# Patient Record
Sex: FEMALE | Race: WHITE | HISPANIC OR LATINO | Employment: UNEMPLOYED | URBAN - METROPOLITAN AREA
[De-identification: names, ages, dates, MRNs, and addresses within clinical notes are randomized per-mention and may not be internally consistent; named-entity substitution may affect disease eponyms.]

---

## 2017-01-06 ENCOUNTER — GENERIC CONVERSION - ENCOUNTER (OUTPATIENT)
Dept: OTHER | Facility: OTHER | Age: 52
End: 2017-01-06

## 2017-01-31 ENCOUNTER — ALLSCRIPTS OFFICE VISIT (OUTPATIENT)
Dept: OTHER | Facility: OTHER | Age: 52
End: 2017-01-31

## 2017-01-31 DIAGNOSIS — M54.50 LOW BACK PAIN: ICD-10-CM

## 2017-01-31 DIAGNOSIS — R20.2 PARESTHESIA OF SKIN: ICD-10-CM

## 2017-02-07 ENCOUNTER — ALLSCRIPTS OFFICE VISIT (OUTPATIENT)
Dept: FAMILY MEDICINE CLINIC | Facility: CLINIC | Age: 52
End: 2017-02-07
Payer: COMMERCIAL

## 2017-02-07 PROCEDURE — T1015 CLINIC SERVICE: HCPCS | Performed by: PHYSICIAN ASSISTANT

## 2017-02-20 ENCOUNTER — HOSPITAL ENCOUNTER (OUTPATIENT)
Dept: RADIOLOGY | Facility: HOSPITAL | Age: 52
Discharge: HOME | End: 2017-02-20
Attending: ANESTHESIOLOGY
Payer: COMMERCIAL

## 2017-02-20 ENCOUNTER — TRANSCRIBE ORDERS (OUTPATIENT)
Dept: ADMINISTRATIVE | Facility: HOSPITAL | Age: 52
End: 2017-02-20

## 2017-02-20 DIAGNOSIS — M54.50 LOW BACK PAIN: ICD-10-CM

## 2017-02-20 DIAGNOSIS — R20.2 PARESTHESIA OF SKIN: ICD-10-CM

## 2017-02-20 PROCEDURE — 72110 X-RAY EXAM L-2 SPINE 4/>VWS: CPT

## 2017-02-21 ENCOUNTER — GENERIC CONVERSION - ENCOUNTER (OUTPATIENT)
Dept: OTHER | Facility: OTHER | Age: 52
End: 2017-02-21

## 2017-02-22 ENCOUNTER — HOSPITAL ENCOUNTER (EMERGENCY)
Facility: HOSPITAL | Age: 52
Discharge: HOME | End: 2017-02-22
Attending: EMERGENCY MEDICINE | Admitting: EMERGENCY MEDICINE
Payer: COMMERCIAL

## 2017-02-22 ENCOUNTER — APPOINTMENT (EMERGENCY)
Dept: RADIOLOGY | Facility: HOSPITAL | Age: 52
End: 2017-02-22
Payer: COMMERCIAL

## 2017-02-22 VITALS
RESPIRATION RATE: 18 BRPM | TEMPERATURE: 98.2 F | DIASTOLIC BLOOD PRESSURE: 65 MMHG | WEIGHT: 130 LBS | HEIGHT: 64 IN | OXYGEN SATURATION: 98 % | SYSTOLIC BLOOD PRESSURE: 122 MMHG | HEART RATE: 60 BPM | BODY MASS INDEX: 22.2 KG/M2

## 2017-02-22 DIAGNOSIS — K59.09 OTHER CONSTIPATION: Primary | ICD-10-CM

## 2017-02-22 DIAGNOSIS — G89.29 CHRONIC RIGHT-SIDED LOW BACK PAIN WITH RIGHT-SIDED SCIATICA: ICD-10-CM

## 2017-02-22 DIAGNOSIS — M54.41 CHRONIC RIGHT-SIDED LOW BACK PAIN WITH RIGHT-SIDED SCIATICA: ICD-10-CM

## 2017-02-22 LAB
BILIRUB UR QL STRIP: NEGATIVE
CLARITY UR: NORMAL
COLOR UR: YELLOW
GLUCOSE UR STRIP-MCNC: NEGATIVE MG/DL
HGB UR QL STRIP.AUTO: NEGATIVE
KETONES UR STRIP-MCNC: NEGATIVE MG/DL
LEUKOCYTE ESTERASE UR QL STRIP: NEGATIVE
NITRITE UR QL STRIP: NEGATIVE
PH UR STRIP.AUTO: 6 [PH] (ref 4.5–8)
PROT UR STRIP-MCNC: NEGATIVE MG/DL
SP GR UR STRIP.AUTO: >=1.03 (ref 1–1.03)
UROBILINOGEN UR QL STRIP.AUTO: 0.2 E.U./DL

## 2017-02-22 PROCEDURE — 74000 HB X-RAY EXAM OF ABDOMEN (SINGLE ANTEROPOSTERIOR VIEW): CPT

## 2017-02-22 PROCEDURE — 99283 EMERGENCY DEPT VISIT LOW MDM: CPT

## 2017-02-22 PROCEDURE — 81003 URINALYSIS AUTO W/O SCOPE: CPT | Performed by: EMERGENCY MEDICINE

## 2017-02-22 RX ORDER — PREDNISONE 20 MG/1
60 TABLET ORAL DAILY
Qty: 15 TABLET | Refills: 0 | Status: SHIPPED | OUTPATIENT
Start: 2017-02-22 | End: 2017-02-27

## 2017-02-22 RX ORDER — POLYETHYLENE GLYCOL 3350 17 G/17G
17 POWDER, FOR SOLUTION ORAL DAILY
Qty: 238 G | Refills: 0 | Status: SHIPPED | OUTPATIENT
Start: 2017-02-22 | End: 2018-02-11

## 2017-02-22 RX ORDER — PREDNISONE 20 MG/1
60 TABLET ORAL ONCE
Status: COMPLETED | OUTPATIENT
Start: 2017-02-22 | End: 2017-02-22

## 2017-02-22 RX ADMIN — PREDNISONE 60 MG: 20 TABLET ORAL at 16:00

## 2017-02-28 ENCOUNTER — GENERIC CONVERSION - ENCOUNTER (OUTPATIENT)
Dept: OTHER | Facility: OTHER | Age: 52
End: 2017-02-28

## 2017-03-10 ENCOUNTER — ALLSCRIPTS OFFICE VISIT (OUTPATIENT)
Dept: FAMILY MEDICINE CLINIC | Facility: CLINIC | Age: 52
End: 2017-03-10
Payer: COMMERCIAL

## 2017-03-10 PROCEDURE — T1015 CLINIC SERVICE: HCPCS | Performed by: PHYSICIAN ASSISTANT

## 2017-03-22 DIAGNOSIS — Z12.31 ENCOUNTER FOR SCREENING MAMMOGRAM FOR MALIGNANT NEOPLASM OF BREAST: ICD-10-CM

## 2017-03-30 ENCOUNTER — HOSPITAL ENCOUNTER (OUTPATIENT)
Dept: MAMMOGRAPHY | Facility: HOSPITAL | Age: 52
Discharge: HOME | End: 2017-03-30
Payer: COMMERCIAL

## 2017-03-30 ENCOUNTER — TRANSCRIBE ORDERS (OUTPATIENT)
Dept: ADMINISTRATIVE | Facility: HOSPITAL | Age: 52
End: 2017-03-30

## 2017-03-30 DIAGNOSIS — Z12.31 SCREENING MAMMOGRAM FOR HIGH-RISK PATIENT: Primary | ICD-10-CM

## 2017-03-30 DIAGNOSIS — Z12.31 ENCOUNTER FOR SCREENING MAMMOGRAM FOR MALIGNANT NEOPLASM OF BREAST: ICD-10-CM

## 2017-03-30 DIAGNOSIS — Z12.31 SCREENING MAMMOGRAM FOR HIGH-RISK PATIENT: ICD-10-CM

## 2017-03-30 PROCEDURE — G0202 SCR MAMMO BI INCL CAD: HCPCS

## 2017-06-13 ENCOUNTER — ALLSCRIPTS OFFICE VISIT (OUTPATIENT)
Dept: OTHER | Facility: OTHER | Age: 52
End: 2017-06-13

## 2017-06-14 ENCOUNTER — ALLSCRIPTS OFFICE VISIT (OUTPATIENT)
Dept: FAMILY MEDICINE CLINIC | Facility: CLINIC | Age: 52
End: 2017-06-14
Payer: COMMERCIAL

## 2017-06-14 DIAGNOSIS — Z01.419 ENCOUNTER FOR GYNECOLOGICAL EXAMINATION WITHOUT ABNORMAL FINDING: ICD-10-CM

## 2017-06-14 DIAGNOSIS — R23.8 OTHER SKIN CHANGES: ICD-10-CM

## 2017-06-14 DIAGNOSIS — R53.83 OTHER FATIGUE: ICD-10-CM

## 2017-06-14 DIAGNOSIS — M79.604 PAIN OF RIGHT LEG: ICD-10-CM

## 2017-06-14 DIAGNOSIS — E78.5 HYPERLIPIDEMIA: ICD-10-CM

## 2017-06-14 PROCEDURE — T1015 CLINIC SERVICE: HCPCS | Performed by: FAMILY MEDICINE

## 2017-06-29 ENCOUNTER — GENERIC CONVERSION - ENCOUNTER (OUTPATIENT)
Dept: OTHER | Facility: OTHER | Age: 52
End: 2017-06-29

## 2017-07-06 ENCOUNTER — APPOINTMENT (OUTPATIENT)
Dept: LAB | Facility: HOSPITAL | Age: 52
End: 2017-07-06
Payer: COMMERCIAL

## 2017-07-06 ENCOUNTER — ALLSCRIPTS OFFICE VISIT (OUTPATIENT)
Dept: FAMILY MEDICINE CLINIC | Facility: CLINIC | Age: 52
End: 2017-07-06
Payer: COMMERCIAL

## 2017-07-06 LAB
BILIRUB UR QL STRIP: NORMAL
CLARITY UR: NORMAL
COLOR UR: YELLOW
GLUCOSE (HISTORICAL): NORMAL
HGB UR QL STRIP.AUTO: NORMAL
KETONES UR STRIP-MCNC: NORMAL MG/DL
LEUKOCYTE ESTERASE UR QL STRIP: NORMAL
NITRITE UR QL STRIP: NORMAL
PH UR STRIP.AUTO: 6 [PH]
PROT UR STRIP-MCNC: NORMAL MG/DL
SP GR UR STRIP.AUTO: 1010
UROBILINOGEN UR QL STRIP.AUTO: 0.2

## 2017-07-06 PROCEDURE — 36415 COLL VENOUS BLD VENIPUNCTURE: CPT

## 2017-07-06 PROCEDURE — 80053 COMPREHEN METABOLIC PANEL: CPT

## 2017-07-06 PROCEDURE — 81241 F5 GENE: CPT

## 2017-07-06 PROCEDURE — 84443 ASSAY THYROID STIM HORMONE: CPT

## 2017-07-06 PROCEDURE — T1015 CLINIC SERVICE: HCPCS | Performed by: FAMILY MEDICINE

## 2017-07-06 PROCEDURE — 85610 PROTHROMBIN TIME: CPT

## 2017-07-06 PROCEDURE — 85025 COMPLETE CBC W/AUTO DIFF WBC: CPT

## 2017-07-06 PROCEDURE — 80061 LIPID PANEL: CPT

## 2017-07-06 PROCEDURE — 81002 URINALYSIS NONAUTO W/O SCOPE: CPT | Performed by: FAMILY MEDICINE

## 2017-07-10 ENCOUNTER — ALLSCRIPTS OFFICE VISIT (OUTPATIENT)
Dept: FAMILY MEDICINE CLINIC | Facility: CLINIC | Age: 52
End: 2017-07-10
Payer: COMMERCIAL

## 2017-07-10 PROCEDURE — T1015 CLINIC SERVICE: HCPCS | Performed by: FAMILY MEDICINE

## 2017-07-10 PROCEDURE — G0145 SCR C/V CYTO,THINLAYER,RESCR: HCPCS | Performed by: PHYSICIAN ASSISTANT

## 2017-07-11 ENCOUNTER — LAB REQUISITION (OUTPATIENT)
Dept: LAB | Facility: HOSPITAL | Age: 52
End: 2017-07-11
Payer: COMMERCIAL

## 2017-07-11 DIAGNOSIS — Z01.419 ENCOUNTER FOR GYNECOLOGICAL EXAMINATION WITHOUT ABNORMAL FINDING: ICD-10-CM

## 2017-07-18 LAB
LAB AP GYN PRIMARY INTERPRETATION: NORMAL
Lab: NORMAL

## 2017-08-08 ENCOUNTER — CLINICAL SUPPORT (OUTPATIENT)
Dept: OTHER | Facility: OTHER | Age: 52
End: 2017-08-08

## 2017-08-29 ENCOUNTER — ALLSCRIPTS OFFICE VISIT (OUTPATIENT)
Dept: FAMILY MEDICINE CLINIC | Facility: CLINIC | Age: 52
End: 2017-08-29
Payer: COMMERCIAL

## 2017-08-29 DIAGNOSIS — M25.552 PAIN IN LEFT HIP: ICD-10-CM

## 2017-08-29 PROCEDURE — T1015 CLINIC SERVICE: HCPCS | Performed by: FAMILY MEDICINE

## 2017-08-30 ENCOUNTER — TRANSCRIBE ORDERS (OUTPATIENT)
Dept: ADMINISTRATIVE | Facility: HOSPITAL | Age: 52
End: 2017-08-30

## 2017-08-30 ENCOUNTER — HOSPITAL ENCOUNTER (OUTPATIENT)
Dept: RADIOLOGY | Facility: HOSPITAL | Age: 52
Discharge: HOME | End: 2017-08-30
Payer: COMMERCIAL

## 2017-08-30 DIAGNOSIS — M25.552 PAIN IN LEFT HIP: ICD-10-CM

## 2017-08-30 PROCEDURE — 73502 X-RAY EXAM HIP UNI 2-3 VIEWS: CPT

## 2017-09-05 ENCOUNTER — APPOINTMENT (OUTPATIENT)
Dept: PHYSICAL THERAPY | Facility: CLINIC | Age: 52
End: 2017-09-05
Payer: COMMERCIAL

## 2017-09-05 ENCOUNTER — GENERIC CONVERSION - ENCOUNTER (OUTPATIENT)
Dept: OTHER | Facility: OTHER | Age: 52
End: 2017-09-05

## 2017-09-05 PROCEDURE — 97140 MANUAL THERAPY 1/> REGIONS: CPT

## 2017-09-05 PROCEDURE — 97110 THERAPEUTIC EXERCISES: CPT

## 2017-09-05 PROCEDURE — 97014 ELECTRIC STIMULATION THERAPY: CPT

## 2017-09-05 PROCEDURE — 97162 PT EVAL MOD COMPLEX 30 MIN: CPT

## 2017-09-06 ENCOUNTER — APPOINTMENT (OUTPATIENT)
Dept: PHYSICAL THERAPY | Facility: CLINIC | Age: 52
End: 2017-09-06
Payer: COMMERCIAL

## 2017-09-06 PROCEDURE — 97014 ELECTRIC STIMULATION THERAPY: CPT

## 2017-09-06 PROCEDURE — 97140 MANUAL THERAPY 1/> REGIONS: CPT

## 2017-09-06 PROCEDURE — 97110 THERAPEUTIC EXERCISES: CPT

## 2017-09-07 ENCOUNTER — APPOINTMENT (OUTPATIENT)
Dept: PHYSICAL THERAPY | Facility: CLINIC | Age: 52
End: 2017-09-07
Payer: COMMERCIAL

## 2017-09-07 PROCEDURE — 97014 ELECTRIC STIMULATION THERAPY: CPT

## 2017-09-07 PROCEDURE — 97140 MANUAL THERAPY 1/> REGIONS: CPT

## 2017-09-07 PROCEDURE — 97110 THERAPEUTIC EXERCISES: CPT

## 2017-09-12 ENCOUNTER — APPOINTMENT (OUTPATIENT)
Dept: PHYSICAL THERAPY | Facility: CLINIC | Age: 52
End: 2017-09-12
Payer: COMMERCIAL

## 2017-09-13 ENCOUNTER — APPOINTMENT (OUTPATIENT)
Dept: PHYSICAL THERAPY | Facility: CLINIC | Age: 52
End: 2017-09-13
Payer: COMMERCIAL

## 2017-09-13 PROCEDURE — 97110 THERAPEUTIC EXERCISES: CPT

## 2017-09-13 PROCEDURE — 97140 MANUAL THERAPY 1/> REGIONS: CPT

## 2017-09-14 ENCOUNTER — APPOINTMENT (OUTPATIENT)
Dept: PHYSICAL THERAPY | Facility: CLINIC | Age: 52
End: 2017-09-14
Payer: COMMERCIAL

## 2017-09-14 PROCEDURE — 97012 MECHANICAL TRACTION THERAPY: CPT

## 2017-09-14 PROCEDURE — 97110 THERAPEUTIC EXERCISES: CPT

## 2017-09-18 ENCOUNTER — ALLSCRIPTS OFFICE VISIT (OUTPATIENT)
Dept: OTHER | Facility: OTHER | Age: 52
End: 2017-09-18

## 2017-09-20 ENCOUNTER — APPOINTMENT (OUTPATIENT)
Dept: PHYSICAL THERAPY | Facility: CLINIC | Age: 52
End: 2017-09-20
Payer: COMMERCIAL

## 2017-09-20 PROCEDURE — 97140 MANUAL THERAPY 1/> REGIONS: CPT

## 2017-09-20 PROCEDURE — 97110 THERAPEUTIC EXERCISES: CPT

## 2017-09-21 ENCOUNTER — GENERIC CONVERSION - ENCOUNTER (OUTPATIENT)
Dept: OTHER | Facility: OTHER | Age: 52
End: 2017-09-21

## 2017-09-21 ENCOUNTER — APPOINTMENT (OUTPATIENT)
Dept: PHYSICAL THERAPY | Facility: CLINIC | Age: 52
End: 2017-09-21
Payer: COMMERCIAL

## 2017-09-21 PROCEDURE — 97110 THERAPEUTIC EXERCISES: CPT

## 2017-09-21 PROCEDURE — 97140 MANUAL THERAPY 1/> REGIONS: CPT

## 2017-09-27 ENCOUNTER — GENERIC CONVERSION - ENCOUNTER (OUTPATIENT)
Dept: OTHER | Facility: OTHER | Age: 52
End: 2017-09-27

## 2017-09-27 ENCOUNTER — APPOINTMENT (OUTPATIENT)
Dept: PHYSICAL THERAPY | Facility: CLINIC | Age: 52
End: 2017-09-27
Payer: COMMERCIAL

## 2017-09-27 PROCEDURE — 97110 THERAPEUTIC EXERCISES: CPT

## 2017-09-27 PROCEDURE — 97012 MECHANICAL TRACTION THERAPY: CPT

## 2017-09-27 PROCEDURE — 97140 MANUAL THERAPY 1/> REGIONS: CPT

## 2017-09-28 ENCOUNTER — APPOINTMENT (OUTPATIENT)
Dept: PHYSICAL THERAPY | Facility: CLINIC | Age: 52
End: 2017-09-28
Payer: COMMERCIAL

## 2017-09-28 PROCEDURE — 97110 THERAPEUTIC EXERCISES: CPT

## 2017-09-28 PROCEDURE — 97140 MANUAL THERAPY 1/> REGIONS: CPT

## 2017-09-28 PROCEDURE — 97012 MECHANICAL TRACTION THERAPY: CPT

## 2017-10-04 ENCOUNTER — APPOINTMENT (OUTPATIENT)
Dept: PHYSICAL THERAPY | Facility: CLINIC | Age: 52
End: 2017-10-04
Payer: COMMERCIAL

## 2017-10-04 PROCEDURE — 97140 MANUAL THERAPY 1/> REGIONS: CPT

## 2017-10-04 PROCEDURE — 97110 THERAPEUTIC EXERCISES: CPT

## 2017-10-04 PROCEDURE — 97012 MECHANICAL TRACTION THERAPY: CPT

## 2017-10-05 ENCOUNTER — APPOINTMENT (OUTPATIENT)
Dept: PHYSICAL THERAPY | Facility: CLINIC | Age: 52
End: 2017-10-05
Payer: COMMERCIAL

## 2017-10-05 PROCEDURE — 97110 THERAPEUTIC EXERCISES: CPT

## 2017-10-05 PROCEDURE — G8979 MOBILITY GOAL STATUS: HCPCS

## 2017-10-05 PROCEDURE — 97012 MECHANICAL TRACTION THERAPY: CPT

## 2017-10-05 PROCEDURE — 97140 MANUAL THERAPY 1/> REGIONS: CPT

## 2017-10-05 PROCEDURE — 97164 PT RE-EVAL EST PLAN CARE: CPT

## 2017-10-05 PROCEDURE — G8978 MOBILITY CURRENT STATUS: HCPCS

## 2017-10-09 ENCOUNTER — GENERIC CONVERSION - ENCOUNTER (OUTPATIENT)
Dept: OTHER | Facility: OTHER | Age: 52
End: 2017-10-09

## 2017-10-11 ENCOUNTER — APPOINTMENT (OUTPATIENT)
Dept: PHYSICAL THERAPY | Facility: CLINIC | Age: 52
End: 2017-10-11
Payer: COMMERCIAL

## 2017-10-11 PROCEDURE — 97012 MECHANICAL TRACTION THERAPY: CPT

## 2017-10-11 PROCEDURE — 97140 MANUAL THERAPY 1/> REGIONS: CPT

## 2017-10-11 PROCEDURE — 97110 THERAPEUTIC EXERCISES: CPT

## 2017-10-12 ENCOUNTER — APPOINTMENT (OUTPATIENT)
Dept: PHYSICAL THERAPY | Facility: CLINIC | Age: 52
End: 2017-10-12
Payer: COMMERCIAL

## 2017-10-12 PROCEDURE — 97110 THERAPEUTIC EXERCISES: CPT

## 2017-10-16 ENCOUNTER — ALLSCRIPTS OFFICE VISIT (OUTPATIENT)
Dept: FAMILY MEDICINE CLINIC | Facility: CLINIC | Age: 52
End: 2017-10-16
Payer: COMMERCIAL

## 2017-10-16 PROCEDURE — T1015 CLINIC SERVICE: HCPCS | Performed by: FAMILY MEDICINE

## 2017-10-18 ENCOUNTER — APPOINTMENT (OUTPATIENT)
Dept: PHYSICAL THERAPY | Facility: CLINIC | Age: 52
End: 2017-10-18
Payer: COMMERCIAL

## 2017-10-18 PROCEDURE — 97110 THERAPEUTIC EXERCISES: CPT

## 2017-10-19 ENCOUNTER — APPOINTMENT (OUTPATIENT)
Dept: PHYSICAL THERAPY | Facility: CLINIC | Age: 52
End: 2017-10-19
Payer: COMMERCIAL

## 2017-10-19 PROCEDURE — 97110 THERAPEUTIC EXERCISES: CPT

## 2017-10-25 ENCOUNTER — APPOINTMENT (OUTPATIENT)
Dept: PHYSICAL THERAPY | Facility: CLINIC | Age: 52
End: 2017-10-25
Payer: COMMERCIAL

## 2017-10-25 PROCEDURE — 97110 THERAPEUTIC EXERCISES: CPT

## 2017-10-26 ENCOUNTER — APPOINTMENT (OUTPATIENT)
Dept: PHYSICAL THERAPY | Facility: CLINIC | Age: 52
End: 2017-10-26
Payer: COMMERCIAL

## 2017-10-26 PROCEDURE — 97110 THERAPEUTIC EXERCISES: CPT

## 2017-10-26 NOTE — PROGRESS NOTES
Assessment  1  Myofascial pain (729 1) (M79 1)   2  Lumbar radiculopathy (724 4) (M54 16)   3  Primary osteoarthritis of both knees (715 16) (M17 0)   4  Backache with radiation (724 5) (M54 9)    Plan  Abdominal pain, PMH: Abdominal pain, LLQ (left lower quadrant), PMH: Acute medial  meniscus tear of right knee, subsequent encounter, PMH: History of epigastric pain    · Methocarbamol 500 MG Oral Tablet; take 1 tablet tid prn   Rx By: Eileen Cherry; Dispense: 30 Days ; #:90 Tablet; Refill: 2;For: Abdominal pain, PMH: Abdominal pain, LLQ (left lower quadrant), PMH: Acute medial meniscus tear of right knee, subsequent encounter, PMH: History of epigastric pain; RONNA = N; Verified Transmission to [facility]; Last Updated By: System, SureScripts; 9/18/2017 9:13:51 AM  Backache with radiation    · Follow-up visit in 2 months Evaluation and Treatment  Follow-up  Status: Hold For -  Scheduling  Requested for: 42Gge7372   Ordered; For: Backache with radiation; Ordered By: Eileen Cherry Performed:  Due: 84FTP6320  Backache with radiation, Lumbar radiculopathy    · * MRI LUMBAR SPINE WO CONTRAST; Status:Need Information - Financial  Authorization; Requested for:[id number];    Perform:[facility]; Due:98Lhm7594; Ordered; For:Backache with radiation, Lumbar radiculopathy; Ordered By:Ebony Orlando;  Lumbar radiculopathy, Paresthesia of bilateral legs    · Gabapentin 400 MG Oral Capsule; TAKE 1 CAPSULE 3 TIMES DAILY   Rx By: Eileen Cherry; Dispense: 30 Days ; #:90 Capsule; Refill: 1;For: Lumbar radiculopathy, Paresthesia of bilateral legs; RONNA = N; Verified Transmission to [facility]; Last Updated By: System, SureScripts; 9/18/2017 8:59:43 AM    Discussion/Summary    This is a 26-year-old Turkish-speaking female patient presents the office for follow-up visit today  The patient has brought her son to interpret for her today   The patient is currently being treated for myofascial pain, lumbar radiculopathy, lumbar back pain, paresthesias of bilateral legs, and patellofemoral arthralgia of the right knee  The patient reports that she has been having low back pain with left lower extremity radiculopathy in the L4 and L5 dermatomal distribution  The patient reports that she has been attending physical therapy 2 times a week for the last 2 weeks and reports that she does not see a significant decrease in her pain symptoms at this time  The patient reports that she is currently taking gabapentin 300 mg 3 times a day for her neuropathic and radicular pain complaints  The patient denies any adverse side effects from this medication at this time and reports that she feels that this medication is effective at decreasing her pain  The patient reports that she has been taking methocarbamol 500 mg 3 times a day for her myofascial pain and muscle spasm and denies any adverse side effects from this medication at this time such as drowsiness or excessive fatigue  The patient reports that she did not get the MRI of her lumbar spine done that was ordered at her last visit because she was unaware that it was ordered  The patient is also complaining of bilateral knee pain and reports that she does follow up with Dr Benjie Slaughter regarding her knee pain  plan for this patient is as follows:I will increase this patient's gabapentin to 400 mg 3 times a day for her neuropathic and radicular pain complaints  The patient denies any adverse side effects from this medication at this time and states that she feels that this medication is effective  I will renew this patient's methocarbamol 500 mg 3 times a day when necessary for her myofascial pain and muscle spasm  The patient denies any adverse side effects from this medication at this time  I encouraged this patient to continue physical therapy 2 times a week to help reduce her low back pain with left lower extremity radiculopathy    I will provide this patient with another prescription for an MRI of her lumbar spine  I did let this patient know that if there are any procedural interventions that she may benefit from that our office will contact her and have this scheduled  We will avoid NSAIDs in this patient due to peptic ulcer disease  The patient should continue with her home exercise program   I will follow-up with this patient in 2 months  The patient has the current Goals: To provide this patient had good pain relief to improve quality of life level functioning  The patent has the current Barriers: Language barrier  Patient is able to Self-Care  The treatment plan was reviewed with the patient/guardian  The patient/guardian understands and agrees with the treatment plan   The patient and Son was counseled regarding instructions for management,-- risk factor reductions,-- prognosis,-- patient and family education,-- impressions,-- risks and benefits of treatment options-- and-- importance of compliance with treatment  total time of encounter was 25 minutes  Chief Complaint  1  Leg Pain  Back and left leg pain  History of Present Illness  This is a 60-year-old Greek-speaking female patient presents the office for follow-up visit today  The patient has brought her son to interpret for her today  The patient is currently being treated for myofascial pain, lumbar radiculopathy, lumbar back pain, paresthesias of bilateral legs, and patellofemoral arthralgia of the right knee  The patient reports that she has been having low back pain with left lower extremity radiculopathy in the L4 and L5 dermatomal distribution  The patient reports that she has been attending physical therapy 2 times a week for the last 2 weeks and reports that she does not see a significant decrease in her pain symptoms at this time  The patient reports that she is currently taking gabapentin 300 mg 3 times a day for her neuropathic and radicular pain complaints   The patient denies any adverse side effects from this medication at this time and reports that she feels that this medication is effective at decreasing her pain  The patient reports that she has been taking methocarbamol 500 mg 3 times a day for her myofascial pain and muscle spasm and denies any adverse side effects from this medication at this time such as drowsiness or excessive fatigue  The patient reports that she did not get the MRI of her lumbar spine done that was ordered at her last visit because she was unaware that it was ordered  The patient is also complaining of bilateral knee pain and reports that she does follow up with Dr Dorinda Benoit regarding her knee pain  patient reports that her pain is the same as her previous visit rates her pain a 6/10 on the numerical pain rating scale  The patient reports that her pain is worse in the morning, evening, and night and that her pain is constant and describes the pain as a burning and pins and needles-like in nature  The patient reports that the amount of pain relief that she is obtaining now with her current medication regimen treatment plan is enough to make a real difference in her life  The patient reports that 3% of her pain is being relieved with her current medication regimen treatment plan   have personally reviewed and/or updated the patient's past medical history, past surgical history, family history, social history, allergies, and vital signs today  Other than as stated above, the patient denies any interval changes in medications, medical condition, mental condition, symptoms, or allergies since last office visit  Sam Sanchez presents with complaints of constant episodes of right posterior upper leg pain, radiating to the right hip, right thigh, bilateral knee and right lower leg  On a scale of 1 to 10, the patient rates the pain as 6  Symptoms are unchanged  Review of Systems    Constitutional: no fever,-- no recent weight gain-- and-- no recent weight loss     Eyes: no double vision-- and-- no blurry vision  Cardiovascular: no chest pain,-- no palpitations-- and-- no lower extremity edema  Respiratory: no complaints of shortness of breath-- and-- no wheezing  Musculoskeletal: difficulty walking, but-- no muscle weakness,-- no joint stiffness,-- no joint swelling,-- no limb swelling,-- no pain in extremity-- and-- no decreased range of motion  Neurological: no dizziness,-- no difficulty swallowing,-- no memory loss,-- no loss of consciousness-- and-- no seizures  Gastrointestinal: no nausea,-- no vomiting,-- no constipation-- and-- no diarrhea  Genitourinary: no difficulty initiating urine stream,-- no genital pain-- and-- no frequent urination  Integumentary: no complaints of skin rash  Psychiatric: no depression  Endocrine: no excessive thirst,-- no adrenal disease,-- no hypothyroidism-- and-- no hyperthyroidism  Hematologic/Lymphatic: no tendency for easy bruising-- and-- no tendency for easy bleeding  ROS reviewed  Active Problems  1  Abdominal pain (789 00) (R10 9)   2  Anxiety (300 00) (F41 9)   3  Arthralgia of knee (719 46) (M25 569)   4  Backache with radiation (724 5) (M54 9)   5  Depression screen (V79 0) (Z13 89)   6  Dyslipidemia (272 4) (E78 5)   7  Easy bruising (782 9) (R23 8)   8  Fatigue (780 79) (R53 83)   9  Lumbar radiculopathy (724 4) (M54 16)   10  Myofascial pain (729 1) (M79 1)   11  Neck swelling (784 2) (R22 1)   12  Need for immunization against influenza (V04 81) (Z23)   13  Need for Tdap vaccination (V06 1) (Z23)   14  Pain of left hip joint (719 45) (M25 552)   15  Paresthesia of bilateral legs (782 0) (R20 2)   16  Patellofemoral arthralgia of right knee (719 46) (M25 561)   17  Post-menopausal (V49 81) (Z78 0)   18  Postmenopausal atrophic vaginitis (627 3) (N95 2)   19  Primary osteoarthritis of both knees (715 16) (M17 0)   20  Right knee pain (719 46) (M25 561)   21  Right leg pain (729 5) (M79 604)   22   Screening for colon cancer (V76 51) (Z12 11)   23  Screening for lipid disorders (V77 91) (Z13 220)   24  Screening for osteoporosis (V82 81) (Z13 820)   25  Transient insomnia (307 41) (F51 02)   26  Visit for routine gyn exam (V72 31) (Z01 419)   27  Visit for screening mammogram (V76 12) (Z12 31)    Past Medical History  1  History of Abdominal pain, LLQ (left lower quadrant) (789 04) (R10 32)   2  History of Acute medial meniscus tear of right knee, subsequent encounter   (V58 89,836 0) (S83 241D)   3  History of Denial (799 29) (R45 89)   4  History of athlete's foot (V12 09) (Z86 19)   5  History of dizziness (V13 89) (Z87 898)   6  History of epigastric pain (V12 79) (Z87 19)   7  History of Helicobacter pylori infection (V12 09) (Z86 19)   8  History of low back pain (V13 59) (Z87 39)   9  History of urinary frequency (V13 09) (Z87 898)   10  History of Other acute sinusitis (461 8) (J01 80)   11  History of Vaginal burning (625 8) (N94 9)    The active problems and past medical history were reviewed and updated today  Surgical History  1  History of Appendectomy   2  History of Complete Colonoscopy    The surgical history was reviewed and updated today  Family History  Mother    1  Family history of Uterine cancer (80) (C55)    The family history was reviewed and updated today  Social History   · Never a smoker  The social history was reviewed and updated today  The social history was reviewed and is unchanged  Current Meds   1  Gabapentin 300 MG Oral Capsule; TAKE 1 CAPSULE 3 TIMES DAILY; Therapy: 50XIC1223 to (Evaluate:34Cdj4960)  Requested for: 54APW5840; Last   Rx:13Jun2017 Ordered   2  Methocarbamol 500 MG Oral Tablet; take 1 tablet tid prn; Therapy: 75GOU6632 to (Evaluate:10Khr6664)  Requested for: 74MHO9874; Last   Rx:13Jun2017 Ordered    The medication list was reviewed and updated today  Allergies  1   No Known Drug Allergies    Vitals  Vital Signs    Recorded: 21NXM4198 08: 37WB   Systolic 496   Diastolic 80   Weight 721 lb 4 oz   BMI Calculated 23 56   BSA Calculated 1 67   Pain Scale 6     Physical Exam    Constitutional   General appearance: Well developed, well nourished, alert, in no distress, non-toxic and no overt pain behavior  Eyes   Sclera: anicteric   HEENT   Hearing grossly intact  Neck   Neck: Supple, symmetric, trachea midline, no masses  Pulmonary   Respiratory effort: Even and unlabored  Cardiovascular   Examination of extremities: No edema or pitting edema present  Abdomen   Abdomen: Soft, non-tender, non-distended  Skin   Skin and subcutaneous tissue: Normal without rashes or lesions, well hydrated  Psychiatric   Mood and affect: Mood and affect appropriate  Neurologic   Cranial nerves: Cranial nerves II-XII grossly intact  the muscle tone was normal   Musculoskeletal   Gait and station: Abnormal  -- Antalgic  Joint Exam: -- Bilateral knees tender to palpation  Lumbar/Sacral Spine examination demonstrates  5/5 lower extremity strength in all muscle groups bilaterally  Positive seated straight leg raise on the left  Lumbar paraspinals and quadratus lumborum muscles tender to palpation bilaterally with significant muscle spasms noted  Results/Data  * XR HIP/PELV 2-3 VWS LEFT W PELVIS IF PERFORMED 98Kme1861 03:26PM Michelle Carey Order Number: [id number]     Test Name Result Flag Reference   * XR HIP/PELV 2-3 VWS LEFT (Report)     LEFT HIP     INDICATION: Chronic left hip pain     COMPARISON: None     VIEWS: AP pelvis and 2 coned down views     IMAGES: 3     FINDINGS:     There is no fracture or dislocation  Degenerative changes present in the inferior aspects of both sacroiliac joints  No lytic or blastic lesions are seen  Surgical clips overlie the right lower abdomen and left side of the true pelvis  IMPRESSION:     No acute osseous abnormality, left hip       Degenerative changes, sacroiliac joints  Workstation performed: [id number]     Signed by:   Marla Casillas MD   8/30/17     * XR SPINE LUMBAR MINIMUM 4 VIEWS NON INJURY 69Biu3112 01:52PM Vidhi Coulter Order Number: [id number]     Test Name Result Flag Reference   XR SPINE LUMBAR MINIMUM 4 VIEWS (Report)     LUMBAR SPINE     INDICATION: Chronic low back pain     COMPARISON: None     VIEWS: AP, lateral and bilateral oblique projections; 4 images     FINDINGS: Minimal thoracolumbar dextroscoliosis  Alignment is unremarkable  There is no radiographic evidence of acute fracture or destructive osseous lesion  No significant lumbar degenerative change noted  Narrowing and adjacent sclerosis is seen in the inferior aspect of the sacroiliac joints, right greater the left  Surgical clips overlie the right iliac crest and left inferior sacroiliac joints  IMPRESSION:     No acute bony abnormality, lumbar spine  Degenerative changes, sacroiliac joints  Workstation performed: [id number]     Signed by:   Marla Casillas MD   2/20/17     Future Appointments    Date/Time Provider Specialty Site   11/13/2017 09:00 AM RE[facility] SPINE   10/16/2017 03:45 PM David Vega, [address]     Signatures   Electronically signed by : Luz Marina Rodney; Sep 18 2017  9:12AM EST                       (Author)    Electronically signed by : Elliott Bazzi DO; Sep 20 2017  4:44PM EST

## 2017-11-01 ENCOUNTER — APPOINTMENT (OUTPATIENT)
Dept: PHYSICAL THERAPY | Facility: CLINIC | Age: 52
End: 2017-11-01
Payer: COMMERCIAL

## 2017-11-01 PROCEDURE — 97110 THERAPEUTIC EXERCISES: CPT

## 2017-11-02 ENCOUNTER — APPOINTMENT (OUTPATIENT)
Dept: PHYSICAL THERAPY | Facility: CLINIC | Age: 52
End: 2017-11-02
Payer: COMMERCIAL

## 2017-11-02 PROCEDURE — 97110 THERAPEUTIC EXERCISES: CPT

## 2017-11-08 ENCOUNTER — APPOINTMENT (OUTPATIENT)
Dept: PHYSICAL THERAPY | Facility: CLINIC | Age: 52
End: 2017-11-08
Payer: COMMERCIAL

## 2017-11-09 ENCOUNTER — APPOINTMENT (OUTPATIENT)
Dept: PHYSICAL THERAPY | Facility: CLINIC | Age: 52
End: 2017-11-09
Payer: COMMERCIAL

## 2017-11-09 ENCOUNTER — GENERIC CONVERSION - ENCOUNTER (OUTPATIENT)
Dept: OTHER | Facility: OTHER | Age: 52
End: 2017-11-09

## 2017-11-09 ENCOUNTER — APPOINTMENT (OUTPATIENT)
Dept: FAMILY MEDICINE CLINIC | Facility: CLINIC | Age: 52
End: 2017-11-09
Payer: COMMERCIAL

## 2017-11-09 PROCEDURE — 97110 THERAPEUTIC EXERCISES: CPT

## 2017-11-09 PROCEDURE — T1015 CLINIC SERVICE: HCPCS | Performed by: FAMILY MEDICINE

## 2017-11-12 NOTE — PROGRESS NOTES
Assessment    1  Pain of left hip joint (474 26) (M25 902)    Plan   Pain of left hip joint    · *1 - [facility]  *  Status: Active  Requested for: 79Pbu3604  Care Summary provided  : Yes   · Follow-up visit in 2 months Evaluation and Treatment  Follow-up  Status: Hold For -Scheduling  Requested for: 27Atj8786  * XR HIP/PELV 2-3 VWS LEFT W PELVIS IF PERFORMED; Status:Resulted - Requires Verification;   Done: 96GOP3815 12:00AM Due:28Jtk6462; Ordered; For:Pain of left hip joint; Ordered By:WILLAM Rodriguez [facility]; Pain of left hip joint (309 41) (K69 750)       Discussion/Summary    She missed appt with DR Emerald Chaparro as she was in [place]  She will reschedule  Check x ray L hip and pelvis  She will also reschedule MRI  6 weeks  The treatment plan was reviewed with the patient/guardian  The patient/guardian understands and agrees with the treatment plan      Chief Complaint  Patietn for a month has been having left hip pain  She says she is having a lot problems walking  History of Present Illness  47 yo female presents for above  she continues to have back pain and now with L hip pain  She did not have MRI done yet as she was on vacation  Leg feels weak but did not fall  described as burning and radiates into the L anterior thigh  Pain is constant but waxes and wanes  Review of Systems   Constitutional: No fever, no chills, feels well, no tiredness, no recent weight gain or weight loss  Eyes: No complaints of eye pain, no red eyes, no eyesight problems, no discharge, no dry eyes, no itching of eyes  ENT: no complaints of earache, no loss of hearing, no nose bleeds, no nasal discharge, no sore throat, no hoarseness  Cardiovascular: No complaints of slow heart rate, no fast heart rate, no chest pain, no palpitations, no leg claudication, no lower extremity edema  Respiratory: No complaints of shortness of breath, no wheezing, no cough, no SOB on exertion, no orthopnea, no PND    Musculoskeletal: as noted in HPI  Active Problems     1  Abdominal pain (789 00) (R10 9)   2  Arthralgia of knee (719 46) (M25 569)   3  Depression screen (V79 0) (Z13 89)   4  Dyslipidemia (272 4) (E78 5)   5  Easy bruising (782 9) (R23 8)   6  Fatigue (780 79) (R53 83)   7  Neck swelling (784 2) (R22 1)   8  Need for Tdap vaccination (V06 1) (Z23)   9  Paresthesia of bilateral legs (782 0) (R20 2)   10  Patellofemoral arthralgia of right knee (719 46) (M25 561)   11  Post-menopausal (V49 81) (Z78 0)   12  Postmenopausal atrophic vaginitis (627 3) (N95 2)   13  Right knee pain (719 46) (M25 561)   14  Right leg pain (729 5) (M79 604)   15  Screening for colon cancer (V76 51) (Z12 11)   16  Screening for lipid disorders (V77 91) (Z13 220)   17  Screening for osteoporosis (V82 81) (Z13 820)   18  Transient insomnia (307 41) (F51 02)   19  Visit for routine gyn exam (V72 31) (Z01 419)   20  Visit for screening mammogram (V76 12) (Z12 31)  Primary osteoarthritis of both knees (715 16) (M17 0)     Backache with radiation (724 5) (M54 9)     Anxiety (300 00) (F41 9)     Need for immunization against influenza (V04 81) (Z23)     Myofascial pain (729 1) (M79 1)     Lumbar radiculopathy (724 4) (M54 16)       Past Medical History    1  History of Abdominal pain, LLQ (left lower quadrant) (789 04) (R10 32)   2  History of Acute medial meniscus tear of right knee, subsequent encounter (V58 89,836 0) (S83 241D)   3  History of Denial (799 29) (R45 89)   4  History of athlete's foot (V12 09) (Z86 19)   5  History of dizziness (V13 89) (Z87 898)   6  History of epigastric pain (V12 79) (Z87 19)   7  History of Helicobacter pylori infection (V12 09) (Z86 19)   8  History of low back pain (V13 59) (Z87 39)   9  History of urinary frequency (V13 09) (Z87 898)   10  History of Other acute sinusitis (461 8) (J01 80)   11  History of Vaginal burning (625 8) (N94 9)    The active problems and past medical history were reviewed and updated today  Surgical History  1  History of Appendectomy   2  History of Complete Colonoscopy    The surgical history was reviewed and updated today  Family History  Mother    1  Family history of Uterine cancer (80) (C55)    The family history was reviewed and updated today  Social History     · Never a smoker  The social history was reviewed and updated today  The social history was reviewed and is unchanged  Current Meds   1  Famotidine 40 MG Oral Tablet; take 1 tablet daily at bedtime; Therapy: 44Uwv7566 to (Last Rx:00Pzw5717)  Requested for: 64EUB0112 Ordered   2  Gabapentin 300 MG Oral Capsule; TAKE 1 CAPSULE 3 TIMES DAILY; Therapy: 63PNX9185 to (Evaluate:54Abg9713)  Requested for: 38BLZ4160; Last Rx:61Xds9054 Ordered   3  HydrOXYzine HCl - 25 MG Oral Tablet; take 1 tab QHS as needed for sleep; Therapy: 75XQF5104 to (Cloteal Raw)  Requested for: 48ESK3884; Last Rx:39Ido8866 Ordered   4  Methocarbamol 500 MG Oral Tablet; take 1 tablet tid prn; Therapy: 81PDW0231 to (Evaluate:11Sep2017)  Requested for: 19BQS8233; Last Rx:37Wdu9161 Ordered    The medication list was reviewed and updated today  Allergies  1  No Known Drug Allergies    Vitals  Vital Signs    Recorded: 35Een5117 03:49PM   Temperature 98 6 F   Heart Rate 90   Systolic 696   Diastolic 76   Height 5 ft 4 in   Weight 137 lb    BMI Calculated 23 52   BSA Calculated 1 67   O2 Saturation 97       Physical Exam   Constitutional  General appearance: No acute distress, well appearing and well nourished  Eyes  Conjunctiva and lids: No swelling, erythema or discharge  Pupils and irises: Equal, round and reactive to light  Ears, Nose, Mouth, and Throat  External inspection of ears and nose: Normal    Pulmonary  Respiratory effort: No increased work of breathing or signs of respiratory distress  Auscultation of lungs: Clear to auscultation  Cardiovascular  Palpation of heart: Normal PMI, no thrills     Auscultation of heart: Normal rate and rhythm, normal S1 and S2, without murmurs  Examination of extremities for edema and/or varicosities: Normal    Lymphatic  Palpation of lymph nodes in neck: No lymphadenopathy  Musculoskeletal  Inspection/palpation of joints, bones, and muscles: Abnormal  -- tenderness L LS paraspinal area  Decreased ROM with hop flexion  Skin  Skin and subcutaneous tissue: Normal without rashes or lesions  Neurologic  Cranial nerves: Cranial nerves 2-12 intact  Psychiatric  Orientation to person, place, and time: Normal    Mood and affect: Normal          Future Appointments    Date/Time Provider Specialty Site   11/13/2017 09:00 AM REJI Truong [facility] SPINE   11/17/2017 11:00 AM HAM Carballo  Orthopedic Surgery [address]        Signatures   Electronically signed by : SHELLY Smith;  Aug 29 2017  4:07PM EST                       (Author)    Electronically signed by : Jovita Castillo MD; Nov 11 2017  6:00PM EST                       (Author)

## 2017-11-13 NOTE — PROGRESS NOTES
Assessment    1  Need for immunization against influenza (V04 81) (Z23)   2  Pain of left hip joint (719 45) (M25 552)   3  Anxiety (300 00) (F41 9)    Plan   Anxiety    · Follow-up PRN Evaluation and Treatment  Follow-up  Status: Complete  Done:16Oct2017  Need for immunization against influenza    · 1 Karla Dupont MD, Kali Childs (Orthopedic Surgery) Co-Management  *  Status: Active Requested for: [id number]  Care Summary provided  : Yes   · Fluzone Quadrivalent Intramuscular Suspension; inject 0 5 mg today; To BeDone: [id number]  Pain of left hip joint (719 45) (M25 552)       Discussion/Summary    Flu vaccine today  Ortho referral  Discussed because of peptic disease no NSAID's  She may use Tylenol 1-2 daily as needed  The treatment plan was reviewed with the patient/guardian  The patient/guardian understands and agrees with the treatment plan      Chief Complaint  Pt is here today for a follow up  Pt is with daughter who speaks [place] and [address]  Daughter states that pt has been having bilateral leg pain and knee pain  Pt is sore more when pt gets up from a sitting position  History of Present Illness  47 yo female presents for above  She recently saw pain management who increased the Gabapentin to 400 mg TID per note  daughter who translates states that she is having hip pain bilat worse on the left that radiates down into the knee after sitting for a few minutes  Leg feels unsteady for a few minutes  Review of Systems   Constitutional: No fever, no chills, feels well, no tiredness, no recent weight gain or weight loss  Eyes: No complaints of eye pain, no red eyes, no eyesight problems, no discharge, no dry eyes, no itching of eyes  ENT: no complaints of earache, no loss of hearing, no nose bleeds, no nasal discharge, no sore throat, no hoarseness  Cardiovascular: No complaints of slow heart rate, no fast heart rate, no chest pain, no palpitations, no leg claudication, no lower extremity edema  Respiratory: No complaints of shortness of breath, no wheezing, no cough, no SOB on exertion, no orthopnea, no PND  Gastrointestinal: No complaints of abdominal pain, no constipation, no nausea or vomiting, no diarrhea, no bloody stools  Genitourinary: No complaints of dysuria, no incontinence, no pelvic pain, no dysmenorrhea, no vaginal discharge or bleeding  Musculoskeletal: as noted in HPI  Integumentary: No complaints of skin rash or lesions, no itching, no skin wounds, no breast pain or lump  Neurological: No complaints of headache, no confusion, no convulsions, no numbness, no dizziness or fainting, no tingling, no limb weakness, no difficulty walking  Active Problems     1  Abdominal pain (789 00) (R10 9)   2  Anxiety (300 00) (F41 9)   3  Arthralgia of knee (719 46) (M25 569)   4  Backache with radiation (724 5) (M54 9)   5  Depression screen (V79 0) (Z13 89)   6  Dyslipidemia (272 4) (E78 5)   7  Easy bruising (782 9) (R23 8)   8  Fatigue (780 79) (R53 83)   9  Lumbar radiculopathy (724 4) (M54 16)   10  Myofascial pain (729 1) (M79 1)   11  Neck swelling (784 2) (R22 1)   12  Need for immunization against influenza (V04 81) (Z23)   13  Need for Tdap vaccination (V06 1) (Z23)   14  Paresthesia of bilateral legs (782 0) (R20 2)   15  Patellofemoral arthralgia of right knee (719 46) (M25 561)   16  Post-menopausal (V49 81) (Z78 0)   17  Postmenopausal atrophic vaginitis (627 3) (N95 2)   18  Primary osteoarthritis of both knees (715 16) (M17 0)   19  Right knee pain (719 46) (M25 561)   20  Right leg pain (729 5) (M79 604)   21  Screening for colon cancer (V76 51) (Z12 11)   22  Screening for lipid disorders (V77 91) (Z13 220)   23  Screening for osteoporosis (V82 81) (Z13 820)   24  Transient insomnia (307 41) (F51 02)   25  Visit for routine gyn exam (V72 31) (Z01 419)   26  Visit for screening mammogram (V76 12) (Z12 31)  Pain of left hip joint (619 45) (M25 102)       Past Medical History    1  History of Abdominal pain, LLQ (left lower quadrant) (789 04) (R10 32)   2  History of Acute medial meniscus tear of right knee, subsequent encounter (V58 89,836 0) (S83 241D)   3  History of Denial (799 29) (R45 89)   4  History of athlete's foot (V12 09) (Z86 19)   5  History of dizziness (V13 89) (Z87 898)   6  History of epigastric pain (V12 79) (Z87 19)   7  History of Helicobacter pylori infection (V12 09) (Z86 19)   8  History of low back pain (V13 59) (Z87 39)   9  History of urinary frequency (V13 09) (Z87 898)   10  History of Other acute sinusitis (461 8) (J01 80)   11  History of Vaginal burning (625 8) (N94 9)    Surgical History  1  History of Appendectomy   2  History of Complete Colonoscopy    Family History  Mother    1  Family history of Uterine cancer (80) (C55)    Social History     · Never a smoker    Current Meds   1  Gabapentin 400 MG Oral Capsule; TAKE 1 CAPSULE 3 TIMES DAILY; Therapy: 81JVU5976 to (Evaluate:20Gxl1743)  Requested for: 65Set4121; Last Rx:01Zfk6330 Ordered   2  Methocarbamol 500 MG Oral Tablet; take 1 tablet tid prn; Therapy: 44DQH0089 to (Evaluate:12Epq4955)  Requested for: 89Mdf4952; Last Rx:50Ucp5842 Ordered    Allergies  1  No Known Drug Allergies    Vitals  Vital Signs    Recorded: 54QHJ0954 04:09PM   Temperature 97 5 F   Heart Rate 52   Respiration 14   Systolic 607   Diastolic 78   Height 5 ft 4 in   Weight 136 lb    BMI Calculated 23 34   BSA Calculated 1 66   O2 Saturation 98       Physical Exam   Constitutional  General appearance: No acute distress, well appearing and well nourished  Eyes  Conjunctiva and lids: No swelling, erythema or discharge  Pupils and irises: Equal, round and reactive to light  Ears, Nose, Mouth, and Throat  External inspection of ears and nose: Normal    Pulmonary  Respiratory effort: No increased work of breathing or signs of respiratory distress  Auscultation of lungs: Clear to auscultation     Cardiovascular  Palpation of heart: Normal PMI, no thrills  Auscultation of heart: Normal rate and rhythm, normal S1 and S2, without murmurs  Examination of extremities for edema and/or varicosities: Normal    Lymphatic  Palpation of lymph nodes in neck: No lymphadenopathy  Musculoskeletal  Inspection/palpation of joints, bones, and muscles: Abnormal  -- L hip pain with palaption  Decreased ROM  Skin  Skin and subcutaneous tissue: Normal without rashes or lesions  Neurologic  Cranial nerves: Cranial nerves 2-12 intact  Psychiatric  Orientation to person, place, and time: Normal    Mood and affect: Normal          Future Appointments    Date/Time Provider Specialty Site   11/13/2017 09:00 AM REJI [facility] SPINE   11/17/2017 11:00 AM HAM Mace  Orthopedic Surgery [address]        Signatures   Electronically signed by :  Wanda Mckeon [facility]; Oct 16 2017  4:31PM EST                       (Author)    Electronically signed by : Azael Borja MD; Nov 12 2017  8:50PM EST                       (Author)

## 2017-11-15 ENCOUNTER — APPOINTMENT (OUTPATIENT)
Dept: PHYSICAL THERAPY | Facility: CLINIC | Age: 52
End: 2017-11-15
Payer: COMMERCIAL

## 2017-11-15 PROCEDURE — G8979 MOBILITY GOAL STATUS: HCPCS

## 2017-11-15 PROCEDURE — G8980 MOBILITY D/C STATUS: HCPCS

## 2017-11-15 PROCEDURE — 97164 PT RE-EVAL EST PLAN CARE: CPT

## 2017-11-15 PROCEDURE — 97110 THERAPEUTIC EXERCISES: CPT

## 2017-11-20 ENCOUNTER — ALLSCRIPTS OFFICE VISIT (OUTPATIENT)
Dept: OTHER | Facility: OTHER | Age: 52
End: 2017-11-20

## 2017-11-20 DIAGNOSIS — M54.42 LOW BACK PAIN WITH LEFT-SIDED SCIATICA: ICD-10-CM

## 2017-11-20 DIAGNOSIS — M54.41 LOW BACK PAIN WITH RIGHT-SIDED SCIATICA: ICD-10-CM

## 2017-11-20 DIAGNOSIS — M25.551 PAIN IN RIGHT HIP: ICD-10-CM

## 2017-11-20 DIAGNOSIS — M54.16 RADICULOPATHY OF LUMBAR REGION: ICD-10-CM

## 2017-11-20 DIAGNOSIS — M25.552 PAIN IN LEFT HIP: ICD-10-CM

## 2017-11-21 ENCOUNTER — GENERIC CONVERSION - ENCOUNTER (OUTPATIENT)
Dept: OTHER | Facility: OTHER | Age: 52
End: 2017-11-21

## 2017-12-11 ENCOUNTER — HOSPITAL ENCOUNTER (OUTPATIENT)
Dept: MRI IMAGING | Facility: HOSPITAL | Age: 52
Discharge: HOME | End: 2017-12-11
Payer: COMMERCIAL

## 2017-12-11 DIAGNOSIS — M54.41 LOW BACK PAIN WITH RIGHT-SIDED SCIATICA: ICD-10-CM

## 2017-12-11 DIAGNOSIS — M54.42 LOW BACK PAIN WITH LEFT-SIDED SCIATICA: ICD-10-CM

## 2017-12-11 DIAGNOSIS — M54.16 RADICULOPATHY OF LUMBAR REGION: ICD-10-CM

## 2017-12-11 PROCEDURE — 72148 MRI LUMBAR SPINE W/O DYE: CPT

## 2017-12-12 ENCOUNTER — GENERIC CONVERSION - ENCOUNTER (OUTPATIENT)
Dept: OTHER | Facility: OTHER | Age: 52
End: 2017-12-12

## 2017-12-12 ENCOUNTER — TRANSCRIBE ORDERS (OUTPATIENT)
Dept: ADMINISTRATIVE | Facility: HOSPITAL | Age: 52
End: 2017-12-12

## 2017-12-12 ENCOUNTER — HOSPITAL ENCOUNTER (OUTPATIENT)
Dept: RADIOLOGY | Facility: HOSPITAL | Age: 52
Discharge: HOME | End: 2017-12-12
Payer: COMMERCIAL

## 2017-12-12 ENCOUNTER — HOSPITAL ENCOUNTER (EMERGENCY)
Facility: HOSPITAL | Age: 52
Discharge: HOME | End: 2017-12-13
Attending: EMERGENCY MEDICINE | Admitting: EMERGENCY MEDICINE
Payer: COMMERCIAL

## 2017-12-12 VITALS
HEART RATE: 66 BPM | SYSTOLIC BLOOD PRESSURE: 176 MMHG | OXYGEN SATURATION: 96 % | BODY MASS INDEX: 23.05 KG/M2 | HEIGHT: 64 IN | RESPIRATION RATE: 18 BRPM | DIASTOLIC BLOOD PRESSURE: 80 MMHG | WEIGHT: 135 LBS | TEMPERATURE: 97.7 F

## 2017-12-12 DIAGNOSIS — M54.42 LOW BACK PAIN WITH LEFT-SIDED SCIATICA: ICD-10-CM

## 2017-12-12 DIAGNOSIS — M54.16 RADICULOPATHY OF LUMBAR REGION: ICD-10-CM

## 2017-12-12 DIAGNOSIS — J06.9 UPPER RESPIRATORY TRACT INFECTION, UNSPECIFIED TYPE: Primary | ICD-10-CM

## 2017-12-12 DIAGNOSIS — M54.41 LOW BACK PAIN WITH RIGHT-SIDED SCIATICA: ICD-10-CM

## 2017-12-12 DIAGNOSIS — J02.9 PHARYNGITIS, UNSPECIFIED ETIOLOGY: ICD-10-CM

## 2017-12-12 LAB — S PYO AG THROAT QL: NEGATIVE

## 2017-12-12 PROCEDURE — 87430 STREP A AG IA: CPT | Performed by: EMERGENCY MEDICINE

## 2017-12-12 PROCEDURE — 72114 X-RAY EXAM L-S SPINE BENDING: CPT

## 2017-12-12 PROCEDURE — 87070 CULTURE OTHR SPECIMN AEROBIC: CPT | Performed by: EMERGENCY MEDICINE

## 2017-12-13 PROCEDURE — 99283 EMERGENCY DEPT VISIT LOW MDM: CPT

## 2017-12-13 RX ORDER — IBUPROFEN 600 MG/1
600 TABLET ORAL EVERY 6 HOURS PRN
Qty: 30 TABLET | Refills: 0 | Status: SHIPPED | OUTPATIENT
Start: 2017-12-13 | End: 2018-02-11

## 2017-12-13 RX ORDER — BENZONATATE 200 MG/1
200 CAPSULE ORAL EVERY 8 HOURS
Qty: 30 CAPSULE | Refills: 0 | Status: SHIPPED | OUTPATIENT
Start: 2017-12-13 | End: 2018-02-11

## 2017-12-13 RX ORDER — IBUPROFEN 600 MG/1
600 TABLET ORAL ONCE
Status: COMPLETED | OUTPATIENT
Start: 2017-12-13 | End: 2017-12-13

## 2017-12-13 RX ADMIN — IBUPROFEN 600 MG: 600 TABLET, FILM COATED ORAL at 00:11

## 2017-12-13 NOTE — DISCHARGE INSTRUCTIONS
Honey for cough  No strep found today - if the culture comes back positive you will need an antibiotic  We will call you in the next few days if you need an antibiotic  Infección respiratoria superior   LO QUE NECESITA SABER:   Natalie infección respiratoria superior también se conoce yolis el resfriado común  Nemo es natalie infección que puede afectar del valle nariz, garganta, oídos y los senos frontales  Para personas saludables, el resfriado común generalmente no es grave y no requiere tratamiento especial  Los síntomas del resfriado generalmente son [place] graves linda los primeros 3 a 5 días  [address] en 7 a 14 días  Puede que usted siga tosiendo por 2 a 3 semanas  Los resfriados son provocados por virus y no mejoran con antibióticos  INSTRUCCIONES SOBRE EL MAGGIE HOSPITALARIA:   Regrese a la nat de emergencias si:   · Usted tiene dolor torácico y dificultad para respirar  Pregúntele a del valle [facility] vitaminas y minerales son adecuados para usted  · Usted tiene fiebre de más de 102ºF [facility])  · Del Valle garganta irritada empeora o usted ve manchas ayaka o jesse en del valle garganta  · Ludy síntomas empeoran después de 3 a 5 días o del valle resfriado no mejora en 14 días  · Usted tiene sarpullido en alguna parte de del valle piel  · Usted tiene bultos grandes y sensible en del valle shante    · Usted tiene secreción espesa de color kenny o amarillo proveniente de del valle nariz  · Usted expectora mucosidad de color amarillo, kenny o con Daniel  · Usted vomita por más de 24 horas y no puede retener líquidos en el estómago  · Usted tiene un grave dolor de oído  · Usted tiene preguntas o inquietudes acerca de del valle condición o cuidado  Medicamentos:  Es posible que usted necesite alguno de los siguientes:  · Los descongestionantes  ayudan a reducir la congestión nasal y [place] Lemon a respirar más fácilmente  Si lynda pastillas descongestionantes, pueden causarle agitación o problemas para dormir   No use aerosol descongestionante por más de unos cuantos días  · Los jarabes para la tos  ayudan a reducir la tos  Pregúntele a del valle médico cuál tipo de medicamento para la tos es mejor para usted  · AINEs (Analgésicos antiinflamatorios no esteroides) yolis el ibuprofeno, ayudan a disminuir la inflamación, el dolor y la [place]  Los AINEs pueden causar sangrado estomacal o problemas renales en ciertas personas  Si usted lynda un medicamento anticoagulante, siempre pregúntele a del valle médico si los FANTASMA son seguros para usted  Siempre rhea la etiqueta de jennifer medicamento y [place] instrucciones  · Acetaminofeno:  liza el dolor y baja la fiebre  Está disponible sin receta médica  Pregunte la cantidad y la frecuencia con que debe tomarlos  [address]  Rhea las etiquetas de todos los demás medicamentos que esté usando para saber si también contienen acetaminofén, o pregunte a del valle médico o farmacéutico  El acetaminofén puede causar daño en el hígado cuando no se lynda de forma correcta  No use más de 4 gramos (4000 miligramos) en total de acetaminofeno en un día  · [place] catherine medicamentos yolis se le haya indicado  Consulte con del valle médico si usted kayla que del valle medicamento no le está ayudando o si presenta efectos secundarios  Infórmele si es alérgico a cualquier medicamento  Mantenga haydee lista actualizada de los [place], las vitaminas y los productos herbales que lynda  Incluya los siguientes datos de los medicamentos: cantidad, frecuencia y motivo de administración  Traiga con usted la lista o los envases de la píldoras a catherine citas de seguimiento  Lleve la lista de los medicamentos con usted en farhad de haydee emergencia  Acuda a catherine consultas de control con del valle médico según le indicaron  Anote catherine preguntas para que se acuerde de hacerlas linda catherine visitas  Cuidados personales:   · Descanse el mayor tiempo posible  Empiece a hacer un poco más día a día  · [employer] líquidos yolis se le indique    Los [employer] ayudarán a aflojar y Yasir Jorge A mucosidad para que la pueda expectorar  Los líquidos ayudarán a evitar la deshidratación  Los líquidos que ayudan a prevenir la deshidratación pueden ser [place] Ghislaine y caldo  No tome líquidos que contienen cafeína  La cafeína puede aumentar el riesgo de deshidratación  Pregúntele a del valle médico cuál es la cantidad de líquido que necesita ingerir a diario  · Alivie el dolor de garganta  Roberto gárgaras de agua tibia con sal  [place] ayuda a aliviar el dolor de garganta  Prepare agua salina disolviendo ¼ de cucharada de sal a 1 taza de agua tibia  Usted puede también chupar dulces duros o pastillas para la garganta  Usted puede usar aerosol para el dolor de garganta  · Use un humidificador o vaporizador  Use un humidificador de deborah frío o un vaporizador para elevar la humedad en del valle casa  [place] podría ayudarle a respirar más fácilmente y al mismo tiempo disminuir la tos  · Use gotas nasales de solución salina yolis se le haya indicado  Estas ayudan a Arthur Dumont  · Aplique vaselina en la parte externa alrededor de las fosas nasales  Esta puede disminuir la irritación de sonarse la nariz  · No fume  La nicotina y otros químicos en los cigarrillos y cigarros pueden empeorar catherine síntomas  También pueden causar infecciones yolis la bronquitis o la neumonía  Pida información a del valle médico si usted actualmente fuma y necesita ayuda para dejar de fumar  Los cigarrillos electrónicos o tabaco sin humo todavía contienen nicotina  Consulte con del valle médico antes de [employer]  Evite transmitir del valle resfriado a los demás:   · Trate de mantenerse alejado de otras personas linda los primeros 2 a 3 días de del valle resfriado cuando éste es más fácil de transmitir  · No comparta alimentos o bebidas  · No comparta toallas de mano con otros miembros de oscar en el hogar  · [employer] frecuentemente, especialmente después de sonarse la nariz   Mj la espalda a otras personas y cúbrase la boca y la nariz con un pañuelo desechable cuando estornuda o tose  © 2017 [address] Information is for End User's use only and may not be sold, redistributed or otherwise used for commercial purposes  All illustrations and images included in CareNotes® are the copyrighted property of A D A M , Inc  or Robert Thornton  Esta información es sólo para uso en educación  Jane intención no es darle un consejo médico sobre enfermedades o tratamientos  Colsulte con jane Sandee Cabot farmacéutico antes de seguir cualquier régimen médico para saber si es seguro y efectivo para usted

## 2017-12-13 NOTE — ED PROVIDER NOTES
History  Chief Complaint   Patient presents with    Sore Throat     c/o sore throat, intermittent vomiting and nausea;  with congestion and cough for past three days  Denies follow up with PCP  Patient: Ai finley o /female  YOB: 1965  MRN: [id number]  PCP: Dee Buckley PA-C  Date of evaluation: 17    (N SAMMIE Locke may have been used in the preparation of this document )    History provided by:  Patient and relative  Sore Throat   Location:  Generalized  Quality:  Sharp  Severity:  Moderate  Onset quality:  Gradual  Duration:  2 days  Timing:  Constant  Progression:  Worsening  Chronicity:  New  Relieved by:  Nothing  Worsened by:  Drinking and eating  Ineffective treatments:  OTC medications  Associated symptoms: chills and cough    Associated symptoms: no abdominal pain, no chest pain, no neck stiffness, no rash, no shortness of breath, no stridor and no trouble swallowing  Fever: subjective  Voice change: hoarse  Prior to Admission Medications   Prescriptions Last Dose Informant Patient Reported?  Taking?   fluticasone (FLONASE) 50 mcg/act nasal spray   No No   Si spray into each nostril daily for 30 days   gabapentin (NEURONTIN) 100 mg capsule Past Week at Unknown time  Yes Yes   Sig: Take 100 mg by mouth 3 (three) times a day   polyethylene glycol (MIRALAX) 17 g packet   No No   Sig: Take 17 g by mouth daily for 14 days      Facility-Administered Medications: None       Past Medical History:   Diagnosis Date    Fatigue     High cholesterol     Neuropathy     Osteoarthritis     knees       Past Surgical History:   Procedure Laterality Date    APPENDECTOMY      NE COLONOSCOPY FLX DX W/COLLJ SPEC WHEN PFRMD N/A 2016    Procedure: COLONOSCOPY;  Surgeon: Seema Bear MD;  Location: [facility];  Service: Gastroenterology    NE ESOPHAGOGASTRODUODENOSCOPY TRANSORAL DIAGNOSTIC N/A 11/15/2016    Procedure: ESOPHAGOGASTRODUODENOSCOPY (EGD); Surgeon: Veronica Mcleod MD;  Location: [facility];  Service: Gastroenterology       History reviewed  No pertinent family history  I have reviewed and agree with the history as documented  Social History   Substance Use Topics    Smoking status: Never Smoker    Smokeless tobacco: Never Used    Alcohol use No        Review of Systems   Constitutional: Positive for chills  Fever: subjective  HENT: Positive for sore throat  Negative for trouble swallowing  Voice change: hoarse  Respiratory: Positive for cough  Negative for shortness of breath and stridor  Cardiovascular: Negative for chest pain and palpitations  Gastrointestinal: Negative for abdominal pain, diarrhea and vomiting  Genitourinary: Negative for decreased urine volume and difficulty urinating  Musculoskeletal: Negative for neck stiffness  Skin: Negative for color change and rash  Physical Exam  ED Triage Vitals [12/12/17 2026]   Temperature Pulse Respirations Blood Pressure SpO2   97 7 °F (36 5 °C) 66 18 (!) 176/80 96 %      Temp Source Heart Rate Source Patient Position - Orthostatic VS BP Location FiO2 (%)   Temporal Monitor Sitting Right arm --      Pain Score       7           Orthostatic Vital Signs  Vitals:    12/12/17 2026   BP: (!) 176/80   Pulse: 66   Patient Position - Orthostatic VS: Sitting       Physical Exam   Constitutional: She appears well-developed and well-nourished  Non-toxic appearance  She appears ill  HENT:   Mouth/Throat: Uvula is midline and mucous membranes are normal  No oropharyngeal exudate, posterior oropharyngeal edema or tonsillar abscesses  Pharynx injected   Cardiovascular: Normal rate and regular rhythm  Pulmonary/Chest: Effort normal and breath sounds normal    Abdominal: Soft  There is no tenderness  Neurological: She is alert  GCS eye subscore is 4  GCS verbal subscore is 5  GCS motor subscore is 6  Psychiatric: She has a normal mood and affect   Her speech is normal and behavior is normal    Nursing note and vitals reviewed  ED Medications  Medications   ibuprofen (MOTRIN) tablet 600 mg (600 mg Oral Given 12/13/17 0011)       Diagnostic Studies  Results Reviewed     Procedure Component Value Units Date/Time    Rapid Beta strep screen [[id number]]  (Normal) Collected:  12/12/17 2113    Lab Status:  Final result Specimen:  Throat from Throat Updated:  12/12/17 2123     Rapid Strep A Screen Negative    Throat culture [[id number]] Collected:  12/12/17 2113    Lab Status: In process Specimen:  Throat from Throat Updated:  12/12/17 2123                 No orders to display              Procedures  Procedures       Phone Contacts  ED Phone Contact    ED Course  ED Course          Pt had declined an ; the  line was used in the discharge process to make sure all her questions were addressed  MDM  CritCare Time    Disposition  Final diagnoses:   Upper respiratory tract infection, unspecified type   Pharyngitis, unspecified etiology     Time reflects when diagnosis was documented in both MDM as applicable and the Disposition within this note     Time User Action Codes Description Comment    12/13/2017 12:07 AM Truong Mendieta Add [J06 9] Upper respiratory tract infection, unspecified type     12/13/2017 12:08 AM Truong Mendieta Add [J02 9] Pharyngitis, unspecified etiology       ED Disposition     ED Disposition Condition Comment    Discharge  [facility] discharge to home/self care  Condition at discharge: Good        Follow-up Information     Follow up With Specialties Details Why Contact Info    Andie Pagan PA-C Family Medicine Call in 1 day Tell about this ER visit   [address]  [phone]          Discharge Medication List as of 12/13/2017 12:26 AM      START taking these medications    Details   Benzocaine-Menthol 15-2 6 MG LOZG Use according to package directions as needed for throat pain , Normal benzonatate (TESSALON) 200 MG capsule Take 1 capsule by mouth every 8 (eight) hours prn cough, Starting Wed 12/13/2017, Normal      ibuprofen (MOTRIN) 600 mg tablet Take 1 tablet by mouth every 6 (six) hours as needed (pain), Starting Wed 12/13/2017, Normal         CONTINUE these medications which have NOT CHANGED    Details   gabapentin (NEURONTIN) 100 mg capsule Take 100 mg by mouth 3 (three) times a day, Until Discontinued, Historical Med      fluticasone (FLONASE) 50 mcg/act nasal spray 1 spray into each nostril daily for 30 days, Starting 12/6/2016, Until Wed 2/22/17, Normal      polyethylene glycol (MIRALAX) 17 g packet Take 17 g by mouth daily for 14 days, Starting 2/22/2017, Until Wed 3/8/17, Print           No discharge procedures on file      ED Provider  Electronically Signed by           Alise Owusu MD  12/13/17 9018

## 2017-12-15 LAB — BACTERIA THROAT CULT: NORMAL

## 2018-01-09 NOTE — MISCELLANEOUS
Signatures   Electronically signed by : Rojas Johnson DO; Aug  8 2017  6:15PM EST                       (Author)

## 2018-01-10 NOTE — MISCELLANEOUS
Message   Recorded as Task   Date: 09/18/2017 08:58 AM, Created By: System   Task Name: Financial Auth   Assigned To: Renetta Davila   Regarding Patient: Rachelle Walls, Status: In Progress   Comment:    System - 18 Sep 2017 8:58 AM     MRI LUMBAR SPINE WO CONTRAST requires Financial Alex Karina - 18 Sep 2017 12:59 PM     TASK IN PROGRESS   Roberto Mcdonough - 18 Sep 2017 12:59 PM     TASK REASSIGNED: Previously Assigned To [employer] - 27 Sep 2017 1:15 PM     TASK EDITED  pt had another task in progress regarding this issue        Active Problems    1  Abdominal pain (789 00) (R10 9)   2  Anxiety (300 00) (F41 9)   3  Arthralgia of knee (719 46) (M25 569)   4  Backache with radiation (724 5) (M54 9)   5  Depression screen (V79 0) (Z13 89)   6  Dyslipidemia (272 4) (E78 5)   7  Easy bruising (782 9) (R23 8)   8  Fatigue (780 79) (R53 83)   9  Lumbar radiculopathy (724 4) (M54 16)   10  Myofascial pain (729 1) (M79 1)   11  Neck swelling (784 2) (R22 1)   12  Need for immunization against influenza (V04 81) (Z23)   13  Need for Tdap vaccination (V06 1) (Z23)   14  Pain of left hip joint (719 45) (M25 552)   15  Paresthesia of bilateral legs (782 0) (R20 2)   16  Patellofemoral arthralgia of right knee (719 46) (M25 561)   17  Post-menopausal (V49 81) (Z78 0)   18  Postmenopausal atrophic vaginitis (627 3) (N95 2)   19  Primary osteoarthritis of both knees (715 16) (M17 0)   20  Right knee pain (719 46) (M25 561)   21  Right leg pain (729 5) (M79 604)   22  Screening for colon cancer (V76 51) (Z12 11)   23  Screening for lipid disorders (V77 91) (Z13 220)   24  Screening for osteoporosis (V82 81) (Z13 820)   25  Transient insomnia (307 41) (F51 02)   26  Visit for routine gyn exam (V72 31) (Z01 419)   27  Visit for screening mammogram (V76 12) (Z12 31)    Current Meds   1  Gabapentin 400 MG Oral Capsule; TAKE 1 CAPSULE 3 TIMES DAILY;    Therapy: 47BUZ3611 to (Evaluate:[address]7677) Requested for: 99Bcg8120; Last   Rx:41Nwi7568 Ordered   2  Methocarbamol 500 MG Oral Tablet; take 1 tablet tid prn; Therapy: 04WHM0934 to (Evaluate:46Xvo5910)  Requested for: 23Pqd2111; Last   Rx:26Avp2627 Ordered    Allergies    1   No Known Drug Allergies    Signatures   Electronically signed by : Valeria Strickland, ; Sep 27 2017  1:15PM EST                       (Author)

## 2018-01-11 NOTE — PROGRESS NOTES
Assessment    1  Right knee pain (719 46) (M25 561)   2  Patellofemoral arthralgia of right knee (719 46) (M22 2X1)   3  Primary osteoarthritis of both knees (715 16) (M17 0)    Plan  Patellofemoral arthralgia of right knee    · Voltaren 1 % Transdermal Gel; APPLY 2 GRAMS TO AFFECTED AREA 2-4 TIMES  A DAY   · Follow-up visit in 2 months Evaluation and Treatment  Follow-up  Status: Complete   Done: [id number]  Patellofemoral arthralgia of right knee, Primary osteoarthritis of both knees    · *1 - [facility] Physical Therapy  Consult  Status: Active  Requested for:  00VGF7920  Care Summary provided  : Yes    Discussion/Summary    Bilateral knee patellofemoral syndrome with primary osteoarthritis  Patient will start a formal physical therapy program working on both knees  Will continue with her NSAIDs as needed for pain  Voltaren gel will be called into pharmacy  Continue with home exercise program  Follow-up in 2 months for reevaluation of the right and left knee  If right knee is still an issue will discuss possible MRI      The treatment plan was reviewed with the patient/guardian  The patient/guardian understands and agrees with the treatment plan      Chief Complaint    1  Knee Injury  Bilateral knee pain right being worse      History of Present Illness  Knee Problem: The patient is being seen for Bilateral a knee problem , follow-up   She was previously evaluated in this clinic  Previous presentation included knee pain, knee swelling, knee clicking and decreased range of motion  Past evaluation has included knee x-rays and orthopedic evaluation  Past treatment has included application of ice and elevation  Symptoms:  pain, swelling, clicking and decreased range of motion  The patient is currently experiencing symptoms  Knee pain: Symptoms are located in the bilateral knees and Right being worse than left  Exacerbating factors:  bearing weight, walking and direct pressure   Relieving factors: application of ice, pain medication and rest  No associated symptoms are reported  HPI: [place] speaking patient enters today with her son for translation  She has continued bilateral knee pain with the right being much worse  She was in our office back in August 2015 end received cortisone injection  She states it only helped about 10%  Since that visit she started to develop left knee pain as well  She was not able to attend physical therapy yet was doing a home exercise program  Pain is diffusely throughout both knees in the medial lateral and anterior aspect  Patient has pain and difficulty with activities of daily living  Pain with going up and down stairs  Review of Systems    Constitutional: No fever, no chills, feels well, no tiredness, no recent weight gain or loss  Eyes: No complaints of eyesight problems, no red eyes  ENT: no loss of hearing, no nosebleeds, no sore throat  Cardiovascular: No complaints of chest pain, no palpitations, no leg claudication or lower extremity edema  Respiratory: no compliants of shortness of breath, no wheezing, no cough  Gastrointestinal: no complaints of abdominal pain, no constipation, no nausea or diarrhea, no vomiting, no bloody stools  Genitourinary: no complaints of dysuria, no incontinence  Musculoskeletal: as noted in HPI  Integumentary: no complaints of skin rash or lesion, no itching or dry skin, no skin wounds  Neurological: no complaints of headache, no confusion, no numbness or tingling, no dizziness  Endocrine: No complaints of muscle weakness, no feelings of weakness, no frequent urination, no excessive thirst    Psychiatric: No suicidal thoughts, no anxiety, no feelings of depression  ROS reviewed  Active Problems    1  Abdominal pain, LLQ (left lower quadrant) (789 04) (R10 32)   2  Arthralgia of knee (719 46) (M25 569)   3  Depression screen (V79 0) (Z13 89)   4  Fatigue (780 79) (R53 83)   5   Neck swelling (784  2) (R22 1)   6  Need for Tdap vaccination (V06 1) (Z23)   7  Patellofemoral arthralgia of right knee (719 46) (M22 2X1)   8  Post-menopausal (V49 81) (Z78 0)   9  Right knee pain (719 46) (M25 561)   10  Screening for colon cancer (V76 51) (Z12 11)   11  Screening for osteoporosis (V82 81) (Z13 820)   12  Vaginal burning (625 8) (N94 9)   13  Visit for routine gyn exam (V72 31) (Z01 419)   14  Visit for screening mammogram (V76 12) (Z12 31)    Past Medical History    · History of Denial (799 29) (R45 89)    The active problems and past medical history were reviewed and updated today  Surgical History    · History of Appendectomy    The surgical history was reviewed and updated today  Family History    · Family history of Uterine cancer (80) (C55)    The family history was reviewed and updated today  Social History    · Never a smoker  The social history was reviewed and updated today  The social history was reviewed and is unchanged  Current Meds   1  No Reported Medications Recorded    The medication list was reviewed and updated today  Allergies    1  TraMADol HCl TABS    Vitals  Signs [Data Includes: Current Encounter]    Heart Rate: 54  Systolic: 737  Diastolic: 79  Height: 5 ft   Weight: 140 lb   BMI Calculated: 27 34  BSA Calculated: 1 6    Physical Exam    Right Knee: Appearance: no effusion, but no swelling  Tenderness: None except the diffuse anteromedial aspect and undersurface of the patella  Palpatory findings include crepitus  ROM: Full except as noted: Motor: Normal except as noted: Special Test: Negative except positive patellar grind and positive patellofemoral apprehension test, but negative medial Kings test, negative lateral Kings test, no laxity on Valgus Stress and no laxity on Varus Stress  Appearance: swelling  Tenderness: diffuse anteromedial aspect, medial joint line and diffuse lateral knee   ROM: Full except as noted: Flexion: restricted AROM 0-110 degrees  Motor: Normal except as noted: Special Test: Negative except positive medial Kings test, but negative patellar grind, negative patellofemoral apprehension test, negative lateral Kings test, no laxity on Valgus Stress and no laxity on Varus Stress  Left Knee: Appearance: Normal except swelling  Tenderness: None except the lateral joint line, medial joint line and undersurface of the patella  Palpatory findings include crepitus  ROM: Full except as noted: Flexion: restricted AROM 0-120 degrees  Motor: Normal except as noted: Special Test: Negative except negative patellar grind, negative patellofemoral apprehension test, negative medial Kings test, negative lateral Kings test, no laxity on Valgus Stress and no laxity on Varus Stress     Constitutional - General appearance: Normal    Musculoskeletal - Lower extremity compartments: Normal    Cardiovascular - Pulses: Normal    Neurologic - Sensation: Normal    Psychiatric - Orientation to person, place, and time: Normal    Eyes   Pupils and irises: Normal        Future Appointments    Date/Time Provider Specialty Site   03/23/2016 01:00 PM Abelardo Schneider MD Gastroenterology Adult [address]   04/05/2016 09:40 AM HAM Treviño , MD Orthopedic Surgery [address]   Electronically signed by : Lynne Reaves, [facility] Kathryn Bocanegra; Feb 5 2016 10:01AM EST                       (Author)    Electronically signed by : KSENIA Reeves ; Feb 5 2016  3:38PM EST                       (Author)

## 2018-01-11 NOTE — MISCELLANEOUS
Message   Recorded as Task   Date: 02/21/2017 08:20 AM, Created By: Terrance Gomez   Task Name: Call Patient with results   Assigned To: Constance Guzman   Regarding Patient: Andre Chandra, Status: In Progress   Comment:    Terrance Gomez - 21 Feb 2017 8:20 AM     Patient Phone: [phone]      Please notify the patient that the x-ray of her lumbar spine revealed degenerative changes of the sacroiliac joints   Danika Mike - 27 Feb 2017 12:25 PM     TASK REASSIGNED: Previously Assigned To Abigail Cuevas - 27 Feb 2017 1:11 PM     TASK EDITED  Attempted to call patient, no answer, no machine to leave message  AichaNeda - 27 Feb 2017 1:11 PM     TASK IN PROGRESS   Farzana Anderson - 28 Feb 2017 8:29 AM     TASK EDITED  I spoke to son (he is on HIPPA) because Isidoro Coello has difficulty with English and explained that the Lumbar Xray showed degenerative changes of the SI Joints  He verbalizes understanding  Active Problems    1  Abdominal pain (789 00) (R10 9)   2  Abdominal pain, epigastric (789 06) (R10 13)   3  Abdominal pain, LLQ (left lower quadrant) (789 04) (R10 32)   4  Acute medial meniscus tear of right knee, subsequent encounter (V58 89,836 0)   (S83 241D)   5  Anxiety (300 00) (F41 9)   6  Arthralgia of knee (719 46) (M25 569)   7  Athlete's foot (110 4) (B35 3)   8  Backache with radiation (724 5) (M54 9)   9  Depression screen (V79 0) (Z13 89)   10  Dyslipidemia (272 4) (E78 5)   11  Fatigue (780 79) (R53 83)   12  H  pylori infection (041 86) (A04 8)   13  Lumbar back pain (724 2) (M54 5)   14  Myofascial pain (729 1) (M79 1)   15  Neck swelling (784 2) (R22 1)   16  Need for immunization against influenza (V04 81) (Z23)   17  Need for Tdap vaccination (V06 1) (Z23)   18  Other acute sinusitis (461 8) (J01 80)   19  Paresthesia of bilateral legs (782 0) (R20 2)   20  Patellofemoral arthralgia of right knee (719 46) (M25 561)   21   Post-menopausal (V42 81) (Z78 0)   22  Postmenopausal atrophic vaginitis (627 3) (N95 2)   23  Primary osteoarthritis of both knees (715 16) (M17 0)   24  Right knee pain (719 46) (M25 561)   25  Right leg pain (729 5) (M79 604)   26  Screening for colon cancer (V76 51) (Z12 11)   27  Screening for lipid disorders (V77 91) (Z13 220)   28  Screening for osteoporosis (V82 81) (Z13 820)   29  Transient insomnia (307 41) (F51 02)   30  Vaginal burning (625 8) (N94 9)   31  Visit for routine gyn exam (V72 31) (Z01 419)   32  Visit for screening mammogram (V76 12) (Z12 31)    Current Meds   1  DULoxetine HCl - 30 MG Oral Capsule Delayed Release Particles; TAKE ONE CAPSULE   BY MOUTH EVERY DAY; Therapy: 05ZTR0102 to (Last Rx:09Pbv3958)  Requested for: 54RZM9325 Ordered   2  Fluconazole 100 MG Oral Tablet; TAKE 1 TABLET ONCE  MAY REPEAT IN 2 DAYS ;   Therapy: 02OII7035 to (Last Rx:49Uzm4745)  Requested for: 37DZV1663 Ordered   3  Gabapentin 100 MG Oral Capsule; TAKE 1 CAPSULE 3 TIMES DAILY; Therapy: 34XPY9513 to (Farzana Decent)  Requested for: 94GTB4005; Last   Rx:31Wea2030 Ordered   4  Methocarbamol 500 MG Oral Tablet; take 1 tablet tid prn;    Therapy: 48MZF8912 to (Farzana Decent)  Requested for: 40IYB8369; Last   OC:78ZIE0994 Ordered    Signatures   Electronically signed by : Ryan Freitas, ; Feb 28 2017  8:30AM EST                       (Author)

## 2018-01-12 VITALS
DIASTOLIC BLOOD PRESSURE: 62 MMHG | HEIGHT: 64 IN | TEMPERATURE: 97.2 F | HEART RATE: 73 BPM | BODY MASS INDEX: 24.07 KG/M2 | WEIGHT: 141 LBS | RESPIRATION RATE: 16 BRPM | OXYGEN SATURATION: 98 % | SYSTOLIC BLOOD PRESSURE: 118 MMHG

## 2018-01-12 NOTE — CONSULTS
Future Appointments    Signatures   Electronically signed by : Noah Medel [facility]; Feb 5 2016 10:01AM EST                       (Author)    Electronically signed by : KSENIA Kumar ; Feb 5 2016  3:38PM EST                       (Author)

## 2018-01-12 NOTE — RESULT NOTES
Message   Please notify the patient that the x-ray of her lumbar spine revealed degenerative changes of the sacroiliac joints     Verified Results  * XR SPINE LUMBAR MINIMUM 4 VIEWS NON INJURY 72Jqk4280 01:52PM Saqib Reynoso Order Number: [id number]     Test Name Result Flag Reference   XR SPINE LUMBAR MINIMUM 4 VIEWS (Report)     LUMBAR SPINE     INDICATION: Chronic low back pain     COMPARISON: None     VIEWS: AP, lateral and bilateral oblique projections; 4 images     FINDINGS: Minimal thoracolumbar dextroscoliosis  Alignment is unremarkable  There is no radiographic evidence of acute fracture or destructive osseous lesion  No significant lumbar degenerative change noted  Narrowing and adjacent sclerosis is seen in the inferior aspect of the sacroiliac joints, right greater the left  Surgical clips overlie the right iliac crest and left inferior sacroiliac joints  IMPRESSION:     No acute bony abnormality, lumbar spine  Degenerative changes, sacroiliac joints  Workstation performed: [id number]     Signed by:   Stefano Cramer MD   2/20/17

## 2018-01-12 NOTE — MISCELLANEOUS
Signatures   Electronically signed by :  Eliseo Silvestre, ; Feb 28 2017  3:58PM EST                       (Author)

## 2018-01-12 NOTE — MISCELLANEOUS
Message   Recorded as Task   Date: 09/18/2017 10:42 AM, Created By: [facility]   Task Name: Care Coordination   Assigned To: Jojo Menon   Regarding Patient: Veronique Fernandez, Status: In Progress   Comment:    Jyoti Barakat - 18 Sep 2017 10:42 AM     TASK CREATED  [facility] with [facility] for PA for MRI of the lumbar spine, Tracking number is [id number]   Kb Armstrong - 18 Sep 2017 12:54 PM     TASK IN PROGRESS   Jyoti Barakat - 19 Sep 2017 9:28 AM     TASK EDITED  Faxed clinical summary and PT notes to Vinay Garcia - 20 Sep 2017 1:26 PM     TASK REASSIGNED: Previously Assigned To Jojo Menno  MRI denied by ins stating it does not meet medical guidelines  Please advise next step   Jesus Alvarez - 20 Sep 2017 3:02 PM     TASK REPLIED TO: Previously Assigned To Natalie Pearce                      The patient will likely need to complete at least 4-6 weeks of physical therapy before her insurance will approve the MRI   Kb Wilsont - 21 Sep 2017 1:17 PM     TASK EDITED  I called and informed patient that MRI was denied by her insurance, I informed her that her insurance will not pay for the MRI  I informed her that Reagan Go would like her to participate in 4-6 weeks of PT, patient does not want to participate in PT at this time  She was pleasant and appreciative of the call  Active Problems    1  Abdominal pain (789 00) (R10 9)   2  Anxiety (300 00) (F41 9)   3  Arthralgia of knee (719 46) (M25 569)   4  Backache with radiation (724 5) (M54 9)   5  Depression screen (V79 0) (Z13 89)   6  Dyslipidemia (272 4) (E78 5)   7  Easy bruising (782 9) (R23 8)   8  Fatigue (780 79) (R53 83)   9  Lumbar radiculopathy (724 4) (M54 16)   10  Myofascial pain (729 1) (M79 1)   11  Neck swelling (784 2) (R22 1)   12  Need for immunization against influenza (V04 81) (Z23)   13  Need for Tdap vaccination (V06 1) (Z23)   14  Pain of left hip joint (719 45) (M25 552)   15   Paresthesia of bilateral legs (782 0) (R20 2)   16  Patellofemoral arthralgia of right knee (719 46) (M25 561)   17  Post-menopausal (V49 81) (Z78 0)   18  Postmenopausal atrophic vaginitis (627 3) (N95 2)   19  Primary osteoarthritis of both knees (715 16) (M17 0)   20  Right knee pain (719 46) (M25 561)   21  Right leg pain (729 5) (M79 604)   22  Screening for colon cancer (V76 51) (Z12 11)   23  Screening for lipid disorders (V77 91) (Z13 220)   24  Screening for osteoporosis (V82 81) (Z13 820)   25  Transient insomnia (307 41) (F51 02)   26  Visit for routine gyn exam (V72 31) (Z01 419)   27  Visit for screening mammogram (V76 12) (Z12 31)    Current Meds   1  Gabapentin 400 MG Oral Capsule; TAKE 1 CAPSULE 3 TIMES DAILY; Therapy: 75MTF2450 to (Evaluate:43Adi1535)  Requested for: 62Urr9205; Last   Rx:47Xtd5499 Ordered   2  Methocarbamol 500 MG Oral Tablet; take 1 tablet tid prn; Therapy: 99JLV9807 to (Evaluate:67Dij4484)  Requested for: 81Eqh2313; Last   Rx:41Qdj4754 Ordered    Allergies    1   No Known Drug Allergies    Signatures   Electronically signed by : Andrew Roman, ; Sep 21 2017  1:18PM EST                       (Author)

## 2018-01-13 VITALS
WEIGHT: 137 LBS | HEART RATE: 90 BPM | SYSTOLIC BLOOD PRESSURE: 120 MMHG | BODY MASS INDEX: 23.39 KG/M2 | TEMPERATURE: 98.6 F | DIASTOLIC BLOOD PRESSURE: 76 MMHG | OXYGEN SATURATION: 97 % | HEIGHT: 64 IN

## 2018-01-13 VITALS
HEIGHT: 64 IN | WEIGHT: 143 LBS | OXYGEN SATURATION: 96 % | BODY MASS INDEX: 24.41 KG/M2 | SYSTOLIC BLOOD PRESSURE: 114 MMHG | HEART RATE: 68 BPM | TEMPERATURE: 96.8 F | RESPIRATION RATE: 16 BRPM | DIASTOLIC BLOOD PRESSURE: 68 MMHG

## 2018-01-13 VITALS
SYSTOLIC BLOOD PRESSURE: 122 MMHG | TEMPERATURE: 98.2 F | HEIGHT: 64 IN | DIASTOLIC BLOOD PRESSURE: 60 MMHG | WEIGHT: 140.38 LBS | BODY MASS INDEX: 23.97 KG/M2

## 2018-01-13 NOTE — MISCELLANEOUS
Message   Recorded as Task   Date: 06/13/2017 09:00 AM, Created By: System   Task Name: Financial Auth   Assigned To: Alexia Brown Procedure,Team   Regarding Patient: Clyde Pedraza, Status: In Progress   Comment:    System - 13 Jun 2017 9:00 AM     MRI LUMBAR SPINE WO CONTRAST requires Financial Dotty Sherri - 22 Jun 2017 1:45 PM     TASK REASSIGNED: Previously Assigned To Michael Jo - 22 Jun 2017 2:05 PM     TASK IN [address] - 28 Jun 2017 2:59 PM     TASK EDITED  MRI Lynn Gene pending approvalLartayla Yu - 28 Jun 2017 2:59 PM     TASK REASSIGNED: Previously Assigned To Zaina Abernathy - 29 Jun 2017 9:08 AM     TASK REASSIGNED: Previously Assigned To [facility]  MRI denied by insurance, does not meet criteria  Any further recommendations at this time for the patient? Please advise   Pedro Bhatt - 29 Jun 2017 9:27 AM     TASK EDITED  [facility] returned a call  Please call [phone]  Neda Holcomb - 29 Jun 2017 2:32 PM     TASK IN PROGRESS   Sally Hardin - 29 Jun 2017 2:32 PM     TASK EDITED  MRI denied due to not meeting clinical guidelines        Active Problems    1  Abdominal pain (789 00) (R10 9)   2  Abdominal pain, epigastric (789 06) (R10 13)   3  Abdominal pain, LLQ (left lower quadrant) (789 04) (R10 32)   4  Acute medial meniscus tear of right knee, subsequent encounter (V58 89,836 0)   (S83 241D)   5  Anxiety (300 00) (F41 9)   6  Arthralgia of knee (719 46) (M25 569)   7  Athlete's foot (110 4) (B35 3)   8  Backache with radiation (724 5) (M54 9)   9  Depression screen (V79 0) (Z13 89)   10  Dyslipidemia (272 4) (E78 5)   11  Easy bruising (782 9) (R23 8)   12  Fatigue (780 79) (R53 83)   13  H  pylori infection (041 86) (A04 8)   14  Lumbar back pain (724 2) (M54 5)   15  Lumbar radiculopathy (724 4) (M54 16)   16  Myofascial pain (729 1) (M79 1)   17  Neck swelling (784 2) (R22 1)   18  Need for immunization against influenza (V04 81) (Z23)   19  Need for Tdap vaccination (V06 1) (Z23)   20  Other acute sinusitis (461 8) (J01 80)   21  Paresthesia of bilateral legs (782 0) (R20 2)   22  Patellofemoral arthralgia of right knee (719 46) (M25 561)   23  Post-menopausal (V49 81) (Z78 0)   24  Postmenopausal atrophic vaginitis (627 3) (N95 2)   25  Primary osteoarthritis of both knees (715 16) (M17 0)   26  Right knee pain (719 46) (M25 561)   27  Right leg pain (729 5) (M79 604)   28  Screening for colon cancer (V76 51) (Z12 11)   29  Screening for lipid disorders (V77 91) (Z13 220)   30  Screening for osteoporosis (V82 81) (Z13 820)   31  Transient insomnia (307 41) (F51 02)   32  Vaginal burning (625 8) (N94 9)   33  Visit for routine gyn exam (V72 31) (Z01 419)   34  Visit for screening mammogram (V76 12) (Z12 31)    Current Meds   1  DULoxetine HCl - 30 MG Oral Capsule Delayed Release Particles; TAKE ONE CAPSULE   BY MOUTH EVERY DAY; Therapy: 64UJO6752 to (Last Rx:12Pjx5054)  Requested for: 67MSZ4208 Ordered   2  Famotidine 40 MG Oral Tablet; take 1 tablet daily at bedtime; Therapy: 64Mjj2287 to (Last Rx:14Jun2017)  Requested for: 81RMR1927 Ordered   3  Gabapentin 300 MG Oral Capsule; TAKE 1 CAPSULE 3 TIMES DAILY; Therapy: 92ZFU3387 to (Evaluate:53Cvv1718)  Requested for: 55BQK4332; Last   Rx:13Jun2017 Ordered   4  Methocarbamol 500 MG Oral Tablet; take 1 tablet tid prn;    Therapy: 30JRC3795 to (Evaluate:07Cam0301)  Requested for: 13Jun2017; Last   Rx:13Jun2017 Ordered    Signatures   Electronically signed by : Anthony Primitivo, ; Jun 29 2017  2:32PM EST                       (Author)

## 2018-01-14 VITALS
SYSTOLIC BLOOD PRESSURE: 124 MMHG | HEART RATE: 93 BPM | TEMPERATURE: 97.6 F | WEIGHT: 140 LBS | HEIGHT: 64 IN | BODY MASS INDEX: 23.9 KG/M2 | OXYGEN SATURATION: 98 % | DIASTOLIC BLOOD PRESSURE: 76 MMHG

## 2018-01-14 VITALS
HEIGHT: 64 IN | DIASTOLIC BLOOD PRESSURE: 78 MMHG | OXYGEN SATURATION: 98 % | BODY MASS INDEX: 23.22 KG/M2 | HEART RATE: 52 BPM | WEIGHT: 136 LBS | TEMPERATURE: 97.5 F | SYSTOLIC BLOOD PRESSURE: 188 MMHG | RESPIRATION RATE: 14 BRPM

## 2018-01-14 VITALS
WEIGHT: 141.13 LBS | HEIGHT: 64 IN | SYSTOLIC BLOOD PRESSURE: 128 MMHG | BODY MASS INDEX: 24.1 KG/M2 | DIASTOLIC BLOOD PRESSURE: 72 MMHG | TEMPERATURE: 98.5 F

## 2018-01-14 VITALS
BODY MASS INDEX: 23.56 KG/M2 | DIASTOLIC BLOOD PRESSURE: 80 MMHG | SYSTOLIC BLOOD PRESSURE: 118 MMHG | WEIGHT: 137.25 LBS

## 2018-01-15 ENCOUNTER — ALLSCRIPTS OFFICE VISIT (OUTPATIENT)
Dept: OTHER | Facility: OTHER | Age: 53
End: 2018-01-15

## 2018-01-15 ENCOUNTER — APPOINTMENT (OUTPATIENT)
Dept: RADIOLOGY | Facility: MEDICAL CENTER | Age: 53
End: 2018-01-15
Payer: COMMERCIAL

## 2018-01-15 ENCOUNTER — TRANSCRIBE ORDERS (OUTPATIENT)
Dept: RADIOLOGY | Facility: MEDICAL CENTER | Age: 53
End: 2018-01-15

## 2018-01-15 ENCOUNTER — TRANSCRIBE ORDERS (OUTPATIENT)
Dept: LAB | Facility: MEDICAL CENTER | Age: 53
End: 2018-01-15

## 2018-01-15 VITALS
DIASTOLIC BLOOD PRESSURE: 80 MMHG | HEART RATE: 63 BPM | WEIGHT: 142 LBS | HEIGHT: 64 IN | BODY MASS INDEX: 24.24 KG/M2 | SYSTOLIC BLOOD PRESSURE: 120 MMHG | TEMPERATURE: 97.6 F | RESPIRATION RATE: 17 BRPM | OXYGEN SATURATION: 97 %

## 2018-01-15 DIAGNOSIS — M25.561 PAIN IN RIGHT KNEE: ICD-10-CM

## 2018-01-15 DIAGNOSIS — M25.562 PAIN IN LEFT KNEE: ICD-10-CM

## 2018-01-15 PROCEDURE — 73562 X-RAY EXAM OF KNEE 3: CPT

## 2018-01-15 NOTE — PROGRESS NOTES
Assessment    1  Encounter for preventive health examination (V70 0) (Z00 00)   2  Vaginal burning (625 8) (N94 9)   3  Patellofemoral arthralgia of right knee (719 46) (M25 561)   4  Visit for screening mammogram (V76 12) (Z12 31)   5  Screening for colon cancer (V76 51) (Z12 11)   6  Post-menopausal (V49 81) (Z78 0)   7  Need for Tdap vaccination (V06 1) (Z23)    Plan   Health Maintenance    · SCREEN AUDIOGRAM- POC; Status:Active; Requested [id number];    · SNELLEN VISION- POC; Status:Complete;   Done: 96YVY4545  Need for Tdap vaccination    · Tdap (Boostrix)  Patellofemoral arthralgia of right knee    · 1 Milly Encinas MD, Moriah Baca (Orthopedic Surgery) Physician Referral  Consult  Status: Hold  For - Scheduling  Requested for: 18EQQ9685  Care Summary provided  : Yes  Post-menopausal    · DXA BODY COMP ANALYSIS; Status:Hold For - Scheduling; Requested AGJ:[id number];   Screening for colon cancer    · COLONOSCOPY; Status:Active; Requested VKX:[id number];    · Gastroenterology Follow Up Evaluation and Treatment  Follow-up  Status: Hold For -  Scheduling  Requested for: 23GQG1038  Visit for screening mammogram    · * MAMMO SCREENING BILATERAL W CAD; Status:Hold For - Scheduling; Requested  for:2016;     Fluconazole 150 MG Oral Tablet; TAKE 1 TABLET 1 TIME ONLY; Therapy: 12DPZ4356 to )  Requested for: 41FHR0660; Last Rx:2016; Status: ACTIVE Ordered  Rx By: Wesly Bah; Dispense: 1 Days ; #:1 Tablet; Refill: 0;   For: Vaginal burning; RONNA = N; Verified Transmission to [facility] ; Last Updated By: SystemManalto; 2016 9:30:06 AM  Patellofemoral arthralgia of right knee (699 46) (M22 2X1)          Discussion/Summary  health maintenance visit Currently, she eats an adequate diet  cervical cancer screening is not indicated Breast cancer screening: mammogram has been ordered   Colorectal cancer screening: the risks and benefits of colorectal cancer screening were discussed and colonoscopy has been ordered  Osteoporosis screening: the risks and benefits of osteoporosis screening were discussed and bone mineral density testing has been ordered  The immunizations will be given as outlined in the orders  Advice and education were given regarding aerobic exercise, weight bearing exercise, calcium supplements and vitamin D supplements  Tdap today  Will schedule mammogram, DEXA and colonoscopy  RTC 1 year or sooner if needed  Will refer back to Ortho for the continued knee pain  Possible side effects of new medications were reviewed with the patient/guardian today  The patient was counseled regarding risk factor reductions  Chief Complaint  yearly physical      History of Present Illness  HM, Adult Female: The patient is being seen for a health maintenance evaluation  The last health maintenance visit was 1 year(s) ago  Social History: Household members include 1 daughter(s) and 1 son(s)  She is   Work status: not currently employed  The patient has never smoked cigarettes  She reports never drinking alcohol  She has never used illicit drugs  General Health: The patient's health since the last visit is described as good  She has regular dental visits  She denies vision problems  She denies hearing loss  Lifestyle:  She consumes a diverse and healthy diet  She does not have any weight concerns  She exercises regularly  She does not use tobacco  She denies alcohol use  She denies drug use  Reproductive health: the patient is postmenopausal    Screening: Cervical cancer screening includes a pap smear performed 1/47, uncertain timing of her last human papilloma virus screening and no previous colposcopy  Breast cancer screening includes a mammogram performed last year, a clinical breast exam performed last year and monthly breast self-exams performed  She hasn't been previously screened for colorectal cancer     Metabolic screening includes lipid profile performed 7/15, glucose screening performed 7/15, thyroid function test performed 7/15 and no previous DEXA  Cardiovascular risk factors: no hypertension, no diabetes, no obesity, no tobacco use, no illicit drug use and no sedentary lifestyle  General health risks: no abnormal cervical cytology  Safety elements used: seat belt, safe driving habits, smoke detector, carbon monoxide detector and hot water temperature less than 120 degrees F    HPI: 45 yo female also presents for bilat knee pain  Had received R knee injection but then lost her insurance  Also today c/o vaginal itching  Review of Systems    Constitutional: No fever, no chills, feels well, no tiredness, no recent weight gain or weight loss  Eyes: No complaints of eye pain, no red eyes, no eyesight problems, no discharge, no dry eyes, no itching of eyes  ENT: no complaints of earache, no loss of hearing, no nose bleeds, no nasal discharge, no sore throat, no hoarseness  Cardiovascular: No complaints of slow heart rate, no fast heart rate, no chest pain, no palpitations, no leg claudication, no lower extremity edema  Respiratory: No complaints of shortness of breath, no wheezing, no cough, no SOB on exertion, no orthopnea, no PND  Gastrointestinal: No complaints of abdominal pain, no constipation, no nausea or vomiting, no diarrhea, no bloody stools  Genitourinary: No complaints of dysuria, no incontinence, no pelvic pain, no dysmenorrhea, no vaginal discharge or bleeding  Musculoskeletal: as noted in HPI  Integumentary: No complaints of skin rash or lesions, no itching, no skin wounds, no breast pain or lump  Neurological: No complaints of headache, no confusion, no convulsions, no numbness, no dizziness or fainting, no tingling, no limb weakness, no difficulty walking  Psychiatric: Not suicidal, no sleep disturbance, no anxiety or depression, no change in personality, no emotional problems     Endocrine: No complaints of proptosis, no hot flashes, no muscle weakness, no deepening of the voice, no feelings of weakness  Hematologic/Lymphatic: No complaints of swollen glands, no swollen glands in the neck, does not bleed easily, does not bruise easily  Active Problems     1  Abdominal pain, LLQ (left lower quadrant) (789 04) (R10 32)   2  Arthralgia of knee (719 46) (M25 569)   3  Depression screen (V79 0) (Z13 89)   4  Fatigue (780 79) (R53 83)   5  Neck swelling (784 2) (R22 1)   6  Post-menopausal (V49 81) (Z78 0)   7  Screening for colon cancer (V76 51) (Z12 11)   8  Screening for osteoporosis (V82 81) (Z13 820)   9  Visit for routine gyn exam (V72 31) (Z01 419)   10  Visit for screening mammogram (V76 12) (Z12 31)    Right knee pain (719 46) (M25 561)       Patellofemoral arthralgia of right knee (719 46) (M22 2X1)          Past Medical History    · History of Denial (799 29) (R45 89)    Surgical History    · History of Appendectomy    Family History    · Family history of Uterine cancer (179) (C55)    Social History    · Never a smoker    Current Meds   1  Voltaren 1 % Transdermal Gel; APPLY 2 GRAMS TO AFFECTED AREA 2-4 TIMES A   DAY; Therapy: 45AVW2099 to (Last Rx:34Wow8911)  Requested for: 61ZJI3497 Ordered    Allergies    1  TraMADol HCl TABS    Vitals   Recorded: 56ZEQ9857 10:10AM   Temperature 97 8 F   Heart Rate 60   Respiration 14   Systolic 939   Diastolic 70   Height 5 ft    Weight 138 lb    BMI Calculated 26 95   BSA Calculated 1 59   O2 Saturation 98     Physical Exam    Constitutional   General appearance: No acute distress, well appearing and well nourished  Head and Face   Head and face: Normal     Eyes   Conjunctiva and lids: No swelling, erythema or discharge  Pupils and irises: Equal, round, reactive to light  Ears, Nose, Mouth, and Throat   External inspection of ears and nose: Normal     Otoscopic examination: Tympanic membranes translucent with normal light reflex   Canals patent without erythema  Hearing: Normal     Nasal mucosa, septum, and turbinates: Normal without edema or erythema  Lips, teeth, and gums: Normal, good dentition  Oropharynx: Normal with no erythema, edema, exudate or lesions  Neck   Neck: Supple, symmetric, trachea midline, no masses  Thyroid: Normal, no thyromegaly  Pulmonary   Respiratory effort: No increased work of breathing or signs of respiratory distress  Percussion of chest: Normal     Auscultation of lungs: Clear to auscultation  Cardiovascular   Palpation of heart: Normal PMI, no thrills  Auscultation of heart: Normal rate and rhythm, normal S1 and S2, no murmurs  Carotid pulses: 2+ bilaterally  Abdominal aorta: Normal     Femoral pulses: 2+ bilaterally  Pedal pulses: 2+ bilaterally  Peripheral vascular exam: Normal     Examination of extremities for edema and/or varicosities: Normal     Abdomen   Abdomen: Non-tender, no masses  Liver and spleen: No hepatomegaly or splenomegaly  Lymphatic   Palpation of lymph nodes in neck: No lymphadenopathy  Palpation of lymph nodes in axillae: No lymphadenopathy  Palpation of lymph nodes in groin: No lymphadenopathy  Musculoskeletal   Gait and station: Normal     Digits and nails: Normal without clubbing or cyanosis  Joints, bones, and muscles: Normal     Range of motion: Normal     Muscle strength/tone: Normal     Skin   Skin and subcutaneous tissue: Normal without rashes or lesions  Palpation of skin and subcutaneous tissue: Normal turgor  Neurologic   Cranial nerves: Cranial nerves II-XII intact  Cortical function: Normal mental status  Reflexes: 2+ and symmetric  Sensation: No sensory loss  Coordination: Normal finger to nose and heel to shin  Psychiatric   Judgment and insight: Normal     Orientation to person, place, and time: Normal     Mood and affect: Normal        Procedure    Procedure: Visual Acuity Test    Indication: routine screening  Inforrmation supplied by a Snellen chart  Results: 20/25 in both eyes without corrective device   The patient was cooperative  Procedure: Hearing Acuity Test    Indication: Routine screeing  Audiometry: Normal bilaterally  Future Appointments    Date/Time Provider Specialty Site   03/23/2016 01:00 PM Afia Larson MD Gastroenterology Adult [facility] [address] [place] [address]   04/05/2016 09:40 AM HAM Dacosta MD, [facility] Orthopedic Surgery [address]   Electronically signed by : Villa Francis, ; Jan 28 2016  9:50AM EST                       (Author)    Electronically signed by :  Shanika Saravia, [facility]; Jan 28 2016 10:41AM EST                       (Author)    Electronically signed by : Alexey Bryan MD; Mar 15 2016 10:35PM EST                       (Author)

## 2018-01-16 NOTE — PROGRESS NOTES
Assessment   1  Chronic low back pain with bilateral sciatica (724 2,724 3,338 29)     (M54 41,M54 42,G89 29)   2  Chronic right hip pain (719 45,338 29) (M25 551,G89 29)   3  Chronic lumbar radiculopathy (724 4) (M54 16)   4  Pain syndrome, chronic (338 4) (G89 4)   5  Chronic pain of left knee (469 31,561 73) (M25 562,G89 29)   6  Chronic pain of right knee (098 07,681 24) (M25 561,G89 29)    Plan   Chronic lumbar radiculopathy, Paresthesia of bilateral legs    · Gabapentin 400 MG Oral Capsule; Take 1 PO QID   Rx By: Rhett Payan; Dispense: 30 Days ; #:120 Capsule; Refill: 2;For: Chronic lumbar radiculopathy, Paresthesia of bilateral legs; RONNA = N; Verified Transmission to [facility]; Last Updated By: SystemSignNow; 1/15/2018 8:31:01 AM  Chronic pain of left knee    · * XR KNEE 3 VW LEFT NON INJURY; Status:Active; Requested EJN:[id number]; Perform:[facility] Radiology; PIK:[id number];LOQEKZM; For:Chronic pain of left knee; Ordered By:Brannon Smith;  Chronic pain of right knee    · * XR KNEE 3 VW RIGHT NON INJURY; Status:Active; Requested MDV:[id number]; Perform:[facility] Radiology; OMQ:39QFX5557;WYUOYPH; For:Chronic pain of right knee; Ordered By:Brannon Smith;  Pain syndrome, chronic    · Follow-up visit in 3 months Evaluation and Treatment  Follow-up  Status: Hold For -    Scheduling  Requested for: 40QTS7201   Ordered; For: Pain syndrome, chronic; Ordered By: hRett Payan Performed:  Due: 87JYG8512    Discussion/Summary      While the patient and her son were in the office today, I did have a thorough conversation with him regarding her medication regimen and treatment plan  I explained to them that this point with the worsening knee pain, I feel that it would be beneficial to proceed with updated x-rays of the left and right knee to evaluate for any new or worsening underlying etiology   I explained to them that once we receive the results of the x-rays, our office will give them a call to discuss the results in the next step in her treatment plan which may include bilateral knee injections with Dr [facility]  The patient was agreeable and verbalized an understanding  At this point time since the gabapentin is providing at least moderate relief, without side effects, for the next 3 months I would like her to continue the gabapentin as prescribed, however, we could possibly increase the medication in the future if needed  The patient was agreeable and verbalized an understanding  patient is to schedule a follow-up office visit in 3 months and at that point time we will regroup with regards to their medication regimen and treatment plan  The patient was agreeable and verbalized an understanding  The patient has the current Goals: To continue with the current level of pain relief and proceed with further evaluation of her left greater than right knee pain  The patent has the current Barriers: Chronic pain syndrome and language barrier  Patient is able to Self-Care  The treatment plan was reviewed with the patient/guardian  The patient/guardian understands and agrees with the treatment plan    The patient and patient's family was counseled regarding instructions for management,-- prognosis,-- patient and family education,-- impressions,-- risks and benefits of treatment options-- and-- importance of compliance with treatment  total time of encounter was 25 minutes  Chief Complaint   1  Back Pain  Chronic low back and leg pain, currently left greater than right, improved/stable  Worsening left greater than right knee pain  History of Present Illness   The patient presents today for a follow-up office visit  She is currently being treated for her chronic pain symptoms with the worse for pain currently being her left-sided greater than right low back and leg pain as well as her left greater than right knee pain   Since her last office visit between the physical therapy and [facility] the gabapentin as she is now taking 1600 mg a day, she is noting at least 50% improvement in her overall pain symptoms, without any significant side effects or issues  She presents today to discuss her medication regimen treatment plan and to see if there is anything else we can do about her knee pain as she reports that there are times when she is trying to be active reducing and feels like her knee, especially her left 1, gives out or will not hold her  Please note that the patient speaks mostly [place]an and her son interpreted for her the entire time  Byron Bolden presents with complaints of constant episodes of left lower back pain, described as dull, aching and burning, radiating to the left thigh  On a scale of 1 to 10, the patient rates the pain as 5  Symptoms are unchanged  Review of Systems        Constitutional: no fever,-- no recent weight gain-- and-- no recent weight loss  Eyes: no double vision-- and-- no blurry vision  Cardiovascular: no chest pain,-- no palpitations-- and-- no lower extremity edema  Respiratory: no complaints of shortness of breath-- and-- no wheezing  Musculoskeletal: difficulty walking-- and-- decreased range of motion, but-- no muscle weakness,-- no joint stiffness,-- no joint swelling,-- no limb swelling-- and-- no pain in extremity  Neurological: no dizziness,-- no difficulty swallowing,-- no memory loss,-- no loss of consciousness-- and-- no seizures  Gastrointestinal: no nausea,-- no vomiting,-- no constipation-- and-- no diarrhea  Genitourinary: no difficulty initiating urine stream,-- no genital pain-- and-- no frequent urination  Integumentary: no complaints of skin rash  Psychiatric: no depression  Endocrine: no excessive thirst,-- no adrenal disease,-- no hypothyroidism-- and-- no hyperthyroidism  Hematologic/Lymphatic: no tendency for easy bruising-- and-- no tendency for easy bleeding        Active Problems   1  Abdominal pain (789 00) (R10 9)   2  Anxiety (300 00) (F41 9)   3  Arthralgia of knee (719 46) (M25 569)   4  Backache with radiation (724 5) (M54 9)   5  Chronic low back pain with bilateral sciatica (724 2,724 3,338 29)     (M54 41,M54 42,G89 29)   6  Chronic lumbar radiculopathy (724 4) (M54 16)   7  Chronic pain of right knee (506 95,635 39) (M25 561,G89 29)   8  Chronic right hip pain (719 45,338 29) (M25 551,G89 29)   9  Depression screen (V79 0) (Z13 89)   10  Dyslipidemia (272 4) (E78 5)   11  Easy bruising (782 9) (R23 8)   12  Fatigue (780 79) (R53 83)   13  Myofascial pain (729 1) (M79 1)   14  Neck swelling (784 2) (R22 1)   15  Need for immunization against influenza (V04 81) (Z23)   16  Need for Tdap vaccination (V06 1) (Z23)   17  Pain of left hip joint (719 45) (M25 552)   18  Pain syndrome, chronic (338 4) (G89 4)   19  Paresthesia of bilateral legs (782 0) (R20 2)   20  Patellofemoral arthralgia of right knee (719 46) (M25 561)   21  Post-menopausal (V49 81) (Z78 0)   22  Postmenopausal atrophic vaginitis (627 3) (N95 2)   23  Primary osteoarthritis of both knees (715 16) (M17 0)   24  Right leg pain (729 5) (M79 604)   25  Screening for colon cancer (V76 51) (Z12 11)   26  Screening for lipid disorders (V77 91) (Z13 220)   27  Screening for osteoporosis (V82 81) (Z13 820)   28  Transient insomnia (307 41) (F51 02)   29  Visit for routine gyn exam (V72 31) (Z01 419)   30  Visit for screening mammogram (V76 12) (Z12 31)    Past Medical History   1  History of Abdominal pain, LLQ (left lower quadrant) (789 04) (R10 32)   2  History of Acute medial meniscus tear of right knee, subsequent encounter     (V58 89,836 0) (S83 241D)   3  History of Denial (799 29) (R45 89)   4  History of athlete's foot (V12 09) (Z86 19)   5  History of dizziness (V13 89) (Z87 898)   6  History of epigastric pain (V12 79) (Z87 19)   7  History of Helicobacter pylori infection (V12 09) (Z86 19)   8   History of low back pain (V13 59) (Z87 39)   9  History of urinary frequency (V13 09) (Z87 898)   10  History of Other acute sinusitis (461 8) (J01 80)   11  History of Vaginal burning (625 8) (N94 9)     The active problems and past medical history were reviewed and updated today  Surgical History   1  History of Appendectomy   2  History of Complete Colonoscopy     The surgical history was reviewed and updated today  Family History   Mother    1  Family history of Uterine cancer (80) (C55)     The family history was reviewed and updated today  Social History    · Never a smoker  The social history was reviewed and updated today  The social history was reviewed and is unchanged  Current Meds    1  Gabapentin 400 MG Oral Capsule; Take 1 pill at bed x 4 days, then1 pill 2 x daily x 4     days, then 1 pill 3 x daily x 4 days, then 1 pill 4 x daily; Therapy: 63OOT0667 to (Evaluate:19Jan2018)  Requested for: 16HKP4519; Last     Rx:20Nov2017 Ordered     The medication list was reviewed and updated today  Allergies   1  No Known Drug Allergies    Vitals   Vital Signs    Recorded: 59IFN8678 08:18AM   Temperature 62 5 F   Systolic 093   Diastolic 70   Weight 550 lb    BMI Calculated 23 69   BSA Calculated 1 67   Pain Scale 5     Physical Exam        Constitutional      General appearance: Well developed, well nourished, alert, in no distress, non-toxic and no overt pain behavior  Eyes      Sclera: anicteric      HEENT      Hearing grossly intact  Pulmonary      Respiratory effort: Even and unlabored  Cardiovascular      Examination of extremities: No edema or pitting edema present  Skin      Skin and subcutaneous tissue: Normal without rashes or lesions, well hydrated  Psychiatric      Mood and affect: Mood and affect appropriate  Neurologic Motor Tone:       Cranial nerves: Cranial nerves II-XII grossly intact   -- Slightly antalgic, painful, but steady gait without the use of any assistive devices  Musculoskeletal       Results/Data   * MRI LUMBAR SPINE WO CONTRAST 24ZKH2748 09:46AM Almeta Door   TW Order Number: [id number]       - Patient Instructions: To schedule this appointment, please contact Central Scheduling at [phone]  Test Name Result Flag Reference   MRI LUMBAR SPINE WO CONTRAST (Report)     MRI LUMBAR SPINE WITHOUT CONTRAST           INDICATION: M54 16: Radiculopathy, lumbar region      M54 41: Lumbago with sciatica, right side      M54 42: Lumbago with sciatica, left side  History taken directly from the electronic ordering system  low back pain radiating down both legs pt has symptoms for approx  2 years-getting worse           COMPARISON: 2/20/2017 radiograph           TECHNIQUE: Sagittal T1, sagittal T2, sagittal inversion recovery, axial T1 and axial T2, coronal T2             IMAGE QUALITY: Diagnostic           FINDINGS:           ALIGNMENT: Minimal levoscoliosis mid lumbar spine  Normal lordosis  MARROW SIGNAL: Normal marrow signal is identified within the visualized bony structures  No discrete marrow lesion  DISTAL CORD AND CONUS: Normal size and signal within the distal cord and conus  The conus ends at the inferior endplate of L1 level  PARASPINAL SOFT TISSUES: Paraspinal soft tissues are unremarkable  SACRUM: Normal signal within the sacrum  No evidence of insufficiency or stress fracture  LOWER THORACIC DISC SPACES: Normal disc height and signal  No disc herniation, canal stenosis or foraminal narrowing  LUMBAR DISC SPACES:              L1-L2: Normal            L2-L3: Normal            L3-L4: Normal            L4-L5: There is a diffuse disk bulge  No significant central canal or neural foraminal narrowing  L5-S1: There is a diffuse disk bulge  No significant central canal or neural foraminal narrowing                   IMPRESSION:           There is no focal disk herniation, central canal or neural foraminal narrowing  Workstation performed: [id number]           Signed by:       Khadijah Ann MD      12/12/17      Signatures    Electronically signed by : Sylvia Reyes [address] [place] [address]; Twin 15 2018  8:49AM EST                       (Author)     Electronically signed by : Francisca Goldberg, DO; Twin 15 2018 10:18AM EST                       (Author)

## 2018-01-16 NOTE — MISCELLANEOUS
Message   Recorded as Task   Date: 11/21/2017 10:21 AM, Created By: Arnold Márquez   Task Name: Miscellaneous   Assigned To: [facility] Authorization,Team   Regarding Patient: Maribell Huang, Status: In Progress   Comment:    Adelaide Kinney - 21 Nov 2017 10:21 AM     TASK CREATED  Pt's son Dayne Villarreal called stating Brianne said the doctor's office has to call because the medication needs prior authorization (he did not know the name of the medication)  Dayne Villarreal can be reached at [phone]  Jyoti Barakat - 21 Nov 2017 10:51 AM     TASK EDITED   Luis Paul - 21 Nov 2017 11:14 AM     TASK EDITED  spoke with Osiel Graves from [facility] and stated that pt does not need a PA that the pharmacy nees to fill the script for 96 pills for the 1st month due to the titration  The next month the 120 pills then will be filled  called pharmacy on file and spoke with Toyabrianna Yves and made her aware   Called pt's son and made him aware that pt's medication will be ready for him to p/u  Active Problems    1  Abdominal pain (789 00) (R10 9)   2  Anxiety (300 00) (F41 9)   3  Arthralgia of knee (719 46) (M25 569)   4  Backache with radiation (724 5) (M54 9)   5  Chronic low back pain with bilateral sciatica (724 2,724 3,338 29)   (M54 41,M54 42,G89 29)   6  Chronic right hip pain (719 45,338 29) (M25 551,G89 29)   7  Depression screen (V79 0) (Z13 89)   8  Dyslipidemia (272 4) (E78 5)   9  Easy bruising (782 9) (R23 8)   10  Fatigue (780 79) (R53 83)   11  Lumbar radiculopathy (724 4) (M54 16)   12  Myofascial pain (729 1) (M79 1)   13  Neck swelling (784 2) (R22 1)   14  Need for immunization against influenza (V04 81) (Z23)   15  Need for Tdap vaccination (V06 1) (Z23)   16  Pain of left hip joint (719 45) (M25 552)   17  Pain syndrome, chronic (338 4) (G89 4)   18  Paresthesia of bilateral legs (782 0) (R20 2)   19  Patellofemoral arthralgia of right knee (719 46) (M25 561)   20   Post-menopausal (V49 81) (Z78 0)   21  Postmenopausal atrophic vaginitis (627 3) (N95 2)   22  Primary osteoarthritis of both knees (715 16) (M17 0)   23  Right knee pain (719 46) (M25 561)   24  Right leg pain (729 5) (M79 604)   25  Screening for colon cancer (V76 51) (Z12 11)   26  Screening for lipid disorders (V77 91) (Z13 220)   27  Screening for osteoporosis (V82 81) (Z13 820)   28  Transient insomnia (307 41) (F51 02)   29  Visit for routine gyn exam (V72 31) (Z01 419)   30  Visit for screening mammogram (V76 12) (Z12 31)    Current Meds   1  Gabapentin 400 MG Oral Capsule; Take 1 pill at bed x 4 days, then1 pill 2 x daily x 4   days, then 1 pill 3 x daily x 4 days, then 1 pill 4 x daily; Therapy: 75KSB6112 to (Evaluate:19Jan2018)  Requested for: 27KHC6000; Last   Rx:20Nov2017 Ordered    Allergies    1   No Known Drug Allergies    Signatures   Electronically signed by : Daniela García, ; Nov 21 2017 11:14AM EST                       (Author)

## 2018-01-17 NOTE — MISCELLANEOUS
Message  patient was called today to  the H  pylori treatment from pharmacy today  Plan  H  pylori infection    · Amoxicillin 500 MG Oral Capsule; TAKE 2 CAPSULES TWICE DAILY UNTIL GONE   · Clarithromycin 500 MG Oral Tablet; TAKE 1 TABLET EVERY 12 HOURS DAILY   · Omeprazole 20 MG Oral Tablet Delayed Release;  Take 1 tablet twice daily    Signatures   Electronically signed by : Haley Javier MD; Dec 29 2016  5:23PM EST                       (Author)

## 2018-01-17 NOTE — RESULT NOTES
Message   h  pylori stool antigen test negative   small tubular adenoma in the colon biopsy  removed  repeat colonoscopy in 5 years  enter reminder to repeat colonoscopy in 5 years  If patient still has abd pain, can schedule follow up office appointments  thanks  Verified Results  (1) Bay Allred, STOOL [id number] 01:24PM Zaira Villavicencio     Test Name Result Flag Reference   H PYLORI ANTIGEN Negative  Negative   Performed at:  [facility] [address]  [id number]  : Lyubov Carpenter MD, Phone:  [phone]     (1) TISSUE EXAM [id number] 10:45AM Zaira Villavicencio     Test Name Result Flag Reference   LAB AP CASE REPORT (Report)     Surgical Pathology Report             Case: [id number]                   Authorizing Provider: Dionisio Valentine MD       Collected:      05/31/2016 1045        Ordering Location:   [facility] Received:      05/31/2016 1422                    Operating Room                                 Pathologist:      Jovani Yancey MD                                Specimen:  Large Intestine, Right/Ascending Colon, Polypectomy, retrieved with hot snare   LAB AP FINAL DIAGNOSIS      Ascending colon polyp, biopsy of: Tubular adenoma  Interpretation performed at [facility], [address] [place] [address]   LAB AP SURGICAL ADDITIONAL INFORMATION (Report)     These tests were developed and their performance characteristics   determined by [facility] or [facility]  They may not be cleared or approved by the [place]  Food and   Drug Administration  The FDA has determined that such clearance or   approval is not necessary  These tests are used for clinical purposes  They should not be regarded as investigational or for research  This   laboratory has been approved by CLIA 88, designated as a high-complexity   laboratory and is qualified to perform these tests      CPT code: 81626   LAB AP GROSS DESCRIPTION (Report)     A  The specimen is received in formalin, labeled with the patient's name   and hospital number, and is designated ascending colon polyp  The   specimen consists of 2 tan-pink soft tissue fragments measuring 0 3 cm and   0 5 cm in greatest dimension  Entirely submitted  One cassette  Note: The estimated total formalin fixation time based upon information   provided by the submitting clinician and the standard processing schedule   is 27 25 hours    MAS       Plan  Acute medial meniscus tear of right knee, subsequent encounter    · *1 - [facility] Physical Therapy  Consult  Status: Active  Requested for:  [id number]  Care Summary provided  : Yes

## 2018-01-22 ENCOUNTER — GENERIC CONVERSION - ENCOUNTER (OUTPATIENT)
Dept: OTHER | Facility: OTHER | Age: 53
End: 2018-01-22

## 2018-01-22 VITALS
HEIGHT: 64 IN | TEMPERATURE: 98.6 F | BODY MASS INDEX: 23.39 KG/M2 | WEIGHT: 137 LBS | DIASTOLIC BLOOD PRESSURE: 82 MMHG | SYSTOLIC BLOOD PRESSURE: 138 MMHG | HEART RATE: 62 BPM | OXYGEN SATURATION: 97 %

## 2018-01-22 VITALS
DIASTOLIC BLOOD PRESSURE: 60 MMHG | SYSTOLIC BLOOD PRESSURE: 110 MMHG | TEMPERATURE: 98.7 F | BODY MASS INDEX: 23.86 KG/M2 | WEIGHT: 139 LBS

## 2018-01-23 VITALS
WEIGHT: 138 LBS | TEMPERATURE: 98.3 F | DIASTOLIC BLOOD PRESSURE: 70 MMHG | BODY MASS INDEX: 23.69 KG/M2 | SYSTOLIC BLOOD PRESSURE: 118 MMHG

## 2018-01-23 NOTE — MISCELLANEOUS
Message   Recorded as Task   Date: 12/12/2017 01:01 PM, Created By: Tierra Marquez   Task Name: Follow Up   Assigned To: RAMON Espana   Regarding Patient: Anabela Henson, Status: Active   Comment:    Brannon Smith - 12 Dec 2017 1:01 PM     TASK CREATED  Can you please call the patient and advise her that her lumb/sac spine MRI showed mild disc bulging at l4-5 & L5-S1, but no sig herniations, stenosis, or even OA noted  At this point I would recommend proceeding with the gabapentin as discussed and f/u as scheduled in January  Thank you  Glenis Sanchez - 12 Dec 2017 1:19 PM     TASK EDITED  Informed pt of results of MRI and plan  Active Problems    1  Abdominal pain (789 00) (R10 9)   2  Anxiety (300 00) (F41 9)   3  Arthralgia of knee (719 46) (M25 569)   4  Backache with radiation (724 5) (M54 9)   5  Chronic low back pain with bilateral sciatica (724 2,724 3,338 29)   (M54 41,M54 42,G89 29)   6  Chronic right hip pain (719 45,338 29) (M25 551,G89 29)   7  Depression screen (V79 0) (Z13 89)   8  Dyslipidemia (272 4) (E78 5)   9  Easy bruising (782 9) (R23 8)   10  Fatigue (780 79) (R53 83)   11  Lumbar radiculopathy (724 4) (M54 16)   12  Myofascial pain (729 1) (M79 1)   13  Neck swelling (784 2) (R22 1)   14  Need for immunization against influenza (V04 81) (Z23)   15  Need for Tdap vaccination (V06 1) (Z23)   16  Pain of left hip joint (719 45) (M25 552)   17  Pain syndrome, chronic (338 4) (G89 4)   18  Paresthesia of bilateral legs (782 0) (R20 2)   19  Patellofemoral arthralgia of right knee (719 46) (M25 561)   20  Post-menopausal (V49 81) (Z78 0)   21  Postmenopausal atrophic vaginitis (627 3) (N95 2)   22  Primary osteoarthritis of both knees (715 16) (M17 0)   23  Right knee pain (719 46) (M25 561)   24  Right leg pain (729 5) (M79 604)   25  Screening for colon cancer (V76 51) (Z12 11)   26  Screening for lipid disorders (V77 91) (Z13 220)   27   Screening for osteoporosis (V82 81) (Z13 820)   28  Transient insomnia (307 41) (F51 02)   29  Visit for routine gyn exam (V72 31) (Z01 419)   30  Visit for screening mammogram (V76 12) (Z12 31)    Current Meds   1  Gabapentin 400 MG Oral Capsule; Take 1 pill at bed x 4 days, then1 pill 2 x daily x 4   days, then 1 pill 3 x daily x 4 days, then 1 pill 4 x daily; Therapy: 57FKH0434 to (Evaluate:19Jan2018)  Requested for: 85KMY5239; Last   Rx:20Nov2017 Ordered    Allergies    1   No Known Drug Allergies    Signatures   Electronically signed by : Ketan Nicole, ; Dec 12 2017  1:20PM EST                       (Author)

## 2018-01-24 NOTE — MISCELLANEOUS
Message   Recorded as Task   Date: 01/15/2018 12:36 PM, Created By: Margoth Loja   Task Name: Follow Up   Assigned To: RAMON Gomezr Reneeuce   Regarding Patient: Kyle Alcaraz, Status: In Progress   Comment:    Brannon Smith - 15 Twin 2018 12:36 PM     TASK CREATED  Can you please call the patient and advise her that her b/l knee x-rays were normal  At this point we can try a topical NSAID such as Voltaren gel and continue with the gabapentin as prescribed  I escribed the Voltaren gel to her pharmacy  She can put it on her knees up to 4 x daily as needed  Thank you  Carmen Krueger - 15 Twin 2018 1:14 PM     TASK EDITED   Thu Estelle - 15 Twin 2018 1:18 PM     TASK EDITED  Attempted to reach pt, her son answered, pt was not home, he will tell the pt to call [facility] back, [address] phone # provided  Rhea - 22 Jan 2018 11:26 AM     TASK EDITED  S/w pt who gave phone to son due to language barrier  Advised son of same  States he will  voltaren gel  4/16 f/u with RM  Active Problems    1  Abdominal pain (789 00) (R10 9)   2  Anxiety (300 00) (F41 9)   3  Arthralgia of knee (719 46) (M25 569)   4  Backache with radiation (724 5) (M54 9)   5  Chronic low back pain with bilateral sciatica (724 2,724 3,338 29)   (M54 41,M54 42,G89 29)   6  Chronic lumbar radiculopathy (724 4) (M54 16)   7  Chronic pain of left knee (797 44,430 46) (M25 562,G89 29)   8  Chronic pain of right knee (883 53,446 01) (M25 561,G89 29)   9  Chronic right hip pain (719 45,338 29) (M25 551,G89 29)   10  Depression screen (V79 0) (Z13 89)   11  Dyslipidemia (272 4) (E78 5)   12  Easy bruising (782 9) (R23 8)   13  Fatigue (780 79) (R53 83)   14  Myofascial pain (729 1) (M79 1)   15  Neck swelling (784 2) (R22 1)   16  Need for immunization against influenza (V04 81) (Z23)   17  Need for Tdap vaccination (V06 1) (Z23)   18  Pain of left hip joint (719 45) (M25 552)   19  Pain syndrome, chronic (338 4) (G89 4)   20   Paresthesia of bilateral legs (782 0) (R20 2)   21  Patellofemoral arthralgia of right knee (719 46) (M25 561)   22  Post-menopausal (V49 81) (Z78 0)   23  Postmenopausal atrophic vaginitis (627 3) (N95 2)   24  Primary osteoarthritis of both knees (715 16) (M17 0)   25  Right leg pain (729 5) (M79 604)   26  Screening for colon cancer (V76 51) (Z12 11)   27  Screening for lipid disorders (V77 91) (Z13 220)   28  Screening for osteoporosis (V82 81) (Z13 820)   29  Transient insomnia (307 41) (F51 02)   30  Visit for routine gyn exam (V72 31) (Z01 419)   31  Visit for screening mammogram (V76 12) (Z12 31)    Current Meds   1  Diclofenac Sodium 1 % Transdermal Gel; Apply 4 grams to affected area QID PRN; Therapy: 24LPO6688 to [id number])  Requested for: 22GPY4251; Last   Rx:15Jan2018 Ordered   2  Gabapentin 400 MG Oral Capsule; Take 1 PO QID; Therapy: 26YUX5727 to (Evaluate:15Apr2018)  Requested for: 20KBA6747; Last   Rx:15Jan2018 Ordered    Allergies    1   No Known Drug Allergies    Signatures   Electronically signed by : Judit Villeda, ; Jan 22 2018 11:26AM EST                       (Author)

## 2018-02-11 ENCOUNTER — HOSPITAL ENCOUNTER (EMERGENCY)
Facility: HOSPITAL | Age: 53
Discharge: HOME | End: 2018-02-11
Admitting: EMERGENCY MEDICINE
Payer: COMMERCIAL

## 2018-02-11 VITALS
OXYGEN SATURATION: 98 % | DIASTOLIC BLOOD PRESSURE: 66 MMHG | RESPIRATION RATE: 18 BRPM | BODY MASS INDEX: 23.5 KG/M2 | TEMPERATURE: 99.3 F | HEART RATE: 66 BPM | SYSTOLIC BLOOD PRESSURE: 143 MMHG | WEIGHT: 136.91 LBS

## 2018-02-11 DIAGNOSIS — J32.9 SINUSITIS: Primary | ICD-10-CM

## 2018-02-11 PROCEDURE — 99283 EMERGENCY DEPT VISIT LOW MDM: CPT

## 2018-02-11 RX ORDER — AMOXICILLIN AND CLAVULANATE POTASSIUM 875; 125 MG/1; MG/1
1 TABLET, FILM COATED ORAL EVERY 12 HOURS
Qty: 14 TABLET | Refills: 0 | Status: SHIPPED | OUTPATIENT
Start: 2018-02-11 | End: 2018-02-18

## 2018-02-11 RX ORDER — AMOXICILLIN AND CLAVULANATE POTASSIUM 875; 125 MG/1; MG/1
1 TABLET, FILM COATED ORAL ONCE
Status: COMPLETED | OUTPATIENT
Start: 2018-02-11 | End: 2018-02-11

## 2018-02-11 RX ADMIN — AMOXICILLIN AND CLAVULANATE POTASSIUM 1 TABLET: 875; 125 TABLET, FILM COATED ORAL at 19:02

## 2018-02-11 NOTE — ED PROVIDER NOTES
History  Chief Complaint   Patient presents with    Jaw Pain     bilateral ear pain into jaws with headache and chills     Patient presents to the emergency department today with her granddaughter  Patient states over the last 8 days she has had frontal and maxillary sinus pressure as well as sore throat and bilateral ear pain  Occasional cough  She has been experiencing body aches  No history of chest pain shortness of breath or abdominal pain  Fever today  Prior to Admission Medications   Prescriptions Last Dose Informant Patient Reported? Taking?   gabapentin (NEURONTIN) 100 mg capsule   Yes Yes   Sig: Take 100 mg by mouth 3 (three) times a day      Facility-Administered Medications: None       Past Medical History:   Diagnosis Date    Fatigue     High cholesterol     Neuropathy     Osteoarthritis     knees       Past Surgical History:   Procedure Laterality Date    APPENDECTOMY      NH COLONOSCOPY FLX DX W/COLLJ SPEC WHEN PFRMD N/A 5/31/2016    Procedure: COLONOSCOPY;  Surgeon: Dennis Baer MD;  Location: [facility];  Service: Gastroenterology    NH ESOPHAGOGASTRODUODENOSCOPY TRANSORAL DIAGNOSTIC N/A 11/15/2016    Procedure: ESOPHAGOGASTRODUODENOSCOPY (EGD); Surgeon: Dennis Baer MD;  Location: [facility];  Service: Gastroenterology       History reviewed  No pertinent family history  I have reviewed and agree with the history as documented  Social History   Substance Use Topics    Smoking status: Never Smoker    Smokeless tobacco: Never Used    Alcohol use No        Review of Systems   Constitutional: Negative  HENT: Positive for congestion, ear pain, sinus pain, sinus pressure and sore throat  Negative for dental problem, drooling, ear discharge, facial swelling, hearing loss, mouth sores, nosebleeds, postnasal drip, rhinorrhea, sneezing, tinnitus, trouble swallowing and voice change  Eyes: Negative  Respiratory: Negative  Cardiovascular: Negative      Gastrointestinal: Negative  Endocrine: Negative  Genitourinary: Negative  Musculoskeletal: Positive for myalgias  Negative for arthralgias, back pain, gait problem, joint swelling, neck pain and neck stiffness  Skin: Negative  Allergic/Immunologic: Negative  Neurological: Negative  Hematological: Negative  Psychiatric/Behavioral: Negative  All other systems reviewed and are negative  Physical Exam  ED Triage Vitals [02/11/18 1752]   Temperature Pulse Respirations Blood Pressure SpO2   99 3 °F (37 4 °C) 66 18 143/66 98 %      Temp Source Heart Rate Source Patient Position - Orthostatic VS BP Location FiO2 (%)   Temporal Right Sitting Right arm --      Pain Score       7           Orthostatic Vital Signs  Vitals:    02/11/18 1752   BP: 143/66   Pulse: 66   Patient Position - Orthostatic VS: Sitting       Physical Exam   Constitutional: She is oriented to person, place, and time  She appears well-developed and well-nourished  No distress  HENT:   Head: Normocephalic  Right Ear: External ear normal    Left Ear: External ear normal    Mouth/Throat: Oropharynx is clear and moist  No oropharyngeal exudate  Boggy and edematous nasal mucosa with b/l maxillary and frontal sinus tenderness to percussion  Eyes: EOM are normal  Pupils are equal, round, and reactive to light  Right eye exhibits no discharge  Left eye exhibits no discharge  No scleral icterus  Neck: Normal range of motion  No JVD present  No tracheal deviation present  No thyromegaly present  Cardiovascular: Normal rate, regular rhythm, normal heart sounds and intact distal pulses  Exam reveals no gallop and no friction rub  No murmur heard  Pulmonary/Chest: Effort normal and breath sounds normal  No stridor  No respiratory distress  She has no wheezes  She has no rales  She exhibits no tenderness  Abdominal: Soft  Musculoskeletal: Normal range of motion  Lymphadenopathy:     She has no cervical adenopathy     Neurological: She is alert and oriented to person, place, and time  Skin: Skin is warm  Capillary refill takes less than 2 seconds  She is not diaphoretic  Psychiatric: She has a normal mood and affect  ED Medications  Medications   amoxicillin-clavulanate (AUGMENTIN) 875-125 mg per tablet 1 tablet (not administered)       Diagnostic Studies  Results Reviewed     None                 No orders to display              Procedures  Procedures       Phone Contacts  ED Phone Contact    ED Course  ED Course                                [facility]  CritCare Time    Disposition  Final diagnoses:   Sinusitis     Time reflects when diagnosis was documented in both MDM as applicable and the Disposition within this note     Time User Action Codes Description Comment    2/11/2018  6:35 PM Gladys SPRING Add [J32 9] Sinusitis       ED Disposition     ED Disposition Condition Comment    Discharge  [facility] discharge to home/self care  Condition at discharge: Good        Follow-up Information     Follow up With Specialties Details Why Contact Info    Thai Staley PA-C Family Medicine Schedule an appointment as soon as possible for a visit  [place] [id number] [phone]          Patient's Medications   Discharge Prescriptions    AMOXICILLIN-CLAVULANATE (AUGMENTIN) 875-125 MG PER TABLET    Take 1 tablet by mouth every 12 (twelve) hours for 7 days       Start Date: 2/11/2018 End Date: 2/18/2018       Order Dose: 1 tablet       Quantity: 14 tablet    Refills: 0     No discharge procedures on file      ED Provider  Electronically Signed by           Wm Argueta PA-C  02/11/18 8805

## 2018-02-11 NOTE — DISCHARGE INSTRUCTIONS
Rinosinusitis   LO QUE NECESITA SABER:   La rinosinusitis (RS) es la inflamación de jane nariz y senos paranasales  Por lo general, comienza yolis un virus, frecuentemente yolis un resfriado común  Los virus normalmente moser entre 7 y 8 días y no necesitan tratamiento  Cuando el virus no se mejora por sí solo, usted podría tener rinosinusitis bacteriana  [place] significa que ha comenzado a crecer bacteria dentro de ludy senos paranasales  La RS aguda dura menos de 4 semanas  La RS crónica dura 12 semanas o más  La RS recurrente es cuando usted tiene 4 o más episodios de rinosinusitis en 1 año  INSTRUCCIONES SOBRE EL [facility]IE [facility]OSPITALARIA:   Busque atención médica de inmediato si:   · Jane dario y párpado están enrojecidos, inflamados y adoloridos  · Usted no puede abrir jane dario  · Usted tiene visión doble o no ve  · Jane globo ocular sobresale o usted no puede  jane dario  · Usted tiene más sueño de lo normal o nota cambios en jane habilidad de pensar, moverse o hablar  · Usted tiene el shante rígido, fiebre o un herve dolor de trevin  · Usted tiene inflamada la frente o el cuero cabelludo  Pregúntele a jane Rogue Beady vitaminas y minerales son adecuados para usted  · Ludy síntomas empeoran o no mejoran después de 3 a 5 días de [place]  · Usted tiene preguntas o inquietudes acerca de jane condición o cuidado  Medicamentos:  Es posible que usted necesite alguno de los siguientes:  · El acetaminofén  liza el dolor y baja la fiebre  Está disponible sin receta médica  Pregunte la cantidad y la frecuencia con que debe tomarlos  Umair 645  El acetaminofén puede causar daño en el hígado cuando no se lynda de forma correcta  · AINEs (Analgésicos antiinflamatorios no esteroides) yolis el ibuprofeno, ayudan a disminuir la inflamación, el dolor y la [place]  Nemo medicamento esta disponible con o sin haydee receta médica   Los AINEs pueden causar sangrado estomacal o problemas renales en ciertas personas  Si usted lynda un medicamento anticoagulante, siempre pregúntele a del valle médico si los FANTASMA son seguros para usted  Siempre maria ines la etiqueta de jennifer medicamento y [place] instrucciones  · Los aerosoles nasales con esteroides  disminuyen la inflamación de del valle nariz y de los senos paranasales  · Los descongestionantes  reducen la inflamación y despejan la mucosidad de la nariz y los senos paranasales  Los descongestionantes podrían ayudarle a respirar más fácilmente  · Los antihistamínicos  secar la mucosidad en del valle nariz y [employer] estornudos  · Antibióticos  sirven para tratar haydee infección bacteriana y se podrían necesitar si los síntomas no mejoran o se [place]marie Linear  · [place] catherine medicamentos yolis se le haya indicado  Consulte con del valle médico si usted kayla que del valle medicamento no le está ayudando o si presenta efectos secundarios  Infórmele si es alérgico a cualquier medicamento  Mantenga haydee lista actualizada de los [place], las vitaminas y los productos herbales que lynda  Incluya los siguientes datos de los medicamentos: cantidad, frecuencia y motivo de administración  Traiga con usted la lista o los envases de la píldoras a catherine citas de seguimiento  Lleve la lista de los medicamentos con usted en farhad de haydee emergencia  Cuidados personales:   · Enjuague catherine senos paranasales  Use un aparato para enjuagar catherine fosas nasales con haydee solución salina (agua salada)  [place] ayudará a diluir la mucosidad en la nariz eliminando el polen y la suciedad  También ayudará a reducir la inflamación para que usted pueda respirar normalmente  Pregunte a del valle médico con qué frecuencia hacerlo  · Respire vapor  [place] agua en un sartén hasta que salga vapor  Inclínese sobre el cuenco y roberto haydee carpa con haydee toalla bandar  Respire profundamente por aproximadamente 20 minutos  Tenga cuidado de no acercarse demasiado al vapor o de quemarse  Roberto esto 3 veces al día   Usted también puede respirar profundamente mientras lynda haydee ducha caliente  · Duerma con la Page Heller  Coloque haydee almohada adicional debajo de jane trevin antes de dormir para ayudar a drenar catherine senos paranasales  · [address] se le haya indicado  Pregunte a jane médico sobre la cantidad de líquido que necesita dmitri todos los días y cuáles le recomienda  Los líquidos van a diluir la mucosidad en jane Rice Gottron y Waynette Nunez a drenarla  Evite las bebidas que contienen alcohol o cafeína  · No fume y evite el humo de [place]  La nicotina y otros químicos en los cigarrillos y cigarros pueden empeorar catherine síntomas  Pida información a jane médico si usted actualmente fuma y necesita ayuda para dejar de fumar  Los cigarrillos electrónicos o tabaco sin humo todavía contienen nicotina  Consulte con jane médico antes de [employer]  Acuda a catherine consultas de control con jane médico según le indicaron  Acuda a haydee megan de seguimiento si catherine síntomas empeoran o no mejoran después de 3 a 5 días de tratamiento  Anote catherine preguntas para que se acuerde de hacerlas linda catherine visitas  © 2017 [facility] Information is for End User's use only and may not be sold, redistributed or otherwise used for commercial purposes  All illustrations and images included in CareNotes® are the copyrighted property of [employer]  or Robert Thornton  Esta información es sólo para uso en educación  Jane intención no es darle un consejo médico sobre enfermedades o tratamientos  Colsulte con jane [place] farmacéutico antes de seguir cualquier régimen médico para saber si es seguro y efectivo para usted

## 2018-04-18 ENCOUNTER — OFFICE VISIT (OUTPATIENT)
Dept: PAIN MEDICINE | Facility: CLINIC | Age: 53
End: 2018-04-18
Payer: COMMERCIAL

## 2018-04-18 VITALS — WEIGHT: 138 LBS | SYSTOLIC BLOOD PRESSURE: 120 MMHG | BODY MASS INDEX: 23.69 KG/M2 | DIASTOLIC BLOOD PRESSURE: 80 MMHG

## 2018-04-18 DIAGNOSIS — G89.4 CHRONIC PAIN SYNDROME: ICD-10-CM

## 2018-04-18 DIAGNOSIS — M54.16 LUMBAR RADICULOPATHY: Primary | ICD-10-CM

## 2018-04-18 DIAGNOSIS — M79.18 MYOFASCIAL PAIN SYNDROME: ICD-10-CM

## 2018-04-18 PROCEDURE — 99214 OFFICE O/P EST MOD 30 MIN: CPT | Performed by: ANESTHESIOLOGY

## 2018-04-18 RX ORDER — GABAPENTIN 600 MG/1
600 TABLET ORAL 3 TIMES DAILY
Qty: 90 TABLET | Refills: 2 | Status: SHIPPED | OUTPATIENT
Start: 2018-04-18 | End: 2018-05-17

## 2018-04-18 NOTE — PROGRESS NOTES
Pt is c/o lower back pain  Assessment:  1  Lumbar radiculopathy    2  Myofascial pain syndrome    3  Chronic pain syndrome        Plan:  Patient is a 44-year-old female with complaints of low back pain and left leg pain in the L5 and L4 nerve root distribution presents to office for follow-up visit  Patient denies any adverse events since last office visit  MRI lumbar spine was reviewed which shows 2 disc bulges at L4-L5 and L5-S1   1  We will schedule patient for a left L4 and L5 nerve root transforaminal epidural steroid injection  2  We will  Increased patient's gabapentin 600 mg p o  T i d   3   We will also order we will start physical therapy for lumbar corestrengthening Karina based therapy        Complete risks and benefits including bleeding, infection, tissue reaction, nerve injury and allergic reaction were discussed  The approach was demonstrated using models and literature was provided  Verbal and written consent was obtained  [place] Prescription Drug Monitoring Program report was reviewed and was appropriate       History of Present Illness: The patient is a 48 y o  female who presents for a follow up office visit in regards to Back Pain  The patients current symptoms include  6/10 constant pins and needles in the left lower extremity in the L5 and L4 without any particular pain pattern  Current pain medications includes:  Gabapentin 400 mg   The patient reports that this regimen is providing   50% pain relief  The patient is reporting no side effects from this pain medication regimen  I have personally reviewed and/or updated the patient's past medical history, past surgical history, family history, social history, current medications, allergies, and vital signs today  Review of Systems  Review of Systems   Respiratory: Negative for shortness of breath  Cardiovascular: Positive for chest pain     Gastrointestinal: Negative for constipation, diarrhea, nausea and vomiting  Musculoskeletal: Negative for arthralgias, gait problem, joint swelling and myalgias  Skin: Negative for rash  Neurological: Negative for dizziness, seizures and weakness  All other systems reviewed and are negative  Past Medical History:   Diagnosis Date    Fatigue     High cholesterol     Neuropathy     Osteoarthritis     knees       Past Surgical History:   Procedure Laterality Date    APPENDECTOMY      MD COLONOSCOPY FLX DX W/COLLJ SPEC WHEN PFRMD N/A 5/31/2016    Procedure: COLONOSCOPY;  Surgeon: Hezzie Cranker, MD;  Location: [facility];  Service: Gastroenterology    MD ESOPHAGOGASTRODUODENOSCOPY TRANSORAL DIAGNOSTIC N/A 11/15/2016    Procedure: ESOPHAGOGASTRODUODENOSCOPY (EGD); Surgeon: Hezzie Cranker, MD;  Location: [facility];  Service: Gastroenterology       No family history on file  Social History     Occupational History    Not on file  Social History Main Topics    Smoking status: Never Smoker    Smokeless tobacco: Never Used    Alcohol use No    Drug use: No    Sexual activity: Not on file         Current Outpatient Prescriptions:     gabapentin (NEURONTIN) 100 mg capsule, Take 100 mg by mouth 3 (three) times a day, Disp: , Rfl:     No Known Allergies    Physical Exam:    /80   Wt 62 6 kg (138 lb)   BMI 23 69 kg/m²     Constitutional:normal, well developed, well nourished, alert, in no distress and non-toxic and no overt pain behavior  Eyes:anicteric  HEENT:grossly intact  Neck:supple, symmetric, trachea midline and no masses   Pulmonary:even and unlabored  Cardiovascular:No edema or pitting edema present  Skin:Normal without rashes or lesions and well hydrated  Psychiatric:Mood and affect appropriate  Neurologic:Cranial Nerves II-XII grossly intact  Musculoskeletal:normal      Lumbar/Sacral Spine examination demonstrates  Full range of motion lumbar spine with pain upon: flexion, lateral rotation to the left/right, and bending to the left/right  Bilateral lumbar paraspinals tender to palpation  Muscle spasms noted in the lumbar area bilaterally  5/5 lower extremity strength in all muscle groups  Right lower extremity, 3/5 lower extremity strength in left lower muscles  Positive seated straight leg raise for  left lower extremities  Sensitivity to light touch intact bilateral lower extremities  4+ reflexes in the patella and Achilles  No ankle clonus    Imaging  No orders to display     XR spine lumbar complete w bending minimum 6 views   Status: Final result   PACS Images     Show images for XR spine lumbar complete w bending minimum 6 views   Order Report      Order Details   Study Result     LUMBAR SPINE     INDICATION:  M54 16: Radiculopathy, lumbar region  M54 41: Lumbago with sciatica, right side  M54 42: Lumbago with sciatica, left side  History taken directly from the electronic ordering system           COMPARISON: February 20, 2017     VIEWS:  AP, bilateral oblique, coned down and lateral projections in neutral, flexion and extension     IMAGES:  6     FINDINGS:     Alignment is unremarkable      There is no subluxation and alignment is stable in flexion, extension, and neutral positioning      There is no radiographic evidence of acute fracture or destructive osseous lesion      Mild degenerative changes are noted the posterior elements at L4-5 and L5-S1 as well as both sacroiliac joints      Surgical clips overlie the right iliac crest and left iliac bone      IMPRESSION:     Degenerative changes, lower lumbar spine/lumbosacral junction and both sacroiliac joints       No orders of the defined types were placed in this encounter

## 2018-04-20 ENCOUNTER — TRANSCRIBE ORDERS (OUTPATIENT)
Dept: ADMINISTRATIVE | Facility: HOSPITAL | Age: 53
End: 2018-04-20

## 2018-04-24 DIAGNOSIS — Z12.39 SCREENING FOR BREAST CANCER: Primary | ICD-10-CM

## 2018-04-30 ENCOUNTER — EVALUATION (OUTPATIENT)
Dept: PHYSICAL THERAPY | Facility: CLINIC | Age: 53
End: 2018-04-30
Payer: COMMERCIAL

## 2018-04-30 DIAGNOSIS — M54.16 LUMBAR RADICULOPATHY: Primary | ICD-10-CM

## 2018-04-30 PROCEDURE — G8990 OTHER PT/OT CURRENT STATUS: HCPCS | Performed by: PHYSICAL THERAPIST

## 2018-04-30 PROCEDURE — 97140 MANUAL THERAPY 1/> REGIONS: CPT | Performed by: PHYSICAL THERAPIST

## 2018-04-30 PROCEDURE — 97010 HOT OR COLD PACKS THERAPY: CPT | Performed by: PHYSICAL THERAPIST

## 2018-04-30 PROCEDURE — G8991 OTHER PT/OT GOAL STATUS: HCPCS | Performed by: PHYSICAL THERAPIST

## 2018-04-30 PROCEDURE — 97162 PT EVAL MOD COMPLEX 30 MIN: CPT | Performed by: PHYSICAL THERAPIST

## 2018-04-30 NOTE — PROGRESS NOTES
PT Evaluation     Today's date: 2018  Patient name: Brad Allen  : 1965  MRN: [id number]  Referring provider: Norman Bueno MD  Dx:   Encounter Diagnosis     ICD-10-CM    1  Lumbar radiculopathy M54 16 Ambulatory referral to Physical Therapy                  Assessment    Assessment details:   CURRENT FUNCTIONAL STATUS    Sleep tolerance several hours  Standing/ADL tolerance 15-20 minutes  Walking tolerance 20 minutes      Ability to bend forward: Moderate pain arising from     Ability to arise from sitting: Moderate pain arising from  Lifting tolerance 20 lbs  SHORT TERM GOALS (2 WEEKS)    Decrease painduring lumbar spine AROM  Increase lower extremity strength 1/2 grade in all weak areas  Decrease pain to 2-5/10  Sleep tolerance 4-6 hours  Standing/ADL tolerance 25-30 minutes  Walking tolerance 30 minutes      Ability to bend forward: Minimal pain arising from     Ability to arise from sitting: Minimal pain arising from  Lifting tolerance 25 lbs  LONG TERM GOALS (DISCHARGE)    Lumbar Spine AROM: Minimal/no pain in all planes  Lower Extremity Strength: 5/5 t/o  Decrease pain to 0-3/10  Sleep tolerance 6-8 hours  Standing/ADL tolerance 60 minutes  Walking tolerance 60 minutes      Ability to bend forward: Minimal/no pain arising from     Ability to arise from sitting: Minimal/no pain arising from  Lifting tolerance 30 lbs  Prognosis: fair    Goals  See assessment details above  Plan  Planned modality interventions: thermotherapy: hydrocollator packs  Planned therapy interventions: manual therapy, neuromuscular re-education, therapeutic activities and therapeutic exercise  Frequency: 2x week  Duration in weeks: 4  Plan details: Brad Allen is a 48y o  year old female presenting to PT with pain, decreased range of motion, decreased strength, and decreased tolerance to activity   This patient would benefit from skilled PT services to address these issues and to maximize function  A home exercise program was provided and all questions were answered  Thank you for the referral           Subjective Evaluation    History of Present Illness  Mechanism of injury: CC: Left sided low back pain radiating into the left hip and knee, weakness of the left leg, pins and needles over the left knee  HPI: The patient states that her back problem began 3 years ago after sitting in a cold room for 15 hours  Testing revealed some arthritis and diffuse disc bulging in her low back  Prior injections and physical therapy afforded some relief  She will be having another injection next month  Pain  Current pain ratin  At best pain rating: 3  At worst pain ratin    Patient Goals  Patient goals for therapy: decreased pain, increased motion, increased strength and independence with ADLs/IADLs          Objective     Postural Observations    Additional Postural Observation Details  Gait and transfers normal, no lateral shift  General Comments     Lumbar Comments  CURRENT OBJECTIVE MEASUREMENTS    Lumbar Spine AROM: WFL in all planes, with flexion, extension, and R SB being painful  Lower Extremity Strength: Grossly 4/5 t/o with bilateral knee pain reported  Lower Extremity Reflexes: +2 bilaterally             Precautions: None    Daily Treatment Diary     Manual          STM L LF        P/A Mob         Flex/Rot Mob         Hamstring Stretch L LF        Piriformis Stretch L LF                 Exercise Diary          Prone lying         Elbow Props         Press Ups         SBB         SG         LTR         SKTC         DKTC         Flexion Sitting         Flexion Standing         NuStep: S , A         BACK EXT: F  P , C         PYR AB: S , P         Rotary Torso: S  P , C         Hoist Row: S          Lat Pulldown: S         SA Pulldown         Oblique Press         Multi-Hip: F , P         FLEX/EXT         ABD/ADD         Prone Planks         Side Planks Suspension Planks         Body Mechanics         Centralization         Disc mechanics         Lumbar Roll                  Modalities          HP L L-S prone 15'        Traction

## 2018-05-03 ENCOUNTER — OFFICE VISIT (OUTPATIENT)
Dept: PHYSICAL THERAPY | Facility: CLINIC | Age: 53
End: 2018-05-03
Payer: COMMERCIAL

## 2018-05-03 DIAGNOSIS — M54.16 LUMBAR RADICULOPATHY: Primary | ICD-10-CM

## 2018-05-03 PROCEDURE — 97140 MANUAL THERAPY 1/> REGIONS: CPT

## 2018-05-03 PROCEDURE — 97010 HOT OR COLD PACKS THERAPY: CPT

## 2018-05-03 PROCEDURE — 97110 THERAPEUTIC EXERCISES: CPT

## 2018-05-03 NOTE — PROGRESS NOTES
Daily Note     Today's date: 5/3/2018  Patient name: Brad Allen  : 1965  MRN: [id number]  Referring provider: Norman Bueno MD  Dx:   Encounter Diagnosis     ICD-10-CM    1  Lumbar radiculopathy M54 16                   Subjective: Patient expresses that her pain resides in her L side, quad and groin area  Daughter translates as needed  Objective: See treatment diary below  Incorporated TE program today to strengthen core and back musculature  Assessment: Tolerated treatment well  Patient demonstrated fatigue post treatment  Patient will benefit from further services to monitor pain and strengthen core  Progress as able and to tolerance  Plan: Continue per plan of care  Precautions: None     Daily Treatment Diary      Manual    5-3           STM L LF CM           P/A Mob               Flex/Rot Mob               Hamstring Stretch L LF  CM           Piriformis Stretch L LF  CM                           Exercise Diary                Prone lying               Elbow Props               Press Ups               SBB               SG               LTR               SKTC               DKTC               Flexion Sitting               Flexion Standing               Bike    10' L 3           BACK EXT: F 6  P 3 ,    40# 2/10           PYR AB: S 4, P    40# 2/10           Rotary Torso: S 2  P3 , C 7    P 3 2/10           Hoist Row: S     P 2 2x10           Lat Pulldown:  S    P 2 2/10           SA Pulldown    P 4 2/10           Oblique Press    P 2 2/10           Multi-Hip: F , P               FLEX/EXT               ABD/ADD               Prone Planks               Side Planks               Suspension Planks               Body Mechanics               Centralization               Disc mechanics               Lumbar Roll                               Modalities                HP L L-S prone 15'  15'           Traction

## 2018-05-08 ENCOUNTER — OFFICE VISIT (OUTPATIENT)
Dept: PHYSICAL THERAPY | Facility: CLINIC | Age: 53
End: 2018-05-08
Payer: COMMERCIAL

## 2018-05-08 DIAGNOSIS — M54.16 LUMBAR RADICULOPATHY: Primary | ICD-10-CM

## 2018-05-08 PROCEDURE — 97110 THERAPEUTIC EXERCISES: CPT

## 2018-05-08 PROCEDURE — 97140 MANUAL THERAPY 1/> REGIONS: CPT

## 2018-05-08 PROCEDURE — 97010 HOT OR COLD PACKS THERAPY: CPT

## 2018-05-08 NOTE — PROGRESS NOTES
Daily Note     Today's date: 2018  Patient name: Ute Farrell  : 1965  MRN: [id number]  Referring provider: Laura Stokes MD  Dx:   Encounter Diagnosis     ICD-10-CM    1  Lumbar radiculopathy M54 16                   Subjective: Patient reports soreness in the L hip/anterior thigh is increased since performing the exercise program       Objective: See treatment diary below      Assessment: Tolerated treatment well  Patient Patient reports slight discomfort during the exercise program and stretching  Plan: Continue per plan of care  Precautions: None     Daily Treatment Diary      Manual    5-3           STM L LF CM  LF         P/A Mob               Flex/Rot Mob               Hamstring Stretch L LF  CM LF         Piriformis Stretch L LF  CM  LF                         Exercise Diary                Prone lying               Elbow Props               Press Ups               SBB               SG               LTR               SKTC               DKTC               Flexion Sitting               Flexion Standing               Bike    10' L 3  L 3 10'         BACK EXT: F 6  P 3 ,    40# 2/10  P 4 2/10         PYR AB: S 4, P    40# 2/10  P 4 2/10         Rotary Torso: S 2  P3 , C 7    P 3 2/10  P 3 2/10         Hoist Row: S     P 2 2x10  P 2 2/10         Lat Pulldown:  S    P 2 2/10  P 3 2/10         SA Pulldown    P 4 2/10  P 4 2/10         Oblique Press    P 2 2/10  P 2 2/10         Multi-Hip: F , P               FLEX/EXT               ABD/ADD               Prone Planks               Side Planks               Suspension Planks               Body Mechanics               Centralization               Disc mechanics               Lumbar Roll                               Modalities                HP L L-S prone 15'  15'  15'         Traction

## 2018-05-10 ENCOUNTER — OFFICE VISIT (OUTPATIENT)
Dept: PHYSICAL THERAPY | Facility: CLINIC | Age: 53
End: 2018-05-10
Payer: COMMERCIAL

## 2018-05-10 DIAGNOSIS — M54.16 LUMBAR RADICULOPATHY: Primary | ICD-10-CM

## 2018-05-10 PROCEDURE — 97010 HOT OR COLD PACKS THERAPY: CPT

## 2018-05-10 PROCEDURE — 97110 THERAPEUTIC EXERCISES: CPT

## 2018-05-10 PROCEDURE — 97140 MANUAL THERAPY 1/> REGIONS: CPT

## 2018-05-10 NOTE — PROGRESS NOTES
Daily Note     Today's date: 5/10/2018  Patient name: Nic Phillips  : 1965  MRN: [id number]  Referring provider: Babita Raphael MD  Dx:   Encounter Diagnosis     ICD-10-CM    1  Lumbar radiculopathy M54 16                   Subjective: Patient reports her back pain is decreased but anterior/lateral thigh pain continues  (Pegge Clonts by patient's daughter)      Objective: See treatment diary below      Assessment: Tolerated treatment well  Patient exhibited good technique with therapeutic exercises      Plan: Continue per plan of care  Precautions: None     Daily Treatment Diary      Manual    5-3  5/8  5/10       STM L LF CM  LF  LF       P/A Mob               Flex/Rot Mob               Hamstring Stretch L LF  CM LF  LF       Piriformis Stretch L LF  CM  LF  LF                       Exercise Diary                Prone lying               Elbow Props               Press Ups               SBB               SG               LTR               SKTC               DKTC               Flexion Sitting               Flexion Standing               Bike    10' L 3  L 3 10'  L 3 10'       BACK EXT: F 6  P 3 ,    40# 2/10  P 4 2/10  P 4 3/10       PYR AB: S 4, P    40# 2/10  P 4 2/10  P 4 3/10       Rotary Torso: S 2  P3 , C 7    P 3 2/10  P 3 2/10  P 3 3/10       Hoist Row: S     P 2 2x10  P 2 2/10  P 2 3/10       Lat Pulldown:  S    P 2 2/10  P 3 2/10  P 3 3/10       SA Pulldown    P 4 2/10  P 4 2/10  P 4 3/10       Oblique Press    P 2 2/10  P 2 2/10  P 2 3/10       Multi-Hip: F , P               FLEX/EXT               ABD/ADD               Prone Planks               Side Planks               Suspension Planks               Body Mechanics               Centralization               Disc mechanics               Lumbar Roll                               Modalities                HP L L-S prone 15'  15'  15'  15'       Traction

## 2018-05-14 ENCOUNTER — TRANSCRIBE ORDERS (OUTPATIENT)
Dept: ADMINISTRATIVE | Facility: HOSPITAL | Age: 53
End: 2018-05-14

## 2018-05-14 DIAGNOSIS — Z12.31 VISIT FOR SCREENING MAMMOGRAM: Primary | ICD-10-CM

## 2018-05-15 ENCOUNTER — OFFICE VISIT (OUTPATIENT)
Dept: PHYSICAL THERAPY | Facility: CLINIC | Age: 53
End: 2018-05-15
Payer: COMMERCIAL

## 2018-05-15 DIAGNOSIS — M54.16 LUMBAR RADICULOPATHY: Primary | ICD-10-CM

## 2018-05-15 DIAGNOSIS — Z12.39 SCREENING FOR BREAST CANCER: ICD-10-CM

## 2018-05-15 PROCEDURE — 97010 HOT OR COLD PACKS THERAPY: CPT

## 2018-05-15 PROCEDURE — 97110 THERAPEUTIC EXERCISES: CPT

## 2018-05-15 PROCEDURE — 97140 MANUAL THERAPY 1/> REGIONS: CPT

## 2018-05-15 NOTE — PROGRESS NOTES
Daily Note     Today's date: 5/15/2018  Patient name: Feliberto Davies  : 1965  MRN: [id number]  Referring provider: Brett Owens MD  Dx:   Encounter Diagnosis     ICD-10-CM    1  Lumbar radiculopathy M54 16    2  Screening for breast cancer Z12 31 Mammo screening bilateral w cad                  Subjective: Patient reports pain is constant      Objective: See treatment diary below      Assessment: Tolerated treatment well  Patient exhibited good technique with therapeutic exercises      Plan: Continue per plan of care  Precautions: None     Daily Treatment Diary      Manual    5-3  5/8  5/10  5/14     STM L LF CM  LF  LF  LF     P/A Mob               Flex/Rot Mob               Hamstring Stretch L LF  CM LF  LF  self     Piriformis Stretch L LF  CM  LF  LF  self                     Exercise Diary                Prone lying               Elbow Props               Press Ups               SBB               SG               LTR               SKTC               DKTC               Flexion Sitting               Flexion Standing               Bike    10' L 3  L 3 10'  L 3 10'  L 3 10'     BACK EXT: F 6  P 3 ,    40# 2/10  P 4 2/10  P 4 3/10  P 4 3/10     PYR AB: S 4, P    40# 2/10  P 4 2/10  P 4 3/10  P 4 3/10     Rotary Torso: S 2  P3 , C 7    P 3 2/10  P 3 2/10  P 3 3/10  P 3 3/10     Hoist Row: S     P 2 2x10  P 2 2/10  P 2 3/10  P 2 3/10     Lat Pulldown:  S    P 2 2/10  P 3 2/10  P 3 3/10  P 3 3/10     SA Pulldown    P 4 2/10  P 4 2/10  P 4 3/10  P 4 3/10     Oblique Press    P 2 2/10  P 2 2/10  P 2 3/10  P 2 3/10     Multi-Hip: F , P               FLEX/EXT               ABD/ADD               Prone Planks               Side Planks               Suspension Planks               Body Mechanics               Centralization               Disc mechanics               Lumbar Roll                               Modalities                HP L L-S prone 15'  15'  15'  15'  15'     Traction

## 2018-05-17 ENCOUNTER — OFFICE VISIT (OUTPATIENT)
Dept: PHYSICAL THERAPY | Facility: CLINIC | Age: 53
End: 2018-05-17
Payer: COMMERCIAL

## 2018-05-17 ENCOUNTER — HOSPITAL ENCOUNTER (OUTPATIENT)
Facility: HOSPITAL | Age: 53
Setting detail: OUTPATIENT SURGERY
Discharge: HOME | End: 2018-05-17
Attending: ANESTHESIOLOGY | Admitting: ANESTHESIOLOGY
Payer: COMMERCIAL

## 2018-05-17 ENCOUNTER — APPOINTMENT (OUTPATIENT)
Dept: RADIOLOGY | Facility: HOSPITAL | Age: 53
End: 2018-05-17
Payer: COMMERCIAL

## 2018-05-17 VITALS
BODY MASS INDEX: 23.56 KG/M2 | HEART RATE: 61 BPM | HEIGHT: 64 IN | WEIGHT: 138 LBS | OXYGEN SATURATION: 98 % | RESPIRATION RATE: 18 BRPM | SYSTOLIC BLOOD PRESSURE: 130 MMHG | TEMPERATURE: 98 F | DIASTOLIC BLOOD PRESSURE: 72 MMHG

## 2018-05-17 DIAGNOSIS — M54.16 LUMBAR RADICULOPATHY: Primary | ICD-10-CM

## 2018-05-17 PROCEDURE — 64484 NJX AA&/STRD TFRM EPI L/S EA: CPT | Performed by: ANESTHESIOLOGY

## 2018-05-17 PROCEDURE — 97010 HOT OR COLD PACKS THERAPY: CPT

## 2018-05-17 PROCEDURE — 97140 MANUAL THERAPY 1/> REGIONS: CPT

## 2018-05-17 PROCEDURE — 72100 X-RAY EXAM L-S SPINE 2/3 VWS: CPT

## 2018-05-17 PROCEDURE — 64483 NJX AA&/STRD TFRM EPI L/S 1: CPT | Performed by: ANESTHESIOLOGY

## 2018-05-17 PROCEDURE — 97110 THERAPEUTIC EXERCISES: CPT

## 2018-05-17 RX ORDER — DEXAMETHASONE SODIUM PHOSPHATE 10 MG/ML
INJECTION, SOLUTION INTRAMUSCULAR; INTRAVENOUS AS NEEDED
Status: DISCONTINUED | OUTPATIENT
Start: 2018-05-17 | End: 2018-05-17 | Stop reason: HOSPADM

## 2018-05-17 RX ORDER — LIDOCAINE HYDROCHLORIDE 10 MG/ML
INJECTION, SOLUTION EPIDURAL; INFILTRATION; INTRACAUDAL; PERINEURAL AS NEEDED
Status: DISCONTINUED | OUTPATIENT
Start: 2018-05-17 | End: 2018-05-17 | Stop reason: HOSPADM

## 2018-05-17 RX ADMIN — IOHEXOL 48 ML: 300 INJECTION, SOLUTION INTRAVENOUS at 12:58

## 2018-05-17 NOTE — OP NOTE
OPERATIVE REPORT  PATIENT NAME: Benny Lipscomb    :  1965  MRN: [id number]  Pt Location: [facility] ROOM 01    SURGERY DATE: 2018    Surgeon(s) and Role:     * Aicha Flores MD - Primary    Preop Diagnosis:  Lumbar radiculopathy [M54 16]    Post-Op Diagnosis Codes:     * Lumbar radiculopathy [M54 16]    Procedure(s) (LRB):  L4 AND L5 TRANSFORAMINAL EPIDURAL STEROID INJECTION (Left)    Specimen(s):  * No specimens in log *    Estimated Blood Loss:   Minimal    Drains:       Anesthesia Type:   Local    Operative Indications:  Lumbar radiculopathy [M54 16]      Operative Findings:  same    Complications:   None    Procedure and Technique:  Indication:  Low back and leg pain  Preoperative diagnosis:  Lumbar radiculitis  Postoperative diagnosis:  Lumbar radiculitis    Procedure: Fluoroscopically-guided left L4-L5 and L5-S1 transforaminal epidural steroid injection under fluoroscopy      After discussing the risks, benefits, and alternatives to the procedure, the patient expressed understanding and wished to proceed  The patient was brought to the fluoroscopy suite and placed in the prone position  A procedural pause was conducted to verify:  correct patient identity, procedure to be performed and as applicable, correct side and site, correct patient position, and availability of implants, special equipment and special requirements  After identifying the left L4 and L5 pedicles fluoroscopically with an oblique view, the skin was sterilely prepped and draped in the usual fashion using Chloraprep skin prep  The skin and subcutaneous tissue were anesthetized with 0 5% lidocaine  A 3 5 inch 22 gauge spinal needle was then advanced under fluoroscopic guidance to the posterior aspect of the left L4-L5 and L5-S1 neural foramens    Appropriate foraminal depth was determined with a lateral fluoroscopic view, and AP visualization confirmed needle positioning at approximately the 6 oclock position relative to the pedicles  After negative aspiration, 1 mL of Omnipaque 300 contrast was injected using live fluoroscopy/digital subtraction angiography, confirming appropriate transforaminal spread without evidence of intravascular or intrathecal uptake  Next, a local anesthetic test dose consisting of 1 mL of 2% lidocaine was injected through the needle at each level  After an appropriate period of observation, a directed neurological exam was performed which revealed no new neurologic deficits  Next, a 1 5 ml solution consisting of 7 5 mg of dexamethasone in sterile saline was injected slowly and incrementally into the epidural space at each level  Following the injection the needles were withdrawn slightly and flushed with lidocaine as they were fully extracted  The patient tolerated the procedure well and there were no apparent complications  The patient did not develop any new neurologic deficits  After appropriate observation, the patient was dismissed from the clinic in good condition under their own power  COMMENTS   The patient received a total steroid dose of 15 mg of dexamethasone     I was present for the entire procedure    Patient Disposition:  hemodynamically stable    SIGNATURE: Hannah Sanchez MD  DATE: May 17, 2018  TIME: 12:57 PM

## 2018-05-17 NOTE — H&P (VIEW-ONLY)
Pt is c/o lower back pain  Assessment:  1  Lumbar radiculopathy    2  Myofascial pain syndrome    3  Chronic pain syndrome        Plan:  Patient is a 59-year-old female with complaints of low back pain and left leg pain in the L5 and L4 nerve root distribution presents to office for follow-up visit  Patient denies any adverse events since last office visit  MRI lumbar spine was reviewed which shows 2 disc bulges at L4-L5 and L5-S1   1  We will schedule patient for a left L4 and L5 nerve root transforaminal epidural steroid injection  2  We will  Increased patient's gabapentin 600 mg p o  T i d   3   We will also order we will start physical therapy for lumbar corestrengthening Karina based therapy        Complete risks and benefits including bleeding, infection, tissue reaction, nerve injury and allergic reaction were discussed  The approach was demonstrated using models and literature was provided  Verbal and written consent was obtained  [place] Prescription Drug Monitoring Program report was reviewed and was appropriate       History of Present Illness: The patient is a 48 y o  female who presents for a follow up office visit in regards to Back Pain  The patients current symptoms include  6/10 constant pins and needles in the left lower extremity in the L5 and L4 without any particular pain pattern  Current pain medications includes:  Gabapentin 400 mg   The patient reports that this regimen is providing   50% pain relief  The patient is reporting no side effects from this pain medication regimen  I have personally reviewed and/or updated the patient's past medical history, past surgical history, family history, social history, current medications, allergies, and vital signs today  Review of Systems  Review of Systems   Respiratory: Negative for shortness of breath  Cardiovascular: Positive for chest pain     Gastrointestinal: Negative for constipation, diarrhea, nausea and vomiting  Musculoskeletal: Negative for arthralgias, gait problem, joint swelling and myalgias  Skin: Negative for rash  Neurological: Negative for dizziness, seizures and weakness  All other systems reviewed and are negative  Past Medical History:   Diagnosis Date    Fatigue     High cholesterol     Neuropathy     Osteoarthritis     knees       Past Surgical History:   Procedure Laterality Date    APPENDECTOMY      ND COLONOSCOPY FLX DX W/COLLJ SPEC WHEN PFRMD N/A 5/31/2016    Procedure: COLONOSCOPY;  Surgeon: Haley Javier MD;  Location: [facility];  Service: Gastroenterology    ND ESOPHAGOGASTRODUODENOSCOPY TRANSORAL DIAGNOSTIC N/A 11/15/2016    Procedure: ESOPHAGOGASTRODUODENOSCOPY (EGD); Surgeon: Haley Javier MD;  Location: [facility];  Service: Gastroenterology       No family history on file  Social History     Occupational History    Not on file  Social History Main Topics    Smoking status: Never Smoker    Smokeless tobacco: Never Used    Alcohol use No    Drug use: No    Sexual activity: Not on file         Current Outpatient Prescriptions:     gabapentin (NEURONTIN) 100 mg capsule, Take 100 mg by mouth 3 (three) times a day, Disp: , Rfl:     No Known Allergies    Physical Exam:    /80   Wt 62 6 kg (138 lb)   BMI 23 69 kg/m²     Constitutional:normal, well developed, well nourished, alert, in no distress and non-toxic and no overt pain behavior  Eyes:anicteric  HEENT:grossly intact  Neck:supple, symmetric, trachea midline and no masses   Pulmonary:even and unlabored  Cardiovascular:No edema or pitting edema present  Skin:Normal without rashes or lesions and well hydrated  Psychiatric:Mood and affect appropriate  Neurologic:Cranial Nerves II-XII grossly intact  Musculoskeletal:normal      Lumbar/Sacral Spine examination demonstrates  Full range of motion lumbar spine with pain upon: flexion, lateral rotation to the left/right, and bending to the left/right  Bilateral lumbar paraspinals tender to palpation  Muscle spasms noted in the lumbar area bilaterally  5/5 lower extremity strength in all muscle groups  Right lower extremity, 3/5 lower extremity strength in left lower muscles  Positive seated straight leg raise for  left lower extremities  Sensitivity to light touch intact bilateral lower extremities  4+ reflexes in the patella and Achilles  No ankle clonus    Imaging  No orders to display     XR spine lumbar complete w bending minimum 6 views   Status: Final result   PACS Images     Show images for XR spine lumbar complete w bending minimum 6 views   Order Report      Order Details   Study Result     LUMBAR SPINE     INDICATION:  M54 16: Radiculopathy, lumbar region  M54 41: Lumbago with sciatica, right side  M54 42: Lumbago with sciatica, left side  History taken directly from the electronic ordering system           COMPARISON: February 20, 2017     VIEWS:  AP, bilateral oblique, coned down and lateral projections in neutral, flexion and extension     IMAGES:  6     FINDINGS:     Alignment is unremarkable      There is no subluxation and alignment is stable in flexion, extension, and neutral positioning      There is no radiographic evidence of acute fracture or destructive osseous lesion      Mild degenerative changes are noted the posterior elements at L4-5 and L5-S1 as well as both sacroiliac joints      Surgical clips overlie the right iliac crest and left iliac bone      IMPRESSION:     Degenerative changes, lower lumbar spine/lumbosacral junction and both sacroiliac joints       No orders of the defined types were placed in this encounter

## 2018-05-17 NOTE — DISCHARGE INSTRUCTIONS
Epidural Steroid Injection   WHAT YOU NEED TO KNOW:   An epidural steroid injection (LANDRY) is a procedure to inject steroid medicine into the epidural space  The epidural space is between your spinal cord and vertebrae  Steroids reduce inflammation and fluid buildup in your spine that may be causing pain  You may be given pain medicine along with the steroids  ACTIVITY  · Do not drive or operate machinery today  · No strenuous activity today - bending, lifting, etc   · You may resume normal activites starting tomorrow - start slowly and as tolerated  · You may shower today, but no tub baths or hot tubs  · You may have numbness for several hours from the local anesthetic  Please use caution and common sense, especially with weight-bearing activities  CARE OF THE INJECTION SITE  · If you have soreness or pain, apply ice to the area today (20 minutes on/20 minutes off)  · Starting tomorrow, you may use warm, moist heat or ice if needed  · You may have an increase or change in your discomfort for 36-48 hours after your treatment  · Apply ice and continue with any pain medication you have been prescribed  · Notify [facility] if you have any of the following: redness, drainage, swelling, headache, stiff neck or fever above 100°F     SPECIAL INSTRUCTIONS  · Our office will contact you in approximately 7 days for a progress report  MEDICATIONS  · Continue to take all routine medications  · Our office may have instructed you to hold some medications  If you have a problem specifically related to your procedure, please call our office at [phone]  Problems not related to your procedure should be directed to your primary care physician

## 2018-05-17 NOTE — PROGRESS NOTES
Daily Note     Today's date: 2018  Patient name: Vilma Abdi  : 1965  MRN: [id number]  Referring provider: Johnnie Ingram MD  Dx:   Encounter Diagnosis     ICD-10-CM    1  Lumbar radiculopathy M54 16                   Subjective: Patient answers with head nod "yes" to all attempts at obtaining information about back pain  No  available today  Objective: See treatment diary below      Assessment: Tolerated treatment well  Patient Attempted to explain how the patient could perform the hamstring/piriformis stretches at home  Unclear whether patient understood         Plan: Continue per plan of care  Precautions: None     Daily Treatment Diary      Manual    5-3  5/8  5/10  5/14  5/17   STM L LF CM  LF  LF  LF  LF   P/A Mob               Flex/Rot Mob               Hamstring Stretch L LF  CM LF  LF  self  self   Piriformis Stretch L LF  CM  LF  LF  self  self                   Exercise Diary                Prone lying               Elbow Props               Press Ups               SBB               SG               LTR               SKTC               DKTC               Flexion Sitting               Flexion Standing               Bike    10' L 3  L 3 10'  L 3 10'  L 3 10'  L 3 10'   BACK EXT: F 6  P 3 ,    40# 2/10  P 4 2/10  P 4 3/10  P 4 3/10  P 4 3/10   PYR AB: S 4, P    40# 2/10  P 4 2/10  P 4 3/10  P 4 3/10  P 4 3/10   Rotary Torso: S 2  P3 , C 7    P 3 2/10  P 3 2/10  P 3 3/10  P 3 3/10  P 3 3/10   Hoist Row: S     P 2 2x10  P 2 2/10  P 2 3/10  P 2 3/10  P 2 3/10   Lat Pulldown:  S    P 2 2/10  P 3 2/10  P 3 3/10  P 3 3/10  P 3 3/10   SA Pulldown    P 4 2/10  P 4 2/10  P 4 3/10  P 4 3/10  P 4 3/10   Oblique Press    P 2 2/10  P 2 2/10  P 2 3/10  P 2 3/10  P 2 3/10   Multi-Hip: F , P               FLEX/EXT               ABD/ADD               Prone Planks               Side Planks               Suspension Planks               Body Mechanics               Centralization               Disc mechanics               Lumbar Roll                               Modalities                HP L L-S prone 15'  15'  15'  15'  15'  15'   Traction

## 2018-05-22 ENCOUNTER — OFFICE VISIT (OUTPATIENT)
Dept: PHYSICAL THERAPY | Facility: CLINIC | Age: 53
End: 2018-05-22
Payer: COMMERCIAL

## 2018-05-22 DIAGNOSIS — M54.16 LUMBAR RADICULOPATHY: Primary | ICD-10-CM

## 2018-05-22 PROCEDURE — 97010 HOT OR COLD PACKS THERAPY: CPT

## 2018-05-22 PROCEDURE — 97140 MANUAL THERAPY 1/> REGIONS: CPT

## 2018-05-22 PROCEDURE — 97110 THERAPEUTIC EXERCISES: CPT

## 2018-05-22 NOTE — PROGRESS NOTES
Daily Note     Today's date: 2018  Patient name: Valery Armstrong  : 1965  MRN: [id number]  Referring provider: Ryley Smith MD  Dx:   Encounter Diagnosis     ICD-10-CM    1  Lumbar radiculopathy M54 16                   Subjective: Patient reports no change in back pain  Objective: See treatment diary below      Assessment: Tolerated treatment well  Patient exhibited good technique with therapeutic exercises      Plan: Continue per plan of care       Precautions: None     Daily Treatment Diary      Manual    5-3  5/8  5/10  5/14  5/17   STM L LF CM  LF  LF  LF  LF   P/A Mob               Flex/Rot Mob               Hamstring Stretch L self  CM LF  LF  self  self   Piriformis Stretch L self  CM  LF  LF  self  self                   Exercise Diary                Prone lying               Elbow Props               Press Ups               SBB               SG               LTR               SKTC               DKTC               Flexion Sitting               Flexion Standing               Bike  L 3 10'  10' L 3  L 3 10'  L 3 10'  L 3 10'  L 3 10'   BACK EXT: F 6  P 3 ,  P 4 3/10  40# 2/10  P 4 2/10  P 4 3/10  P 4 3/10  P 4 3/10   PYR AB: S 4, P  P 4 3/10  40# 2/10  P 4 2/10  P 4 3/10  P 4 3/10  P 4 3/10   Rotary Torso: S 2  P3 , C 7  P 3 3/10  P 3 2/10  P 3 2/10  P 3 3/10  P 3 3/10  P 3 3/10   Hoist Row: S   P 2 3/10  P 2 2x10  P 2 2/10  P 2 3/10  P 2 3/10  P 2 3/10   Lat Pulldown: S  P 3 3/10  P 2 2/10  P 3 2/10  P 3 3/10  P 3 3/10  P 3 3/10   SA Pulldown  P 4 3/10  P 4 2/10  P 4 2/10  P 4 3/10  P 4 3/10  P 4 3/10   Oblique Press P 2 3/10  P 2 2/10  P 2 2/10  P 2 3/10  P 2 3/10  P 2 3/10   Multi-Hip: F , P               FLEX/EXT               ABD/ADD               Prone Planks               Side Planks               Suspension Planks               Body Mechanics               Centralization               Disc mechanics               Lumbar Roll                               Modalities                HP L L-S prone 15'  15'  15'  15'  15'  15'   Traction

## 2018-05-23 ENCOUNTER — TELEPHONE (OUTPATIENT)
Dept: PAIN MEDICINE | Facility: CLINIC | Age: 53
End: 2018-05-23

## 2018-05-24 ENCOUNTER — OFFICE VISIT (OUTPATIENT)
Dept: PHYSICAL THERAPY | Facility: CLINIC | Age: 53
End: 2018-05-24
Payer: COMMERCIAL

## 2018-05-24 DIAGNOSIS — M54.16 LUMBAR RADICULOPATHY: Primary | ICD-10-CM

## 2018-05-24 PROCEDURE — 97110 THERAPEUTIC EXERCISES: CPT

## 2018-05-24 PROCEDURE — 97140 MANUAL THERAPY 1/> REGIONS: CPT

## 2018-05-24 PROCEDURE — 97010 HOT OR COLD PACKS THERAPY: CPT

## 2018-05-24 NOTE — PROGRESS NOTES
Daily Note     Today's date: 2018  Patient name: Angie Stevenson  : 1965  MRN: [id number]  Referring provider: Bari Guerra MD  Dx:   Encounter Diagnosis     ICD-10-CM    1  Lumbar radiculopathy M54 16                   Subjective: Patient complains of increased pain today, from L LS area into L anterior thigh      Objective: See treatment diary below      Assessment: Tolerated treatment well   Patient exhibited good technique with therapeutic exercises      Plan: Continue per plan of care           Precautions: None     Daily Treatment Diary      Manual  5/22  5/24  5/8  5/10  5/14  5/17   STM L LF LF  LF  LF  LF  LF   P/A Mob               Flex/Rot Mob               Hamstring Stretch L self  self LF  LF  self  self   Piriformis Stretch L self  self  LF  LF  self  self                   Exercise Diary                Prone lying               Elbow Props               Press Ups               SBB               SG               LTR               SKTC               DKTC               Flexion Sitting               Flexion Standing               Bike  L 3 10'  10' L 3  L 3 10'  L 3 10'  L 3 10'  L 3 10'   BACK EXT: F 6  P 3 ,  P 4 3/10  50# 2/10  P 4 2/10  P 4 3/10  P 4 3/10  P 4 3/10   PYR AB: S 4, P  P 4 3/10  40# 2/10  P 4 2/10  P 4 3/10  P 4 3/10  P 4 3/10   Rotary Torso: S 2  P3 , C 7  P 3 3/10  P 3 2/10  P 3 2/10  P 3 3/10  P 3 3/10  P 3 3/10   Hoist Row: S   P 2 3/10  P 2 2x10  P 2 2/10  P 2 3/10  P 2 3/10  P 2 3/10   Lat Pulldown: S  P 3 3/10  P 2 2/10  P 3 2/10  P 3 3/10  P 3 3/10  P 3 3/10   SA Pulldown  P 4 3/10  P 4 2/10  P 4 2/10  P 4 3/10  P 4 3/10  P 4 3/10   Oblique Press P 2 3/10  P 2 2/10  P 2 2/10  P 2 3/10  P 2 3/10  P 2 3/10   Multi-Hip: F , P               FLEX/EXT               ABD/ADD               Prone Planks               Side Planks               Suspension Planks               Body Mechanics               Centralization               Disc mechanics               Lumbar Roll                               Modalities                HP L L-S prone 13'  15'  15'  15'  15'  15'   Traction

## 2018-05-25 NOTE — TELEPHONE ENCOUNTER
Spoke with pt via [employer] # [id number]  Pt could not give % relief  Pain level 6/10  Explained to the pt that it could take up to 2weeks to get the full effect of the esteroid  pt is still c/o of pain going down her legs

## 2018-05-29 ENCOUNTER — OFFICE VISIT (OUTPATIENT)
Dept: PHYSICAL THERAPY | Facility: CLINIC | Age: 53
End: 2018-05-29
Payer: COMMERCIAL

## 2018-05-29 DIAGNOSIS — M54.16 LUMBAR RADICULOPATHY: Primary | ICD-10-CM

## 2018-05-29 PROCEDURE — 97010 HOT OR COLD PACKS THERAPY: CPT

## 2018-05-29 PROCEDURE — 97110 THERAPEUTIC EXERCISES: CPT

## 2018-05-29 PROCEDURE — 97140 MANUAL THERAPY 1/> REGIONS: CPT

## 2018-05-29 NOTE — PROGRESS NOTES
Daily Note     Today's date: 2018  Patient name: Pee Shirley  : 1965  MRN: [id number]  Referring provider: Cande Reno MD  Dx:   Encounter Diagnosis     ICD-10-CM    1  Lumbar radiculopathy M54 16                   Subjective: Patient answers "yes" to questions regarding back pain  Language barrier prevents effective communication  Objective: See treatment diary below      Assessment: Tolerated treatment well   Patient exhibited good technique with therapeutic exercises      Plan: Continue per plan of care         Precautions: None     Daily Treatment Diary      Manual  5/22  5/24  5/29  5/10  5/14  5/17   STM L LF LF  LF  LF  LF  LF   P/A Mob               Flex/Rot Mob               Hamstring Stretch L self  self 10" X3  LF  self  self   Piriformis Stretch L self  self  10" X3  LF  self  self                   Exercise Diary                Prone lying               Elbow Props               Press Ups               SBB               SG               LTR               SKTC               DKTC               Flexion Sitting               Flexion Standing               Bike  L 3 10'  10' L 3  L 3 10'  L 3 10'  L 3 10'  L 3 10'   BACK EXT: F 6  P 3 ,  P 4 3/10  50# 2/10  P 52/10  P 4 3/10  P 4 3/10  P 4 3/10   PYR AB: S 4, P  P 4 3/10  40# 2/10  P 4 2/10  P 4 3/10  P 4 3/10  P 4 3/10   Rotary Torso: S 2  P3 , C 7  P 3 3/10  P 3 2/10  P 3 2/10  P 3 3/10  P 3 3/10  P 3 3/10   Hoist Row: S   P 2 3/10  P 2 2x10  P 2 2/10  P 2 3/10  P 2 3/10  P 2 3/10   Lat Pulldown: S  P 3 3/10  P 2 2/10  P 3 2/10  P 3 3/10  P 3 3/10  P 3 3/10   SA Pulldown  P 4 3/10  P 4 2/10  P 4 2/10  P 4 3/10  P 4 3/10  P 4 3/10   Oblique Press P 2 3/10  P 2 2/10  P 2 2/10  P 2 3/10  P 2 3/10  P 2 3/10   Multi-Hip: F , P               FLEX/EXT               ABD/ADD               Prone Planks               Side Planks               Suspension Planks               Body Mechanics               Centralization               Disc mechanics               Lumbar Roll                               Modalities                HP L L-S prone 15'  15'  15'  15'  15'  15'   Traction

## 2018-05-31 ENCOUNTER — EVALUATION (OUTPATIENT)
Dept: PHYSICAL THERAPY | Facility: CLINIC | Age: 53
End: 2018-05-31
Payer: COMMERCIAL

## 2018-05-31 DIAGNOSIS — Z12.39 SCREENING FOR BREAST CANCER: ICD-10-CM

## 2018-05-31 DIAGNOSIS — M54.16 LUMBAR RADICULOPATHY: Primary | ICD-10-CM

## 2018-05-31 PROCEDURE — 97010 HOT OR COLD PACKS THERAPY: CPT | Performed by: PHYSICAL THERAPIST

## 2018-05-31 PROCEDURE — 97110 THERAPEUTIC EXERCISES: CPT | Performed by: PHYSICAL THERAPIST

## 2018-05-31 PROCEDURE — G8992 OTHER PT/OT  D/C STATUS: HCPCS | Performed by: PHYSICAL THERAPIST

## 2018-05-31 PROCEDURE — G8991 OTHER PT/OT GOAL STATUS: HCPCS | Performed by: PHYSICAL THERAPIST

## 2018-05-31 PROCEDURE — 97140 MANUAL THERAPY 1/> REGIONS: CPT | Performed by: PHYSICAL THERAPIST

## 2018-05-31 NOTE — PROGRESS NOTES
PT Discharge    Today's date: 2018  Patient name: Saint Isaacs  : 1965  MRN: [id number]  Referring provider: Ousmane Sweet MD  Dx:   Encounter Diagnosis     ICD-10-CM    1  Lumbar radiculopathy M54 16    2  Screening for breast cancer [id number]                   Assessment    Assessment details:   CURRENT FUNCTIONAL STATUS    Sleep tolerance several hours  Standing/ADL tolerance 20-30 minutes  Walking tolerance 20 minutes      Ability to bend forward: Moderate pain arising from     Ability to arise from sitting: Moderate pain arising from  Lifting tolerance 20 lbs  LONG TERM GOALS (DISCHARGE)    Lumbar Spine AROM: Minimal/no pain in all planes  -not met  Lower Extremity Strength: 5/5 t/o -not  met  Decrease pain to 0-3/10 -not met  Sleep tolerance 6-8 hours  -not met     Standing/ADL tolerance 60 minutes  -not met   Walking tolerance 60 minutes-not met      Ability to bend forward: Minimal/no pain arising from-not met    Ability to arise from sitting: Minimal/no pain arising from -not met    Lifting tolerance 30 lbs  -not met    Goals  See assessment details above  Plan  Plan details: The patient has not any improvement in her pain levels  , and minimal improvement in her activity tolerance  She has achieved maximum benefit from PT and has been discharged to a [facility]  Subjective Evaluation    History of Present Illness  Mechanism of injury: Subjective: The patient continues to have constant low back pain radiating into the left leg  More recently she has had pain and burning in both feet at night  She notes some temporary relief from treatment, but overall her pain has not improved  Pain  Current pain ratin  At best pain rating: 3  At worst pain ratin          Objective     Postural Observations    Additional Postural Observation Details  Gait and transfers normal, no lateral shift      General Comments     Lumbar Comments  CURRENT OBJECTIVE MEASUREMENTS    Lumbar Spine AROM: WFL in all planes, with flexion, extension, and R SB being painful  Lower Extremity Strength: Grossly 4/5 t/o with bilateral knee pain reported  Lower Extremity Reflexes: +2 bilaterally             Precautions: None     Daily Treatment Diary      Manual  5/22 5/24 5/29 5/31 5/14 5/17   STM L LF LF  LF  LF  LF  LF   P/A Mob               Flex/Rot Mob               Hamstring Stretch L self  self 10" X3  self  self  self   Piriformis Stretch L self  self  10" X3  self  self  self                   Exercise Diary                Prone lying               Elbow Props               Press Ups               SBB               SG               LTR               SKTC               DKTC               Flexion Sitting               Flexion Standing               Bike  L 3 10'  10' L 3  L 3 10'  L 3 10'  L 3 10'  L 3 10'   BACK EXT: F 6  P 3 ,  P 4 3/10  50# 2/10  P 52/10  P 4 3/10  P 4 3/10  P 4 3/10   PYR AB: S 4, P  P 4 3/10  40# 2/10  P 4 2/10  P 4 3/10  P 4 3/10  P 4 3/10   Rotary Torso: S 2  P3 , C 7  P 3 3/10  P 3 2/10  P 3 2/10  P 3 3/10  P 3 3/10  P 3 3/10   Hoist Row: S   P 2 3/10  P 2 2x10  P 2 2/10  P 2 3/10  P 2 3/10  P 2 3/10   Lat Pulldown: S  P 3 3/10  P 2 2/10  P 3 2/10  P 3 3/10  P 3 3/10  P 3 3/10   SA Pulldown  P 4 3/10  P 4 2/10  P 4 2/10  P 4 3/10  P 4 3/10  P 4 3/10   Oblique Press P 2 3/10  P 2 2/10  P 2 2/10  P 2 3/10  P 2 3/10  P 2 3/10   Modalities                HP L L-S prone 15'  15'  15'  15'  15'  15'   Traction

## 2018-06-04 ENCOUNTER — HOSPITAL ENCOUNTER (OUTPATIENT)
Dept: MAMMOGRAPHY | Facility: HOSPITAL | Age: 53
Discharge: HOME | End: 2018-06-04
Payer: COMMERCIAL

## 2018-06-04 DIAGNOSIS — Z12.31 VISIT FOR SCREENING MAMMOGRAM: ICD-10-CM

## 2018-06-04 PROCEDURE — 77067 SCR MAMMO BI INCL CAD: CPT

## 2018-06-04 PROCEDURE — 77063 BREAST TOMOSYNTHESIS BI: CPT

## 2018-06-15 ENCOUNTER — TELEPHONE (OUTPATIENT)
Dept: PAIN MEDICINE | Facility: CLINIC | Age: 53
End: 2018-06-15

## 2018-06-15 NOTE — TELEPHONE ENCOUNTER
Spoke with pt via [address] [facility]  [address] [place] # [id number]  Made pt aware why she was being discharged from the practice  A letter will be drafted and put on your desk for review

## 2018-06-15 NOTE — TELEPHONE ENCOUNTER
Pt came in and cancelled todays appt  Please see appt history  Pt has cancelled x2 and NS x2   Please advise if we can reschedule

## 2018-06-22 ENCOUNTER — OFFICE VISIT (OUTPATIENT)
Dept: FAMILY MEDICINE CLINIC | Facility: CLINIC | Age: 53
End: 2018-06-22
Payer: COMMERCIAL

## 2018-06-22 VITALS
HEIGHT: 64 IN | TEMPERATURE: 96.8 F | RESPIRATION RATE: 18 BRPM | OXYGEN SATURATION: 98 % | BODY MASS INDEX: 24.59 KG/M2 | HEART RATE: 85 BPM | WEIGHT: 144 LBS | DIASTOLIC BLOOD PRESSURE: 76 MMHG | SYSTOLIC BLOOD PRESSURE: 136 MMHG

## 2018-06-22 DIAGNOSIS — N89.8 VAGINAL ITCHING: ICD-10-CM

## 2018-06-22 DIAGNOSIS — E78.5 DYSLIPIDEMIA: Primary | ICD-10-CM

## 2018-06-22 DIAGNOSIS — M54.16 CHRONIC LUMBAR RADICULOPATHY: ICD-10-CM

## 2018-06-22 DIAGNOSIS — Z12.4 PAP SMEAR FOR CERVICAL CANCER SCREENING: ICD-10-CM

## 2018-06-22 DIAGNOSIS — R13.12 OROPHARYNGEAL DYSPHAGIA: ICD-10-CM

## 2018-06-22 PROBLEM — M25.551 CHRONIC RIGHT HIP PAIN: Status: ACTIVE | Noted: 2017-11-20

## 2018-06-22 PROBLEM — G89.29 CHRONIC RIGHT HIP PAIN: Status: ACTIVE | Noted: 2017-11-20

## 2018-06-22 PROCEDURE — T1015 CLINIC SERVICE: HCPCS | Performed by: FAMILY MEDICINE

## 2018-06-22 RX ORDER — FLUCONAZOLE 150 MG/1
150 TABLET ORAL ONCE
Qty: 1 TABLET | Refills: 0 | Status: SHIPPED | OUTPATIENT
Start: 2018-06-22 | End: 2018-06-22

## 2018-06-23 ENCOUNTER — APPOINTMENT (OUTPATIENT)
Dept: LAB | Facility: HOSPITAL | Age: 53
End: 2018-06-23
Payer: COMMERCIAL

## 2018-06-23 DIAGNOSIS — E78.5 DYSLIPIDEMIA: ICD-10-CM

## 2018-06-23 LAB
ALBUMIN SERPL BCP-MCNC: 4.1 G/DL (ref 3.5–5)
ALP SERPL-CCNC: 98 U/L (ref 46–116)
ALT SERPL W P-5'-P-CCNC: 56 U/L (ref 12–78)
ANION GAP SERPL CALCULATED.3IONS-SCNC: 8 MMOL/L (ref 4–13)
AST SERPL W P-5'-P-CCNC: 26 U/L (ref 5–45)
BASOPHILS # BLD AUTO: 0.03 THOUSANDS/ΜL (ref 0–0.1)
BASOPHILS NFR BLD AUTO: 1 % (ref 0–1)
BILIRUB SERPL-MCNC: 0.5 MG/DL (ref 0.2–1)
BUN SERPL-MCNC: 16 MG/DL (ref 5–25)
CALCIUM SERPL-MCNC: 9.1 MG/DL (ref 8.3–10.1)
CHLORIDE SERPL-SCNC: 104 MMOL/L (ref 100–108)
CHOLEST SERPL-MCNC: 165 MG/DL (ref 50–200)
CO2 SERPL-SCNC: 26 MMOL/L (ref 21–32)
CREAT SERPL-MCNC: 0.7 MG/DL (ref 0.6–1.3)
EOSINOPHIL # BLD AUTO: 0.08 THOUSAND/ΜL (ref 0–0.61)
EOSINOPHIL NFR BLD AUTO: 2 % (ref 0–6)
ERYTHROCYTE [DISTWIDTH] IN BLOOD BY AUTOMATED COUNT: 12.4 % (ref 11.6–15.1)
GFR SERPL CREATININE-BSD FRML MDRD: 99 ML/MIN/1.73SQ M
GLUCOSE P FAST SERPL-MCNC: 97 MG/DL (ref 65–99)
HCT VFR BLD AUTO: 38.3 % (ref 34.8–46.1)
HDLC SERPL-MCNC: 44 MG/DL (ref 40–60)
HGB BLD-MCNC: 13.7 G/DL (ref 11.5–15.4)
LDLC SERPL CALC-MCNC: 89 MG/DL (ref 0–100)
LYMPHOCYTES # BLD AUTO: 2.14 THOUSANDS/ΜL (ref 0.6–4.47)
LYMPHOCYTES NFR BLD AUTO: 41 % (ref 14–44)
MCH RBC QN AUTO: 31.2 PG (ref 26.8–34.3)
MCHC RBC AUTO-ENTMCNC: 35.8 G/DL (ref 31.4–37.4)
MCV RBC AUTO: 87 FL (ref 82–98)
MONOCYTES # BLD AUTO: 0.29 THOUSAND/ΜL (ref 0.17–1.22)
MONOCYTES NFR BLD AUTO: 6 % (ref 4–12)
NEUTROPHILS # BLD AUTO: 2.64 THOUSANDS/ΜL (ref 1.85–7.62)
NEUTS SEG NFR BLD AUTO: 51 % (ref 43–75)
NONHDLC SERPL-MCNC: 121 MG/DL
PLATELET # BLD AUTO: 223 THOUSANDS/UL (ref 149–390)
PMV BLD AUTO: 9.8 FL (ref 8.9–12.7)
POTASSIUM SERPL-SCNC: 4.5 MMOL/L (ref 3.5–5.3)
PROT SERPL-MCNC: 7.1 G/DL (ref 6.4–8.2)
RBC # BLD AUTO: 4.39 MILLION/UL (ref 3.81–5.12)
SODIUM SERPL-SCNC: 138 MMOL/L (ref 136–145)
T4 SERPL-MCNC: 11.7 UG/DL (ref 4.7–13.3)
TRIGL SERPL-MCNC: 160 MG/DL
TSH SERPL DL<=0.05 MIU/L-ACNC: 2.91 UIU/ML (ref 0.36–3.74)
WBC # BLD AUTO: 5.18 THOUSAND/UL (ref 4.31–10.16)

## 2018-06-23 PROCEDURE — 80061 LIPID PANEL: CPT

## 2018-06-23 PROCEDURE — 80053 COMPREHEN METABOLIC PANEL: CPT

## 2018-06-23 PROCEDURE — 36415 COLL VENOUS BLD VENIPUNCTURE: CPT

## 2018-06-23 PROCEDURE — 84443 ASSAY THYROID STIM HORMONE: CPT

## 2018-06-23 PROCEDURE — 85025 COMPLETE CBC W/AUTO DIFF WBC: CPT

## 2018-06-23 PROCEDURE — 84436 ASSAY OF TOTAL THYROXINE: CPT

## 2018-07-11 ENCOUNTER — TELEPHONE (OUTPATIENT)
Dept: PAIN MEDICINE | Facility: CLINIC | Age: 53
End: 2018-07-11

## 2018-07-11 NOTE — TELEPHONE ENCOUNTER
Family Practice called on behalf of patient (only speaks Pashto) who is in that office at time of call, she is with her daughter who is 15 y/o  Patient states she came into the office on 6/15/18 top reschedule that appt and was not a no show or simple cancellation  Patient states she had to cancel her  6/15/18 appt, because her son (36 y/o) who translates for her could not come with her to the appointment  She was feeling better seeing Dr Rakel Aceves and hopes he will reconsider her as a patient  Patient and son will come to [address] office to sign MORRIS in order to speak with her son who is an adult  Ignacio Castaneda is available to speak with [facility] on behalf of the patient

## 2018-07-13 ENCOUNTER — TELEPHONE (OUTPATIENT)
Dept: PAIN MEDICINE | Facility: CLINIC | Age: 53
End: 2018-07-13

## 2018-07-13 NOTE — TELEPHONE ENCOUNTER
Pt came into office with son  Thania Haider (son) stated pt does not speak english and did not have anyone to translate for her and that is why she cancelled so many appts  He stated that if appts can be made before 9am for her, he is able to come with her and translate  Please advise if we will continue to see pt under these circumstances

## 2018-08-24 ENCOUNTER — OFFICE VISIT (OUTPATIENT)
Dept: MULTI SPECIALTY CLINIC | Facility: CLINIC | Age: 53
End: 2018-08-24
Payer: COMMERCIAL

## 2018-08-24 VITALS
WEIGHT: 141.09 LBS | DIASTOLIC BLOOD PRESSURE: 80 MMHG | HEIGHT: 59 IN | BODY MASS INDEX: 28.44 KG/M2 | SYSTOLIC BLOOD PRESSURE: 132 MMHG

## 2018-08-24 DIAGNOSIS — H91.91 HEARING LOSS OF RIGHT EAR, UNSPECIFIED HEARING LOSS TYPE: ICD-10-CM

## 2018-08-24 DIAGNOSIS — J04.0 REFLUX LARYNGITIS: ICD-10-CM

## 2018-08-24 DIAGNOSIS — J30.2 SEASONAL ALLERGIC RHINITIS, UNSPECIFIED TRIGGER: ICD-10-CM

## 2018-08-24 DIAGNOSIS — R13.14 PHARYNGOESOPHAGEAL DYSPHAGIA: Primary | ICD-10-CM

## 2018-08-24 DIAGNOSIS — K21.9 REFLUX LARYNGITIS: ICD-10-CM

## 2018-08-24 DIAGNOSIS — J38.01 VOCAL FOLD PARESIS, RIGHT: ICD-10-CM

## 2018-08-24 DIAGNOSIS — R09.89 SENSATION OF FOREIGN BODY IN THROAT: ICD-10-CM

## 2018-08-24 DIAGNOSIS — E07.89 THYROID FULLNESS: ICD-10-CM

## 2018-08-24 DIAGNOSIS — K21.9 GASTROESOPHAGEAL REFLUX DISEASE, ESOPHAGITIS PRESENCE NOT SPECIFIED: ICD-10-CM

## 2018-08-24 DIAGNOSIS — K90.41 GLUTEN INTOLERANCE: ICD-10-CM

## 2018-08-24 PROCEDURE — 99203 OFFICE O/P NEW LOW 30 MIN: CPT | Performed by: OTOLARYNGOLOGY

## 2018-08-24 PROCEDURE — 31575 DIAGNOSTIC LARYNGOSCOPY: CPT | Performed by: OTOLARYNGOLOGY

## 2018-08-24 RX ORDER — OMEPRAZOLE 40 MG/1
40 CAPSULE, DELAYED RELEASE ORAL EVERY MORNING
Qty: 30 CAPSULE | Refills: 6 | Status: SHIPPED | OUTPATIENT
Start: 2018-08-24 | End: 2018-08-28

## 2018-08-24 RX ORDER — RANITIDINE 300 MG/1
300 TABLET ORAL
Qty: 30 TABLET | Refills: 6 | Status: SHIPPED | OUTPATIENT
Start: 2018-08-24 | End: 2018-08-28

## 2018-08-24 NOTE — PROGRESS NOTES
[facility] New Patient Visit    Dayanara Erickson is a 48 y o  who presents with a chief complaint of dysphagia    HPI:  She reported difficulty swallowing for 5-6 mos  No preceding events/infections  No pain with swallow  Stable  She reported sensation of food sticking in the throat  Also described globus sensation  No cough with swallow  No weight loss  No swallow studies  Reflux-  Denied heartburn  Mucous/throat clear  Sometimes has dry, sore throat  Mild cough associated with allergy  Sometimes has hoarse voice  Denied GI evaluation; Records show she had EGD in 2016 Dr Maria Elena Singh  When eating fatty/rich foods, drinking coffee, has some nausea  No lactose intolerance, no perceived gluten intolerance  No reflux meds taken  Allergy- spring allergy symptoms  At times takes medicine (OTC antihistamine), or uses theraflu tea  TFT recently [facility]    EGD 11/15/16 - irreg z line, inflammation/erosion in stomach  Final Diagnosis   A  Stomach, ulcer, biopsy:  -  Few Helicobacter pylori gastritis with associated chronic inflammation   -  Negative for features of ulceration, intestinal metaplasia, dysplasia or carcinoma  -  Immunohistochemical stain for Helicobacter pylori, performed with appropriate control, is positive, supporting the diagnosis  -  Alcian blue/PAS stain is negative for intestinal type mucin      B  Gastroesophageal junction, biopsy:  -  Portion of benign gastric mucosa with chronic inflammation and Helicobacter pylori organisms identified on H&E   -  Negative for intestinal metaplasia, dysplasia or carcinoma  -  Esophageal squamous mucosa is not identified  Results reviewed; images from any scan have been personally reviewed:    Review of systems 10 point review of systems reviewed as documented in the intake form, scanned into the medical record under the media tab      The past medical, surgical, social and family history have been reviewed as documented in today's record  No problem-specific Assessment & Plan notes found for this encounter  Physical exam:     /80 (BP Location: Left arm, Patient Position: Sitting, Cuff Size: Adult)   Ht 4' 11" (1 499 m)   Wt 64 kg (141 lb 1 5 oz)   BMI 28 50 kg/m²     Constitutional:  Well developed, well nourished, in no acute distress  Eyes:  Extra-ocular movements intact, The lids and conjunctivae are normal in appearance  Head: Atraumatic, normocephalic, no visible scalp lesions  Ears:  Auricles normal in appearance bilaterally, mastoid prominence non-tender, external auditory canals clear bilaterally, tympanic membranes intact bilaterally without evidence of middle ear effusion or masses  Nose/Sinuses:  External appearance unremarkable, no maxillary or frontal sinus tenderness to palpation bilaterally  Anterior rhinoscopy revealed patent nasal cavities, mild deviated nasal septum, inferior turbinates with hypertrophy, pale  Oral Cavity:  Moist mucus membranes, gums and dentition unremarkable, no oral mucosal masses or lesions, floor of mouth soft, tongue mobile without masses or lesions  Oropharynx:  Base of tongue soft and without masses, tonsils bilaterally unremarkable, soft palate mucosa unremarkable  Tonsils 1+, crypts  Neck:  No visible or palpable cervical lesions or lymphadenopathy, thyroid gland full and without masses, normal laryngeal elevation with swallowing  Salivary Glands: Parotid and submandibular salivary glands non-tender to palpation and without masses bilaterally  Cardiovascular:  Extremities perfused, pulses palpable  Respiratory:  Normal respiratory effort without evidence of retractions or use of accessory muscles; no stridor  Neurologic:  Alert; Cranial nerves III-VII, IX-XII intact bilaterally  Dec hearing left with finger rub    Psychiatric:  Alert and oriented to time, place and person, normal affect      Procedures  PROCEDURE: Flexible Laryngoscopy  Indication:  Dysphagia, dysphonia  Verbal consent obtained  Surgeon: Jeremy Aguiar MD  Anesthesia: 2% lidocaine, oxymetazoline  Scope passed through nasal cavity  Nasopharynx: unremarkable  Oropharynx: unremarkable  Hypopharynx/Larynx:   Vocal fold mobility = slight asymmetry (mild right paresis)   Laryngeal edema  = mild-mod   Laryngeal erythema = mod arytenoids   Vocal folds = mid 1/3 fibrosis L>R, small ectasia   Other findings = lingual tonsil hypertrophy  Scope was removed  Patient tolerated procedure well without complications        Assessment:   1  Pharyngoesophageal dysphagia  Ambulatory Referral to Otolaryngology   2  Gastroesophageal reflux disease, esophagitis presence not specified  omeprazole (PriLOSEC) 40 MG capsule    ranitidine (ZANTAC) 300 MG tablet   3  Thyroid fullness  US head neck soft tissue   4  Hearing loss of right ear, unspecified hearing loss type  Comprehensive hearing evaluation   5  Sensation of foreign body in throat     6  Reflux laryngitis     7  Vocal fold paresis, right      mild       Orders  Orders Placed This Encounter   Procedures    US head neck soft tissue     Standing Status:   Future     Standing Expiration Date:   8/24/2019     Scheduling Instructions:      No prep required  Please bring your insurance cards, a form of photo ID and a list of your medications with you  Arrive 15 minutes prior to your appointment time in order to register  To schedule this appointment, please contact Central Scheduling at [phone]  Order Specific Question:   Reason for Exam:     Answer:   thyroid fullness     Order Specific Question:   Is the patient pregnant?      Answer:   No    Comprehensive hearing evaluation     Standing Status:   Future     Standing Expiration Date:   8/24/2019         Plan:    -Treat reflux  Omeprazole 40 qam  Ranitidine 300 qhs  Diet/lifestyle modifications handout given    -Need to find out if she was treated for H pylori in 2016  -reflux/neuro labs, gluten  -thyroid u/s  -audiogram  -future considerations (EGD, barium esophagram)  -return in 3 months        Thank you for allowing me to participate in the care of your patient

## 2018-08-28 ENCOUNTER — OFFICE VISIT (OUTPATIENT)
Dept: FAMILY MEDICINE CLINIC | Facility: CLINIC | Age: 53
End: 2018-08-28
Payer: COMMERCIAL

## 2018-08-28 VITALS
HEIGHT: 59 IN | BODY MASS INDEX: 28.83 KG/M2 | HEART RATE: 86 BPM | RESPIRATION RATE: 18 BRPM | DIASTOLIC BLOOD PRESSURE: 80 MMHG | TEMPERATURE: 97.6 F | SYSTOLIC BLOOD PRESSURE: 130 MMHG | WEIGHT: 143 LBS | OXYGEN SATURATION: 97 %

## 2018-08-28 DIAGNOSIS — M25.562 ACUTE PAIN OF BOTH KNEES: Primary | ICD-10-CM

## 2018-08-28 DIAGNOSIS — M25.561 ACUTE PAIN OF BOTH KNEES: Primary | ICD-10-CM

## 2018-08-28 PROCEDURE — T1015 CLINIC SERVICE: HCPCS | Performed by: FAMILY MEDICINE

## 2018-08-28 RX ORDER — NAPROXEN 500 MG/1
500 TABLET ORAL
Qty: 30 TABLET | Refills: 1 | Status: SHIPPED | OUTPATIENT
Start: 2018-08-28 | End: 2018-10-08 | Stop reason: SDUPTHER

## 2018-08-28 NOTE — PROGRESS NOTES
History and Physical  Akhil Johnson 48 y o  female MRN: [id number]      Assessment:   bilateral knee pain    Plan:  PT for knee pain  Start Naproxen BID with food prn  Gabapentin 100 mg TID  May consider MRI of L knee if no improvement  RTC 6 weeks      Chief Complaint   Patient presents with    Leg Pain     pain on both legs radiating to feet she says she wants medication  HPI:  Akhil Johnson is a 48 y o  female who presents with above x months  I reviewed X ray results from 1/18  Pain is worse with weight bearing  Bottoms of the feet hurt with getting up  Pain also travels up the into the L hip  L knee has been giving out and she has fallen  Rare swelling of the knees  Historical Information   Past Medical History:   Diagnosis Date    Fatigue     High cholesterol     Neuropathy     Osteoarthritis     knees     Past Surgical History:   Procedure Laterality Date    APPENDECTOMY      EPIDURAL BLOCK INJECTION Left 5/17/2018    Procedure: L4 AND L5 TRANSFORAMINAL EPIDURAL STEROID INJECTION;  Surgeon: Kyaw Disla MD;  [facility];  Service: Pain Management     IA COLONOSCOPY FLX DX W/COLLJ SPEC WHEN PFRMD N/A 5/31/2016    Procedure: COLONOSCOPY;  Surgeon: Emerson Caballero MD;  [facility];  Service: Gastroenterology    IA ESOPHAGOGASTRODUODENOSCOPY TRANSORAL DIAGNOSTIC N/A 11/15/2016    Procedure: ESOPHAGOGASTRODUODENOSCOPY (EGD); Surgeon: Emerson Caballero MD;  [facility];  Service: Gastroenterology     Social History   History   Alcohol Use No     History   Drug Use No     History   Smoking Status    Never Smoker   Smokeless Tobacco    Never Used     Family History   Problem Relation Age of Onset    Uterine cancer Mother        Meds/Allergies   No Known Allergies    Meds:  No current outpatient prescriptions on file  REVIEW OF SYSTEMS  Review of Systems   Constitutional: Negative  HENT: Negative  Eyes: Negative  Cardiovascular: Negative  Musculoskeletal:        As per HPI   Psychiatric/Behavioral: Negative  Current Vitals:   Blood Pressure: 130/80 (08/28/18 1631)  Pulse: 86 (08/28/18 1631)  Temperature: 97 6 °F (36 4 °C) (08/28/18 1631)  Respirations: 18 (08/28/18 1631)  Height: 4' 11" (149 9 cm) (08/28/18 1631)  Weight - Scale: 64 9 kg (143 lb) (08/28/18 1631)  SpO2: 97 % (08/28/18 1631)  Body mass index is 28 88 kg/m²  PHYSICAL EXAMS:  Physical Exam   Constitutional: She is oriented to person, place, and time  She appears well-developed and well-nourished  HENT:   Head: Normocephalic and atraumatic  Right Ear: External ear normal    Left Ear: External ear normal    Eyes: Pupils are equal, round, and reactive to light  Neck: Normal range of motion  Neck supple  No thyromegaly present  Cardiovascular: Normal rate, regular rhythm, normal heart sounds and intact distal pulses  Pulmonary/Chest: Effort normal and breath sounds normal  She has no wheezes  She has no rales  Musculoskeletal: She exhibits no edema  L knee with diffuse tenderness over medial aspect with palpation  + drawers sign L knee  Neurological: She is alert and oriented to person, place, and time  Skin: Skin is warm and dry  Psychiatric: She has a normal mood and affect  Lab Results:          Pamella García PA-C  8/28/2018, 4:52 PM

## 2018-09-07 ENCOUNTER — OFFICE VISIT (OUTPATIENT)
Dept: AUDIOLOGY | Age: 53
End: 2018-09-07
Payer: COMMERCIAL

## 2018-09-07 DIAGNOSIS — H90.6 MIXED CONDUCTIVE AND SENSORINEURAL HEARING LOSS, BILATERAL: Primary | ICD-10-CM

## 2018-09-07 PROCEDURE — 92567 TYMPANOMETRY: CPT | Performed by: AUDIOLOGIST-HEARING AID FITTER

## 2018-09-07 PROCEDURE — 92557 COMPREHENSIVE HEARING TEST: CPT | Performed by: AUDIOLOGIST-HEARING AID FITTER

## 2018-09-07 NOTE — LETTER
2018     Trina Valdez PA-C  [address] [place] [phone]    Patient: Jerry Nair   YOB: 1965   Date of Visit: 2018       Dear Dr Lizzy Wong: Thank you for referring Jerry Nair to me for evaluation  Below are my notes for this consultation  If you have questions, please do not hesitate to call me  I look forward to following your patient along with you  Sincerely,        Bob Arra        CC: No Recipients  Bob Fraire  2018 12:20 PM  Sign at close encounter  HEARING EVALUATION    Name:  Jerry Nair  :  1965  Age:  48 y o  Date of Evaluation: 18     History: Difficulty Understanding and itchiness in ears  Reason for visit: Jerry Nair is being seen today at the request of Dr Lizzy Wong for an evaluation of hearing  Patient was accompanied by family member who served as   Patient reports itchiness in ears for about one month  She reports some difficulty hearing  Patient denies otalgia, otorrhea, dizziness, fullness, and tinnitus  Patient denies a family history of hearing loss and noise exposure  EVALUATION:    Otoscopic Evaluation:   Right Ear: Clear and healthy ear canal and tympanic membrane   Left Ear: Clear and healthy ear canal and tympanic membrane    Tympanometry:   Right: Type A - normal middle ear pressure and compliance   Left: Type A - normal middle ear pressure and compliance    Audiogram Results:  Pure tone testing revealed a mild to moderate mixed hearing loss, bilaterally  SRT and PTA are in agreement indicating good test reliability  *see attached audiogram      RECOMMENDATIONS:  3 month hearing eval, Consult ENT, Return to [facility]  for F/U, Hearing Aid Evaluation and Copy to Patient/Caregiver    PATIENT EDUCATION:   Discussed results and recommendations with Makayla Ortiz  Questions were addressed and the patient was encouraged to contact our department should concerns arise        Dominga Calero Leela Fraire , CCC-A  Clinical Audiologist

## 2018-09-07 NOTE — PROGRESS NOTES
HEARING EVALUATION    Name:  Angie Stevenson  :  1965  Age:  48 y o  Date of Evaluation: 18     History: Difficulty Understanding and itchiness in ears  Reason for visit: Angie Stevenson is being seen today at the request of Dr Desirae Platt for an evaluation of hearing  Patient was accompanied by family member who served as   Patient reports itchiness in ears for about one month  She reports some difficulty hearing  Patient denies otalgia, otorrhea, dizziness, fullness, and tinnitus  Patient denies a family history of hearing loss and noise exposure  EVALUATION:    Otoscopic Evaluation:   Right Ear: Clear and healthy ear canal and tympanic membrane   Left Ear: Clear and healthy ear canal and tympanic membrane    Tympanometry:   Right: Type A - normal middle ear pressure and compliance   Left: Type A - normal middle ear pressure and compliance    Audiogram Results:  Pure tone testing revealed a mild to moderate mixed hearing loss, bilaterally  SRT and PTA are in agreement indicating good test reliability  *see attached audiogram      RECOMMENDATIONS:  3 month hearing eval, Consult ENT, Return to [facility]  for F/U, Hearing Aid Evaluation and Copy to Patient/Caregiver    PATIENT EDUCATION:   Discussed results and recommendations with Fred Urrutia  Questions were addressed and the patient was encouraged to contact our department should concerns arise        Leela Vera , CCC-A  Clinical Audiologist

## 2018-09-10 ENCOUNTER — EVALUATION (OUTPATIENT)
Dept: PHYSICAL THERAPY | Facility: CLINIC | Age: 53
End: 2018-09-10
Payer: COMMERCIAL

## 2018-09-10 DIAGNOSIS — M25.561 ACUTE PAIN OF BOTH KNEES: Primary | ICD-10-CM

## 2018-09-10 DIAGNOSIS — M25.562 ACUTE PAIN OF BOTH KNEES: Primary | ICD-10-CM

## 2018-09-10 PROCEDURE — 97162 PT EVAL MOD COMPLEX 30 MIN: CPT | Performed by: PHYSICAL THERAPIST

## 2018-09-10 PROCEDURE — 97110 THERAPEUTIC EXERCISES: CPT | Performed by: PHYSICAL THERAPIST

## 2018-09-10 PROCEDURE — G8978 MOBILITY CURRENT STATUS: HCPCS | Performed by: PHYSICAL THERAPIST

## 2018-09-10 PROCEDURE — G8979 MOBILITY GOAL STATUS: HCPCS | Performed by: PHYSICAL THERAPIST

## 2018-09-10 PROCEDURE — 97014 ELECTRIC STIMULATION THERAPY: CPT | Performed by: PHYSICAL THERAPIST

## 2018-09-10 NOTE — PROGRESS NOTES
PT Evaluation     Today's date: 9/10/2018  Patient name: Ranjana Barton  : 1965  MRN: [id number]  Referring provider: Ariel Cordero PA-C  Dx:   Encounter Diagnosis     ICD-10-CM    1  Acute pain of both knees M25 561 Ambulatory referral to Physical Therapy    M25 562                   Assessment    Assessment details:   CURRENT FUNCTIONAL STATUS    Standing/ADL tolerance 20 minutes  Walking tolerance 1/2 mile  Ascends stairs reciprocally with moderate pain/descends stairs step by step  Difficulty arising from sitting: Moderate  Able to squat 30 degrees with moderate pain  SHORT TERM GOALS (2 WEEKS)    Decreased pain with knee AROM  Increase knee strength 3-5 lbs in all weak areas  Girth MP R/L: 40 5/39 5cm  Decrease pain to 1-4/10  Standing/ADL tolerance 30-45 minutes  Walking tolerance 3/4 mile  Ascends/descends stairs reciprocally with minimal pain  Difficulty arising from sitting: Mild  Able to squat 30 degrees with minimal pain  LONG TERM GOALS (DISCHARGE)    B Knee AROM: WNL and painfree  B Knee Strength: E=40 lbs, F=25 lbs  B Girth MP: 39 5 cm  Decrease pain to 0-3/10  Standing/ADL tolerance 60 minutes  Walking tolerance 1 mile  Ascends/descends stairs reciprocally with minimal/no pain  Difficulty arising from sitting: Minimal/none  Able to squat 45 degrees without pain  Understanding of Dx/Px/POC: good   Prognosis: good    Goals  See assessment details above  Plan  Planned modality interventions: unattended electrical stimulation, cryotherapy and thermotherapy: hydrocollator packs  Planned therapy interventions: manual therapy, neuromuscular re-education, therapeutic training and therapeutic exercise  Frequency: 2x week  Duration in weeks: 4  Plan details: Ranjana Barton is a 48 y o  female presenting to PT with pain, decreased range of motion, decreased strength, and decreased tolerance to activity   This patient would benefit from skilled PT services to address these issues and to maximize function  A home exercise program was provided and all questions were answered  Thank you for the referral           Subjective Evaluation    History of Present Illness  Mechanism of injury: CC: Pain, weakness, cracking, and swelling in both knees  HPI: The patient's knee problems began 3 years ago without any history of injury  She had injections 3 months ago, with relief noted for 2 months  She has not had any other treatment or testing done  She is a homemaker  Pain  Current pain ratin  At best pain rating: 3  At worst pain ratin  Location: Left knee 3-6/10, right knee 2-5/10  Patient Goals  Patient goals for therapy: decreased pain, increased strength, independence with ADLs/IADLs, improved balance and decreased edema          Objective     Observations     Additional Observation Details  Gait mildly antalgic on the left leg without an AD  Keturah Francois Moderate right knee swelling  General Comments     Knee Comments  CURRENT OBJECTIVE MEASUREMENTS    B Knee AROM: WNL but moderately painful  Knee Strength R/L: E=28/29 lbs, F=12/18 lbs  Girth MP R/L: 41 5/39 5 cm               Precautions: None    Daily Treatment Diary     Manual  9/10        PROM         Hamstring Stretch         Calf Stretch                  Exercise Diary          QS         SLR         SAQ         Heel slides with Strap         T-Band Press         T-Band Hamstring         T-Band TKE         LAQ 1# 2/10        Mini Squats         Steps Forward         Steps Lateral         PYR HAM: S 6  P 4, C 6 P 3 2/10        PYR QUAD: S   P , C         Leg Press: S         XO TKE         Hack Squat         SLS         NuStep: S , A         Bike: S         Treadmill                  Modalities          CP/Premod B knees 15'

## 2018-09-11 ENCOUNTER — OFFICE VISIT (OUTPATIENT)
Dept: PAIN MEDICINE | Facility: CLINIC | Age: 53
End: 2018-09-11
Payer: COMMERCIAL

## 2018-09-11 VITALS — BODY MASS INDEX: 28.52 KG/M2 | WEIGHT: 141.2 LBS

## 2018-09-11 DIAGNOSIS — M79.18 MYOFASCIAL PAIN: ICD-10-CM

## 2018-09-11 DIAGNOSIS — G89.4 CHRONIC PAIN SYNDROME: ICD-10-CM

## 2018-09-11 DIAGNOSIS — M54.16 CHRONIC LUMBAR RADICULOPATHY: Primary | ICD-10-CM

## 2018-09-11 PROCEDURE — 99214 OFFICE O/P EST MOD 30 MIN: CPT | Performed by: ANESTHESIOLOGY

## 2018-09-11 RX ORDER — GABAPENTIN 600 MG/1
600 TABLET ORAL 3 TIMES DAILY
Qty: 90 TABLET | Refills: 0 | Status: SHIPPED | OUTPATIENT
Start: 2018-09-11 | End: 2018-11-08 | Stop reason: SDUPTHER

## 2018-09-11 NOTE — PROGRESS NOTES
Assessment:  1  Chronic lumbar radiculopathy    2  Myofascial pain    3  Chronic pain syndrome        Plan:  Patient is a 59-year-old female with complaints of low back pain and left leg pain in the L5 and L4 nerve root distribution presents to office for follow-up visit  Patient denies any adverse events since last office visit  MRI lumbar spine was reviewed which shows 2 disc bulges at L4-L5 and L5-S1  Patient is status post left L4-L5 and L5-S1 transforaminal epidural steroid injection performed on 05/17/2018 for which she reported 80% relief lasting greater than 3 months  After discussion we feel that good plan would be to repeat the injection since her pain is root turned in combine that with lumbar core strengthening to ultimately get her in the position that would decrease her requirements for injection and improve her long-term outcome  1  We will schedule patient for a left L4 and L5 nerve root transforaminal epidural steroid injection  2  We will  continue patient's gabapentin 600 mg p o  T i d   3  We will also order we will start physical therapy for lumbar core strengthening Karina based therapy        Complete risks and benefits including bleeding, infection, tissue reaction, nerve injury and allergic reaction were discussed  The approach was demonstrated using models and literature was provided  Verbal and written consent was obtained  [place] Prescription Drug Monitoring Program report was reviewed and was appropriate       History of Present Illness: The patient is a 48 y o  female who presents for a follow up office visit in regards to Back Pain and Leg Pain  The patients current symptoms include 7/10 constant burning, sharp pain in the low back and into left lower extremity which is worse in the evening and at nighttime  Current pain medications includes:   Gabapentin 600 mg   The patient reports that this regimen is providing 45% pain relief    The patient is reporting no side effects from this pain medication regimen  I have personally reviewed and/or updated the patient's past medical history, past surgical history, family history, social history, current medications, allergies, and vital signs today  Review of Systems  Review of Systems   Musculoskeletal: Positive for gait problem  All other systems reviewed and are negative  Past Medical History:   Diagnosis Date    Fatigue     High cholesterol     Neuropathy     Osteoarthritis     knees       Past Surgical History:   Procedure Laterality Date    APPENDECTOMY      EPIDURAL BLOCK INJECTION Left 5/17/2018    Procedure: L4 AND L5 TRANSFORAMINAL EPIDURAL STEROID INJECTION;  Surgeon: Jonathan Chacon MD;  [facility];  Service: Pain Management     SD COLONOSCOPY FLX DX W/COLLJ SPEC WHEN PFRMD N/A 5/31/2016    Procedure: COLONOSCOPY;  Surgeon: Michelle Triplett MD;  [facility];  Service: Gastroenterology    SD ESOPHAGOGASTRODUODENOSCOPY TRANSORAL DIAGNOSTIC N/A 11/15/2016    Procedure: ESOPHAGOGASTRODUODENOSCOPY (EGD); Surgeon: Michelle Triplett MD;  [facility];  Service: Gastroenterology       Family History   Problem Relation Age of Onset    Uterine cancer Mother        Social History     Occupational History    Not on file  Social History Main Topics    Smoking status: Never Smoker    Smokeless tobacco: Never Used    Alcohol use No    Drug use: No    Sexual activity: Not on file         Current Outpatient Prescriptions:     naproxen (NAPROSYN) 500 mg tablet, Take 1 tablet (500 mg total) by mouth 2 (two) times daily after meals, Disp: 30 tablet, Rfl: 1    No Known Allergies    Physical Exam:    Wt 64 kg (141 lb 3 2 oz)   BMI 28 52 kg/m²     Constitutional:normal, well developed, well nourished, alert, in no distress and non-toxic and no overt pain behavior    Eyes:anicteric  HEENT:grossly intact  Neck:supple, symmetric, trachea midline and no masses Pulmonary:even and unlabored  Cardiovascular:No edema or pitting edema present  Skin:Normal without rashes or lesions and well hydrated  Psychiatric:Mood and affect appropriate  Neurologic:Cranial Nerves II-XII grossly intact  Musculoskeletal:antalgic     Lumbar/Sacral Spine examination demonstrates  Full range of motion lumbar spine with pain upon: flexion, lateral rotation to the left/right, and bending to the left/right  Bilateral lumbar paraspinals tender to palpation  Muscle spasms noted in the lumbar area bilaterally  5/5 lower extremity strength in all muscle groups  Right lower extremity, 3/5 lower extremity strength in left lower muscles  Positive seated straight leg raise for  left lower extremities  Sensitivity to light touch intact bilateral lower extremities  4+ reflexes in the patella and Achilles  No ankle clonus    Imaging  No orders to display       No orders of the defined types were placed in this encounter

## 2018-09-13 ENCOUNTER — OFFICE VISIT (OUTPATIENT)
Dept: PHYSICAL THERAPY | Facility: CLINIC | Age: 53
End: 2018-09-13
Payer: COMMERCIAL

## 2018-09-13 DIAGNOSIS — M25.562 ACUTE PAIN OF BOTH KNEES: Primary | ICD-10-CM

## 2018-09-13 DIAGNOSIS — M25.561 ACUTE PAIN OF BOTH KNEES: Primary | ICD-10-CM

## 2018-09-13 PROCEDURE — 97014 ELECTRIC STIMULATION THERAPY: CPT

## 2018-09-13 PROCEDURE — 97110 THERAPEUTIC EXERCISES: CPT

## 2018-09-13 NOTE — PROGRESS NOTES
Daily Note     Today's date: 2018  Patient name: Navid Dubois  : 1965  MRN: [id number]  Referring provider: Lanie Barlow PA-C  Dx:   Encounter Diagnosis     ICD-10-CM    1  Acute pain of both knees M25 561     M25 562                   Subjective: Patient indicates B knee/shin pain  Objective: See treatment diary below      Assessment: Tolerated treatment well  Patient exhibited good technique with therapeutic exercises      Plan: Continue per plan of care       Precautions: None     Daily Treatment Diary      Manual  9/10  9/13           PROM               Hamstring Stretch               Calf Stretch                               Exercise Diary                QS               SLR               SAQ               Heel slides with Strap               T-Band Press               T-Band Hamstring               T-Band TKE               TB Sidestep  L 2 5X       LAQ 1# 2/10  1# 2/10           Mini Squats               Steps Forward               Steps Lateral    4" 2/10           PYR HAM: S 6  P 4, C 6 P 3 2/10  P 3 2/10           PYR QUAD: S   P , C               Leg Press: S7    P 2 2/10           XO TKE    P 2 2/10           Hack Squat               SLS               NuStep: S6 , A    L 2 7'           Bike: S               Treadmill                               Modalities                CP/Premod B knees 15'  15'

## 2018-09-17 ENCOUNTER — OFFICE VISIT (OUTPATIENT)
Dept: PHYSICAL THERAPY | Facility: CLINIC | Age: 53
End: 2018-09-17
Payer: COMMERCIAL

## 2018-09-17 DIAGNOSIS — M25.561 ACUTE PAIN OF BOTH KNEES: Primary | ICD-10-CM

## 2018-09-17 DIAGNOSIS — M25.562 ACUTE PAIN OF BOTH KNEES: Primary | ICD-10-CM

## 2018-09-17 PROCEDURE — 97110 THERAPEUTIC EXERCISES: CPT

## 2018-09-17 PROCEDURE — 97014 ELECTRIC STIMULATION THERAPY: CPT

## 2018-09-17 NOTE — PROGRESS NOTES
Daily Note     Today's date: 2018  Patient name: Wiley Moreland  : 1965  MRN: [id number]  Referring provider: Maira Felix PA-C  Dx:   Encounter Diagnosis     ICD-10-CM    1  Acute pain of both knees M25 561     M25 562               Subjective: Pt reports slight improvement since SOC in B knees  Objective: Precautions: None     Daily Treatment Diary      Manual  9/10  9/13  9/17         PROM               Hamstring Stretch               Calf Stretch                               Exercise Diary                QS               SLR               SAQ               Heel slides with Strap               T-Band Press               T-Band Hamstring               T-Band TKE               TB Sidestep   L 2 5X  L2 5x         LAQ 1# 2/10  1# 2/10  1# 2x10         Mini Squats               Steps Forward               Steps Lateral    4" 2/10  4" 2x10         PYR HAM: S 6  P 4, C 6 P 3 2/10  P 3 2/10  P3 2x10         PYR QUAD: S   P , C               Leg Press: S7    P 2 2/10  P2 2x10         XO TKE    P 2 2/10  P2 2x10         Hack Squat               SLS               NuStep: S6 , A    L 2 7'  L2 7'         Bike: S               Treadmill                               Modalities                CP/Premod B knees 13'  15'  15' HP             Assessment: Tolerated treatment well  Pt with discomfort during session  Improved symptoms  Plan: Progress treatment as tolerated

## 2018-09-18 NOTE — PROGRESS NOTES
PT Evaluation     Today's date: 2018  Patient name: Ute Farrell  : 1965  MRN: [id number]  Referring provider: Laura Stokes MD  Dx:   Encounter Diagnosis     ICD-10-CM    1  Chronic lumbar radiculopathy M54 16 Ambulatory referral to Physical Therapy                  Assessment    Assessment details:   CURRENT FUNCTIONAL STATUS    Ability to bend forward: Moderate pain arising from     Ability to arise from sitting: Moderate pain arising from  Lifting tolerance 10 lbs  Able to do light housework  SHORT TERM GOALS (2 WEEKS)    Increase lumbar spine AROM 10-20% in all planes  Decrease pain to 2-5/10  Ability to bend forward: Decreased pain arising from     Ability to arise from sitting: Decreased pain arising from  Lifting tolerance 15 lbs  Able to do light/moderate housework  LONG TERM GOALS (DISCHARGE)    Lumbar Spine AROM: WFL and less pain in all planes  Decrease pain to 1-4/10/10  Ability to bend forward: Minimal/no pain arising from     Ability to arise from sitting: Minimal/no pain arising from  Lifting tolerance 20 lbs  Able to do moderate housework  Understanding of Dx/Px/POC: fair   Prognosis: fair    Goals  See assessment details above  Plan  Planned modality interventions: thermotherapy: hydrocollator packs  Planned therapy interventions: neuromuscular re-education, therapeutic activities and therapeutic exercise  Frequency: 2x week  Duration in weeks: 4  Plan details: Ute Farrell is a 48y o  year old female presenting to PT with pain, decreased range of motion, and decreased tolerance to activity  This patient would benefit from skilled PT services to address these issues and to maximize function  A home exercise program was provided and all questions were answered   Thank you for the referral           Subjective Evaluation    History of Present Illness  Mechanism of injury: CC: Left sided low back pain radiating into the left hip and knee, weakness of the left leg, pins and needles over the left knee  HPI: The patient states that her back problem began 3-4 years ago after sitting in a cold room for 15 hours  Testing revealed some arthritis and diffuse disc bulging in her low back  Prior injections and several physical therapy admissions over the past year afforded some relief  She is a homemaker  Pain  Current pain ratin  At best pain rating: 3  At worst pain ratin    Patient Goals  Patient goals for therapy: decreased pain, increased motion and increased strength          Objective     Postural Observations    Additional Postural Observation Details  Gait mildly antalgic due to left knee pain  Transfers normal, no lateral shift  General Comments     Lumbar Comments  CURRENT OBJECTIVE MEASUREMENTS    Lumbar Spine AROM: F=90% with pain, E=50% with pain, R LR=536% without pain, L SB=75% with pain             Precautions: None    Daily Treatment Diary     Manual                   Exercise Diary          NuStep: S , A         BACK EXT: F 6  P 3, C 0 P 4 2/10        PYR AB: S 4,   P 1 P 4 2/10        Rotary Torso:   S 2, P 3, C 3 P 3 2/10        Hoist Row: S 5 P 2 2/10        Lat Pulldown: S 5 P 3 2/10        SA Pulldown P 4 2/10        Oblique Press P 2 2/10                 Modalities          HP

## 2018-09-20 ENCOUNTER — OFFICE VISIT (OUTPATIENT)
Dept: PHYSICAL THERAPY | Facility: CLINIC | Age: 53
End: 2018-09-20
Payer: COMMERCIAL

## 2018-09-20 ENCOUNTER — EVALUATION (OUTPATIENT)
Dept: PHYSICAL THERAPY | Facility: CLINIC | Age: 53
End: 2018-09-20
Payer: COMMERCIAL

## 2018-09-20 DIAGNOSIS — M25.561 ACUTE PAIN OF BOTH KNEES: Primary | ICD-10-CM

## 2018-09-20 DIAGNOSIS — M17.0 PRIMARY OSTEOARTHRITIS OF BOTH KNEES: ICD-10-CM

## 2018-09-20 DIAGNOSIS — M25.562 ACUTE PAIN OF BOTH KNEES: Primary | ICD-10-CM

## 2018-09-20 DIAGNOSIS — M54.16 CHRONIC LUMBAR RADICULOPATHY: Primary | ICD-10-CM

## 2018-09-20 PROCEDURE — 97162 PT EVAL MOD COMPLEX 30 MIN: CPT | Performed by: PHYSICAL THERAPIST

## 2018-09-20 PROCEDURE — 97014 ELECTRIC STIMULATION THERAPY: CPT

## 2018-09-20 PROCEDURE — G8990 OTHER PT/OT CURRENT STATUS: HCPCS | Performed by: PHYSICAL THERAPIST

## 2018-09-20 PROCEDURE — 97010 HOT OR COLD PACKS THERAPY: CPT | Performed by: PHYSICAL THERAPIST

## 2018-09-20 PROCEDURE — G8991 OTHER PT/OT GOAL STATUS: HCPCS | Performed by: PHYSICAL THERAPIST

## 2018-09-20 PROCEDURE — 97110 THERAPEUTIC EXERCISES: CPT

## 2018-09-20 PROCEDURE — 97110 THERAPEUTIC EXERCISES: CPT | Performed by: PHYSICAL THERAPIST

## 2018-09-20 NOTE — PROGRESS NOTES
Daily Note     Today's date: 2018  Patient name: Arnol Allan  : 1965  MRN: [id number]  Referring provider: Marco Amaya PA-C  Dx:   Encounter Diagnosis     ICD-10-CM    1  Acute pain of both knees M25 561     M25 562                   Subjective: Patient indicates knee pain with gestures due to language barrier  Objective: See treatment diary below      Assessment: Tolerated treatment well  Patient exhibited good technique with therapeutic exercises      Plan: Continue per plan of care       Objective: Precautions: None     Daily Treatment Diary      Manual  9/10  9/13  9/17  9/20       PROM               Hamstring Stretch               Calf Stretch                               Exercise Diary                QS               SLR               SAQ               Heel slides with Strap               T-Band Press               T-Band Hamstring               T-Band TKE               TB Sidestep   L 2 5X  L2 5x  L 2 5X       LAQ 1# 2/10  1# 2/10  1# 2x10  1# 2/10       Mini Squats               Steps Forward               Steps Lateral    4" 2/10  4" 2x10  4" 2/10       PYR HAM: S 6  P 4, C 6 P 3 2/10  P 3 2/10  P3 2x10  P 3 2/10       PYR QUAD: S   P , C               Leg Press: S7    P 2 2/10  P2 2x10  P 2 2/10       XO TKE    P 2 2/10  P2 2x10  P 2 2/10       Hack Squat               SLS               NuStep: S6 , A    L 2 7'  L2 7'  L 2 7'       Bike: S               Treadmill                               Modalities                CP/Premod B knees 13'  15'  15' HP  15'

## 2018-09-21 NOTE — PROGRESS NOTES
Pt reminded to complete the following active test: US head neck soft tissue which was ordered by Dr Jocelin Monterroso on 08/24/18  Pt provided with central scheduling phone number  Pt stated she will call them and make an appointment to complete US

## 2018-09-25 ENCOUNTER — APPOINTMENT (OUTPATIENT)
Dept: PHYSICAL THERAPY | Facility: CLINIC | Age: 53
End: 2018-09-25
Payer: COMMERCIAL

## 2018-09-27 ENCOUNTER — OFFICE VISIT (OUTPATIENT)
Dept: PHYSICAL THERAPY | Facility: CLINIC | Age: 53
End: 2018-09-27
Payer: COMMERCIAL

## 2018-09-27 DIAGNOSIS — M25.562 ACUTE PAIN OF BOTH KNEES: Primary | ICD-10-CM

## 2018-09-27 DIAGNOSIS — M54.16 CHRONIC LUMBAR RADICULOPATHY: Primary | ICD-10-CM

## 2018-09-27 DIAGNOSIS — M25.561 ACUTE PAIN OF BOTH KNEES: Primary | ICD-10-CM

## 2018-09-27 PROCEDURE — 97014 ELECTRIC STIMULATION THERAPY: CPT

## 2018-09-27 PROCEDURE — 97110 THERAPEUTIC EXERCISES: CPT

## 2018-09-27 NOTE — PROGRESS NOTES
Daily Note     Today's date: 2018  Patient name: Jerry Nair  : 1965  MRN: [id number]  Referring provider: Kirsty Davis MD  Dx:   Encounter Diagnosis     ICD-10-CM    1  Acute pain of both knees M25 561     M25 562                   Subjective: Patient c/o a little pain in the L knee      Objective: See treatment diary below      Assessment: Tolerated treatment well  Patient exhibited good technique with therapeutic exercises      Plan: Continue per plan of care       Daily Treatment Diary      Manual  9/10  9/13  9/17  9/20  9/27     PROM               Hamstring Stretch               Calf Stretch                               Exercise Diary                QS               SLR               SAQ               Heel slides with Strap               T-Band Press               T-Band Hamstring               T-Band TKE               TB Sidestep   L 2 5X  L2 5x  L 2 5X       LAQ 1# 2/10  1# 2/10  1# 2x10  1# 2/10  2# 3/10     Mini Squats               Steps Forward               Steps Lateral    4" 2/10  4" 2x10  4" 2/10  4" 2/10     PYR HAM: S 6  P 4, C 6 P 3 2/10  P 3 2/10  P3 2x10  P 3 2/10  P 3 3/10     PYR QUAD: S   P , C               Leg Press: S7    P 2 2/10  P2 2x10  P 2 2/10  P 2 3/10     XO TKE    P 2 2/10  P2 2x10  P 2 2/10  P 2 3/10     Hack Squat               SLS               NuStep: S6 , A    L 2 7'  L2 7'  L 2 7'  L 2 10'     Bike: S               Treadmill                               Modalities                CP/Premod B knees 13'  15'  15' HP  15'  15'

## 2018-10-01 ENCOUNTER — APPOINTMENT (OUTPATIENT)
Dept: PHYSICAL THERAPY | Facility: CLINIC | Age: 53
End: 2018-10-01
Payer: COMMERCIAL

## 2018-10-02 ENCOUNTER — OFFICE VISIT (OUTPATIENT)
Dept: PHYSICAL THERAPY | Facility: CLINIC | Age: 53
End: 2018-10-02
Payer: COMMERCIAL

## 2018-10-02 ENCOUNTER — APPOINTMENT (OUTPATIENT)
Dept: PHYSICAL THERAPY | Facility: CLINIC | Age: 53
End: 2018-10-02
Payer: COMMERCIAL

## 2018-10-02 ENCOUNTER — HOSPITAL ENCOUNTER (OUTPATIENT)
Dept: ULTRASOUND IMAGING | Facility: HOSPITAL | Age: 53
Discharge: HOME | End: 2018-10-02
Attending: OTOLARYNGOLOGY
Payer: COMMERCIAL

## 2018-10-02 DIAGNOSIS — M25.562 ACUTE PAIN OF BOTH KNEES: Primary | ICD-10-CM

## 2018-10-02 DIAGNOSIS — E07.89 THYROID FULLNESS: ICD-10-CM

## 2018-10-02 DIAGNOSIS — M25.561 ACUTE PAIN OF BOTH KNEES: Primary | ICD-10-CM

## 2018-10-02 DIAGNOSIS — M54.16 CHRONIC LUMBAR RADICULOPATHY: Primary | ICD-10-CM

## 2018-10-02 PROCEDURE — 76536 US EXAM OF HEAD AND NECK: CPT

## 2018-10-02 PROCEDURE — 97014 ELECTRIC STIMULATION THERAPY: CPT

## 2018-10-02 PROCEDURE — 97110 THERAPEUTIC EXERCISES: CPT

## 2018-10-02 NOTE — PROGRESS NOTES
Daily Note     Today's date: 10/2/2018  Patient name: Nathaly Rashid  : 1965  MRN: [id number]  Referring provider: Aby Contreras MD  Dx:   Encounter Diagnosis     ICD-10-CM    1  Chronic lumbar radiculopathy M54 16                   Subjective: Pt reports L/S is improving with exercises she is doing here  Objective:   Assessment: Pt responded well with gym activities as relief is admitted    Plan: Continue per plan of care       Precautions: None     Daily Treatment Diary      Manual  9/20  9/27  10/2                         Exercise Diary                NuStep: S , A               BACK EXT: F 6  P 3, C 0 P 4 2/10  P 4 3/10  P4 3/10         PYR AB: S 4,   P 1 P 4 2/10  P 4 3/10  P4 3/10         Rotary Torso:   S 2, P 3, C 3 P 3 2/10  P 3 3/10  P3 3/10         Hoist Row: S 5 P 2 2/10  P 2 3/10  P2 3/10         Lat Pulldown: S 5 P 3 2/10  P 3 3/10  P3 3/10         SA Pulldown P 4 2/10  P 4 3/10  P4 3/10         Oblique Press P 2 2/10  P 2 3/10  P2 3/10                         Modalities                HP                                            S

## 2018-10-02 NOTE — PROGRESS NOTES
Daily Note     Today's date: 10/2/2018  Patient name: Quin Cloud  : 1965  MRN: [id number]  Referring provider: Jc Kaye PA-C  Dx:   Encounter Diagnosis     ICD-10-CM    1  Acute pain of both knees M25 561     M25 562                   Subjective:Pt complains of both knee pain that is not improving  Objective   Assessment: Tolerated treatment well   Patient exhibited good technique with therapeutic exercises        Plan: Continue per plan of care       Daily Treatment Diary      Manual  9/10  9/13  9/17  9/20  9/27 10/2   PROM               Hamstring Stretch               Calf Stretch                               Exercise Diary                QS               SLR               SAQ               Heel slides with Strap               T-Band Press               T-Band Hamstring               T-Band TKE               TB Sidestep   L 2 5X  L2 5x  L 2 5X    L2 5x   LAQ 1# 2/10  1# 2/10  1# 2x10  1# 2/10  2# 3/10  2# 3/10   Mini Squats               Steps Forward               Steps Lateral    4" 2/10  4" 2x10  4" 2/10  4" 2/10  4# 2/10   PYR HAM: S 6  P 4, C 6 P 3 2/10  P 3 2/10  P3 2x10  P 3 2/10  P 3 3/10  P3 3/10   PYR QUAD: S   P , C               Leg Press: S7    P 2 2/10  P2 2x10  P 2 2/10  P 2 3/10  P2 3/10   XO TKE    P 2 2/10  P2 2x10  P 2 2/10  P 2 3/10  P2 3/10   Hack Squat               SLS               NuStep: S6 , A    L 2 7'  L2 7'  L 2 7'  L 2 10'  L2 10'   Bike: S               Treadmill                               Modalities                CP/Premod B knees 13'  15'  15' HP  15'  15'  15'         A
18-Mar-2017 12:17

## 2018-10-03 NOTE — PROGRESS NOTES
PT Discharge    Today's date: 10/4/2018  Patient name: Joel Murrieta  : 1965  MRN: [id number]  Referring provider: Yoshi Shanks PA-C  Dx:   Encounter Diagnosis     ICD-10-CM    1  Acute pain of both knees M25 561     M25 562    2  Primary osteoarthritis of both knees M17 0                   Assessment    Assessment details:   CURRENT FUNCTIONAL STATUS    Standing/ADL tolerance 20 minutes  Walking tolerance 1/2 mile  Ascends stairs reciprocally with moderate pain/descends stairs step by step  Difficulty arising from sitting: Moderate  Able to squat 30 degrees with moderate pain  LONG TERM GOALS (DISCHARGE)    B Knee AROM: WNL and painfree -not met  B Knee Strength: E=40 lbs, F=25 lbs  -not met  B Girth MP: 39 5 cm -not met  Decrease pain to 0-3/10 -not met  Standing/ADL tolerance 60 minutes  -not met   Walking tolerance 1 mile  -not met  Ascends/descends stairs reciprocally with minimal/no pain  -not met  Difficulty arising from sitting: Minimal/none-not met  Able to squat 45 degrees without pain  -not met  Understanding of Dx/Px/POC: good   Prognosis: poor    Goals  See assessment details above  Plan  Planned modality interventions: unattended electrical stimulation, cryotherapy and thermotherapy: hydrocollator packs  Planned therapy interventions: manual therapy, neuromuscular re-education, therapeutic training and therapeutic exercise  Plan details: The patient's pain levels and activity tolerance have not improved with treatment  Her functional survey score has decreased significantly, from 60 to 39  Her strength measurements have also diminished  Accordingly, the patient has been discharged from our care due to lack of progress  She will see her PCP for follow up on 10/8/2018  Subjective Evaluation    History of Present Illness  Mechanism of injury: Subjective: The patient's pain has not improved in either knee, nor has her tolerance for standing or walking    Pain  Current pain ratin  At best pain rating: 3  At worst pain ratin  Location: Left knee 3-7/10, right knee 3-10  Patient Goals  Patient goals for therapy: decreased pain, increased strength, independence with ADLs/IADLs, improved balance and decreased edema          Objective     Observations     Additional Observation Details  Gait mildly antalgic on the left leg without an AD  Mild right knee swelling  General Comments     Knee Comments  CURRENT OBJECTIVE MEASUREMENTS    B Knee AROM: WNL but moderately painful  Knee Strength R/L: E=29/14 lbs, F=19/16 lbs  Girth MP R/L: 41/39 cm             Daily Treatment Diary      Manual  10/4  9/13  9/17  9/20  9/27 10/2                   Exercise Diary                TB Sidestep  L 2 5x L 2 5X  L2 5x  L 2 5X    L2 5x   LAQ 2# 2/10  1# 2/10  1# 2x10  1# 2/10  2# 3/10  2# 3/10   Mini Squats               Steps Forward               Steps Lateral  4" 2/10  4" 2/10  4" 2x10  4" 2/10  4" 2/10  4# 2/10   PYR HAM: S 6  P 4, C 6 P 3 2/10  P 3 2/10  P3 2x10  P 3 2/10  P 3 3/10  P3 3/10   PYR QUAD: S   P , C               Leg Press: S7  P 2 3/10  P 2 2/10  P2 2x10  P 2 2/10  P 2 3/10  P2 3/10   XO TKE  P 2 3/10  P 2 2/10  P2 2x10  P 2 2/10  P 2 3/10  P2 3/10   Hack Squat               SLS               NuStep: S6 , A  L 2 7'  L 2 7'  L2 7'  L 2 7'  L 2 10'  L2 10'   Bike: S                               Modalities                HP/Premod B knees 13'  15'  15' HP  15'  15'  15'

## 2018-10-04 ENCOUNTER — OFFICE VISIT (OUTPATIENT)
Dept: PHYSICAL THERAPY | Facility: CLINIC | Age: 53
End: 2018-10-04
Payer: COMMERCIAL

## 2018-10-04 ENCOUNTER — APPOINTMENT (OUTPATIENT)
Dept: RADIOLOGY | Facility: HOSPITAL | Age: 53
End: 2018-10-04
Payer: COMMERCIAL

## 2018-10-04 ENCOUNTER — HOSPITAL ENCOUNTER (OUTPATIENT)
Facility: HOSPITAL | Age: 53
Setting detail: OUTPATIENT SURGERY
Discharge: HOME | End: 2018-10-04
Attending: ANESTHESIOLOGY | Admitting: ANESTHESIOLOGY
Payer: COMMERCIAL

## 2018-10-04 ENCOUNTER — EVALUATION (OUTPATIENT)
Dept: PHYSICAL THERAPY | Facility: CLINIC | Age: 53
End: 2018-10-04
Payer: COMMERCIAL

## 2018-10-04 VITALS
RESPIRATION RATE: 18 BRPM | TEMPERATURE: 96.3 F | SYSTOLIC BLOOD PRESSURE: 151 MMHG | OXYGEN SATURATION: 96 % | HEART RATE: 57 BPM | DIASTOLIC BLOOD PRESSURE: 97 MMHG

## 2018-10-04 DIAGNOSIS — M54.16 CHRONIC LUMBAR RADICULOPATHY: Primary | ICD-10-CM

## 2018-10-04 DIAGNOSIS — M17.0 PRIMARY OSTEOARTHRITIS OF BOTH KNEES: ICD-10-CM

## 2018-10-04 DIAGNOSIS — M25.561 ACUTE PAIN OF BOTH KNEES: Primary | ICD-10-CM

## 2018-10-04 DIAGNOSIS — M25.562 ACUTE PAIN OF BOTH KNEES: Primary | ICD-10-CM

## 2018-10-04 PROCEDURE — G8979 MOBILITY GOAL STATUS: HCPCS | Performed by: PHYSICAL THERAPIST

## 2018-10-04 PROCEDURE — 97110 THERAPEUTIC EXERCISES: CPT | Performed by: PHYSICAL THERAPIST

## 2018-10-04 PROCEDURE — 64483 NJX AA&/STRD TFRM EPI L/S 1: CPT | Performed by: ANESTHESIOLOGY

## 2018-10-04 PROCEDURE — 72100 X-RAY EXAM L-S SPINE 2/3 VWS: CPT

## 2018-10-04 PROCEDURE — 64484 NJX AA&/STRD TFRM EPI L/S EA: CPT | Performed by: ANESTHESIOLOGY

## 2018-10-04 PROCEDURE — 97110 THERAPEUTIC EXERCISES: CPT

## 2018-10-04 PROCEDURE — G8980 MOBILITY D/C STATUS: HCPCS | Performed by: PHYSICAL THERAPIST

## 2018-10-04 RX ORDER — OMEPRAZOLE 40 MG/1
40 CAPSULE, DELAYED RELEASE ORAL DAILY
COMMUNITY
End: 2018-10-08 | Stop reason: ALTCHOICE

## 2018-10-04 RX ORDER — LIDOCAINE HYDROCHLORIDE 10 MG/ML
INJECTION, SOLUTION INFILTRATION; PERINEURAL AS NEEDED
Status: DISCONTINUED | OUTPATIENT
Start: 2018-10-04 | End: 2018-10-04 | Stop reason: HOSPADM

## 2018-10-04 RX ORDER — RANITIDINE 300 MG/1
300 TABLET ORAL
COMMUNITY
End: 2018-10-08 | Stop reason: SDUPTHER

## 2018-10-04 RX ORDER — DEXAMETHASONE SODIUM PHOSPHATE 10 MG/ML
INJECTION, SOLUTION INTRAMUSCULAR; INTRAVENOUS AS NEEDED
Status: DISCONTINUED | OUTPATIENT
Start: 2018-10-04 | End: 2018-10-04 | Stop reason: HOSPADM

## 2018-10-04 NOTE — PROGRESS NOTES
Daily Note     Today's date: 10/4/2018  Patient name: Lashanda Lipscomb  : 1965  MRN: [id number]  Referring provider: Emerson Farr PA-C  Dx:   Encounter Diagnosis     ICD-10-CM    1  Chronic lumbar radiculopathy M54 16                   Subjective: Patient indicates L lateral thigh pain by pointing to that area  Language barrier prevents further explanation  Objective: See treatment diary below      Assessment: Tolerated treatment well  Patient exhibited good technique with therapeutic exercises      Plan: Continue per plan of care       Precautions: None     Daily Treatment Diary      Manual  9/20  9/27  10/2  10/4                       Exercise Diary                NuStep: S , A               BACK EXT: F 6  P 3, C 0 P 4 2/10  P 4 3/10  P4 3/10  P 4 3/10       PYR AB: S 4,   P 1 P 4 2/10  P 4 3/10  P4 3/10  P 4 3/10       Rotary Torso:   S 2, P 3, C 3 P 3 2/10  P 3 3/10  P3 3/10  P 3 3/10       Hoist Row: S 5 P 2 2/10  P 2 3/10  P2 3/10  P 2 3/10       Lat Pulldown: S 5 P 3 2/10  P 3 3/10  P3 3/10  P 3 3/10       SA Pulldown P 4 2/10  P 4 3/10  P4 3/10  P 4 3/10       Oblique Press P 2 2/10  P 2 3/10  P2 3/10  P 2 3/10                       Modalities                HP

## 2018-10-04 NOTE — OP NOTE
OPERATIVE REPORT  PATIENT NAME: Dieter Da Silva    :  1965  MRN: [id number]  Pt Location: [facility] ROOM 01    SURGERY DATE: 10/4/2018    Surgeon(s) and Role:     * Catracho Monsivais MD - Primary    Preop Diagnosis:  Lumbar radiculopathy [M54 16]    Post-Op Diagnosis Codes:     * Lumbar radiculopathy [M54 16]    Procedure(s) (LRB):  L4 AND L5 TRANSFORAMINAL EPIDURAL STEROID INJECTION (Left)    Specimen(s):  * No specimens in log *    Estimated Blood Loss:   Minimal    Drains:       Anesthesia Type:   Local    Operative Indications:  Lumbar radiculopathy [M54 16]      Operative Findings:  same    Complications:   None    Procedure and Technique:  Indication:  Low back and leg pain  Preoperative diagnosis:  Lumbar radiculitis  Postoperative diagnosis:  Lumbar radiculitis    Procedure: Fluoroscopically-guided left L4-5 and L5-S1 transforaminal epidural steroid injection under fluoroscopy      After discussing the risks, benefits, and alternatives to the procedure, the patient expressed understanding and wished to proceed  The patient was brought to the fluoroscopy suite and placed in the prone position  A procedural pause was conducted to verify:  correct patient identity, procedure to be performed and as applicable, correct side and site, correct patient position, and availability of implants, special equipment and special requirements  After identifying the left L4 and L5 pedicles fluoroscopically with an oblique view, the skin was sterilely prepped and draped in the usual fashion using Chloraprep skin prep  The skin and subcutaneous tissue were anesthetized with 0 5% lidocaine  A 3 5 inch 22 gauge spinal needle was then advanced under fluoroscopic guidance to the posterior aspect of the left Health the patient a L4-L5-L5-S1 neural foramens    Appropriate foraminal depth was determined with a lateral fluoroscopic view, and AP visualization confirmed needle positioning at approximately the 6 oclock position relative to the pedicles  After negative aspiration, 1 mL of Omnipaque 300 contrast was injected using live fluoroscopy/digital subtraction angiography, confirming appropriate transforaminal spread without evidence of intravascular or intrathecal uptake  Next, a local anesthetic test dose consisting of 1 mL of 2% lidocaine was injected through the needle at each level  After an appropriate period of observation, a directed neurological exam was performed which revealed no new neurologic deficits  Next, a 1 5 ml solution consisting of 7 5 mg of dexamethasone in sterile saline was injected slowly and incrementally into the epidural space at each level  Following the injection the needles were withdrawn slightly and flushed with lidocaine as they were fully extracted  The patient tolerated the procedure well and there were no apparent complications  The patient did not develop any new neurologic deficits  After appropriate observation, the patient was dismissed from the clinic in good condition under their own power  COMMENTS   The patient received a total steroid dose of 15 mg of dexamethasone     I was present for the entire procedure    Patient Disposition:  hemodynamically stable    SIGNATURE: Ludwig Moss MD  DATE: October 4, 2018  TIME: 12:33 PM

## 2018-10-04 NOTE — H&P
Assessment:  1  Chronic lumbar radiculopathy    2  Myofascial pain    3  Chronic pain syndrome          Plan:  Patient is a 55-year-old female with complaints of low back pain and left leg pain in the L5 and L4 nerve root distribution presents to office for follow-up visit  Vijaya Lee denies any adverse events since last office visit   MRI lumbar spine was reviewed which shows 2 disc bulges at L4-L5 and L5-S1  Patient is status post left L4-L5 and L5-S1 transforaminal epidural steroid injection performed on 05/17/2018 for which she reported 80% relief lasting greater than 3 months  After discussion we feel that good plan would be to repeat the injection since her pain is root turned in combine that with lumbar core strengthening to ultimately get her in the position that would decrease her requirements for injection and improve her long-term outcome  1  We will perform a left L4 and L5 nerve root transforaminal epidural steroid injection          Complete risks and benefits including bleeding, infection, tissue reaction, nerve injury and allergic reaction were discussed  The approach was demonstrated using models and literature was provided  Verbal and written consent was obtained                  [place] Prescription Drug Monitoring Program report was reviewed and was appropriate         History of Present Illness: The patient is a 48 y o  female who presents for a follow up office visit in regards to Back Pain and Leg Pain  The patients current symptoms include 7/10 constant burning, sharp pain in the low back and into left lower extremity which is worse in the evening and at nighttime      Current pain medications includes:   Gabapentin 600 mg   The patient reports that this regimen is providing 45% pain relief    The patient is reporting no side effects from this pain medication regimen      I have personally reviewed and/or updated the patient's past medical history, past surgical history, family history, social history, current medications, allergies, and vital signs today             Review of Systems  Review of Systems   Musculoskeletal: Positive for gait problem  All other systems reviewed and are negative            Medical History        Past Medical History:   Diagnosis Date    Fatigue      High cholesterol      Neuropathy      Osteoarthritis       knees            Surgical History         Past Surgical History:   Procedure Laterality Date    APPENDECTOMY        EPIDURAL BLOCK INJECTION Left 5/17/2018     Procedure: L4 AND L5 TRANSFORAMINAL EPIDURAL STEROID INJECTION;  Surgeon: Maya Mukherjee MD;  Location: [facility];  Service: Pain Management     CT COLONOSCOPY FLX DX W/COLLJ SPEC WHEN PFRMD N/A 5/31/2016     Procedure: COLONOSCOPY;  Surgeon: Chelsey Dai MD;  Location: [facility];  Service: Gastroenterology    CT ESOPHAGOGASTRODUODENOSCOPY TRANSORAL DIAGNOSTIC N/A 11/15/2016     Procedure: ESOPHAGOGASTRODUODENOSCOPY (EGD); Surgeon: Chelsey Dai MD;  Location: [facility];  Service: Gastroenterology                  Family History   Problem Relation Age of Onset    Uterine cancer Mother           Social History          Occupational History    Not on file            Social History Main Topics    Smoking status: Never Smoker    Smokeless tobacco: Never Used    Alcohol use No    Drug use: No    Sexual activity: Not on file            Current Outpatient Prescriptions:     naproxen (NAPROSYN) 500 mg tablet, Take 1 tablet (500 mg total) by mouth 2 (two) times daily after meals, Disp: 30 tablet, Rfl: 1     No Known Allergies     Physical Exam:     Wt 64 kg (141 lb 3 2 oz)   BMI 28 52 kg/m²      Constitutional:normal, well developed, well nourished, alert, in no distress and non-toxic and no overt pain behavior    Eyes:anicteric  HEENT:grossly intact  Neck:supple, symmetric, trachea midline and no masses   Pulmonary:even and unlabored  Cardiovascular:No edema or pitting edema present  Skin:Normal without rashes or lesions and well hydrated  Psychiatric:Mood and affect appropriate  Neurologic:Cranial Nerves II-XII grossly intact  Musculoskeletal:antalgic     Lumbar/Sacral Spine examination demonstrates   Full range of motion lumbar spine with pain upon: flexion, lateral rotation to the left/right, and bending to the left/right   Bilateral lumbar paraspinals tender to palpation  Muscle spasms noted in the lumbar area bilaterally  5/5 lower extremity strength in all muscle groups  Right lower extremity, 3/5 lower extremity strength in left lower muscles  Positive seated straight leg raise for  left lower extremities   Sensitivity to light touch intact bilateral lower extremities   4+ reflexes in the patella and Achilles   No ankle clonus

## 2018-10-08 ENCOUNTER — OFFICE VISIT (OUTPATIENT)
Dept: FAMILY MEDICINE CLINIC | Facility: CLINIC | Age: 53
End: 2018-10-08
Payer: COMMERCIAL

## 2018-10-08 VITALS
DIASTOLIC BLOOD PRESSURE: 80 MMHG | HEART RATE: 55 BPM | WEIGHT: 138 LBS | TEMPERATURE: 96.8 F | HEIGHT: 59 IN | RESPIRATION RATE: 16 BRPM | BODY MASS INDEX: 27.82 KG/M2 | SYSTOLIC BLOOD PRESSURE: 110 MMHG

## 2018-10-08 DIAGNOSIS — M25.562 ACUTE PAIN OF BOTH KNEES: ICD-10-CM

## 2018-10-08 DIAGNOSIS — K21.00 REFLUX ESOPHAGITIS: Primary | ICD-10-CM

## 2018-10-08 DIAGNOSIS — M25.561 ACUTE PAIN OF BOTH KNEES: ICD-10-CM

## 2018-10-08 PROCEDURE — T1015 CLINIC SERVICE: HCPCS | Performed by: FAMILY MEDICINE

## 2018-10-08 RX ORDER — NAPROXEN 500 MG/1
500 TABLET ORAL
Qty: 60 TABLET | Refills: 1 | Status: SHIPPED | OUTPATIENT
Start: 2018-10-08 | End: 2019-01-05 | Stop reason: ALTCHOICE

## 2018-10-08 RX ORDER — RANITIDINE 300 MG/1
300 TABLET ORAL
Qty: 30 TABLET | Refills: 1 | Status: SHIPPED | OUTPATIENT
Start: 2018-10-08 | End: 2019-01-05 | Stop reason: ALTCHOICE

## 2018-10-08 NOTE — PROGRESS NOTES
History and Physical  Nae Pa 48 y o  female MRN: [id number]      Assessment:   L knee pain  Reflux    Plan:  Stop Prilosec  Continue other meds  RTC 4 months    Chief Complaint   Patient presents with    Follow-up    Medication Refill        HPI:  Nae Pa is a 48 y o  female who presents for follow up  She has been going to PT for L knee pain and it is improving nicely  Needs med refills today  Historical Information   Past Medical History:   Diagnosis Date    Fatigue     High cholesterol     Neuropathy     Osteoarthritis     knees     Past Surgical History:   Procedure Laterality Date    APPENDECTOMY      EPIDURAL BLOCK INJECTION Left 5/17/2018    Procedure: L4 AND L5 TRANSFORAMINAL EPIDURAL STEROID INJECTION;  Surgeon: Kesha Neri MD;  [facility];  Service: Pain Management     EPIDURAL BLOCK INJECTION Left 10/4/2018    Procedure: L4 AND L5 TRANSFORAMINAL EPIDURAL STEROID INJECTION;  Surgeon: Kesha Neri MD;  [facility];  Service: Pain Management     AR COLONOSCOPY FLX DX W/COLLJ SPEC WHEN PFRMD N/A 5/31/2016    Procedure: COLONOSCOPY;  Surgeon: Ahmed Galeazzi, MD;  [facility];  Service: Gastroenterology    AR ESOPHAGOGASTRODUODENOSCOPY TRANSORAL DIAGNOSTIC N/A 11/15/2016    Procedure: ESOPHAGOGASTRODUODENOSCOPY (EGD);   Surgeon: Ahmed Galeazzi, MD;  [facility];  Service: Gastroenterology     Social History   History   Alcohol Use No     History   Drug Use No     History   Smoking Status    Never Smoker   Smokeless Tobacco    Never Used     Family History   Problem Relation Age of Onset    Uterine cancer Mother        Meds/Allergies   No Known Allergies    Meds:    Current Outpatient Prescriptions:     gabapentin (NEURONTIN) 600 MG tablet, Take 1 tablet (600 mg total) by mouth 3 (three) times a day for 30 days, Disp: 90 tablet, Rfl: 0    naproxen (NAPROSYN) 500 mg tablet, Take 1 tablet (500 mg total) by mouth 2 (two) times daily after meals, Disp: 30 tablet, Rfl: 1    omeprazole (PriLOSEC) 40 MG capsule, Take 40 mg by mouth daily, Disp: , Rfl:     ranitidine (ZANTAC) 300 MG tablet, Take 300 mg by mouth daily at bedtime, Disp: , Rfl:       REVIEW OF SYSTEMS  Review of Systems   Constitutional: Negative  HENT: Negative  Eyes: Negative  Respiratory: Negative  Cardiovascular: Negative  Gastrointestinal: Negative  Endocrine: Negative  Genitourinary: Negative  Musculoskeletal: Positive for arthralgias  Skin: Negative  Neurological: Negative  Hematological: Negative  Psychiatric/Behavioral: Negative  Current Vitals:   Blood Pressure: 110/80 (10/08/18 1605)  Pulse: 55 (10/08/18 1605)  Temperature: (!) 96 8 °F (36 °C) (10/08/18 1605)  Temp Source: Tympanic (10/08/18 1605)  Respirations: 16 (10/08/18 1605)  Height: 4' 11" (149 9 cm) (10/08/18 1605)  Weight - Scale: 62 6 kg (138 lb) (10/08/18 1605)  Body mass index is 27 87 kg/m²  PHYSICAL EXAMS:  Physical Exam   Constitutional: She is oriented to person, place, and time  She appears well-developed and well-nourished  HENT:   Head: Normocephalic and atraumatic  Right Ear: External ear normal    Left Ear: External ear normal    Eyes: Pupils are equal, round, and reactive to light  Neck: Normal range of motion  Neck supple  No thyromegaly present  Cardiovascular: Normal rate, regular rhythm and normal heart sounds  Pulmonary/Chest: Effort normal and breath sounds normal  She has no wheezes  She has no rales  Musculoskeletal: She exhibits no edema  Neurological: She is alert and oriented to person, place, and time  No cranial nerve deficit  Skin: Skin is warm and dry  Psychiatric: She has a normal mood and affect  Lab Results:          Esequiel Hampton PA-C  10/8/2018, 4:17 PM

## 2018-10-09 ENCOUNTER — OFFICE VISIT (OUTPATIENT)
Dept: PHYSICAL THERAPY | Facility: CLINIC | Age: 53
End: 2018-10-09
Payer: COMMERCIAL

## 2018-10-09 DIAGNOSIS — M54.16 LUMBAR RADICULOPATHY: ICD-10-CM

## 2018-10-09 DIAGNOSIS — M54.16 CHRONIC LUMBAR RADICULOPATHY: Primary | ICD-10-CM

## 2018-10-09 PROCEDURE — 97014 ELECTRIC STIMULATION THERAPY: CPT

## 2018-10-09 PROCEDURE — 97110 THERAPEUTIC EXERCISES: CPT

## 2018-10-09 NOTE — PROGRESS NOTES
Daily Note     Today's date: 10/9/2018  Patient name: Lashanda Lipscomb  : 1965  MRN: [id number]  Referring provider: Emerson Farr PA-C  Dx:   Encounter Diagnosis     ICD-10-CM    1  Chronic lumbar radiculopathy M54 16                   Subjective: Patient indicates her pain area when asked by pointing to her L lateral thigh to the knee  Objective: See treatment diary below      Assessment: Tolerated treatment well  Patient exhibited good technique with therapeutic exercises      Plan: Continue per plan of care       Precautions: None     Daily Treatment Diary      Manual  9/20  9/27  10/2  10/4  10/9                     Exercise Diary                NuStep: S , A               BACK EXT: F 6  P 3, C 0 P 4 2/10  P 4 3/10  P4 3/10  P 4 3/10  P 4 3/10     PYR AB: S 4,   P 1 P 4 2/10  P 4 3/10  P4 3/10  P 4 3/10  P 4 3/10     Rotary Torso:   S 2, P 3, C 3 P 3 2/10  P 3 3/10  P3 3/10  P 3 3/10  P 3 3/10     Hoist Row: S 5 P 2 2/10  P 2 3/10  P2 3/10  P 2 3/10  P 2 3/10     Lat Pulldown: S 5 P 3 2/10  P 3 3/10  P3 3/10  P 3 3/10  P 3 3/10     SA Pulldown P 4 2/10  P 4 3/10  P4 3/10  P 4 3/10  P 4 3/10     Oblique Press P 2 2/10  P 2 3/10  P2 3/10  P 2 3/10  P 2 3/10                     Modalities                HP                 CP/IFC          15'

## 2018-10-11 ENCOUNTER — OFFICE VISIT (OUTPATIENT)
Dept: PHYSICAL THERAPY | Facility: CLINIC | Age: 53
End: 2018-10-11
Payer: COMMERCIAL

## 2018-10-11 ENCOUNTER — TELEPHONE (OUTPATIENT)
Dept: PAIN MEDICINE | Facility: CLINIC | Age: 53
End: 2018-10-11

## 2018-10-11 DIAGNOSIS — M54.16 LUMBAR RADICULOPATHY: ICD-10-CM

## 2018-10-11 DIAGNOSIS — M54.16 CHRONIC LUMBAR RADICULOPATHY: Primary | ICD-10-CM

## 2018-10-11 PROCEDURE — 97110 THERAPEUTIC EXERCISES: CPT

## 2018-10-11 PROCEDURE — 97014 ELECTRIC STIMULATION THERAPY: CPT

## 2018-10-11 NOTE — TELEPHONE ENCOUNTER
S/P L4 & L5 TFESI left side, done 10/4 at [facility] by , follow up 11/8      Attempted to call patient, phone rang, no machine to leave message

## 2018-10-11 NOTE — PROGRESS NOTES
Daily Note     Today's date: 10/11/2018  Patient name: Layla Phan  : 1965  MRN: [id number]  Referring provider: Omi Cervantes PA-C  Dx:   Encounter Diagnosis     ICD-10-CM    1  Chronic lumbar radiculopathy M54 16                   Subjective: Patient c/o bilateral knee/ L lateral thigh pain  Objective: See treatment diary below      Assessment: Tolerated treatment well  Patient exhibited good technique with therapeutic exercises      Plan: Continue per plan of care       Precautions: None     Daily Treatment Diary      Manual  9/20  9/27  10/2  10/4  10/9  10/11                   Exercise Diary                NuStep: S6 , A8            L 2 10'   BACK EXT: F 6  P 3, C 0 P 4 2/10  P 4 3/10  P4 3/10  P 4 3/10  P 4 3/10  P 4 3/10   PYR AB: S 4,   P 1 P 4 2/10  P 4 3/10  P4 3/10  P 4 3/10  P 4 3/10  P 4 3/10   Rotary Torso:   S 2, P 3, C 3 P 3 2/10  P 3 3/10  P3 3/10  P 3 3/10  P 3 3/10  P 3 3/10   Hoist Row: S 5 P 2 2/10  P 2 3/10  P2 3/10  P 2 3/10  P 2 3/10  P 2 3/10   Lat Pulldown: S 5 P 3 2/10  P 3 3/10  P3 3/10  P 3 3/10  P 3 3/10  P 3 3/10   SA Pulldown P 4 2/10  P 4 3/10  P4 3/10  P 4 3/10  P 4 3/10  P 4 3/10   Oblique Press P 2 2/10  P 2 3/10  P2 3/10  P 2 3/10  P 2 3/10  P 2 3/10                   Modalities                HP                 CP/IFC          15'  15'

## 2018-10-15 NOTE — TELEPHONE ENCOUNTER
I called and spoke with patient , he states that she still has some of the pain but feels better since having the injections  She is scheduled for follow up 11/8  He will check with her to see if she has any questions and call the office   He was pleasant and appreciative

## 2018-10-16 ENCOUNTER — OFFICE VISIT (OUTPATIENT)
Dept: PHYSICAL THERAPY | Facility: CLINIC | Age: 53
End: 2018-10-16
Payer: COMMERCIAL

## 2018-10-16 DIAGNOSIS — M54.16 CHRONIC LUMBAR RADICULOPATHY: Primary | ICD-10-CM

## 2018-10-16 PROCEDURE — 97110 THERAPEUTIC EXERCISES: CPT

## 2018-10-16 PROCEDURE — 97014 ELECTRIC STIMULATION THERAPY: CPT

## 2018-10-16 NOTE — PROGRESS NOTES
PT Discharge    Today's date: 10/17/2018  Patient name: Prateek Collins  : 1965  MRN: [id number]  Referring provider: Meagan Martinez PA-C  Dx:   No diagnosis found  Assessment    Assessment details:   CURRENT FUNCTIONAL STATUS    Ability to bend forward: Moderate pain arising from     Ability to arise from sitting: Moderate pain arising from  Lifting tolerance 10 lbs  Able to do light housework  LONG TERM GOALS (DISCHARGE)    Lumbar Spine AROM: WFL and less pain in all planes  Decrease pain to 1-4/10/10  Ability to bend forward: Minimal/no pain arising from     Ability to arise from sitting: Minimal/no pain arising from  Lifting tolerance 20 lbs  Able to do moderate housework  Understanding of Dx/Px/POC: fair   Prognosis: fair    Goals  See assessment details above  Plan  Planned modality interventions: thermotherapy: hydrocollator packs  Planned therapy interventions: neuromuscular re-education, therapeutic activities and therapeutic exercise  Plan details: The patient continues to note low back pain radiating into her legs  She has achieved maximum benefit from PT at this time and has been discharged from our care  Subjective Evaluation    History of Present Illness  Mechanism of injury: Subjective: The patient's back pain  Pain  Current pain ratin  At best pain rating: 3  At worst pain ratin    Patient Goals  Patient goals for therapy: decreased pain, increased motion and increased strength          Objective     Postural Observations    Additional Postural Observation Details  Gait mildly antalgic due to left knee pain  Transfers normal, no lateral shift  General Comments     Lumbar Comments  CURRENT OBJECTIVE MEASUREMENTS    Lumbar Spine AROM: F=90% with pain, E=50% with pain, R RZ=774% without pain, L SB=75% with pain

## 2018-10-16 NOTE — PROGRESS NOTES
Daily Note     Today's date: 10/16/2018  Patient name: Lashanda Lipscomb  : 1965  MRN: [id number]  Referring provider: Emerson Farr PA-C  Dx:   Encounter Diagnosis     ICD-10-CM    1  Chronic lumbar radiculopathy M54 16                   Subjective: Pt reports her back is feeling better but my "left knee" is still aching"      Objective:       Precautions: None     Daily Treatment Diary      Manual  9/20  10/16  10/2  10/4  10/9  10/11                   Exercise Diary                NuStep: S6 , A8    L4 2x10        L 2 10'   BACK EXT: F 6  P 3, C 0 P 4 2/10  P 4 3/10  P4 3/10  P 4 3/10  P 4 3/10  P 4 3/10   PYR AB: S 4,   P 1 P 4 2/10  P 4 3/10  P4 3/10  P 4 3/10  P 4 3/10  P 4 3/10   Rotary Torso:   S 2, P 3, C 3 P 3 2/10  P 3 3/10  P3 3/10  P 3 3/10  P 3 3/10  P 3 3/10   Hoist Row: S 5 P 2 2/10  P 2 3/10  P2 3/10  P 2 3/10  P 2 3/10  P 2 3/10   Lat Pulldown: S 5 P 3 2/10  P 3 3/10  P3 3/10  P 3 3/10  P 3 3/10  P 3 3/10   SA Pulldown P 4 2/10  P 4 3/10  P4 3/10  P 4 3/10  P 4 3/10  P 4 3/10   Oblique Press P 2 2/10  P 2 3/10  P2 3/10  P 2 3/10  P 2 3/10  P 2 3/10                   Modalities                HP                 CP/IFC    15      15'  15'             Assessment: Tolerated treatment well with greater relief during exercises        Plan: Continue per plan of care

## 2018-10-17 ENCOUNTER — APPOINTMENT (OUTPATIENT)
Dept: PHYSICAL THERAPY | Facility: CLINIC | Age: 53
End: 2018-10-17
Payer: COMMERCIAL

## 2018-10-17 NOTE — PROGRESS NOTES
PT Discharge    Today's date: 10/22/2018  Patient name: Elias De Oliveira  : 1965  MRN: [id number]  Referring provider: Nando Watkins PA-C  Dx:   Encounter Diagnosis     ICD-10-CM    1  Chronic lumbar radiculopathy M54 16                   Assessment    Assessment details:   CURRENT FUNCTIONAL STATUS    Ability to bend forward: Moderate pain arising from     Ability to arise from sitting: Moderate pain arising from  Lifting tolerance 10 lbs  Able to do light housework  LONG TERM GOALS (DISCHARGE)    Lumbar Spine AROM: WFL and less pain in all planes  -met  Decrease pain to 1-4/10/10  -notmet  Ability to bend forward: Minimal/no pain arising from -not met    Ability to arise from sitting: Minimal/no pain arising from -not met    Lifting tolerance 20 lbs  -not met  Able to do moderate housework -not met  Understanding of Dx/Px/POC: fair   Prognosis: fair    Goals  See assessment details above  Plan  Planned modality interventions: thermotherapy: hydrocollator packs  Planned therapy interventions: neuromuscular re-education, therapeutic activities and therapeutic exercise  Plan details: The patient continues to note low back pain radiating into her legs  She has achieved maximum benefit from PT at this time and has been discharged from our care  Subjective Evaluation    History of Present Illness  Mechanism of injury: Subjective: The patient's back pain is temporarily improved with therapy and her recent injections  Sitting and bending are her most aggravating daily activities  Pain  Current pain ratin  At best pain rating: 3  At worst pain ratin    Patient Goals  Patient goals for therapy: decreased pain, increased motion and increased strength          Objective     Postural Observations    Additional Postural Observation Details  Gait mildly antalgic due to left knee pain  Transfers normal, no lateral shift      General Comments     Lumbar Comments  CURRENT OBJECTIVE MEASUREMENTS    Lumbar Spine AROM: F=100% with central low back pain, E=100% with central low back pain, R IV=855% with left hip pain, L JE=976% with right hip pain             Precautions: None     Daily Treatment Diary      Manual  9/20  10/16  10/22  10/4  10/9  10/11                   Exercise Diary                NuStep: S6 , A8    L4 2x10  L 4 10'      L 2 10'   BACK EXT: F 6  P 3, C 0 P 4 2/10  P 4 3/10  P4 3/10  P 4 3/10  P 4 3/10  P 4 3/10   PYR AB: S 4,   P 1 P 4 2/10  P 4 3/10  P4 3/10  P 4 3/10  P 4 3/10  P 4 3/10   Rotary Torso:   S 2, P 3, C 3 P 3 2/10  P 3 3/10  P3 3/10  P 3 3/10  P 3 3/10  P 3 3/10   Hoist Row: S 5 P 2 2/10  P 2 3/10  P2 3/10  P 2 3/10  P 2 3/10  P 2 3/10   Lat Pulldown: S 5 P 3 2/10  P 3 3/10  P3 3/10  P 3 3/10  P 3 3/10  P 3 3/10   SA Pulldown P 4 2/10  P 4 3/10  P4 3/10  P 4 3/10  P 4 3/10  P 4 3/10   Oblique Press P 2 2/10  P 2 3/10  P2 3/10  P 2 3/10  P 2 3/10  P 2 3/10                   Modalities                HP                 CP/IFC    15  15'    15'  15'

## 2018-10-22 ENCOUNTER — EVALUATION (OUTPATIENT)
Dept: PHYSICAL THERAPY | Facility: CLINIC | Age: 53
End: 2018-10-22
Payer: COMMERCIAL

## 2018-10-22 DIAGNOSIS — M54.16 CHRONIC LUMBAR RADICULOPATHY: Primary | ICD-10-CM

## 2018-10-22 PROCEDURE — G8979 MOBILITY GOAL STATUS: HCPCS | Performed by: PHYSICAL THERAPIST

## 2018-10-22 PROCEDURE — G8980 MOBILITY D/C STATUS: HCPCS | Performed by: PHYSICAL THERAPIST

## 2018-10-22 PROCEDURE — 97014 ELECTRIC STIMULATION THERAPY: CPT | Performed by: PHYSICAL THERAPIST

## 2018-10-22 PROCEDURE — 97110 THERAPEUTIC EXERCISES: CPT | Performed by: PHYSICAL THERAPIST

## 2018-11-08 ENCOUNTER — OFFICE VISIT (OUTPATIENT)
Dept: PAIN MEDICINE | Facility: CLINIC | Age: 53
End: 2018-11-08
Payer: COMMERCIAL

## 2018-11-08 VITALS
HEIGHT: 59 IN | RESPIRATION RATE: 16 BRPM | DIASTOLIC BLOOD PRESSURE: 74 MMHG | BODY MASS INDEX: 28.22 KG/M2 | TEMPERATURE: 98.3 F | SYSTOLIC BLOOD PRESSURE: 128 MMHG | WEIGHT: 140 LBS

## 2018-11-08 DIAGNOSIS — M25.552 LEFT HIP PAIN: ICD-10-CM

## 2018-11-08 DIAGNOSIS — M54.16 LUMBAR RADICULOPATHY: Primary | ICD-10-CM

## 2018-11-08 DIAGNOSIS — M54.16 CHRONIC LUMBAR RADICULOPATHY: ICD-10-CM

## 2018-11-08 DIAGNOSIS — M79.18 MYOFASCIAL PAIN SYNDROME: ICD-10-CM

## 2018-11-08 PROCEDURE — 99214 OFFICE O/P EST MOD 30 MIN: CPT | Performed by: ANESTHESIOLOGY

## 2018-11-08 RX ORDER — TIZANIDINE 2 MG/1
2 TABLET ORAL 3 TIMES DAILY
Qty: 90 TABLET | Refills: 1 | Status: SHIPPED | OUTPATIENT
Start: 2018-11-08 | End: 2019-01-05 | Stop reason: ALTCHOICE

## 2018-11-08 RX ORDER — GABAPENTIN 600 MG/1
600 TABLET ORAL 3 TIMES DAILY
Qty: 90 TABLET | Refills: 1 | Status: SHIPPED | OUTPATIENT
Start: 2018-11-08 | End: 2019-02-04 | Stop reason: SDUPTHER

## 2018-11-08 NOTE — PROGRESS NOTES
Pt c/o back and knee pain    Assessment:  1  Lumbar radiculopathy    2  Chronic lumbar radiculopathy    3  Left hip pain    4  Myofascial pain syndrome        Plan:  Patient is a 27-year-old female with complaints of low back pain and left leg pain in the L5 and L4 nerve root distribution presents to office for follow-up visit  Hosseinjaswant Michael denies any adverse events since last office visit   MRI lumbar spine was reviewed which shows 2 disc bulges at L4-L5 and L5-S1  Patient is status post left L4-L5 and L5-S1 transforaminal epidural steroid injection performed on 10/04/18 for which she reported 30% relief  Pt notes increasing low back pain and radiating pain in the left lower extremity in the L4 NR distribution  Pt also notes pain in the left hip  1  We will order Xr left hip to assess the degenerative changes  2  We will schedule pt for a trigger point injectio to alleviate the muscle spasms in the lumbosacral area  3  We will continue gabapentin 600mg po TID for radiculopathy  4  Pt will continue physical therapy  5  We will schedule pt for a trigger point injection      Complete risks and benefits including bleeding, infection, tissue reaction, nerve injury and allergic reaction were discussed  The approach was demonstrated using models and literature was provided  Verbal and written consent was obtained  My impressions and treatment recommendations were discussed in detail with the patient who verbalized understanding and had no further questions  Discharge instructions were provided  I personally saw and examined the patient and I agree with the above discussed plan of care  No orders of the defined types were placed in this encounter  No orders of the defined types were placed in this encounter  History of Present Illness:  Vernon Barreto is a 48 y o  female who presents for a follow up office visit in regards to Back Pain and Knee Pain     The patients current symptoms include 5/10 constant sharp pain in the low back, and left hip without any particular pain pattern  Current pain medications includes:  gabapentin  The patient reports that this regimen is providing 30% pain relief  The patient is reporting no side effects from this pain medication regimen  I have personally reviewed and/or updated the patient's past medical history, past surgical history, family history, social history, current medications, allergies, and vital signs today  Review of Systems   Respiratory: Negative for shortness of breath  Cardiovascular: Negative for chest pain  Gastrointestinal: Negative for constipation, diarrhea, nausea and vomiting  Musculoskeletal: Positive for gait problem  Negative for arthralgias, joint swelling and myalgias  Skin: Negative for rash  Neurological: Negative for dizziness, seizures and weakness  All other systems reviewed and are negative        Patient Active Problem List   Diagnosis    Abdominal pain, left lower quadrant    Chronic lumbar radiculopathy    Chronic pain syndrome    Dyslipidemia    Chronic pain of right knee    Chronic right hip pain    Myofascial pain    Atrophic vaginitis    Primary osteoarthritis of both knees    Lumbar radiculopathy       Past Medical History:   Diagnosis Date    Fatigue     High cholesterol     Neuropathy     Osteoarthritis     knees       Past Surgical History:   Procedure Laterality Date    APPENDECTOMY      EPIDURAL BLOCK INJECTION Left 5/17/2018    Procedure: L4 AND L5 TRANSFORAMINAL EPIDURAL STEROID INJECTION;  Surgeon: Eufemia Marley MD;  [facility];  Service: Pain Management     EPIDURAL BLOCK INJECTION Left 10/4/2018    Procedure: L4 AND L5 TRANSFORAMINAL EPIDURAL STEROID INJECTION;  Surgeon: Eufemia Marley MD;  [facility];  Service: Pain Management     NC COLONOSCOPY FLX DX W/COLLJ SPEC WHEN PFRMD N/A 5/31/2016    Procedure: COLONOSCOPY;  Surgeon: Abiodun Doe MD;  [facility];  Service: Gastroenterology    SC ESOPHAGOGASTRODUODENOSCOPY TRANSORAL DIAGNOSTIC N/A 11/15/2016    Procedure: ESOPHAGOGASTRODUODENOSCOPY (EGD); Surgeon: Abhijeet Owen MD;  [facility];  Service: Gastroenterology       Family History   Problem Relation Age of Onset    Uterine cancer Mother        Social History     Occupational History    Not on file  Social History Main Topics    Smoking status: Never Smoker    Smokeless tobacco: Never Used    Alcohol use No    Drug use: No    Sexual activity: Not on file       Current Outpatient Prescriptions on File Prior to Visit   Medication Sig    gabapentin (NEURONTIN) 600 MG tablet Take 1 tablet (600 mg total) by mouth 3 (three) times a day for 30 days    naproxen (NAPROSYN) 500 mg tablet Take 1 tablet (500 mg total) by mouth 2 (two) times daily after meals    ranitidine (ZANTAC) 300 MG tablet Take 1 tablet (300 mg total) by mouth daily at bedtime     No current facility-administered medications on file prior to visit  No Known Allergies    Physical Exam:    There were no vitals taken for this visit  Constitutional:normal, well developed, well nourished, alert, in no distress and non-toxic and no overt pain behavior  Eyes:anicteric  HEENT:grossly intact  Neck:supple, symmetric, trachea midline and no masses   Pulmonary:even and unlabored  Cardiovascular:No edema or pitting edema present  Skin:Normal without rashes or lesions and well hydrated  Psychiatric:Mood and affect appropriate  Neurologic:Cranial Nerves II-XII grossly intact  Musculoskeletal:normal , left hip decreased range of motion with internal-external rotation and flexion    Lumbar/Sacral Spine examination demonstrates  Decreased range of motion lumbar spine with pain upon: flexion, lateral rotation to the left/right, and bending to the left/right  Bilateral lumbar paraspinals tender to palpation  Muscle spasms noted in the lumbar area bilaterally   4/5 lower extremity strength in all muscle groups bilaterally  Negative seated straight leg raise for bilateral lower extremities  Sensitivity to light touch intact bilateral lower extremities  2+ reflexes in the patella and Achilles    No ankle clonus     Imaging    1

## 2018-11-12 ENCOUNTER — HOSPITAL ENCOUNTER (OUTPATIENT)
Dept: RADIOLOGY | Facility: HOSPITAL | Age: 53
Discharge: HOME | End: 2018-11-12
Attending: ANESTHESIOLOGY
Payer: COMMERCIAL

## 2018-11-12 DIAGNOSIS — M25.552 LEFT HIP PAIN: ICD-10-CM

## 2018-11-12 PROCEDURE — 73502 X-RAY EXAM HIP UNI 2-3 VIEWS: CPT

## 2018-11-26 ENCOUNTER — TELEPHONE (OUTPATIENT)
Dept: PAIN MEDICINE | Facility: CLINIC | Age: 53
End: 2018-11-26

## 2018-11-26 NOTE — TELEPHONE ENCOUNTER
S/W Mingo-pt's son as per release of health information using  Hadley Kaur # [id number]  Advised him of the same and confirmed the pt's next appt on 12/12  He verbalized understanding and will tell his mother

## 2018-11-26 NOTE — TELEPHONE ENCOUNTER
Natalia Coleman MD  [facility]             Please call the patient regarding her abnormal result  XR Left Hip calcific trochanteric bursitis, right hip

## 2018-12-12 ENCOUNTER — PROCEDURE VISIT (OUTPATIENT)
Dept: PAIN MEDICINE | Facility: CLINIC | Age: 53
End: 2018-12-12
Payer: COMMERCIAL

## 2018-12-12 DIAGNOSIS — M76.31 ILIOTIBIAL BAND SYNDROME OF BOTH SIDES: ICD-10-CM

## 2018-12-12 DIAGNOSIS — M76.32 ILIOTIBIAL BAND SYNDROME OF BOTH SIDES: ICD-10-CM

## 2018-12-12 DIAGNOSIS — M79.18 MYOFASCIAL PAIN SYNDROME: Primary | ICD-10-CM

## 2018-12-12 PROCEDURE — 20552 NJX 1/MLT TRIGGER POINT 1/2: CPT | Performed by: ANESTHESIOLOGY

## 2018-12-12 NOTE — PROGRESS NOTES
Inj Trigger Point Single/Multiple     Date/Time 12/12/2018 8:39 AM     Performed by  Adan Fernando     Authorized by Adan Fernando       Consent: Written consent obtained  Risks and benefits: risks, benefits and alternatives were discussed  Consent given by: patient  Patient understanding: patient states understanding of the procedure being performed  Patient consent: the patient's understanding of the procedure matches consent given  Procedure consent: procedure consent matches procedure scheduled  Relevant documents: relevant documents present and verified  Test results: test results available and properly labeled  Site marked: the operative site was marked  Imaging studies: imaging studies available  Patient identity confirmed: verbally with patient  Time out: Immediately prior to procedure a "time out" was called to verify the correct patient, procedure, equipment, support staff and site/side marked as required  Preparation: Patient was prepped and draped in the usual sterile fashion  Site preparation: Chloraprep    Local anesthesia used: no     Anesthesia   Local anesthesia used: no     Sedation   Patient sedated: no      Specimen: no    Culture: no   Procedure Details   Procedure Notes: Bilateral paracervical and trapezius trigger point injection with steroid       After discussing the risks, benefits, and alternatives to the procedure, the patient expressed understanding and wished to proceed  The patient was brought to the fluoroscopy suite and placed in the prone position  Procedural pause conducted to verify:  correct patient identity, procedure to be performed and as applicable, correct side and site, correct patient position, and availability of implants, special equipment and special requirements  The appropriate hyper irritable musculoskeletal tender points were marked with a sterile pen, prepped with ChloraPrep and sterilely draped   After appropriate reproduction of pain at each of the tender points marked, a total of 10 mL of 40 mg of Depo-Medrol and 0 25% bupivacaine was injected after negative aspiration of air, CSF, heme, or other bodily fluids with a 1 5 inch 25-gauge needle  A total number of 2 muscle groups were injected  Vital signs were monitored before, during, and after the procedure and remained stable at all points  The patient was discharged with no apparent complications on their own power after an appropriate observation period in the recovery area      0 25% Bupivacaine        NDC 3594957869 EXP 11/2019 LOT [id number]  40mg/ml Depomedrol  NDC [id number] EXP 01/2019 LOT [id number]  1% lidocaine                  NDC 6106092609 EXP 02/2020 LOT [id number]      Patient Transportation: confirmed  Patient tolerance: Patient tolerated the procedure well with no immediate complications

## 2018-12-19 ENCOUNTER — EVALUATION (OUTPATIENT)
Dept: PHYSICAL THERAPY | Facility: CLINIC | Age: 53
End: 2018-12-19
Payer: COMMERCIAL

## 2018-12-19 DIAGNOSIS — M76.31 ILIOTIBIAL BAND SYNDROME OF BOTH SIDES: Primary | ICD-10-CM

## 2018-12-19 DIAGNOSIS — M76.32 ILIOTIBIAL BAND SYNDROME OF BOTH SIDES: Primary | ICD-10-CM

## 2018-12-19 PROCEDURE — 97535 SELF CARE MNGMENT TRAINING: CPT | Performed by: PHYSICAL THERAPIST

## 2018-12-19 PROCEDURE — 97010 HOT OR COLD PACKS THERAPY: CPT | Performed by: PHYSICAL THERAPIST

## 2018-12-19 PROCEDURE — G8979 MOBILITY GOAL STATUS: HCPCS | Performed by: PHYSICAL THERAPIST

## 2018-12-19 PROCEDURE — 97140 MANUAL THERAPY 1/> REGIONS: CPT | Performed by: PHYSICAL THERAPIST

## 2018-12-19 PROCEDURE — 97161 PT EVAL LOW COMPLEX 20 MIN: CPT | Performed by: PHYSICAL THERAPIST

## 2018-12-19 PROCEDURE — G8978 MOBILITY CURRENT STATUS: HCPCS | Performed by: PHYSICAL THERAPIST

## 2018-12-19 PROCEDURE — 97110 THERAPEUTIC EXERCISES: CPT | Performed by: PHYSICAL THERAPIST

## 2018-12-19 NOTE — PROGRESS NOTES
PT Evaluation     Today's date: 2018  Patient name: Niurka Chi  : 1965  MRN: [id number]  Referring provider: Miladys Ferraro MD  Dx:   Encounter Diagnosis     ICD-10-CM    1  Iliotibial band syndrome of both sides M76 31 Ambulatory referral to Physical Therapy    M76 32                   Assessment  Understanding of Dx/Px/POC: good   Prognosis: good    Goals  STGs: To be complete within 4 weeks  - Decrease/centralize pain to < 2/10 at worst  - Increase lumbar extension and Hip extension/IR ROM to WNL  - Increase bilateral hip flexor flexibility to WNL  - Increase core stability and posterior lateral LE chain strength to > 4+/5  - Improve postural awareness capacity to > 60min before deficit    LTGs: To be complete within 6 weeks  - Able to sit for any extended amount of time without pain or limitation for increased safety and functional capacity with ADLs and home-related duty  - Able to repetitively bend over at trunk without pain or limitation for increased safety and functional capacity with ADLs and and home-related duty  - Able to complete all lifting/carrying activity without pain or limitation for increased safety and functional capacity with ADLs and whome-related duty  - Able to complete transfers repetitively without pain or limitation for increased safety and functional capacity with ADLs and home-related duty    Plan  Frequency: 2x week  Duration in weeks: 4         Pt is a very pleasant 48 y o  female with chronic LBP and L LE Radicular sx, L Hip ITB syndrome and glut med tendinitis, who presents with functional deficits including decreased capacity with sitting, lifting/carrying, transfers, and bending over at the trunk  Upon completion of today's initial evaluation, Lindsey's sx remain consistent with L side post/lat lumbar derangement and L Hip instability secondary to postural dysfunction and improper functional movement mechanics   Pt will benefit from skilled physical therapy to address current deficits  Subjective Evaluation    Pain  Current pain ratin  At best pain ratin  At worst pain ratin  Location: LBP with L Hip pain           Pt reports chronic LBP with Hip dysfunction/ITB syndrome  Pt reports her sx continue to negatively impact her overall safety and functional capacity with ADLs and home-related duty  Objective Pain level ranges 2-8/10  Palpation:  Mod to severe point tenderness over L Hip flexors, glut med, TFL, ITB  AROM: Lumbar extension 50% decreased, L Hip extension and IR 50% decreased  Strength: Core stability and post/lat LE chain strength 3+/5  Postural Awareness: Poor (Ant pelvic tilt, flexed trunk)  Repeated movement testing: (+) for L side post/lat lumbar derangement  Hip flexor flexibility: Mod shortening (+) Kennedy Test  Unable sit without pain and limitation  Unable to bend over at trunk without pain and limitation  Unable to complete lifting/carrying activity without pain and limitation  Unable to complete transfers without pain and limitation        Precautions: Karina principle for L side post/lat lumbar derangement; 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, Glut Med, TFL, ITB    Daily Treatment Diary     HEP: Sheet provided and discussed    Manual  18            ART x10'            IASTM x5'            JM             Stretch             Triggerpoint                 Exercise Diary  18            Prone on elbows x10'                         Prone Press Ups 3x10            Front Wall Glides             Lateral Wall Glides             LTR             SKTC             DKTC             PB fwd trunk flx             Prone hip ext 3x10            Prone hip IR with TB 3x10 L3            Prone hip ER with Ball 3x10            Prone Hip flexor stretch              No Shoe AE Steamboats             Lateral walking              QKB             Cat Backs             On elbows hip ext                                           Modalities  18 JBP             CP Prone x12'            STIM

## 2018-12-26 ENCOUNTER — OFFICE VISIT (OUTPATIENT)
Dept: PHYSICAL THERAPY | Facility: CLINIC | Age: 53
End: 2018-12-26
Payer: COMMERCIAL

## 2018-12-26 DIAGNOSIS — M76.32 ILIOTIBIAL BAND SYNDROME OF BOTH SIDES: Primary | ICD-10-CM

## 2018-12-26 DIAGNOSIS — M76.31 ILIOTIBIAL BAND SYNDROME OF BOTH SIDES: Primary | ICD-10-CM

## 2018-12-26 PROCEDURE — 97140 MANUAL THERAPY 1/> REGIONS: CPT

## 2018-12-26 PROCEDURE — 97110 THERAPEUTIC EXERCISES: CPT

## 2018-12-26 PROCEDURE — 97010 HOT OR COLD PACKS THERAPY: CPT

## 2018-12-26 NOTE — PROGRESS NOTES
Daily Note     Today's date: 2018  Patient name: Hyacinth Davidson  : 1965  MRN: [id number]  Referring provider: Sherri Haque MD  Dx:   Encounter Diagnosis     ICD-10-CM    1  Iliotibial band syndrome of both sides M76 31     M76 32                   Subjective: Pt reports she has cont sx  Objective: See treatment diary below      Assessment: Tolerated treatment fair  Patient demonstrated fatigue post treatment  Pt with cont sx in L hip flexor/LS  Pt saman fair with some c/o sx  Pt will cont to benefit from tx to address sx and improve function  Plan: Continue per plan of care

## 2018-12-28 ENCOUNTER — OFFICE VISIT (OUTPATIENT)
Dept: PHYSICAL THERAPY | Facility: CLINIC | Age: 53
End: 2018-12-28
Payer: COMMERCIAL

## 2018-12-28 DIAGNOSIS — M76.32 ILIOTIBIAL BAND SYNDROME OF BOTH SIDES: Primary | ICD-10-CM

## 2018-12-28 DIAGNOSIS — M76.31 ILIOTIBIAL BAND SYNDROME OF BOTH SIDES: Primary | ICD-10-CM

## 2018-12-28 PROCEDURE — 97110 THERAPEUTIC EXERCISES: CPT

## 2018-12-28 PROCEDURE — 97140 MANUAL THERAPY 1/> REGIONS: CPT

## 2018-12-28 NOTE — PROGRESS NOTES
Daily Note     Today's date: 2018  Patient name: Kristie Joaquin  : 1965  MRN: [id number]  Referring provider: Newton Olguin MD  Dx:   Encounter Diagnosis     ICD-10-CM    1  Iliotibial band syndrome of both sides M76 31     M76 32                   Subjective: Pt reports her sx and improving and becoming more centralized  Objective: See treatment diary below      Assessment: Tolerated treatment well  Patient demonstrated fatigue post treatment and exhibited good technique with therapeutic exercises  Pt with overall improvement in sx with centralization of sx more proximal into LS  Pt progressing  Plan: Continue per plan of care         Precautions: Karina principle for L side post/lat lumbar derangement; 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, Glut Med, TFL, ITB     Daily Treatment Diary      HEP: Sheet provided and discussed     Manual  18                   ART x10'                     IASTM x5'                     JM                       Stretch    15'                   Triggerpoint                             Exercise Diary  18                   Prone on elbows x10'  10'                                           Prone Press Ups 3x10  3/10                   Front Wall Glides                       Lateral Wall Glides                       LTR                       SKTC                       DKTC                       PB fwd trunk flx                       Prone hip ext 3x10  3/10                   Prone hip IR with TB 3x10 L3  3/10 L3                   Prone hip ER with Ball 3x10  3/10                   Prone Hip flexor stretch                        No Shoe AE Steamboats                       Lateral walking                        QKB                       Cat Backs                       On elbows hip ext                                                                             Modalities  18                   MHP                       CP Prone x12'  10'                   STIM

## 2019-01-02 ENCOUNTER — OFFICE VISIT (OUTPATIENT)
Dept: PHYSICAL THERAPY | Facility: CLINIC | Age: 54
End: 2019-01-02
Payer: COMMERCIAL

## 2019-01-02 DIAGNOSIS — M76.31 ILIOTIBIAL BAND SYNDROME OF BOTH SIDES: Primary | ICD-10-CM

## 2019-01-02 DIAGNOSIS — M76.32 ILIOTIBIAL BAND SYNDROME OF BOTH SIDES: Primary | ICD-10-CM

## 2019-01-02 PROCEDURE — 97110 THERAPEUTIC EXERCISES: CPT

## 2019-01-02 PROCEDURE — 97140 MANUAL THERAPY 1/> REGIONS: CPT

## 2019-01-02 NOTE — PROGRESS NOTES
Daily Note     Today's date: 2019  Patient name: Mary Cox  : 1965  MRN: [id number]  Referring provider: Mary Hernandez MD  Dx:   Encounter Diagnosis     ICD-10-CM    1  Iliotibial band syndrome of both sides M76 31     M76 32                   Subjective: Pt reports she has increased pain today along LS  Objective: See treatment diary below      Assessment: Tolerated treatment fair  Patient demonstrated fatigue post treatment  Pt was limited with exercises today secondary to pain  Pt was positioned in prone ext lying for periods of time to decrease radicular sx  Pt with some relief in prone ext based position         Plan: Continue per plan of care         Precautions: Karina principle for L side post/lat lumbar derangement; 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, Glut Med, TFL, ITB     Daily Treatment Diary      HEP: Sheet provided and discussed     Manual  18                 ART x10'                     IASTM x5'                     JM                       Stretch    15'  25'                 Triggerpoint                             Exercise Diary  18                 Prone on elbows x10'  10'  10'                                         Prone Press Ups 3x10  3/10                   Front Wall Glides                       Lateral Wall Glides                       LTR                       SKTC                       DKTC                       PB fwd trunk flx                       Prone hip ext 3x10  3/10                   Prone hip IR with TB 3x10 L3  3/10 L3                   Prone hip ER with Ball 3x10  3/10                   Prone Hip flexor stretch                        No Shoe AE Steamboats                       Lateral walking                        QKB                       Cat Backs                       On elbows hip ext                                                                             Modalities  18                 MHP                       CP Prone x12'  10'  10'prone                 STIM

## 2019-01-04 ENCOUNTER — OFFICE VISIT (OUTPATIENT)
Dept: PHYSICAL THERAPY | Facility: CLINIC | Age: 54
End: 2019-01-04
Payer: COMMERCIAL

## 2019-01-04 DIAGNOSIS — M76.32 ILIOTIBIAL BAND SYNDROME OF BOTH SIDES: Primary | ICD-10-CM

## 2019-01-04 DIAGNOSIS — M76.31 ILIOTIBIAL BAND SYNDROME OF BOTH SIDES: Primary | ICD-10-CM

## 2019-01-04 PROCEDURE — 97110 THERAPEUTIC EXERCISES: CPT

## 2019-01-04 PROCEDURE — 97140 MANUAL THERAPY 1/> REGIONS: CPT

## 2019-01-04 NOTE — PROGRESS NOTES
Daily Note     Today's date: 2019  Patient name: Oliva Jim  : 1965  MRN: [id number]  Referring provider: Toribio Jamison MD  Dx:   Encounter Diagnosis     ICD-10-CM    1  Iliotibial band syndrome of both sides M76 31     M76 32                   Subjective: Reports some centralization of sx  States more hip and LS pain  Objective: See treatment diary below      Assessment: Tolerated treatment fair  Patient demonstrated fatigue post treatment  Pt demonstrated improved saman to exercise program today with ability to complete full program        Plan: Continue per plan of care       Precautions: Karina principle for L side post/lat lumbar derangement; 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, Glut Med, TFL, ITB     Daily Treatment Diary      HEP: Sheet provided and discussed     Manual  18               ART x10'                     IASTM x5'                     JM                       Stretch    15'  25'  15'               Triggerpoint                             Exercise Diary  18               Prone on elbows x10'  10'  10'  10'                                       Prone Press Ups 3x10  3/10    3/10               Front Wall Glides                       Lateral Wall Glides                       LTR                       SKTC                       DKTC                       PB fwd trunk flx                       Prone hip ext 3x10  3/10    3/10               Prone hip IR with TB 3x10 L3  3/10 L3    3/10 L3               Prone hip ER with Ball 3x10  3/10    3/10               Prone Hip flexor stretch                        No Shoe AE Steamboats                       Lateral walking                        QKB        3/5               Cat Backs                       On elbows hip ext                                                                             Modalities  18               P                     CP Prone x12'  10'  10'prone  10'               STIM

## 2019-01-05 ENCOUNTER — APPOINTMENT (EMERGENCY)
Dept: CT IMAGING | Facility: HOSPITAL | Age: 54
End: 2019-01-05
Payer: COMMERCIAL

## 2019-01-05 ENCOUNTER — HOSPITAL ENCOUNTER (EMERGENCY)
Facility: HOSPITAL | Age: 54
Discharge: HOME | End: 2019-01-05
Attending: EMERGENCY MEDICINE | Admitting: EMERGENCY MEDICINE
Payer: COMMERCIAL

## 2019-01-05 VITALS
RESPIRATION RATE: 18 BRPM | TEMPERATURE: 98.7 F | BODY MASS INDEX: 27.83 KG/M2 | HEART RATE: 55 BPM | DIASTOLIC BLOOD PRESSURE: 76 MMHG | OXYGEN SATURATION: 96 % | SYSTOLIC BLOOD PRESSURE: 165 MMHG | WEIGHT: 137.79 LBS

## 2019-01-05 DIAGNOSIS — R03.0 ELEVATED BLOOD PRESSURE READING: ICD-10-CM

## 2019-01-05 DIAGNOSIS — R10.9 ABDOMINAL PAIN: ICD-10-CM

## 2019-01-05 DIAGNOSIS — R51.9 HEADACHE: Primary | ICD-10-CM

## 2019-01-05 LAB
ALBUMIN SERPL BCP-MCNC: 3.9 G/DL (ref 3.5–5)
ALP SERPL-CCNC: 87 U/L (ref 46–116)
ALT SERPL W P-5'-P-CCNC: 41 U/L (ref 12–78)
ANION GAP SERPL CALCULATED.3IONS-SCNC: 7 MMOL/L (ref 4–13)
AST SERPL W P-5'-P-CCNC: 25 U/L (ref 5–45)
ATRIAL RATE: 54 BPM
BASOPHILS # BLD AUTO: 0.03 THOUSANDS/ΜL (ref 0–0.1)
BASOPHILS NFR BLD AUTO: 0 % (ref 0–1)
BILIRUB SERPL-MCNC: 1 MG/DL (ref 0.2–1)
BUN SERPL-MCNC: 14 MG/DL (ref 5–25)
CALCIUM SERPL-MCNC: 9.1 MG/DL (ref 8.3–10.1)
CHLORIDE SERPL-SCNC: 104 MMOL/L (ref 100–108)
CO2 SERPL-SCNC: 30 MMOL/L (ref 21–32)
CREAT SERPL-MCNC: 0.86 MG/DL (ref 0.6–1.3)
EOSINOPHIL # BLD AUTO: 0.09 THOUSAND/ΜL (ref 0–0.61)
EOSINOPHIL NFR BLD AUTO: 1 % (ref 0–6)
ERYTHROCYTE [DISTWIDTH] IN BLOOD BY AUTOMATED COUNT: 12.3 % (ref 11.6–15.1)
GFR SERPL CREATININE-BSD FRML MDRD: 77 ML/MIN/1.73SQ M
GLUCOSE SERPL-MCNC: 94 MG/DL (ref 65–140)
HCT VFR BLD AUTO: 37.1 % (ref 34.8–46.1)
HGB BLD-MCNC: 12.5 G/DL (ref 11.5–15.4)
IMM GRANULOCYTES # BLD AUTO: 0.02 THOUSAND/UL (ref 0–0.2)
IMM GRANULOCYTES NFR BLD AUTO: 0 % (ref 0–2)
LIPASE SERPL-CCNC: 114 U/L (ref 73–393)
LYMPHOCYTES # BLD AUTO: 1.87 THOUSANDS/ΜL (ref 0.6–4.47)
LYMPHOCYTES NFR BLD AUTO: 27 % (ref 14–44)
MAGNESIUM SERPL-MCNC: 2.1 MG/DL (ref 1.6–2.6)
MCH RBC QN AUTO: 30.6 PG (ref 26.8–34.3)
MCHC RBC AUTO-ENTMCNC: 33.7 G/DL (ref 31.4–37.4)
MCV RBC AUTO: 91 FL (ref 82–98)
MONOCYTES # BLD AUTO: 0.4 THOUSAND/ΜL (ref 0.17–1.22)
MONOCYTES NFR BLD AUTO: 6 % (ref 4–12)
NEUTROPHILS # BLD AUTO: 4.56 THOUSANDS/ΜL (ref 1.85–7.62)
NEUTS SEG NFR BLD AUTO: 66 % (ref 43–75)
NRBC BLD AUTO-RTO: 0 /100 WBCS
P AXIS: -17 DEGREES
PLATELET # BLD AUTO: 210 THOUSANDS/UL (ref 149–390)
PMV BLD AUTO: 9.8 FL (ref 8.9–12.7)
POTASSIUM SERPL-SCNC: 4.3 MMOL/L (ref 3.5–5.3)
PR INTERVAL: 144 MS
PROT SERPL-MCNC: 7.4 G/DL (ref 6.4–8.2)
QRS AXIS: -16 DEGREES
QRSD INTERVAL: 116 MS
QT INTERVAL: 500 MS
QTC INTERVAL: 474 MS
RBC # BLD AUTO: 4.08 MILLION/UL (ref 3.81–5.12)
SODIUM SERPL-SCNC: 141 MMOL/L (ref 136–145)
T WAVE AXIS: 12 DEGREES
TROPONIN I SERPL-MCNC: <0.02 NG/ML
VENTRICULAR RATE: 54 BPM
WBC # BLD AUTO: 6.97 THOUSAND/UL (ref 4.31–10.16)

## 2019-01-05 PROCEDURE — 96375 TX/PRO/DX INJ NEW DRUG ADDON: CPT

## 2019-01-05 PROCEDURE — 84484 ASSAY OF TROPONIN QUANT: CPT | Performed by: EMERGENCY MEDICINE

## 2019-01-05 PROCEDURE — 85025 COMPLETE CBC W/AUTO DIFF WBC: CPT | Performed by: EMERGENCY MEDICINE

## 2019-01-05 PROCEDURE — 93005 ELECTROCARDIOGRAM TRACING: CPT

## 2019-01-05 PROCEDURE — 83690 ASSAY OF LIPASE: CPT | Performed by: EMERGENCY MEDICINE

## 2019-01-05 PROCEDURE — 83735 ASSAY OF MAGNESIUM: CPT | Performed by: EMERGENCY MEDICINE

## 2019-01-05 PROCEDURE — 80053 COMPREHEN METABOLIC PANEL: CPT | Performed by: EMERGENCY MEDICINE

## 2019-01-05 PROCEDURE — 96365 THER/PROPH/DIAG IV INF INIT: CPT

## 2019-01-05 PROCEDURE — 99284 EMERGENCY DEPT VISIT MOD MDM: CPT

## 2019-01-05 PROCEDURE — 70450 CT HEAD/BRAIN W/O DYE: CPT

## 2019-01-05 PROCEDURE — 36415 COLL VENOUS BLD VENIPUNCTURE: CPT | Performed by: EMERGENCY MEDICINE

## 2019-01-05 PROCEDURE — 96366 THER/PROPH/DIAG IV INF ADDON: CPT

## 2019-01-05 PROCEDURE — 93010 ELECTROCARDIOGRAM REPORT: CPT | Performed by: INTERNAL MEDICINE

## 2019-01-05 RX ORDER — METOCLOPRAMIDE HYDROCHLORIDE 5 MG/ML
10 INJECTION INTRAMUSCULAR; INTRAVENOUS ONCE
Status: COMPLETED | OUTPATIENT
Start: 2019-01-05 | End: 2019-01-05

## 2019-01-05 RX ORDER — DIPHENHYDRAMINE HYDROCHLORIDE 50 MG/ML
25 INJECTION INTRAMUSCULAR; INTRAVENOUS ONCE
Status: COMPLETED | OUTPATIENT
Start: 2019-01-05 | End: 2019-01-05

## 2019-01-05 RX ORDER — BUTALBITAL, ACETAMINOPHEN AND CAFFEINE 50; 325; 40 MG/1; MG/1; MG/1
1 TABLET ORAL ONCE
Status: COMPLETED | OUTPATIENT
Start: 2019-01-05 | End: 2019-01-05

## 2019-01-05 RX ORDER — KETOROLAC TROMETHAMINE 30 MG/ML
30 INJECTION, SOLUTION INTRAMUSCULAR; INTRAVENOUS ONCE
Status: COMPLETED | OUTPATIENT
Start: 2019-01-05 | End: 2019-01-05

## 2019-01-05 RX ORDER — ACETAMINOPHEN 325 MG/1
650 TABLET ORAL ONCE
Status: COMPLETED | OUTPATIENT
Start: 2019-01-05 | End: 2019-01-05

## 2019-01-05 RX ORDER — BUTALBITAL, ACETAMINOPHEN AND CAFFEINE 50; 325; 40 MG/1; MG/1; MG/1
1 TABLET ORAL EVERY 6 HOURS PRN
Qty: 10 TABLET | Refills: 0 | Status: SHIPPED | OUTPATIENT
Start: 2019-01-05 | End: 2019-01-07

## 2019-01-05 RX ORDER — MAGNESIUM SULFATE HEPTAHYDRATE 40 MG/ML
2 INJECTION, SOLUTION INTRAVENOUS ONCE
Status: COMPLETED | OUTPATIENT
Start: 2019-01-05 | End: 2019-01-05

## 2019-01-05 RX ADMIN — BUTALBITAL, ACETAMINOPHEN, AND CAFFEINE 1 TABLET: 50; 325; 40 TABLET ORAL at 15:01

## 2019-01-05 RX ADMIN — METOCLOPRAMIDE 10 MG: 5 INJECTION, SOLUTION INTRAMUSCULAR; INTRAVENOUS at 12:44

## 2019-01-05 RX ADMIN — MAGNESIUM SULFATE HEPTAHYDRATE 2 G: 40 INJECTION, SOLUTION INTRAVENOUS at 12:48

## 2019-01-05 RX ADMIN — KETOROLAC TROMETHAMINE 30 MG: 30 INJECTION, SOLUTION INTRAMUSCULAR at 13:57

## 2019-01-05 RX ADMIN — ACETAMINOPHEN 650 MG: 325 TABLET, FILM COATED ORAL at 12:39

## 2019-01-05 RX ADMIN — SODIUM CHLORIDE 1000 ML: 0.9 INJECTION, SOLUTION INTRAVENOUS at 12:39

## 2019-01-05 RX ADMIN — DIPHENHYDRAMINE HYDROCHLORIDE 25 MG: 50 INJECTION, SOLUTION INTRAMUSCULAR; INTRAVENOUS at 12:41

## 2019-01-05 NOTE — ED PROVIDER NOTES
History  Chief Complaint   Patient presents with    Headache     headache for 1 week  with vomiting and photosensitivity   Abdominal Pain     for 1 week with headache  Patient is a 80-year-old Salvadorean-speaking female here with daughter who translates  Patient comes in for headache that has been ongoing for 1 week  Patient describes a constant dull throbbing sensation  She did not take any medications for this  She is photophobic  She has vomited twice that is nonbilious nonbloody in nature  She also started with epigastric discomfort over the past week  States that she is unable to eat due to the headache not due to abdominal pain  She has no fevers, visual changes, tinnitus, difficulty swallowing  She has no diarrhea oral melena or bright red blood per rectum  She had no trauma to the head or abdomen  She has no chest pain or shortness of breath  She has no palpitations or syncope  She has no weakness throughout the bilateral upper and lower extremity  She denies any paresthesias throughout the bilateral upper lower extremities  She denies any neck pain    She has no recent travel, sick contacts or recent antibiotic use        History provided by:  Relative and patient   used: Yes (Salvadorean)    Headache   Pain location:  Generalized  Quality:  Dull (Throbbing)  Radiates to:  Does not radiate  Onset quality:  Gradual  Duration:  1 week  Timing:  Constant  Progression:  Unchanged  Chronicity:  New  Context: not activity, not exposure to bright light, not caffeine, not coughing, not defecating, not eating, not stress, not exposure to cold air, not intercourse, not loud noise and not straining    Relieved by:  None tried  Worsened by:  Nothing  Ineffective treatments:  None tried  Associated symptoms: abdominal pain, nausea, photophobia and vomiting    Associated symptoms: no back pain, no blurred vision, no congestion, no cough, no diarrhea, no dizziness, no drainage, no ear pain, no eye pain, no facial pain, no fatigue, no fever, no focal weakness, no hearing loss, no loss of balance, no myalgias, no near-syncope, no neck pain, no neck stiffness, no numbness, no paresthesias, no seizures, no sinus pressure, no sore throat, no swollen glands, no syncope, no tingling, no URI, no visual change and no weakness    Nausea:     Severity:  Mild    Onset quality:  Gradual    Duration:  1 week    Timing:  Intermittent    Progression:  Waxing and waning  Vomiting:     Quality:  Unable to specify    Severity:  Mild    Timing:  Intermittent    Progression:  Resolved  Risk factors: no anger, no family hx of SAH, does not have insomnia and lifestyle not sedentary    Abdominal Pain   Pain location:  Epigastric  Pain quality: aching    Pain radiates to:  Does not radiate  Pain severity:  Mild  Onset quality:  Gradual  Duration:  1 week  Timing:  Constant  Progression:  Unchanged  Chronicity:  New  Context: not alcohol use, not awakening from sleep, not diet changes, not eating, not laxative use, not medication withdrawal, not previous surgeries, not recent illness, not recent sexual activity, not recent travel, not retching, not sick contacts, not suspicious food intake and not trauma    Relieved by:  None tried  Worsened by:  Nothing  Ineffective treatments:  None tried  Associated symptoms: nausea and vomiting    Associated symptoms: no anorexia, no belching, no chest pain, no chills, no constipation, no cough, no diarrhea, no dysuria, no fatigue, no fever, no flatus, no hematemesis, no hematochezia, no hematuria, no melena, no shortness of breath, no sore throat, no vaginal bleeding and no vaginal discharge    Risk factors: no alcohol abuse, no aspirin use, not elderly, has not had multiple surgeries, no NSAID use, not obese, not pregnant and no recent hospitalization        Prior to Admission Medications   Prescriptions Last Dose Informant Patient Reported?  Taking?   gabapentin (NEURONTIN) 600 MG tablet   No Yes   Sig: Take 1 tablet (600 mg total) by mouth 3 (three) times a day for 30 days      Facility-Administered Medications: None       Past Medical History:   Diagnosis Date    Fatigue     High cholesterol     Neuropathy     Osteoarthritis     knees       Past Surgical History:   Procedure Laterality Date    APPENDECTOMY      EPIDURAL BLOCK INJECTION Left 5/17/2018    Procedure: L4 AND L5 TRANSFORAMINAL EPIDURAL STEROID INJECTION;  Surgeon: Leeann Watkins MD;  Location: [facility];  Service: Pain Management     EPIDURAL BLOCK INJECTION Left 10/4/2018    Procedure: L4 AND L5 TRANSFORAMINAL EPIDURAL STEROID INJECTION;  Surgeon: Leeann Watkins MD;  Location: [facility];  Service: Pain Management     WI COLONOSCOPY FLX DX W/COLLJ SPEC WHEN PFRMD N/A 5/31/2016    Procedure: COLONOSCOPY;  Surgeon: Paulette Toussaint MD;  Location: [facility];  Service: Gastroenterology    WI ESOPHAGOGASTRODUODENOSCOPY TRANSORAL DIAGNOSTIC N/A 11/15/2016    Procedure: ESOPHAGOGASTRODUODENOSCOPY (EGD); Surgeon: Paulette Toussaint MD;  Location: [facility];  Service: Gastroenterology       Family History   Problem Relation Age of Onset    Uterine cancer Mother      I have reviewed and agree with the history as documented  Social History   Substance Use Topics    Smoking status: Never Smoker    Smokeless tobacco: Never Used    Alcohol use No        Review of Systems   Constitutional: Negative for chills, diaphoresis, fatigue and fever  HENT: Negative for congestion, ear pain, hearing loss, postnasal drip, sinus pressure and sore throat  Eyes: Positive for photophobia  Negative for blurred vision, pain and visual disturbance  Respiratory: Negative for cough, chest tightness and shortness of breath  Cardiovascular: Negative for chest pain, palpitations, syncope and near-syncope  Gastrointestinal: Positive for abdominal pain, nausea and vomiting   Negative for anorexia, constipation, diarrhea, flatus, hematemesis, hematochezia and melena  Genitourinary: Negative for difficulty urinating, dysuria, hematuria, vaginal bleeding and vaginal discharge  Musculoskeletal: Negative for back pain, myalgias, neck pain and neck stiffness  Skin: Negative for rash  Neurological: Positive for headaches  Negative for dizziness, focal weakness, seizures, weakness, numbness, paresthesias and loss of balance  Psychiatric/Behavioral: Negative for confusion  All other systems reviewed and are negative  Physical Exam  Physical Exam   Constitutional: She is oriented to person, place, and time  She appears well-developed and well-nourished  No distress  HENT:   Head: Normocephalic and atraumatic  Mouth/Throat: Oropharynx is clear and moist    Eyes: Pupils are equal, round, and reactive to light  Conjunctivae and EOM are normal    Neck: Normal range of motion  Neck supple  Cardiovascular: Normal rate, regular rhythm, normal heart sounds and intact distal pulses  No murmur heard  Pulmonary/Chest: Effort normal and breath sounds normal  No stridor  No respiratory distress  Abdominal: Soft  Bowel sounds are normal  She exhibits no distension  There is no tenderness  Musculoskeletal: Normal range of motion  She exhibits no edema  Neurological: She is alert and oriented to person, place, and time  She has normal strength  She displays no atrophy and normal reflexes  No cranial nerve deficit or sensory deficit  She exhibits normal muscle tone  Coordination and gait normal  GCS eye subscore is 4  GCS verbal subscore is 5  GCS motor subscore is 6  Reflex Scores:       Patellar reflexes are 2+ on the right side and 2+ on the left side  Achilles reflexes are 2+ on the right side and 2+ on the left side  Negative pronator drift  Skin: Skin is warm  Capillary refill takes less than 2 seconds  She is not diaphoretic  Nursing note and vitals reviewed        Vital Signs  ED Triage Vitals [01/05/19 1126] Temperature Pulse Respirations Blood Pressure SpO2   98 7 °F (37 1 °C) 63 12 165/76 95 %      Temp Source Heart Rate Source Patient Position - Orthostatic VS BP Location FiO2 (%)   Temporal Monitor Lying Right arm --      Pain Score       8           Vitals:    01/05/19 1126   BP: 165/76   Pulse: 63   Patient Position - Orthostatic VS: Lying       Visual Acuity      ED Medications  Medications   magnesium sulfate 2 g/50 mL IVPB (premix) 2 g (2 g Intravenous New Bag 1/5/19 1248)   butalbital-acetaminophen-caffeine (FIORICET,ESGIC) -40 mg per tablet 1 tablet (not administered)   sodium chloride 0 9 % bolus 1,000 mL (1,000 mL Intravenous New Bag 1/5/19 1239)   acetaminophen (TYLENOL) tablet 650 mg (650 mg Oral Given 1/5/19 1239)   metoclopramide (REGLAN) injection 10 mg (10 mg Intravenous Given 1/5/19 1244)   diphenhydrAMINE (BENADRYL) injection 25 mg (25 mg Intravenous Given 1/5/19 1241)   ketorolac (TORADOL) injection 30 mg (30 mg Intravenous Given 1/5/19 1357)       Diagnostic Studies  Results Reviewed     Procedure Component Value Units Date/Time    Troponin I [[id number]]  (Normal) Collected:  01/05/19 1213    Lab Status:  Final result Specimen:  Blood from Arm, Right Updated:  01/05/19 1241     Troponin I <0 02 ng/mL     Comprehensive metabolic panel [[id number]] Collected:  01/05/19 1213    Lab Status:  Final result Specimen:  Blood from Arm, Right Updated:  01/05/19 1236     Sodium 141 mmol/L      Potassium 4 3 mmol/L      Chloride 104 mmol/L      CO2 30 mmol/L      ANION GAP 7 mmol/L      BUN 14 mg/dL      Creatinine 0 86 mg/dL      Glucose 94 mg/dL      Calcium 9 1 mg/dL      AST 25 U/L      ALT 41 U/L      Alkaline Phosphatase 87 U/L      Total Protein 7 4 g/dL      Albumin 3 9 g/dL      Total Bilirubin 1 00 mg/dL      eGFR 77 ml/min/1 73sq m     Narrative:         National Kidney Disease Education Program recommendations are as follows:  GFR calculation is accurate only with a steady state creatinine  Chronic Kidney disease less than 60 ml/min/1 73 sq  meters  Kidney failure less than 15 ml/min/1 73 sq  meters  Magnesium [[id number]]  (Normal) Collected:  01/05/19 1213    Lab Status:  Final result Specimen:  Blood from Arm, Right Updated:  01/05/19 1236     Magnesium 2 1 mg/dL     Lipase [[id number]]  (Normal) Collected:  01/05/19 1213    Lab Status:  Final result Specimen:  Blood from Arm, Right Updated:  01/05/19 1236     Lipase 114 u/L     CBC and differential [[id number]] Collected:  01/05/19 1213    Lab Status:  Final result Specimen:  Blood from Arm, Right Updated:  01/05/19 1220     WBC 6 97 Thousand/uL      RBC 4 08 Million/uL      Hemoglobin 12 5 g/dL      Hematocrit 37 1 %      MCV 91 fL      MCH 30 6 pg      MCHC 33 7 g/dL      RDW 12 3 %      MPV 9 8 fL      Platelets 934 Thousands/uL      nRBC 0 /100 WBCs      Neutrophils Relative 66 %      Immat GRANS % 0 %      Lymphocytes Relative 27 %      Monocytes Relative 6 %      Eosinophils Relative 1 %      Basophils Relative 0 %      Neutrophils Absolute 4 56 Thousands/µL      Immature Grans Absolute 0 02 Thousand/uL      Lymphocytes Absolute 1 87 Thousands/µL      Monocytes Absolute 0 40 Thousand/µL      Eosinophils Absolute 0 09 Thousand/µL      Basophils Absolute 0 03 Thousands/µL     UA w Reflex to Microscopic [[id number]]     Lab Status:  No result Specimen:  Urine                  CT head without contrast   Final Result by Jordana Chester MD (01/05 1239)      No acute intracranial abnormality  Diffuse sinus mucosal thickening  Could sinus disease account for the patient's headache?             Workstation performed: [id number]                    Procedures  Procedures       Phone Contacts  ED Phone Contact    ED Course         HEART Risk Score      Most Recent Value   History  0 Filed at: 01/05/2019 1311   ECG  1 Filed at: 01/05/2019 1311   Age  1 Filed at: 01/05/2019 1311   Risk Factors  1 Filed at: 01/05/2019 1311   Troponin  0 Filed at: 01/05/2019 1311   Heart Score Risk Calculator   History  0 Filed at: 01/05/2019 1311   ECG  1 Filed at: 01/05/2019 1311   Age  1 Filed at: 01/05/2019 1311   Risk Factors  1 Filed at: 01/05/2019 1311   Troponin  0 Filed at: 01/05/2019 1311   HEART Score  3 Filed at: 01/05/2019 1311   HEART Score  3 Filed at: 01/05/2019 1311                Stroke Assessment     Row Name 01/05/19 1135             NIH Stroke Scale    Interval Baseline      Level of Consciousness (1a ) 0      LOC Questions (1b ) 0      LOC Commands (1c ) 0      Best Gaze (2 ) 0      Visual (3 ) 0      Facial Palsy (4 ) 0      Motor Arm, Left (5a ) 0      Motor Arm, Right (5b ) 0      Motor Leg, Left (6a ) 0      Motor Leg, Right (6b ) 0      Limb Ataxia (7 ) 0      Sensory (8 ) 0      Best Language (9 ) 0      Dysarthria (10 ) 0      Extinction and Inattention (11 ) (Formerly Neglect) 0      Total 0                First Filed Value   TPA Decision  Patient not a TPA candidate  Patient is not a candidate options  comment [NIH 0 and greater than 6 hours]                        MDM  Number of Diagnoses or Management Options  Abdominal pain:   Elevated blood pressure reading:   Headache:   Diagnosis management comments:   Patient is a 60-year-old female coming in today with headaches and abdominal pain  On exam she is neurologically intact and no peritoneal signs  Will obtain labs, troponin, EKG as well as CT head as patient is mildly elevated blood pressure here in the emergency department  Will also give medications such as Mag, Reglan Benadryl as well as a seated benefit in for pain control until CT head resulted  EKG INTERPRETATION at 1233  RHYTHM:  Bradycardic at 50 beats per minute  AXIS:  Normal axis  INTERVALS:  FL interval measured at 144 milliseconds  QRS COMPLEX:  Right bundle branch block  QRS measured at 116 milliseconds  ST SEGMENT:  Nonspecific ST segment changes  Low-voltage  Diffuse flattening of T-waves    Poor R-wave progression  QT INTERVAL:  QTC measured at 474 millisecond  COMPARED WITH PRIOR no acute change from 2016  Interpretation by Ezekiel Enriquez, DO    Differential diagnosis includes but not limited to:  AAA, mesenteric ischemia, pancreatitis, cholecystitis, choledocholithiasis, hepatitis, bowel obstruction, ileus, gastroenteritis, colitis, malignancy, AAA, perforation, toxicologic poisoning, renal infarct, acute kidney injury, splenic infarct, splenic injury, nephrolithiasis, UTI, muscular strain, intra-abdominal hematoma    1:17 PM  Patient resting in bed  Per daughter patient has marked relief from pain  She has sleep and asking for another blanket  Reviewed CT and labs of patient  No evidence of ACS, end-organ damage, electrolyte dysfunction, or abnormality of CT of the brain  Will give dose of Toradol and reassess    2:28 PM  With use of , patient's pain is down to a 2/10  Discussed with them that given the length of time we cannot completely resolved patient's headache  She remains NIH 0 neurologically intact with no focal deficits  Will give Fioricet and discharged home with Fioricet  Instructed to follow up with PCP  Return to ER instructions given as well  Portions of the record may have been created with voice recognition software  Occasional wrong word or "sound a like" substitutions may have occurred due to the inherent limitations of voice recognition software  Read the chart carefully and recognize, using context, where substitutions have occurred           Amount and/or Complexity of Data Reviewed  Clinical lab tests: ordered and reviewed  Tests in the radiology section of CPT®: ordered and reviewed  Tests in the medicine section of CPT®: ordered and reviewed  Independent visualization of images, tracings, or specimens: yes      CritCare Time    Disposition  Final diagnoses:   Headache   Elevated blood pressure reading   Abdominal pain     Time reflects when diagnosis was documented in both MDM as applicable and the Disposition within this note     Time User Action Codes Description Comment    1/5/2019  2:28 PM Alexandra Parry Add [R51] Headache     1/5/2019  2:29 PM Shanna Viramontes Add [R03 0] Elevated blood pressure reading     1/5/2019  2:29 PM Ana M Oliverio Ezra Add [R10 9] Abdominal pain       ED Disposition     ED Disposition Condition Comment    Discharge  [facility] discharge to home/self care  Condition at discharge: Stable        Follow-up Information     Follow up With Specialties Details Why Contact Info    Sue Holman PA-C Family Medicine, Physician Assistant Schedule an appointment as soon as possible for a visit in 2 days  [address] [place] [id number] [phone]            Patient's Medications   Discharge Prescriptions    BUTALBITAL-ACETAMINOPHEN-CAFFEINE (FIORICET,ESGIC) -40 MG PER TABLET    Take 1 tablet by mouth every 6 (six) hours as needed for headaches for up to 2 days       Start Date: 1/5/2019  End Date: 1/7/2019       Order Dose: 1 tablet       Quantity: 10 tablet    Refills: 0     No discharge procedures on file      ED Provider  Electronically Signed by           Francis William DO  01/05/19 [id number]

## 2019-01-05 NOTE — DISCHARGE INSTRUCTIONS
Dolor abdominal, cuidados ambulatorios   INFORMACIÓN GENERAL:   El dolor abdominal  puede ser sordo, molesto o [place]  Puede sentir dolor en haydee stuart del abdomen o en todo el abdomen  El dolor puede deberse a ciertos estados yolis estreñimiento, sensibilidad o intoxicación alimentaria, infección o haydee obstrucción  Asimismo, el dolor abdominal puede deberse a haydee hernia, apendicitis o úlcera  La causa del dolor abdominal puede ser desconocida  Busque cuidados inmediatos para los siguientes síntomas:   · Bruno dolor de pecho o falta de aliento    · Dolor pulsátil en el abdomen superior o en la parte inferior de la espalda que de repente es angelica    · Dolor que se localiza en el abdomen inferior derecho y empeora con el movimiento    · Fiebre por encima de 100 4°F (38°C) o escalofríos pablito    · Vómito de todo lo que usted come y lynda    · Dolor que no mejora o más oanh empeora linda las siguientes 8 a 12 horas    · Josep en el vómito o heces que se isael negras y alquitranadas    · La piel o el leiva de los ojos se vuelven amarillentos    · Grandes cantidades de sangrado vaginal que no es talbert periodo menstrual  El tratamiento para el dolor abdominal  puede llegar a incluir medicamentos para calmar talbert estómago, prevenir el vómito o disminuir el dolor  Programe haydee megan con talbert proveedor de [employer] se le haya indicado: Anote catherine preguntas para que se acuerde de hacerlas linda catherine visitas  ACUERDOS SOBRE TALBERT CUIDADO:   Usted tiene el derecho de participar en la planificación de talbert cuidado  Aprenda todo lo que pueda sobre talbert condición y yolis darle tratamiento  Discuta con catherine médicos catherine opciones de tratamiento para juntos decidir el cuidado que usted quiere recibir  Usted siempre tiene el derecho a rechazar talbert tratamiento  Esta información es sólo para uso en educación  Talbert intención no es darle un consejo médico sobre enfermedades o tratamientos   Colsulte con talbert Irma Elkin farmacéutico antes de seguir cualquier régimen médico para saber si es seguro y efectivo para usted  © 2014 [address] is for End User's use only and may not be sold, redistributed or otherwise used for commercial purposes  All illustrations and images included in CareNotes® are the copyrighted property of A D A M , Inc  or Robert Thornton  Dolor de trevin randal   LO QUE NECESITA SABER:   El dolor de [place] rnadal es un dolor o molestia que comienza de henna y MELVA rápidamente  Usted puede tener un dolor de trevin randal sólo cuando siente estrés o come ciertos alimentos  Otro tipo dolor de trevin randal puede producirse todos los días y a veces varias veces al día  INSTRUCCIONES SOBRE EL MAGGIE HOSPITALARIA:   Busque atención médica de inmediato si:   · Usted tiene dolor intenso  · Usted tiene entumecimiento en un lado de del valle radha o cuerpo  · Usted tiene un dolor de trevin que ocurre después de un golpe en la trevin, haydee caída u otro trauma  · Tiene dolor de [place], está olvidadizo o confundido o tiene dificultad para hablar  · Tiene dolor de [place], rigidez en el shante y [place]  Pregúntele a del valle Ardie Giovanni vitaminas y minerales son adecuados para usted  · Usted tiene un dolor de trevin angelica y está vomitando  · Usted tiene dolor de trevin todos los días y no se [place] aun después de tratarlo  · Ludy nichole de [facility] u ocurren nuevos síntomas cuando tiene dolor de [place]  · Usted tiene preguntas o inquietudes acerca de del valle condición o cuidado  Medicamentos:  Es posible que usted necesite alguno de los siguientes:  · Un medicamento con receta para el dolor  podrían ser Particia [place]  El medicamento que recomienda del valle médico dependerá del tipo de dolor de trevin que tenga  Usted necesitará dmitri medicamentos para el dolor de trevin según las indicaciones para evitar un problema llamado dolor de trevin de rebote   Estos nichole de [place] ocurren con el uso regular de analgésicos para los trastornos de dolor de [place]  · AINEs (Analgésicos antiinflamatorios no esteroides) yolis el ibuprofeno, ayudan a disminuir la inflamación, el dolor y la [place]  Nemo medicamento esta disponible con o sin haydee receta médica  Los AINEs pueden causar sangrado estomacal o problemas renales en ciertas personas  Si usted lynda un medicamento anticoagulante, siempre pregúntele a del valle médico si los FANTASMA son seguros para usted  Siempre rhea la etiqueta de nemo medicamento y [place] instrucciones  · El acetaminofén  [place] el dolor y baja la fiebre  Está disponible sin receta médica  Pregunte la cantidad y la frecuencia con que debe tomarlos  [address]  Rhea las etiquetas de todos los demás medicamentos que esté usando para saber si también contienen acetaminofén, o pregunte a del valle médico o farmacéutico  El acetaminofén puede causar daño en el hígado cuando no se lynda de forma correcta  No use más de 3 gramos (3,000 miligramos) en total de acetaminofeno en un día  · Antidepresivos  se pueden administrar para algunos tipos de nichole de [place]  · [place] catherine medicamentos yolis se le haya indicado  Consulte con del valle médico si usted kayla que del valle medicamento no le está ayudando o si presenta efectos secundarios  Infórmele si es alérgico a cualquier medicamento  Mantenga haydee lista actualizada de los [employer], las vitaminas y los productos herbales que lynda  Incluya los siguientes datos de los medicamentos: cantidad, frecuencia y motivo de administración  Traiga con usted la lista o los envases de la píldoras a catherine citas de seguimiento  Lleve la lista de los medicamentos con usted en farhad de haydee emergencia  El manejo de del valle síntomas:   · Aplique hielo o calor  en la stuart donde del valle hijo siente el dolor de trevin  Utilice un paquete (compresa) de hielo o calor  Para un paquete de hielo, también puede colocar hielo molido en haydee bolsa plástica   Cubra el paquete de hielo o la bolsa con Corey Alexandre pequeña antes de aplicarla en la piel  Tanto el hielo yolis el calor ayudan a reducir el dolor, y el calor también contribuye a reducir los C [employer]  Aplique calor linda 20 a 30 minutos cada 2 horas  Aplique hielo linda 15 a 20 minutos cada hora  Aplique calor o hielo linda el tiempo y la cantidad de días que se le indique  Usted puede alternar el calor y el hielo  · Relaje catherine músculos  Acuéstese en haydee posición cómoda y cierre catherine ojos  Relaje catherine músculos lentamente  Comience por los dedos de los pies y avance hacia arriba al meño de del valle cuerpo  · Registre en un diario catherine nichole de [place]  Escriba cuándo comienzan y terminan catherine migrañas  Patrisha Powell y qué estaba haciendo cuando comenzó la migraña  Registre lo que comió y lo que tomó las 24 horas previas al comienzo de del valle migraña  Everlean Rinks dolor y dónde le duele: Lleve un registro de lo que hizo para tratar del valle [place] y si obtuvo un resultado satisfactorio  Cómo prevenir un dolor de trevin randal:   · Evite cualquier cosa que provoque un dolor de trevin randal  Los ejemplos incluyen la exposición a sustancias químicas, las grandes altitudes o no dormir lo suficiente  Nia haydee rutina para dormir  Acuéstese y [employer] días a la misma hora  No utilice aparatos electrónicos antes de acostarse  Pueden provocarle un dolor de trevin o impedirle dormir oanh  · No fume  La nicotina y otras sustancias químicas en los cigarrillos y puros pueden desencadenar un dolor de trevin randal o [place]  Pida información a del valle médico si usted actualmente fuma y necesita ayuda para dejar de fumar  Los cigarrillos electrónicos o tabaco sin humo todavía contienen nicotina  Consulte con del valle médico antes de [employer]  · Limite el consumo de alcohol según le indicaron  El alcohol puede provocar un dolor de trevin randal o empeorarlo  Si usted tiene nichole de [place] de racimo, no kenyon alcohol linda un episodio   Para otros tipos de nichole de [place], pregúntele a jane proveedor de atención médica si es seguro para usted beber alcohol  Pregunte cuál es la cantidad ramos que puede beber y con qué frecuencia  · Ejercítese según indicaciones  El ejercicio puede reducir la tensión y [place] a aliviar el dolor de [place]  Propóngase hacer 30 minutos de [place] física kimberly todos los días de la [place]  Jane médico puede ayudarle a crear un plan de ejercicios  · Consuma alimentos saludables y variados  [address] frutas, verduras, productos lácteos bajos en grasa, malathi [place], pescado y frijoles cocidos  Jane médico o dietista puede ayudarle a crear planes de comidas si desea evitar los alimentos que provocan nichole de [place]  Acuda a catherine consultas de control con jane médico según le indicaron  Traiga jane registro de nichole de trevin con usted cuando visite a jane médico  Anote catherine preguntas para que se acuerde de hacerlas linda catherine visitas  © 2017 [address] Information is for End User's use only and may not be sold, redistributed or otherwise used for commercial purposes  All illustrations and images included in CareNotes® are the copyrighted property of A D A M , Inc  or Robert Thornton  Esta información es sólo para uso en educación  Jane intención no es darle un consejo médico sobre enfermedades o tratamientos  Colsulte con jane Charna Heckler farmacéutico antes de seguir cualquier régimen médico para saber si es seguro y efectivo para usted  Dolor de [place] regular   CUIDADO AMBULATORIO:   El dolor de [place]  puede ser leve o intenso  Las causas comunes incluyen el estrés, medicamentos y lesiones en la trevin  Problemas de sueño, alergias y [address] hormonales también pueden causar nichole de [place]  Puede que usted sufra de nichole de [place] frecuentes sin que se conozca jane causa  Es probable que el dolor empiece en otra parte de jane cuerpo y pase a jane trevin   El dolor de trevin también puede moverse hacia otras partes de del valle cuerpo  Un dolor de trevin puede causar otros síntomas, yolis náusea y vómito  Un dolor de trevin herve puede incluso ser haydee señal de derrame cerebral u otro problema jai que necesita de tratamiento inmediato  Llame al 911 en farhad de presentar lo siguiente:   · Usted tiene alguno de los siguientes signos de derrame cerebral:      ¨ Adormecimiento o caída de un lado de del valle radha     ¨ Debilidad en un brazo o ahydee pierna    ¨ Confusión o debilidad para hablar    ¨ Mareos o dolor de trevin intenso, o pérdida de la visión  Busque atención médica de inmediato si:   · Usted tiene un dolor de trevin con rigidez de shante y [place]  · Usted tiene un dolor de trevin angelica y está vomitando  · Usted tiene dolor severo que no se liza después de dmitri ludy medicamentos para el dolor  · Usted tiene dolor de [place] y el dolor empeora cuando usted shamar hacia la hector  · Usted tiene dolor de [place] y cambios en la vista, yolis visión borrosa  · Usted tiene dolor de [place] y está olvidadizo o confundido  Pregúntele a del valle Heddy Emily vitaminas y minerales son adecuados para usted  · Usted tiene dolor de trevin todos los días y no se [place] aun después de tratarlo  · Ludy nichole de [place] u ocurren nuevos síntomas cuando tiene dolor de [place]  · Otras personas con las que usted vive o [place] tienen nichole de [place]  · Usted tiene preguntas o inquietudes acerca de del valle condición o cuidado  El tratamiento  podría incluir cualquiera de los siguientes:  · Migdalia,  Pueden darse medicamentospara prevenir o tratar el dolor de [place]  No espere hasta que el dolor sea severo para dmitri del valle medicamento  Pregunte al médico cómo debe dmitri nemo medicamento de forma ramos  · AINEs (Analgésicos antiinflamatorios no esteroides) yolis el ibuprofeno, ayudan a disminuir la inflamación, el dolor y la [place]   Nemo [employer] disponible con o sin haydee receta médica  Los AINEs pueden causar sangrado estomacal o problemas renales en ciertas personas  Si usted esta tomando un anticoágulante,  siempre  pregunte si los AINEs son seguros para usted  Siempre rhea la etiqueta de jennifer medicamento y [place] instrucciones  No administre jennifer medicamento a niños menores de 6 meses de lisa sin antes obtener la autorización de del valle médico      · Acetaminofeno:  liza el dolor y baja la fiebre  Está disponible sin receta médica  Pregunte la cantidad y la frecuencia con que debe tomarlos  [address]  Rhea las etiquetas de todos los demás medicamentos que esté usando para saber si también contienen acetaminofén, o pregunte a del valle médico o farmacéutico  El acetaminofén puede causar daño en el hígado cuando no se lynda de forma correcta  No use más de 4 gramos (4000 miligramos) en total de acetaminofeno en un día  · Los medicamentos contra las náuseas  pueden darse para calmar del valle estómago y ayudar a prevenir los vómitos  [place] de del valle síntomas:   · Descanse en haydee habitación oscura y [place]  [place] yolis consecuencia podría ayudar a disminuir del valle dolor  · Aplique calor o hielo según lo indicado  El calor o el hielo pueden ayudar a disminuir el dolor o los espasmos musculares  Aplíquese calor o hielo en el área por 20 minutos cada 2 horas por tantos días yolis se lo recomienden  Es probable que del valle médico le recomiende que alterne entre calor y hielo  · Relaje catherine músculos para ayudar a aliviar del valle dolor de trevin  Acuéstese en haydee posición cómoda y cierre catherine ojos  Relaje catherine músculos lentamente  Comience por los dedos de los pies y avance hacia arriba al meño de del valle cuerpo  Un masaje o baño en agua tibia también pueden ayudar a relajar catherine músculos  Mantenga un registro de catherine nichole de trevin:  Escriba en el diario las fechas y las horas en que usted sufre un dolor de [place] y lo que estaba haciendo antes de que [place]   Registre también lo que comió y [employer] 24 horas previas a que comenzara el dolor de [place]  [place] puede ayudar a del valle médico a encontrar la causa de ludy nichole de [place] y así poder crear un plan de tratamiento  Ludy anotaciones también pueden ayudarle a evitar lo que provoca ludy nichole de trevin o manejar ludy síntomas  Duerma suficiente:  Usted debería dormir entre 8 y 10 horas cada noche  Establezca un horario para dormir  Acuéstese y levántese a la misma hora todos los días  Puede resultarle útil hacer algo relajante antes de acostarse  No abdoulaye televisión antes de acostarse  No fume:  La nicotina y otras sustancias químicas en los cigarrillos y cigarros pueden provocar un dolor de trevin tensional o empeorarlo  Pida información a del valle médico si usted actualmente fuma y necesita ayuda para dejar de fumar  Los cigarrillos electrónicos o tabaco sin humo todavía contienen nicotina  Consulte con del valle médico antes de [employer]  [place] líquidos según ludy indicaciones:  Es probable que usted necesite dmitri más líquido para prevenir la deshidratación  La deshidratación puede causar nichole de trevin  Pregunte a del valle médico sobre la cantidad de líquido que necesita dmitri todos los días y cuáles le recomienda  Limite la cafeína y las bebidas alcohólicas según se le haya indicado:  Ludy nichole de [place] pueden ser causados por la cafeína o el alcohol  Es probable que usted también desarrolle un dolor de trevin si lynda cafeína regularmente y aidan de tomarla repentinamente  Consuma alimentos saludables y variados:  No se salte ninguna comida  Annabella demasiado poco puede causar un dolor de [place]  Incluya frutas, vegetales, panes integrales, productos lácteos bajos en grasas, frijoles, malathi magras y pescado  No coma alimentos que provoquen ludy nichole de [place], yolis chocolate y vino tinto   Alimentos que contienen gluten, nitratos, monosodio glutamato (MSG) o azúcares artificiales también pueden llegar a causar un dolor de trevin  Acuda a catherine consultas de control con jane médico según le indicaron  Anote catherine preguntas para que se acuerde de hacerlas linda catherine visitas  © 2017 [address] Information is for End User's use only and may not be sold, redistributed or otherwise used for commercial purposes  All illustrations and images included in CareNotes® are the copyrighted property of A D A M , Inc  or Robert Thornton  Esta información es sólo para uso en educación  Jane intención no es darle un consejo médico sobre enfermedades o tratamientos  Colsulte con jane Aimee Bourdon farmacéutico antes de seguir cualquier régimen médico para saber si es seguro y efectivo para usted  Prehipertensión   CUIDADO AMBULATORIO:   Prehipertensión  es cuando el nivel de la presión arterial está ligeramente más alto de lo normal  La presión arterial es la fuerza que jane lia ejerce contra las kahn de catherine arterias  La presión arterial normal es gustavo de 120/80  La prehipertensión es la presión arterial entre 120/80 y 139/89 en adultos y [facility]  La hipertensión (presión arterial eric) es la presión arterial de 140/90 o superior  La prehipertensión aumenta jane riesgo de hipertensión crónica (a bill plazo)  La prehipertensión y la hipertensión aumentan jane riesgo de haydee enfermedad cardíaca o vascular  Con el tiempo, esto aumenta jane riesgo de un ataque cardíaco, derrame cerebral, insuficiencia cardíaca o enfermedad del riñón que representen haydee amenaza para la lisa  Llame al 911 en farhad de presentar lo siguiente:   · Usted tiene alguno de los siguientes signos de un ataque cardíaco:      ¨ Estornudos, presión, o dolor en jane pecho que dura mas de 5 minutos o regresa      ¨ Malestar o dolor en jane espalda, shante, mandíbula, abdomen, o brazo     ¨ Dificultad para respirar    ¨ Náuseas o vómito    ¨ Siente un desvanecimiento o tiene sudores fríos especialmente en el pecho o dificultad para respirar  · Usted tiene alguno de los siguientes signos de derrame cerebral:      ¨ Adormecimiento o caída de un lado de del valle radha     ¨ Debilidad en un brazo o haydee pierna    ¨ Confusión o debilidad para hablar    ¨ Mareos o dolor de trevin intenso, o pérdida de la visión  Busque atención médica de inmediato si:   · Usted tiene un herve dolor de [place]  · Usted tiene cambios repentinos en del valle vista  Pregúntele a del valle Cherylann Bimler vitaminas y minerales son adecuados para usted  · Usted ha estado tomando medicamento para la presión arterial y [place] todavía está en un nivel más elevado del que del valle médico le indicó que debería estar  · Usted tiene preguntas o inquietudes acerca de del valle condición o cuidado  Controle la hipertensión:   · Salyer menos sodio (sal)  No agregue sodio a catherine alimentos  Limite el consumo de alimentos altos en sodio, yolis por ejemplo comidas enlatadas, dieudonne fritas, y embutidos  Del Valle médico podría sugerirle que siga el plan de alimentación con enfoque dietético para reducir la hipertensión conocido yolis dieta DASH, por catherine siglas en inglés  Nemo plan de comidas es bajo en sodio, grasas malsanas y grasas totales  Es alto en potasio, calcio y [place]  · Ejercítese para mantener o alcanzar un peso saludable  Realice actividad física por lo menos 30 minutos al día, la mayoría de los días de la [place]  [place] ayudará a bajar del valle presión arterial  Pregunte a del valle médico acerca del mejor plan de ejercicio para usted  · [address] [place] [address] estrés  [place] podría ayudarlo a bajar del valle presión arterial  Aprenda sobre formas de relajarse, yolis respiración profunda o escuchar música  · Limite el consumo de alcohol  Pregunte a del valle médico si usted puede dmitri alcohol  Las mujeres deberían limitar el consumo de alcohol a 1 bebida por día  Los hombres deberían limitar el consumo de alcohol a 2 tragos al día  Un trago equivale a 12 onzas de cerveza, 5 onzas de vino o 1 onza y ½ de licor  · No fume    La nicotina y otros químicos en los cigarrillos y cigarros pueden aumentar jane presión arterial y provocar daño al pulmón  Pida información a jane médico si usted actualmente fuma y necesita ayuda para dejar de fumar  Los cigarrillos electrónicos o tabaco sin humo todavía contienen nicotina  Consulte con jane médico antes de [employer]  · Medicamentos para la presión arterial  podría se necesario si usted tiene diabetes, enfermedad cardíaca o enfermedad renal      · Controle catherine otras afecciones de manuel  [place] jane medicamento para la diabetes y colesterol yolis se le indique  Asista a catherine citas de seguimiento con jane médico para controlar estas condiciones  Acuda a catherine consultas de control con jane médico según le indicaron  Anote catherine preguntas para que se acuerde de hacerlas linda catherine visitas  © 2017 [address] [facility] Information is for End User's use only and may not be sold, redistributed or otherwise used for commercial purposes  All illustrations and images included in CareNotes® are the copyrighted property of A D A M , Inc  or Robert Thornton  Esta información es sólo para uso en educación  Jane intención no es darle un consejo médico sobre enfermedades o tratamientos  Colsulte con jane Génesis Poncho farmacéutico antes de seguir cualquier régimen médico para saber si es seguro y efectivo para usted

## 2019-01-09 ENCOUNTER — OFFICE VISIT (OUTPATIENT)
Dept: PHYSICAL THERAPY | Facility: CLINIC | Age: 54
End: 2019-01-09
Payer: COMMERCIAL

## 2019-01-09 DIAGNOSIS — M76.32 ILIOTIBIAL BAND SYNDROME OF BOTH SIDES: Primary | ICD-10-CM

## 2019-01-09 DIAGNOSIS — M76.31 ILIOTIBIAL BAND SYNDROME OF BOTH SIDES: Primary | ICD-10-CM

## 2019-01-09 PROCEDURE — 97110 THERAPEUTIC EXERCISES: CPT

## 2019-01-09 PROCEDURE — 97140 MANUAL THERAPY 1/> REGIONS: CPT

## 2019-01-09 NOTE — PROGRESS NOTES
Daily Note     Today's date: 2019  Patient name: Jordon Mishra  : 1965  MRN: [id number]  Referring provider: Wicho Hunter MD  Dx:   Encounter Diagnosis     ICD-10-CM    1  Iliotibial band syndrome of both sides M76 31     M76 32                   Subjective: Pt states overall she is doing better  Reports improved hip and LS sx  Objective: See treatment diary below      Assessment: Tolerated treatment well  Patient exhibited good technique with therapeutic exercises  Pt saman exercises well without c/o exacerbation of sx  Pt cont to make steady gains with improved sx  Plan: Continue per plan of care         Precautions: Karina principle for L side post/lat lumbar derangement; 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, Glut Med, TFL, ITB     Daily Treatment Diary      HEP: Sheet provided and discussed     Manual  18             ART x10'                     IASTM x5'                     JM                       Stretch    15'  25'  15'  15'             Triggerpoint                             Exercise Diary  18            Prone on elbows x10'  10'  10'  10'  10'                                 Prone Press Ups 3x10  3/10    3/10  3/10           Front Wall Glides                     Lateral Wall Glides                     LTR                     SKTC                     DKTC                     PB fwd trunk flx                     Prone hip ext 3x10  3/10    3/10  3/10           Prone hip IR with TB 3x10 L3  3/10 L3    3/10 L3  3/10 L3           Prone hip ER with Ball 3x10  3/10    3/10  3/10           Prone Hip flexor stretch                      No Shoe AE Steamboats                     Lateral walking                      QKB        3/5  3/5           Cat Backs                     On elbows hip ext                                                                       Modalities  18             MHP                       CP Prone x12'  10'  10'prone  10'  10'             STIM

## 2019-01-11 ENCOUNTER — OFFICE VISIT (OUTPATIENT)
Dept: PHYSICAL THERAPY | Facility: CLINIC | Age: 54
End: 2019-01-11
Payer: COMMERCIAL

## 2019-01-11 DIAGNOSIS — M54.16 CHRONIC LUMBAR RADICULOPATHY: ICD-10-CM

## 2019-01-11 DIAGNOSIS — M76.31 ILIOTIBIAL BAND SYNDROME OF BOTH SIDES: Primary | ICD-10-CM

## 2019-01-11 DIAGNOSIS — M76.32 ILIOTIBIAL BAND SYNDROME OF BOTH SIDES: Primary | ICD-10-CM

## 2019-01-11 PROCEDURE — 97140 MANUAL THERAPY 1/> REGIONS: CPT

## 2019-01-11 PROCEDURE — 97110 THERAPEUTIC EXERCISES: CPT

## 2019-01-11 PROCEDURE — 97010 HOT OR COLD PACKS THERAPY: CPT

## 2019-01-11 NOTE — PROGRESS NOTES
Daily Note     Today's date: 2019  Patient name: Zoe Espino  : 1965  MRN: [id number]  Referring provider: Maikel Gutierrez MD  Dx:   Encounter Diagnosis     ICD-10-CM    1  Iliotibial band syndrome of both sides M76 31     M76 32                   Subjective: Pt reports pain along L ant hip and LS  Objective: See treatment diary below      Assessment: Tolerated treatment fair  Patient demonstrated fatigue post treatment  Pt with good demo of body mechanics with program  Pt with cont point tenderness along L sciatic notch and piriformis  Plan: Continue per plan of care          Precautions: Karina principle for L side post/lat lumbar derangement; 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, Glut Med, TFL, ITB     Daily Treatment Diary      HEP: Sheet provided and discussed     Manual  18           ART x10'                     IASTM x5'                     JM                       Stretch    15'  25'  15'  15'  15'           Triggerpoint                             Exercise Diary  18         Prone on elbows x10'  10'  10'  10'  10'  10'                               Prone Press Ups 3x10  3/10    3/10  3/10  3/10         Front Wall Glides                     Lateral Wall Glides                     LTR                     SKTC                     DKTC                     PB fwd trunk flx                     Prone hip ext 3x10  3/10    3/10  3/10  3/10         Prone hip IR with TB 3x10 L3  3/10 L3    3/10 L3  3/10 L3  3/10 L3         Prone hip ER with Ball 3x10  3/10    3/10  3/10  3/10         Prone Hip flexor stretch                      No Shoe AE Steamboats                     Lateral walking                      QKB        3/5  3/5  3/5         Cat Backs                     On elbows hip ext                                                                       Modalities  18  1/4/19 1/9/19 1/11/19           MHP                       CP Prone x12'  10'  10'prone  10'  10'  10'           STIM

## 2019-01-16 ENCOUNTER — OFFICE VISIT (OUTPATIENT)
Dept: PHYSICAL THERAPY | Facility: CLINIC | Age: 54
End: 2019-01-16
Payer: COMMERCIAL

## 2019-01-16 ENCOUNTER — OFFICE VISIT (OUTPATIENT)
Dept: FAMILY MEDICINE CLINIC | Facility: CLINIC | Age: 54
End: 2019-01-16
Payer: COMMERCIAL

## 2019-01-16 VITALS
OXYGEN SATURATION: 97 % | WEIGHT: 139 LBS | RESPIRATION RATE: 18 BRPM | HEIGHT: 59 IN | HEART RATE: 55 BPM | BODY MASS INDEX: 28.02 KG/M2 | SYSTOLIC BLOOD PRESSURE: 128 MMHG | DIASTOLIC BLOOD PRESSURE: 78 MMHG | TEMPERATURE: 97.2 F

## 2019-01-16 DIAGNOSIS — M54.16 CHRONIC LUMBAR RADICULOPATHY: ICD-10-CM

## 2019-01-16 DIAGNOSIS — M76.31 ILIOTIBIAL BAND SYNDROME OF BOTH SIDES: Primary | ICD-10-CM

## 2019-01-16 DIAGNOSIS — J34.89 SINUS MUCOSAL THICKENING: Primary | ICD-10-CM

## 2019-01-16 DIAGNOSIS — M76.32 ILIOTIBIAL BAND SYNDROME OF BOTH SIDES: Primary | ICD-10-CM

## 2019-01-16 DIAGNOSIS — N76.1 SUBACUTE VAGINITIS: ICD-10-CM

## 2019-01-16 DIAGNOSIS — L29.9 EAR ITCH: ICD-10-CM

## 2019-01-16 DIAGNOSIS — G44.51 HEMICRANIA CONTINUA: ICD-10-CM

## 2019-01-16 PROCEDURE — 97110 THERAPEUTIC EXERCISES: CPT

## 2019-01-16 PROCEDURE — T1015 CLINIC SERVICE: HCPCS | Performed by: FAMILY MEDICINE

## 2019-01-16 PROCEDURE — 97140 MANUAL THERAPY 1/> REGIONS: CPT

## 2019-01-16 RX ORDER — KETOROLAC TROMETHAMINE 30 MG/ML
60 INJECTION, SOLUTION INTRAMUSCULAR; INTRAVENOUS ONCE
Status: COMPLETED | OUTPATIENT
Start: 2019-01-16 | End: 2019-01-16

## 2019-01-16 RX ORDER — KETOROLAC TROMETHAMINE 30 MG/ML
30 INJECTION, SOLUTION INTRAMUSCULAR; INTRAVENOUS ONCE
Status: CANCELLED | OUTPATIENT
Start: 2019-01-16 | End: 2019-01-16

## 2019-01-16 RX ORDER — BUTALBITAL, ACETAMINOPHEN AND CAFFEINE 50; 325; 40 MG/1; MG/1; MG/1
1 TABLET ORAL EVERY 4 HOURS PRN
COMMUNITY
End: 2019-01-18 | Stop reason: SDUPTHER

## 2019-01-16 RX ORDER — AMOXICILLIN AND CLAVULANATE POTASSIUM 875; 125 MG/1; MG/1
1 TABLET, FILM COATED ORAL EVERY 12 HOURS SCHEDULED
Qty: 20 TABLET | Refills: 0 | Status: SHIPPED | OUTPATIENT
Start: 2019-01-16 | End: 2019-01-26

## 2019-01-16 RX ORDER — METRONIDAZOLE 7.5 MG/G
1 GEL VAGINAL 2 TIMES DAILY
Qty: 70 G | Refills: 0 | Status: SHIPPED | OUTPATIENT
Start: 2019-01-16 | End: 2019-01-21

## 2019-01-16 RX ADMIN — KETOROLAC TROMETHAMINE 60 MG: 30 INJECTION, SOLUTION INTRAMUSCULAR; INTRAVENOUS at 15:50

## 2019-01-16 NOTE — PROGRESS NOTES
Daily Note     Today's date: 2019  Patient name: Jordon Mishra  : 1965  MRN: [id number]  Referring provider: Wicho Hunter MD  Dx:   Encounter Diagnosis     ICD-10-CM    1  Iliotibial band syndrome of both sides M76 31     M76 32    2  Chronic lumbar radiculopathy M54 16                   Subjective: Pt reports she is doing well  States having some improvement in L hip pain  Objective: See treatment diary below      Assessment: Tolerated treatment well  Patient exhibited good technique with therapeutic exercises  Pt saman manual tx and TE today with less overall pain  Pt with decreased point tenderness along sciatic notch and piriformis  Plan: Continue per plan of care        Precautions: Karina principle for L side post/lat lumbar derangement; 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, Glut Med, TFL, ITB     Daily Treatment Diary      HEP: Sheet provided and discussed     Manual  18         ART x10'                     IASTM x5'                     JM                       Stretch    15'  25'  15'  15'  15'  15'         Triggerpoint                             Exercise Diary  18       Prone on elbows x10'  10'  10'  10'  10'  10'  10'                             Prone Press Ups 3x10  3/10    3/10  3/10  3/10  3/10       Front Wall Glides                     Lateral Wall Glides                     LTR                     SKTC                     DKTC                     PB fwd trunk flx                     Prone hip ext 3x10  3/10    3/10  3/10  3/10  3/10       Prone hip IR with TB 3x10 L3  3/10 L3    3/10 L3  3/10 L3  3/10 L3 3/10 L3       Prone hip ER with Ball 3x10  3/10    3/10  3/10  3/10  3/10       Prone Hip flexor stretch                      No Shoe AE Steamboats                     Lateral walking                      QKB        3/5  3/5  3/5 3/5       Cat Backs                     On elbows hip ext                      clamshells             L2 3/10                                   Modalities  12/19/18 12/28/18 1/2/19 1/4/19 1/9/19 1/11/19 1/16/19         MHP                       CP Prone x12'  10'  10'prone  10'  10'  10'  10'         STIM

## 2019-01-16 NOTE — PROGRESS NOTES
History and Physical  Zoe Espino 47 y o  female MRN: [id number]      Assessment:   Vaginitis  Headache  Sinus mucosal thickening  Ear itching    Plan:  Toradol 60 mg IM today  Metrogel for vaginitis  Keep appt with GYN as scheduled  Augmentin 875/125 BID until gone for sinus mucosal thickening  Cortisporin otic solution TID x 5 days  RTC if no improvement  Chief Complaint   Patient presents with    Headache     She states she has had a headache for over 15 days   Ear Problem     She states her ears are always itchy but mostly the left  She states its been like that for years   Vaginal Itching        HPI:  Zoe Espino is a 47 y o  female who presents with above  ER record reviewed  Yesterday she had no headache but today it restarted  Describes as a p inching sensation and is located at the top of the head and temples  Today she rates the pain a 3  Today she also c/o vaginal itching which she gets quite frequently  No discharge  Also has bilateral ear itching for many years  Historical Information   Past Medical History:   Diagnosis Date    Fatigue     High cholesterol     Neuropathy     Osteoarthritis     knees     Past Surgical History:   Procedure Laterality Date    APPENDECTOMY      EPIDURAL BLOCK INJECTION Left 5/17/2018    Procedure: L4 AND L5 TRANSFORAMINAL EPIDURAL STEROID INJECTION;  Surgeon: Edmond Yeung MD;  [facility];  Service: Pain Management     EPIDURAL BLOCK INJECTION Left 10/4/2018    Procedure: L4 AND L5 TRANSFORAMINAL EPIDURAL STEROID INJECTION;  Surgeon: Edmond Yeung MD;  [facility];  Service: Pain Management     MT COLONOSCOPY FLX DX W/COLLJ SPEC WHEN PFRMD N/A 5/31/2016    Procedure: COLONOSCOPY;  Surgeon: Jayna Fisher MD;  [facility]: [facility];  Service: Gastroenterology    MT ESOPHAGOGASTRODUODENOSCOPY TRANSORAL DIAGNOSTIC N/A 11/15/2016    Procedure: ESOPHAGOGASTRODUODENOSCOPY (EGD);   Surgeon: Jayna Fsiher MD;  [facility]: [facility];  Service: Gastroenterology     Social History   History   Alcohol Use No     History   Drug Use No     History   Smoking Status    Never Smoker   Smokeless Tobacco    Never Used     Family History   Problem Relation Age of Onset    Uterine cancer Mother        Meds/Allergies   No Known Allergies    Meds:    Current Outpatient Prescriptions:     butalbital-acetaminophen-caffeine (FIORICET,ESGIC) -40 mg per tablet, Take 1 tablet by mouth every 4 (four) hours as needed, Disp: , Rfl:     gabapentin (NEURONTIN) 600 MG tablet, Take 1 tablet (600 mg total) by mouth 3 (three) times a day for 30 days, Disp: 90 tablet, Rfl: 1      REVIEW OF SYSTEMS  Review of Systems   Constitutional: Negative  HENT: Negative  Eyes: Negative  Respiratory: Negative  Cardiovascular: Negative  Gastrointestinal: Negative  Endocrine: Negative  Genitourinary:        Vaginal itching   Neurological: Positive for headaches  Hematological: Negative  Psychiatric/Behavioral: Negative  Current Vitals:   Blood Pressure: 128/78 (01/16/19 1458)  Pulse: 55 (01/16/19 1458)  Temperature: (!) 97 2 °F (36 2 °C) (01/16/19 1458)  Respirations: 18 (01/16/19 1458)  Height: 4' 11" (149 9 cm) (01/16/19 1458)  Weight - Scale: 63 kg (139 lb) (01/16/19 1458)  SpO2: 97 % (01/16/19 1458)  Body mass index is 28 07 kg/m²  PHYSICAL EXAMS:  Physical Exam   Constitutional: She is oriented to person, place, and time  She appears well-developed and well-nourished  HENT:   Head: Normocephalic and atraumatic  Right Ear: External ear normal    Left Ear: External ear normal    Mouth/Throat: Oropharynx is clear and moist    TM's clear and intact  Canals are patent  Eyes: Pupils are equal, round, and reactive to light  EOM are normal    Neck: Normal range of motion  Neck supple  No thyromegaly present  Cardiovascular: Normal rate, regular rhythm and normal heart sounds      Pulmonary/Chest: Effort normal and breath sounds normal  She has no wheezes  She has no rales  Abdominal: There is no tenderness  Musculoskeletal: She exhibits no edema  No tenderness with palpation to the skull  Lymphadenopathy:     She has no cervical adenopathy  Neurological: She is alert and oriented to person, place, and time  No cranial nerve deficit  Skin: Skin is warm and dry  Psychiatric: She has a normal mood and affect  Lab Results:          Thai Staley PA-C  1/16/2019, 3:12 PM

## 2019-01-18 ENCOUNTER — TELEPHONE (OUTPATIENT)
Dept: FAMILY MEDICINE CLINIC | Facility: CLINIC | Age: 54
End: 2019-01-18

## 2019-01-18 ENCOUNTER — OFFICE VISIT (OUTPATIENT)
Dept: PHYSICAL THERAPY | Facility: CLINIC | Age: 54
End: 2019-01-18
Payer: COMMERCIAL

## 2019-01-18 DIAGNOSIS — M76.32 ILIOTIBIAL BAND SYNDROME OF BOTH SIDES: Primary | ICD-10-CM

## 2019-01-18 DIAGNOSIS — M54.16 CHRONIC LUMBAR RADICULOPATHY: ICD-10-CM

## 2019-01-18 DIAGNOSIS — R51.9 FREQUENT HEADACHES: Primary | ICD-10-CM

## 2019-01-18 DIAGNOSIS — M76.31 ILIOTIBIAL BAND SYNDROME OF BOTH SIDES: Primary | ICD-10-CM

## 2019-01-18 PROCEDURE — 97110 THERAPEUTIC EXERCISES: CPT

## 2019-01-18 PROCEDURE — 97140 MANUAL THERAPY 1/> REGIONS: CPT

## 2019-01-18 RX ORDER — BUTALBITAL, ACETAMINOPHEN AND CAFFEINE 50; 325; 40 MG/1; MG/1; MG/1
1 TABLET ORAL EVERY 4 HOURS PRN
Qty: 30 TABLET | Refills: 0 | Status: SHIPPED | OUTPATIENT
Start: 2019-01-18 | End: 2019-04-17

## 2019-01-18 RX ORDER — BUTALBITAL, ACETAMINOPHEN AND CAFFEINE 50; 325; 40 MG/1; MG/1; MG/1
1 TABLET ORAL EVERY 4 HOURS PRN
Qty: 30 TABLET | Refills: 0 | Status: SHIPPED | OUTPATIENT
Start: 2019-01-18 | End: 2019-01-18 | Stop reason: SDUPTHER

## 2019-01-18 NOTE — PROGRESS NOTES
Daily Note     Today's date: 2019  Patient name: Linda Ochoa  : 1965  MRN: [id number]  Referring provider: Rach Dexter MD  Dx:   Encounter Diagnosis     ICD-10-CM    1  Iliotibial band syndrome of both sides M76 31     M76 32    2  Chronic lumbar radiculopathy M54 16                   Subjective: Pt reports she has less hip pain  Objective: See treatment diary below      Assessment: Tolerated treatment well  Patient exhibited good technique with therapeutic exercises  Pt cont to have less point tenderness along IT band and piriformis  Pt saman program well with overall improved sx  Plan: Continue per plan of care         Precautions: Karina principle for L side post/lat lumbar derangement; 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, Glut Med, TFL, ITB     Daily Treatment Diary      HEP: Sheet provided and discussed     Manual  18       ART x10'                     IASTM x5'                     JM                       Stretch    15'  25'  15'  15'  15'  15'  15'       Triggerpoint                             Exercise Diary  18     Prone on elbows x10'  10'  10'  10'  10'  10'  10'  10'                           Prone Press Ups 3x10  3/10    3/10  3/10  3/10  3/10  3/10     Front Wall Glides                     Lateral Wall Glides                     LTR                     SKTC                     DKTC                     PB fwd trunk flx                     Prone hip ext 3x10  3/10    3/10  3/10  3/10  3/10  3/10     Prone hip IR with TB 3x10 L3  3/10 L3    3/10 L3  3/10 L3  3/10 L3 3/10 L3  3/10 L3     Prone hip ER with Ball 3x10  3/10    3/10  3/10  3/10  3/10  3/10     Prone Hip flexor stretch                "      No Shoe AE Steamboats                     Lateral walking                      QKB        3/5  3/5  3/5 3/5  3/10     Cat Backs                     On elbows hip ext                      clamshells             L2 3/10  L2 3/10                                 Modalities  12/19/18 12/28/18 1/2/19 1/4/19 1/9/19 1/11/19 1/16/19 1/18/19       MHP                10'       CP Prone x12'  10'  10'prone  10'  10'  10'  10'  10'       STIM

## 2019-01-18 NOTE — TELEPHONE ENCOUNTER
PATIENT DAUGHTER SANG CALLED TO REPORT THAT EVERY TIME VINNY TAKES HER AUGMENTIN SHE GETS A HEADACHE AND VOMITS  SHE REPORTS THAT WHEN SHE TAKES THE FIORICET HER HEADACHE GOES AWAY AND THAT MEDICINE DOES NOT CAUSE ANY VOMITING  SHE ONLY HAS 2 PILLS LEFT, SHE ASKED IF SHE COULD HAVE ANOTHER PRESCRIPTION FOR THIS SINCE IT HELPS HER HEADACHES    Diego Gutiérrez

## 2019-01-23 ENCOUNTER — OFFICE VISIT (OUTPATIENT)
Dept: PHYSICAL THERAPY | Facility: CLINIC | Age: 54
End: 2019-01-23
Payer: COMMERCIAL

## 2019-01-23 DIAGNOSIS — M76.32 ILIOTIBIAL BAND SYNDROME OF BOTH SIDES: Primary | ICD-10-CM

## 2019-01-23 DIAGNOSIS — M76.31 ILIOTIBIAL BAND SYNDROME OF BOTH SIDES: Primary | ICD-10-CM

## 2019-01-23 DIAGNOSIS — M54.16 CHRONIC LUMBAR RADICULOPATHY: ICD-10-CM

## 2019-01-23 PROCEDURE — 97140 MANUAL THERAPY 1/> REGIONS: CPT

## 2019-01-23 PROCEDURE — 97110 THERAPEUTIC EXERCISES: CPT

## 2019-01-23 NOTE — PROGRESS NOTES
Daily Note     Today's date: 2019  Patient name: Britt Lcokhart  : 1965  MRN: [id number]  Referring provider: Angel Acuña MD  Dx:   Encounter Diagnosis     ICD-10-CM    1  Iliotibial band syndrome of both sides M76 31     M76 32    2  Chronic lumbar radiculopathy M54 16                   Subjective: Pt reports she has improved pain sx in L hip  Objective: See treatment diary below      Assessment: Tolerated treatment well  Patient demonstrated fatigue post treatment and exhibited good technique with therapeutic exercises  Pt with some increased point tenderness along L lateral hip with STM  Pt able to complete added hip ext progression completing with min difficulty  Plan: Continue per plan of care          Precautions: Karina principle for L side post/lat lumbar derangement; 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, Glut Med, TFL, ITB     Daily Treatment Diary      HEP: Sheet provided and discussed     Manual  18     ART x10'                     IASTM x5'                     JM                       Stretch    15'  25'  15'  15'  15'  15'  15'  15'     Triggerpoint                             Exercise Diary  18   Prone on elbows x10'  10'  10'  10'  10'  10'  10'  10' 10'                         Prone Press Ups 3x10  3/10    3/10  3/10  3/10  3/10  3/10  3/10   Front Wall Glides                     Lateral Wall Glides                     LTR                     SKTC                     DKTC                     PB fwd trunk flx                     Prone hip ext 3x10  3/10    3/10  3/10  3/10  3/10  3/10  3/10   Prone hip IR with TB 3x10 L3  3/10 L3    3/10 L3  3/10 L3  3/10 L3 3/10 L3  3/10 L3  3/10 L3   Prone hip ER with Ball 3x10  3/10    3/10  3/10  3/10  3/10  3/10  3/10   Prone Hip flexor stretch                " "    No Shoe AE Steamboats                     Lateral walking                      QKB        3/5  3/5  3/5 3/5  3/10  3/10   Cat Backs                     On elbows hip ext                  3/10    clamshells             L2 3/10  L2 3/10  L2 3/10                               Modalities  12/19/18 12/28/18 1/2/19 1/4/19 1/9/19 1/11/19 1/16/19 1/18/19 1/23/19     MHP                10'  10'     CP Prone x12'  10'  10'prone  10'  10'  10'  10'  10' 10'     STIM

## 2019-01-25 ENCOUNTER — APPOINTMENT (OUTPATIENT)
Dept: PHYSICAL THERAPY | Facility: CLINIC | Age: 54
End: 2019-01-25
Payer: COMMERCIAL

## 2019-01-30 ENCOUNTER — OFFICE VISIT (OUTPATIENT)
Dept: PHYSICAL THERAPY | Facility: CLINIC | Age: 54
End: 2019-01-30
Payer: COMMERCIAL

## 2019-01-30 DIAGNOSIS — M76.32 ILIOTIBIAL BAND SYNDROME OF BOTH SIDES: Primary | ICD-10-CM

## 2019-01-30 DIAGNOSIS — M54.16 CHRONIC LUMBAR RADICULOPATHY: ICD-10-CM

## 2019-01-30 DIAGNOSIS — M76.31 ILIOTIBIAL BAND SYNDROME OF BOTH SIDES: Primary | ICD-10-CM

## 2019-01-30 PROCEDURE — 97140 MANUAL THERAPY 1/> REGIONS: CPT

## 2019-01-30 PROCEDURE — 97110 THERAPEUTIC EXERCISES: CPT

## 2019-01-30 NOTE — PROGRESS NOTES
Daily Note     Today's date: 2019  Patient name: Martha Quinones  : 1965  MRN: [id number]  Referring provider: Jero Lee MD  Dx:   Encounter Diagnosis     ICD-10-CM    1  Iliotibial band syndrome of both sides M76 31     M76 32    2  Chronic lumbar radiculopathy M54 16                   Subjective: Pt reports she has some pain along R SI and lateral hip today  States she has been improving since start of PT  Objective: See treatment diary below      Assessment: Tolerated treatment well  Patient demonstrated fatigue post treatment and exhibited good technique with therapeutic exercises  Pt cont to saman program well with min c/o pain during tx  Pt with less point tenderness over lateral portion of R hip with STM  Plan: Continue per plan of care       Precautions: Karina principle for L side post/lat lumbar derangement; 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, Glut Med, TFL, ITB     Daily Treatment Diary      HEP: Sheet provided and discussed     Manual  18     ART                      IASTM                      JM                       Stretch  15'  15'  25'  15'  15'  15'  15'  15'  15'     Triggerpoint                             Exercise Diary  18   Prone on elbows x10'  10'  10'  10'  10'  10'  10'  10' 10'                         Prone Press Ups 3x10  3/10    3/10  3/10  3/10  3/10  3/10  3/10   Front Wall Glides                     Lateral Wall Glides                     LTR                     SKTC                     DKTC                     PB fwd trunk flx                     Prone hip ext 3x10  3/10    3/10  3/10  3/10  3/10  3/10  3/10   Prone hip IR with TB 3x10 L3  3/10 L3    3/10 L3  3/10 L3  3/10 L3 3/10 L3  3/10 L3  3/10 L3   Prone hip ER with Ball 3x10  3/10    3/10  3/10  3/10  3/10  3/10  3/10   Prone Hip flexor stretch  "              6/20" 6/20"    No Shoe AE Steamboats                     Lateral walking                      QKB  3/5      3/5  3/5  3/5 3/5  3/10  3/10   Cat Backs                     On elbows hip ext  3/10                3/10    clamshells  L3 3/10           L2 3/10  L2 3/10  L2 3/10                               Modalities  1/30/19 12/28/18 1/2/19 1/4/19 1/9/19 1/11/19 1/16/19 1/18/19 1/23/19   MHP                10'  10'   CP Prone x12'  10'  10'prone  10'  10'  10'  10'  10' 10'   STIM

## 2019-02-01 ENCOUNTER — OFFICE VISIT (OUTPATIENT)
Dept: PHYSICAL THERAPY | Facility: CLINIC | Age: 54
End: 2019-02-01
Payer: COMMERCIAL

## 2019-02-01 ENCOUNTER — TELEPHONE (OUTPATIENT)
Dept: PAIN MEDICINE | Facility: CLINIC | Age: 54
End: 2019-02-01

## 2019-02-01 DIAGNOSIS — M76.32 ILIOTIBIAL BAND SYNDROME OF BOTH SIDES: Primary | ICD-10-CM

## 2019-02-01 DIAGNOSIS — M54.16 CHRONIC LUMBAR RADICULOPATHY: ICD-10-CM

## 2019-02-01 DIAGNOSIS — M76.31 ILIOTIBIAL BAND SYNDROME OF BOTH SIDES: Primary | ICD-10-CM

## 2019-02-01 PROCEDURE — 97140 MANUAL THERAPY 1/> REGIONS: CPT

## 2019-02-01 PROCEDURE — 97110 THERAPEUTIC EXERCISES: CPT

## 2019-02-01 NOTE — PROGRESS NOTES
Daily Note     Today's date: 2019  Patient name: Oliva Jim  : 1965  MRN: [id number]  Referring provider: Toribio Jamison MD  Dx:   Encounter Diagnosis     ICD-10-CM    1  Iliotibial band syndrome of both sides M76 31     M76 32    2  Chronic lumbar radiculopathy M54 16                   Subjective: Pt reports cont pain along lateral L hip and central LS  Reports overall improved sx  Objective: See treatment diary below      Assessment: Tolerated treatment well  Patient demonstrated fatigue post treatment and exhibited good technique with therapeutic exercises  Pt cont to saman program well with min increased pain today  Pt cont to have some tenderness over L IT band and lateral hip  Plan: Continue per plan of care       Precautions: Karina principle for L side post/lat lumbar derangement; 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, Glut Med, TFL, ITB     Daily Treatment Diary      HEP: Sheet provided and discussed     Manual  19     ART                       IASTM                       JM                       Stretch  15'  15'  25'  15'  15'  15'  15'  15'  15'     Triggerpoint                             Exercise Diary  19   Prone on elbows x10'  10'  10'  10'  10'  10'  10'  10' 10'                         Prone Press Ups 3x10  3/10    3/10  3/10  3/10  3/10  3/10  3/10   Front Wall Glides                     Lateral Wall Glides                     LTR                     SKTC                     DKTC                     PB fwd trunk flx                     Prone hip ext 3x10  3/10    3/10  3/10  3/10  3/10  3/10  3/10   Prone hip IR with TB 3x10 L3  3/10 L3    3/10 L3  3/10 L3  3/10 L3 3/10 L3  3/10 L3  3/10 L3   Prone hip ER with Ball 3x10  3/10    3/10  3/10  3/10  3/10  3/10  3/10   Prone Hip flexor stretch  "  "            " "  No Shoe AE Steamboats                     Lateral walking                      QKB  3/5  3/5    3/5  3/5  3/5 3/5  3/10  3/10   Cat Backs                     On elbows hip ext  3/10  3/10              3/10    clamshells  L3 3/10  L3 3/10         L2 3/10  L2 3/10  L2 3/10                               Modalities  1/30/19 2/1/19 1/2/19 1/4/19 1/9/19 1/11/19 1/16/19 1/18/19 1/23/19   MHP                10'  10'   CP Prone x12'  10'  10'prone  10'  10'  10'  10'  10' 10'   STIM

## 2019-02-01 NOTE — TELEPHONE ENCOUNTER
Called pt and spoke with son  Verified that her appointment for 2/4 is in [place]  Pt's son verbalized his understanding

## 2019-02-04 ENCOUNTER — OFFICE VISIT (OUTPATIENT)
Dept: PAIN MEDICINE | Facility: CLINIC | Age: 54
End: 2019-02-04
Payer: COMMERCIAL

## 2019-02-04 VITALS — SYSTOLIC BLOOD PRESSURE: 136 MMHG | BODY MASS INDEX: 27.95 KG/M2 | WEIGHT: 138.4 LBS | DIASTOLIC BLOOD PRESSURE: 70 MMHG

## 2019-02-04 DIAGNOSIS — M54.16 CHRONIC LUMBAR RADICULOPATHY: ICD-10-CM

## 2019-02-04 DIAGNOSIS — M79.18 MYOFASCIAL PAIN SYNDROME: Primary | ICD-10-CM

## 2019-02-04 DIAGNOSIS — M54.16 LUMBAR RADICULOPATHY: ICD-10-CM

## 2019-02-04 PROCEDURE — 99213 OFFICE O/P EST LOW 20 MIN: CPT | Performed by: ANESTHESIOLOGY

## 2019-02-04 RX ORDER — GABAPENTIN 600 MG/1
600 TABLET ORAL 3 TIMES DAILY
Qty: 90 TABLET | Refills: 1 | Status: SHIPPED | OUTPATIENT
Start: 2019-02-04 | End: 2019-03-25

## 2019-02-04 NOTE — PROGRESS NOTES
Assessment:  1  Myofascial pain syndrome    2  Chronic lumbar radiculopathy    3  Lumbar radiculopathy        Plan:  Patient is a 70-year-old female with complaints of low back pain and left leg pain in the L5 and L4 nerve root distribution presents to office for follow-up visit  Christoph Granado is status post bilateral paracervical trapezius trigger point injections to she reports 60% percent myofascial in neck shoulders  Patient denies any adverse events since last office visit   Pt notes increasing low back pain and radiating pain in the left lower extremity in the L4 NR distribution  Pt also notes pain in the left hip  X-ray of left hip shows evidence of degeneration of sacroiliac joints and calcification of trochanteric bursa on the right hip  Patient reports good relief from physical therapy for low back pain and leg pain  1  We will continue gabapentin 600mg po TID for radiculopathy  2  Pt will continue physical therapy  3  F/U 2 months        History of Present Illness: The patient is a 47 y o  female who presents for a follow up office visit in regards to Back Pain and Leg Pain  The patients current symptoms include 6/10 constant    Current pain medications includes:    The patient reports that this regimen is providing 60% pain relief  The patient is reporting no side effects from this pain medication regimen  I have personally reviewed and/or updated the patient's past medical history, past surgical history, family history, social history, current medications, allergies, and vital signs today  Review of Systems  Review of Systems   Musculoskeletal: Positive for gait problem  All other systems reviewed and are negative          Past Medical History:   Diagnosis Date    Fatigue     High cholesterol     Neuropathy     Osteoarthritis     knees       Past Surgical History:   Procedure Laterality Date    APPENDECTOMY      EPIDURAL BLOCK INJECTION Left 5/17/2018    Procedure: L4 AND L5 TRANSFORAMINAL EPIDURAL STEROID INJECTION;  Surgeon: Baltazar Matt MD;  [facility];  Service: Pain Management     EPIDURAL BLOCK INJECTION Left 10/4/2018    Procedure: L4 AND L5 TRANSFORAMINAL EPIDURAL STEROID INJECTION;  Surgeon: Baltazar Matt MD;  [facility];  Service: Pain Management     CT COLONOSCOPY FLX DX W/COLLJ SPEC WHEN PFRMD N/A 5/31/2016    Procedure: COLONOSCOPY;  Surgeon: Ariadna Nguyen MD;  [facility];  Service: Gastroenterology    CT ESOPHAGOGASTRODUODENOSCOPY TRANSORAL DIAGNOSTIC N/A 11/15/2016    Procedure: ESOPHAGOGASTRODUODENOSCOPY (EGD); Surgeon: Ariadna Nguyen MD;  [facility];  Service: Gastroenterology       Family History   Problem Relation Age of Onset    Uterine cancer Mother        Social History     Occupational History    Not on file  Social History Main Topics    Smoking status: Never Smoker    Smokeless tobacco: Never Used    Alcohol use No    Drug use: No    Sexual activity: Not on file         Current Outpatient Prescriptions:     butalbital-acetaminophen-caffeine (FIORICET,ESGIC) -40 mg per tablet, Take 1 tablet by mouth every 4 (four) hours as needed for headaches, Disp: 30 tablet, Rfl: 0    gabapentin (NEURONTIN) 600 MG tablet, Take 1 tablet (600 mg total) by mouth 3 (three) times a day for 30 days, Disp: 90 tablet, Rfl: 1    neomycin-polymyxin-hydrocortisone (CORTISPORIN) otic solution, Administer 3 drops into both ears every 8 (eight) hours, Disp: 10 mL, Rfl: 0    No Known Allergies    Physical Exam:    There were no vitals taken for this visit  Constitutional:normal, well developed, well nourished, alert, in no distress and non-toxic and no overt pain behavior    Eyes:anicteric  HEENT:grossly intact  Neck:supple, symmetric, trachea midline and no masses   Pulmonary:even and unlabored  Cardiovascular:No edema or pitting edema present  Skin:Normal without rashes or lesions and well hydrated  Psychiatric:Mood and affect appropriate  Neurologic:Cranial Nerves II-XII grossly intact  Musculoskeletal:normal    Imaging  No orders to display       No orders of the defined types were placed in this encounter

## 2019-02-06 ENCOUNTER — OFFICE VISIT (OUTPATIENT)
Dept: PHYSICAL THERAPY | Facility: CLINIC | Age: 54
End: 2019-02-06
Payer: COMMERCIAL

## 2019-02-06 DIAGNOSIS — M54.16 CHRONIC LUMBAR RADICULOPATHY: ICD-10-CM

## 2019-02-06 DIAGNOSIS — M76.31 ILIOTIBIAL BAND SYNDROME OF BOTH SIDES: Primary | ICD-10-CM

## 2019-02-06 DIAGNOSIS — M76.32 ILIOTIBIAL BAND SYNDROME OF BOTH SIDES: Primary | ICD-10-CM

## 2019-02-06 PROCEDURE — 97110 THERAPEUTIC EXERCISES: CPT

## 2019-02-06 PROCEDURE — 97140 MANUAL THERAPY 1/> REGIONS: CPT

## 2019-02-06 NOTE — PROGRESS NOTES
Daily Note     Today's date: 2019  Patient name: Oliva Jim  : 1965  MRN: [id number]  Referring provider: Toribio Jamison MD  Dx:   Encounter Diagnosis     ICD-10-CM    1  Iliotibial band syndrome of both sides M76 31     M76 32    2  Chronic lumbar radiculopathy M54 16                   Subjective: Pt reports she has less tenderness along lateral L hip  Objective: See treatment diary below      Assessment: Tolerated treatment well  Patient exhibited good technique with therapeutic exercises  Pt cont to saman tx with decreased overall sx  Pt cont to have tenderness with STM along L lateral hip  Pt saman program with overall improved saman  Plan: Continue per plan of care            Precautions: Karina principle for L side post/lat lumbar derangement; 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, Glut Med, TFL, ITB     Daily Treatment Diary      HEP: Sheet provided and discussed     Manual  19     ART                       IASTM                       JM                       Stretch  15'  15'  25'  15'  15'  15'  15'  15'  15'     Triggerpoint                             Exercise Diary  19   Prone on elbows x10'  10'  10'  10'  10'  10'  10'  10' 10'                         Prone Press Ups 3x10  3/10  3/10  3/10  3/10  3/10  3/10  3/10  3/10   Front Wall Glides                     Lateral Wall Glides                     LTR                     SKTC                     DKTC                     PB fwd trunk flx                     Prone hip ext 3x10  3/10    3/10  3/10  3/10  3/10  3/10  3/10   Prone hip IR with TB 3x10 L3  3/10 L3    3/10 L3  3/10 L3  3/10 L3 3/10 L3  3/10 L3  3/10 L3   Prone hip ER with Ball 3x10  3/10    3/10  3/10  3/10  3/10  3/10  3/10   Prone Hip flexor stretch  "  "    "        " "    No Shoe AE Steamboats                     Lateral walking                      QKB  3/5  3/5    3/5  3/5  3/5 3/5  3/10  3/10   Cat Backs                     On elbows hip ext  3/10  3/10    3/10          3/10    clamshells  L3 3/10  L3 3/10    L3 3/10     L2 3/10  L2 3/10  L2 3/10                               Modalities  1/30/19 2/1/19 2/4/19 1/4/19 1/9/19 1/11/19 1/16/19 1/18/19 1/23/19   MHP                10'  10'   CP Prone x12'  10'  10'prone  10'  10'  10'  10'  10' 10'   STIM

## 2019-02-08 ENCOUNTER — OFFICE VISIT (OUTPATIENT)
Dept: PHYSICAL THERAPY | Facility: CLINIC | Age: 54
End: 2019-02-08
Payer: COMMERCIAL

## 2019-02-08 DIAGNOSIS — M76.31 ILIOTIBIAL BAND SYNDROME OF BOTH SIDES: ICD-10-CM

## 2019-02-08 DIAGNOSIS — M76.32 ILIOTIBIAL BAND SYNDROME OF BOTH SIDES: ICD-10-CM

## 2019-02-08 DIAGNOSIS — M54.16 CHRONIC LUMBAR RADICULOPATHY: Primary | ICD-10-CM

## 2019-02-08 PROCEDURE — 97140 MANUAL THERAPY 1/> REGIONS: CPT

## 2019-02-08 PROCEDURE — 97110 THERAPEUTIC EXERCISES: CPT

## 2019-02-08 PROCEDURE — 97010 HOT OR COLD PACKS THERAPY: CPT

## 2019-02-08 NOTE — PROGRESS NOTES
Daily Note     Today's date: 2019  Patient name: Jordon Mishra  : 1965  MRN: [id number]  Referring provider: Wicho Hunter MD  Dx:   Encounter Diagnosis     ICD-10-CM    1  Chronic lumbar radiculopathy M54 16    2  Iliotibial band syndrome of both sides M76 31     M76 32                   Subjective: Pt states she is feeling a little better today  Hip is improving but back is same"      Objective: See treatment diary below      Assessment: Tolerated treatment fair  Pt cued for proper execution of exercise techniques  Pt with minimal complaints during session         Plan: Progress treatment as tolerated                Precautions: Karina principle for L side post/lat lumbar derangement; 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, Glut Med, TFL, ITB     Daily Treatment Diary      HEP: Sheet provided and discussed     Manual  19     ART                       IASTM                       JM                       Stretch  15'  15'  25'  15'  15'  15'  15'  15'  15'  15'   Triggerpoint                             Exercise Diary  19   Prone on elbows x10'  10'  10'  10'  10'  10'  10'  10' 10'                         Prone Press Ups 3x10  3/10  3/10  3/10  3/10  3/10  3/10  3/10  3/10   Front Wall Glides                     Lateral Wall Glides                     LTR                     SKTC                     DKTC                     PB fwd trunk flx                     Prone hip ext 3x10  3/10    3/10  3/10  3/10  3/10  3/10  3/10   Prone hip IR with TB 3x10 L3  3/10 L3    3/10 L3  3/10 L3  3/10 L3 3/10 L3  3/10 L3  3/10 L3   Prone hip ER with Ball 3x10  3/10    3/10  3/10  3/10  3/10  3/10  3/10   Prone Hip flexor stretch  "  "    "        " "    No Shoe AE Steamboats                     Lateral walking                      QKB  3/10  3/5    3/5  3/5  3/5 3/5  3/10  3/10   Cat Backs                     On elbows hip ext  3/10  3/10    3/10          3/10    clamshells  L3 3/10  L3 3/10    L3 3/10     L2 3/10  L2 3/10  L2 3/10                               Modalities  2/7 2/1/19 2/4/19 1/4/19 1/9/19 1/11/19 1/16/19 1/18/19 1/23/19   MHP  P 10'              10'  10'   CP Prone x12'  10'  10'prone  10'  10'  10'  10'  10' 10'   STIM

## 2019-02-13 ENCOUNTER — OFFICE VISIT (OUTPATIENT)
Dept: PHYSICAL THERAPY | Facility: CLINIC | Age: 54
End: 2019-02-13
Payer: COMMERCIAL

## 2019-02-13 DIAGNOSIS — M54.16 CHRONIC LUMBAR RADICULOPATHY: Primary | ICD-10-CM

## 2019-02-13 DIAGNOSIS — M76.32 ILIOTIBIAL BAND SYNDROME OF BOTH SIDES: ICD-10-CM

## 2019-02-13 DIAGNOSIS — M76.31 ILIOTIBIAL BAND SYNDROME OF BOTH SIDES: ICD-10-CM

## 2019-02-13 PROCEDURE — 97140 MANUAL THERAPY 1/> REGIONS: CPT

## 2019-02-13 PROCEDURE — 97110 THERAPEUTIC EXERCISES: CPT

## 2019-02-13 NOTE — PROGRESS NOTES
Daily Note     Today's date: 2019  Patient name: Vani Palacios  : 1965  MRN: [id number]  Referring provider: Srinivas Marquez MD  Dx:   Encounter Diagnosis     ICD-10-CM    1  Chronic lumbar radiculopathy M54 16    2  Iliotibial band syndrome of both sides M76 31     M76 32                   Subjective: Pt cont to c/o L SI and lateral hip discomfort  Objective: See treatment diary below      Assessment: Tolerated treatment fair  Patient demonstrated fatigue post treatment and exhibited good technique with therapeutic exercises  Pt with decreased mm guarding and tenderness with STM along L hip today  Plan: Continue per plan of care        Precautions: Karina principle for L side post/lat lumbar derangement; 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, Glut Med, TFL, ITB     Daily Treatment Diary      HEP: Sheet provided and discussed     Manual  19     ART                       IASTM                       JM                       Stretch  15'  15'  25'  15'  15'  15'  15'  15'  15'  15'   Triggerpoint                             Exercise Diary  19   Prone on elbows x10'  10'  10'  10'  10'  10'  10'  10' 10'                         Prone Press Ups 3x10  3/10  3/10  3/10  3/10  3/10  3/10  3/10  3/10   Front Wall Glides                     Lateral Wall Glides                     LTR                     SKTC                     DKTC                     PB fwd trunk flx                     Prone hip ext 3x10  3/10    3/10  3/10  3/10  3/10  3/10  3/10   Prone hip IR with TB 3x10 L3  3/10 L3    3/10 L3  3/10 L3  3/10 L3 3/10 L3  3/10 L3  3/10 L3   Prone hip ER with Ball 3x10  3/10    3/10  3/10  3/10  3/10  3/10  3/10   Prone Hip flexor stretch  "  "    "  "      " "    No Shoe AE Steamboats                     Lateral walking                      QKB  3/10  3/5    3/5  3/5  3/5 3/5  3/10  3/10   Cat Backs                     On elbows hip ext  3/10  3/10    3/10  3/10        3/10    clamshells  L3 3/10  L3 3/10    L3 3/10  L3 3/10   L2 3/10  L2 3/10  L2 3/10                               Modalities  2/7 2/1/19 2/4/19 2/13/19 1/9/19 1/11/19 1/16/19 1/18/19 1/23/19   MHP  P 10'              10'  10'   CP Prone x12'  10'  10'prone  10'  10'  10'  10'  10' 10'   STIM

## 2019-02-15 ENCOUNTER — OFFICE VISIT (OUTPATIENT)
Dept: PHYSICAL THERAPY | Facility: CLINIC | Age: 54
End: 2019-02-15
Payer: COMMERCIAL

## 2019-02-15 DIAGNOSIS — M76.31 ILIOTIBIAL BAND SYNDROME OF BOTH SIDES: ICD-10-CM

## 2019-02-15 DIAGNOSIS — M54.16 CHRONIC LUMBAR RADICULOPATHY: Primary | ICD-10-CM

## 2019-02-15 DIAGNOSIS — M76.32 ILIOTIBIAL BAND SYNDROME OF BOTH SIDES: ICD-10-CM

## 2019-02-15 PROCEDURE — 97140 MANUAL THERAPY 1/> REGIONS: CPT

## 2019-02-15 PROCEDURE — 97110 THERAPEUTIC EXERCISES: CPT

## 2019-02-15 NOTE — PROGRESS NOTES
Daily Note     Today's date: 2/15/2019  Patient name: Zoe Espino  : 1965  MRN: [id number]  Referring provider: Maikel Gutierrez MD  Dx:   Encounter Diagnosis     ICD-10-CM    1  Chronic lumbar radiculopathy M54 16    2  Iliotibial band syndrome of both sides M76 31     M76 32                   Subjective: Pt reports she is doing well however c/o anterior hip pain today  Objective: See treatment diary below      Assessment: Tolerated treatment fair  Patient demonstrated fatigue post treatment  Pt with notable point tenderness along L anterior hip today and hip flexors with STM  Plan: Continue per plan of care       Precautions: Karina principle for L side post/lat lumbar derangement; 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, Glut Med, TFL, ITB     Daily Treatment Diary      HEP: Sheet provided and discussed     Manual  2/7/19  2/1/19  1/2/19  2/6/19 2/13/19  2/15/19  1/16/19  1/18/19  1/23/19     ART                       IASTM                       JM                       Stretch  15'  15'  25'  15'  15'  15'  15'  15'  15'  15'   Triggerpoint                             Exercise Diary  2/7  2/1/19  2/4/19  2/6/19 2/13/19  2/15/19  1/16/19  1819  1/23/19   Prone on elbows x10'  10'  10'  10'  10'  10'  10'  10' 10'                         Prone Press Ups 3x10  3/10  3/10  3/10  3/10  3/10  3/10  3/10  3/10   Front Wall Glides                     Lateral Wall Glides                     LTR                     SKTC                     DKTC                     PB fwd trunk flx                     Prone hip ext 3x10  3/10    3/10  3/10  3/10  3/10  3/10  3/10   Prone hip IR with TB 3x10 L3  3/10 L3    3/10 L3  3/10 L3  3/10 L3 3/10 L3  3/10 L3  3/10 L3   Prone hip ER with Ball 3x10  3/10    3/10  3/10  3/10  3/10  3/10  3/10   Prone Hip flexor stretch  "  "    "  "  "    " "    No Shoe AE Steamboats                     Lateral walking                      QKB  3/10  3/5    3/5  3/5  3/5 3/5  3/10  3/10   Cat Backs                     On elbows hip ext  3/10  3/10    3/10  3/10  3/10      3/10    clamshells  L3 3/10  L3 3/10    L3 3/10  L3 3/10  L3 3/10 L2 3/10  L2 3/10  L2 3/10                               Modalities  2/7  2/1/19  2/4/19  2/13/19 2/15/19  1/11/19  1/16/19  1/18/19  1/23/19   MHP  P 10'              10'  10'   CP Prone x12'  10'  10'prone  10'  10'  10'  10'  10' 10'   STIM

## 2019-02-20 ENCOUNTER — OFFICE VISIT (OUTPATIENT)
Dept: PHYSICAL THERAPY | Facility: CLINIC | Age: 54
End: 2019-02-20
Payer: COMMERCIAL

## 2019-02-20 DIAGNOSIS — M76.31 ILIOTIBIAL BAND SYNDROME OF BOTH SIDES: ICD-10-CM

## 2019-02-20 DIAGNOSIS — M54.16 CHRONIC LUMBAR RADICULOPATHY: Primary | ICD-10-CM

## 2019-02-20 DIAGNOSIS — M76.32 ILIOTIBIAL BAND SYNDROME OF BOTH SIDES: ICD-10-CM

## 2019-02-20 PROCEDURE — 97110 THERAPEUTIC EXERCISES: CPT | Performed by: PHYSICAL THERAPIST

## 2019-02-20 PROCEDURE — 97112 NEUROMUSCULAR REEDUCATION: CPT | Performed by: PHYSICAL THERAPIST

## 2019-02-20 PROCEDURE — 97140 MANUAL THERAPY 1/> REGIONS: CPT | Performed by: PHYSICAL THERAPIST

## 2019-02-20 NOTE — PROGRESS NOTES
PT Re-Evaluation     Today's date: 2019  Patient name: Nae Pa  : 1965  MRN: [id number]  Referring provider: Jane English MD  Dx:   Encounter Diagnosis     ICD-10-CM    1  Chronic lumbar radiculopathy M54 16    2  Iliotibial band syndrome of both sides M76 31     M76 32                   Assessment  Understanding of Dx/Px/POC: good   Prognosis: good    Goals  STGs: To be complete within 4 weeks  - Decrease/centralize pain to < 2/10 at worst (partially met)  - Increase lumbar extension and Hip extension/IR ROM to WNL (partially met)  - Increase bilateral hip flexor flexibility to WNL (partially met)  - Increase core stability and posterior lateral LE chain strength to > 4+/5 (partially met)  - Improve postural awareness capacity to > 60min before deficit (partially met)    LTGs: To be complete within 6 weeks  - Able to sit for any extended amount of time without pain or limitation for increased safety and functional capacity with ADLs and home-related duty (partially met)  - Able to repetitively bend over at trunk without pain or limitation for increased safety and functional capacity with ADLs and and home-related duty (partially met)  - Able to complete all lifting/carrying activity without pain or limitation for increased safety and functional capacity with ADLs and whome-related duty (partially met)  - Able to complete transfers repetitively without pain or limitation for increased safety and functional capacity with ADLs and home-related duty (partially met)    Plan  Frequency: 2x week  Duration in weeks: 4         Upon completion of today's re-evaluation, as evidenced by present objective ans subjective measures, Lindsey's sx remain consistent with continued recovery from posterior lumbar derangement and bilateral Hip instability secondary to postural dysfunction and improper functional movement mechanics   Pt will benefit from continued skilled physical therapy to address current deficits  Subjective Evaluation    Pain  Current pain ratin  At best pain ratin  At worst pain ratin  Location: LBP with L Hip pain           Pt reports she feels 50-75% improved since starting physical therapy  Pt reports the intensity and frequency of her pain has continued to improve each visit  Pt reports she still feels bilateral Hip and LE weakness, but her strength continues to improve  Continues to see benefit from physical therapy at this time and anxious to continue progressing        Objective Pain level ranges 0-5/10  Palpation: Mild point tenderness over Hip flexors, glut med, TFL, ITB  AROM: WNL t/o lumbar and bilateral hips  Strength: Core stability and post/lat LE chain strength 4+/5  Postural Awareness: Fair and improving (Ant pelvic tilt, flexed trunk)  Repeated movement testing: (+) for L side post/lat lumbar derangement  Hip flexor flexibility: Mild shortening (+) Kennedy Test  Able sit without pain and limitation > 50% of the time  Able to bend over at trunk without pain and limitation > 50% of the time  Able to complete lifting/carrying activity without pain and limitation > 50% of the time  Able to complete transfers without pain and limitation > 50% of the time    Flowsheet Rows      Most Recent Value   PT/OT G-Codes   FOTO information reviewed  -- [PSFS 7/10]          Precautions: Karina principle for L side post/lat lumbar derangement; 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, Glut Med, TFL, ITB     Daily Treatment Diary      HEP: Sheet provided and discussed     Manual  19     ART            x15'           IASTM                       JM                       Stretch  15'  15'  25'  15'  15'   15'  15'  15'  15'   Triggerpoint                             Exercise Diary  19   Prone on elbows x10'  10'  10'  10'  10'  10'  10'  10' 10'                         Prone Press Ups 3x10  3/10  3/10  3/10  3/10  3/10  3/10  3/10  3/10   Front Wall Glides                     Lateral Wall Glides                     LTR                     SKTC                     DKTC                     PB fwd trunk flx                     Prone hip ext 3x10  3/10    3/10  3/10  3/10  3/10  3/10  3/10   Prone hip IR with TB 3x10 L3  3/10 L3    3/10 L3  3/10 L3  3/10 L3 3/10 L3  3/10 L3  3/10 L3   Prone hip ER with Ball 3x10  3/10    3/10  3/10  3/10  3/10  3/10  3/10   Prone Hip flexor stretch  6/20"  6/20"    6/20"  6/20"  6/20"    6/20" 6/20"    No Shoe AE Steamboats                     Lateral walking                      QKB  3/10  3/5    3/5  3/5  3/5 3/5  3/10  3/10   Cat Backs                     On elbows hip ext  3/10  3/10    3/10  3/10  3/10      3/10    clamshells  L3 3/10  L3 3/10    L3 3/10  L3 3/10  L3 3/10 L2 3/10  L2 3/10  L2 3/10                               Modalities  2/7 2/1/19 2/4/19 2/13/19 2/20/19 1/11/19 1/16/19 1/18/19 1/23/19   MHP  P 10'              10'  10'   CP Prone x12'  10'  10'prone  10'  10'  10'  10'  10' 10'   STIM

## 2019-02-22 ENCOUNTER — OFFICE VISIT (OUTPATIENT)
Dept: PHYSICAL THERAPY | Facility: CLINIC | Age: 54
End: 2019-02-22
Payer: COMMERCIAL

## 2019-02-22 DIAGNOSIS — M76.31 ILIOTIBIAL BAND SYNDROME OF BOTH SIDES: ICD-10-CM

## 2019-02-22 DIAGNOSIS — M54.16 CHRONIC LUMBAR RADICULOPATHY: Primary | ICD-10-CM

## 2019-02-22 DIAGNOSIS — M76.32 ILIOTIBIAL BAND SYNDROME OF BOTH SIDES: ICD-10-CM

## 2019-02-22 PROCEDURE — 97110 THERAPEUTIC EXERCISES: CPT

## 2019-02-22 PROCEDURE — 97140 MANUAL THERAPY 1/> REGIONS: CPT

## 2019-02-22 NOTE — PROGRESS NOTES
Daily Note     Today's date: 2019  Patient name: Davidson Ayala  : 1965  MRN: [id number]  Referring provider: Darwin Whitley MD  Dx:   Encounter Diagnosis     ICD-10-CM    1  Chronic lumbar radiculopathy M54 16    2  Iliotibial band syndrome of both sides M76 31     M76 32                   Subjective: Pt reports she has B lateral hip discomfort  States min pain in LS  Objective: See treatment diary below      Assessment: Tolerated treatment well  Patient demonstrated fatigue post treatment and exhibited good technique with therapeutic exercises  Pt cont to saman exercises well  Pt with cont discomfort with STM along lateral hip discomfort  Plan: Continue per plan of care            Precautions: Karina principle for L side post/lat lumbar derangement; 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, Glut Med, TFL, ITB     Daily Treatment Diary      HEP: Sheet provided and discussed     Manual  19     ART            x15'           IASTM                       JM                       Stretch  15'  15'  25'  15'  15'    15'  15'  15'  15'   Triggerpoint                             Exercise Diary  19   Prone on elbows x10'  10'  10'  10'  10'  10'  10'  10' 10'                         Prone Press Ups 3x10  3/10  3/10  3/10  3/10  3/10  3/10  3/10  3/10   Front Wall Glides                     Lateral Wall Glides                     LTR                     SKTC                     DKTC                     PB fwd trunk flx                     Prone hip ext 3x10  3/10    3/10  3/10  3/10  3/10  3/10  3/10   Prone hip IR with TB 3x10 L3  3/10 L3    3/10 L3  3/10 L3  3/10 L3 3/10 L3  3/10 L3  3/10 L3   Prone hip ER with Ball 3x10  3/10    3/10  3/10  3/10  3/10  3/10  3/10   Prone Hip flexor stretch  "  "    "  "  "  "  " "    No Shoe AE Steamboats                     Lateral walking                      QKB  3/10  3/5    3/5  3/5  3/5 3/5  3/10  3/10   Cat Backs                     On elbows hip ext  3/10  3/10    3/10  3/10  3/10  3/10    3/10    clamshells  L3 3/10  L3 3/10    L3 3/10  L3 3/10  L3 3/10 L3 3/10  L2 3/10  L2 3/10                               Modalities  2/7 2/1/19 2/4/19 2/13/19 2/20/19 2/22/19 1/16/19 1/18/19 1/23/19   MHP  P 10'              10'  10'   CP Prone x12'  10'  10'prone  10'  10'  10'  10'  10' 10'   STIM

## 2019-02-26 ENCOUNTER — OFFICE VISIT (OUTPATIENT)
Dept: FAMILY MEDICINE CLINIC | Facility: CLINIC | Age: 54
End: 2019-02-26
Payer: COMMERCIAL

## 2019-02-26 VITALS
HEART RATE: 67 BPM | OXYGEN SATURATION: 97 % | SYSTOLIC BLOOD PRESSURE: 148 MMHG | HEIGHT: 59 IN | DIASTOLIC BLOOD PRESSURE: 94 MMHG | WEIGHT: 134 LBS | RESPIRATION RATE: 18 BRPM | TEMPERATURE: 98.9 F | BODY MASS INDEX: 27.01 KG/M2

## 2019-02-26 DIAGNOSIS — G44.229 CHRONIC TENSION-TYPE HEADACHE, NOT INTRACTABLE: Primary | ICD-10-CM

## 2019-02-26 PROCEDURE — T1015 CLINIC SERVICE: HCPCS | Performed by: FAMILY MEDICINE

## 2019-02-26 PROCEDURE — 99213 OFFICE O/P EST LOW 20 MIN: CPT | Performed by: FAMILY MEDICINE

## 2019-02-26 RX ORDER — TOPIRAMATE 25 MG/1
25 TABLET ORAL EVERY 12 HOURS SCHEDULED
Qty: 60 TABLET | Refills: 1 | Status: SHIPPED | OUTPATIENT
Start: 2019-02-26 | End: 2019-03-25 | Stop reason: SDUPTHER

## 2019-02-26 NOTE — PROGRESS NOTES
History and Physical  Kristie Joaquin 47 y o  female MRN: [id number]      Assessment:   Chronic headache    Plan:  Start Topamax 25 mg daily x 3 days then 1 tab 2x daily  Continue with Gabapentin  Check ESR  RTC 2-3 weeks  Chief Complaint   Patient presents with    Follow-up     patient states that she has been having bad headaches on and off for the past month and a half she says its better with medication also state that lately she has been really really tired all she wants to do is be in bed  HPI:  Kristie Joaquin is a 47 y o  female who presents with above  She c/o headache at visit last month also  The pain is constant unless taking Fioricet  Pain described as "bites" across the forehead and temples  Historical Information   Past Medical History:   Diagnosis Date    Fatigue     High cholesterol     Neuropathy     Osteoarthritis     knees     Past Surgical History:   Procedure Laterality Date    APPENDECTOMY      EPIDURAL BLOCK INJECTION Left 5/17/2018    Procedure: L4 AND L5 TRANSFORAMINAL EPIDURAL STEROID INJECTION;  Surgeon: Curtis Boyer MD;  Location: [facility];  Service: Pain Management     EPIDURAL BLOCK INJECTION Left 10/4/2018    Procedure: L4 AND L5 TRANSFORAMINAL EPIDURAL STEROID INJECTION;  Surgeon: Curtis Boyer MD;  Location: [facility];  Service: Pain Management     NH COLONOSCOPY FLX DX W/COLLJ SPEC WHEN PFRMD N/A 5/31/2016    Procedure: COLONOSCOPY;  Surgeon: Kelsey Orellana MD;  Location: [facility];  Service: Gastroenterology    NH ESOPHAGOGASTRODUODENOSCOPY TRANSORAL DIAGNOSTIC N/A 11/15/2016    Procedure: ESOPHAGOGASTRODUODENOSCOPY (EGD);   Surgeon: Kelsey Orellana MD;  Location: [facility];  Service: Gastroenterology     Social History   Social History     Substance and Sexual Activity   Alcohol Use No     Social History     Substance and Sexual Activity   Drug Use No     Social History     Tobacco Use   Smoking Status Never Smoker   Smokeless Tobacco Never Used Family History   Problem Relation Age of Onset    Uterine cancer Mother        Meds/Allergies   No Known Allergies    Meds:    Current Outpatient Medications:     butalbital-acetaminophen-caffeine (FIORICET,ESGIC) -40 mg per tablet, Take 1 tablet by mouth every 4 (four) hours as needed for headaches (Patient not taking: Reported on 2/4/2019 ), Disp: 30 tablet, Rfl: 0    gabapentin (NEURONTIN) 600 MG tablet, Take 1 tablet (600 mg total) by mouth 3 (three) times a day for 30 days, Disp: 90 tablet, Rfl: 1    neomycin-polymyxin-hydrocortisone (CORTISPORIN) otic solution, Administer 3 drops into both ears every 8 (eight) hours (Patient not taking: Reported on 2/4/2019 ), Disp: 10 mL, Rfl: 0      REVIEW OF SYSTEMS  Review of Systems   Constitutional: Negative  HENT: Negative  Eyes: Negative  Respiratory: Negative  Cardiovascular: Negative  Neurological: Positive for headaches  Psychiatric/Behavioral: Negative  Current Vitals:   Blood Pressure: 148/94 (02/26/19 1527)  Pulse: 67 (02/26/19 1527)  Temperature: 98 9 °F (37 2 °C) (02/26/19 1527)  Respirations: 18 (02/26/19 1527)  Height: 4' 11" (149 9 cm) (02/26/19 1527)  Weight - Scale: 60 8 kg (134 lb) (02/26/19 1527)  SpO2: 97 % (02/26/19 1527)  Body mass index is 27 06 kg/m²  PHYSICAL EXAMS:  Physical Exam   Constitutional: She appears well-developed and well-nourished  HENT:   Head: Normocephalic and atraumatic  Right Ear: External ear normal    Eyes: Pupils are equal, round, and reactive to light  Conjunctivae and EOM are normal    Neck: Normal range of motion  Neck supple  Cardiovascular: Normal rate, regular rhythm and normal heart sounds  Pulmonary/Chest: Effort normal and breath sounds normal    Musculoskeletal: She exhibits no edema  Minimal tenderness with palpation across forehead  Psychiatric: She has a normal mood and affect  Nursing note and vitals reviewed  Lab Results:          Aron Negro, LEIGHTON  2/26/2019, 3:33 PM

## 2019-02-27 ENCOUNTER — OFFICE VISIT (OUTPATIENT)
Dept: PHYSICAL THERAPY | Facility: CLINIC | Age: 54
End: 2019-02-27
Payer: COMMERCIAL

## 2019-02-27 DIAGNOSIS — G89.29 CHRONIC RIGHT HIP PAIN: ICD-10-CM

## 2019-02-27 DIAGNOSIS — M76.31 ILIOTIBIAL BAND SYNDROME OF BOTH SIDES: ICD-10-CM

## 2019-02-27 DIAGNOSIS — M25.551 CHRONIC RIGHT HIP PAIN: ICD-10-CM

## 2019-02-27 DIAGNOSIS — M76.32 ILIOTIBIAL BAND SYNDROME OF BOTH SIDES: ICD-10-CM

## 2019-02-27 DIAGNOSIS — M54.16 CHRONIC LUMBAR RADICULOPATHY: Primary | ICD-10-CM

## 2019-02-27 PROCEDURE — 97110 THERAPEUTIC EXERCISES: CPT

## 2019-02-27 PROCEDURE — 97140 MANUAL THERAPY 1/> REGIONS: CPT

## 2019-02-27 PROCEDURE — 97010 HOT OR COLD PACKS THERAPY: CPT

## 2019-02-27 NOTE — PROGRESS NOTES
Daily Note     Today's date: 2019  Patient name: Evangelist Vazquez  : 1965  MRN: [id number]  Referring provider: Anshu Branch MD  Dx:   Encounter Diagnosis     ICD-10-CM    1  Chronic lumbar radiculopathy M54 16    2  Iliotibial band syndrome of both sides M76 31     M76 32                   Subjective: Pt states she cont to have B hip pain however min LS pain  Objective: See treatment diary below      Assessment: Tolerated treatment fair  Patient demonstrated fatigue post treatment  Pt able to cont to complete program with c/o B hip pain  Pt able to complete with improved mechanics and strength  Plan: Continue per plan of care        Precautions: Karina principle for L side post/lat lumbar derangement; 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, Glut Med, TFL, ITB     Daily Treatment Diary      HEP: Sheet provided and discussed     Manual  19     ART            x15'           IASTM                       JM                       Stretch  15'  15'  25'  15'  15'    15'  15'  15'  15'   Triggerpoint                             Exercise Diary  19   Prone on elbows x10'  10'  10'  10'  10'  10'  10'  10' 10'                         Prone Press Ups 3x10  3/10  3/10  3/10  3/10  3/10  3/10  3/10  3/10   Front Wall Glides                     Lateral Wall Glides                     LTR                     SKTC                     DKTC                     PB fwd trunk flx                     Prone hip ext 3x10  3/10    3/10  3/10  3/10  3/10  3/10  3/10   Prone hip IR with TB 3x10 L3  3/10 L3    3/10 L3  3/10 L3  3/10 L3 3/10 L3  3/10 L3  3/10 L3   Prone hip ER with Ball 3x10  3/10    3/10  3/10  3/10  3/10  3/10  3/10   Prone Hip flexor stretch  "  "    "  "  "  "  " "    No Shoe AE Steamboats                     Lateral walking                      QKB  3/10  3/5    3/5  3/5  3/5 3/5  3/10  3/10   Cat Backs                     On elbows hip ext  3/10  3/10    3/10  3/10  3/10  3/10  3/10  3/10    clamshells  L3 3/10  L3 3/10    L3 3/10  L3 3/10  L3 3/10 L3 3/10  L3 3/10  L2 3/10                               Modalities  2/7 2/1/19 2/4/19 2/13/19 2/20/19 2/22/19 1/16/19 2/27/19 1/23/19   MHP  P 10'              10'  10'   CP Prone x12'  10'  10'prone  10'  10'  10'  10'   10'   STIM

## 2019-03-01 ENCOUNTER — APPOINTMENT (OUTPATIENT)
Dept: PHYSICAL THERAPY | Facility: CLINIC | Age: 54
End: 2019-03-01
Payer: COMMERCIAL

## 2019-03-06 ENCOUNTER — OFFICE VISIT (OUTPATIENT)
Dept: PHYSICAL THERAPY | Facility: CLINIC | Age: 54
End: 2019-03-06
Payer: COMMERCIAL

## 2019-03-06 DIAGNOSIS — G89.29 CHRONIC RIGHT HIP PAIN: ICD-10-CM

## 2019-03-06 DIAGNOSIS — M54.16 CHRONIC LUMBAR RADICULOPATHY: Primary | ICD-10-CM

## 2019-03-06 DIAGNOSIS — M25.551 CHRONIC RIGHT HIP PAIN: ICD-10-CM

## 2019-03-06 DIAGNOSIS — M76.31 ILIOTIBIAL BAND SYNDROME OF BOTH SIDES: ICD-10-CM

## 2019-03-06 DIAGNOSIS — M76.32 ILIOTIBIAL BAND SYNDROME OF BOTH SIDES: ICD-10-CM

## 2019-03-06 PROCEDURE — 97110 THERAPEUTIC EXERCISES: CPT

## 2019-03-06 PROCEDURE — 97140 MANUAL THERAPY 1/> REGIONS: CPT

## 2019-03-06 NOTE — PROGRESS NOTES
Daily Note     Today's date: 3/6/2019  Patient name: Leatha Boyd  : 1965  MRN: [id number]  Referring provider: Kareem Camargo MD  Dx:   Encounter Diagnosis     ICD-10-CM    1  Chronic lumbar radiculopathy M54 16    2  Iliotibial band syndrome of both sides M76 31     M76 32    3  Chronic right hip pain M25 551     G89 29                   Subjective: Pt reports she has anterior hip pain and LS pain today  Objective: See treatment diary below      Assessment: Tolerated treatment fair  Patient demonstrated fatigue post treatment and exhibited good technique with therapeutic exercises  Pt with increased pain along L hip flexors and LS with STM  Pt able to complete lateral resisted walking with increased sx today  Plan: Continue per plan of care         Precautions: Karina principle for L side post/lat lumbar derangement; 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, Glut Med, TFL, ITB     Daily Treatment Diary      HEP: Sheet provided and discussed     Manual  2/7/19  2/1/19  1/2/19  2/6/19 2/13/19  2/20/19  2/22/19  2/27/19  3/6/219   ART            x15'         IASTM                     JM                     Stretch  15'  15'  25'  15'  15'    15'  15'  15'   Triggerpoint                           Exercise Diary  2/7  2/1/19  2/4/19  2/6/19 2/13/19  2/20/19  2/22/19  2/27/19  3/6/19   Prone on elbows x10'  10'  10'  10'  10'  10'  10'  10' 10'                         Prone Press Ups 3x10  3/10  3/10  3/10  3/10  3/10  3/10  3/10  3/10   Front Wall Glides                     Lateral Wall Glides                     LTR                     SKTC                     DKTC                     PB fwd trunk flx                     Prone hip ext 3x10  3/10    3/10  3/10  3/10  3/10  3/10  3/10   Prone hip IR with TB 3x10 L3  3/10 L3    3/10 L3  3/10 L3  3/10 L3 3/10 L3  3/10 L3  3/10 L3   Prone hip ER with Ball 3x10  3/10    3/10  3/10  3/10  3/10  3/10  3/10   Prone Hip flexor stretch  "  "    "  6/20"  6/20"  6/20"  6/20" 6/20"    No Shoe AE Steamboats                     Lateral walking                   L3 2/60'   QKB  3/10  3/5    3/5  3/5  3/5 3/5  3/10  3/10   Cat Backs                     On elbows hip ext  3/10  3/10    3/10  3/10  3/10  3/10  3/10  3/10    clamshells  L3 3/10  L3 3/10    L3 3/10  L3 3/10  L3 3/10 L3 3/10  L3 3/10  L2 3/10                               Modalities  2/7  2/1/19  2/4/19  2/13/19 2/20/19  2/22/19  1/16/19  2/27/19  3/6/19   MHP  P 10'              10'  10'   CP Prone x12'  10'  10'prone  10'  10'  10'  10'      STIM

## 2019-03-08 ENCOUNTER — OFFICE VISIT (OUTPATIENT)
Dept: PHYSICAL THERAPY | Facility: CLINIC | Age: 54
End: 2019-03-08
Payer: COMMERCIAL

## 2019-03-08 DIAGNOSIS — M54.16 CHRONIC LUMBAR RADICULOPATHY: Primary | ICD-10-CM

## 2019-03-08 DIAGNOSIS — G89.29 CHRONIC RIGHT HIP PAIN: ICD-10-CM

## 2019-03-08 DIAGNOSIS — M25.551 CHRONIC RIGHT HIP PAIN: ICD-10-CM

## 2019-03-08 DIAGNOSIS — M76.32 ILIOTIBIAL BAND SYNDROME OF BOTH SIDES: ICD-10-CM

## 2019-03-08 DIAGNOSIS — M76.31 ILIOTIBIAL BAND SYNDROME OF BOTH SIDES: ICD-10-CM

## 2019-03-08 PROCEDURE — 97140 MANUAL THERAPY 1/> REGIONS: CPT

## 2019-03-08 PROCEDURE — 97110 THERAPEUTIC EXERCISES: CPT

## 2019-03-08 NOTE — PROGRESS NOTES
Daily Note     Today's date: 3/8/2019  Patient name: Kristie Joaquin  : 1965  MRN: [id number]  Referring provider: Newton Olguin MD  Dx:   Encounter Diagnosis     ICD-10-CM    1  Chronic lumbar radiculopathy M54 16    2  Iliotibial band syndrome of both sides M76 31     M76 32    3  Chronic right hip pain M25 551     G89 29                   Subjective: Pt reports anterior L hip pain and centralized LS pain  Objective: See treatment diary below      Assessment: Tolerated treatment fair  Patient demonstrated fatigue post treatment  Pt with cont fatigue with exercise  Pt unable to complete lateral resisted walking with notable pain  Pt tender to stm along L hip flexors  Plan: Continue per plan of care       Precautions: Karina principle for L side post/lat lumbar derangement; 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, Glut Med, TFL, ITB     Daily Treatment Diary      HEP: Sheet provided and discussed     Manual  3/8/19  2/1/19  1/2/19  2/6/19 2/13/19  2/20/19  2/22/19  2/27/19  3/6/219   ART            x15'         IASTM                     JM                     Stretch  15'  15'  25'  15'  15'    15'  15'  15'   Triggerpoint                           Exercise Diary  3/8/19  2/1/19  2/4/19  2/6/19 2/13/19  2/20/19  2/22/19  2/27/19  3/6/19   Prone on elbows x10'  10'  10'  10'  10'  10'  10'  10' 10'                         Prone Press Ups 3x10  3/10  3/10  3/10  3/10  3/10  3/10  3/10  3/10   Front Wall Glides                     Lateral Wall Glides                     LTR                     SKTC                     DKTC                     PB fwd trunk flx                     Prone hip ext 3x10  3/10    3/10  3/10  3/10  3/10  3/10  3/10   Prone hip IR with TB 3x10 L3  3/10 L3    3/10 L3  3/10 L3  3/10 L3 3/10 L3  3/10 L3  3/10 L3   Prone hip ER with Ball 3x10  3/10    3/10  3/10  3/10  3/10  3/10  3/10   Prone Hip flexor stretch  "  "    "  "  "  "  " "    No Shoe AE Steamboats                     Lateral walking   held                L3 2/60'   QKB  3/10  3/5    3/5  3/5  3/5 3/5  3/10  3/10   Cat Backs                     On elbows hip ext  3/10  3/10    3/10  3/10  3/10  3/10  3/10  3/10    clamshells  L3 3/10  L3 3/10    L3 3/10  L3 3/10  L3 3/10 L3 3/10  L3 3/10  L2 3/10                               Modalities  3/8/19  2/1/19  2/4/19  2/13/19 2/20/19  2/22/19  1/16/19  2/27/19  3/6/19   MHP                10'  10'   CP Prone x12'  10'  10'prone  10'  10'  10'  10'       STIM

## 2019-03-11 ENCOUNTER — OFFICE VISIT (OUTPATIENT)
Dept: OBGYN CLINIC | Facility: CLINIC | Age: 54
End: 2019-03-11
Payer: COMMERCIAL

## 2019-03-11 VITALS
BODY MASS INDEX: 28.76 KG/M2 | HEIGHT: 58 IN | DIASTOLIC BLOOD PRESSURE: 72 MMHG | WEIGHT: 137 LBS | SYSTOLIC BLOOD PRESSURE: 130 MMHG

## 2019-03-11 DIAGNOSIS — Z12.31 ENCOUNTER FOR SCREENING MAMMOGRAM FOR MALIGNANT NEOPLASM OF BREAST: ICD-10-CM

## 2019-03-11 DIAGNOSIS — Z01.419 ENCNTR FOR GYN EXAM (GENERAL) (ROUTINE) W/O ABN FINDINGS: Primary | ICD-10-CM

## 2019-03-11 PROCEDURE — 99386 PREV VISIT NEW AGE 40-64: CPT | Performed by: NURSE PRACTITIONER

## 2019-03-11 NOTE — PROGRESS NOTES
ASSESSMENT & PLAN: Chava Alberto is a 47 y o  [id number] with normal gynecologic exam     1   Routine well woman exam done today  2  Pap and HPV:  The patient's last pap and hpv was 2017    It was normal   2017  Pap and cotesting was not done today  Current ASCCP Guidelines reviewed  3   Mammogram ordered  4  Colonoscopy   5  The following were reviewed in today's visit : breast exam, mammogram,exercise , healthy diet and  menopause, to call for evalution if ever has vaginal bleeding  6  rto one year  CC:  Annual Gynecologic Examination    HPI: Chava Alberto is a 47 y o  [id number] who presents for annual gynecologic examination  She has the following concerns: none, here for yearly exam   Pt speaks Magdalenabrianna Mishra stayed in room and interpreted  any thing pt could not understand when I spoke Prydeinig to her  Health Maintenance:    She wears her seatbelt routinely  She does not perform regular monthly self breast exams  She feels safe at home  Pap: 2017   Last mammogram: 6/2018  Last colonoscopy: 2018    Past Medical History:   Diagnosis Date    Fatigue     High cholesterol     Neuropathy     Osteoarthritis     knees       Past Surgical History:   Procedure Laterality Date    APPENDECTOMY      EPIDURAL BLOCK INJECTION Left 5/17/2018    Procedure: L4 AND L5 TRANSFORAMINAL EPIDURAL STEROID INJECTION;  Surgeon: Baltazar Matt MD;  Location: [facility];  Service: Pain Management     EPIDURAL BLOCK INJECTION Left 10/4/2018    Procedure: L4 AND L5 TRANSFORAMINAL EPIDURAL STEROID INJECTION;  Surgeon: Baltazar Matt MD;  Location: [facility];  Service: Pain Management     IA COLONOSCOPY FLX DX W/COLLJ SPEC WHEN PFRMD N/A 5/31/2016    Procedure: COLONOSCOPY;  Surgeon: Ariadna Nguyen MD;  Location: [facility];  Service: Gastroenterology    IA ESOPHAGOGASTRODUODENOSCOPY TRANSORAL DIAGNOSTIC N/A 11/15/2016    Procedure: ESOPHAGOGASTRODUODENOSCOPY (EGD);   Surgeon: Ariadna Nguyen MD; Location: [facility];  Service: Gastroenterology    TUBAL LIGATION         Past OB/Gyn History:  OB History        7    Para   7    Term   7            AB        Living   7       SAB        TAB        Ectopic        Multiple        Live Births   7               Pt does not have menstrual issues  lmp was 3 years ago   History of sexually transmitted infection: No   History of abnormal pap smears: No      Patient is not currently sexually active  heterosexual   The current method of family planning is tubal ligation      Family History   Problem Relation Age of Onset    Uterine cancer Mother     Heart disease Brother        Social History:  Social History     Socioeconomic History    Marital status: Single     Spouse name: Not on file    Number of children: Not on file    Years of education: Not on file    Highest education level: Not on file   Occupational History    Not on file   Social Needs    Financial resource strain: Not on file    Food insecurity:     Worry: Not on file     Inability: Not on file    Transportation needs:     Medical: Not on file     Non-medical: Not on file   Tobacco Use    Smoking status: Never Smoker    Smokeless tobacco: Never Used   Substance and Sexual Activity    Alcohol use: No    Drug use: No    Sexual activity: Not Currently     Birth control/protection: Female Sterilization   Lifestyle    Physical activity:     Days per week: Not on file     Minutes per session: Not on file    Stress: Not on file   Relationships    Social connections:     Talks on phone: Not on file     Gets together: Not on file     Attends Faith service: Not on file     Active member of club or organization: Not on file     Attends meetings of clubs or organizations: Not on file     Relationship status: Not on file    Intimate partner violence:     Fear of current or ex partner: Not on file     Emotionally abused: Not on file     Physically abused: Not on file     Forced sexual activity: Not on file   Other Topics Concern    Not on file   Social History Narrative    Not on file     Presently lives spouse and some of her 7 children who are still at home     Patient is currently unemployed   No Known Allergies    Current Outpatient Medications:     topiramate (TOPAMAX) 25 mg tablet, Take 1 tablet (25 mg total) by mouth every 12 (twelve) hours, Disp: 60 tablet, Rfl: 1    butalbital-acetaminophen-caffeine (FIORICET,ESGIC) -40 mg per tablet, Take 1 tablet by mouth every 4 (four) hours as needed for headaches (Patient not taking: Reported on 2/4/2019 ), Disp: 30 tablet, Rfl: 0    gabapentin (NEURONTIN) 600 MG tablet, Take 1 tablet (600 mg total) by mouth 3 (three) times a day for 30 days, Disp: 90 tablet, Rfl: 1    neomycin-polymyxin-hydrocortisone (CORTISPORIN) otic solution, Administer 3 drops into both ears every 8 (eight) hours (Patient not taking: Reported on 2/4/2019 ), Disp: 10 mL, Rfl: 0      Review of Systems  Constitutional :no fever, feels well, no tiredness, no recent weight gain or loss  ENT: no ear ache, no loss of hearing, no nosebleeds or nasal discharge, no sore throat or hoarseness  Cardiovascular: no complaints of slow or fast heart beat, no chest pain, no palpitations, no leg claudication or lower extremity edema  Respiratory: no complaints of shortness of shortness of breath, no RAIN  Breasts:no complaints of breast pain, breast lump, or nipple discharge  Gastrointestinal: no complaints of abdominal pain, constipation, nausea, vomiting, or diarrhea or bloody stools  Genitourinary : no complaints of dysuria, incontinence, pelvic pain, no dysmenorrhea, vaginal discharge or abnormal vaginal bleeding and as noted in HPI  Musculoskeletal: no complaints of arthralgia, no myalgia, no joint swelling or stiffness, no limb pain or swelling    Integumentary: no complaints of skin rash or lesion, itching or dry skin  Neurological: no complaints of headache, no confusion, no numbness or tingling, no dizziness or fainting    Objective      /72   Ht 4' 10" (1 473 m)   Wt 62 1 kg (137 lb)   Breastfeeding? No   BMI 28 63 kg/m²     General:   appears stated age, cooperative, alert normal mood and affect   Neck: normal, supple,trachea midline, no masses   Heart: regular rate and rhythm, S1, S2 normal, no murmur, click, rub or gallop   Lungs: clear to auscultation bilaterally   Breasts: normal appearance, no masses or tenderness   Abdomen: soft, non-tender, without masses or organomegaly   Vulva: normal female genitalia   Vagina: normal vagina   Urethra: normal   Cervix: Normal, no discharge  Uterus: normal size, contour, position, consistency, mobility, non-tender   Adnexa: normal adnexa      Lymphatic palpation of lymph nodes in neck, axilla, groin and/or other locations: no lymphadenopathy or masses noted   Skin normal skin turgor and no rashes     Psychiatric orientation to person, place, and time: normal  mood and affect: normal

## 2019-03-13 ENCOUNTER — OFFICE VISIT (OUTPATIENT)
Dept: PHYSICAL THERAPY | Facility: CLINIC | Age: 54
End: 2019-03-13
Payer: COMMERCIAL

## 2019-03-13 DIAGNOSIS — M76.31 ILIOTIBIAL BAND SYNDROME OF BOTH SIDES: ICD-10-CM

## 2019-03-13 DIAGNOSIS — G89.29 CHRONIC RIGHT HIP PAIN: ICD-10-CM

## 2019-03-13 DIAGNOSIS — G89.29 CHRONIC PAIN OF RIGHT KNEE: ICD-10-CM

## 2019-03-13 DIAGNOSIS — G89.4 CHRONIC PAIN SYNDROME: ICD-10-CM

## 2019-03-13 DIAGNOSIS — M25.551 CHRONIC RIGHT HIP PAIN: ICD-10-CM

## 2019-03-13 DIAGNOSIS — M54.16 CHRONIC LUMBAR RADICULOPATHY: Primary | ICD-10-CM

## 2019-03-13 DIAGNOSIS — M25.561 CHRONIC PAIN OF RIGHT KNEE: ICD-10-CM

## 2019-03-13 DIAGNOSIS — M76.32 ILIOTIBIAL BAND SYNDROME OF BOTH SIDES: ICD-10-CM

## 2019-03-13 PROCEDURE — 97110 THERAPEUTIC EXERCISES: CPT

## 2019-03-13 PROCEDURE — 97010 HOT OR COLD PACKS THERAPY: CPT

## 2019-03-13 PROCEDURE — 97140 MANUAL THERAPY 1/> REGIONS: CPT

## 2019-03-13 NOTE — PROGRESS NOTES
Daily Note     Today's date: 3/13/2019  Patient name: Evangelist Vazquez  : 1965  MRN: [id number]  Referring provider: Anshu Branch MD  Dx:   Encounter Diagnosis     ICD-10-CM    1  Chronic lumbar radiculopathy M54 16    2  Iliotibial band syndrome of both sides M76 31     M76 32    3  Chronic right hip pain M25 551     G89 29                   Subjective: reports cont L hip flexor and LS pain  Objective: See treatment diary below      Assessment: Tolerated treatment fair  Patient demonstrated fatigue post treatment  Pt cont to be tentative to progress program as she cont to have stagnant pain in B hips, LS, and anterior L thigh  Plan: Continue per plan of care         Precautions: Karina principle for L side post/lat lumbar derangement; 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, Glut Med, TFL, ITB     Daily Treatment Diary      HEP: Sheet provided and discussed     Manual  3/8/19  3/13/19  1/2/19  2/6/19 2/13/19  2/20/19  2/22/19  2/27/19  3/6/219   ART            x15'         IASTM                     JM                     Stretch  15'  15'  25'  15'  15'    15'  15'  15'   Triggerpoint                           Exercise Diary  3/8/19  3/13/19  2/4/19  2/6/19 2/13/19  2/20/19  2/22/19  2/27/19  3/6/19   Prone on elbows x10'  10'  10'  10'  10'  10'  10'  10' 10'                         Prone Press Ups 3x10  3/10  3/10  3/10  3/10  3/10  3/10  3/10  3/10   Front Wall Glides                     Lateral Wall Glides                     LTR                     SKTC                     DKTC                     PB fwd trunk flx                     Prone hip ext 3x10  3/10    3/10  3/10  3/10  3/10  3/10  3/10   Prone hip IR with TB 3x10 L3  3/10 L3    3/10 L3  3/10 L3  3/10 L3 3/10 L3  3/10 L3  3/10 L3   Prone hip ER with Ball 3x10  3/10    3/10  3/10  3/10  3/10  3/10  3/10   Prone Hip flexor stretch  "  "    "  "  "  "  " "    No Shoe AE Steamboats                     Lateral walking   held                L3 2/60'   QKB  3/10  3/5    3/5  3/5  3/5 3/5  3/10  3/10   Cat Backs                     On elbows hip ext  3/10  3/10    3/10  3/10  3/10  3/10  3/10  3/10    clamshells  L3 3/10  L3 3/10    L3 3/10  L3 3/10  L3 3/10 L3 3/10  L3 3/10  L2 3/10                               Modalities  3/8/19 3/13/19  2/4/19  2/13/19 2/20/19  2/22/19  1/16/19  2/27/19  3/6/19   MHP                10'  10'   CP Prone x12'  10'  10'prone  10'  10'  10'  10'       STIM

## 2019-03-15 ENCOUNTER — OFFICE VISIT (OUTPATIENT)
Dept: PHYSICAL THERAPY | Facility: CLINIC | Age: 54
End: 2019-03-15
Payer: COMMERCIAL

## 2019-03-15 DIAGNOSIS — M76.31 ILIOTIBIAL BAND SYNDROME OF BOTH SIDES: ICD-10-CM

## 2019-03-15 DIAGNOSIS — M25.551 CHRONIC RIGHT HIP PAIN: ICD-10-CM

## 2019-03-15 DIAGNOSIS — M25.561 CHRONIC PAIN OF RIGHT KNEE: ICD-10-CM

## 2019-03-15 DIAGNOSIS — G89.29 CHRONIC RIGHT HIP PAIN: ICD-10-CM

## 2019-03-15 DIAGNOSIS — G89.29 CHRONIC PAIN OF RIGHT KNEE: ICD-10-CM

## 2019-03-15 DIAGNOSIS — G89.4 CHRONIC PAIN SYNDROME: ICD-10-CM

## 2019-03-15 DIAGNOSIS — M76.32 ILIOTIBIAL BAND SYNDROME OF BOTH SIDES: ICD-10-CM

## 2019-03-15 DIAGNOSIS — M54.16 CHRONIC LUMBAR RADICULOPATHY: Primary | ICD-10-CM

## 2019-03-15 PROCEDURE — 97010 HOT OR COLD PACKS THERAPY: CPT

## 2019-03-15 PROCEDURE — 97110 THERAPEUTIC EXERCISES: CPT

## 2019-03-15 PROCEDURE — 97140 MANUAL THERAPY 1/> REGIONS: CPT

## 2019-03-15 NOTE — PROGRESS NOTES
Daily Note     Today's date: 3/15/2019  Patient name: Evangelist Vazquez  : 1965  MRN: [id number]  Referring provider: Anshu Branch MD  Dx:   Encounter Diagnosis     ICD-10-CM    1  Chronic lumbar radiculopathy M54 16    2  Iliotibial band syndrome of both sides M76 31     M76 32    3  Chronic right hip pain M25 551     G89 29    4  Chronic pain of right knee M25 561     G89 29    5  Chronic pain syndrome G89 4                   Subjective: Pt cont to report Lateral hip and anterior R hip pain  Objective: See treatment diary below      Assessment: Tolerated treatment fair  Patient demonstrated fatigue post treatment  Pt cont to have pain with current program however completes full program       Plan: Continue per plan of care         Precautions: Karina principle for L side post/lat lumbar derangement; 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, Glut Med, TFL, ITB     Daily Treatment Diary      HEP: Sheet provided and discussed     Manual  3/8/19  3/13/19  3/15/19  2/6/19 2/13/19  2/20/19  2/22/19  2/27/19  3/6/219   ART            x15'         IASTM                     JM                     Stretch  15'  15'  15'  15'  15'    15'  15'  15'   Triggerpoint                           Exercise Diary  3/8/19  3/13/19  3/15/19  2/6/19 2/13/19  2/20/19  2/22/19  2/27/19  3/6/19   Prone on elbows x10'  10'  10'  10'  10'  10'  10'  10' 10'                         Prone Press Ups 3x10  3/10  3/10  3/10  3/10  3/10  3/10  3/10  3/10   Front Wall Glides                     Lateral Wall Glides                     LTR                     SKTC                     DKTC                     PB fwd trunk flx                     Prone hip ext 3x10  3/10  3/10  3/10  3/10  3/10  3/10  3/10  3/10   Prone hip IR with TB 3x10 L3  3/10 L3 3/10 L3  3/10 L3  3/10 L3  3/10 L3 3/10 L3  3/10 L3  3/10 L3   Prone hip ER with Ball 3x10  3/10  3/10  3/10  3/10  3/10  3/10  3/10  3/10   Prone Hip flexor stretch  "  "  "  "  6/20"  6/20"  6/20"  6/20" 6/20"    No Shoe AE Steamboats                     Lateral walking   held                L3 2/60'   QKB  3/10  3/5  3/5  3/5  3/5  3/5 3/5  3/10  3/10   Cat Backs                     On elbows hip ext  3/10  3/10  3/10  3/10  3/10  3/10  3/10  3/10  3/10    clamshells  L3 3/10  L3 3/10  L3 3/10  L3 3/10  L3 3/10  L3 3/10 L3 3/10  L3 3/10  L2 3/10                               Modalities  3/8/19 3/13/19  3/15/19  2/13/19 2/20/19  2/22/19  1/16/19  2/27/19  3/6/19   MHP                10'  10'   CP Prone x12'  10'  10'prone  10'  10'  10'  10'       STIM

## 2019-03-20 ENCOUNTER — OFFICE VISIT (OUTPATIENT)
Dept: PHYSICAL THERAPY | Facility: CLINIC | Age: 54
End: 2019-03-20
Payer: COMMERCIAL

## 2019-03-20 DIAGNOSIS — G89.29 CHRONIC RIGHT HIP PAIN: ICD-10-CM

## 2019-03-20 DIAGNOSIS — M25.561 CHRONIC PAIN OF RIGHT KNEE: ICD-10-CM

## 2019-03-20 DIAGNOSIS — G89.29 CHRONIC PAIN OF RIGHT KNEE: ICD-10-CM

## 2019-03-20 DIAGNOSIS — M76.31 ILIOTIBIAL BAND SYNDROME OF BOTH SIDES: ICD-10-CM

## 2019-03-20 DIAGNOSIS — G89.4 CHRONIC PAIN SYNDROME: ICD-10-CM

## 2019-03-20 DIAGNOSIS — M54.16 CHRONIC LUMBAR RADICULOPATHY: Primary | ICD-10-CM

## 2019-03-20 DIAGNOSIS — M76.32 ILIOTIBIAL BAND SYNDROME OF BOTH SIDES: ICD-10-CM

## 2019-03-20 DIAGNOSIS — M25.551 CHRONIC RIGHT HIP PAIN: ICD-10-CM

## 2019-03-20 PROCEDURE — 97010 HOT OR COLD PACKS THERAPY: CPT

## 2019-03-20 PROCEDURE — 97140 MANUAL THERAPY 1/> REGIONS: CPT

## 2019-03-20 PROCEDURE — 97110 THERAPEUTIC EXERCISES: CPT

## 2019-03-20 NOTE — PROGRESS NOTES
Daily Note     Today's date: 3/20/2019  Patient name: Quin Cloud  : 1965  MRN: [id number]  Referring provider: Stuart Thomason MD  Dx:   Encounter Diagnosis     ICD-10-CM    1  Chronic lumbar radiculopathy M54 16    2  Iliotibial band syndrome of both sides M76 31     M76 32    3  Chronic right hip pain M25 551     G89 29    4  Chronic pain of right knee M25 561     G89 29    5  Chronic pain syndrome G89 4                   Subjective: Pt reports she is doing fair however cont to reports LS pain and L ant hip pain  Objective: See treatment diary below      Assessment: Tolerated treatment fair  Patient demonstrated fatigue post treatment  Pt cont to saman program fair with overall c/o pain along B hip and anterior L hip pain  Plan: Continue per plan of care          Precautions: Karina principle for L side post/lat lumbar derangement; 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, Glut Med, TFL, ITB     Daily Treatment Diary      HEP: Sheet provided and discussed     Manual  3/8/19  3/13/19  3/15/19  3/20/19 2/13/19  2/20/19  2/22/19  2/27/19  3/6/219   ART            x15'         IASTM                     JM                     Stretch  15'  15'  15'  15'  15'    15'  15'  15'   Triggerpoint                           Exercise Diary  3/8/19  3/13/19  3/15/19  3/20/19 2/13/19  2/20/19  2/22/19  2/27/19  3/6/19   Prone on elbows x10'  10'  10'  10'  10'  10'  10'  10' 10'                         Prone Press Ups 3x10  3/10  3/10  3/10  3/10  3/10  3/10  3/10  3/10   Front Wall Glides                     Lateral Wall Glides                     LTR                     SKTC                     DKTC                     PB fwd trunk flx                     Prone hip ext 3x10  3/10  3/10  3/10  3/10  3/10  3/10  3/10  3/10   Prone hip IR with TB 3x10 L3  3/10 L3 3/10 L3  3/10 L3  3/10 L3  3/10 L3 3/10 L3  3/10 L3  3/10 L3   Prone hip ER with Ball 3x10  3/10  3/10  3/10  3/10  3/10  3/10  3/10  3/10   Prone Hip flexor stretch  6/20"  6/20"  6/20"  6/20"  6/20"  6/20"  6/20"  6/20" 6/20"    No Shoe AE Steamboats                     Lateral walking   held                L3 2/60'   QKB  3/10  3/5  3/5  3/5  3/5  3/5 3/5  3/10  3/10   Cat Backs                     On elbows hip ext  3/10  3/10  3/10  3/10  3/10  3/10  3/10  3/10  3/10    clamshells  L3 3/10  L3 3/10  L3 3/10  L3 3/10  L3 3/10  L3 3/10 L3 3/10  L3 3/10  L2 3/10                               Modalities  3/8/19 3/13/19  3/15/19  3/20/19 2/20/19  2/22/19  1/16/19  2/27/19  3/6/19   MHP                10'  10'   CP Prone x12'  10'  10'prone  10'  10'  10'  10'       STIM

## 2019-03-22 ENCOUNTER — OFFICE VISIT (OUTPATIENT)
Dept: PHYSICAL THERAPY | Facility: CLINIC | Age: 54
End: 2019-03-22
Payer: COMMERCIAL

## 2019-03-22 DIAGNOSIS — M54.16 CHRONIC LUMBAR RADICULOPATHY: Primary | ICD-10-CM

## 2019-03-22 DIAGNOSIS — M25.551 CHRONIC RIGHT HIP PAIN: ICD-10-CM

## 2019-03-22 DIAGNOSIS — G89.29 CHRONIC RIGHT HIP PAIN: ICD-10-CM

## 2019-03-22 DIAGNOSIS — M25.561 CHRONIC PAIN OF RIGHT KNEE: ICD-10-CM

## 2019-03-22 DIAGNOSIS — G89.29 CHRONIC PAIN OF RIGHT KNEE: ICD-10-CM

## 2019-03-22 DIAGNOSIS — M76.32 ILIOTIBIAL BAND SYNDROME OF BOTH SIDES: ICD-10-CM

## 2019-03-22 DIAGNOSIS — M76.31 ILIOTIBIAL BAND SYNDROME OF BOTH SIDES: ICD-10-CM

## 2019-03-22 DIAGNOSIS — G89.4 CHRONIC PAIN SYNDROME: ICD-10-CM

## 2019-03-22 PROCEDURE — 97010 HOT OR COLD PACKS THERAPY: CPT

## 2019-03-22 PROCEDURE — 97140 MANUAL THERAPY 1/> REGIONS: CPT

## 2019-03-22 PROCEDURE — 97110 THERAPEUTIC EXERCISES: CPT

## 2019-03-22 NOTE — PROGRESS NOTES
Daily Note     Today's date: 3/22/2019  Patient name: Nathaly Rashid  : 1965  MRN: [id number]  Referring provider: Aby Contreras MD  Dx:   Encounter Diagnosis     ICD-10-CM    1  Chronic lumbar radiculopathy M54 16    2  Iliotibial band syndrome of both sides M76 31     M76 32    3  Chronic right hip pain M25 551     G89 29    4  Chronic pain of right knee M25 561     G89 29    5  Chronic pain syndrome G89 4        Start Time: 1000  Stop Time: 1110  Total time in clinic (min): 70 minutes    Subjective: Pt states she cont to have pain in both LS and R hip flexor  Objective: See treatment diary below      Assessment: Tolerated treatment fair  Patient demonstrated fatigue post treatment and exhibited good technique with therapeutic exercises  Pt with notable stretching and discomfort along R ant thigh with supine hip flexor stretching  Plan: Continue per plan of care       Precautions: Karina principle for L side post/lat lumbar derangement; 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, Glut Med, TFL, ITB     Daily Treatment Diary      HEP: Sheet provided and discussed     Manual  3/8/19  3/13/19  3/15/19  3/20/19 3/22/19  2/20/19  2/22/19  2/27/19  3/6/219   ART            x15'         IASTM                     JM                     Stretch  15'  15'  15'  15'  15'    15'  15'  15'   Triggerpoint                           Exercise Diary  3/8/19  3/13/19  3/15/19  3/20/19 3/22/19  2/20/19  2/22/19  2/27/19  3/6/19   Prone on elbows x10'  10'  10'  10'  10'  10'  10'  10' 10'                         Prone Press Ups 3x10  3/10  3/10  3/10  3/10  3/10  3/10  3/10  3/10   Front Wall Glides                     Lateral Wall Glides                     LTR                     SKTC                     DKTC                     PB fwd trunk flx                     Prone hip ext 3x10  3/10  3/10  3/10  3/10  3/10  3/10  3/10  3/10   Prone hip IR with TB 3x10 L3  3/10 L3 3/10 L3  3/10 L3  3/10 L3  3/10 L3 3/10 L3  3/10 L3  3/10 L3   Prone hip ER with Ball 3x10  3/10  3/10  3/10  3/10  3/10  3/10  3/10  3/10   Prone Hip flexor stretch  6/20"  6/20"  6/20"  6/20"  6/20" Supine B  6/20"  6/20"  6/20" 6/20"    No Shoe AE Steamboats                     Lateral walking   held                L3 2/60'   QKB  3/10  3/5  3/5  3/5  3/5  3/5 3/5  3/10  3/10   Cat Backs                     On elbows hip ext  3/10  3/10  3/10  3/10  3/10  3/10  3/10  3/10  3/10    clamshells  L3 3/10  L3 3/10  L3 3/10  L3 3/10  L3 3/10  L3 3/10 L3 3/10  L3 3/10  L2 3/10                               Modalities  3/8/19 3/13/19  3/15/19  3/20/19 3/22/19  2/22/19  1/16/19  2/27/19  3/6/19   MHP                10'  10'   CP Prone x12'  10'  10'prone  10'  10'  10'  10'       STIM

## 2019-03-25 ENCOUNTER — OFFICE VISIT (OUTPATIENT)
Dept: FAMILY MEDICINE CLINIC | Facility: CLINIC | Age: 54
End: 2019-03-25
Payer: COMMERCIAL

## 2019-03-25 VITALS
DIASTOLIC BLOOD PRESSURE: 68 MMHG | WEIGHT: 132 LBS | SYSTOLIC BLOOD PRESSURE: 120 MMHG | RESPIRATION RATE: 6 BRPM | TEMPERATURE: 96 F | BODY MASS INDEX: 27.71 KG/M2 | OXYGEN SATURATION: 97 % | HEIGHT: 58 IN | HEART RATE: 61 BPM

## 2019-03-25 DIAGNOSIS — R10.13 EPIGASTRIC PAIN: Primary | ICD-10-CM

## 2019-03-25 DIAGNOSIS — G44.229 CHRONIC TENSION-TYPE HEADACHE, NOT INTRACTABLE: ICD-10-CM

## 2019-03-25 PROCEDURE — T1015 CLINIC SERVICE: HCPCS | Performed by: FAMILY MEDICINE

## 2019-03-25 PROCEDURE — 99213 OFFICE O/P EST LOW 20 MIN: CPT | Performed by: FAMILY MEDICINE

## 2019-03-25 RX ORDER — FAMOTIDINE 40 MG/1
40 TABLET, FILM COATED ORAL DAILY
Qty: 30 TABLET | Refills: 3 | Status: SHIPPED | OUTPATIENT
Start: 2019-03-25 | End: 2019-04-18 | Stop reason: HOSPADM

## 2019-03-25 RX ORDER — TOPIRAMATE 50 MG/1
50 TABLET, FILM COATED ORAL EVERY 12 HOURS SCHEDULED
Qty: 60 TABLET | Refills: 5 | Status: SHIPPED | OUTPATIENT
Start: 2019-03-25 | End: 2019-10-23

## 2019-03-25 NOTE — PROGRESS NOTES
History and Physical  Leatha Boyd 47 y o  female MRN: [id number]      Assessment:   Headaches  Abdominal pain    Plan:  Increase Topamax to 50 mg BID  Will check ultrasound of the gall  Bladder  Start Pepcid 40 mg daily  RTC 2 months    Chief Complaint   Patient presents with    Headache    Abdominal Pain        HPI:  Leatha Boyd is a 47 y o  female who presents with above  She was started on Topamax at last visit and there has been some improvement  Today she c/o morning abdominal pain x 2 weeks  It is assoc  with nausea and vomiting  It resolves within 1-2 hours  No fever  Pain is located in epigastric area   needed  Historical Information   Past Medical History:   Diagnosis Date    Fatigue     High cholesterol     Neuropathy     Osteoarthritis     knees     Past Surgical History:   Procedure Laterality Date    APPENDECTOMY      EPIDURAL BLOCK INJECTION Left 5/17/2018    Procedure: L4 AND L5 TRANSFORAMINAL EPIDURAL STEROID INJECTION;  Surgeon: Treasure Atkins MD;  [facility];  Service: Pain Management     EPIDURAL BLOCK INJECTION Left 10/4/2018    Procedure: L4 AND L5 TRANSFORAMINAL EPIDURAL STEROID INJECTION;  Surgeon: Treasure Atkins MD;  [facility];  Service: Pain Management     TN COLONOSCOPY FLX DX W/COLLJ SPEC WHEN PFRMD N/A 5/31/2016    Procedure: COLONOSCOPY;  Surgeon: Tyrone Velasquez MD;  [facility];  Service: Gastroenterology    TN ESOPHAGOGASTRODUODENOSCOPY TRANSORAL DIAGNOSTIC N/A 11/15/2016    Procedure: ESOPHAGOGASTRODUODENOSCOPY (EGD);   Surgeon: Tyrone Velasquez MD;  [facility];  Service: Gastroenterology    TUBAL LIGATION       Social History   Social History     Substance and Sexual Activity   Alcohol Use No     Social History     Substance and Sexual Activity   Drug Use No     Social History     Tobacco Use   Smoking Status Never Smoker   Smokeless Tobacco Never Used     Family History   Problem Relation Age of Onset    Uterine cancer Mother     Heart disease Brother        Meds/Allergies   No Known Allergies    Meds:    Current Outpatient Medications:     butalbital-acetaminophen-caffeine (FIORICET,ESGIC) -40 mg per tablet, Take 1 tablet by mouth every 4 (four) hours as needed for headaches, Disp: 30 tablet, Rfl: 0    topiramate (TOPAMAX) 25 mg tablet, Take 1 tablet (25 mg total) by mouth every 12 (twelve) hours, Disp: 60 tablet, Rfl: 1      REVIEW OF SYSTEMS  Review of Systems   Constitutional: Negative  HENT: Negative  Eyes: Negative  Respiratory: Negative  Cardiovascular: Negative  Gastrointestinal:        As per HPI   Neurological: Positive for headaches  Psychiatric/Behavioral: Negative  Current Vitals:   Blood Pressure: 120/68 (03/25/19 1504)  Pulse: 61 (03/25/19 1504)  Temperature: (!) 96 °F (35 6 °C) (03/25/19 1504)  Temp Source: Tympanic (03/25/19 1504)  Respirations: (!) 6 (03/25/19 1504)  Height: 4' 10" (147 3 cm) (03/25/19 1504)  Weight - Scale: 59 9 kg (132 lb) (03/25/19 1504)  SpO2: 97 % (03/25/19 1504)  Body mass index is 27 59 kg/m²  PHYSICAL EXAMS:  Physical Exam   Constitutional: She is oriented to person, place, and time  She appears well-developed and well-nourished  HENT:   Head: Normocephalic and atraumatic  Eyes: Pupils are equal, round, and reactive to light  Cardiovascular: Normal rate and regular rhythm  Pulmonary/Chest: Effort normal and breath sounds normal    Abdominal: Normal appearance and bowel sounds are normal    Mild tenderness with palpation over RUQ and epigastric areas  No rebound or guarding  Neurological: She is alert and oriented to person, place, and time  Skin: Skin is warm and dry  Psychiatric: She has a normal mood and affect  Nursing note and vitals reviewed  Lab Results:          Derick Cogan, PA-C  3/25/2019, 3:19 PM

## 2019-03-27 ENCOUNTER — OFFICE VISIT (OUTPATIENT)
Dept: PHYSICAL THERAPY | Facility: CLINIC | Age: 54
End: 2019-03-27
Payer: COMMERCIAL

## 2019-03-27 DIAGNOSIS — G89.29 CHRONIC RIGHT HIP PAIN: ICD-10-CM

## 2019-03-27 DIAGNOSIS — M76.32 ILIOTIBIAL BAND SYNDROME OF BOTH SIDES: ICD-10-CM

## 2019-03-27 DIAGNOSIS — M54.16 CHRONIC LUMBAR RADICULOPATHY: Primary | ICD-10-CM

## 2019-03-27 DIAGNOSIS — M25.561 CHRONIC PAIN OF RIGHT KNEE: ICD-10-CM

## 2019-03-27 DIAGNOSIS — G89.29 CHRONIC PAIN OF RIGHT KNEE: ICD-10-CM

## 2019-03-27 DIAGNOSIS — G89.4 CHRONIC PAIN SYNDROME: ICD-10-CM

## 2019-03-27 DIAGNOSIS — M76.31 ILIOTIBIAL BAND SYNDROME OF BOTH SIDES: ICD-10-CM

## 2019-03-27 DIAGNOSIS — M25.551 CHRONIC RIGHT HIP PAIN: ICD-10-CM

## 2019-03-27 PROCEDURE — 97140 MANUAL THERAPY 1/> REGIONS: CPT

## 2019-03-27 PROCEDURE — 97110 THERAPEUTIC EXERCISES: CPT

## 2019-03-27 NOTE — PROGRESS NOTES
Daily Note     Today's date: 3/27/2019  Patient name: Mary Cox  : 1965  MRN: [id number]  Referring provider: Mary Hernandez MD  Dx:   Encounter Diagnosis     ICD-10-CM    1  Chronic lumbar radiculopathy M54 16    2  Iliotibial band syndrome of both sides M76 31     M76 32    3  Chronic right hip pain M25 551     G89 29    4  Chronic pain of right knee M25 561     G89 29    5  Chronic pain syndrome G89 4                   Subjective: Pt reports she is doing ok with cont pain in LS and L hip  States cont to have tenderness to touch  Objective: See treatment diary below      Assessment: Tolerated treatment fair  Patient demonstrated fatigue post treatment and exhibited good technique with therapeutic exercises  Pt with notable tightness with supine hip flexor stretch at edge of table  Plan: Continue per plan of care       Precautions: Karina principle for L side post/lat lumbar derangement; 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, Glut Med, TFL, ITB     Daily Treatment Diary      HEP: Sheet provided and discussed     Manual  3/8/19  3/13/19  3/15/19  3/20/19 3/22/19  3/27/19  2/22/19  2/27/19  3/6/219   ART                     IASTM                     JM                     Stretch  15'  15'  15'  15'  15'  15'  15'  15'  15'   Triggerpoint                           Exercise Diary  3/8/19  3/13/19  3/15/19  3/20/19 3/22/19  3/27/19  2/22/19  2/27/19  3/6/19   Prone on elbows x10'  10'  10'  10'  10'  10'  10'  10' 10'                         Prone Press Ups 3x10  3/10  3/10  3/10  3/10  3/10  3/10  3/10  3/10   Front Wall Glides                     Lateral Wall Glides                     LTR                     SKTC                     DKTC                     PB fwd trunk flx                     Prone hip ext 3x10  3/10  3/10  3/10  3/10  3/10  3/10  3/10  3/10   Prone hip IR with TB 3x10 L3  3/10 L3 3/10 L3  3/10 L3  3/10 L3  3/10 L3 3/10 L3  3/10 L3  3/10 L3   Prone hip ER with Ball 3x10  3/10  3/10  3/10  3/10  3/10  3/10  3/10  3/10   Prone Hip flexor stretch  6/20"  6/20"  6/20"  6/20"  6/20" Supine B  6/20" supine  6/20"  6/20" 6/20"    No Shoe AE Steamboats                     Lateral walking   held                L3 2/60'   QKB  3/10  3/5  3/5  3/5  3/5  3/5 3/5  3/10  3/10   Cat Backs                     On elbows hip ext  3/10  3/10  3/10  3/10  3/10  3/10  3/10  3/10  3/10    clamshells  L3 3/10  L3 3/10  L3 3/10  L3 3/10  L3 3/10  L3 3/10 L3 3/10  L3 3/10  L2 3/10                               Modalities  3/8/19 3/13/19  3/15/19  3/20/19 3/22/19  3/27/19  1/16/19  2/27/19  3/6/19   MHP                10'  10'   CP Prone x12'  10'  10'prone  10'  10'  10'  10'       STIM

## 2019-03-29 ENCOUNTER — OFFICE VISIT (OUTPATIENT)
Dept: PHYSICAL THERAPY | Facility: CLINIC | Age: 54
End: 2019-03-29
Payer: COMMERCIAL

## 2019-03-29 DIAGNOSIS — M76.31 ILIOTIBIAL BAND SYNDROME OF BOTH SIDES: ICD-10-CM

## 2019-03-29 DIAGNOSIS — G89.4 CHRONIC PAIN SYNDROME: ICD-10-CM

## 2019-03-29 DIAGNOSIS — G89.29 CHRONIC RIGHT HIP PAIN: ICD-10-CM

## 2019-03-29 DIAGNOSIS — M76.32 ILIOTIBIAL BAND SYNDROME OF BOTH SIDES: ICD-10-CM

## 2019-03-29 DIAGNOSIS — M25.561 CHRONIC PAIN OF RIGHT KNEE: ICD-10-CM

## 2019-03-29 DIAGNOSIS — M25.551 CHRONIC RIGHT HIP PAIN: ICD-10-CM

## 2019-03-29 DIAGNOSIS — G89.29 CHRONIC PAIN OF RIGHT KNEE: ICD-10-CM

## 2019-03-29 DIAGNOSIS — M54.16 CHRONIC LUMBAR RADICULOPATHY: Primary | ICD-10-CM

## 2019-03-29 PROCEDURE — 97140 MANUAL THERAPY 1/> REGIONS: CPT

## 2019-03-29 PROCEDURE — 97010 HOT OR COLD PACKS THERAPY: CPT

## 2019-03-29 PROCEDURE — 97110 THERAPEUTIC EXERCISES: CPT

## 2019-03-29 NOTE — PROGRESS NOTES
Daily Note     Today's date: 3/29/2019  Patient name: Jordon Mishra  : 1965  MRN: [id number]  Referring provider: Wicho Hunter MD  Dx:   Encounter Diagnosis     ICD-10-CM    1  Chronic lumbar radiculopathy M54 16    2  Iliotibial band syndrome of both sides M76 31     M76 32    3  Chronic right hip pain M25 551     G89 29    4  Chronic pain of right knee M25 561     G89 29    5  Chronic pain syndrome G89 4                   Subjective: Pt reports she is doing fair  Reports LS and R hip discomfort  Objective: See treatment diary below      Assessment: Tolerated treatment fair  Patient demonstrated fatigue post treatment  Pt cont to saman program well however cont to have pain c/o in R hip and LS  Plan: Continue per plan of care         Precautions: Karina principle for L side post/lat lumbar derangement; 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, Glut Med, TFL, ITB     Daily Treatment Diary      HEP: Sheet provided and discussed     Manual  3/8/19  3/13/19  3/15/19  3/20/19 3/22/19  3/27/19  3/29/19  2/27/19  3/6/219   ART                     IASTM                     JM                     Stretch  15'  15'  15'  15'  15'  15'  15'  15'  15'   Triggerpoint                           Exercise Diary  3/8/19  3/13/19  3/15/19  3/20/19 3/22/19  3/27/19  3/29/19  2/27/19  3/6/19   Prone on elbows x10'  10'  10'  10'  10'  10'  10'  10' 10'                         Prone Press Ups 3x10  3/10  3/10  3/10  3/10  3/10  3/10  3/10  3/10   Front Wall Glides                     Lateral Wall Glides                     LTR                     SKTC                     DKTC                     PB fwd trunk flx                     Prone hip ext 3x10  3/10  3/10  3/10  3/10  3/10  3/10  3/10  3/10   Prone hip IR with TB 3x10 L3  3/10 L3 3/10 L3  3/10 L3  3/10 L3  3/10 L3 3/10 L3  3/10 L3  3/10 L3   Prone hip ER with Ball 3x10  3/10  3/10  3/10  3/10  3/10  3/10  3/10  3/10   Prone Hip flexor stretch  "  "  6/20"  6/20"  6/20" Supine B  6/20" supine  6/20" supine  6/20" 6/20"    No Shoe AE Steamboats                     Lateral walking   held                L3 2/60'   QKB  3/10  3/5  3/5  3/5  3/5  3/5 3/5  3/10  3/10   Cat Backs                     On elbows hip ext  3/10  3/10  3/10  3/10  3/10  3/10  3/10  3/10  3/10    clamshells  L3 3/10  L3 3/10  L3 3/10  L3 3/10  L3 3/10  L3 3/10 L3 3/10  L3 3/10  L2 3/10                               Modalities  3/8/19 3/13/19  3/15/19  3/20/19 3/22/19  3/27/19  3/29/19  2/27/19  3/6/19   MHP                10'  10'   CP Prone x12'  10'  10'prone  10'  10'  10'  10'       STIM

## 2019-04-01 ENCOUNTER — OFFICE VISIT (OUTPATIENT)
Dept: PAIN MEDICINE | Facility: CLINIC | Age: 54
End: 2019-04-01
Payer: COMMERCIAL

## 2019-04-01 VITALS — BODY MASS INDEX: 28.93 KG/M2 | DIASTOLIC BLOOD PRESSURE: 85 MMHG | SYSTOLIC BLOOD PRESSURE: 136 MMHG | WEIGHT: 138.4 LBS

## 2019-04-01 DIAGNOSIS — M79.18 MYOFASCIAL PAIN SYNDROME: ICD-10-CM

## 2019-04-01 DIAGNOSIS — M51.36 LUMBAR DEGENERATIVE DISC DISEASE: ICD-10-CM

## 2019-04-01 DIAGNOSIS — M54.16 LUMBAR RADICULOPATHY: Primary | ICD-10-CM

## 2019-04-01 DIAGNOSIS — G89.4 CHRONIC PAIN SYNDROME: ICD-10-CM

## 2019-04-01 PROBLEM — M51.369 LUMBAR DEGENERATIVE DISC DISEASE: Status: ACTIVE | Noted: 2019-04-01

## 2019-04-01 PROCEDURE — 99214 OFFICE O/P EST MOD 30 MIN: CPT | Performed by: ANESTHESIOLOGY

## 2019-04-03 ENCOUNTER — OFFICE VISIT (OUTPATIENT)
Dept: PHYSICAL THERAPY | Facility: CLINIC | Age: 54
End: 2019-04-03
Payer: COMMERCIAL

## 2019-04-05 ENCOUNTER — OFFICE VISIT (OUTPATIENT)
Dept: PHYSICAL THERAPY | Facility: CLINIC | Age: 54
End: 2019-04-05
Payer: COMMERCIAL

## 2019-04-05 DIAGNOSIS — M76.31 ILIOTIBIAL BAND SYNDROME OF BOTH SIDES: ICD-10-CM

## 2019-04-05 DIAGNOSIS — M25.551 CHRONIC RIGHT HIP PAIN: ICD-10-CM

## 2019-04-05 DIAGNOSIS — M54.16 CHRONIC LUMBAR RADICULOPATHY: Primary | ICD-10-CM

## 2019-04-05 DIAGNOSIS — G89.29 CHRONIC PAIN OF RIGHT KNEE: ICD-10-CM

## 2019-04-05 DIAGNOSIS — G89.29 CHRONIC RIGHT HIP PAIN: ICD-10-CM

## 2019-04-05 DIAGNOSIS — M25.561 CHRONIC PAIN OF RIGHT KNEE: ICD-10-CM

## 2019-04-05 DIAGNOSIS — G89.4 CHRONIC PAIN SYNDROME: ICD-10-CM

## 2019-04-05 DIAGNOSIS — M76.32 ILIOTIBIAL BAND SYNDROME OF BOTH SIDES: ICD-10-CM

## 2019-04-05 PROCEDURE — 97110 THERAPEUTIC EXERCISES: CPT | Performed by: PHYSICAL THERAPIST

## 2019-04-05 PROCEDURE — 97140 MANUAL THERAPY 1/> REGIONS: CPT | Performed by: PHYSICAL THERAPIST

## 2019-04-05 PROCEDURE — 97112 NEUROMUSCULAR REEDUCATION: CPT | Performed by: PHYSICAL THERAPIST

## 2019-04-07 ENCOUNTER — APPOINTMENT (EMERGENCY)
Dept: RADIOLOGY | Facility: HOSPITAL | Age: 54
End: 2019-04-07
Payer: COMMERCIAL

## 2019-04-07 ENCOUNTER — APPOINTMENT (EMERGENCY)
Dept: CT IMAGING | Facility: HOSPITAL | Age: 54
End: 2019-04-07
Payer: COMMERCIAL

## 2019-04-07 ENCOUNTER — HOSPITAL ENCOUNTER (EMERGENCY)
Facility: HOSPITAL | Age: 54
Discharge: HOME | End: 2019-04-07
Attending: EMERGENCY MEDICINE | Admitting: EMERGENCY MEDICINE
Payer: COMMERCIAL

## 2019-04-07 VITALS
DIASTOLIC BLOOD PRESSURE: 59 MMHG | HEART RATE: 53 BPM | OXYGEN SATURATION: 98 % | SYSTOLIC BLOOD PRESSURE: 114 MMHG | RESPIRATION RATE: 17 BRPM | WEIGHT: 137.13 LBS | HEIGHT: 58 IN | TEMPERATURE: 97.7 F | BODY MASS INDEX: 28.78 KG/M2

## 2019-04-07 DIAGNOSIS — G43.909 MIGRAINE HEADACHE: Primary | ICD-10-CM

## 2019-04-07 LAB
ALBUMIN SERPL BCP-MCNC: 3.9 G/DL (ref 3.5–5)
ALP SERPL-CCNC: 119 U/L (ref 46–116)
ALT SERPL W P-5'-P-CCNC: 80 U/L (ref 12–78)
ANION GAP SERPL CALCULATED.3IONS-SCNC: 11 MMOL/L (ref 4–13)
APTT PPP: 35 SECONDS (ref 26–38)
AST SERPL W P-5'-P-CCNC: 46 U/L (ref 5–45)
BASOPHILS # BLD AUTO: 0.03 THOUSANDS/ΜL (ref 0–0.1)
BASOPHILS NFR BLD AUTO: 1 % (ref 0–1)
BILIRUB SERPL-MCNC: 0.7 MG/DL (ref 0.2–1)
BILIRUB UR QL STRIP: NEGATIVE
BUN SERPL-MCNC: 18 MG/DL (ref 5–25)
CALCIUM SERPL-MCNC: 8.9 MG/DL (ref 8.3–10.1)
CHLORIDE SERPL-SCNC: 104 MMOL/L (ref 100–108)
CLARITY UR: CLEAR
CO2 SERPL-SCNC: 24 MMOL/L (ref 21–32)
COLOR UR: YELLOW
CREAT SERPL-MCNC: 0.84 MG/DL (ref 0.6–1.3)
EOSINOPHIL # BLD AUTO: 0.17 THOUSAND/ΜL (ref 0–0.61)
EOSINOPHIL NFR BLD AUTO: 5 % (ref 0–6)
ERYTHROCYTE [DISTWIDTH] IN BLOOD BY AUTOMATED COUNT: 12.4 % (ref 11.6–15.1)
GFR SERPL CREATININE-BSD FRML MDRD: 79 ML/MIN/1.73SQ M
GLUCOSE SERPL-MCNC: 114 MG/DL (ref 65–140)
GLUCOSE UR STRIP-MCNC: NEGATIVE MG/DL
HCT VFR BLD AUTO: 37.9 % (ref 34.8–46.1)
HGB BLD-MCNC: 13.2 G/DL (ref 11.5–15.4)
HGB UR QL STRIP.AUTO: NEGATIVE
IMM GRANULOCYTES # BLD AUTO: 0.01 THOUSAND/UL (ref 0–0.2)
IMM GRANULOCYTES NFR BLD AUTO: 0 % (ref 0–2)
INR PPP: 1.04 (ref 0.86–1.17)
KETONES UR STRIP-MCNC: ABNORMAL MG/DL
LEUKOCYTE ESTERASE UR QL STRIP: NEGATIVE
LYMPHOCYTES # BLD AUTO: 1.2 THOUSANDS/ΜL (ref 0.6–4.47)
LYMPHOCYTES NFR BLD AUTO: 35 % (ref 14–44)
MAGNESIUM SERPL-MCNC: 2.1 MG/DL (ref 1.6–2.6)
MCH RBC QN AUTO: 30.9 PG (ref 26.8–34.3)
MCHC RBC AUTO-ENTMCNC: 34.8 G/DL (ref 31.4–37.4)
MCV RBC AUTO: 89 FL (ref 82–98)
MONOCYTES # BLD AUTO: 0.38 THOUSAND/ΜL (ref 0.17–1.22)
MONOCYTES NFR BLD AUTO: 11 % (ref 4–12)
NEUTROPHILS # BLD AUTO: 1.63 THOUSANDS/ΜL (ref 1.85–7.62)
NEUTS SEG NFR BLD AUTO: 48 % (ref 43–75)
NITRITE UR QL STRIP: NEGATIVE
NRBC BLD AUTO-RTO: 0 /100 WBCS
PH UR STRIP.AUTO: 6 [PH]
PLATELET # BLD AUTO: 192 THOUSANDS/UL (ref 149–390)
PMV BLD AUTO: 10.4 FL (ref 8.9–12.7)
POTASSIUM SERPL-SCNC: 3.5 MMOL/L (ref 3.5–5.3)
PROT SERPL-MCNC: 7.6 G/DL (ref 6.4–8.2)
PROT UR STRIP-MCNC: NEGATIVE MG/DL
PROTHROMBIN TIME: 13.1 SECONDS (ref 11.8–14.2)
RBC # BLD AUTO: 4.27 MILLION/UL (ref 3.81–5.12)
SODIUM SERPL-SCNC: 139 MMOL/L (ref 136–145)
SP GR UR STRIP.AUTO: >=1.03 (ref 1–1.03)
TROPONIN I SERPL-MCNC: <0.02 NG/ML
UROBILINOGEN UR QL STRIP.AUTO: 1 E.U./DL
WBC # BLD AUTO: 3.42 THOUSAND/UL (ref 4.31–10.16)

## 2019-04-07 PROCEDURE — 96375 TX/PRO/DX INJ NEW DRUG ADDON: CPT

## 2019-04-07 PROCEDURE — 71046 X-RAY EXAM CHEST 2 VIEWS: CPT

## 2019-04-07 PROCEDURE — 85610 PROTHROMBIN TIME: CPT | Performed by: EMERGENCY MEDICINE

## 2019-04-07 PROCEDURE — 96361 HYDRATE IV INFUSION ADD-ON: CPT

## 2019-04-07 PROCEDURE — 85025 COMPLETE CBC W/AUTO DIFF WBC: CPT | Performed by: EMERGENCY MEDICINE

## 2019-04-07 PROCEDURE — 84484 ASSAY OF TROPONIN QUANT: CPT | Performed by: EMERGENCY MEDICINE

## 2019-04-07 PROCEDURE — 96366 THER/PROPH/DIAG IV INF ADDON: CPT

## 2019-04-07 PROCEDURE — 96360 HYDRATION IV INFUSION INIT: CPT

## 2019-04-07 PROCEDURE — 99284 EMERGENCY DEPT VISIT MOD MDM: CPT

## 2019-04-07 PROCEDURE — 80053 COMPREHEN METABOLIC PANEL: CPT | Performed by: EMERGENCY MEDICINE

## 2019-04-07 PROCEDURE — 83735 ASSAY OF MAGNESIUM: CPT | Performed by: EMERGENCY MEDICINE

## 2019-04-07 PROCEDURE — 96365 THER/PROPH/DIAG IV INF INIT: CPT

## 2019-04-07 PROCEDURE — 99284 EMERGENCY DEPT VISIT MOD MDM: CPT | Performed by: EMERGENCY MEDICINE

## 2019-04-07 PROCEDURE — 74177 CT ABD & PELVIS W/CONTRAST: CPT

## 2019-04-07 PROCEDURE — 81003 URINALYSIS AUTO W/O SCOPE: CPT | Performed by: EMERGENCY MEDICINE

## 2019-04-07 PROCEDURE — 93005 ELECTROCARDIOGRAM TRACING: CPT

## 2019-04-07 PROCEDURE — 85730 THROMBOPLASTIN TIME PARTIAL: CPT | Performed by: EMERGENCY MEDICINE

## 2019-04-07 RX ORDER — DEXAMETHASONE SODIUM PHOSPHATE 10 MG/ML
10 INJECTION, SOLUTION INTRAMUSCULAR; INTRAVENOUS ONCE
Status: COMPLETED | OUTPATIENT
Start: 2019-04-07 | End: 2019-04-07

## 2019-04-07 RX ORDER — METOCLOPRAMIDE HYDROCHLORIDE 5 MG/ML
10 INJECTION INTRAMUSCULAR; INTRAVENOUS ONCE
Status: COMPLETED | OUTPATIENT
Start: 2019-04-07 | End: 2019-04-07

## 2019-04-07 RX ORDER — DIPHENHYDRAMINE HYDROCHLORIDE 50 MG/ML
25 INJECTION INTRAMUSCULAR; INTRAVENOUS ONCE
Status: COMPLETED | OUTPATIENT
Start: 2019-04-07 | End: 2019-04-07

## 2019-04-07 RX ORDER — METOCLOPRAMIDE 10 MG/1
10 TABLET ORAL EVERY 6 HOURS
Qty: 30 TABLET | Refills: 0 | Status: SHIPPED | OUTPATIENT
Start: 2019-04-07 | End: 2019-04-17

## 2019-04-07 RX ORDER — MAGNESIUM SULFATE HEPTAHYDRATE 40 MG/ML
2 INJECTION, SOLUTION INTRAVENOUS ONCE
Status: COMPLETED | OUTPATIENT
Start: 2019-04-07 | End: 2019-04-07

## 2019-04-07 RX ORDER — IBUPROFEN 600 MG/1
600 TABLET ORAL EVERY 6 HOURS PRN
Qty: 30 TABLET | Refills: 0 | Status: SHIPPED | OUTPATIENT
Start: 2019-04-07 | End: 2019-04-18 | Stop reason: HOSPADM

## 2019-04-07 RX ADMIN — IOHEXOL 100 ML: 350 INJECTION, SOLUTION INTRAVENOUS at 17:26

## 2019-04-07 RX ADMIN — DIPHENHYDRAMINE HYDROCHLORIDE 25 MG: 50 INJECTION, SOLUTION INTRAMUSCULAR; INTRAVENOUS at 16:40

## 2019-04-07 RX ADMIN — DEXAMETHASONE SODIUM PHOSPHATE 10 MG: 10 INJECTION, SOLUTION INTRAMUSCULAR; INTRAVENOUS at 16:43

## 2019-04-07 RX ADMIN — METOCLOPRAMIDE 10 MG: 5 INJECTION, SOLUTION INTRAMUSCULAR; INTRAVENOUS at 16:42

## 2019-04-07 RX ADMIN — MAGNESIUM SULFATE HEPTAHYDRATE 2 G: 40 INJECTION, SOLUTION INTRAVENOUS at 16:38

## 2019-04-07 RX ADMIN — SODIUM CHLORIDE 1000 ML: 0.9 INJECTION, SOLUTION INTRAVENOUS at 16:37

## 2019-04-08 LAB
ATRIAL RATE: 60 BPM
P AXIS: -31 DEGREES
PR INTERVAL: 152 MS
QRS AXIS: -38 DEGREES
QRSD INTERVAL: 122 MS
QT INTERVAL: 470 MS
QTC INTERVAL: 470 MS
T WAVE AXIS: 51 DEGREES
VENTRICULAR RATE: 60 BPM

## 2019-04-08 PROCEDURE — 93010 ELECTROCARDIOGRAM REPORT: CPT | Performed by: INTERNAL MEDICINE

## 2019-04-12 ENCOUNTER — OFFICE VISIT (OUTPATIENT)
Dept: MULTI SPECIALTY CLINIC | Facility: CLINIC | Age: 54
End: 2019-04-12

## 2019-04-12 VITALS
HEIGHT: 59 IN | SYSTOLIC BLOOD PRESSURE: 122 MMHG | WEIGHT: 136.24 LBS | BODY MASS INDEX: 27.47 KG/M2 | DIASTOLIC BLOOD PRESSURE: 82 MMHG

## 2019-04-12 DIAGNOSIS — H66.92 CHRONIC OTITIS MEDIA OF LEFT EAR: ICD-10-CM

## 2019-04-12 DIAGNOSIS — H72.92 PERFORATION OF LEFT TYMPANIC MEMBRANE: ICD-10-CM

## 2019-04-12 DIAGNOSIS — R13.14 PHARYNGOESOPHAGEAL DYSPHAGIA: Primary | ICD-10-CM

## 2019-04-12 PROCEDURE — 99214 OFFICE O/P EST MOD 30 MIN: CPT | Performed by: OTOLARYNGOLOGY

## 2019-04-17 ENCOUNTER — APPOINTMENT (EMERGENCY)
Dept: RADIOLOGY | Facility: HOSPITAL | Age: 54
End: 2019-04-17
Payer: COMMERCIAL

## 2019-04-17 ENCOUNTER — HOSPITAL ENCOUNTER (OUTPATIENT)
Facility: HOSPITAL | Age: 54
Setting detail: OBSERVATION
Discharge: HOME | End: 2019-04-18
Attending: EMERGENCY MEDICINE | Admitting: INTERNAL MEDICINE
Payer: COMMERCIAL

## 2019-04-17 DIAGNOSIS — R11.0 NAUSEA: ICD-10-CM

## 2019-04-17 DIAGNOSIS — R10.13 EPIGASTRIC ABDOMINAL PAIN: ICD-10-CM

## 2019-04-17 DIAGNOSIS — R07.9 CHEST PAIN: Primary | ICD-10-CM

## 2019-04-17 PROBLEM — R07.89 ATYPICAL CHEST PAIN: Status: ACTIVE | Noted: 2019-04-17

## 2019-04-17 PROBLEM — G89.29 CHRONIC MUSCULOSKELETAL PAIN: Status: ACTIVE | Noted: 2019-04-17

## 2019-04-17 PROBLEM — M79.18 CHRONIC MUSCULOSKELETAL PAIN: Status: ACTIVE | Noted: 2019-04-17

## 2019-04-17 PROBLEM — M79.645 PAIN IN FINGER OF BOTH HANDS: Status: ACTIVE | Noted: 2019-04-17

## 2019-04-17 PROBLEM — M79.644 PAIN IN FINGER OF BOTH HANDS: Status: ACTIVE | Noted: 2019-04-17

## 2019-04-17 LAB
ALBUMIN SERPL BCP-MCNC: 4 G/DL (ref 3.5–5)
ALP SERPL-CCNC: 103 U/L (ref 46–116)
ALT SERPL W P-5'-P-CCNC: 63 U/L (ref 12–78)
ANION GAP SERPL CALCULATED.3IONS-SCNC: 4 MMOL/L (ref 4–13)
APTT PPP: 37 SECONDS (ref 26–38)
AST SERPL W P-5'-P-CCNC: 40 U/L (ref 5–45)
ATRIAL RATE: 38 BPM
BASOPHILS # BLD AUTO: 0.06 THOUSANDS/ΜL (ref 0–0.1)
BASOPHILS NFR BLD AUTO: 1 % (ref 0–1)
BILIRUB SERPL-MCNC: 0.74 MG/DL (ref 0.2–1)
BUN SERPL-MCNC: 11 MG/DL (ref 5–25)
CALCIUM SERPL-MCNC: 9.4 MG/DL (ref 8.3–10.1)
CHLORIDE SERPL-SCNC: 106 MMOL/L (ref 100–108)
CO2 SERPL-SCNC: 28 MMOL/L (ref 21–32)
CREAT SERPL-MCNC: 0.93 MG/DL (ref 0.6–1.3)
EOSINOPHIL # BLD AUTO: 0.14 THOUSAND/ΜL (ref 0–0.61)
EOSINOPHIL NFR BLD AUTO: 2 % (ref 0–6)
ERYTHROCYTE [DISTWIDTH] IN BLOOD BY AUTOMATED COUNT: 12.5 % (ref 11.6–15.1)
GFR SERPL CREATININE-BSD FRML MDRD: 70 ML/MIN/1.73SQ M
GLUCOSE SERPL-MCNC: 136 MG/DL (ref 65–140)
HCT VFR BLD AUTO: 35.9 % (ref 34.8–46.1)
HGB BLD-MCNC: 12.1 G/DL (ref 11.5–15.4)
IMM GRANULOCYTES # BLD AUTO: 0.11 THOUSAND/UL (ref 0–0.2)
IMM GRANULOCYTES NFR BLD AUTO: 2 % (ref 0–2)
INR PPP: 0.96 (ref 0.86–1.17)
LYMPHOCYTES # BLD AUTO: 1.5 THOUSANDS/ΜL (ref 0.6–4.47)
LYMPHOCYTES NFR BLD AUTO: 23 % (ref 14–44)
MCH RBC QN AUTO: 30.3 PG (ref 26.8–34.3)
MCHC RBC AUTO-ENTMCNC: 33.7 G/DL (ref 31.4–37.4)
MCV RBC AUTO: 90 FL (ref 82–98)
MONOCYTES # BLD AUTO: 0.33 THOUSAND/ΜL (ref 0.17–1.22)
MONOCYTES NFR BLD AUTO: 5 % (ref 4–12)
NEUTROPHILS # BLD AUTO: 4.41 THOUSANDS/ΜL (ref 1.85–7.62)
NEUTS SEG NFR BLD AUTO: 67 % (ref 43–75)
NRBC BLD AUTO-RTO: 0 /100 WBCS
NT-PROBNP SERPL-MCNC: 233 PG/ML
P AXIS: 95 DEGREES
PLATELET # BLD AUTO: 272 THOUSANDS/UL (ref 149–390)
PMV BLD AUTO: 9.6 FL (ref 8.9–12.7)
POTASSIUM SERPL-SCNC: 4 MMOL/L (ref 3.5–5.3)
PR INTERVAL: 158 MS
PROT SERPL-MCNC: 7.7 G/DL (ref 6.4–8.2)
PROTHROMBIN TIME: 12.9 SECONDS (ref 11.8–14.2)
QRS AXIS: -26 DEGREES
QRSD INTERVAL: 114 MS
QT INTERVAL: 444 MS
QTC INTERVAL: 492 MS
RBC # BLD AUTO: 3.99 MILLION/UL (ref 3.81–5.12)
SODIUM SERPL-SCNC: 138 MMOL/L (ref 136–145)
T WAVE AXIS: 39 DEGREES
TROPONIN I SERPL-MCNC: <0.02 NG/ML
VENTRICULAR RATE: 74 BPM
WBC # BLD AUTO: 6.55 THOUSAND/UL (ref 4.31–10.16)

## 2019-04-17 PROCEDURE — 86200 CCP ANTIBODY: CPT | Performed by: INTERNAL MEDICINE

## 2019-04-17 PROCEDURE — 86039 ANTINUCLEAR ANTIBODIES (ANA): CPT | Performed by: INTERNAL MEDICINE

## 2019-04-17 PROCEDURE — 86038 ANTINUCLEAR ANTIBODIES: CPT | Performed by: INTERNAL MEDICINE

## 2019-04-17 PROCEDURE — 85025 COMPLETE CBC W/AUTO DIFF WBC: CPT | Performed by: EMERGENCY MEDICINE

## 2019-04-17 PROCEDURE — 93005 ELECTROCARDIOGRAM TRACING: CPT

## 2019-04-17 PROCEDURE — 99285 EMERGENCY DEPT VISIT HI MDM: CPT

## 2019-04-17 PROCEDURE — 36415 COLL VENOUS BLD VENIPUNCTURE: CPT

## 2019-04-17 PROCEDURE — 85610 PROTHROMBIN TIME: CPT

## 2019-04-17 PROCEDURE — 84484 ASSAY OF TROPONIN QUANT: CPT | Performed by: INTERNAL MEDICINE

## 2019-04-17 PROCEDURE — 85730 THROMBOPLASTIN TIME PARTIAL: CPT

## 2019-04-17 PROCEDURE — 99220 PR INITIAL OBSERVATION CARE/DAY 70 MINUTES: CPT | Performed by: INTERNAL MEDICINE

## 2019-04-17 PROCEDURE — 86140 C-REACTIVE PROTEIN: CPT | Performed by: INTERNAL MEDICINE

## 2019-04-17 PROCEDURE — 93010 ELECTROCARDIOGRAM REPORT: CPT | Performed by: INTERNAL MEDICINE

## 2019-04-17 PROCEDURE — 71046 X-RAY EXAM CHEST 2 VIEWS: CPT

## 2019-04-17 PROCEDURE — 99285 EMERGENCY DEPT VISIT HI MDM: CPT | Performed by: EMERGENCY MEDICINE

## 2019-04-17 PROCEDURE — 84484 ASSAY OF TROPONIN QUANT: CPT | Performed by: EMERGENCY MEDICINE

## 2019-04-17 PROCEDURE — 80053 COMPREHEN METABOLIC PANEL: CPT | Performed by: EMERGENCY MEDICINE

## 2019-04-17 PROCEDURE — 86430 RHEUMATOID FACTOR TEST QUAL: CPT | Performed by: INTERNAL MEDICINE

## 2019-04-17 PROCEDURE — 83880 ASSAY OF NATRIURETIC PEPTIDE: CPT | Performed by: INTERNAL MEDICINE

## 2019-04-17 RX ORDER — TOPIRAMATE 25 MG/1
50 TABLET ORAL EVERY 12 HOURS SCHEDULED
Status: DISCONTINUED | OUTPATIENT
Start: 2019-04-17 | End: 2019-04-18 | Stop reason: HOSPADM

## 2019-04-17 RX ORDER — PANTOPRAZOLE SODIUM 40 MG/1
40 TABLET, DELAYED RELEASE ORAL
Status: DISCONTINUED | OUTPATIENT
Start: 2019-04-17 | End: 2019-04-18 | Stop reason: HOSPADM

## 2019-04-17 RX ADMIN — TOPIRAMATE 50 MG: 25 TABLET, FILM COATED ORAL at 23:25

## 2019-04-17 RX ADMIN — PANTOPRAZOLE SODIUM 40 MG: 40 TABLET, DELAYED RELEASE ORAL at 23:25

## 2019-04-18 VITALS
HEART RATE: 56 BPM | DIASTOLIC BLOOD PRESSURE: 58 MMHG | SYSTOLIC BLOOD PRESSURE: 123 MMHG | HEIGHT: 55 IN | WEIGHT: 136 LBS | OXYGEN SATURATION: 98 % | RESPIRATION RATE: 18 BRPM | TEMPERATURE: 97.5 F | BODY MASS INDEX: 31.47 KG/M2

## 2019-04-18 LAB
ATRIAL RATE: 58 BPM
CRP SERPL QL: <3 MG/L
P AXIS: 61 DEGREES
PR INTERVAL: 160 MS
QRS AXIS: -25 DEGREES
QRSD INTERVAL: 110 MS
QT INTERVAL: 452 MS
QTC INTERVAL: 484 MS
RHEUMATOID FACT SER QL LA: NEGATIVE
T WAVE AXIS: 47 DEGREES
TROPONIN I SERPL-MCNC: <0.02 NG/ML
VENTRICULAR RATE: 69 BPM

## 2019-04-18 PROCEDURE — 84484 ASSAY OF TROPONIN QUANT: CPT | Performed by: INTERNAL MEDICINE

## 2019-04-18 PROCEDURE — 93010 ELECTROCARDIOGRAM REPORT: CPT | Performed by: INTERNAL MEDICINE

## 2019-04-18 PROCEDURE — 99217 PR OBSERVATION CARE DISCHARGE MANAGEMENT: CPT | Performed by: INTERNAL MEDICINE

## 2019-04-18 RX ORDER — OMEPRAZOLE 20 MG/1
20 CAPSULE, DELAYED RELEASE ORAL 2 TIMES DAILY
Qty: 30 CAPSULE | Refills: 0 | Status: SHIPPED | OUTPATIENT
Start: 2019-04-18 | End: 2019-06-14

## 2019-04-18 RX ADMIN — PANTOPRAZOLE SODIUM 40 MG: 40 TABLET, DELAYED RELEASE ORAL at 06:18

## 2019-04-18 RX ADMIN — TOPIRAMATE 50 MG: 25 TABLET, FILM COATED ORAL at 08:04

## 2019-04-19 LAB
ANA HOMOGEN SER QL IF: NORMAL
ANA HOMOGEN TITR SER: NORMAL {TITER}
RYE IGE QN: POSITIVE

## 2019-04-20 LAB — CCP IGA+IGG SERPL IA-ACNC: 4 UNITS (ref 0–19)

## 2019-04-27 ENCOUNTER — HOSPITAL ENCOUNTER (OUTPATIENT)
Dept: ULTRASOUND IMAGING | Facility: HOSPITAL | Age: 54
Discharge: HOME | End: 2019-04-27
Payer: COMMERCIAL

## 2019-04-27 DIAGNOSIS — R10.13 EPIGASTRIC PAIN: ICD-10-CM

## 2019-04-27 PROCEDURE — 76705 ECHO EXAM OF ABDOMEN: CPT

## 2019-05-01 ENCOUNTER — HOSPITAL ENCOUNTER (OUTPATIENT)
Dept: RADIOLOGY | Facility: HOSPITAL | Age: 54
Setting detail: OUTPATIENT SURGERY
Discharge: HOME | End: 2019-05-01
Payer: COMMERCIAL

## 2019-05-01 ENCOUNTER — HOSPITAL ENCOUNTER (OUTPATIENT)
Facility: HOSPITAL | Age: 54
Setting detail: OUTPATIENT SURGERY
Discharge: HOME | End: 2019-05-01
Attending: ANESTHESIOLOGY | Admitting: ANESTHESIOLOGY
Payer: COMMERCIAL

## 2019-05-01 VITALS
DIASTOLIC BLOOD PRESSURE: 62 MMHG | RESPIRATION RATE: 18 BRPM | OXYGEN SATURATION: 97 % | BODY MASS INDEX: 31.47 KG/M2 | WEIGHT: 136 LBS | HEART RATE: 54 BPM | SYSTOLIC BLOOD PRESSURE: 137 MMHG | HEIGHT: 55 IN | TEMPERATURE: 95.5 F

## 2019-05-01 DIAGNOSIS — M54.50 LOW BACK PAIN: ICD-10-CM

## 2019-05-01 DIAGNOSIS — R10.13 EPIGASTRIC PAIN: Primary | ICD-10-CM

## 2019-05-01 PROCEDURE — 64484 NJX AA&/STRD TFRM EPI L/S EA: CPT | Performed by: ANESTHESIOLOGY

## 2019-05-01 PROCEDURE — 64483 NJX AA&/STRD TFRM EPI L/S 1: CPT | Performed by: ANESTHESIOLOGY

## 2019-05-01 RX ORDER — LIDOCAINE HYDROCHLORIDE 10 MG/ML
INJECTION, SOLUTION EPIDURAL; INFILTRATION; INTRACAUDAL; PERINEURAL AS NEEDED
Status: DISCONTINUED | OUTPATIENT
Start: 2019-05-01 | End: 2019-05-01 | Stop reason: HOSPADM

## 2019-05-01 RX ORDER — DEXAMETHASONE SODIUM PHOSPHATE 10 MG/ML
INJECTION, SOLUTION INTRAMUSCULAR; INTRAVENOUS AS NEEDED
Status: DISCONTINUED | OUTPATIENT
Start: 2019-05-01 | End: 2019-05-01 | Stop reason: HOSPADM

## 2019-05-01 RX ORDER — FAMOTIDINE 40 MG/1
40 TABLET, FILM COATED ORAL DAILY
COMMUNITY
End: 2019-10-23 | Stop reason: ALTCHOICE

## 2019-05-03 ENCOUNTER — HOSPITAL ENCOUNTER (OUTPATIENT)
Dept: RADIOLOGY | Facility: HOSPITAL | Age: 54
Discharge: HOME | End: 2019-05-03
Attending: OTOLARYNGOLOGY
Payer: COMMERCIAL

## 2019-05-03 DIAGNOSIS — R13.14 PHARYNGOESOPHAGEAL DYSPHAGIA: ICD-10-CM

## 2019-05-03 PROCEDURE — 74220 X-RAY XM ESOPHAGUS 1CNTRST: CPT

## 2019-05-08 ENCOUNTER — TELEPHONE (OUTPATIENT)
Dept: PAIN MEDICINE | Facility: CLINIC | Age: 54
End: 2019-05-08

## 2019-05-13 ENCOUNTER — HOSPITAL ENCOUNTER (EMERGENCY)
Facility: HOSPITAL | Age: 54
Discharge: HOME | End: 2019-05-13
Attending: EMERGENCY MEDICINE | Admitting: EMERGENCY MEDICINE
Payer: COMMERCIAL

## 2019-05-13 ENCOUNTER — APPOINTMENT (EMERGENCY)
Dept: RADIOLOGY | Facility: HOSPITAL | Age: 54
End: 2019-05-13
Payer: COMMERCIAL

## 2019-05-13 VITALS
DIASTOLIC BLOOD PRESSURE: 61 MMHG | HEIGHT: 55 IN | SYSTOLIC BLOOD PRESSURE: 130 MMHG | TEMPERATURE: 98 F | HEART RATE: 56 BPM | OXYGEN SATURATION: 98 % | WEIGHT: 139.3 LBS | RESPIRATION RATE: 20 BRPM | BODY MASS INDEX: 32.24 KG/M2

## 2019-05-13 DIAGNOSIS — M51.36 DDD (DEGENERATIVE DISC DISEASE), LUMBAR: ICD-10-CM

## 2019-05-13 DIAGNOSIS — M54.16 LUMBAR RADICULOPATHY: Primary | ICD-10-CM

## 2019-05-13 DIAGNOSIS — K59.00 CONSTIPATION: ICD-10-CM

## 2019-05-13 LAB
BACTERIA UR QL AUTO: ABNORMAL /HPF
BILIRUB UR QL STRIP: NEGATIVE
CLARITY UR: CLEAR
COLOR UR: YELLOW
GLUCOSE UR STRIP-MCNC: NEGATIVE MG/DL
HGB UR QL STRIP.AUTO: NEGATIVE
KETONES UR STRIP-MCNC: NEGATIVE MG/DL
LEUKOCYTE ESTERASE UR QL STRIP: NEGATIVE
NITRITE UR QL STRIP: NEGATIVE
NON-SQ EPI CELLS URNS QL MICRO: ABNORMAL /HPF
PH UR STRIP.AUTO: 7 [PH]
PROT UR STRIP-MCNC: ABNORMAL MG/DL
RBC #/AREA URNS AUTO: ABNORMAL /HPF
SP GR UR STRIP.AUTO: 1.02 (ref 1–1.03)
UROBILINOGEN UR QL STRIP.AUTO: 0.2 E.U./DL
WBC #/AREA URNS AUTO: ABNORMAL /HPF

## 2019-05-13 PROCEDURE — 72110 X-RAY EXAM L-2 SPINE 4/>VWS: CPT

## 2019-05-13 PROCEDURE — 99284 EMERGENCY DEPT VISIT MOD MDM: CPT | Performed by: EMERGENCY MEDICINE

## 2019-05-13 PROCEDURE — 81001 URINALYSIS AUTO W/SCOPE: CPT | Performed by: EMERGENCY MEDICINE

## 2019-05-13 PROCEDURE — 96372 THER/PROPH/DIAG INJ SC/IM: CPT

## 2019-05-13 PROCEDURE — 99284 EMERGENCY DEPT VISIT MOD MDM: CPT

## 2019-05-13 RX ORDER — METHOCARBAMOL 500 MG/1
500 TABLET, FILM COATED ORAL 3 TIMES DAILY
Qty: 15 TABLET | Refills: 0 | Status: SHIPPED | OUTPATIENT
Start: 2019-05-13 | End: 2019-10-23 | Stop reason: ALTCHOICE

## 2019-05-13 RX ORDER — LIDOCAINE 50 MG/G
1 PATCH TOPICAL ONCE
Status: DISCONTINUED | OUTPATIENT
Start: 2019-05-13 | End: 2019-05-13 | Stop reason: HOSPADM

## 2019-05-13 RX ORDER — LIDOCAINE 50 MG/G
1 PATCH TOPICAL DAILY
Qty: 5 PATCH | Refills: 0 | Status: SHIPPED | OUTPATIENT
Start: 2019-05-13 | End: 2019-10-23 | Stop reason: ALTCHOICE

## 2019-05-13 RX ORDER — METHOCARBAMOL 500 MG/1
500 TABLET, FILM COATED ORAL ONCE
Status: COMPLETED | OUTPATIENT
Start: 2019-05-13 | End: 2019-05-13

## 2019-05-13 RX ORDER — ACETAMINOPHEN 325 MG/1
650 TABLET ORAL ONCE
Status: COMPLETED | OUTPATIENT
Start: 2019-05-13 | End: 2019-05-13

## 2019-05-13 RX ORDER — KETOROLAC TROMETHAMINE 30 MG/ML
30 INJECTION, SOLUTION INTRAMUSCULAR; INTRAVENOUS ONCE
Status: COMPLETED | OUTPATIENT
Start: 2019-05-13 | End: 2019-05-13

## 2019-05-13 RX ORDER — GABAPENTIN 400 MG/1
400 CAPSULE ORAL 3 TIMES DAILY
COMMUNITY
End: 2020-10-16 | Stop reason: ALTCHOICE

## 2019-05-13 RX ADMIN — KETOROLAC TROMETHAMINE 30 MG: 30 INJECTION, SOLUTION INTRAMUSCULAR; INTRAVENOUS at 17:53

## 2019-05-13 RX ADMIN — LIDOCAINE 1 PATCH: 50 PATCH TOPICAL at 17:57

## 2019-05-13 RX ADMIN — METHOCARBAMOL TABLETS 500 MG: 500 TABLET, COATED ORAL at 17:52

## 2019-05-13 RX ADMIN — ACETAMINOPHEN 650 MG: 325 TABLET, FILM COATED ORAL at 17:52

## 2019-05-16 ENCOUNTER — OFFICE VISIT (OUTPATIENT)
Dept: AUDIOLOGY | Age: 54
End: 2019-05-16
Payer: COMMERCIAL

## 2019-05-16 DIAGNOSIS — H90.3 SENSORY HEARING LOSS, BILATERAL: Primary | ICD-10-CM

## 2019-05-16 DIAGNOSIS — H90.3 SENSORINEURAL HEARING LOSS (SNHL) OF BOTH EARS: ICD-10-CM

## 2019-05-16 PROCEDURE — 92567 TYMPANOMETRY: CPT | Performed by: AUDIOLOGIST

## 2019-05-16 PROCEDURE — 92557 COMPREHENSIVE HEARING TEST: CPT | Performed by: AUDIOLOGIST

## 2019-06-10 ENCOUNTER — HOSPITAL ENCOUNTER (OUTPATIENT)
Dept: MAMMOGRAPHY | Facility: HOSPITAL | Age: 54
Discharge: HOME | End: 2019-06-10
Payer: COMMERCIAL

## 2019-06-10 VITALS — WEIGHT: 139 LBS | BODY MASS INDEX: 32.17 KG/M2 | HEIGHT: 55 IN

## 2019-06-10 DIAGNOSIS — Z12.31 ENCOUNTER FOR SCREENING MAMMOGRAM FOR MALIGNANT NEOPLASM OF BREAST: ICD-10-CM

## 2019-06-10 PROCEDURE — 77063 BREAST TOMOSYNTHESIS BI: CPT

## 2019-06-10 PROCEDURE — 77067 SCR MAMMO BI INCL CAD: CPT

## 2019-06-11 ENCOUNTER — TELEPHONE (OUTPATIENT)
Dept: FAMILY MEDICINE CLINIC | Facility: CLINIC | Age: 54
End: 2019-06-11

## 2019-06-11 ENCOUNTER — OFFICE VISIT (OUTPATIENT)
Dept: FAMILY MEDICINE CLINIC | Facility: CLINIC | Age: 54
End: 2019-06-11
Payer: COMMERCIAL

## 2019-06-11 VITALS
DIASTOLIC BLOOD PRESSURE: 68 MMHG | BODY MASS INDEX: 31.7 KG/M2 | RESPIRATION RATE: 18 BRPM | SYSTOLIC BLOOD PRESSURE: 134 MMHG | HEIGHT: 55 IN | WEIGHT: 137 LBS | HEART RATE: 60 BPM | TEMPERATURE: 97.6 F | OXYGEN SATURATION: 97 %

## 2019-06-11 DIAGNOSIS — L08.9 SKIN INFECTION: Primary | ICD-10-CM

## 2019-06-11 DIAGNOSIS — L29.9 ITCH OF SKIN: Primary | ICD-10-CM

## 2019-06-11 PROCEDURE — T1015 CLINIC SERVICE: HCPCS | Performed by: FAMILY MEDICINE

## 2019-06-11 PROCEDURE — 99213 OFFICE O/P EST LOW 20 MIN: CPT | Performed by: FAMILY MEDICINE

## 2019-06-11 RX ORDER — DIAPER,BRIEF,INFANT-TODD,DISP
EACH MISCELLANEOUS 2 TIMES DAILY
Qty: 30 G | Refills: 0 | Status: SHIPPED | OUTPATIENT
Start: 2019-06-11 | End: 2019-06-11 | Stop reason: ALTCHOICE

## 2019-06-11 RX ORDER — CLOTRIMAZOLE AND BETAMETHASONE DIPROPIONATE 10; .64 MG/G; MG/G
CREAM TOPICAL 2 TIMES DAILY
Qty: 30 G | Refills: 0 | Status: SHIPPED | OUTPATIENT
Start: 2019-06-11 | End: 2019-10-23 | Stop reason: ALTCHOICE

## 2019-06-14 ENCOUNTER — OFFICE VISIT (OUTPATIENT)
Dept: MULTI SPECIALTY CLINIC | Facility: CLINIC | Age: 54
End: 2019-06-14

## 2019-06-14 VITALS
SYSTOLIC BLOOD PRESSURE: 130 MMHG | BODY MASS INDEX: 28 KG/M2 | WEIGHT: 138.89 LBS | DIASTOLIC BLOOD PRESSURE: 72 MMHG | HEIGHT: 59 IN

## 2019-06-14 DIAGNOSIS — H72.92 TYMPANIC MEMBRANE PERFORATION, LEFT: ICD-10-CM

## 2019-06-14 DIAGNOSIS — H90.A32 MIXED CONDUCTIVE AND SENSORINEURAL HEARING LOSS OF LEFT EAR WITH RESTRICTED HEARING OF RIGHT EAR: ICD-10-CM

## 2019-06-14 DIAGNOSIS — H90.3 SENSORINEURAL HEARING LOSS, BILATERAL: ICD-10-CM

## 2019-06-14 DIAGNOSIS — R13.14 PHARYNGOESOPHAGEAL DYSPHAGIA: ICD-10-CM

## 2019-06-14 DIAGNOSIS — K21.9 GASTROESOPHAGEAL REFLUX DISEASE, ESOPHAGITIS PRESENCE NOT SPECIFIED: Primary | ICD-10-CM

## 2019-06-14 PROCEDURE — 99213 OFFICE O/P EST LOW 20 MIN: CPT | Performed by: OTOLARYNGOLOGY

## 2019-06-14 RX ORDER — OMEPRAZOLE 40 MG/1
40 CAPSULE, DELAYED RELEASE ORAL DAILY
Qty: 30 CAPSULE | Refills: 2 | Status: SHIPPED | OUTPATIENT
Start: 2019-06-14 | End: 2019-09-21 | Stop reason: SDUPTHER

## 2019-07-26 ENCOUNTER — OFFICE VISIT (OUTPATIENT)
Dept: MULTI SPECIALTY CLINIC | Facility: CLINIC | Age: 54
End: 2019-07-26

## 2019-07-26 VITALS
DIASTOLIC BLOOD PRESSURE: 82 MMHG | HEIGHT: 59 IN | SYSTOLIC BLOOD PRESSURE: 118 MMHG | TEMPERATURE: 98.2 F | HEART RATE: 60 BPM | BODY MASS INDEX: 28 KG/M2 | WEIGHT: 138.89 LBS

## 2019-07-26 DIAGNOSIS — K21.9 GASTROESOPHAGEAL REFLUX DISEASE, ESOPHAGITIS PRESENCE NOT SPECIFIED: Primary | ICD-10-CM

## 2019-07-26 DIAGNOSIS — H72.92 PERFORATION OF LEFT TYMPANIC MEMBRANE: ICD-10-CM

## 2019-07-26 DIAGNOSIS — H90.A32 MIXED CONDUCTIVE AND SENSORINEURAL HEARING LOSS OF LEFT EAR WITH RESTRICTED HEARING OF RIGHT EAR: ICD-10-CM

## 2019-07-26 DIAGNOSIS — H66.92 CHRONIC OTITIS MEDIA OF LEFT EAR: ICD-10-CM

## 2019-07-26 PROCEDURE — 99214 OFFICE O/P EST MOD 30 MIN: CPT | Performed by: OTOLARYNGOLOGY

## 2019-07-26 RX ORDER — OFLOXACIN 3 MG/ML
5 SOLUTION AURICULAR (OTIC) 2 TIMES DAILY
Qty: 5 ML | Refills: 1 | Status: SHIPPED | OUTPATIENT
Start: 2019-07-26 | End: 2019-08-02

## 2019-07-26 NOTE — PROGRESS NOTES
Otolaryngology-- Head and Neck Surgery Follow up visit    CC:  Hearing loss, intermittent otorrhea, bilateral more severe on the left side  In addition she does have symptoms consistent with LPR  The barium swallow demonstrated reflux, on omeprazole 40 mg a day but patient taking medication at night  She does not have a clear understanding of reflux diet  Time interval of problem since last visit:  One month    Pertinent interval elements of the history:  Patient has a very long history of ear problems since childhood  More recently having intermittent otorrhea  She did have a audiometry that revealed mixed hearing loss bilaterally, right ear type A tympanogram, left ear type B with large volume  She was instructed on water precautions but she does mention that occasionally water gets into her ears particularly when she washes her hair      Review of any relevant imaging:  Barium swallow from 05/03/2019 with reflux evident      Interval Review of systems:  General: no weight change, normal energy  CV: no palpitations or chest pain  Pulm: no shortness of breath    Interval Social History:  Social History     Socioeconomic History    Marital status: Single     Spouse name: Not on file    Number of children: Not on file    Years of education: Not on file    Highest education level: Not on file   Occupational History    Not on file   Social Needs    Financial resource strain: Not on file    Food insecurity:     Worry: Not on file     Inability: Not on file    Transportation needs:     Medical: Not on file     Non-medical: Not on file   Tobacco Use    Smoking status: Never Smoker    Smokeless tobacco: Never Used   Substance and Sexual Activity    Alcohol use: Not Currently    Drug use: No    Sexual activity: Not Currently     Birth control/protection: Female Sterilization   Lifestyle    Physical activity:     Days per week: Not on file     Minutes per session: Not on file    Stress: Not on file Relationships    Social connections:     Talks on phone: Not on file     Gets together: Not on file     Attends Sabianism service: Not on file     Active member of club or organization: Not on file     Attends meetings of clubs or organizations: Not on file     Relationship status: Not on file    Intimate partner violence:     Fear of current or ex partner: Not on file     Emotionally abused: Not on file     Physically abused: Not on file     Forced sexual activity: Not on file   Other Topics Concern    Not on file   Social History Narrative    Not on file       Interval Physical Examination:  /82 (BP Location: Left arm, Patient Position: Sitting, Cuff Size: Adult)   Pulse 60   Temp 98 2 °F (36 8 °C) (Oral)   Ht 4' 11" (1 499 m)   Wt 63 kg (138 lb 14 2 oz)   BMI 28 05 kg/m²   NAD  Nose:  No discharge, no edema  Nasal cavities patent  Oral cavity:  Partially edentulous, uses partial denture inferiorly and superior denture  Mucosa moist no mucosal lesions or masses  Oropharynx:  A trophic tonsils, no postnasal drip  Otoscopy:  Left external auditory canal patent, tympanic membrane with large 50% perforation  Middle ear with mild erythema  The middle ear mucosa does appear slightly moist   Right ear with external auditory canal patent normal wax, no discharge  Tympanic membrane appears to be intact but there is a large crust on the posterior superior quadrant  Assessment:  1  Gastroesophageal reflux disease, esophagitis presence not specified     2  Perforation of left tympanic membrane  Ambulatory referral to Audiology   3  Chronic otitis media of left ear  Ambulatory referral to Audiology   4  Mixed conductive and sensorineural hearing loss of left ear with restricted hearing of right ear         Plan:  I will prescribe ofloxacin ear drops  I did review with the patient how to prevent water to enter into her ears  This is of essence due to the large size of the perforation    I instructed patient is to follow up in my office in approximately 1 week to 10 days to perform an exam under the microscope of the right ear that has a very large crust covering the eardrum  Also reviewed with the patient the fact that the omeprazole works significantly better when taken in the morning  Reviewed with the tail of the reflux diet

## 2019-09-21 DIAGNOSIS — K21.9 GASTROESOPHAGEAL REFLUX DISEASE, ESOPHAGITIS PRESENCE NOT SPECIFIED: ICD-10-CM

## 2019-09-30 RX ORDER — OMEPRAZOLE 40 MG/1
CAPSULE, DELAYED RELEASE ORAL
Qty: 30 CAPSULE | Refills: 2 | Status: SHIPPED | OUTPATIENT
Start: 2019-09-30 | End: 2020-10-16 | Stop reason: ALTCHOICE

## 2019-10-07 PROBLEM — H72.92 TYMPANIC MEMBRANE PERFORATION, LEFT: Status: ACTIVE | Noted: 2019-10-07

## 2019-10-07 NOTE — H&P (VIEW-ONLY)
[facility]  Annette Lee 47 y o  female MRN: [id number]  Encounter: [id number]  Slim Arrieta MD  Office : [phone]  Also available on [employer]    Thank you for referring Annette Lee for an evaluation  My recommendations are included  Please do not hesitate to contact me with any questions you may have  ASSESSMENT AND PLAN:      1  Perforation of left tympanic membrane     2  Mixed conductive and sensorineural hearing loss of left ear with restricted hearing of right ear     3  Laryngopharyngeal reflux (LPR)     4  Tonsillolith       Patient now 6 moths with dry ear, middle ear mucosa healthy in appearance  Left tympanoplasty indicated, Risks, benefits and alternatives discussed  Risks include hearing loss need for additional procedures, new perforation  Patient opts to proceed with surgery  ______________________________________________________________________    Reason for consultation : perforated eardrum  HPI: Annette Lee is a 47 y o  female with history of "ear problems" since childhood, more recently chronic otorrhea, left ear present for at least one year, also reports decreased hearing in her left ear  In prior visit (7/26/19) was diagnosed with left TM perforation, treated with eardrops and instructed on water precautions  She is also on treatment for suspected LPR  She had a study of Barium esophagogram that documented reflux  PRIOR VISIT, ENDOSCOPIC EVALUATION : After informed verbal consent was obtained the nose was anesthetized using 4% lidocaine and neosynephrine spray  After adequate time for anesthesia the scope was guided easily along the nasal cavity floor and into the nasopharynx  The nasopharynx, oropharynx, hypopharynx and larynx were evaluated with the below listed findings  erythema on the arytenoid, L>R, no nodules, no edema,   The scope was removed without difficulty and the patient tolerated the procedure well       REVIEW OF SYSTEMS:    Review of systems:  10 Point ROS was performed and negative except as above or otherwise noted in the medical record  Historical Information   Past Medical History:   Diagnosis Date    Fatigue     High cholesterol     Hypertension     Neuropathy     Osteoarthritis     knees    Shortness of breath      Past Surgical History:   Procedure Laterality Date    APPENDECTOMY      EPIDURAL BLOCK INJECTION Left 5/17/2018    Procedure: L4 AND L5 TRANSFORAMINAL EPIDURAL STEROID INJECTION;  Surgeon: Maribel Booker MD;  [facility];  Service: Pain Management     EPIDURAL BLOCK INJECTION Left 10/4/2018    Procedure: L4 AND L5 TRANSFORAMINAL EPIDURAL STEROID INJECTION;  Surgeon: Maribel Booker MD;  [facility];  Service: Pain Management     EPIDURAL BLOCK INJECTION Left 5/1/2019    Procedure: L4-5 and L5-S1 transforaminal epidural steroid injection;  Surgeon: Maribel Booker MD;  [facility];  Service: Pain Management     FL GUIDED NEEDLE PLAC BX/ASP/INJ  5/1/2019    PA COLONOSCOPY FLX DX W/COLLJ SPEC WHEN PFRMD N/A 5/31/2016    Procedure: COLONOSCOPY;  Surgeon: Kesha Yo MD;  [facility];  Service: Gastroenterology    PA ESOPHAGOGASTRODUODENOSCOPY TRANSORAL DIAGNOSTIC N/A 11/15/2016    Procedure: ESOPHAGOGASTRODUODENOSCOPY (EGD);   Surgeon: Kesha Yo MD;  [facility];  Service: Gastroenterology    TUBAL LIGATION       Social History   Social History     Substance and Sexual Activity   Alcohol Use Not Currently     Social History     Substance and Sexual Activity   Drug Use No     Social History     Tobacco Use   Smoking Status Never Smoker   Smokeless Tobacco Never Used     Family History   Problem Relation Age of Onset    Uterine cancer Mother     Heart disease Brother     No Known Problems Sister     No Known Problems Daughter     No Known Problems Sister     No Known Problems Daughter     No Known Problems Maternal Aunt     No Known Problems Maternal Aunt     No Known Problems Maternal Aunt     No Known Problems Maternal Aunt        Meds/Allergies       Current Outpatient Medications:     omeprazole (PriLOSEC) 40 MG capsule    gabapentin (NEURONTIN) 400 mg capsule    No Known Allergies      PHYSICAL EXAM:    Height 4' 11" (1 499 m), weight 62 6 kg (138 lb), not currently breastfeeding  Body mass index is 27 87 kg/m²  Constitutional: Oriented to person, place, and time  Well-developed and well-nourished, no apparent distress, non-toxic appearance  Cooperative, able to hear and answer questions without difficulty  Voice: Normal voice quality  Head: Normocephalic, atraumatic  No scars, masses or lesions  Face: Symmetric, no edema, no sinus tenderness  Eyes: Vision grossly intact, extra-ocular movement intact  Right Ear: External ear normal   Auditory canal with yellow crust on pars flaccida, also a green dry crust on anterior wall of canal, most medial segment,  near TM  Peroxide instilled and crusts removed, no perforation seen, no cholesteatoma  Tympanic membrane well-appearing, without retraction or scarring  No fluid present  No post-auricular erythema or tenderness  Left Ear: External ear normal   Auditory canal clear  Tympanic membrane with large 60% central perforation, middle ear with healthy appearing mucosa  Also noted a yellow green crust over the pars flaccida and extending to roof  Detached with suction cannula and removed with alligator forceps  No perforation, retraction pocket or signs of cholesteatoma  No post-auricular erythema or tenderness  Nose: Septum midline, nares clear  Mucosa moist, turbinates well appearing  No crusting, polyps or discharge evident  Oral cavity: Dentition intact  Mucosa moist, lips normal   Tongue mobile, floor of mouth normal   Hard palate unremarkable  No masses or lesions  Oropharynx: Uvula is midline, sot palate normal   Unremarkable oropharyngeal inlet    Tonsils 1+ but left tonsil with significant amount of debris on the crypts consistent with tonsilloliths  Posterior pharyngeal wall clear  No masses or lesions  Salivary glands:  Parotid glands and submandibular glands symmetric, no enlargement or tenderness  Neck: Normal laryngeal elevation with swallow  Trachea midline  No masses or lesions  No palpable adenopathy  Thyroid: normal in size, unremarkable without tenderness or palpable nodules  Pulmonary/Chest: Normal effort and rate  No respiratory distress  Lungs CTAB, Heart S1 S2 RRR   Musculoskeletal: Normal range of motion  Neurological: Cranial nerves 2-12 intact  Skin: Skin is warm and dry  Psychiatric: Normal mood and affect  Lab Results: non contributory   Imaging Studies: I have personally reviewed images on the PACS system and :CT head 1/5/19 : poorly developed mastoid complex left ear  Mastoid antrum, epitympanum and middle ear areated, no cholesteatoma  Audiometry: I have personally reviewed PTA 37/53  With air bone gap of 20-30 dB on most frequencies left ear  Tympanograms: type A tymp right,  and type B large volume left

## 2019-10-14 ENCOUNTER — APPOINTMENT (OUTPATIENT)
Dept: LAB | Facility: HOSPITAL | Age: 54
End: 2019-10-14
Attending: OTOLARYNGOLOGY
Payer: COMMERCIAL

## 2019-10-14 ENCOUNTER — TRANSCRIBE ORDERS (OUTPATIENT)
Dept: ADMINISTRATIVE | Facility: HOSPITAL | Age: 54
End: 2019-10-14

## 2019-10-14 DIAGNOSIS — H72.92 PERFORATION OF LEFT TYMPANIC MEMBRANE: Primary | ICD-10-CM

## 2019-10-14 DIAGNOSIS — H72.92 TYMPANIC MEMBRANE PERFORATION, LEFT: ICD-10-CM

## 2019-10-14 LAB
ANION GAP SERPL CALCULATED.3IONS-SCNC: 8 MMOL/L (ref 4–13)
BASOPHILS # BLD AUTO: 0.03 THOUSANDS/ΜL (ref 0–0.1)
BASOPHILS NFR BLD AUTO: 1 % (ref 0–1)
BUN SERPL-MCNC: 16 MG/DL (ref 5–25)
CALCIUM SERPL-MCNC: 9.3 MG/DL (ref 8.3–10.1)
CHLORIDE SERPL-SCNC: 105 MMOL/L (ref 100–108)
CO2 SERPL-SCNC: 27 MMOL/L (ref 21–32)
CREAT SERPL-MCNC: 0.85 MG/DL (ref 0.6–1.3)
EOSINOPHIL # BLD AUTO: 0.09 THOUSAND/ΜL (ref 0–0.61)
EOSINOPHIL NFR BLD AUTO: 2 % (ref 0–6)
ERYTHROCYTE [DISTWIDTH] IN BLOOD BY AUTOMATED COUNT: 12.6 % (ref 11.6–15.1)
GFR SERPL CREATININE-BSD FRML MDRD: 78 ML/MIN/1.73SQ M
GLUCOSE P FAST SERPL-MCNC: 112 MG/DL (ref 65–99)
HCT VFR BLD AUTO: 36.7 % (ref 34.8–46.1)
HGB BLD-MCNC: 12.4 G/DL (ref 11.5–15.4)
IMM GRANULOCYTES # BLD AUTO: 0.01 THOUSAND/UL (ref 0–0.2)
IMM GRANULOCYTES NFR BLD AUTO: 0 % (ref 0–2)
LYMPHOCYTES # BLD AUTO: 1.7 THOUSANDS/ΜL (ref 0.6–4.47)
LYMPHOCYTES NFR BLD AUTO: 46 % (ref 14–44)
MCH RBC QN AUTO: 30.3 PG (ref 26.8–34.3)
MCHC RBC AUTO-ENTMCNC: 33.8 G/DL (ref 31.4–37.4)
MCV RBC AUTO: 90 FL (ref 82–98)
MONOCYTES # BLD AUTO: 0.28 THOUSAND/ΜL (ref 0.17–1.22)
MONOCYTES NFR BLD AUTO: 7 % (ref 4–12)
NEUTROPHILS # BLD AUTO: 1.67 THOUSANDS/ΜL (ref 1.85–7.62)
NEUTS SEG NFR BLD AUTO: 44 % (ref 43–75)
NRBC BLD AUTO-RTO: 0 /100 WBCS
PLATELET # BLD AUTO: 234 THOUSANDS/UL (ref 149–390)
PMV BLD AUTO: 10.2 FL (ref 8.9–12.7)
POTASSIUM SERPL-SCNC: 4 MMOL/L (ref 3.5–5.3)
RBC # BLD AUTO: 4.09 MILLION/UL (ref 3.81–5.12)
SODIUM SERPL-SCNC: 140 MMOL/L (ref 136–145)
WBC # BLD AUTO: 3.78 THOUSAND/UL (ref 4.31–10.16)

## 2019-10-14 PROCEDURE — 36415 COLL VENOUS BLD VENIPUNCTURE: CPT

## 2019-10-14 PROCEDURE — 85025 COMPLETE CBC W/AUTO DIFF WBC: CPT

## 2019-10-14 PROCEDURE — 80048 BASIC METABOLIC PNL TOTAL CA: CPT

## 2019-10-23 NOTE — PRE-PROCEDURE INSTRUCTIONS
Pre-Surgery Instructions:   Medication Instructions    gabapentin (NEURONTIN) 400 mg capsule Instructed patient per Anesthesia Guidelines   omeprazole (PriLOSEC) 40 MG capsule Instructed patient per Anesthesia Guidelines  WILL TAKE BOTH MEDS AM SURG SM SIP H2O  INSTR ON KWAME CALL,  REPORT LOC , BRING PHOTO ID/MED LIST/INS  INFO ,SHOWER REV , STOP ASA/NSAID/VIT 7 DAY PREOP, PT VERBALIZES UNDERSTANDING W/ NO FURTHER QUESTIONS  Education Index     Med Instructions Troubleshoot   Antiepileptic Med Class     Continue to take this medication on your normal schedule  If this is an oral medication and you take it in the morning, then you may take this medicine with a sip of water

## 2019-10-24 ENCOUNTER — ANESTHESIA EVENT (OUTPATIENT)
Dept: PERIOP | Facility: HOSPITAL | Age: 54
End: 2019-10-24
Payer: COMMERCIAL

## 2019-10-25 ENCOUNTER — HOSPITAL ENCOUNTER (OUTPATIENT)
Facility: HOSPITAL | Age: 54
Setting detail: OUTPATIENT SURGERY
Discharge: HOME | End: 2019-10-25
Attending: OTOLARYNGOLOGY | Admitting: OTOLARYNGOLOGY
Payer: COMMERCIAL

## 2019-10-25 ENCOUNTER — ANESTHESIA (OUTPATIENT)
Dept: PERIOP | Facility: HOSPITAL | Age: 54
End: 2019-10-25
Payer: COMMERCIAL

## 2019-10-25 VITALS
BODY MASS INDEX: 27.82 KG/M2 | TEMPERATURE: 96.6 F | SYSTOLIC BLOOD PRESSURE: 124 MMHG | RESPIRATION RATE: 18 BRPM | HEIGHT: 59 IN | WEIGHT: 138 LBS | DIASTOLIC BLOOD PRESSURE: 71 MMHG | OXYGEN SATURATION: 91 % | HEART RATE: 67 BPM

## 2019-10-25 DIAGNOSIS — H72.92 TYMPANIC MEMBRANE PERFORATION, LEFT: Primary | ICD-10-CM

## 2019-10-25 PROCEDURE — 69631 REPAIR EARDRUM STRUCTURES: CPT | Performed by: OTOLARYNGOLOGY

## 2019-10-25 RX ORDER — ONDANSETRON 2 MG/ML
4 INJECTION INTRAMUSCULAR; INTRAVENOUS ONCE AS NEEDED
Status: DISCONTINUED | OUTPATIENT
Start: 2019-10-25 | End: 2019-10-25 | Stop reason: HOSPADM

## 2019-10-25 RX ORDER — PROPOFOL 10 MG/ML
INJECTION, EMULSION INTRAVENOUS AS NEEDED
Status: DISCONTINUED | OUTPATIENT
Start: 2019-10-25 | End: 2019-10-25 | Stop reason: SURG

## 2019-10-25 RX ORDER — ACETAMINOPHEN 325 MG/1
650 TABLET ORAL EVERY 6 HOURS PRN
Status: DISCONTINUED | OUTPATIENT
Start: 2019-10-25 | End: 2019-10-25 | Stop reason: HOSPADM

## 2019-10-25 RX ORDER — DEXAMETHASONE SODIUM PHOSPHATE 10 MG/ML
INJECTION, SOLUTION INTRAMUSCULAR; INTRAVENOUS AS NEEDED
Status: DISCONTINUED | OUTPATIENT
Start: 2019-10-25 | End: 2019-10-25 | Stop reason: SURG

## 2019-10-25 RX ORDER — ONDANSETRON 2 MG/ML
INJECTION INTRAMUSCULAR; INTRAVENOUS AS NEEDED
Status: DISCONTINUED | OUTPATIENT
Start: 2019-10-25 | End: 2019-10-25 | Stop reason: SURG

## 2019-10-25 RX ORDER — OFLOXACIN 3 MG/ML
SOLUTION/ DROPS OPHTHALMIC AS NEEDED
Status: DISCONTINUED | OUTPATIENT
Start: 2019-10-25 | End: 2019-10-25 | Stop reason: HOSPADM

## 2019-10-25 RX ORDER — GINSENG 100 MG
CAPSULE ORAL AS NEEDED
Status: DISCONTINUED | OUTPATIENT
Start: 2019-10-25 | End: 2019-10-25 | Stop reason: HOSPADM

## 2019-10-25 RX ORDER — LIDOCAINE HYDROCHLORIDE AND EPINEPHRINE 10; 10 MG/ML; UG/ML
INJECTION, SOLUTION INFILTRATION; PERINEURAL AS NEEDED
Status: DISCONTINUED | OUTPATIENT
Start: 2019-10-25 | End: 2019-10-25 | Stop reason: HOSPADM

## 2019-10-25 RX ORDER — CEFAZOLIN SODIUM 1 G/50ML
SOLUTION INTRAVENOUS AS NEEDED
Status: DISCONTINUED | OUTPATIENT
Start: 2019-10-25 | End: 2019-10-25 | Stop reason: SURG

## 2019-10-25 RX ORDER — ACETAMINOPHEN 500 MG
500 TABLET ORAL EVERY 4 HOURS PRN
Qty: 30 TABLET | Refills: 0 | Status: SHIPPED | OUTPATIENT
Start: 2019-10-25 | End: 2019-10-30

## 2019-10-25 RX ORDER — FENTANYL CITRATE/PF 50 MCG/ML
50 SYRINGE (ML) INJECTION
Status: DISCONTINUED | OUTPATIENT
Start: 2019-10-25 | End: 2019-10-25 | Stop reason: HOSPADM

## 2019-10-25 RX ORDER — SODIUM CHLORIDE, SODIUM LACTATE, POTASSIUM CHLORIDE, CALCIUM CHLORIDE 600; 310; 30; 20 MG/100ML; MG/100ML; MG/100ML; MG/100ML
125 INJECTION, SOLUTION INTRAVENOUS CONTINUOUS
Status: DISCONTINUED | OUTPATIENT
Start: 2019-10-25 | End: 2019-10-25

## 2019-10-25 RX ORDER — SUCCINYLCHOLINE/SOD CL,ISO/PF 100 MG/5ML
SYRINGE (ML) INTRAVENOUS AS NEEDED
Status: DISCONTINUED | OUTPATIENT
Start: 2019-10-25 | End: 2019-10-25 | Stop reason: SURG

## 2019-10-25 RX ORDER — ONDANSETRON 2 MG/ML
4 INJECTION INTRAMUSCULAR; INTRAVENOUS EVERY 6 HOURS PRN
Status: DISCONTINUED | OUTPATIENT
Start: 2019-10-25 | End: 2019-10-25 | Stop reason: HOSPADM

## 2019-10-25 RX ORDER — HYDROMORPHONE HCL/PF 1 MG/ML
0.2 SYRINGE (ML) INJECTION
Status: DISCONTINUED | OUTPATIENT
Start: 2019-10-25 | End: 2019-10-25 | Stop reason: HOSPADM

## 2019-10-25 RX ORDER — EPHEDRINE SULFATE 50 MG/ML
INJECTION INTRAVENOUS AS NEEDED
Status: DISCONTINUED | OUTPATIENT
Start: 2019-10-25 | End: 2019-10-25 | Stop reason: SURG

## 2019-10-25 RX ORDER — LIDOCAINE HYDROCHLORIDE 10 MG/ML
0.5 INJECTION, SOLUTION EPIDURAL; INFILTRATION; INTRACAUDAL; PERINEURAL ONCE AS NEEDED
Status: DISCONTINUED | OUTPATIENT
Start: 2019-10-25 | End: 2019-10-25 | Stop reason: HOSPADM

## 2019-10-25 RX ORDER — CEPHALEXIN 500 MG/1
500 CAPSULE ORAL 4 TIMES DAILY
Qty: 20 CAPSULE | Refills: 0 | Status: SHIPPED | OUTPATIENT
Start: 2019-10-25 | End: 2019-10-30

## 2019-10-25 RX ORDER — MIDAZOLAM HYDROCHLORIDE 1 MG/ML
INJECTION INTRAMUSCULAR; INTRAVENOUS AS NEEDED
Status: DISCONTINUED | OUTPATIENT
Start: 2019-10-25 | End: 2019-10-25 | Stop reason: SURG

## 2019-10-25 RX ORDER — FENTANYL CITRATE 50 UG/ML
INJECTION, SOLUTION INTRAMUSCULAR; INTRAVENOUS AS NEEDED
Status: DISCONTINUED | OUTPATIENT
Start: 2019-10-25 | End: 2019-10-25 | Stop reason: SURG

## 2019-10-25 RX ORDER — GLYCOPYRROLATE 0.2 MG/ML
INJECTION INTRAMUSCULAR; INTRAVENOUS AS NEEDED
Status: DISCONTINUED | OUTPATIENT
Start: 2019-10-25 | End: 2019-10-25 | Stop reason: SURG

## 2019-10-25 RX ORDER — SODIUM CHLORIDE, SODIUM LACTATE, POTASSIUM CHLORIDE, CALCIUM CHLORIDE 600; 310; 30; 20 MG/100ML; MG/100ML; MG/100ML; MG/100ML
125 INJECTION, SOLUTION INTRAVENOUS CONTINUOUS
Status: DISCONTINUED | OUTPATIENT
Start: 2019-10-25 | End: 2019-10-25 | Stop reason: HOSPADM

## 2019-10-25 RX ORDER — LIDOCAINE HYDROCHLORIDE 10 MG/ML
INJECTION, SOLUTION INFILTRATION; PERINEURAL AS NEEDED
Status: DISCONTINUED | OUTPATIENT
Start: 2019-10-25 | End: 2019-10-25 | Stop reason: SURG

## 2019-10-25 RX ADMIN — ACETAMINOPHEN 650 MG: 325 TABLET ORAL at 16:55

## 2019-10-25 RX ADMIN — CEFAZOLIN SODIUM 1000 MG: 1 SOLUTION INTRAVENOUS at 12:42

## 2019-10-25 RX ADMIN — FENTANYL CITRATE 25 MCG: 50 INJECTION, SOLUTION INTRAMUSCULAR; INTRAVENOUS at 14:50

## 2019-10-25 RX ADMIN — MIDAZOLAM 2 MG: 1 INJECTION INTRAMUSCULAR; INTRAVENOUS at 12:14

## 2019-10-25 RX ADMIN — FENTANYL CITRATE 25 MCG: 50 INJECTION, SOLUTION INTRAMUSCULAR; INTRAVENOUS at 14:11

## 2019-10-25 RX ADMIN — FENTANYL CITRATE 25 MCG: 50 INJECTION, SOLUTION INTRAMUSCULAR; INTRAVENOUS at 14:13

## 2019-10-25 RX ADMIN — ONDANSETRON 4 MG: 2 INJECTION INTRAMUSCULAR; INTRAVENOUS at 14:39

## 2019-10-25 RX ADMIN — EPHEDRINE SULFATE 10 MG: 50 INJECTION, SOLUTION INTRAVENOUS at 13:53

## 2019-10-25 RX ADMIN — SODIUM CHLORIDE, SODIUM LACTATE, POTASSIUM CHLORIDE, AND CALCIUM CHLORIDE: .6; .31; .03; .02 INJECTION, SOLUTION INTRAVENOUS at 12:09

## 2019-10-25 RX ADMIN — PHENYLEPHRINE HYDROCHLORIDE 100 MCG: 10 INJECTION INTRAVENOUS at 12:45

## 2019-10-25 RX ADMIN — PROPOFOL 150 MG: 10 INJECTION, EMULSION INTRAVENOUS at 12:29

## 2019-10-25 RX ADMIN — LIDOCAINE HYDROCHLORIDE 60 MG: 10 INJECTION, SOLUTION INFILTRATION; PERINEURAL at 12:29

## 2019-10-25 RX ADMIN — EPHEDRINE SULFATE 10 MG: 50 INJECTION, SOLUTION INTRAVENOUS at 13:05

## 2019-10-25 RX ADMIN — Medication 100 MG: at 12:29

## 2019-10-25 RX ADMIN — DEXAMETHASONE SODIUM PHOSPHATE 10 MG: 10 INJECTION, SOLUTION INTRAMUSCULAR; INTRAVENOUS at 13:48

## 2019-10-25 RX ADMIN — PHENYLEPHRINE HYDROCHLORIDE 100 MCG: 10 INJECTION INTRAVENOUS at 12:51

## 2019-10-25 RX ADMIN — FENTANYL CITRATE 25 MCG: 50 INJECTION, SOLUTION INTRAMUSCULAR; INTRAVENOUS at 14:03

## 2019-10-25 RX ADMIN — PHENYLEPHRINE HYDROCHLORIDE 100 MCG: 10 INJECTION INTRAVENOUS at 12:58

## 2019-10-25 RX ADMIN — FENTANYL CITRATE 50 MCG: 50 INJECTION, SOLUTION INTRAMUSCULAR; INTRAVENOUS at 12:29

## 2019-10-25 RX ADMIN — SODIUM CHLORIDE, SODIUM LACTATE, POTASSIUM CHLORIDE, AND CALCIUM CHLORIDE: .6; .31; .03; .02 INJECTION, SOLUTION INTRAVENOUS at 13:32

## 2019-10-25 RX ADMIN — GLYCOPYRROLATE 0.2 MG: 0.2 INJECTION, SOLUTION INTRAMUSCULAR; INTRAVENOUS at 12:26

## 2019-10-25 NOTE — ANESTHESIA PREPROCEDURE EVALUATION
Review of Systems/Medical History          Cardiovascular  Hyperlipidemia, Hypertension ,   Comment: High cholesterol,  Pulmonary  Shortness of breath,        GI/Hepatic            Endo/Other     GYN       Hematology   Musculoskeletal    Arthritis     Neurology   Psychology           Physical Exam    Airway    Mallampati score: III  TM Distance: >3 FB  Neck ROM: full     Dental   upper dentures and lower dentures,     Cardiovascular  Cardiovascular exam normal    Pulmonary  Pulmonary exam normal     Other Findings        Anesthesia Plan  ASA Score- 2     Anesthesia Type- general with ASA Monitors  Additional Monitors:   Airway Plan:         Plan Factors-    Induction- intravenous  Postoperative Plan-     Informed Consent- Anesthetic plan and risks discussed with patient and son  I personally reviewed this patient with the CRNA  Discussed and agreed on the Anesthesia Plan with the CRNA  Chele Matta

## 2019-10-25 NOTE — INTERVAL H&P NOTE
H&P reviewed  After examining the patient I find no changes in the patients condition since the H&P had been written  Site marked       Vitals:    10/25/19 1033   BP: 145/78   Pulse: (!) 46   Resp: 20   Temp: (!) 96 9 °F (36 1 °C)   SpO2: 99%

## 2019-10-25 NOTE — OP NOTE
OPERATIVE REPORT  PATIENT NAME: Allison Goodman    :  1965  MRN: [id number]  Pt Location: [facility] OR ROOM 05    SURGERY DATE: 10/25/2019    Surgeon(s) and Role:     Altagracia Huertas MD - Primary    Preop Diagnosis:  Tympanic membrane perforation, left [H72 92]    Post-Op Diagnosis Codes: * Tympanic membrane perforation, left [H72 92]    Procedure(s) (LRB):  LEFT TYMPANOPLASTY (Left)    Specimen(s):  * No specimens in log *    Estimated Blood Loss:   Minimal    Drains:  * No LDAs found *    Anesthesia Type:   General    Operative Indications:  Tympanic membrane perforation, left [H72 92]  Mixed hearing loss left ear    Operative Findings:  Large near total perforation of the left tympanic membrane, middle ear with healthy mucosa  A very thin crust was noticed over the the superior rim and pars flaccida, upon removal there is no cholesteatoma mouth perforation at that location    Complications:   None    Procedure and Technique:      Indication for procedure: Patient is a 47years old woman with a chronic left tympanic membrane perforation  Patient has had recurrent infections  The patient was initially evaluated in the office and noticed to have an active middle ear infection  Patient was treated and the infection controlled  Audiometry revealed a mixed hearing loss with a 30 decibel conductive component  After 4 months observation to confirm no recurrence of the infection the patient was scheduled for surgery after reviewing the risks benefits and alternatives of the surgery  Patient opted for surgery and signed informed consent  Patient was met in the holding area positively identified risks benefits and alternatives were reviewed question answer as needed  The patient confirmed informed consent  The left ear was marked as surgical site in agreement with the consent, history and patient's interrogation   The patient was transferred to the operating room and placed in supine position the operating table general anesthesia was administered in the standard fashion for ear surgery  The table was shifted 90°  The head was positioned with right ear up and subsequently prepped and draped in usual fashion for ear surgery  A timeout was carried out in which patient's identification and planned procedure were confirmed by the staff present in the operating room  The operating microscope was brought into the field examination of the ear using a 5 mm ear speculum  The edge of the perforation was then removed using a straight pick to incise along the edges and subsequently the cup forceps to remove the edge of healed mucosa and skin on the rim of the perforation  The epithelium over the inferior edge of the malleus and posterior surface was then removed with a fine hook pick and fine cup forceps  Gelfoam was then placed in the middle ear filling up cavity until it was noticed to protrude through the tympanic membrane perforation  Attention was brought to the postauricular area that was injected with 1% lidocaine with epinephrine one 100,000 along the postauricular crease  After waiting time of 5 minutes for local vasoconstriction to take effect the incision was performed with 15 blade through skin and subcutaneous tissue with hemostasis achieved with the use of bipolar  Subsequently dissection was performed until the temporalis fascia was identified  A small incision was done inferiorly and the fascia dissected from the muscle and subsequently harvested  An additional graft was also harvested from the periosteum inferiorly  Additional hemostasis was obtained as needed with the bipolar  The fascia was placed on a graft press and allowed to dry  The postauricular incision was then closed in layers with 4-0 chromic approximating the deep dermis and muscle, and 60 plain fast-absorbing gut for skin closure      Attention was then brought to the external auditory canal and a round knife was used to estimate the size of the tympanic membrane perforation  The fascia graft that was dry was then trimmed to the proper size  The graft was then placed in position and tucked underneath the rim of the tympanic membrane perforation with excellent support provided by the Gelfoam   On the superior edge of the graft and the graft was advanced underneath the umbo of and long process of the malleus  There was contact on all the edges  Second lateral graft was then placed using the periosteum graft  The external auditory canal was then packed with Gelfoam on the medial 2/3 of the canal and bacitracin ointment on the lateral third of the canal  A cotton ball was placed on the meatus of the external auditory canal      The postauricular incision was then closed in layers with 4-0 chromic approximating the deep dermis and muscle, and 5 0 Monocryl subcuticular running suture for skin closure  Bacitracin ointment was placed as dressing on the postauricular incision is well  All counts were correct at the completion of the procedure, there were no complications  Patient was handed back to the anesthesiologist who proceeded to wean the patient from anesthesia and extubated   Patient was transferred to recovery in good stable condition     I was present for the entire procedure    Patient Disposition:  PACU     SIGNATURE: Delisa Mei MD  DATE: October 25, 2019  TIME: 3:26 PM

## 2019-10-25 NOTE — ANESTHESIA POSTPROCEDURE EVALUATION
Post-Op Assessment Note    CV Status:  Stable    Pain management: adequate     Mental Status:  Alert and awake   Hydration Status:  Euvolemic   PONV Controlled:  Controlled   Airway Patency:  Patent   Post Op Vitals Reviewed: Yes      Staff: Anesthesiologist, CRNA           BP   143/70   Temp 97   Pulse 62   Resp   12   SpO2   98

## 2019-10-25 NOTE — DISCHARGE INSTRUCTIONS
Timpanoplastia   LO QUE NECESITA SABER:   [place]dee timpanoplastia es [place] para corregir un desgarre de tímpano  El tímpano es el tejido que se encuentra en la parte media de del valle oído  Del Valle tímpano divide del valle canal auditivo externo del interno  INSTRUCCIONES SOBRE EL [facility]ITAL[facility]IA:   Medicamentos:   · Medicamentos,  pueden ayudar a disminuir el dolor, la inflamación y Fede  Usted podría necesitar medicamento para evitar haydee infección  · [place] catherine medicamentos yolis se le haya indicado  Consulte con del valle médico si usted kayla que del valle medicamento no le está ayudando o si presenta efectos secundarios  Infórmele si es alérgico a cualquier medicamento  Mantenga haydee lista actualizada de los [place], las vitaminas y los productos herbales que lynda  Incluya los siguientes datos de los medicamentos: cantidad, frecuencia y motivo de administración  Traiga con usted la lista o los envases de la píldoras a catherine citas de seguimiento  Lleve la lista de los medicamentos con usted en farhad de haydee emergencia  Cuidado de la herida:  Siga las instrucciones de del valle médico sobre el cuidado de catherine heridas  No deje que entre agua en del valle oído  Cúbrala al bañarse  No se suene la nariz  Si tiene que [place], [place] del valle boca abierta para disminuir la presión en del valle oído  [place] le ayudará a evitar haydee infección y a mantener el injerto seguro  No fume:  Si usted fuma, nunca es demasiado tarde para dejar de hacerlo  El fumar puede provocar que del valle injerto falle y retardar del valle sanación  Solicite información a del valle médico si usted necesita ayuda para dejar de fumar  Acuda a catherine consultas de control con del valle médico según le indicaron  Usted necesitará regresar a que le revisen del valle oído o para que le quiten el vendaje  Anote catherine preguntas para que se acuerde de hacerlas linda catherine visitas  Pregúntele a del valle [place] vitaminas y minerales son adecuados para usted  · Usted tiene fiebre o escalofríos      · Del Valle oído está myers, inflamado o drenando pus  · Usted tiene preguntas o inquietudes acerca de jane condición o cuidado  Busque atención médica de inmediato o llame al 911 si:   · La lia empapa el vendaje  · Usted no puede oír  · Usted tiene dolor intenso  · Usted tiene dificultad para  los músculos de la radha  © 2017 [facility] Information is for End User's use only and may not be sold, redistributed or otherwise used for commercial purposes  All illustrations and images included in CareNotes® are the copyrighted property of [employer]  or [employer]  Esta información es sólo para uso en educación  Jane intención no es darle un consejo médico sobre enfermedades o tratamientos  Colsulte con jane Jeana Tyler farmacéutico antes de seguir cualquier régimen médico para saber si es seguro y efectivo para usted  Tympanoplasty   WHAT YOU NEED TO KNOW:   Tympanoplasty is surgery to fix a torn eardrum  Your eardrum is a tissue found in the middle part of your ear  Your eardrum divides your outer ear canal from your inner ear  DISCHARGE INSTRUCTIONS:   Medicines:   · Medicines  can help decrease pain, swelling, and dizziness  You may need medicine to prevent infection  · Take your medicine as directed  Contact your healthcare provider if you think your medicine is not helping or if you have side effects  Tell him or her if you are allergic to any medicine  Keep a list of the medicines, vitamins, and herbs you take  Include the amounts, and when and why you take them  Bring the list or the pill bottles to follow-up visits  Carry your medicine list with you in case of an emergency  Wound care:  Care for your wound as directed  Do not get water in your ear  Cover it when you bathe  Do not blow your nose  If you have to sneeze, keep your mouth open to decrease pressure in your ear  This will help prevent infection and keep the graft secure  Do not smoke:   If you smoke, it is never too late to quit  Smoking can cause your graft to fail and slow your healing time  Ask your healthcare provider for information if you need help quitting  Follow up with your healthcare provider as directed: You will need to return to have your ear checked or your bandage removed  Write down your questions so you remember to ask them during your visits  Contact your healthcare provider if:   · You have a fever or chills  · Your ear is red, swollen, or draining pus  · You have questions or concerns about your condition or care  Seek care immediately or call 911 if:   · Blood soaks through your bandage  · You cannot hear  · You have severe pain  · You have trouble moving the muscles in your face  © 2017 [address] Information is for End User's use only and may not be sold, redistributed or otherwise used for commercial purposes  All illustrations and images included in CareNotes® are the copyrighted property of [employer]  or [employer] Thornton  The above information is an  only  It is not intended as medical advice for individual conditions or treatments  Talk to your doctor, nurse or pharmacist before following any medical regimen to see if it is safe and effective for you

## 2020-02-10 ENCOUNTER — OFFICE VISIT (OUTPATIENT)
Dept: FAMILY MEDICINE CLINIC | Facility: CLINIC | Age: 55
End: 2020-02-10
Payer: COMMERCIAL

## 2020-02-10 VITALS
HEIGHT: 59 IN | BODY MASS INDEX: 28.43 KG/M2 | HEART RATE: 62 BPM | WEIGHT: 141 LBS | SYSTOLIC BLOOD PRESSURE: 128 MMHG | OXYGEN SATURATION: 98 % | TEMPERATURE: 98.5 F | DIASTOLIC BLOOD PRESSURE: 74 MMHG

## 2020-02-10 DIAGNOSIS — Z11.59 ENCOUNTER FOR HEPATITIS C SCREENING TEST FOR LOW RISK PATIENT: ICD-10-CM

## 2020-02-10 DIAGNOSIS — M25.561 ACUTE PAIN OF RIGHT KNEE: ICD-10-CM

## 2020-02-10 DIAGNOSIS — R10.13 EPIGASTRIC ABDOMINAL PAIN: ICD-10-CM

## 2020-02-10 DIAGNOSIS — Z11.4 SCREENING FOR HIV (HUMAN IMMUNODEFICIENCY VIRUS): Primary | ICD-10-CM

## 2020-02-10 PROCEDURE — 99213 OFFICE O/P EST LOW 20 MIN: CPT | Performed by: FAMILY MEDICINE

## 2020-02-10 PROCEDURE — T1015 CLINIC SERVICE: HCPCS | Performed by: FAMILY MEDICINE

## 2020-02-10 NOTE — PROGRESS NOTES
Assessment/Plan:     Diagnoses and all orders for this visit:    Screening for HIV (human immunodeficiency virus)  -     HIV 1/2 AG-AB combo; Future    Epigastric abdominal pain  -     US gallbladder; Future  -     CBC and differential; Future  -     Comprehensive metabolic panel; Future  -     UA w Reflex to Microscopic w Reflex to Culture -Lab Collect    Acute pain of right knee  -     Ambulatory referral to Orthopedic Surgery; Future    Encounter for hepatitis C screening test for low risk patient  -     Hepatitis C antibody; Future        She will restart the Prilosec   No eating or drinking for at least 3 hours before bed  Recheck ultrasound of the gall bladder as per last report  Ortho referral for R knne pain and numbness  RTC 6 weeks          Subjective:        Patient ID: Theodora Fleischer is a 54 y o  female  Chief Complaint   Patient presents with    Abdominal Pain    Knee Pain       55 yo female presents with c/o abdominal pain x 2 weeks  Located in epigastric area and described as sharp  It sis no radiating  It is accompanied by  nausea but no vomiting  She does admit to feeling of needing to vomit in her throat  No fever or chills  It usually resolved on its own but sometimes needs water to make it go away  Also c/o R knee numbness x 1 week  Some pain with palpation  No knee swelling  She reports the knee gets hot to the touch but no redness  No radiation of numbness     needed      The following portions of the patient's history were reviewed and updated as appropriate: allergies, current medications, past family history, past medical history, past social history, past surgical history and problem list     Patient Active Problem List   Diagnosis    Abdominal pain, left lower quadrant    Chronic lumbar radiculopathy    Chronic pain syndrome    Dyslipidemia    Chronic pain of right knee    Chronic right hip pain    Myofascial pain syndrome    Atrophic vaginitis    Primary osteoarthritis of both knees    Lumbar radiculopathy    Left hip pain    Lumbar degenerative disc disease    Pharyngoesophageal dysphagia    Perforation of left tympanic membrane    Chronic otitis media of left ear    Atypical chest pain    Epigastric abdominal pain    Chronic musculoskeletal pain    Pain in finger of both hands    Tympanic membrane perforation, left       Current Outpatient Medications   Medication Sig Dispense Refill    gabapentin (NEURONTIN) 400 mg capsule Take 400 mg by mouth 3 (three) times a day      omeprazole (PriLOSEC) 40 MG capsule take 1 capsule by mouth daily every morning 30 MINUTES PRIOR TO FOOD 30 capsule 2     No current facility-administered medications for this visit  Past Medical History:   Diagnosis Date    Fatigue     High cholesterol     Hypertension     Neuropathy     Osteoarthritis     knees    Shortness of breath         Past Surgical History:   Procedure Laterality Date    APPENDECTOMY      EPIDURAL BLOCK INJECTION Left 5/17/2018    Procedure: L4 AND L5 TRANSFORAMINAL EPIDURAL STEROID INJECTION;  Surgeon: Antoinette Knutson MD;  [facility];  Service: Pain Management     EPIDURAL BLOCK INJECTION Left 10/4/2018    Procedure: L4 AND L5 TRANSFORAMINAL EPIDURAL STEROID INJECTION;  Surgeon: Antoinette Knutson MD;  [facility];  Service: Pain Management     EPIDURAL BLOCK INJECTION Left 5/1/2019    Procedure: L4-5 and L5-S1 transforaminal epidural steroid injection;  Surgeon: Antoinette Knutson MD;  [facility];  Service: Pain Management     FL GUIDED NEEDLE PLAC BX/ASP/INJ  5/1/2019    WI COLONOSCOPY FLX DX W/COLLJ SPEC WHEN PFRMD N/A 5/31/2016    Procedure: COLONOSCOPY;  Surgeon: Jeremiah Mckeon MD;  [facility];  Service: Gastroenterology    WI ESOPHAGOGASTRODUODENOSCOPY TRANSORAL DIAGNOSTIC N/A 11/15/2016    Procedure: ESOPHAGOGASTRODUODENOSCOPY (EGD);   Surgeon: Jeremiah Mckeon MD;  [facility]; Service: Gastroenterology    SC TYMPANOPLASTY Left 10/25/2019    Procedure: LEFT TYMPANOPLASTY;  Surgeon: Elisabeth Veronica MD;  [facility];  Service: ENT    TUBAL LIGATION          Social History     Socioeconomic History    Marital status: Single     Spouse name: Not on file    Number of children: Not on file    Years of education: Not on file    Highest education level: Not on file   Occupational History    Not on file   Social Needs    Financial resource strain: Not on file    Food insecurity:     Worry: Not on file     Inability: Not on file    Transportation needs:     Medical: Not on file     Non-medical: Not on file   Tobacco Use    Smoking status: Never Smoker    Smokeless tobacco: Never Used   Substance and Sexual Activity    Alcohol use: Not Currently    Drug use: No    Sexual activity: Not Currently     Birth control/protection: Female Sterilization   Lifestyle    Physical activity:     Days per week: Not on file     Minutes per session: Not on file    Stress: Not on file   Relationships    Social connections:     Talks on phone: Not on file     Gets together: Not on file     Attends Nondenominational service: Not on file     Active member of club or organization: Not on file     Attends meetings of clubs or organizations: Not on file     Relationship status: Not on file    Intimate partner violence:     Fear of current or ex partner: Not on file     Emotionally abused: Not on file     Physically abused: Not on file     Forced sexual activity: Not on file   Other Topics Concern    Not on file   Social History Narrative    Not on file        Review of Systems   Constitutional: Negative  HENT: Negative  Eyes: Negative  Respiratory: Negative  Cardiovascular: Negative  Gastrointestinal:        Per HPI   Musculoskeletal:        R knee numbness   Psychiatric/Behavioral: Negative            Objective:      /74 (BP Location: Right arm, Patient Position: Sitting, Cuff Size: Adult)   Pulse 62   Temp 98 5 °F (36 9 °C) (Tympanic)   Ht 4' 11" (1 499 m)   Wt 64 kg (141 lb)   SpO2 98%   BMI 28 48 kg/m²          Physical Exam   Constitutional: She is oriented to person, place, and time  She appears well-developed and well-nourished  HENT:   Head: Normocephalic and atraumatic  Right Ear: External ear normal    Left Ear: External ear normal    Eyes: Pupils are equal, round, and reactive to light  Neck: Neck supple  No thyromegaly present  Cardiovascular: Normal rate, regular rhythm and normal heart sounds  Pulmonary/Chest: Effort normal and breath sounds normal  She has no wheezes  She has no rales  Abdominal: Soft  Bowel sounds are normal  She exhibits no mass  Mild tenderness in epigastric area without rebound or guarding  Musculoskeletal: She exhibits no edema  Tenderness with palpation to medial aspect of R knee with mild swelling  No rash/lesions or ecchymosis noted  Lymphadenopathy:     She has no cervical adenopathy  Neurological: She is alert and oriented to person, place, and time  Skin: Skin is warm and dry  Psychiatric: She has a normal mood and affect  Nursing note and vitals reviewed  BMI Counseling: Body mass index is 28 48 kg/m²  The BMI is above normal  Nutrition recommendations include reducing portion sizes, decreasing overall calorie intake, 3-5 servings of fruits/vegetables daily, reducing fast food intake, consuming healthier snacks, decreasing soda and/or juice intake, moderation in carbohydrate intake and increasing intake of lean protein  Exercise recommendations include exercising 3-5 times per week

## 2020-02-17 ENCOUNTER — APPOINTMENT (OUTPATIENT)
Dept: LAB | Facility: HOSPITAL | Age: 55
End: 2020-02-17
Payer: COMMERCIAL

## 2020-02-17 ENCOUNTER — HOSPITAL ENCOUNTER (OUTPATIENT)
Dept: ULTRASOUND IMAGING | Facility: HOSPITAL | Age: 55
Discharge: HOME | End: 2020-02-17
Payer: COMMERCIAL

## 2020-02-17 DIAGNOSIS — R10.13 EPIGASTRIC ABDOMINAL PAIN: ICD-10-CM

## 2020-02-17 DIAGNOSIS — Z11.59 ENCOUNTER FOR HEPATITIS C SCREENING TEST FOR LOW RISK PATIENT: ICD-10-CM

## 2020-02-17 DIAGNOSIS — Z11.4 SCREENING FOR HIV (HUMAN IMMUNODEFICIENCY VIRUS): ICD-10-CM

## 2020-02-17 LAB
ALBUMIN SERPL BCP-MCNC: 4.3 G/DL (ref 3.5–5)
ALP SERPL-CCNC: 79 U/L (ref 46–116)
ALT SERPL W P-5'-P-CCNC: 25 U/L (ref 12–78)
ANION GAP SERPL CALCULATED.3IONS-SCNC: 8 MMOL/L (ref 4–13)
AST SERPL W P-5'-P-CCNC: 19 U/L (ref 5–45)
BACTERIA UR QL AUTO: ABNORMAL /HPF
BASOPHILS # BLD AUTO: 0.03 THOUSANDS/ΜL (ref 0–0.1)
BASOPHILS NFR BLD AUTO: 1 % (ref 0–1)
BILIRUB SERPL-MCNC: 0.8 MG/DL (ref 0.2–1)
BILIRUB UR QL STRIP: NEGATIVE
BUN SERPL-MCNC: 17 MG/DL (ref 5–25)
CALCIUM SERPL-MCNC: 9.3 MG/DL (ref 8.3–10.1)
CHLORIDE SERPL-SCNC: 104 MMOL/L (ref 100–108)
CLARITY UR: ABNORMAL
CO2 SERPL-SCNC: 29 MMOL/L (ref 21–32)
COLOR UR: YELLOW
CREAT SERPL-MCNC: 0.79 MG/DL (ref 0.6–1.3)
EOSINOPHIL # BLD AUTO: 0.06 THOUSAND/ΜL (ref 0–0.61)
EOSINOPHIL NFR BLD AUTO: 1 % (ref 0–6)
ERYTHROCYTE [DISTWIDTH] IN BLOOD BY AUTOMATED COUNT: 12.2 % (ref 11.6–15.1)
GFR SERPL CREATININE-BSD FRML MDRD: 85 ML/MIN/1.73SQ M
GLUCOSE P FAST SERPL-MCNC: 94 MG/DL (ref 65–99)
GLUCOSE UR STRIP-MCNC: NEGATIVE MG/DL
HCT VFR BLD AUTO: 38.7 % (ref 34.8–46.1)
HCV AB SER QL: NORMAL
HGB BLD-MCNC: 13.4 G/DL (ref 11.5–15.4)
HGB UR QL STRIP.AUTO: NEGATIVE
IMM GRANULOCYTES # BLD AUTO: 0.01 THOUSAND/UL (ref 0–0.2)
IMM GRANULOCYTES NFR BLD AUTO: 0 % (ref 0–2)
KETONES UR STRIP-MCNC: NEGATIVE MG/DL
LEUKOCYTE ESTERASE UR QL STRIP: ABNORMAL
LYMPHOCYTES # BLD AUTO: 2.35 THOUSANDS/ΜL (ref 0.6–4.47)
LYMPHOCYTES NFR BLD AUTO: 43 % (ref 14–44)
MCH RBC QN AUTO: 31 PG (ref 26.8–34.3)
MCHC RBC AUTO-ENTMCNC: 34.6 G/DL (ref 31.4–37.4)
MCV RBC AUTO: 90 FL (ref 82–98)
MONOCYTES # BLD AUTO: 0.32 THOUSAND/ΜL (ref 0.17–1.22)
MONOCYTES NFR BLD AUTO: 6 % (ref 4–12)
NEUTROPHILS # BLD AUTO: 2.76 THOUSANDS/ΜL (ref 1.85–7.62)
NEUTS SEG NFR BLD AUTO: 49 % (ref 43–75)
NITRITE UR QL STRIP: NEGATIVE
NON-SQ EPI CELLS URNS QL MICRO: ABNORMAL /HPF
NRBC BLD AUTO-RTO: 0 /100 WBCS
PH UR STRIP.AUTO: 5.5 [PH]
PLATELET # BLD AUTO: 257 THOUSANDS/UL (ref 149–390)
PMV BLD AUTO: 9.7 FL (ref 8.9–12.7)
POTASSIUM SERPL-SCNC: 4.1 MMOL/L (ref 3.5–5.3)
PROT SERPL-MCNC: 7.9 G/DL (ref 6.4–8.2)
PROT UR STRIP-MCNC: ABNORMAL MG/DL
RBC # BLD AUTO: 4.32 MILLION/UL (ref 3.81–5.12)
RBC #/AREA URNS AUTO: ABNORMAL /HPF
SODIUM SERPL-SCNC: 141 MMOL/L (ref 136–145)
SP GR UR STRIP.AUTO: 1.02 (ref 1–1.03)
UROBILINOGEN UR QL STRIP.AUTO: 0.2 E.U./DL
WBC # BLD AUTO: 5.53 THOUSAND/UL (ref 4.31–10.16)
WBC #/AREA URNS AUTO: ABNORMAL /HPF

## 2020-02-17 PROCEDURE — 80053 COMPREHEN METABOLIC PANEL: CPT

## 2020-02-17 PROCEDURE — 36415 COLL VENOUS BLD VENIPUNCTURE: CPT

## 2020-02-17 PROCEDURE — 85025 COMPLETE CBC W/AUTO DIFF WBC: CPT

## 2020-02-17 PROCEDURE — 76705 ECHO EXAM OF ABDOMEN: CPT

## 2020-02-17 PROCEDURE — 87389 HIV-1 AG W/HIV-1&-2 AB AG IA: CPT

## 2020-02-17 PROCEDURE — 81001 URINALYSIS AUTO W/SCOPE: CPT | Performed by: PHYSICIAN ASSISTANT

## 2020-02-17 PROCEDURE — 86803 HEPATITIS C AB TEST: CPT

## 2020-02-18 ENCOUNTER — TELEPHONE (OUTPATIENT)
Dept: FAMILY MEDICINE CLINIC | Facility: CLINIC | Age: 55
End: 2020-02-18

## 2020-02-18 LAB — HIV 1+2 AB+HIV1 P24 AG SERPL QL IA: NORMAL

## 2020-02-18 NOTE — TELEPHONE ENCOUNTER
----- Message from Eliseo Williamson PA-C sent at 2/18/2020 11:20 AM EST -----  Call patient with normal results

## 2020-02-19 ENCOUNTER — OFFICE VISIT (OUTPATIENT)
Dept: OBGYN CLINIC | Facility: CLINIC | Age: 55
End: 2020-02-19
Payer: COMMERCIAL

## 2020-02-19 ENCOUNTER — APPOINTMENT (OUTPATIENT)
Dept: RADIOLOGY | Facility: MEDICAL CENTER | Age: 55
End: 2020-02-19
Payer: COMMERCIAL

## 2020-02-19 VITALS
WEIGHT: 142 LBS | SYSTOLIC BLOOD PRESSURE: 122 MMHG | HEART RATE: 56 BPM | DIASTOLIC BLOOD PRESSURE: 79 MMHG | BODY MASS INDEX: 28.68 KG/M2

## 2020-02-19 DIAGNOSIS — M17.0 PRIMARY OSTEOARTHRITIS OF BOTH KNEES: Primary | ICD-10-CM

## 2020-02-19 DIAGNOSIS — M17.0 PRIMARY OSTEOARTHRITIS OF BOTH KNEES: ICD-10-CM

## 2020-02-19 PROCEDURE — 1036F TOBACCO NON-USER: CPT | Performed by: PHYSICIAN ASSISTANT

## 2020-02-19 PROCEDURE — 20610 DRAIN/INJ JOINT/BURSA W/O US: CPT | Performed by: PHYSICIAN ASSISTANT

## 2020-02-19 PROCEDURE — 99203 OFFICE O/P NEW LOW 30 MIN: CPT | Performed by: PHYSICIAN ASSISTANT

## 2020-02-19 PROCEDURE — 73565 X-RAY EXAM OF KNEES: CPT

## 2020-02-19 RX ORDER — BETAMETHASONE SODIUM PHOSPHATE AND BETAMETHASONE ACETATE 3; 3 MG/ML; MG/ML
6 INJECTION, SUSPENSION INTRA-ARTICULAR; INTRALESIONAL; INTRAMUSCULAR; SOFT TISSUE
Status: COMPLETED | OUTPATIENT
Start: 2020-02-19 | End: 2020-02-19

## 2020-02-19 RX ORDER — LIDOCAINE HYDROCHLORIDE 10 MG/ML
2 INJECTION, SOLUTION INFILTRATION; PERINEURAL
Status: COMPLETED | OUTPATIENT
Start: 2020-02-19 | End: 2020-02-19

## 2020-02-19 RX ADMIN — LIDOCAINE HYDROCHLORIDE 2 ML: 10 INJECTION, SOLUTION INFILTRATION; PERINEURAL at 09:00

## 2020-02-19 RX ADMIN — LIDOCAINE HYDROCHLORIDE 2 ML: 10 INJECTION, SOLUTION INFILTRATION; PERINEURAL at 09:01

## 2020-02-19 RX ADMIN — BETAMETHASONE SODIUM PHOSPHATE AND BETAMETHASONE ACETATE 6 MG: 3; 3 INJECTION, SUSPENSION INTRA-ARTICULAR; INTRALESIONAL; INTRAMUSCULAR; SOFT TISSUE at 09:00

## 2020-02-19 RX ADMIN — BETAMETHASONE SODIUM PHOSPHATE AND BETAMETHASONE ACETATE 6 MG: 3; 3 INJECTION, SUSPENSION INTRA-ARTICULAR; INTRALESIONAL; INTRAMUSCULAR; SOFT TISSUE at 09:01

## 2020-02-19 NOTE — PATIENT INSTRUCTIONS
Patient elected cortisone injection both knees today for symptomatic pain relief  She will ice the knee 20 minutes 1 to 2 times a day as needed for any discomfort  In the future she can use ice at any time for discomfort in the knees  She may also use Tylenol as needed  We will get the patient started in formal physical therapy  We will see the patient back in 2 months to evaluate her progress  She still symptomatic would consider other testing  Initial test if needed would be sunrise x-ray view both knees and possible MRI  Weightbearing activities as tolerated

## 2020-02-19 NOTE — PROGRESS NOTES
54 y o female presents for evaluation of bilateral knee pain  She notes her right knee is more symptomatic than her left  She is Stateless-speaking and her son is interpreting  She has occasional giving way but denies any locking  She notes her pain is mostly on the inside of her knees  She denies any previous treatment  Denies any previous injury or trauma  She is not allergic to any medicines  She is not diabetic  She denies any numbness or tingling  Pain is helped some with Tylenol and worsen with increased activity  He notes some grinding of the kneecap  Her pain is been present for approximately 2-3 years  Review of Systems  Review of systems negative unless otherwise specified in HPI    Past Medical History  Past Medical History:   Diagnosis Date    Fatigue     High cholesterol     Hypertension     Neuropathy     Osteoarthritis     knees    Shortness of breath      Past Surgical History  Past Surgical History:   Procedure Laterality Date    APPENDECTOMY      EPIDURAL BLOCK INJECTION Left 5/17/2018    Procedure: L4 AND L5 TRANSFORAMINAL EPIDURAL STEROID INJECTION;  Surgeon: Swati David MD;  [facility];  Service: Pain Management     EPIDURAL BLOCK INJECTION Left 10/4/2018    Procedure: L4 AND L5 TRANSFORAMINAL EPIDURAL STEROID INJECTION;  Surgeon: Swati David MD;  [facility];  Service: Pain Management     EPIDURAL BLOCK INJECTION Left 5/1/2019    Procedure: L4-5 and L5-S1 transforaminal epidural steroid injection;  Surgeon: Swati David MD;  [facility];  Service: Pain Management     FL GUIDED NEEDLE PLAC BX/ASP/INJ  5/1/2019    NH COLONOSCOPY FLX DX W/COLLJ SPEC WHEN PFRMD N/A 5/31/2016    Procedure: COLONOSCOPY;  Surgeon: Richard Mariscal MD;  [facility];  Service: Gastroenterology    NH ESOPHAGOGASTRODUODENOSCOPY TRANSORAL DIAGNOSTIC N/A 11/15/2016    Procedure: ESOPHAGOGASTRODUODENOSCOPY (EGD);   Surgeon: Richard Mariscal MD; [facility];  Service: Gastroenterology    VT TYMPANOPLASTY Left 10/25/2019    Procedure: LEFT TYMPANOPLASTY;  Surgeon: Lisa Hills MD;  Location:  MAIN OR;  Service: ENT    TUBAL LIGATION       Current Medications  Current Outpatient Medications on File Prior to Visit   Medication Sig Dispense Refill    gabapentin (NEURONTIN) 400 mg capsule Take 400 mg by mouth 3 (three) times a day      omeprazole (PriLOSEC) 40 MG capsule take 1 capsule by mouth daily every morning 30 MINUTES PRIOR TO FOOD 30 capsule 2     No current facility-administered medications on file prior to visit  Recent Labs [facility])  0   Lab Value Date/Time    HCT 38 7 02/17/2020 1308    HCT 37 2 07/14/2015 0737    HGB 13 4 02/17/2020 1308    HGB 13 3 07/14/2015 0737    WBC 5 53 02/17/2020 1308    WBC 4 94 07/14/2015 0737    INR 0 96 04/17/2019 1258    ESR 6 07/14/2015 0737    CRP <3 0 04/17/2019 1956    GLUCOSE 96 07/14/2015 0737     Physical exam  · General: Awake, Alert, Oriented, BMI 28 6  · Eyes: Pupils equal, round and reactive to light  · Heart: regular rate and rhythm  · Lungs: No audible wheezing  · Abdomen: soft  bilateral Knee exam  · Trace effusion no erythema or warmth  No signs of infection  She has full extension both knees flexion approximately 125°  She is grossly neurovascular intact  She has bilateral patellar facet tenderness and mild patellofemoral crepitation with range of motion  She is most tender on the medial femoral condyles  Her left knee notes some laxity with anterior drawer testing  There is no other gross ligamentous instability or laxity of either knee  Patellas were non ballotable  Kings's test in the right produces some discomfort but no palpable meniscal click  There is some patellar crepitation with this  Left knee Kings's negative for pain or clicking  She is grossly neurovascular intact crutches no pitting edema      Imaging  I personally reviewed AP standing x-rays both knees which notes very minimal joint space narrowing medial aspect  1  Primary osteoarthritis of both knees      Assessment:  bilateral knee pain which appears to be mild arthritis in nature with a significant patellofemoral component  Large joint arthrocentesis: R knee  Date/Time: 2/19/2020 9:00 AM  Consent given by: patient  Timeout: Immediately prior to procedure a time out was called to verify the correct patient, procedure, equipment, support staff and site/side marked as required   Supporting Documentation  Indications: pain   Procedure Details  Location: knee - R knee  Preparation: Patient was prepped and draped in the usual sterile fashion  Needle size: 22 G  Ultrasound guidance: no  Approach: anterolateral  Medications administered: 6 mg betamethasone acetate-betamethasone sodium phosphate 6 (3-3) mg/mL; 2 mL lidocaine 1 %    Patient tolerance: patient tolerated the procedure well with no immediate complications  Dressing:  Sterile dressing applied    Large joint arthrocentesis: L knee  Date/Time: 2/19/2020 9:01 AM  Consent given by: patient  Timeout: Immediately prior to procedure a time out was called to verify the correct patient, procedure, equipment, support staff and site/side marked as required   Supporting Documentation  Indications: pain   Procedure Details  Location: knee - L knee  Preparation: Patient was prepped and draped in the usual sterile fashion  Needle size: 22 G  Ultrasound guidance: no  Approach: anterolateral  Medications administered: 6 mg betamethasone acetate-betamethasone sodium phosphate 6 (3-3) mg/mL; 2 mL lidocaine 1 %    Patient tolerance: patient tolerated the procedure well with no immediate complications  Dressing:  Sterile dressing applied        Plan:  Patient elected cortisone injection both knees today for symptomatic pain relief  She will ice the knee 20 minutes 1 to 2 times a day as needed for any discomfort    In the future she can use ice at any time for discomfort in the knees  She may also use Tylenol as needed  We will get the patient started in formal physical therapy  We will see the patient back in 2 months to evaluate her progress  She still symptomatic would consider other testing  Initial test if needed would be sunrise x-ray view both knees and possible MRI  Weightbearing activities as tolerated

## 2020-02-21 ENCOUNTER — TELEPHONE (OUTPATIENT)
Dept: FAMILY MEDICINE CLINIC | Facility: HOME HEALTHCARE | Age: 55
End: 2020-02-21

## 2020-03-14 ENCOUNTER — APPOINTMENT (EMERGENCY)
Dept: RADIOLOGY | Facility: HOSPITAL | Age: 55
End: 2020-03-14
Payer: COMMERCIAL

## 2020-03-14 ENCOUNTER — HOSPITAL ENCOUNTER (EMERGENCY)
Facility: HOSPITAL | Age: 55
Discharge: HOME | End: 2020-03-14
Attending: EMERGENCY MEDICINE | Admitting: EMERGENCY MEDICINE
Payer: COMMERCIAL

## 2020-03-14 VITALS
SYSTOLIC BLOOD PRESSURE: 144 MMHG | BODY MASS INDEX: 27.74 KG/M2 | OXYGEN SATURATION: 100 % | DIASTOLIC BLOOD PRESSURE: 67 MMHG | WEIGHT: 137.35 LBS | RESPIRATION RATE: 14 BRPM | HEART RATE: 68 BPM | TEMPERATURE: 97.4 F

## 2020-03-14 DIAGNOSIS — J06.9 VIRAL URI WITH COUGH: Primary | ICD-10-CM

## 2020-03-14 DIAGNOSIS — R19.7 NAUSEA, VOMITING AND DIARRHEA: ICD-10-CM

## 2020-03-14 DIAGNOSIS — R11.2 NAUSEA, VOMITING AND DIARRHEA: ICD-10-CM

## 2020-03-14 DIAGNOSIS — R79.89 ELEVATED LFTS: ICD-10-CM

## 2020-03-14 LAB
ALBUMIN SERPL BCP-MCNC: 4.2 G/DL (ref 3.5–5)
ALP SERPL-CCNC: 96 U/L (ref 46–116)
ALT SERPL W P-5'-P-CCNC: 84 U/L (ref 12–78)
ANION GAP SERPL CALCULATED.3IONS-SCNC: 8 MMOL/L (ref 4–13)
AST SERPL W P-5'-P-CCNC: 52 U/L (ref 5–45)
BACTERIA UR QL AUTO: ABNORMAL /HPF
BASOPHILS # BLD AUTO: 0.02 THOUSANDS/ΜL (ref 0–0.1)
BASOPHILS NFR BLD AUTO: 0 % (ref 0–1)
BILIRUB SERPL-MCNC: 1.1 MG/DL (ref 0.2–1)
BILIRUB UR QL STRIP: NEGATIVE
BUN SERPL-MCNC: 15 MG/DL (ref 5–25)
CALCIUM SERPL-MCNC: 9 MG/DL (ref 8.3–10.1)
CHLORIDE SERPL-SCNC: 102 MMOL/L (ref 100–108)
CLARITY UR: CLEAR
CO2 SERPL-SCNC: 26 MMOL/L (ref 21–32)
COLOR UR: YELLOW
CREAT SERPL-MCNC: 0.86 MG/DL (ref 0.6–1.3)
EOSINOPHIL # BLD AUTO: 0.13 THOUSAND/ΜL (ref 0–0.61)
EOSINOPHIL NFR BLD AUTO: 2 % (ref 0–6)
ERYTHROCYTE [DISTWIDTH] IN BLOOD BY AUTOMATED COUNT: 12.2 % (ref 11.6–15.1)
FLUAV RNA NPH QL NAA+PROBE: NORMAL
FLUBV RNA NPH QL NAA+PROBE: NORMAL
GFR SERPL CREATININE-BSD FRML MDRD: 76 ML/MIN/1.73SQ M
GLUCOSE SERPL-MCNC: 126 MG/DL (ref 65–140)
GLUCOSE UR STRIP-MCNC: NEGATIVE MG/DL
HCT VFR BLD AUTO: 40.8 % (ref 34.8–46.1)
HGB BLD-MCNC: 14.1 G/DL (ref 11.5–15.4)
HGB UR QL STRIP.AUTO: ABNORMAL
IMM GRANULOCYTES # BLD AUTO: 0.03 THOUSAND/UL (ref 0–0.2)
IMM GRANULOCYTES NFR BLD AUTO: 0 % (ref 0–2)
KETONES UR STRIP-MCNC: NEGATIVE MG/DL
LEUKOCYTE ESTERASE UR QL STRIP: NEGATIVE
LIPASE SERPL-CCNC: 133 U/L (ref 73–393)
LYMPHOCYTES # BLD AUTO: 0.78 THOUSANDS/ΜL (ref 0.6–4.47)
LYMPHOCYTES NFR BLD AUTO: 9 % (ref 14–44)
MCH RBC QN AUTO: 31.1 PG (ref 26.8–34.3)
MCHC RBC AUTO-ENTMCNC: 34.6 G/DL (ref 31.4–37.4)
MCV RBC AUTO: 90 FL (ref 82–98)
MONOCYTES # BLD AUTO: 0.39 THOUSAND/ΜL (ref 0.17–1.22)
MONOCYTES NFR BLD AUTO: 5 % (ref 4–12)
NEUTROPHILS # BLD AUTO: 6.93 THOUSANDS/ΜL (ref 1.85–7.62)
NEUTS SEG NFR BLD AUTO: 84 % (ref 43–75)
NITRITE UR QL STRIP: NEGATIVE
NON-SQ EPI CELLS URNS QL MICRO: ABNORMAL /HPF
NRBC BLD AUTO-RTO: 0 /100 WBCS
PH UR STRIP.AUTO: 6 [PH]
PLATELET # BLD AUTO: 222 THOUSANDS/UL (ref 149–390)
PMV BLD AUTO: 9.6 FL (ref 8.9–12.7)
POTASSIUM SERPL-SCNC: 4 MMOL/L (ref 3.5–5.3)
PROT SERPL-MCNC: 7.8 G/DL (ref 6.4–8.2)
PROT UR STRIP-MCNC: NEGATIVE MG/DL
RBC # BLD AUTO: 4.54 MILLION/UL (ref 3.81–5.12)
RBC #/AREA URNS AUTO: ABNORMAL /HPF
RSV RNA NPH QL NAA+PROBE: NORMAL
SODIUM SERPL-SCNC: 136 MMOL/L (ref 136–145)
SP GR UR STRIP.AUTO: 1.02 (ref 1–1.03)
UROBILINOGEN UR QL STRIP.AUTO: 0.2 E.U./DL
WBC # BLD AUTO: 8.28 THOUSAND/UL (ref 4.31–10.16)
WBC #/AREA URNS AUTO: ABNORMAL /HPF

## 2020-03-14 PROCEDURE — 96374 THER/PROPH/DIAG INJ IV PUSH: CPT

## 2020-03-14 PROCEDURE — 71046 X-RAY EXAM CHEST 2 VIEWS: CPT

## 2020-03-14 PROCEDURE — 99283 EMERGENCY DEPT VISIT LOW MDM: CPT

## 2020-03-14 PROCEDURE — 85025 COMPLETE CBC W/AUTO DIFF WBC: CPT | Performed by: PHYSICIAN ASSISTANT

## 2020-03-14 PROCEDURE — 80053 COMPREHEN METABOLIC PANEL: CPT | Performed by: PHYSICIAN ASSISTANT

## 2020-03-14 PROCEDURE — 99284 EMERGENCY DEPT VISIT MOD MDM: CPT | Performed by: PHYSICIAN ASSISTANT

## 2020-03-14 PROCEDURE — 96361 HYDRATE IV INFUSION ADD-ON: CPT

## 2020-03-14 PROCEDURE — 36415 COLL VENOUS BLD VENIPUNCTURE: CPT | Performed by: PHYSICIAN ASSISTANT

## 2020-03-14 PROCEDURE — 83690 ASSAY OF LIPASE: CPT | Performed by: PHYSICIAN ASSISTANT

## 2020-03-14 PROCEDURE — 81001 URINALYSIS AUTO W/SCOPE: CPT | Performed by: PHYSICIAN ASSISTANT

## 2020-03-14 PROCEDURE — 87631 RESP VIRUS 3-5 TARGETS: CPT | Performed by: PHYSICIAN ASSISTANT

## 2020-03-14 RX ORDER — ONDANSETRON 2 MG/ML
4 INJECTION INTRAMUSCULAR; INTRAVENOUS ONCE
Status: COMPLETED | OUTPATIENT
Start: 2020-03-14 | End: 2020-03-14

## 2020-03-14 RX ADMIN — ONDANSETRON 4 MG: 2 INJECTION INTRAMUSCULAR; INTRAVENOUS at 12:40

## 2020-03-14 RX ADMIN — SODIUM CHLORIDE 1000 ML: 0.9 INJECTION, SOLUTION INTRAVENOUS at 12:40

## 2020-03-14 NOTE — ED PROVIDER NOTES
History  Chief Complaint   Patient presents with    Cough     Cough, headache, diarrhea for three days     54year old female presents with daughter for evaluation of cough and congestion  Pt English speaking and accompanied by daughter who helps interpret  Onset of symptoms was three days ago  Reports sinus congestion, nonproductive cough  Denies earache, sore throat  Denies chest pain, wheezing or SOB  Also reports nausea, vomiting, diarrhea with symptoms  Reports abdominal pain  This is described as generalized, described as cramping, does not radiate  No reported aggravating or alleviating factors  Pt has had prior tubal and appendectomy  Denies fever, chills  Denies sick contacts  Denies recent travel  No specific treatments tried  History provided by:  Patient   used: Yes (pt declined blue line and wants daughter to interpret)        Prior to Admission Medications   Prescriptions Last Dose Informant Patient Reported?  Taking?   gabapentin (NEURONTIN) 400 mg capsule   Yes No   Sig: Take 400 mg by mouth 3 (three) times a day   omeprazole (PriLOSEC) 40 MG capsule   No No   Sig: take 1 capsule by mouth daily every morning 30 MINUTES PRIOR TO FOOD      Facility-Administered Medications: None       Past Medical History:   Diagnosis Date    Fatigue     High cholesterol     Hypertension     Neuropathy     Osteoarthritis     knees    Shortness of breath        Past Surgical History:   Procedure Laterality Date    APPENDECTOMY      EPIDURAL BLOCK INJECTION Left 5/17/2018    Procedure: L4 AND L5 TRANSFORAMINAL EPIDURAL STEROID INJECTION;  Surgeon: Antoinette Knutson MD;  [facility];  Service: Pain Management     EPIDURAL BLOCK INJECTION Left 10/4/2018    Procedure: L4 AND L5 TRANSFORAMINAL EPIDURAL STEROID INJECTION;  Surgeon: Antoinette Knutson MD;  [facility];  Service: Pain Management     EPIDURAL BLOCK INJECTION Left 5/1/2019    Procedure: L4-5 and L5-S1 transforaminal epidural steroid injection;  Surgeon: Antoinette Knutson MD;  [facility];  Service: Pain Management     FL GUIDED NEEDLE PLAC BX/ASP/INJ  5/1/2019    AR COLONOSCOPY FLX DX W/COLLJ SPEC WHEN PFRMD N/A 5/31/2016    Procedure: COLONOSCOPY;  Surgeon: Jeremiah Mckeon MD;  [facility];  Service: Gastroenterology    AR ESOPHAGOGASTRODUODENOSCOPY TRANSORAL DIAGNOSTIC N/A 11/15/2016    Procedure: ESOPHAGOGASTRODUODENOSCOPY (EGD); Surgeon: Jeremiah Mckeon MD;  [facility];  Service: Gastroenterology    AR TYMPANOPLASTY Left 10/25/2019    Procedure: LEFT TYMPANOPLASTY;  Surgeon: Vanna Bernal MD;  Location:  MAIN OR;  Service: ENT    TUBAL LIGATION         Family History   Problem Relation Age of Onset    Uterine cancer Mother     Heart disease Brother     No Known Problems Sister     No Known Problems Daughter     No Known Problems Sister     No Known Problems Daughter     No Known Problems Maternal Aunt     No Known Problems Maternal Aunt     No Known Problems Maternal Aunt     No Known Problems Maternal Aunt      I have reviewed and agree with the history as documented  E-Cigarette/Vaping     E-Cigarette/Vaping Substances     Social History     Tobacco Use    Smoking status: Never Smoker    Smokeless tobacco: Never Used   Substance Use Topics    Alcohol use: Not Currently    Drug use: No       Review of Systems   Constitutional: Negative  Negative for chills, fatigue and fever  HENT: Positive for congestion and rhinorrhea  Negative for sore throat  Eyes: Negative  Negative for visual disturbance  Respiratory: Positive for cough  Negative for shortness of breath and wheezing  Cardiovascular: Negative  Negative for chest pain, palpitations and leg swelling  Gastrointestinal: Positive for abdominal pain, diarrhea, nausea and vomiting  Negative for constipation  Genitourinary: Negative    Negative for decreased urine volume, dysuria, flank pain, frequency and hematuria  Musculoskeletal: Negative  Negative for back pain and myalgias  Skin: Negative  Negative for rash  Neurological: Negative  Negative for dizziness, light-headedness and headaches  Psychiatric/Behavioral: Negative  Negative for confusion  All other systems reviewed and are negative  Physical Exam  Physical Exam   Constitutional: She is oriented to person, place, and time  She appears well-developed and well-nourished  Non-toxic appearance  No distress  HENT:   Head: Normocephalic and atraumatic  Right Ear: Hearing, tympanic membrane, external ear and ear canal normal    Left Ear: Hearing, tympanic membrane, external ear and ear canal normal    Nose: Nose normal    Mouth/Throat: Uvula is midline, oropharynx is clear and moist and mucous membranes are normal  No oropharyngeal exudate  Eyes: Pupils are equal, round, and reactive to light  Conjunctivae and EOM are normal  No scleral icterus  Neck: Trachea normal and normal range of motion  Neck supple  No tracheal deviation present  Cardiovascular: Normal rate, regular rhythm, normal heart sounds, intact distal pulses and normal pulses  No murmur heard  Pulmonary/Chest: Effort normal and breath sounds normal  No tachypnea  No respiratory distress  She has no wheezes  She has no rhonchi  She has no rales  Abdominal: Soft  Bowel sounds are normal  She exhibits no distension  There is no hepatosplenomegaly  There is no tenderness  There is no rebound, no guarding and no CVA tenderness  No hernia  Musculoskeletal: Normal range of motion  She exhibits no edema or tenderness  Neurological: She is alert and oriented to person, place, and time  No cranial nerve deficit or sensory deficit  She exhibits normal muscle tone  Gait normal  GCS eye subscore is 4  GCS verbal subscore is 5  GCS motor subscore is 6  Skin: Skin is warm and dry  Capillary refill takes less than 2 seconds  No rash noted     Psychiatric: She has a normal mood and affect  Her speech is normal and behavior is normal    Nursing note and vitals reviewed        Vital Signs  ED Triage Vitals [03/14/20 1141]   Temperature Pulse Respirations Blood Pressure SpO2   (!) 97 4 °F (36 3 °C) 75 16 123/62 97 %      Temp Source Heart Rate Source Patient Position - Orthostatic VS BP Location FiO2 (%)   Temporal Monitor Lying Right arm --      Pain Score       --           Vitals:    03/14/20 1141 03/14/20 1245 03/14/20 1300   BP: 123/62 121/65 144/67   Pulse: 75 66 68   Patient Position - Orthostatic VS: Lying           Visual Acuity  Visual Acuity      Most Recent Value   L Pupil Size (mm)  3   R Pupil Size (mm)  3          ED Medications  Medications   sodium chloride 0 9 % bolus 1,000 mL (0 mL Intravenous Stopped 3/14/20 1321)   ondansetron (ZOFRAN) injection 4 mg (4 mg Intravenous Given 3/14/20 1240)       Diagnostic Studies  Results Reviewed     Procedure Component Value Units Date/Time    Influenza A/B and RSV PCR [[id number]]  (Normal) Collected:  03/14/20 1200    Lab Status:  Final result Specimen:  Nasopharyngeal from Nose Updated:  03/14/20 1244     INFLUENZA A PCR None Detected     INFLUENZA B PCR None Detected     RSV PCR None Detected    Comprehensive metabolic panel [[id number]]  (Abnormal) Collected:  03/14/20 1200    Lab Status:  Final result Specimen:  Blood from Arm, Right Updated:  03/14/20 1224     Sodium 136 mmol/L      Potassium 4 0 mmol/L      Chloride 102 mmol/L      CO2 26 mmol/L      ANION GAP 8 mmol/L      BUN 15 mg/dL      Creatinine 0 86 mg/dL      Glucose 126 mg/dL      Calcium 9 0 mg/dL      AST 52 U/L      ALT 84 U/L      Alkaline Phosphatase 96 U/L      Total Protein 7 8 g/dL      Albumin 4 2 g/dL      Total Bilirubin 1 10 mg/dL      eGFR 76 ml/min/1 73sq m     Narrative:       [place] guidelines for Chronic Kidney Disease (CKD):     Stage 1 with normal or high GFR (GFR > 90 mL/min/1 73 square meters)    Stage 2 Mild CKD (GFR = 60-89 mL/min/1 73 square meters)    Stage 3A Moderate CKD (GFR = 45-59 mL/min/1 73 square meters)    Stage 3B Moderate CKD (GFR = 30-44 mL/min/1 73 square meters)    Stage 4 Severe CKD (GFR = 15-29 mL/min/1 73 square meters)    Stage 5 End Stage CKD (GFR <15 mL/min/1 73 square meters)  Note: GFR calculation is accurate only with a steady state creatinine    Lipase [[id number]]  (Normal) Collected:  03/14/20 1200    Lab Status:  Final result Specimen:  Blood from Arm, Right Updated:  03/14/20 1219     Lipase 133 u/L     Urine Microscopic [[id number]]  (Abnormal) Collected:  03/14/20 1200    Lab Status:  Final result Specimen:  Urine, Clean Catch Updated:  03/14/20 1217     RBC, UA 0-1 /hpf      WBC, UA None Seen /hpf      Epithelial Cells Occasional /hpf      Bacteria, UA Occasional /hpf     UA (URINE) with reflex to Scope [[id number]]  (Abnormal) Collected:  03/14/20 1200    Lab Status:  Final result Specimen:  Urine, Clean Catch Updated:  03/14/20 1208     Color, UA Yellow     Clarity, UA Clear     Specific Gravity, UA 1 025     pH, UA 6 0     Leukocytes, UA Negative     Nitrite, UA Negative     Protein, UA Negative mg/dl      Glucose, UA Negative mg/dl      Ketones, UA Negative mg/dl      Urobilinogen, UA 0 2 E U /dl      Bilirubin, UA Negative     Blood, UA Trace-Intact    CBC and differential [[id number]]  (Abnormal) Collected:  03/14/20 1200    Lab Status:  Final result Specimen:  Blood from Arm, Right Updated:  03/14/20 1207     WBC 8 28 Thousand/uL      RBC 4 54 Million/uL      Hemoglobin 14 1 g/dL      Hematocrit 40 8 %      MCV 90 fL      MCH 31 1 pg      MCHC 34 6 g/dL      RDW 12 2 %      MPV 9 6 fL      Platelets 357 Thousands/uL      nRBC 0 /100 WBCs      Neutrophils Relative 84 %      Immat GRANS % 0 %      Lymphocytes Relative 9 %      Monocytes Relative 5 %      Eosinophils Relative 2 %      Basophils Relative 0 %      Neutrophils Absolute 6 93 Thousands/µL      Immature Grans Absolute 0 03 Thousand/uL      Lymphocytes Absolute 0 78 Thousands/µL      Monocytes Absolute 0 39 Thousand/µL      Eosinophils Absolute 0 13 Thousand/µL      Basophils Absolute 0 02 Thousands/µL                  XR chest 2 views   ED Interpretation by Monserrat Hayden PA-C (03/14 1220)   No acute cardiopulmonary process  Procedures  Procedures         ED Course  ED Course as of Mar 14 1340   Sat Mar 14, 2020   1207 WBC: 8 28   1208 Hemoglobin: 14 1   1208 Platelet Count: 082   1213 Color, UA: Yellow   1213 Clarity, UA: Clear   1213 SL AMB SPECIFIC GRAVITY_URINE: 1 025   1213 pH, UA: 6 0   1213 Leukocytes, UA: Negative   1213 Nitrite, UA: Negative   1213 POCT URINE PROTEIN: Negative   1213 Glucose, UA: Negative   1213 Ketones, UA: Negative   1213 SL AMB POCT UROBILINOGEN: 0 2   1213 Bilirubin, UA: Negative   1213 Blood, UA(!): Trace-Intact   1218 RBC, UA(!): 0-1   1218 WBC, UA: None Seen   1218 Epithelial Cells: Occasional   1218 Bacteria, UA: Occasional   1220 Independently viewed and interpreted by me - no acute cardiopulmonary process, no interval change from prior; pending official read  XR chest 2 views   1221 Lipase: 133   1227 Glucose, Random: 126   1227 Creatinine: 0 86   1227 BUN: 15   1227 Sodium: 136   1227 Potassium: 4 0   1227 Chloride: 102   1227 CO2: 26   1227 Anion Gap: 8   1227 Calcium: 9 0   1227 AST(!): 52   1227 ALT(!): 84   1227 Alkaline Phosphatase: 96   1227 Total Protein: 7 8   1227 Albumin: 4 2   1227 TOTAL BILIRUBIN(!): 1 10   1227 eGFR: 76   1245 INFLU A PCR: None Detected   1245 INFLU B PCR: None Detected   1245 RSV PCR: None Detected   1250 Findings reviewed with pt and her daughter  She is resting comfortably, no distress  Pt has no vomiting or diarrhea while here  Abdomen remains soft and nontender  She declined CT imaging  Pt afebrile and hemodynamically stable  Plan to discharge home with continued symptomatic and supportive care  Suspect viral syndrome  Discussed continued symptomatic and supportive care  Rest, fluids  OTC meds as needed  Strict return precautions outlined  We discussed need for follow up testing of the mild elevation of her LFTs  Advised outpatient follow up with PCP for recheck or return to ER for change in condition as outlined  Pt and daughter verbalized understanding and had no further questions  MDM  Number of Diagnoses or Management Options  Elevated LFTs: new and requires workup  Nausea, vomiting and diarrhea: new and requires workup  Viral URI with cough: new and requires workup     Amount and/or Complexity of Data Reviewed  Clinical lab tests: ordered and reviewed  Tests in the radiology section of CPT®: ordered and reviewed  Decide to obtain previous medical records or to obtain history from someone other than the patient: yes  Obtain history from someone other than the patient: yes  Review and summarize past medical records: yes  Independent visualization of images, tracings, or specimens: yes    Patient Progress  Patient progress: improved        Disposition  Final diagnoses:   Viral URI with cough   Nausea, vomiting and diarrhea   Elevated LFTs     Time reflects when diagnosis was documented in both MDM as applicable and the Disposition within this note     Time User Action Codes Description Comment    3/14/2020  1:00 PM Catrina Casandra Add [J06 [id number],  B97 89] Viral URI with cough     3/14/2020  1:00 PM Catrina Casandra Add [R11 2,  R19 7] Nausea, vomiting and diarrhea     3/14/2020  1:02 PM Catrina Casandra Add [R94 5] Elevated LFTs       ED Disposition     ED Disposition Condition Date/Time Comment    Discharge Stable Sat Mar 14, 2020  1:00 PM Isa Sauceda discharge to home/self care              Follow-up Information     Follow up With Specialties Details Why Contact Info Additional Information    Sylvia Molina PA-C Family Medicine, Physician Assistant Schedule an appointment as soon as possible for a visit   [address] [facility] [address]  [address] [facility] Emergency Medicine  As needed [address] [id number] [phone] [facility], [place], [place], [address]          Discharge Medication List as of 3/14/2020  1:10 PM      CONTINUE these medications which have NOT CHANGED    Details   gabapentin (NEURONTIN) 400 mg capsule Take 400 mg by mouth 3 (three) times a day, Historical Med      omeprazole (PriLOSEC) 40 MG capsule take 1 capsule by mouth daily every morning 30 MINUTES PRIOR TO FOOD, Normal           No discharge procedures on file      PDMP Review       Value Time User    PDMP Reviewed  Yes 10/25/2019  3:20 PM Anshu Norris MD          ED Provider  Electronically Signed by           Mitesh Pelayo PA-C  03/14/20 1625

## 2020-03-14 NOTE — DISCHARGE INSTRUCTIONS
Rest, plenty of fluids  OTC medications as needed for symptoms  Follow up with PCP for recheck  Will need repeat testing of liver enzymes  Return to ER as needed - especially if fever, trouble breathing, chest pain, abdominal pain, inability to maintain hydration/nutrition or any other concerns

## 2020-03-27 ENCOUNTER — APPOINTMENT (EMERGENCY)
Dept: RADIOLOGY | Facility: HOSPITAL | Age: 55
End: 2020-03-27
Payer: COMMERCIAL

## 2020-03-27 ENCOUNTER — HOSPITAL ENCOUNTER (EMERGENCY)
Facility: HOSPITAL | Age: 55
Discharge: HOME | End: 2020-03-27
Attending: EMERGENCY MEDICINE | Admitting: EMERGENCY MEDICINE
Payer: COMMERCIAL

## 2020-03-27 VITALS
SYSTOLIC BLOOD PRESSURE: 154 MMHG | HEART RATE: 71 BPM | OXYGEN SATURATION: 95 % | DIASTOLIC BLOOD PRESSURE: 69 MMHG | RESPIRATION RATE: 15 BRPM | BODY MASS INDEX: 28.76 KG/M2 | WEIGHT: 142.42 LBS | TEMPERATURE: 97.9 F

## 2020-03-27 DIAGNOSIS — J06.9 UPPER RESPIRATORY INFECTION: Primary | ICD-10-CM

## 2020-03-27 PROCEDURE — 71046 X-RAY EXAM CHEST 2 VIEWS: CPT

## 2020-03-27 PROCEDURE — 99283 EMERGENCY DEPT VISIT LOW MDM: CPT

## 2020-03-27 PROCEDURE — 99284 EMERGENCY DEPT VISIT MOD MDM: CPT | Performed by: PHYSICIAN ASSISTANT

## 2020-03-27 RX ORDER — ALBUTEROL SULFATE 90 UG/1
2 AEROSOL, METERED RESPIRATORY (INHALATION) ONCE
Status: COMPLETED | OUTPATIENT
Start: 2020-03-27 | End: 2020-03-27

## 2020-03-27 RX ORDER — PREDNISONE 20 MG/1
40 TABLET ORAL DAILY
Qty: 8 TABLET | Refills: 0 | Status: SHIPPED | OUTPATIENT
Start: 2020-03-27 | End: 2020-03-31

## 2020-03-27 RX ORDER — BENZONATATE 100 MG/1
100 CAPSULE ORAL ONCE
Status: COMPLETED | OUTPATIENT
Start: 2020-03-27 | End: 2020-03-27

## 2020-03-27 RX ORDER — AZITHROMYCIN 250 MG/1
500 TABLET, FILM COATED ORAL ONCE
Status: COMPLETED | OUTPATIENT
Start: 2020-03-27 | End: 2020-03-27

## 2020-03-27 RX ORDER — PREDNISONE 20 MG/1
40 TABLET ORAL ONCE
Status: COMPLETED | OUTPATIENT
Start: 2020-03-27 | End: 2020-03-27

## 2020-03-27 RX ORDER — ALBUTEROL SULFATE 90 UG/1
1-2 AEROSOL, METERED RESPIRATORY (INHALATION) EVERY 4 HOURS PRN
Qty: 1 INHALER | Refills: 0 | Status: SHIPPED | OUTPATIENT
Start: 2020-03-27 | End: 2020-10-16 | Stop reason: ALTCHOICE

## 2020-03-27 RX ORDER — AZITHROMYCIN 250 MG/1
250 TABLET, FILM COATED ORAL EVERY 24 HOURS
Qty: 4 TABLET | Refills: 0 | Status: SHIPPED | OUTPATIENT
Start: 2020-03-27 | End: 2020-03-31

## 2020-03-27 RX ORDER — BENZONATATE 100 MG/1
100 CAPSULE ORAL 3 TIMES DAILY PRN
Qty: 20 CAPSULE | Refills: 0 | Status: SHIPPED | OUTPATIENT
Start: 2020-03-27 | End: 2020-10-16 | Stop reason: ALTCHOICE

## 2020-03-27 RX ADMIN — PREDNISONE 40 MG: 20 TABLET ORAL at 15:15

## 2020-03-27 RX ADMIN — ALBUTEROL SULFATE 2 PUFF: 90 AEROSOL, METERED RESPIRATORY (INHALATION) at 15:15

## 2020-03-27 RX ADMIN — AZITHROMYCIN 500 MG: 250 TABLET, FILM COATED ORAL at 16:25

## 2020-03-27 RX ADMIN — BENZONATATE 100 MG: 100 CAPSULE ORAL at 15:15

## 2020-04-08 ENCOUNTER — TELEPHONE (OUTPATIENT)
Dept: OBGYN CLINIC | Facility: CLINIC | Age: 55
End: 2020-04-08

## 2020-04-13 ENCOUNTER — TELEPHONE (OUTPATIENT)
Dept: OBGYN CLINIC | Facility: CLINIC | Age: 55
End: 2020-04-13

## 2020-04-16 ENCOUNTER — TELEMEDICINE (OUTPATIENT)
Dept: FAMILY MEDICINE CLINIC | Facility: CLINIC | Age: 55
End: 2020-04-16
Payer: COMMERCIAL

## 2020-04-16 DIAGNOSIS — G47.00 INSOMNIA, UNSPECIFIED TYPE: Primary | ICD-10-CM

## 2020-04-16 PROCEDURE — G0071 COMM SVCS BY RHC/FQHC 5 MIN: HCPCS | Performed by: FAMILY MEDICINE

## 2020-04-21 ENCOUNTER — OFFICE VISIT (OUTPATIENT)
Dept: OBGYN CLINIC | Facility: CLINIC | Age: 55
End: 2020-04-21
Payer: COMMERCIAL

## 2020-04-21 ENCOUNTER — APPOINTMENT (OUTPATIENT)
Dept: RADIOLOGY | Facility: MEDICAL CENTER | Age: 55
End: 2020-04-21
Payer: COMMERCIAL

## 2020-04-21 VITALS
BODY MASS INDEX: 28.88 KG/M2 | HEART RATE: 57 BPM | DIASTOLIC BLOOD PRESSURE: 77 MMHG | SYSTOLIC BLOOD PRESSURE: 127 MMHG | WEIGHT: 143 LBS

## 2020-04-21 DIAGNOSIS — M17.0 PRIMARY OSTEOARTHRITIS OF BOTH KNEES: Primary | ICD-10-CM

## 2020-04-21 DIAGNOSIS — M17.0 PRIMARY OSTEOARTHRITIS OF BOTH KNEES: ICD-10-CM

## 2020-04-21 PROCEDURE — 73560 X-RAY EXAM OF KNEE 1 OR 2: CPT

## 2020-04-21 PROCEDURE — 99213 OFFICE O/P EST LOW 20 MIN: CPT | Performed by: ORTHOPAEDIC SURGERY

## 2020-04-21 PROCEDURE — 1036F TOBACCO NON-USER: CPT | Performed by: ORTHOPAEDIC SURGERY

## 2020-05-05 ENCOUNTER — TELEPHONE (OUTPATIENT)
Dept: OBGYN CLINIC | Facility: HOSPITAL | Age: 55
End: 2020-05-05

## 2020-05-14 ENCOUNTER — HOSPITAL ENCOUNTER (EMERGENCY)
Facility: HOSPITAL | Age: 55
Discharge: HOME | End: 2020-05-15
Attending: EMERGENCY MEDICINE
Payer: COMMERCIAL

## 2020-05-14 DIAGNOSIS — S29.011A CHEST WALL MUSCLE STRAIN, INITIAL ENCOUNTER: Primary | ICD-10-CM

## 2020-05-14 DIAGNOSIS — S29.011A INTERCOSTAL MUSCLE STRAIN, INITIAL ENCOUNTER: ICD-10-CM

## 2020-05-14 PROCEDURE — 99284 EMERGENCY DEPT VISIT MOD MDM: CPT

## 2020-05-14 RX ORDER — ONDANSETRON 2 MG/ML
4 INJECTION INTRAMUSCULAR; INTRAVENOUS ONCE
Status: COMPLETED | OUTPATIENT
Start: 2020-05-15 | End: 2020-05-15

## 2020-05-15 ENCOUNTER — APPOINTMENT (EMERGENCY)
Dept: CT IMAGING | Facility: HOSPITAL | Age: 55
End: 2020-05-15
Payer: COMMERCIAL

## 2020-05-15 VITALS
HEIGHT: 59 IN | SYSTOLIC BLOOD PRESSURE: 142 MMHG | OXYGEN SATURATION: 99 % | DIASTOLIC BLOOD PRESSURE: 64 MMHG | HEART RATE: 58 BPM | RESPIRATION RATE: 16 BRPM | WEIGHT: 147.71 LBS | TEMPERATURE: 97.6 F | BODY MASS INDEX: 29.78 KG/M2

## 2020-05-15 LAB
ALBUMIN SERPL BCP-MCNC: 4.4 G/DL (ref 3.5–5)
ALP SERPL-CCNC: 95 U/L (ref 46–116)
ALT SERPL W P-5'-P-CCNC: 40 U/L (ref 12–78)
ANION GAP SERPL CALCULATED.3IONS-SCNC: 5 MMOL/L (ref 4–13)
AST SERPL W P-5'-P-CCNC: 24 U/L (ref 5–45)
ATRIAL RATE: 59 BPM
BASOPHILS # BLD AUTO: 0.06 THOUSANDS/ΜL (ref 0–0.1)
BASOPHILS NFR BLD AUTO: 1 % (ref 0–1)
BILIRUB SERPL-MCNC: 0.5 MG/DL (ref 0.2–1)
BILIRUB UR QL STRIP: NEGATIVE
BUN SERPL-MCNC: 21 MG/DL (ref 5–25)
CALCIUM SERPL-MCNC: 9.5 MG/DL (ref 8.3–10.1)
CHLORIDE SERPL-SCNC: 102 MMOL/L (ref 100–108)
CLARITY UR: CLEAR
CO2 SERPL-SCNC: 32 MMOL/L (ref 21–32)
COLOR UR: YELLOW
CREAT SERPL-MCNC: 0.9 MG/DL (ref 0.6–1.3)
D DIMER PPP FEU-MCNC: 0.6 UG/ML FEU
EOSINOPHIL # BLD AUTO: 0.18 THOUSAND/ΜL (ref 0–0.61)
EOSINOPHIL NFR BLD AUTO: 3 % (ref 0–6)
ERYTHROCYTE [DISTWIDTH] IN BLOOD BY AUTOMATED COUNT: 12.2 % (ref 11.6–15.1)
GFR SERPL CREATININE-BSD FRML MDRD: 72 ML/MIN/1.73SQ M
GLUCOSE SERPL-MCNC: 101 MG/DL (ref 65–140)
GLUCOSE UR STRIP-MCNC: NEGATIVE MG/DL
HCT VFR BLD AUTO: 38.2 % (ref 34.8–46.1)
HGB BLD-MCNC: 13.2 G/DL (ref 11.5–15.4)
HGB UR QL STRIP.AUTO: NEGATIVE
IMM GRANULOCYTES # BLD AUTO: 0.01 THOUSAND/UL (ref 0–0.2)
IMM GRANULOCYTES NFR BLD AUTO: 0 % (ref 0–2)
KETONES UR STRIP-MCNC: NEGATIVE MG/DL
LEUKOCYTE ESTERASE UR QL STRIP: NEGATIVE
LIPASE SERPL-CCNC: 224 U/L (ref 73–393)
LYMPHOCYTES # BLD AUTO: 3.48 THOUSANDS/ΜL (ref 0.6–4.47)
LYMPHOCYTES NFR BLD AUTO: 53 % (ref 14–44)
MCH RBC QN AUTO: 30.7 PG (ref 26.8–34.3)
MCHC RBC AUTO-ENTMCNC: 34.6 G/DL (ref 31.4–37.4)
MCV RBC AUTO: 89 FL (ref 82–98)
MONOCYTES # BLD AUTO: 0.43 THOUSAND/ΜL (ref 0.17–1.22)
MONOCYTES NFR BLD AUTO: 7 % (ref 4–12)
NEUTROPHILS # BLD AUTO: 2.35 THOUSANDS/ΜL (ref 1.85–7.62)
NEUTS SEG NFR BLD AUTO: 36 % (ref 43–75)
NITRITE UR QL STRIP: NEGATIVE
NRBC BLD AUTO-RTO: 0 /100 WBCS
PH UR STRIP.AUTO: 7.5 [PH]
PLATELET # BLD AUTO: 262 THOUSANDS/UL (ref 149–390)
PMV BLD AUTO: 9.5 FL (ref 8.9–12.7)
POTASSIUM SERPL-SCNC: 4 MMOL/L (ref 3.5–5.3)
PR INTERVAL: 156 MS
PROT SERPL-MCNC: 8.2 G/DL (ref 6.4–8.2)
PROT UR STRIP-MCNC: NEGATIVE MG/DL
QRS AXIS: -35 DEGREES
QRSD INTERVAL: 116 MS
QT INTERVAL: 470 MS
QTC INTERVAL: 465 MS
RBC # BLD AUTO: 4.3 MILLION/UL (ref 3.81–5.12)
SODIUM SERPL-SCNC: 139 MMOL/L (ref 136–145)
SP GR UR STRIP.AUTO: <=1.005 (ref 1–1.03)
T WAVE AXIS: 40 DEGREES
TROPONIN I SERPL-MCNC: <0.02 NG/ML
UROBILINOGEN UR QL STRIP.AUTO: 0.2 E.U./DL
VENTRICULAR RATE: 59 BPM
WBC # BLD AUTO: 6.51 THOUSAND/UL (ref 4.31–10.16)

## 2020-05-15 PROCEDURE — 74177 CT ABD & PELVIS W/CONTRAST: CPT

## 2020-05-15 PROCEDURE — 85025 COMPLETE CBC W/AUTO DIFF WBC: CPT | Performed by: EMERGENCY MEDICINE

## 2020-05-15 PROCEDURE — 96375 TX/PRO/DX INJ NEW DRUG ADDON: CPT

## 2020-05-15 PROCEDURE — 85379 FIBRIN DEGRADATION QUANT: CPT | Performed by: EMERGENCY MEDICINE

## 2020-05-15 PROCEDURE — 99285 EMERGENCY DEPT VISIT HI MDM: CPT | Performed by: EMERGENCY MEDICINE

## 2020-05-15 PROCEDURE — 96361 HYDRATE IV INFUSION ADD-ON: CPT

## 2020-05-15 PROCEDURE — 71275 CT ANGIOGRAPHY CHEST: CPT

## 2020-05-15 PROCEDURE — 81003 URINALYSIS AUTO W/O SCOPE: CPT | Performed by: EMERGENCY MEDICINE

## 2020-05-15 PROCEDURE — 80053 COMPREHEN METABOLIC PANEL: CPT | Performed by: EMERGENCY MEDICINE

## 2020-05-15 PROCEDURE — 36415 COLL VENOUS BLD VENIPUNCTURE: CPT | Performed by: EMERGENCY MEDICINE

## 2020-05-15 PROCEDURE — 93010 ELECTROCARDIOGRAM REPORT: CPT | Performed by: INTERNAL MEDICINE

## 2020-05-15 PROCEDURE — 83690 ASSAY OF LIPASE: CPT | Performed by: EMERGENCY MEDICINE

## 2020-05-15 PROCEDURE — 93005 ELECTROCARDIOGRAM TRACING: CPT

## 2020-05-15 PROCEDURE — 96374 THER/PROPH/DIAG INJ IV PUSH: CPT

## 2020-05-15 PROCEDURE — 84484 ASSAY OF TROPONIN QUANT: CPT | Performed by: EMERGENCY MEDICINE

## 2020-05-15 RX ORDER — NAPROXEN 500 MG/1
500 TABLET ORAL 2 TIMES DAILY WITH MEALS
Qty: 14 TABLET | Refills: 0 | Status: SHIPPED | OUTPATIENT
Start: 2020-05-15 | End: 2020-10-16 | Stop reason: ALTCHOICE

## 2020-05-15 RX ORDER — LIDOCAINE 50 MG/G
1 PATCH TOPICAL ONCE
Status: DISCONTINUED | OUTPATIENT
Start: 2020-05-15 | End: 2020-05-15 | Stop reason: HOSPADM

## 2020-05-15 RX ORDER — FAMOTIDINE 20 MG/1
20 TABLET, FILM COATED ORAL 2 TIMES DAILY
Qty: 14 TABLET | Refills: 0 | Status: SHIPPED | OUTPATIENT
Start: 2020-05-15 | End: 2020-10-16 | Stop reason: ALTCHOICE

## 2020-05-15 RX ADMIN — IOHEXOL 85 ML: 350 INJECTION, SOLUTION INTRAVENOUS at 00:53

## 2020-05-15 RX ADMIN — LIDOCAINE 1 PATCH: 50 PATCH TOPICAL at 01:02

## 2020-05-15 RX ADMIN — MORPHINE SULFATE 2 MG: 2 INJECTION, SOLUTION INTRAMUSCULAR; INTRAVENOUS at 00:21

## 2020-05-15 RX ADMIN — ONDANSETRON 4 MG: 2 INJECTION INTRAMUSCULAR; INTRAVENOUS at 00:22

## 2020-05-15 RX ADMIN — SODIUM CHLORIDE 1000 ML: 0.9 INJECTION, SOLUTION INTRAVENOUS at 00:16

## 2020-05-18 ENCOUNTER — OFFICE VISIT (OUTPATIENT)
Dept: FAMILY MEDICINE CLINIC | Facility: CLINIC | Age: 55
End: 2020-05-18
Payer: COMMERCIAL

## 2020-05-18 VITALS
SYSTOLIC BLOOD PRESSURE: 118 MMHG | OXYGEN SATURATION: 97 % | WEIGHT: 147 LBS | BODY MASS INDEX: 29.64 KG/M2 | RESPIRATION RATE: 16 BRPM | TEMPERATURE: 96.4 F | HEIGHT: 59 IN | DIASTOLIC BLOOD PRESSURE: 80 MMHG | HEART RATE: 63 BPM

## 2020-05-18 DIAGNOSIS — Z12.39 SCREENING FOR BREAST CANCER: Primary | ICD-10-CM

## 2020-05-18 DIAGNOSIS — T14.8XXA MUSCLE STRAIN: ICD-10-CM

## 2020-05-18 PROCEDURE — 99213 OFFICE O/P EST LOW 20 MIN: CPT | Performed by: FAMILY MEDICINE

## 2020-05-18 PROCEDURE — T1015 CLINIC SERVICE: HCPCS | Performed by: FAMILY MEDICINE

## 2020-05-20 ENCOUNTER — TRANSCRIBE ORDERS (OUTPATIENT)
Dept: ADMINISTRATIVE | Facility: HOSPITAL | Age: 55
End: 2020-05-20

## 2020-05-20 DIAGNOSIS — Z12.31 ENCOUNTER FOR SCREENING MAMMOGRAM FOR MALIGNANT NEOPLASM OF BREAST: Primary | ICD-10-CM

## 2020-06-12 ENCOUNTER — HOSPITAL ENCOUNTER (OUTPATIENT)
Dept: MAMMOGRAPHY | Facility: HOSPITAL | Age: 55
Discharge: HOME | End: 2020-06-12
Payer: COMMERCIAL

## 2020-06-12 VITALS — HEIGHT: 59 IN | BODY MASS INDEX: 29.64 KG/M2 | WEIGHT: 147 LBS

## 2020-06-12 DIAGNOSIS — Z12.31 ENCOUNTER FOR SCREENING MAMMOGRAM FOR MALIGNANT NEOPLASM OF BREAST: ICD-10-CM

## 2020-06-12 PROCEDURE — 77063 BREAST TOMOSYNTHESIS BI: CPT

## 2020-06-12 PROCEDURE — 77067 SCR MAMMO BI INCL CAD: CPT

## 2020-07-08 ENCOUNTER — OFFICE VISIT (OUTPATIENT)
Dept: OBGYN CLINIC | Facility: CLINIC | Age: 55
End: 2020-07-08
Payer: COMMERCIAL

## 2020-07-08 VITALS
SYSTOLIC BLOOD PRESSURE: 140 MMHG | DIASTOLIC BLOOD PRESSURE: 88 MMHG | TEMPERATURE: 96.9 F | BODY MASS INDEX: 29.23 KG/M2 | HEIGHT: 59 IN | HEART RATE: 54 BPM | WEIGHT: 145 LBS

## 2020-07-08 DIAGNOSIS — M17.12 ARTHRITIS OF LEFT KNEE: ICD-10-CM

## 2020-07-08 DIAGNOSIS — M17.11 ARTHRITIS OF RIGHT KNEE: Primary | ICD-10-CM

## 2020-07-08 PROCEDURE — 20610 DRAIN/INJ JOINT/BURSA W/O US: CPT | Performed by: PHYSICIAN ASSISTANT

## 2020-07-08 RX ORDER — HYALURONATE SODIUM 10 MG/ML
20 SYRINGE (ML) INTRAARTICULAR
Status: COMPLETED | OUTPATIENT
Start: 2020-07-08 | End: 2020-07-08

## 2020-07-08 RX ADMIN — Medication 20 MG: at 10:00

## 2020-07-08 NOTE — PATIENT INSTRUCTIONS
Patient will ice bilateral knee for 20 minutes 1-2 times today  Limit activity for the next 24 hours before resuming normal activity  Weightbearing as tolerated  Patient may use over-the-counter Ibuprofen or Tylenol as needed for discomfort  Follow up in 1 week for repeat evaluation and injection # 2

## 2020-07-08 NOTE — PROGRESS NOTES
54 y o  female presents for Euflexxa injection # 1 to the bilateral knees  She notes her right knee is more symptomatic than her left  She denies recent injury trauma or falls  Review of Systems  Review of systems negative unless otherwise specified in HPI    Past Medical History  Past Medical History:   Diagnosis Date    Fatigue     High cholesterol     Hypertension     Neuropathy     Osteoarthritis     knees    Shortness of breath      Past Surgical History  Past Surgical History:   Procedure Laterality Date    APPENDECTOMY      EPIDURAL BLOCK INJECTION Left 5/17/2018    Procedure: L4 AND L5 TRANSFORAMINAL EPIDURAL STEROID INJECTION;  Surgeon: Leandro Ramirez MD;  [facility];  Service: Pain Management     EPIDURAL BLOCK INJECTION Left 10/4/2018    Procedure: L4 AND L5 TRANSFORAMINAL EPIDURAL STEROID INJECTION;  Surgeon: Leandro Ramirez MD;  [facility];  Service: Pain Management     EPIDURAL BLOCK INJECTION Left 5/1/2019    Procedure: L4-5 and L5-S1 transforaminal epidural steroid injection;  Surgeon: Leandro Ramirez MD;  [facility];  Service: Pain Management     FL GUIDED NEEDLE PLAC BX/ASP/INJ  5/1/2019    LA COLONOSCOPY FLX DX W/COLLJ SPEC WHEN PFRMD N/A 5/31/2016    Procedure: COLONOSCOPY;  Surgeon: Leif Dominguez MD;  [facility];  Service: Gastroenterology    LA ESOPHAGOGASTRODUODENOSCOPY TRANSORAL DIAGNOSTIC N/A 11/15/2016    Procedure: ESOPHAGOGASTRODUODENOSCOPY (EGD);   Surgeon: Leif Dominguez MD;  [facility];  Service: Gastroenterology    LA TYMPANOPLASTY Left 10/25/2019    Procedure: LEFT TYMPANOPLASTY;  Surgeon: Geovany Guerra MD;  Location:  MAIN OR;  Service: ENT    TUBAL LIGATION       Current Medications  Current Outpatient Medications on File Prior to Visit   Medication Sig Dispense Refill    albuterol (PROVENTIL HFA,VENTOLIN HFA) 90 mcg/act inhaler Inhale 1-2 puffs every 4 (four) hours as needed for wheezing or shortness of breath (Patient not taking: Reported on 4/21/2020) 1 Inhaler 0    benzonatate (TESSALON PERLES) 100 mg capsule Take 1 capsule (100 mg total) by mouth 3 (three) times a day as needed for cough (Patient not taking: Reported on 4/16/2020) 20 capsule 0    famotidine (PEPCID) 20 mg tablet Take 1 tablet (20 mg total) by mouth 2 (two) times a day for 7 days (Patient not taking: Reported on 7/8/2020) 14 tablet 0    gabapentin (NEURONTIN) 400 mg capsule Take 400 mg by mouth 3 (three) times a day      naproxen (NAPROSYN) 500 mg tablet Take 1 tablet (500 mg total) by mouth 2 (two) times a day with meals (Patient not taking: Reported on 7/8/2020) 14 tablet 0    omeprazole (PriLOSEC) 40 MG capsule take 1 capsule by mouth daily every morning 30 MINUTES PRIOR TO FOOD (Patient not taking: Reported on 4/21/2020) 30 capsule 2     No current facility-administered medications on file prior to visit  Recent Labs [facility])  0   Lab Value Date/Time    HCT 38 2 05/15/2020 0001    HCT 37 2 07/14/2015 0737    HGB 13 2 05/15/2020 0001    HGB 13 3 07/14/2015 0737    WBC 6 51 05/15/2020 0001    WBC 4 94 07/14/2015 0737    INR 0 96 04/17/2019 1258    ESR 6 07/14/2015 0737    CRP <3 0 04/17/2019 1956    GLUCOSE 96 07/14/2015 0737     Physical exam  Body mass index is 29 29 kg/m²  · General: Awake, Alert, Oriented  · Eyes: Pupils equal, round and reactive to light  · Heart: regular rate and rhythm  · Lungs: No audible wheezing  · Abdomen: soft  bilateral Knee exam  · No gross intra-articular effusion  Positive medial joint line tenderness no gross ligamentous laxity  Neurovascular intact      Imaging  None today    Assessment:  bilateral knee arthritis  Large joint arthrocentesis: R knee  Date/Time: 7/8/2020 10:00 AM  Consent given by: patient  Timeout: Immediately prior to procedure a time out was called to verify the correct patient, procedure, equipment, support staff and site/side marked as required Supporting Documentation  Indications: pain   Procedure Details  Location: knee - R knee  Preparation: Patient was prepped and draped in the usual sterile fashion  Needle size: 22 G  Ultrasound guidance: no  Approach: anterolateral  Medications administered: 20 mg Sodium Hyaluronate 20 MG/2ML    Patient tolerance: patient tolerated the procedure well with no immediate complications  Dressing:  Sterile dressing applied    Large joint arthrocentesis: L knee  Date/Time: 7/8/2020 10:00 AM  Consent given by: patient  Timeout: Immediately prior to procedure a time out was called to verify the correct patient, procedure, equipment, support staff and site/side marked as required   Supporting Documentation  Indications: pain   Procedure Details  Location: knee - L knee  Preparation: Patient was prepped and draped in the usual sterile fashion  Needle size: 22 G  Ultrasound guidance: no  Approach: anterolateral  Medications administered: 20 mg Sodium Hyaluronate 20 MG/2ML    Patient tolerance: patient tolerated the procedure well with no immediate complications  Dressing:  Sterile dressing applied        Plan:   Patient will ice bilateral knee for 20 minutes 1-2 times today  Limit activity for the next 24 hours before resuming normal activity  Weightbearing as tolerated  Patient may use over-the-counter Ibuprofen or Tylenol as needed for discomfort  Follow up in 1 week for repeat evaluation and injection # 2  This note was created with voice recognition software

## 2020-07-17 ENCOUNTER — OFFICE VISIT (OUTPATIENT)
Dept: OBGYN CLINIC | Facility: CLINIC | Age: 55
End: 2020-07-17
Payer: COMMERCIAL

## 2020-07-17 VITALS
BODY MASS INDEX: 29.27 KG/M2 | DIASTOLIC BLOOD PRESSURE: 74 MMHG | TEMPERATURE: 96.9 F | WEIGHT: 145.2 LBS | HEIGHT: 59 IN | HEART RATE: 56 BPM | SYSTOLIC BLOOD PRESSURE: 119 MMHG

## 2020-07-17 DIAGNOSIS — M17.11 ARTHRITIS OF RIGHT KNEE: Primary | ICD-10-CM

## 2020-07-17 DIAGNOSIS — M17.12 ARTHRITIS OF LEFT KNEE: ICD-10-CM

## 2020-07-17 PROCEDURE — 20610 DRAIN/INJ JOINT/BURSA W/O US: CPT | Performed by: ORTHOPAEDIC SURGERY

## 2020-07-17 PROCEDURE — 1036F TOBACCO NON-USER: CPT | Performed by: ORTHOPAEDIC SURGERY

## 2020-07-17 PROCEDURE — 3008F BODY MASS INDEX DOCD: CPT | Performed by: ORTHOPAEDIC SURGERY

## 2020-07-17 RX ORDER — HYALURONATE SODIUM 10 MG/ML
20 SYRINGE (ML) INTRAARTICULAR
Status: COMPLETED | OUTPATIENT
Start: 2020-07-17 | End: 2020-07-17

## 2020-07-17 RX ADMIN — Medication 20 MG: at 08:34

## 2020-07-17 NOTE — PROGRESS NOTES
Assessment/Plan:  Assessment/Plan   Diagnoses and all orders for this visit:    Arthritis of right knee  -     Large joint arthrocentesis: bilateral knee    Arthritis of left knee  -     Large joint arthrocentesis: bilateral knee    Bilateral viscosupplementation injection performed at time of visit without difficulty  Patient tolerated treatment well  Will return in 1 week for injection 3 of 3  She is instructed to avoid any strenuous activity for the next 24-48 hours  She can use ice and NSAIDs/Tylenol as needed for pain and soreness following injection  Subjective:   Patient ID: Jordon Mishra is a 54 y o  female  HPI  Patient presents today for follow-up evaluation in viscosupplementation injection of bilateral knees for DJD  She reports that she has had little improvement since her previous injection last week  She denies any subsequent bruising, swelling, numbness, tingling, feelings of instability, or mechanical symptoms      The following portions of the patient's history were reviewed and updated as appropriate: allergies, current medications, past family history, past medical history, past social history, past surgical history and problem list     Review of Systems    Objective:  /74 (BP Location: Right arm, Patient Position: Sitting, Cuff Size: Standard)   Pulse 56   Temp (!) 96 9 °F (36 1 °C)   Ht 4' 11" (1 499 m)   Wt 65 9 kg (145 lb 3 2 oz)   BMI 29 33 kg/m²     Ortho Exam     Bilateral knees -   No obvious anatomical deformity  Skin is warm and dry to touch with no signs of erythema, ecchymosis, or infection  No overt effusion or soft tissue swelling  Active ROM 0°-115°  Knees are stable to varus, valgus, anterior, and posterior stress  Calf compartments are soft  Neurovascularly intact distally    Physical Exam    No new imaging reviewed this visit     Large joint arthrocentesis: bilateral knee  Date/Time: 7/17/2020 8:34 AM  Consent given by: patient  Site marked: site marked  Supporting Documentation  Indications: pain and joint swelling   Procedure Details  Location: knee - bilateral knee  Needle size: 22 G  Ultrasound guidance: no  Approach: anterolateral    Medications (Right): 20 mg Sodium Hyaluronate 20 MG/2MLMedications (Left): 20 mg Sodium Hyaluronate 20 MG/2ML   Patient tolerance: patient tolerated the procedure well with no immediate complications  Dressing:  Sterile dressing applied        Scribe Attestation    I,:   Antoinette Ryan am acting as a scribe while in the presence of the attending physician :        I,:   Miranda Brown MD personally performed the services described in this documentation    as scribed in my presence :

## 2020-07-22 ENCOUNTER — OFFICE VISIT (OUTPATIENT)
Dept: OBGYN CLINIC | Facility: CLINIC | Age: 55
End: 2020-07-22
Payer: COMMERCIAL

## 2020-07-22 VITALS
DIASTOLIC BLOOD PRESSURE: 80 MMHG | BODY MASS INDEX: 29.03 KG/M2 | SYSTOLIC BLOOD PRESSURE: 127 MMHG | TEMPERATURE: 97.3 F | WEIGHT: 144 LBS | HEIGHT: 59 IN | HEART RATE: 54 BPM

## 2020-07-22 DIAGNOSIS — M17.12 ARTHRITIS OF LEFT KNEE: ICD-10-CM

## 2020-07-22 DIAGNOSIS — M17.11 ARTHRITIS OF RIGHT KNEE: Primary | ICD-10-CM

## 2020-07-22 PROCEDURE — 3008F BODY MASS INDEX DOCD: CPT | Performed by: ORTHOPAEDIC SURGERY

## 2020-07-22 PROCEDURE — 20610 DRAIN/INJ JOINT/BURSA W/O US: CPT | Performed by: ORTHOPAEDIC SURGERY

## 2020-07-22 PROCEDURE — 1036F TOBACCO NON-USER: CPT | Performed by: ORTHOPAEDIC SURGERY

## 2020-07-22 RX ORDER — HYALURONATE SODIUM 10 MG/ML
20 SYRINGE (ML) INTRAARTICULAR
Status: COMPLETED | OUTPATIENT
Start: 2020-07-22 | End: 2020-07-22

## 2020-07-22 RX ADMIN — Medication 20 MG: at 08:17

## 2020-07-22 RX ADMIN — Medication 20 MG: at 08:18

## 2020-07-22 NOTE — PROGRESS NOTES
Chief Complaint  Bilateral knee pain    History Of Presenting Illness  Isiah Schlatter 1965 presents with bilateral knee pain  Patient presents for Euflexxa 3  Current Medications  Current Outpatient Medications   Medication Sig Dispense Refill    gabapentin (NEURONTIN) 400 mg capsule Take 400 mg by mouth 3 (three) times a day      naproxen (NAPROSYN) 500 mg tablet Take 1 tablet (500 mg total) by mouth 2 (two) times a day with meals 14 tablet 0    omeprazole (PriLOSEC) 40 MG capsule take 1 capsule by mouth daily every morning 30 MINUTES PRIOR TO FOOD 30 capsule 2    albuterol (PROVENTIL HFA,VENTOLIN HFA) 90 mcg/act inhaler Inhale 1-2 puffs every 4 (four) hours as needed for wheezing or shortness of breath (Patient not taking: Reported on 7/17/2020) 1 Inhaler 0    benzonatate (TESSALON PERLES) 100 mg capsule Take 1 capsule (100 mg total) by mouth 3 (three) times a day as needed for cough (Patient not taking: Reported on 7/17/2020) 20 capsule 0    famotidine (PEPCID) 20 mg tablet Take 1 tablet (20 mg total) by mouth 2 (two) times a day for 7 days (Patient not taking: Reported on 7/8/2020) 14 tablet 0     No current facility-administered medications for this visit          Current Problems    Active Problems:   Patient Active Problem List    Diagnosis Date Noted    Tympanic membrane perforation, left 10/07/2019    Atypical chest pain 04/17/2019    Epigastric abdominal pain 04/17/2019    Chronic musculoskeletal pain 04/17/2019    Pain in finger of both hands 04/17/2019    Pharyngoesophageal dysphagia 04/12/2019    Perforation of left tympanic membrane 04/12/2019    Chronic otitis media of left ear 04/12/2019    Lumbar degenerative disc disease 04/01/2019    Left hip pain 11/08/2018    Lumbar radiculopathy 09/11/2018    Chronic lumbar radiculopathy 04/18/2018    Chronic pain syndrome 04/18/2018    Chronic right hip pain 11/20/2017    Myofascial pain syndrome 11/22/2016    Dyslipidemia 09/12/2016    Abdominal pain, left lower quadrant 05/31/2016    Atrophic vaginitis 05/11/2016    Primary osteoarthritis of both knees 02/05/2016    Chronic pain of right knee 08/12/2015         Review of Systems:    General: negative for - chills, fatigue, fever,  weight gain or weight loss  Psychological: negative for - anxiety, behavioral disorder, concentration difficulties  Ophthalmic: negative for - blurry vision, decreased vision, double vision,      Past Medical History:   Past Medical History:   Diagnosis Date    Fatigue     High cholesterol     Hypertension     Neuropathy     Osteoarthritis     knees    Shortness of breath        Past Surgical History:   Past Surgical History:   Procedure Laterality Date    APPENDECTOMY      EPIDURAL BLOCK INJECTION Left 5/17/2018    Procedure: L4 AND L5 TRANSFORAMINAL EPIDURAL STEROID INJECTION;  Surgeon: Catracho Monsivais MD;  [facility];  Service: Pain Management     EPIDURAL BLOCK INJECTION Left 10/4/2018    Procedure: L4 AND L5 TRANSFORAMINAL EPIDURAL STEROID INJECTION;  Surgeon: Catracho Monsivais MD;  [facility];  Service: Pain Management     EPIDURAL BLOCK INJECTION Left 5/1/2019    Procedure: L4-5 and L5-S1 transforaminal epidural steroid injection;  Surgeon: Catracho Monsivais MD;  [facility];  Service: Pain Management     FL GUIDED NEEDLE PLAC BX/ASP/INJ  5/1/2019    SD COLONOSCOPY FLX DX W/COLLJ SPEC WHEN PFRMD N/A 5/31/2016    Procedure: COLONOSCOPY;  Surgeon: Jessica Murray MD;  [facility];  Service: Gastroenterology    SD ESOPHAGOGASTRODUODENOSCOPY TRANSORAL DIAGNOSTIC N/A 11/15/2016    Procedure: ESOPHAGOGASTRODUODENOSCOPY (EGD);   Surgeon: Jessica Murray MD;  [facility];  Service: Gastroenterology    SD TYMPANOPLASTY Left 10/25/2019    Procedure: LEFT TYMPANOPLASTY;  Surgeon: Randi Escobedo MD;  Location:  MAIN OR;  Service: ENT    TUBAL LIGATION         Family History:  Family history reviewed and non-contributory  Family History   Problem Relation Age of Onset    Uterine cancer Mother     Early death Father     Heart disease Brother     No Known Problems Sister     No Known Problems Daughter     No Known Problems Sister     No Known Problems Daughter     No Known Problems Maternal Aunt     No Known Problems Maternal Aunt     No Known Problems Maternal Aunt     No Known Problems Maternal Aunt     No Known Problems Maternal Grandmother     No Known Problems Maternal Grandfather     No Known Problems Paternal Grandmother     No Known Problems Paternal Grandfather        Social History:  Social History     Socioeconomic History    Marital status: Single     Spouse name: None    Number of children: None    Years of education: None    Highest education level: None   Occupational History    None   Social Needs    Financial resource strain: None    Food insecurity:     Worry: None     Inability: None    Transportation needs:     Medical: None     Non-medical: None   Tobacco Use    Smoking status: Never Smoker    Smokeless tobacco: Never Used   Substance and Sexual Activity    Alcohol use: Not Currently    Drug use: No    Sexual activity: Not Currently     Birth control/protection: Female Sterilization   Lifestyle    Physical activity:     Days per week: None     Minutes per session: None    Stress: None   Relationships    Social connections:     Talks on phone: None     Gets together: None     Attends Gnosticist service: None     Active member of club or organization: None     Attends meetings of clubs or organizations: None     Relationship status: None    Intimate partner violence:     Fear of current or ex partner: None     Emotionally abused: None     Physically abused: None     Forced sexual activity: None   Other Topics Concern    None   Social History Narrative    None       Allergies:   No Known Allergies        Physical ExaminationBP 127/80   Pulse (!) 54 Temp (!) 97 3 °F (36 3 °C)   Ht 4' 11" (1 499 m)   Wt 65 3 kg (144 lb)   BMI 29 08 kg/m²   Gen: Alert and oriented to person, place, time  HEENT: EOMI, eyes clear, moist mucus membranes, hearing intact      Orthopedic Exam  Both knees examination unchanged 0-130 flexion joint line tenderness crepitus          Impression  Bilateral knee osteoarthritis        Plan    Both knees injected Euflexxa 3    Follow-up in 6 months for repeat injection  X-ray both knees on arrival next visit AP lateral skyline   Large joint arthrocentesis: L knee  Date/Time: 7/22/2020 8:17 AM  Consent given by: patient  Site marked: site marked  Timeout: Immediately prior to procedure a time out was called to verify the correct patient, procedure, equipment, support staff and site/side marked as required   Supporting Documentation  Indications: pain and joint swelling   Procedure Details  Location: knee - L knee  Preparation: Patient was prepped and draped in the usual sterile fashion  Needle size: 22 G  Ultrasound guidance: no  Approach: anterolateral  Medications administered: 20 mg Sodium Hyaluronate 20 MG/2ML    Patient tolerance: patient tolerated the procedure well with no immediate complications  Dressing:  Sterile dressing applied    Large joint arthrocentesis: R knee  Date/Time: 7/22/2020 8:18 AM  Consent given by: patient  Site marked: site marked  Timeout: Immediately prior to procedure a time out was called to verify the correct patient, procedure, equipment, support staff and site/side marked as required   Supporting Documentation  Indications: pain and joint swelling   Procedure Details  Location: knee - R knee  Preparation: Patient was prepped and draped in the usual sterile fashion  Needle size: 22 G  Ultrasound guidance: no  Approach: anterolateral  Medications administered: 20 mg Sodium Hyaluronate 20 MG/2ML    Patient tolerance: patient tolerated the procedure well with no immediate complications  Dressing:  Sterile dressing applied          Jarrod Morgan MD        Portions of the record may have been created with voice recognition software  Occasional wrong word or "sound a like" substitutions may have occurred due to the inherent limitations of voice recognition software  Read the chart carefully and recognize, using context, where substitutions have occurred

## 2020-09-10 NOTE — PROGRESS NOTES
PT Evaluation     Today's date: 9/15/2020  Patient name: Isiah Schlatter  : 1965  MRN: [id number]  Referring provider: Ramiro Bearden MD  Dx:   Encounter Diagnosis     ICD-10-CM    1  Arthritis of right knee  M17 11    2  Arthritis of left knee  M17 12                   Assessment  Assessment details: The patient is a 55 y/o female who presents to PT with diagnosis of B knee arthritis  She has complaints of intermittent pain, L knee > R knee  Pain is t/o her knee though most pain along medial knees  She also demonstrates deficits with slight decreased ROM and strength, decreased flexibility, antalgic gait, decreased balance and proprioception, difficulty with stair negotiation, difficulty sleeping, TTP and pain with completing her ADLs  She remains I with all her ADLs though she has pain with completing them  Increased pain noted with increased time on her feet  She ambulates without AD with antalgic gait pattern and slow michelle  She has difficulty with stair negotiation and has been going up and down the steps with either reciprocal or non-reciprocal gait pattern depending on level of pain in her knees  TTP is noted along medial joint line, B knees  Difficulty sleeping is also noted and pain can wake her up overnight or she has a hard time finding a comfortable position to sleep  Secondary to pain and above deficits she is limited with her overall mobility and treatment  The patient would benefit from continued PT to address deficits and improve function  Tx to include ROM, stretching, strengthening, modalities, HEP, pt education, postural ed, lifting/body mechanics, neuro re-ed, balance/proprioception Te, MT and equipment      Impairments: abnormal gait, abnormal or restricted ROM, activity intolerance, impaired balance, impaired physical strength, lacks appropriate home exercise program, pain with function and weight-bearing intolerance  Other impairment: decreased flexibility  Functional limitations: difficulty with stair negotiation, difficulty sleepingUnderstanding of Dx/Px/POC: good   Prognosis: fair    Goals  STGs:  1  Initiate and complete HEP with verbal cues  2   Decrease B knee pain by > 25% in 4 weeks  3   Improve B knee ROM by 5-10 degrees in 4 weeks  4   Improve BLE strength by 1/2 grade in 4 weeks  LTGs:  1  Patient to be I with HEP in 12 weeks  2  Improve B knee ROM to 0-120 degrees in 12 weeks to improve function  3  Improve BLE strength to 4+ to 5/5 t/o in 12 weeks to improve function  4  Decrease B knee pain to < or = to 2-3/10 with activity in 12 weeks to improve function  5   Patient to ambulate with normalized gait pattern in 12 weeks  6   Stair negotiation is improved to PLOF in 12 weeks  7   Recreational performance is improved to PLOF in 12 weeks  8   ADL performance is improved to PLOF in 12 weeks  9   Patient to report improved sleep in 12 weeks  Plan  Plan details: Modalities and therapy interventions prn  Patient would benefit from: skilled physical therapy  Planned modality interventions: cryotherapy and thermotherapy: hydrocollator packs  Planned therapy interventions: manual therapy, balance/weight bearing training, neuromuscular re-education, patient education, postural training, self care, strengthening, stretching, therapeutic activities, therapeutic exercise, flexibility and home exercise program  Frequency: 2x week  Duration in weeks: 12  Plan of Care beginning date: 9/15/2020  Plan of Care expiration date: 12/15/2020  Treatment plan discussed with: patient and family        Subjective Evaluation    History of Present Illness  Mechanism of injury: (Patient speaks minimal English, has family that helps to translate for her)  The patient states that she has had B knee pain for a few years now  She has been seeing Dr Mj Eugene about her knees and most recently got an injection in July in B knees  She notes that the shot helped a little bit  She notes that she also had x-rays in July which did show arthritis  She was also referred to [facility]PPT and she now presents for her evaluation  She will be going back to see Dr Bret Tang in January for her next appointment  Quality of life: fair    Pain  At best pain ratin  At worst pain ratin  Location: B Knees - L knee > R knee    Quality: sharp, burning and discomfort  Relieving factors: rest and heat  Aggravating factors: walking and stair climbing    Social Support  Steps to enter house: yes  Stairs in house: yes   Lives in: multiple-level home    Employment status: not working    Diagnostic Tests  X-ray: abnormal (per pt arthritis in B knees)  Treatments  Previous treatment: injection treatment, physical therapy and medication  Patient Goals  Patient goals for therapy: increased motion, improved balance, increased strength and decreased pain  Patient goal: "To help with the pain in my knees, to move around better, to do more things around the house "        Objective     Tenderness   Left Knee   Tenderness in the medial joint line  No tenderness in the lateral joint line  Right Knee   Tenderness in the medial joint line  No tenderness in the lateral joint line       Neurological Testing     Sensation     Knee   Left Knee   Intact: light touch    Right Knee   Intact: light touch     Active Range of Motion   Left Knee   Flexion: 110 degrees with pain  Extension: 0 degrees with pain    Right Knee   Flexion: 110 degrees with pain  Extension: 0 degrees with pain    Additional Active Range of Motion Details  R SLR: 55  L SLR: 55    Mobility   Patellar Mobility:   Left Knee   WFL: medial and lateral      Right Knee   WFL: medial and lateral    Strength/Myotome Testing     Left Knee   Flexion: 4-  Extension: 4-  Quadriceps contraction: fair    Right Knee   Flexion: 4-  Extension: 4-  Quadriceps contraction: fair    Swelling     Left Knee Girth Measurement (cm)   Joint line: 37 8 cm    Right Knee Girth Measurement (cm)   Joint line: 38 cm    Ambulation   Weight-Bearing Status   Assistive device used: none    Ambulation: Level Surfaces   Ambulation without assistive device: independent    Ambulation: Stairs   Ascend stairs: independent  Descend stairs: independent    Additional Stairs Ambulation Details  Either reciprocal or non-reciprocal gait pattern depending on level of pain in her knees  Observational Gait   Gait: antalgic   Decreased walking speed  Precautions: None  Re-Eval DUE: 10/15/20    Manuals 9/15/20       BLE NV                               Neuro Re-Ed        KENISHA Lungfredrick        SLS        Tandem Stance                                        Ther Ex 9/15/20       NuStep Level 2  10 minutes       Stand SLR x 3        HR/TR        Marches        Squats        Ham Curls        TKE w/TBand        Stepups F/L        LAQ        Hams Curl with TBand        QSet        SAQ        SLR        Bridges        Hip Add w/Ball        Hip Abd with TBand        Heel Slides        S/L Hip Abd        Ther Activity                        Gait Training                        Modalities        HP/CP prn                    Issued and reviewed HEP with patient and family for SLR x 3 ways, heel raises, marches, squats and LAQ  She verbalized understanding

## 2020-09-15 ENCOUNTER — EVALUATION (OUTPATIENT)
Dept: PHYSICAL THERAPY | Facility: HOME HEALTHCARE | Age: 55
End: 2020-09-15
Payer: COMMERCIAL

## 2020-09-15 DIAGNOSIS — M17.12 ARTHRITIS OF LEFT KNEE: ICD-10-CM

## 2020-09-15 DIAGNOSIS — M17.11 ARTHRITIS OF RIGHT KNEE: Primary | ICD-10-CM

## 2020-09-15 PROCEDURE — 97110 THERAPEUTIC EXERCISES: CPT | Performed by: PHYSICAL THERAPIST

## 2020-09-15 PROCEDURE — 97161 PT EVAL LOW COMPLEX 20 MIN: CPT | Performed by: PHYSICAL THERAPIST

## 2020-09-15 PROCEDURE — 97535 SELF CARE MNGMENT TRAINING: CPT | Performed by: PHYSICAL THERAPIST

## 2020-09-21 ENCOUNTER — OFFICE VISIT (OUTPATIENT)
Dept: PHYSICAL THERAPY | Facility: HOME HEALTHCARE | Age: 55
End: 2020-09-21
Payer: COMMERCIAL

## 2020-09-21 DIAGNOSIS — M17.11 ARTHRITIS OF RIGHT KNEE: Primary | ICD-10-CM

## 2020-09-21 PROCEDURE — 97110 THERAPEUTIC EXERCISES: CPT

## 2020-09-21 PROCEDURE — 97140 MANUAL THERAPY 1/> REGIONS: CPT

## 2020-09-21 NOTE — PROGRESS NOTES
Daily Note     Today's date: 2020  Patient name: Raúl Farah  : 1965  MRN: [id number]  Referring provider: Montse Mayfield MD  Dx:   Encounter Diagnosis     ICD-10-CM    1  Arthritis of right knee  M17 11                 Subjective: Pt reports pain at R knee of 3/10  Objective: See treatment diary below    Assessment: Pt saman initiation of TE and MT as per flow sheet well  She did report mild pain increase at R knee t/o TE  End range calf/HS tightness noted  Pt reports pain return to baseline at end with MHP to B knees  Patient would benefit from continued PT    Plan: Continue per plan of care        Precautions: None  Re-Eval DUE: 10/15/20    Manuals 9/15/20 9-21-20      BLE NV 10'              Neuro Re-Ed        BOSU Lunges        SLS        Tandem Stance                Ther Ex 9/15/20 9-21-20      NuStep Level 2  10 minutes L 2  10'      Stand SLR x 3  RLE 1 x 10 ea      HR/TR  1 x 10      Marches        Squats  1 x 10      Ham Curls        TKE w/TBand        Stepups F/L        LAQ  5" 1 x 10      Hams Curl with TBand  NV      QSet  5" 1 x 10      SAQ        SLR  1 x 10      Bridges        Hip Add w/Ball  5" 1 x 10      Hip Abd with TBand  Red 1 x 10      Heel Slides  1 x 10      S/L Hip Abd        Ther Activity                        Gait Training                        Modalities  20      HP/CP prn  MHP 10'   Supine B knees

## 2020-09-24 ENCOUNTER — APPOINTMENT (OUTPATIENT)
Dept: PHYSICAL THERAPY | Facility: HOME HEALTHCARE | Age: 55
End: 2020-09-24
Payer: COMMERCIAL

## 2020-09-28 ENCOUNTER — OFFICE VISIT (OUTPATIENT)
Dept: PHYSICAL THERAPY | Facility: HOME HEALTHCARE | Age: 55
End: 2020-09-28
Payer: COMMERCIAL

## 2020-09-28 ENCOUNTER — ANNUAL EXAM (OUTPATIENT)
Dept: OBGYN CLINIC | Facility: CLINIC | Age: 55
End: 2020-09-28
Payer: COMMERCIAL

## 2020-09-28 VITALS
DIASTOLIC BLOOD PRESSURE: 80 MMHG | HEIGHT: 59 IN | WEIGHT: 147 LBS | SYSTOLIC BLOOD PRESSURE: 120 MMHG | BODY MASS INDEX: 29.64 KG/M2

## 2020-09-28 DIAGNOSIS — Z01.419 ENCOUNTER FOR GYNECOLOGICAL EXAMINATION: Primary | ICD-10-CM

## 2020-09-28 DIAGNOSIS — M17.11 ARTHRITIS OF RIGHT KNEE: Primary | ICD-10-CM

## 2020-09-28 DIAGNOSIS — M17.12 ARTHRITIS OF LEFT KNEE: ICD-10-CM

## 2020-09-28 DIAGNOSIS — Z12.31 ENCOUNTER FOR SCREENING MAMMOGRAM FOR MALIGNANT NEOPLASM OF BREAST: ICD-10-CM

## 2020-09-28 PROCEDURE — 97110 THERAPEUTIC EXERCISES: CPT

## 2020-09-28 PROCEDURE — 1036F TOBACCO NON-USER: CPT | Performed by: OBSTETRICS & GYNECOLOGY

## 2020-09-28 PROCEDURE — 87624 HPV HI-RISK TYP POOLED RSLT: CPT | Performed by: OBSTETRICS & GYNECOLOGY

## 2020-09-28 PROCEDURE — 99396 PREV VISIT EST AGE 40-64: CPT | Performed by: OBSTETRICS & GYNECOLOGY

## 2020-09-28 PROCEDURE — G0145 SCR C/V CYTO,THINLAYER,RESCR: HCPCS | Performed by: OBSTETRICS & GYNECOLOGY

## 2020-09-28 PROCEDURE — 97140 MANUAL THERAPY 1/> REGIONS: CPT

## 2020-09-28 NOTE — PROGRESS NOTES
Daily Note     Today's date: 2020  Patient name: Nathaly Rashid  : 1965  MRN: [id number]  Referring provider: Kris Taveras MD  Dx:   Encounter Diagnosis     ICD-10-CM    1  Arthritis of right knee  M17 11    2  Arthritis of left knee  M17 12                   Subjective: Pt reports 3-4/10 pain both knees  Objective: See treatment diary below      Assessment: Tolerated treatment fairly well  Progressed to marches and HS curl with  theraband this session  Verbal cues needed t/o TE to perform correctly  Pt reports a little increased pain both knees with exercise  Pt reports some relief at end of session after MHP  Patient would benefit from continued PT      Plan: Continue per plan of care        Precautions: None  Re-Eval DUE: 10/15/20    Manuals 9/15/20 9-21-20 9/28/20     BLE NV 10' 10'              Neuro Re-Ed        BOSU Lunges        SLS        Tandem Stance                Ther Ex 9/15/20 9-21-20 9/28/20     NuStep Level 2  10 minutes L 2  10' L 2  10 min      Stand SLR x 3  RLE 1 x 10 ea 1 x 10 ea Carlos     HR/TR  1 x 10 1 x 10 ea      Marches   1 x 10      Squats  1 x 10 1 x 10      Ham Curls        TKE w/TBand        Stepups F/L        LAQ  5" 1 x 10 5" 1 x 10      Hams Curl with TBand  NV Yellow 1 x 10      QSet  5" 1 x 10 5" 1 x 10      SAQ        SLR  1 x 10 1 x 10      Bridges        Hip Add w/Ball  5" 1 x 10 5" 1 x 10      Hip Abd with TBand  Red 1 x 10 Red 1 x 10      Heel Slides  1 x 10 1 x 10      S/L Hip Abd        Ther Activity                        Gait Training                        Modalities  20     HP/CP prn  MHP 10'   Supine B knees MHP 10 min   Supine B knees

## 2020-09-28 NOTE — PROGRESS NOTES
ASSESSMENT & PLAN: Kely Jacinto is a 54 y o  [id number] with normal gynecologic exam     1   Routine well woman exam done today  2  Pap and HPV:  The patient's last pap  was 2017  It was normal     Pap with cotesting was done today  Current ASCCP Guidelines reviewed  3   Mammogram ordered  4  Colorectal cancer screening was not ordered  - up to date   11  The following were reviewed in today's visit: breast self exam, mammography screening ordered, menopause, osteoporosis, adequate intake of calcium and vitamin D, exercise and healthy diet      CC:  Annual Gynecologic Examination    HPI: Kely Jacinto is a 54 y o  [id number] who presents for annual gynecologic examination  She has the following concerns:  Occasional urine leakage is laying on her belly     Health Maintenance:    She wears her seatbelt routinely  She does perform regular monthly self breast exams  She feels safe at home       Pap: 2017  Last mammogram: 2020  Last colonoscopy: 2016    Past Medical History:   Diagnosis Date    Fatigue     High cholesterol     Hypertension     Neuropathy     Osteoarthritis     knees    Shortness of breath        Past Surgical History:   Procedure Laterality Date    APPENDECTOMY      EPIDURAL BLOCK INJECTION Left 5/17/2018    Procedure: L4 AND L5 TRANSFORAMINAL EPIDURAL STEROID INJECTION;  Surgeon: Deepak Shanks MD;  [facility];  Service: Pain Management     EPIDURAL BLOCK INJECTION Left 10/4/2018    Procedure: L4 AND L5 TRANSFORAMINAL EPIDURAL STEROID INJECTION;  Surgeon: Deepak Shanks MD;  [facility];  Service: Pain Management     EPIDURAL BLOCK INJECTION Left 5/1/2019    Procedure: L4-5 and L5-S1 transforaminal epidural steroid injection;  Surgeon: Deepak Shanks MD;  [facility];  Service: Pain Management     FL GUIDED NEEDLE PLAC BX/ASP/INJ  5/1/2019    [place] COLONOSCOPY FLX DX W/COLLJ SPEC WHEN PFRMD N/A 5/31/2016    Procedure: COLONOSCOPY; Surgeon: Dennis Baer MD;  [facility];  Service: Gastroenterology    VA ESOPHAGOGASTRODUODENOSCOPY TRANSORAL DIAGNOSTIC N/A 11/15/2016    Procedure: ESOPHAGOGASTRODUODENOSCOPY (EGD); Surgeon: Dennis Baer MD;  [facility];  Service: Gastroenterology    VA TYMPANOPLASTY Left 10/25/2019    Procedure: LEFT TYMPANOPLASTY;  Surgeon: Terrence Montgomery MD;  Location:  MAIN OR;  Service: ENT    TUBAL LIGATION         Past OB/Gyn History:  OB History        7    Para   7    Term   7            AB        Living   7       SAB        TAB        Ectopic        Multiple        Live Births   7               Pt does not have menstrual issues  History of sexually transmitted infection: Yes  History of abnormal pap smears: No      Patient is currently sexually active  heterosexual   The current method of family planning is tubal ligation      Family History   Problem Relation Age of Onset    Uterine cancer Mother     Early death Father     Heart disease Brother     No Known Problems Sister     No Known Problems Daughter     No Known Problems Sister     No Known Problems Daughter     No Known Problems Maternal Aunt     No Known Problems Maternal Aunt     No Known Problems Maternal Aunt     No Known Problems Maternal Aunt     No Known Problems Maternal Grandmother     No Known Problems Maternal Grandfather     No Known Problems Paternal Grandmother     No Known Problems Paternal Grandfather        Social History:  Social History     Socioeconomic History    Marital status: Single     Spouse name: Not on file    Number of children: Not on file    Years of education: Not on file    Highest education level: Not on file   Occupational History    Not on file   Social Needs    Financial resource strain: Not on file    Food insecurity     Worry: Not on file     Inability: Not on file    Transportation needs     Medical: Not on file     Non-medical: Not on file   Tobacco Use    Smoking status: Never Smoker    Smokeless tobacco: Never Used   Substance and Sexual Activity    Alcohol use: Not Currently    Drug use: No    Sexual activity: Yes     Birth control/protection: Female Sterilization   Lifestyle    Physical activity     Days per week: Not on file     Minutes per session: Not on file    Stress: Not on file   Relationships    Social connections     Talks on phone: Not on file     Gets together: Not on file     Attends [employer] service: Not on file     Active member of club or organization: Not on file     Attends meetings of clubs or organizations: Not on file     Relationship status: Not on file    Intimate partner violence     Fear of current or ex partner: Not on file     Emotionally abused: Not on file     Physically abused: Not on file     Forced sexual activity: Not on file   Other Topics Concern    Not on file   Social History Narrative    Not on file     No Known Allergies    Current Outpatient Medications:     albuterol (PROVENTIL HFA,VENTOLIN HFA) 90 mcg/act inhaler, Inhale 1-2 puffs every 4 (four) hours as needed for wheezing or shortness of breath (Patient not taking: Reported on 7/17/2020), Disp: 1 Inhaler, Rfl: 0    benzonatate (TESSALON PERLES) 100 mg capsule, Take 1 capsule (100 mg total) by mouth 3 (three) times a day as needed for cough (Patient not taking: Reported on 7/17/2020), Disp: 20 capsule, Rfl: 0    famotidine (PEPCID) 20 mg tablet, Take 1 tablet (20 mg total) by mouth 2 (two) times a day for 7 days (Patient not taking: Reported on 7/8/2020), Disp: 14 tablet, Rfl: 0    gabapentin (NEURONTIN) 400 mg capsule, Take 400 mg by mouth 3 (three) times a day, Disp: , Rfl:     naproxen (NAPROSYN) 500 mg tablet, Take 1 tablet (500 mg total) by mouth 2 (two) times a day with meals (Patient not taking: Reported on 9/28/2020), Disp: 14 tablet, Rfl: 0    omeprazole (PriLOSEC) 40 MG capsule, take 1 capsule by mouth daily every morning 30 MINUTES PRIOR TO FOOD (Patient not taking: Reported on 9/28/2020), Disp: 30 capsule, Rfl: 2      Review of Systems  Constitutional :no fever, feels well, no tiredness, no recent weight gain or loss  ENT: no ear ache, no loss of hearing, no nosebleeds or nasal discharge, no sore throat or hoarseness  Cardiovascular: no complaints of slow or fast heart beat, no chest pain, no palpitations, no leg claudication or lower extremity edema  Respiratory: no complaints of shortness of shortness of breath, no RAIN  Breasts:no complaints of breast pain, breast lump, or nipple discharge  Gastrointestinal: no complaints of abdominal pain, constipation, nausea, vomiting, or diarrhea or bloody stools  Genitourinary : no complaints of dysuria, incontinence, pelvic pain, no dysmenorrhea, vaginal discharge or abnormal vaginal bleeding and as noted in HPI  Musculoskeletal: no complaints of arthralgia, no myalgia, no joint swelling or stiffness, no limb pain or swelling  Integumentary: no complaints of skin rash or lesion, itching or dry skin  Neurological: no complaints of headache, no confusion, no numbness or tingling, no dizziness or fainting    Objective      /80   Ht 4' 11" (1 499 m)   Wt 66 7 kg (147 lb)   BMI 29 69 kg/m²     General:   appears stated age, cooperative, alert normal mood and affect   Neck: normal, supple,trachea midline, no masses   Heart: regular rate and rhythm, S1, S2 normal, no murmur, click, rub or gallop   Lungs: clear to auscultation bilaterally   Breasts: normal appearance, no masses or tenderness   Abdomen: soft, non-tender, without masses or organomegaly   Vulva: normal   Vagina: normal vagina, no discharge, exudate, lesion, or erythema   Urethra: normal   Cervix: Normal, no discharge  Nontender     Uterus: normal size, contour, position, consistency, mobility, non-tender   Adnexa: no mass, fullness, tenderness   Psychiatric orientation to person, place, and time: normal  mood and affect: normal

## 2020-10-01 ENCOUNTER — OFFICE VISIT (OUTPATIENT)
Dept: PHYSICAL THERAPY | Facility: HOME HEALTHCARE | Age: 55
End: 2020-10-01
Payer: COMMERCIAL

## 2020-10-01 DIAGNOSIS — M17.11 ARTHRITIS OF RIGHT KNEE: Primary | ICD-10-CM

## 2020-10-01 PROCEDURE — 97110 THERAPEUTIC EXERCISES: CPT

## 2020-10-01 PROCEDURE — 97140 MANUAL THERAPY 1/> REGIONS: CPT

## 2020-10-05 ENCOUNTER — OFFICE VISIT (OUTPATIENT)
Dept: PHYSICAL THERAPY | Facility: HOME HEALTHCARE | Age: 55
End: 2020-10-05
Payer: COMMERCIAL

## 2020-10-05 DIAGNOSIS — M17.12 ARTHRITIS OF LEFT KNEE: ICD-10-CM

## 2020-10-05 DIAGNOSIS — M17.11 ARTHRITIS OF RIGHT KNEE: Primary | ICD-10-CM

## 2020-10-05 LAB
HPV HR 12 DNA CVX QL NAA+PROBE: POSITIVE
HPV16 DNA CVX QL NAA+PROBE: NEGATIVE
HPV18 DNA CVX QL NAA+PROBE: NEGATIVE
LAB AP GYN PRIMARY INTERPRETATION: NORMAL
Lab: NORMAL

## 2020-10-05 PROCEDURE — 97140 MANUAL THERAPY 1/> REGIONS: CPT

## 2020-10-05 PROCEDURE — 97110 THERAPEUTIC EXERCISES: CPT

## 2020-10-08 ENCOUNTER — OFFICE VISIT (OUTPATIENT)
Dept: PHYSICAL THERAPY | Facility: HOME HEALTHCARE | Age: 55
End: 2020-10-08
Payer: COMMERCIAL

## 2020-10-08 DIAGNOSIS — M17.11 ARTHRITIS OF RIGHT KNEE: Primary | ICD-10-CM

## 2020-10-08 PROCEDURE — 97140 MANUAL THERAPY 1/> REGIONS: CPT

## 2020-10-08 PROCEDURE — 97110 THERAPEUTIC EXERCISES: CPT

## 2020-10-08 PROCEDURE — 97530 THERAPEUTIC ACTIVITIES: CPT

## 2020-10-12 ENCOUNTER — OFFICE VISIT (OUTPATIENT)
Dept: PHYSICAL THERAPY | Facility: HOME HEALTHCARE | Age: 55
End: 2020-10-12
Payer: COMMERCIAL

## 2020-10-12 DIAGNOSIS — M17.11 ARTHRITIS OF RIGHT KNEE: Primary | ICD-10-CM

## 2020-10-12 DIAGNOSIS — M17.12 ARTHRITIS OF LEFT KNEE: ICD-10-CM

## 2020-10-12 PROCEDURE — 97140 MANUAL THERAPY 1/> REGIONS: CPT

## 2020-10-12 PROCEDURE — 97110 THERAPEUTIC EXERCISES: CPT

## 2020-10-12 NOTE — RESULT ENCOUNTER NOTE
Please inform patient pap smear normal    Hpv 16 and 18 negative   She is positive for other edward risk , but because 16 and 18 (highest risk HPV is negative ) no need for further testing at this time    Will need cotesting in one year thanks

## 2020-10-15 ENCOUNTER — EVALUATION (OUTPATIENT)
Dept: PHYSICAL THERAPY | Facility: HOME HEALTHCARE | Age: 55
End: 2020-10-15
Payer: COMMERCIAL

## 2020-10-15 DIAGNOSIS — M17.11 ARTHRITIS OF RIGHT KNEE: Primary | ICD-10-CM

## 2020-10-15 PROCEDURE — 97110 THERAPEUTIC EXERCISES: CPT | Performed by: PHYSICAL THERAPIST

## 2020-10-15 PROCEDURE — 97112 NEUROMUSCULAR REEDUCATION: CPT | Performed by: PHYSICAL THERAPIST

## 2020-10-15 PROCEDURE — 97140 MANUAL THERAPY 1/> REGIONS: CPT | Performed by: PHYSICAL THERAPIST

## 2020-10-15 PROCEDURE — 97164 PT RE-EVAL EST PLAN CARE: CPT | Performed by: PHYSICAL THERAPIST

## 2020-10-16 ENCOUNTER — OFFICE VISIT (OUTPATIENT)
Dept: FAMILY MEDICINE CLINIC | Facility: CLINIC | Age: 55
End: 2020-10-16
Payer: COMMERCIAL

## 2020-10-16 VITALS
OXYGEN SATURATION: 98 % | HEART RATE: 60 BPM | TEMPERATURE: 97.2 F | WEIGHT: 145.8 LBS | BODY MASS INDEX: 29.45 KG/M2 | DIASTOLIC BLOOD PRESSURE: 84 MMHG | RESPIRATION RATE: 16 BRPM | SYSTOLIC BLOOD PRESSURE: 144 MMHG

## 2020-10-16 DIAGNOSIS — R51.9 NONINTRACTABLE EPISODIC HEADACHE, UNSPECIFIED HEADACHE TYPE: Primary | ICD-10-CM

## 2020-10-16 DIAGNOSIS — Z23 ENCOUNTER FOR IMMUNIZATION: ICD-10-CM

## 2020-10-16 DIAGNOSIS — R42 DIZZINESS, NONSPECIFIC: ICD-10-CM

## 2020-10-16 PROCEDURE — T1015 CLINIC SERVICE: HCPCS | Performed by: FAMILY MEDICINE

## 2020-10-16 PROCEDURE — 99213 OFFICE O/P EST LOW 20 MIN: CPT | Performed by: FAMILY MEDICINE

## 2020-10-16 RX ORDER — MECLIZINE HCL 12.5 MG/1
12.5 TABLET ORAL EVERY 12 HOURS PRN
Qty: 30 TABLET | Refills: 0 | Status: SHIPPED | OUTPATIENT
Start: 2020-10-16 | End: 2021-05-11 | Stop reason: HOSPADM

## 2020-10-16 RX ORDER — TOPIRAMATE 25 MG/1
25 TABLET ORAL 2 TIMES DAILY
Qty: 60 TABLET | Refills: 1 | Status: SHIPPED | OUTPATIENT
Start: 2020-10-16 | End: 2021-05-11 | Stop reason: HOSPADM

## 2020-10-19 ENCOUNTER — APPOINTMENT (OUTPATIENT)
Dept: PHYSICAL THERAPY | Facility: HOME HEALTHCARE | Age: 55
End: 2020-10-19
Payer: COMMERCIAL

## 2020-10-22 ENCOUNTER — OFFICE VISIT (OUTPATIENT)
Dept: PHYSICAL THERAPY | Facility: HOME HEALTHCARE | Age: 55
End: 2020-10-22
Payer: COMMERCIAL

## 2020-10-22 DIAGNOSIS — M17.11 ARTHRITIS OF RIGHT KNEE: Primary | ICD-10-CM

## 2020-10-22 PROCEDURE — 97112 NEUROMUSCULAR REEDUCATION: CPT | Performed by: PHYSICAL THERAPIST

## 2020-10-22 PROCEDURE — 97110 THERAPEUTIC EXERCISES: CPT | Performed by: PHYSICAL THERAPIST

## 2020-10-24 ENCOUNTER — APPOINTMENT (OUTPATIENT)
Dept: LAB | Facility: HOSPITAL | Age: 55
End: 2020-10-24
Payer: COMMERCIAL

## 2020-10-24 DIAGNOSIS — R51.9 NONINTRACTABLE EPISODIC HEADACHE, UNSPECIFIED HEADACHE TYPE: ICD-10-CM

## 2020-10-24 LAB
ALBUMIN SERPL BCP-MCNC: 3.9 G/DL (ref 3.5–5)
ALP SERPL-CCNC: 93 U/L (ref 46–116)
ALT SERPL W P-5'-P-CCNC: 41 U/L (ref 12–78)
ANION GAP SERPL CALCULATED.3IONS-SCNC: 8 MMOL/L (ref 4–13)
AST SERPL W P-5'-P-CCNC: 23 U/L (ref 5–45)
BASOPHILS # BLD AUTO: 0.05 THOUSANDS/ΜL (ref 0–0.1)
BASOPHILS NFR BLD AUTO: 1 % (ref 0–1)
BILIRUB SERPL-MCNC: 0.4 MG/DL (ref 0.2–1)
BUN SERPL-MCNC: 21 MG/DL (ref 5–25)
CALCIUM SERPL-MCNC: 8.9 MG/DL (ref 8.3–10.1)
CHLORIDE SERPL-SCNC: 103 MMOL/L (ref 100–108)
CO2 SERPL-SCNC: 24 MMOL/L (ref 21–32)
CREAT SERPL-MCNC: 1.05 MG/DL (ref 0.6–1.3)
EOSINOPHIL # BLD AUTO: 0.14 THOUSAND/ΜL (ref 0–0.61)
EOSINOPHIL NFR BLD AUTO: 3 % (ref 0–6)
ERYTHROCYTE [DISTWIDTH] IN BLOOD BY AUTOMATED COUNT: 11.9 % (ref 11.6–15.1)
ERYTHROCYTE [SEDIMENTATION RATE] IN BLOOD: 4 MM/HOUR (ref 0–29)
GFR SERPL CREATININE-BSD FRML MDRD: 60 ML/MIN/1.73SQ M
GLUCOSE P FAST SERPL-MCNC: 121 MG/DL (ref 65–99)
HCT VFR BLD AUTO: 37.3 % (ref 34.8–46.1)
HGB BLD-MCNC: 13.3 G/DL (ref 11.5–15.4)
IMM GRANULOCYTES # BLD AUTO: 0.01 THOUSAND/UL (ref 0–0.2)
IMM GRANULOCYTES NFR BLD AUTO: 0 % (ref 0–2)
LYMPHOCYTES # BLD AUTO: 2.37 THOUSANDS/ΜL (ref 0.6–4.47)
LYMPHOCYTES NFR BLD AUTO: 44 % (ref 14–44)
MCH RBC QN AUTO: 31.4 PG (ref 26.8–34.3)
MCHC RBC AUTO-ENTMCNC: 35.7 G/DL (ref 31.4–37.4)
MCV RBC AUTO: 88 FL (ref 82–98)
MONOCYTES # BLD AUTO: 0.33 THOUSAND/ΜL (ref 0.17–1.22)
MONOCYTES NFR BLD AUTO: 6 % (ref 4–12)
NEUTROPHILS # BLD AUTO: 2.53 THOUSANDS/ΜL (ref 1.85–7.62)
NEUTS SEG NFR BLD AUTO: 46 % (ref 43–75)
NRBC BLD AUTO-RTO: 0 /100 WBCS
PLATELET # BLD AUTO: 237 THOUSANDS/UL (ref 149–390)
PMV BLD AUTO: 9.7 FL (ref 8.9–12.7)
POTASSIUM SERPL-SCNC: 4.1 MMOL/L (ref 3.5–5.3)
PROT SERPL-MCNC: 7.2 G/DL (ref 6.4–8.2)
RBC # BLD AUTO: 4.24 MILLION/UL (ref 3.81–5.12)
SODIUM SERPL-SCNC: 135 MMOL/L (ref 136–145)
WBC # BLD AUTO: 5.43 THOUSAND/UL (ref 4.31–10.16)

## 2020-10-24 PROCEDURE — 80053 COMPREHEN METABOLIC PANEL: CPT

## 2020-10-24 PROCEDURE — 85652 RBC SED RATE AUTOMATED: CPT

## 2020-10-24 PROCEDURE — 85025 COMPLETE CBC W/AUTO DIFF WBC: CPT

## 2020-10-24 PROCEDURE — 36415 COLL VENOUS BLD VENIPUNCTURE: CPT

## 2020-10-26 ENCOUNTER — DOCUMENTATION (OUTPATIENT)
Dept: FAMILY MEDICINE CLINIC | Facility: CLINIC | Age: 55
End: 2020-10-26

## 2020-10-26 DIAGNOSIS — R73.09 ELEVATED GLUCOSE: Primary | ICD-10-CM

## 2020-11-02 ENCOUNTER — OFFICE VISIT (OUTPATIENT)
Dept: PHYSICAL THERAPY | Facility: HOME HEALTHCARE | Age: 55
End: 2020-11-02
Payer: COMMERCIAL

## 2020-11-02 DIAGNOSIS — M17.11 ARTHRITIS OF RIGHT KNEE: Primary | ICD-10-CM

## 2020-11-02 PROCEDURE — 97140 MANUAL THERAPY 1/> REGIONS: CPT | Performed by: PHYSICAL THERAPIST

## 2020-11-02 PROCEDURE — 97112 NEUROMUSCULAR REEDUCATION: CPT | Performed by: PHYSICAL THERAPIST

## 2020-11-02 PROCEDURE — 97110 THERAPEUTIC EXERCISES: CPT | Performed by: PHYSICAL THERAPIST

## 2020-11-04 ENCOUNTER — APPOINTMENT (OUTPATIENT)
Dept: LAB | Facility: HOSPITAL | Age: 55
End: 2020-11-04
Payer: COMMERCIAL

## 2020-11-04 DIAGNOSIS — R73.09 ELEVATED GLUCOSE: ICD-10-CM

## 2020-11-04 LAB
EST. AVERAGE GLUCOSE BLD GHB EST-MCNC: 123 MG/DL
HBA1C MFR BLD: 5.9 %

## 2020-11-04 PROCEDURE — 83036 HEMOGLOBIN GLYCOSYLATED A1C: CPT

## 2020-11-04 PROCEDURE — 36415 COLL VENOUS BLD VENIPUNCTURE: CPT

## 2020-11-05 ENCOUNTER — OFFICE VISIT (OUTPATIENT)
Dept: PHYSICAL THERAPY | Facility: HOME HEALTHCARE | Age: 55
End: 2020-11-05
Payer: COMMERCIAL

## 2020-11-05 DIAGNOSIS — M17.11 ARTHRITIS OF RIGHT KNEE: Primary | ICD-10-CM

## 2020-11-05 PROCEDURE — 97110 THERAPEUTIC EXERCISES: CPT | Performed by: PHYSICAL THERAPIST

## 2020-11-05 PROCEDURE — 97112 NEUROMUSCULAR REEDUCATION: CPT | Performed by: PHYSICAL THERAPIST

## 2020-11-05 PROCEDURE — 97140 MANUAL THERAPY 1/> REGIONS: CPT | Performed by: PHYSICAL THERAPIST

## 2020-11-09 ENCOUNTER — APPOINTMENT (OUTPATIENT)
Dept: PHYSICAL THERAPY | Facility: HOME HEALTHCARE | Age: 55
End: 2020-11-09
Payer: COMMERCIAL

## 2020-11-12 ENCOUNTER — OFFICE VISIT (OUTPATIENT)
Dept: PHYSICAL THERAPY | Facility: HOME HEALTHCARE | Age: 55
End: 2020-11-12
Payer: COMMERCIAL

## 2020-11-12 DIAGNOSIS — M17.11 ARTHRITIS OF RIGHT KNEE: Primary | ICD-10-CM

## 2020-11-12 DIAGNOSIS — M17.12 ARTHRITIS OF LEFT KNEE: ICD-10-CM

## 2020-11-12 PROCEDURE — 97110 THERAPEUTIC EXERCISES: CPT

## 2020-11-24 ENCOUNTER — OFFICE VISIT (OUTPATIENT)
Dept: FAMILY MEDICINE CLINIC | Facility: CLINIC | Age: 55
End: 2020-11-24
Payer: COMMERCIAL

## 2020-11-24 VITALS
HEART RATE: 64 BPM | WEIGHT: 146.6 LBS | OXYGEN SATURATION: 96 % | BODY MASS INDEX: 29.61 KG/M2 | SYSTOLIC BLOOD PRESSURE: 140 MMHG | RESPIRATION RATE: 16 BRPM | TEMPERATURE: 96.6 F | DIASTOLIC BLOOD PRESSURE: 90 MMHG

## 2020-11-24 DIAGNOSIS — R51.9 NONINTRACTABLE EPISODIC HEADACHE, UNSPECIFIED HEADACHE TYPE: ICD-10-CM

## 2020-11-24 DIAGNOSIS — R42 DIZZINESS: Primary | ICD-10-CM

## 2020-11-24 PROCEDURE — T1015 CLINIC SERVICE: HCPCS | Performed by: FAMILY MEDICINE

## 2020-11-24 PROCEDURE — 99213 OFFICE O/P EST LOW 20 MIN: CPT | Performed by: FAMILY MEDICINE

## 2021-01-29 ENCOUNTER — OFFICE VISIT (OUTPATIENT)
Dept: OBGYN CLINIC | Facility: CLINIC | Age: 56
End: 2021-01-29
Payer: COMMERCIAL

## 2021-01-29 VITALS — BODY MASS INDEX: 29.43 KG/M2 | WEIGHT: 146 LBS | HEIGHT: 59 IN

## 2021-01-29 DIAGNOSIS — M17.12 PRIMARY OSTEOARTHRITIS OF LEFT KNEE: ICD-10-CM

## 2021-01-29 DIAGNOSIS — M17.11 PRIMARY OSTEOARTHRITIS OF RIGHT KNEE: Primary | ICD-10-CM

## 2021-01-29 PROCEDURE — 20610 DRAIN/INJ JOINT/BURSA W/O US: CPT | Performed by: ORTHOPAEDIC SURGERY

## 2021-01-29 RX ORDER — HYALURONATE SODIUM 10 MG/ML
20 SYRINGE (ML) INTRAARTICULAR
Status: COMPLETED | OUTPATIENT
Start: 2021-01-29 | End: 2021-01-29

## 2021-01-29 RX ADMIN — Medication 20 MG: at 15:39

## 2021-01-29 NOTE — PROGRESS NOTES
64 y o female presents for follow up bilateral knee pain  PMH of arthritis of both knees  The patient is here for Euflexxa 1 injections  The patient prefers speaking in [address]  The patient was accompanied by her daughter who acted as   Review of Systems  Review of systems negative unless otherwise specified in HPI    Past Medical History  Past Medical History:   Diagnosis Date    Fatigue     High cholesterol     Hypertension     Neuropathy     Osteoarthritis     knees    Shortness of breath        Past Surgical History  Past Surgical History:   Procedure Laterality Date    APPENDECTOMY      EPIDURAL BLOCK INJECTION Left 5/17/2018    Procedure: L4 AND L5 TRANSFORAMINAL EPIDURAL STEROID INJECTION;  Surgeon: Mamie Cabral MD;  [facility];  Service: Pain Management     EPIDURAL BLOCK INJECTION Left 10/4/2018    Procedure: L4 AND L5 TRANSFORAMINAL EPIDURAL STEROID INJECTION;  Surgeon: Mamie Cabral MD;  [facility];  Service: Pain Management     EPIDURAL BLOCK INJECTION Left 5/1/2019    Procedure: L4-5 and L5-S1 transforaminal epidural steroid injection;  Surgeon: Mamie Cabral MD;  [facility];  Service: Pain Management     FL GUIDED NEEDLE PLAC BX/ASP/INJ  5/1/2019    [place] COLONOSCOPY FLX DX W/COLLJ SPEC WHEN PFRMD N/A 5/31/2016    Procedure: COLONOSCOPY;  Surgeon: Tony Ortiz MD;  [facility];  Service: Gastroenterology    MT ESOPHAGOGASTRODUODENOSCOPY TRANSORAL DIAGNOSTIC N/A 11/15/2016    Procedure: ESOPHAGOGASTRODUODENOSCOPY (EGD);   Surgeon: Tony Ortiz MD;  [facility];  Service: Gastroenterology    MT TYMPANOPLASTY Left 10/25/2019    Procedure: LEFT TYMPANOPLASTY;  Surgeon: Ryan Borjas MD;  Location:  MAIN OR;  Service: ENT    TUBAL LIGATION         Current Medications  Current Outpatient Medications on File Prior to Visit   Medication Sig Dispense Refill    meclizine (ANTIVERT) 12 5 MG tablet Take 1 tablet (12 5 mg total) by mouth every 12 (twelve) hours as needed for dizziness 30 tablet 0    topiramate (TOPAMAX) 25 mg tablet Take 1 tablet (25 mg total) by mouth 2 (two) times a day 60 tablet 1     No current facility-administered medications on file prior to visit  Recent Labs [facility])  0   Lab Value Date/Time    HCT 37 3 10/24/2020 0928    HCT 37 2 07/14/2015 0737    HGB 13 3 10/24/2020 0928    HGB 13 3 07/14/2015 0737    WBC 5 43 10/24/2020 0928    WBC 4 94 07/14/2015 0737    INR 0 96 04/17/2019 1258    ESR 4 10/24/2020 0928    ESR 6 07/14/2015 0737    CRP <3 0 04/17/2019 1956    GLUCOSE 96 07/14/2015 0737    HGBA1C 5 9 (H) 11/04/2020 1237         Physical exam  There is no height or weight on file to calculate BMI  · General: Awake, Alert, Oriented  · Eyes: Pupils equal, round and reactive to light  · Heart: regular rate and rhythm  · Lungs: No audible wheezing  · Abdomen: soft  Bilateral Knee exam  No effusion noted  No erythema, ecchymosis, warmth, or other signs of infection  Palpable medial joint line tenderness with crepitus  ROM 0-130 degrees  Knees stable to varus and valgus stress  Grossly neurologically intact  Procedure  Large joint arthrocentesis: bilateral knee  Universal Protocol:  Consent given by: patient  Time out: Immediately prior to procedure a "time out" was called to verify the correct patient, procedure, equipment, support staff and site/side marked as required    Timeout called at: 1/29/2021 2:56 PM   Patient understanding: patient states understanding of the procedure being performed  Site marked: the operative site was marked  Patient identity confirmed: verbally with patient    Supporting Documentation  Indications: pain   Procedure Details  Location: knee - bilateral knee  Preparation: Patient was prepped and draped in the usual sterile fashion  Needle size: 22 G  Ultrasound guidance: no  Approach: anterolateral    Medications (Right): 20 mg Sodium Hyaluronate 20 MG/2MLMedications (Left): 20 mg Sodium Hyaluronate 20 MG/2ML   Patient tolerance: patient tolerated the procedure well with no immediate complications  Dressing:  Sterile dressing applied        Imaging  No images were taken in office today  1  Primary osteoarthritis of right knee    2  Primary osteoarthritis of left knee        Assessment:  64 YOF with bilateral knee osteoarthritis  Plan:    Bilateral Euflexxa 1 injections done today in office  Patient tolerated procedure well  Ice both knees as needed for pain and swelling up to 2 times a day for 20 minutes  Follow up in 1 week for second injections  X-rays both knees on arrival        This note was created with voice recognition software

## 2021-01-29 NOTE — PATIENT INSTRUCTIONS
Bilateral Euflexxa 1 injections done today in office  Patient tolerated procedure well  Ice both knees as needed for pain and swelling up to 2 times a day for 20 minutes  Follow up in 1 week for second injections   X-rays both knees on arrival

## 2021-02-05 ENCOUNTER — OFFICE VISIT (OUTPATIENT)
Dept: OBGYN CLINIC | Facility: CLINIC | Age: 56
End: 2021-02-05
Payer: COMMERCIAL

## 2021-02-05 VITALS — HEIGHT: 59 IN | WEIGHT: 146 LBS | BODY MASS INDEX: 29.43 KG/M2

## 2021-02-05 DIAGNOSIS — M17.12 PRIMARY OSTEOARTHRITIS OF LEFT KNEE: ICD-10-CM

## 2021-02-05 DIAGNOSIS — M17.11 PRIMARY OSTEOARTHRITIS OF RIGHT KNEE: Primary | ICD-10-CM

## 2021-02-05 PROCEDURE — 20610 DRAIN/INJ JOINT/BURSA W/O US: CPT | Performed by: ORTHOPAEDIC SURGERY

## 2021-02-05 RX ORDER — HYALURONATE SODIUM 10 MG/ML
20 SYRINGE (ML) INTRAARTICULAR
Status: COMPLETED | OUTPATIENT
Start: 2021-02-05 | End: 2021-02-05

## 2021-02-05 RX ADMIN — Medication 20 MG: at 14:53

## 2021-02-05 NOTE — PATIENT INSTRUCTIONS
Patient will ice bilateral knee for 20 minutes 1-2 times today  Limit activity for the next 24 hours before resuming normal activity  Weightbearing as tolerated  Patient may use over-the-counter Ibuprofen or Tylenol as needed for discomfort  Follow up in 1 week for repeat evaluation and injection # 3

## 2021-02-05 NOTE — PROGRESS NOTES
64 y o  female presents for Euflexxa injection # 2 to the bilateral knees  She denies ill affects from the previous injection  She notes that there is some improvement already in her pain  She confirms that her right knee is her more symptomatic knee  Review of Systems  Review of systems negative unless otherwise specified in HPI    Past Medical History  Past Medical History:   Diagnosis Date    Fatigue     High cholesterol     Hypertension     Neuropathy     Osteoarthritis     knees    Shortness of breath      Past Surgical History  Past Surgical History:   Procedure Laterality Date    APPENDECTOMY      EPIDURAL BLOCK INJECTION Left 5/17/2018    Procedure: L4 AND L5 TRANSFORAMINAL EPIDURAL STEROID INJECTION;  Surgeon: Daniel Caruso MD;  [facility];  Service: Pain Management     EPIDURAL BLOCK INJECTION Left 10/4/2018    Procedure: L4 AND L5 TRANSFORAMINAL EPIDURAL STEROID INJECTION;  Surgeon: Daniel Caruso MD;  [facility];  Service: Pain Management     EPIDURAL BLOCK INJECTION Left 5/1/2019    Procedure: L4-5 and L5-S1 transforaminal epidural steroid injection;  Surgeon: Daniel Caruso MD;  [facility];  Service: Pain Management     FL GUIDED NEEDLE PLAC BX/ASP/INJ  5/1/2019    NE COLONOSCOPY FLX DX W/COLLJ SPEC WHEN PFRMD N/A 5/31/2016    Procedure: COLONOSCOPY;  Surgeon: Kelsey Adamson MD;  [facility];  Service: Gastroenterology    NE ESOPHAGOGASTRODUODENOSCOPY TRANSORAL DIAGNOSTIC N/A 11/15/2016    Procedure: ESOPHAGOGASTRODUODENOSCOPY (EGD);   Surgeon: Kelsey Adamson MD;  [facility];  Service: Gastroenterology    NE TYMPANOPLASTY Left 10/25/2019    Procedure: LEFT TYMPANOPLASTY;  Surgeon: Marsha Chandler MD;  Location:  MAIN OR;  Service: ENT    TUBAL LIGATION       Current Medications  Current Outpatient Medications on File Prior to Visit   Medication Sig Dispense Refill    meclizine (ANTIVERT) 12 5 MG tablet Take 1 tablet (12 5 mg total) by mouth every 12 (twelve) hours as needed for dizziness (Patient not taking: Reported on 1/29/2021) 30 tablet 0    topiramate (TOPAMAX) 25 mg tablet Take 1 tablet (25 mg total) by mouth 2 (two) times a day (Patient not taking: Reported on 1/29/2021) 60 tablet 1     No current facility-administered medications on file prior to visit  Recent Labs [facility])  0   Lab Value Date/Time    HCT 37 3 10/24/2020 0928    HCT 37 2 07/14/2015 0737    HGB 13 3 10/24/2020 0928    HGB 13 3 07/14/2015 0737    WBC 5 43 10/24/2020 0928    WBC 4 94 07/14/2015 0737    INR 0 96 04/17/2019 1258    ESR 4 10/24/2020 0928    ESR 6 07/14/2015 0737    CRP <3 0 04/17/2019 1956    GLUCOSE 96 07/14/2015 0737    HGBA1C 5 9 (H) 11/04/2020 1237     Physical exam  Body mass index is 29 49 kg/m²  · General: Awake, Alert, Oriented  · Eyes: Pupils equal, round and reactive to light  · Heart: regular rate and rhythm  · Lungs: No audible wheezing  · Abdomen: soft  bilateral Knee exam  ·   No gross intra-articular effusion  Positive medial joint line tenderness both knees right knee more symptomatic than left  She is able get full extension  No calf pain  Imaging    None    Assessment:  bilateral knee arthritis right greater than left  Large joint arthrocentesis: R knee  Universal Protocol:  Consent: Verbal consent obtained  Risks and benefits: risks, benefits and alternatives were discussed  Consent given by: patient  Time out: Immediately prior to procedure a "time out" was called to verify the correct patient, procedure, equipment, support staff and site/side marked as required    Timeout called at: 2/5/2021 2:50 PM   Patient understanding: patient states understanding of the procedure being performed  Site marked: the operative site was marked  Patient identity confirmed: verbally with patient    Supporting Documentation  Indications: pain   Procedure Details  Location: knee - R knee  Preparation: Patient was prepped and draped in the usual sterile fashion  Needle size: 22 G  Ultrasound guidance: no  Approach: anterolateral  Medications administered: 20 mg Sodium Hyaluronate 20 MG/2ML  Specialty Pharmacy Supplied: received medications from pharmacy  Patient tolerance: patient tolerated the procedure well with no immediate complications  Dressing:  Sterile dressing applied    Large joint arthrocentesis: L knee  Universal Protocol:  Consent: Verbal consent obtained  Risks and benefits: risks, benefits and alternatives were discussed  Consent given by: patient  Time out: Immediately prior to procedure a "time out" was called to verify the correct patient, procedure, equipment, support staff and site/side marked as required  Timeout called at: 2/5/2021 2:51 PM   Patient understanding: patient states understanding of the procedure being performed  Site marked: the operative site was marked  Patient identity confirmed: verbally with patient    Supporting Documentation  Indications: pain   Procedure Details  Location: knee - L knee  Preparation: Patient was prepped and draped in the usual sterile fashion  Needle size: 22 G  Ultrasound guidance: no  Approach: anterolateral  Medications administered: 20 mg Sodium Hyaluronate 20 MG/2ML  Specialty Pharmacy Supplied: received medications from pharmacy  Patient tolerance: patient tolerated the procedure well with no immediate complications  Dressing:  Sterile dressing applied      Plan:   Patient will ice bilateral knee for 20 minutes 1-2 times today  Limit activity for the next 24 hours before resuming normal activity  Weightbearing as tolerated  Patient may use over-the-counter Ibuprofen or Tylenol as needed for discomfort  Follow up in 1 week for repeat evaluation and injection # 3  This note was created with voice recognition software

## 2021-02-12 ENCOUNTER — OFFICE VISIT (OUTPATIENT)
Dept: OBGYN CLINIC | Facility: CLINIC | Age: 56
End: 2021-02-12
Payer: COMMERCIAL

## 2021-02-12 VITALS
SYSTOLIC BLOOD PRESSURE: 134 MMHG | DIASTOLIC BLOOD PRESSURE: 78 MMHG | HEART RATE: 67 BPM | HEIGHT: 59 IN | BODY MASS INDEX: 29.43 KG/M2 | WEIGHT: 146 LBS

## 2021-02-12 DIAGNOSIS — M17.12 PRIMARY OSTEOARTHRITIS OF LEFT KNEE: ICD-10-CM

## 2021-02-12 DIAGNOSIS — M17.11 PRIMARY OSTEOARTHRITIS OF RIGHT KNEE: Primary | ICD-10-CM

## 2021-02-12 PROCEDURE — 20610 DRAIN/INJ JOINT/BURSA W/O US: CPT | Performed by: ORTHOPAEDIC SURGERY

## 2021-02-12 RX ORDER — HYALURONATE SODIUM 10 MG/ML
20 SYRINGE (ML) INTRAARTICULAR
Status: COMPLETED | OUTPATIENT
Start: 2021-02-12 | End: 2021-02-12

## 2021-02-12 RX ADMIN — Medication 20 MG: at 14:35

## 2021-02-12 NOTE — PROGRESS NOTES
Chief Complaint  Bilateral knee pain     History Of Presenting Illness  Lois Banda 1965 presents with bilateral knee pain  She is here for her third set of Euflexxa to the bilateral knees  Patient stated that she has been doing well since her last visit  Patient stated that she tolerated her last set of injections well  She denied any numbness or tingling  Current Medications  Current Outpatient Medications   Medication Sig Dispense Refill    meclizine (ANTIVERT) 12 5 MG tablet Take 1 tablet (12 5 mg total) by mouth every 12 (twelve) hours as needed for dizziness (Patient not taking: Reported on 1/29/2021) 30 tablet 0    topiramate (TOPAMAX) 25 mg tablet Take 1 tablet (25 mg total) by mouth 2 (two) times a day (Patient not taking: Reported on 1/29/2021) 60 tablet 1     No current facility-administered medications for this visit          Current Problems    Active Problems:   Patient Active Problem List    Diagnosis Date Noted    Tympanic membrane perforation, left 10/07/2019    Atypical chest pain 04/17/2019    Epigastric abdominal pain 04/17/2019    Chronic musculoskeletal pain 04/17/2019    Pain in finger of both hands 04/17/2019    Pharyngoesophageal dysphagia 04/12/2019    Perforation of left tympanic membrane 04/12/2019    Chronic otitis media of left ear 04/12/2019    Lumbar degenerative disc disease 04/01/2019    Left hip pain 11/08/2018    Lumbar radiculopathy 09/11/2018    Chronic lumbar radiculopathy 04/18/2018    Chronic pain syndrome 04/18/2018    Chronic right hip pain 11/20/2017    Myofascial pain syndrome 11/22/2016    Dyslipidemia 09/12/2016    Abdominal pain, left lower quadrant 05/31/2016    Atrophic vaginitis 05/11/2016    Primary osteoarthritis of both knees 02/05/2016    Chronic pain of right knee 08/12/2015         Review of Systems:    General: negative for - chills, fatigue, fever,  weight gain or weight loss  Psychological: negative for - anxiety, behavioral disorder, concentration difficulties  Ophthalmic: negative for - blurry vision, decreased vision, double vision,      Past Medical History:   Past Medical History:   Diagnosis Date    Fatigue     High cholesterol     Hypertension     Neuropathy     Osteoarthritis     knees    Shortness of breath        Past Surgical History:   Past Surgical History:   Procedure Laterality Date    APPENDECTOMY      EPIDURAL BLOCK INJECTION Left 5/17/2018    Procedure: L4 AND L5 TRANSFORAMINAL EPIDURAL STEROID INJECTION;  Surgeon: Swati David MD;  [facility];  Service: Pain Management     EPIDURAL BLOCK INJECTION Left 10/4/2018    Procedure: L4 AND L5 TRANSFORAMINAL EPIDURAL STEROID INJECTION;  Surgeon: Swati David MD;  [facility];  Service: Pain Management     EPIDURAL BLOCK INJECTION Left 5/1/2019    Procedure: L4-5 and L5-S1 transforaminal epidural steroid injection;  Surgeon: Swati David MD;  [facility];  Service: Pain Management     FL GUIDED NEEDLE PLAC BX/ASP/INJ  5/1/2019    NE COLONOSCOPY FLX DX W/COLLJ SPEC WHEN PFRMD N/A 5/31/2016    Procedure: COLONOSCOPY;  Surgeon: Richard Mariscal MD;  [facility];  Service: Gastroenterology    NE ESOPHAGOGASTRODUODENOSCOPY TRANSORAL DIAGNOSTIC N/A 11/15/2016    Procedure: ESOPHAGOGASTRODUODENOSCOPY (EGD);   Surgeon: Richard Mariscal MD;  [facility];  Service: Gastroenterology    NE TYMPANOPLASTY Left 10/25/2019    Procedure: LEFT TYMPANOPLASTY;  Surgeon: July Cobian MD;  Location:  MAIN OR;  Service: ENT    TUBAL LIGATION         Family History:  Family history reviewed and non-contributory  Family History   Problem Relation Age of Onset    Uterine cancer Mother     Early death Father     Heart disease Brother     No Known Problems Sister     No Known Problems Daughter     No Known Problems Sister     No Known Problems Daughter     No Known Problems Maternal Aunt     No Known Problems Maternal Aunt     No Known Problems Maternal Aunt     No Known Problems Maternal Aunt     No Known Problems Maternal Grandmother     No Known Problems Maternal Grandfather     No Known Problems Paternal Grandmother     No Known Problems Paternal Grandfather        Social History:  Social History     Socioeconomic History    Marital status: Single     Spouse name: None    Number of children: None    Years of education: None    Highest education level: None   Occupational History    None   Social Needs    Financial resource strain: None    Food insecurity     Worry: None     Inability: None    Transportation needs     Medical: None     Non-medical: None   Tobacco Use    Smoking status: Never Smoker    Smokeless tobacco: Never Used   Substance and Sexual Activity    Alcohol use: Not Currently    Drug use: No    Sexual activity: Yes     Birth control/protection: Female Sterilization   Lifestyle    Physical activity     Days per week: None     Minutes per session: None    Stress: None   Relationships    Social connections     Talks on phone: None     Gets together: None     Attends Mu-ism service: None     Active member of club or organization: None     Attends meetings of clubs or organizations: None     Relationship status: None    Intimate partner violence     Fear of current or ex partner: None     Emotionally abused: None     Physically abused: None     Forced sexual activity: None   Other Topics Concern    None   Social History Narrative    None       Allergies:   No Known Allergies        Physical ExaminationBP 134/78   Pulse 67   Ht 4' 11" (1 499 m)   Wt 66 2 kg (146 lb)   BMI 29 49 kg/m²   Gen: Alert and oriented to person, place, time  HEENT: EOMI, eyes clear, moist mucus membranes, hearing intact      Orthopedic Exam  Bilateral Knees:   Skin intact  No obvious swelling  No erythema or ecchymosis  Medial joint line tenderness   Neurovascularly intact         Impression  Bilateral knee osteoarthritis       Large joint arthrocentesis: R knee  Universal Protocol:  Consent: Verbal consent obtained  Risks and benefits: risks, benefits and alternatives were discussed  Consent given by: patient  Time out: Immediately prior to procedure a "time out" was called to verify the correct patient, procedure, equipment, support staff and site/side marked as required  Timeout called at: 2/12/2021 2:31 PM   Patient understanding: patient states understanding of the procedure being performed  Site marked: the operative site was marked  Patient identity confirmed: verbally with patient    Supporting Documentation  Indications: pain   Procedure Details  Location: knee - R knee  Preparation: Patient was prepped and draped in the usual sterile fashion  Needle size: 22 G  Ultrasound guidance: no  Approach: anterolateral  Medications administered: 20 mg Sodium Hyaluronate 20 MG/2ML    Patient tolerance: patient tolerated the procedure well with no immediate complications  Dressing:  Sterile dressing applied    Large joint arthrocentesis: L knee  Universal Protocol:  Consent: Verbal consent obtained  Risks and benefits: risks, benefits and alternatives were discussed  Consent given by: patient  Time out: Immediately prior to procedure a "time out" was called to verify the correct patient, procedure, equipment, support staff and site/side marked as required    Timeout called at: 2/12/2021 2:31 PM   Patient understanding: patient states understanding of the procedure being performed  Site marked: the operative site was marked  Patient identity confirmed: verbally with patient    Supporting Documentation  Indications: pain   Procedure Details  Location: knee - L knee  Preparation: Patient was prepped and draped in the usual sterile fashion  Needle size: 22 G  Ultrasound guidance: no  Approach: anterolateral  Medications administered: 20 mg Sodium Hyaluronate 20 MG/2ML    Patient tolerance: patient tolerated the procedure well with no immediate complications  Dressing:  Sterile dressing applied          Plan    Patient was offered Euflexxa injections to bilateral knees in office today  Risks and benefits of the injections were discussed  Patient tolerated the injection well  I will see her back in 6 months for re-evaluation of bilateral knees  Scribe Attestation    I,:  Brayden Perdue am acting as a scribe while in the presence of the attending physician :       I,:  Zane Block MD personally performed the services described in this documentation    as scribed in my presence :               Portions of the record may have been created with voice recognition software  Occasional wrong word or "sound a like" substitutions may have occurred due to the inherent limitations of voice recognition software  Read the chart carefully and recognize, using context, where substitutions have occurred

## 2021-03-15 ENCOUNTER — OFFICE VISIT (OUTPATIENT)
Dept: FAMILY MEDICINE CLINIC | Facility: CLINIC | Age: 56
End: 2021-03-15
Payer: COMMERCIAL

## 2021-03-15 VITALS
HEART RATE: 62 BPM | OXYGEN SATURATION: 94 % | BODY MASS INDEX: 29.27 KG/M2 | HEIGHT: 59 IN | WEIGHT: 145.2 LBS | SYSTOLIC BLOOD PRESSURE: 138 MMHG | TEMPERATURE: 96.3 F | DIASTOLIC BLOOD PRESSURE: 84 MMHG

## 2021-03-15 DIAGNOSIS — M25.511 CHRONIC RIGHT SHOULDER PAIN: ICD-10-CM

## 2021-03-15 DIAGNOSIS — M25.522 LEFT ELBOW PAIN: Primary | ICD-10-CM

## 2021-03-15 DIAGNOSIS — G89.29 CHRONIC RIGHT SHOULDER PAIN: ICD-10-CM

## 2021-03-15 PROCEDURE — 99213 OFFICE O/P EST LOW 20 MIN: CPT | Performed by: FAMILY MEDICINE

## 2021-03-15 PROCEDURE — T1015 CLINIC SERVICE: HCPCS | Performed by: FAMILY MEDICINE

## 2021-03-15 NOTE — PROGRESS NOTES
Assessment/Plan:     Diagnoses and all orders for this visit:    Left elbow pain  -     XR elbow 2 vw left; Future    Chronic right shoulder pain  -     XR shoulder 2+ vw right; Future          Will use OTC Motrin as directed with food x 2 weeks  Refuses joint injection at this time  X rays as ordered  RTC 6-8 weeks  Subjective:        Patient ID: Grafton Leventhal is a 64 y o  female  Chief Complaint   Patient presents with    Follow-up       63 yo female presents for routine follow up  Today she c/o R shoulder pain x 6 months and 1 month hx of L elbow pain  L arm feels weak  Has not dropped anything  R shoulder pain described as sharp and is unable to lift up RUE  Pain in L elbow is more a soreness and it hurts to lift anything  No swelling eo shoulder or elbow  No trauma noted        The following portions of the patient's history were reviewed and updated as appropriate: allergies, current medications, past family history, past medical history, past social history, past surgical history and problem list     Patient Active Problem List   Diagnosis    Abdominal pain, left lower quadrant    Chronic lumbar radiculopathy    Chronic pain syndrome    Dyslipidemia    Chronic pain of right knee    Chronic right hip pain    Myofascial pain syndrome    Atrophic vaginitis    Primary osteoarthritis of both knees    Lumbar radiculopathy    Left hip pain    Lumbar degenerative disc disease    Pharyngoesophageal dysphagia    Perforation of left tympanic membrane    Chronic otitis media of left ear    Atypical chest pain    Epigastric abdominal pain    Chronic musculoskeletal pain    Pain in finger of both hands    Tympanic membrane perforation, left       Current Outpatient Medications   Medication Sig Dispense Refill    meclizine (ANTIVERT) 12 5 MG tablet Take 1 tablet (12 5 mg total) by mouth every 12 (twelve) hours as needed for dizziness (Patient not taking: Reported on 1/29/2021) 30 tablet 0    topiramate (TOPAMAX) 25 mg tablet Take 1 tablet (25 mg total) by mouth 2 (two) times a day (Patient not taking: Reported on 1/29/2021) 60 tablet 1     No current facility-administered medications for this visit  Past Medical History:   Diagnosis Date    Fatigue     High cholesterol     Hypertension     Neuropathy     Osteoarthritis     knees    Shortness of breath         Past Surgical History:   Procedure Laterality Date    APPENDECTOMY      EPIDURAL BLOCK INJECTION Left 5/17/2018    Procedure: L4 AND L5 TRANSFORAMINAL EPIDURAL STEROID INJECTION;  Surgeon: Malcolm Almeida MD;  [facility];  Service: Pain Management     EPIDURAL BLOCK INJECTION Left 10/4/2018    Procedure: L4 AND L5 TRANSFORAMINAL EPIDURAL STEROID INJECTION;  Surgeon: Malcolm Almeida MD;  [facility];  Service: Pain Management     EPIDURAL BLOCK INJECTION Left 5/1/2019    Procedure: L4-5 and L5-S1 transforaminal epidural steroid injection;  Surgeon: Malcolm Almeida MD;  [facility];  Service: Pain Management     FL GUIDED NEEDLE PLAC BX/ASP/INJ  5/1/2019    [place] COLONOSCOPY FLX DX W/COLLJ SPEC WHEN PFRMD N/A 5/31/2016    Procedure: COLONOSCOPY;  Surgeon: Cristian Antony MD;  [facility];  Service: Gastroenterology    AZ ESOPHAGOGASTRODUODENOSCOPY TRANSORAL DIAGNOSTIC N/A 11/15/2016    Procedure: ESOPHAGOGASTRODUODENOSCOPY (EGD);   Surgeon: Cristian Antony MD;  [facility];  Service: Gastroenterology    AZ TYMPANOPLASTY Left 10/25/2019    Procedure: LEFT TYMPANOPLASTY;  Surgeon: Berenice Olmos MD;  [facility];  Service: ENT    TUBAL LIGATION          Social History     Socioeconomic History    Marital status: Single     Spouse name: Not on file    Number of children: Not on file    Years of education: Not on file    Highest education level: Not on file   Occupational History    Not on file   Social Needs    Financial resource strain: Not on file   [place] insecurity     Worry: Not on file     Inability: Not on file    Transportation needs     Medical: Not on file     Non-medical: Not on file   Tobacco Use    Smoking status: Never Smoker    Smokeless tobacco: Never Used   Substance and Sexual Activity    Alcohol use: Not Currently    Drug use: No    Sexual activity: Yes     Birth control/protection: Female Sterilization   Lifestyle    Physical activity     Days per week: Not on file     Minutes per session: Not on file    Stress: Not on file   Relationships    Social connections     Talks on phone: Not on file     Gets together: Not on file     Attends Congregational service: Not on file     Active member of club or organization: Not on file     Attends meetings of clubs or organizations: Not on file     Relationship status: Not on file    Intimate partner violence     Fear of current or ex partner: Not on file     Emotionally abused: Not on file     Physically abused: Not on file     Forced sexual activity: Not on file   Other Topics Concern    Not on file   Social History Narrative    Not on file        Review of Systems   Constitutional: Negative  HENT: Negative  Eyes: Negative  Respiratory: Negative  Cardiovascular: Negative  Musculoskeletal:        Per HPI   Psychiatric/Behavioral: Negative  Objective:      /84   Pulse 62   Temp (!) 96 3 °F (35 7 °C) (Tympanic)   Ht 4' 11" (1 499 m)   Wt 65 9 kg (145 lb 3 2 oz)   SpO2 94%   BMI 29 33 kg/m²          Physical Exam  Vitals signs and nursing note reviewed  Constitutional:       Appearance: Normal appearance  HENT:      Head: Normocephalic and atraumatic  Right Ear: External ear normal       Left Ear: External ear normal    Eyes:      Extraocular Movements: Extraocular movements intact  Conjunctiva/sclera: Conjunctivae normal    Neck:      Musculoskeletal: Normal range of motion and neck supple  Cardiovascular:      Rate and Rhythm: Regular rhythm  Pulmonary:      Effort: Pulmonary effort is normal       Breath sounds: Normal breath sounds  No wheezing, rhonchi or rales  Musculoskeletal:      Right lower leg: No edema  Left lower leg: No edema  Comments: Diffuse tenderness to R anterior and lateral shoulder  No swelling noted  Lateral ROM was 90 degrees  L elbow tenderness over medial aspect with mild swelling noted  Lymphadenopathy:      Cervical: No cervical adenopathy  Skin:     General: Skin is warm and dry  Neurological:      Mental Status: She is alert and oriented to person, place, and time     Psychiatric:         Mood and Affect: Mood normal          Behavior: Behavior normal

## 2021-03-17 ENCOUNTER — APPOINTMENT (OUTPATIENT)
Dept: RADIOLOGY | Facility: MEDICAL CENTER | Age: 56
End: 2021-03-17
Payer: COMMERCIAL

## 2021-03-17 DIAGNOSIS — M25.522 LEFT ELBOW PAIN: ICD-10-CM

## 2021-03-17 DIAGNOSIS — M25.511 CHRONIC RIGHT SHOULDER PAIN: ICD-10-CM

## 2021-03-17 DIAGNOSIS — G89.29 CHRONIC RIGHT SHOULDER PAIN: ICD-10-CM

## 2021-03-17 PROCEDURE — 73030 X-RAY EXAM OF SHOULDER: CPT

## 2021-03-17 PROCEDURE — 73070 X-RAY EXAM OF ELBOW: CPT

## 2021-03-22 ENCOUNTER — APPOINTMENT (EMERGENCY)
Dept: RADIOLOGY | Facility: HOSPITAL | Age: 56
End: 2021-03-22
Payer: COMMERCIAL

## 2021-03-22 ENCOUNTER — HOSPITAL ENCOUNTER (EMERGENCY)
Facility: HOSPITAL | Age: 56
Discharge: HOME | End: 2021-03-22
Attending: EMERGENCY MEDICINE
Payer: COMMERCIAL

## 2021-03-22 VITALS
TEMPERATURE: 97.1 F | DIASTOLIC BLOOD PRESSURE: 69 MMHG | BODY MASS INDEX: 29.23 KG/M2 | OXYGEN SATURATION: 99 % | HEART RATE: 61 BPM | RESPIRATION RATE: 18 BRPM | SYSTOLIC BLOOD PRESSURE: 147 MMHG | WEIGHT: 145 LBS | HEIGHT: 59 IN

## 2021-03-22 DIAGNOSIS — S29.012A STRAIN OF THORACIC BACK REGION: Primary | ICD-10-CM

## 2021-03-22 PROCEDURE — 99283 EMERGENCY DEPT VISIT LOW MDM: CPT

## 2021-03-22 PROCEDURE — 99285 EMERGENCY DEPT VISIT HI MDM: CPT | Performed by: EMERGENCY MEDICINE

## 2021-03-22 PROCEDURE — 71046 X-RAY EXAM CHEST 2 VIEWS: CPT

## 2021-03-22 RX ORDER — NAPROXEN 500 MG/1
500 TABLET ORAL 2 TIMES DAILY WITH MEALS
Qty: 10 TABLET | Refills: 0 | Status: SHIPPED | OUTPATIENT
Start: 2021-03-22 | End: 2021-05-11 | Stop reason: HOSPADM

## 2021-03-22 RX ORDER — ONDANSETRON 4 MG/1
4 TABLET, ORALLY DISINTEGRATING ORAL ONCE
Status: COMPLETED | OUTPATIENT
Start: 2021-03-22 | End: 2021-03-22

## 2021-03-22 RX ORDER — ONDANSETRON 4 MG/1
4 TABLET, FILM COATED ORAL EVERY 8 HOURS PRN
Qty: 30 TABLET | Refills: 0 | Status: SHIPPED | OUTPATIENT
Start: 2021-03-22 | End: 2021-05-11 | Stop reason: HOSPADM

## 2021-03-22 RX ORDER — CYCLOBENZAPRINE HCL 10 MG
10 TABLET ORAL 3 TIMES DAILY PRN
Qty: 30 TABLET | Refills: 0 | Status: SHIPPED | OUTPATIENT
Start: 2021-03-22 | End: 2021-05-11 | Stop reason: HOSPADM

## 2021-03-22 RX ORDER — OXYCODONE HYDROCHLORIDE AND ACETAMINOPHEN 5; 325 MG/1; MG/1
1 TABLET ORAL EVERY 4 HOURS PRN
Qty: 8 TABLET | Refills: 0 | Status: SHIPPED | OUTPATIENT
Start: 2021-03-22 | End: 2021-05-11 | Stop reason: HOSPADM

## 2021-03-22 RX ORDER — NAPROXEN 500 MG/1
500 TABLET ORAL ONCE
Status: COMPLETED | OUTPATIENT
Start: 2021-03-22 | End: 2021-03-22

## 2021-03-22 RX ORDER — OXYCODONE HYDROCHLORIDE AND ACETAMINOPHEN 5; 325 MG/1; MG/1
1 TABLET ORAL ONCE
Status: COMPLETED | OUTPATIENT
Start: 2021-03-22 | End: 2021-03-22

## 2021-03-22 RX ADMIN — ONDANSETRON 4 MG: 4 TABLET, ORALLY DISINTEGRATING ORAL at 16:50

## 2021-03-22 RX ADMIN — NAPROXEN 500 MG: 500 TABLET ORAL at 16:50

## 2021-03-22 RX ADMIN — OXYCODONE HYDROCHLORIDE AND ACETAMINOPHEN 1 TABLET: 5; 325 TABLET ORAL at 16:50

## 2021-03-22 NOTE — Clinical Note
Carolyn Biggs was seen and treated in our emergency department on 3/22/2021  Diagnosis:     Fanta Good  may return to work on return date  She may return on this date: 03/24/2021         If you have any questions or concerns, please don't hesitate to call        Curry Acuña, DO    ______________________________           _______________          _______________  Hospital Representative                              Date                                Time

## 2021-03-22 NOTE — ED PROVIDER NOTES
History  Chief Complaint   Patient presents with    Back Pain     pt reports upper back pain for four days, denies any injury or trauma     HPI     Pt presents from home, hx of HTN, HLD, osteoarthritis of her bilateral knees, c/o mid thoracic back pain which began approx 4 days ago  Pt denies any injuries  Pt states that she has been "working on some shelves, and I have to bend over and turn some plastic screws "  Pt states that she noticed her pain shortly after performing this activity  Pt has not taken any medications for the pain  Pt o/w denies any symptoms  Pt denies ha, fevers, cough, cp, sob, n/v/d/c, abd pain, dysuria, focal def or syncope  Prior to Admission Medications   Prescriptions Last Dose Informant Patient Reported?  Taking?   meclizine (ANTIVERT) 12 5 MG tablet   No No   Sig: Take 1 tablet (12 5 mg total) by mouth every 12 (twelve) hours as needed for dizziness   Patient not taking: Reported on 2021   topiramate (TOPAMAX) 25 mg tablet   No No   Sig: Take 1 tablet (25 mg total) by mouth 2 (two) times a day   Patient not taking: Reported on 2021      Facility-Administered Medications: None       Past Medical History:   Diagnosis Date    Fatigue     High cholesterol     Hypertension     Neuropathy     Osteoarthritis     knees    Shortness of breath        Past Surgical History:   Procedure Laterality Date    APPENDECTOMY      EPIDURAL BLOCK INJECTION Left 2018    Procedure: L4 AND L5 TRANSFORAMINAL EPIDURAL STEROID INJECTION;  Surgeon: Areta Apgar, MD;  [facility];  Service: Pain Management     EPIDURAL BLOCK INJECTION Left 10/4/2018    Procedure: L4 AND L5 TRANSFORAMINAL EPIDURAL STEROID INJECTION;  Surgeon: Areta Apgar, MD;  [facility];  Service: Pain Management     EPIDURAL BLOCK INJECTION Left 2019    Procedure: L4-5 and L5-S1 transforaminal epidural steroid injection;  Surgeon: Areta Apgar, MD;  [facility];  Service: Pain Management     FL GUIDED NEEDLE PLAC BX/ASP/INJ  5/1/2019    WI COLONOSCOPY FLX DX W/COLLJ SPEC WHEN PFRMD N/A 5/31/2016    Procedure: COLONOSCOPY;  Surgeon: Ananya Peralta MD;  [facility];  Service: Gastroenterology    WI ESOPHAGOGASTRODUODENOSCOPY TRANSORAL DIAGNOSTIC N/A 11/15/2016    Procedure: ESOPHAGOGASTRODUODENOSCOPY (EGD); Surgeon: Ananya Peralta MD;  [facility];  Service: Gastroenterology    WI TYMPANOPLASTY Left 10/25/2019    Procedure: LEFT TYMPANOPLASTY;  Surgeon: Emmaneul Low MD;  Location:  MAIN OR;  Service: ENT    TUBAL LIGATION         Family History   Problem Relation Age of Onset    Uterine cancer Mother     Early death Father     Heart disease Brother     No Known Problems Sister     No Known Problems Daughter     No Known Problems Sister     No Known Problems Daughter     No Known Problems Maternal Aunt     No Known Problems Maternal Aunt     No Known Problems Maternal Aunt     No Known Problems Maternal Aunt     No Known Problems Maternal Grandmother     No Known Problems Maternal Grandfather     No Known Problems Paternal Grandmother     No Known Problems Paternal Grandfather      I have reviewed and agree with the history as documented  E-Cigarette/Vaping    E-Cigarette Use Never User      E-Cigarette/Vaping Substances    Nicotine No     THC No     CBD No     Flavoring No     Other No     Unknown No      Social History     Tobacco Use    Smoking status: Never Smoker    Smokeless tobacco: Never Used   Substance Use Topics    Alcohol use: Not Currently    Drug use: No       Review of Systems   Constitutional: Negative for activity change, appetite change, diaphoresis, fatigue and fever  HENT: Negative for congestion, facial swelling, mouth sores and trouble swallowing  Eyes: Negative for photophobia, discharge and visual disturbance  Respiratory: Negative for apnea, cough, shortness of breath and wheezing  Cardiovascular: Negative for chest pain and leg swelling  Gastrointestinal: Negative for abdominal pain, constipation, diarrhea, nausea and vomiting  Endocrine: Negative for heat intolerance and polydipsia  Genitourinary: Negative for dysuria, flank pain, frequency and hematuria  Musculoskeletal: Positive for back pain and myalgias  Negative for gait problem and neck pain  Skin: Negative for rash and wound  Allergic/Immunologic: Negative for immunocompromised state  Neurological: Negative for dizziness, syncope, weakness, light-headedness and headaches  Hematological: Negative for adenopathy  Psychiatric/Behavioral: Negative for agitation, confusion and self-injury  The patient is not nervous/anxious  Physical Exam  Physical Exam  Vitals signs and nursing note reviewed  Constitutional:       General: She is not in acute distress  Appearance: She is well-developed  She is not diaphoretic  HENT:      Head: Normocephalic and atraumatic  Right Ear: External ear normal       Left Ear: External ear normal       Nose: Nose normal       Mouth/Throat:      Pharynx: No oropharyngeal exudate  Eyes:      General: No scleral icterus  Right eye: No discharge  Left eye: No discharge  Conjunctiva/sclera: Conjunctivae normal       Pupils: Pupils are equal, round, and reactive to light  Neck:      Musculoskeletal: Normal range of motion and neck supple  Thyroid: No thyromegaly  Vascular: No JVD  Trachea: No tracheal deviation  Cardiovascular:      Rate and Rhythm: Normal rate and regular rhythm  Heart sounds: Normal heart sounds  No murmur  Pulmonary:      Effort: Pulmonary effort is normal  No respiratory distress  Breath sounds: Normal breath sounds  No stridor  No wheezing or rales  Chest:      Chest wall: No tenderness  Abdominal:      General: Bowel sounds are normal  There is no distension  Palpations: Abdomen is soft   There is no mass  Tenderness: There is no abdominal tenderness  There is no guarding or rebound  Musculoskeletal: Normal range of motion  General: Tenderness present  No deformity  Arms:    Lymphadenopathy:      Cervical: No cervical adenopathy  Skin:     General: Skin is warm and dry  Coloration: Skin is not pale  Findings: No erythema or rash  Neurological:      Mental Status: She is alert and oriented to person, place, and time  Cranial Nerves: No cranial nerve deficit  Motor: No abnormal muscle tone  Coordination: Coordination normal       Deep Tendon Reflexes: Reflexes are normal and symmetric  Reflexes normal    Psychiatric:         Behavior: Behavior normal          Thought Content: Thought content normal          Judgment: Judgment normal          Vital Signs  ED Triage Vitals [03/22/21 1610]   Temperature Pulse Respirations Blood Pressure SpO2   (!) 97 1 °F (36 2 °C) 61 18 147/69 99 %      Temp Source Heart Rate Source Patient Position - Orthostatic VS BP Location FiO2 (%)   Temporal Monitor Sitting Left arm --      Pain Score       5           Vitals:    03/22/21 1610   BP: 147/69   Pulse: 61   Patient Position - Orthostatic VS: Sitting         Visual Acuity      ED Medications  Medications   oxyCODONE-acetaminophen (PERCOCET) 5-325 mg per tablet 1 tablet (1 tablet Oral Given 3/22/21 1650)   ondansetron (ZOFRAN-ODT) dispersible tablet 4 mg (4 mg Oral Given 3/22/21 1650)   naproxen (NAPROSYN) tablet 500 mg (500 mg Oral Given 3/22/21 1650)       Diagnostic Studies  Results Reviewed     None                 XR chest 2 views   Final Result by Mario Shah DO (03/22 1724)      No acute cardiopulmonary disease                    Workstation performed: [id number]                    Procedures  Procedures         ED Course                                           MDM  Number of Diagnoses or Management Options  Strain of thoracic back region:   Diagnosis management comments: IMP: musc pain versus occult pneumonia, bronchitis  Doubt acs, pe, dissection, tamponade, cva, bacteremia, surgical abd process  Plan: check cxr, give po narcotic and po anti-inflammatory prn   - cxr no acute  - Pt with likely thoracic strain now improved  Pt will f/up w/ her pcp  Amount and/or Complexity of Data Reviewed  Tests in the radiology section of CPT®: ordered and reviewed  Tests in the medicine section of CPT®: ordered and reviewed  Decide to obtain previous medical records or to obtain history from someone other than the patient: yes  Review and summarize past medical records: yes  Independent visualization of images, tracings, or specimens: yes    Risk of Complications, Morbidity, and/or Mortality  Presenting problems: high  Diagnostic procedures: moderate  Management options: moderate    Patient Progress  Patient progress: improved      Disposition  Final diagnoses:   Strain of thoracic back region     Time reflects when diagnosis was documented in both MDM as applicable and the Disposition within this note     Time User Action Codes Description Comment    3/22/2021  5:55 PM Mckayla Herrera Add [S29 012A] Strain of thoracic back region       ED Disposition     ED Disposition Condition Date/Time Comment    Discharge Stable Mon Mar 22, 2021  5:55 PM Chai Carrizales discharge to home/self care              Follow-up Information     Follow up With Specialties Details Why Contact Info    Kamala Fuentes PA-C Family Medicine, Physician Assistant Schedule an appointment as soon as possible for a visit in 2 days Return immediately, If symptoms worsen [address], [facility] [address] [phone]            Discharge Medication List as of 3/22/2021  5:59 PM      START taking these medications    Details   cyclobenzaprine (FLEXERIL) 10 mg tablet Take 1 tablet (10 mg total) by mouth 3 (three) times a day as needed for muscle spasms, Starting Mon 3/22/2021, Until Wed 4/21/2021, Normal      naproxen (NAPROSYN) 500 mg tablet Take 1 tablet (500 mg total) by mouth 2 (two) times a day with meals for 10 doses, Starting Mon 3/22/2021, Until Sat 3/27/2021, Normal      ondansetron (ZOFRAN) 4 mg tablet Take 1 tablet (4 mg total) by mouth every 8 (eight) hours as needed for nausea for up to 30 doses, Starting Mon 3/22/2021, Normal      oxyCODONE-acetaminophen (PERCOCET) 5-325 mg per tablet Take 1 tablet by mouth every 4 (four) hours as needed for moderate pain for up to 8 dosesMax Daily Amount: 6 tablets, Starting Mon 3/22/2021, Normal         CONTINUE these medications which have NOT CHANGED    Details   meclizine (ANTIVERT) 12 5 MG tablet Take 1 tablet (12 5 mg total) by mouth every 12 (twelve) hours as needed for dizziness, Starting Fri 10/16/2020, Normal      topiramate (TOPAMAX) 25 mg tablet Take 1 tablet (25 mg total) by mouth 2 (two) times a day, Starting Fri 10/16/2020, Normal           No discharge procedures on file      PDMP Review       Value Time User    PDMP Reviewed  Yes 10/25/2019  3:20 PM Delisa Mei MD          ED Provider  Electronically Signed by           Wiley Essex, DO  03/25/21 7760

## 2021-03-23 ENCOUNTER — TELEPHONE (OUTPATIENT)
Dept: FAMILY MEDICINE CLINIC | Facility: CLINIC | Age: 56
End: 2021-03-23

## 2021-03-23 NOTE — TELEPHONE ENCOUNTER
Patient made aware montezwill call dr rashid to schedule appointment   ----- Message from Angelito Borrero PA-C sent at 3/23/2021 10:11 AM EDT -----  Please call the patient regarding her abnormal result  Will need to see Dr Cesar Segura for elbow pain  Calcified lesion noted on x ray  She does see him for knee pain

## 2021-04-12 ENCOUNTER — TELEPHONE (OUTPATIENT)
Dept: PAIN MEDICINE | Facility: CLINIC | Age: 56
End: 2021-04-12

## 2021-04-14 ENCOUNTER — OFFICE VISIT (OUTPATIENT)
Dept: PAIN MEDICINE | Facility: CLINIC | Age: 56
End: 2021-04-14
Payer: COMMERCIAL

## 2021-04-14 VITALS
BODY MASS INDEX: 28.79 KG/M2 | DIASTOLIC BLOOD PRESSURE: 79 MMHG | SYSTOLIC BLOOD PRESSURE: 145 MMHG | HEART RATE: 54 BPM | HEIGHT: 59 IN | WEIGHT: 142.8 LBS

## 2021-04-14 DIAGNOSIS — G89.29 CHRONIC RIGHT SHOULDER PAIN: Primary | ICD-10-CM

## 2021-04-14 DIAGNOSIS — M77.02 MEDIAL EPICONDYLITIS OF ELBOW, LEFT: ICD-10-CM

## 2021-04-14 DIAGNOSIS — M25.511 CHRONIC RIGHT SHOULDER PAIN: Primary | ICD-10-CM

## 2021-04-14 PROCEDURE — 1036F TOBACCO NON-USER: CPT | Performed by: ANESTHESIOLOGY

## 2021-04-14 PROCEDURE — 99214 OFFICE O/P EST MOD 30 MIN: CPT | Performed by: ANESTHESIOLOGY

## 2021-04-14 NOTE — PROGRESS NOTES
Assessment:  1  Chronic right shoulder pain    2  Medial epicondylitis of elbow, left        Plan:  Patient is 51-year-old female complaints of right shoulder plain and left elbow presents to the office with left medial epicondylitis and anterior shoulder pain  X-ray of left elbow was within normal limits however patient does exhibit tenderness to palpation of the medial compartment of the left elbow  X-ray of right shoulder review which does show calcification noted in the anterior compartment of a tendon of anterior rotator cuff  Patient reports experiencing this pain for approximately 6 months  1  We will schedule patient for a ultrasound-guided right shoulder steroid injection with dry needling of calcified tendon and a a ultrasound-guided left elbow medial epicondyle steroid injection      Complete risks and benefits including bleeding, infection, tissue reaction, nerve injury and allergic reaction were discussed  The approach was demonstrated using models and literature was provided  Verbal and written consent was obtained  History of Present Illness: The patient is a 64 y o  female who presents for a follow up office visit in regards to Shoulder Pain and Arm Pain  The patients current symptoms include  4/10 constant sharp pain which worse at night  Current pain medications includes: none   I have personally reviewed and/or updated the patient's past medical history, past surgical history, family history, social history, current medications, allergies, and vital signs today  Review of Systems  Review of Systems   Respiratory: Negative for shortness of breath  Cardiovascular: Negative for chest pain  Gastrointestinal: Negative for constipation, diarrhea, nausea and vomiting  Musculoskeletal: Negative for arthralgias, gait problem, joint swelling and myalgias  Neurological: Negative for dizziness, seizures and weakness     All other systems reviewed and are negative  Past Medical History:   Diagnosis Date    Fatigue     High cholesterol     Hypertension     Neuropathy     Osteoarthritis     knees    Shortness of breath        Past Surgical History:   Procedure Laterality Date    APPENDECTOMY      EPIDURAL BLOCK INJECTION Left 5/17/2018    Procedure: L4 AND L5 TRANSFORAMINAL EPIDURAL STEROID INJECTION;  Surgeon: Ludwig Moss MD;  [facility];  Service: Pain Management     EPIDURAL BLOCK INJECTION Left 10/4/2018    Procedure: L4 AND L5 TRANSFORAMINAL EPIDURAL STEROID INJECTION;  Surgeon: Ludwig Moss MD;  [facility];  Service: Pain Management     EPIDURAL BLOCK INJECTION Left 5/1/2019    Procedure: L4-5 and L5-S1 transforaminal epidural steroid injection;  Surgeon: Ludwig Moss MD;  [facility];  Service: Pain Management     FL GUIDED NEEDLE PLAC BX/ASP/INJ  5/1/2019    OR COLONOSCOPY FLX DX W/COLLJ SPEC WHEN PFRMD N/A 5/31/2016    Procedure: COLONOSCOPY;  Surgeon: Damon Segovia MD;  [facility];  Service: Gastroenterology    OR ESOPHAGOGASTRODUODENOSCOPY TRANSORAL DIAGNOSTIC N/A 11/15/2016    Procedure: ESOPHAGOGASTRODUODENOSCOPY (EGD);   Surgeon: Damon Segovia MD;  [facility];  Service: Gastroenterology    OR TYMPANOPLASTY Left 10/25/2019    Procedure: LEFT TYMPANOPLASTY;  Surgeon: Kian Jacome MD;  [facility];  Service: ENT    TUBAL LIGATION         Family History   Problem Relation Age of Onset    Uterine cancer Mother     Early death Father     Heart disease Brother     No Known Problems Sister     No Known Problems Daughter     No Known Problems Sister     No Known Problems Daughter     No Known Problems Maternal Aunt     No Known Problems Maternal Aunt     No Known Problems Maternal Aunt     No Known Problems Maternal Aunt     No Known Problems Maternal Grandmother     No Known Problems Maternal Grandfather     No Known Problems Paternal Grandmother     No Known Problems Paternal Grandfather        Social History     Occupational History    Not on file   Tobacco Use    Smoking status: Never Smoker    Smokeless tobacco: Never Used   Substance and Sexual Activity    Alcohol use: Not Currently    Drug use: No    Sexual activity: Yes     Birth control/protection: Female Sterilization         Current Outpatient Medications:     cyclobenzaprine (FLEXERIL) 10 mg tablet, Take 1 tablet (10 mg total) by mouth 3 (three) times a day as needed for muscle spasms (Patient not taking: Reported on 4/6/2021), Disp: 30 tablet, Rfl: 0    meclizine (ANTIVERT) 12 5 MG tablet, Take 1 tablet (12 5 mg total) by mouth every 12 (twelve) hours as needed for dizziness (Patient not taking: Reported on 1/29/2021), Disp: 30 tablet, Rfl: 0    naproxen (NAPROSYN) 500 mg tablet, Take 1 tablet (500 mg total) by mouth 2 (two) times a day with meals for 10 doses, Disp: 10 tablet, Rfl: 0    ondansetron (ZOFRAN) 4 mg tablet, Take 1 tablet (4 mg total) by mouth every 8 (eight) hours as needed for nausea for up to 30 doses, Disp: 30 tablet, Rfl: 0    oxyCODONE-acetaminophen (PERCOCET) 5-325 mg per tablet, Take 1 tablet by mouth every 4 (four) hours as needed for moderate pain for up to 8 dosesMax Daily Amount: 6 tablets, Disp: 8 tablet, Rfl: 0    topiramate (TOPAMAX) 25 mg tablet, Take 1 tablet (25 mg total) by mouth 2 (two) times a day (Patient not taking: Reported on 1/29/2021), Disp: 60 tablet, Rfl: 1    No Known Allergies    Physical Exam:    /79 (BP Location: Left arm, Patient Position: Sitting, Cuff Size: Standard)   Pulse (!) 54   Ht 4' 11" (1 499 m)   Wt 64 8 kg (142 lb 12 8 oz)   BMI 28 84 kg/m²     Constitutional:normal, well developed, well nourished, alert, in no distress and non-toxic and no overt pain behavior    Eyes:anicteric  HEENT:grossly intact  Neck:supple, symmetric, trachea midline and no masses   Pulmonary:even and unlabored  Cardiovascular:No edema or pitting edema present  Skin:Normal without rashes or lesions and well hydrated  Psychiatric:Mood and affect appropriate  Neurologic:Cranial Nerves II-XII grossly intact  Musculoskeletal:normal,  tenderness to palpation right shoulder anterior compartment with internal external rotation, potential palpation left elbow medial compartment along the epicondylar surface    Imaging  LEFT ELBOW     INDICATION:   M25 522: Pain in left elbow      COMPARISON:  None     VIEWS:  XR ELBOW 2 VW LEFT         FINDINGS:     There is no acute fracture or dislocation      There is no joint effusion      No significant degenerative changes      No lytic or blastic osseous lesion      Soft tissues are unremarkable      IMPRESSION:     No acute osseous abnormality  RIGHT SHOULDER     INDICATION:   M25 511: Pain in right shoulder  G89 29: Other chronic pain      COMPARISON:  None     VIEWS:  XR SHOULDER 2+ VW RIGHT        FINDINGS:     There is no acute fracture or dislocation      There is an 11 mm calcific density adjacent to the greater tuberosity, 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

## 2021-04-14 NOTE — PATIENT INSTRUCTIONS
Ejercicios para la lesión del manguito rotador   LO QUE NECESITA SABER:   Los ejercicios pueden ayudar a mejorar el movimiento y la fuerza, a la vez que disminuyen el dolor  Del Valle fisioterapeuta o médico le dirán cuándo comenzar a hacer los ejercicios  También le dirán con qué frecuencia hacerlos  Usted necesitará comenzar lentamente para prevenir más lesiones  Usted pasará por varios niveles con el tiempo a medida que se fortalezca y tenga más flexibilidad  INSTRUCCIONES SOBRE EL MAGGIE HOSPITALARIA:   Normas de seguridad:  · Siempre entre en calor antes de realizar los ejercicios  Use haydee bicicleta estacionaria o camine linda 5 a 10 minutos para que catherine músculos entren en calor  · No eleve el brazo sobre la trevin hasta que se lo indiquen  Deberá esperar hasta que la lesión sane  El movimiento de algunos ejercicios podría continuar hasta que del valle Ra Penta sobre del valle [place]  Deberá detener el movimiento a la altura que se le indique  · Deténgase si siente dolor  Es posible que sienta algunas áreas tensionadas o rígidas cuando comience  [place] mejorará a medida que continúe con los ejercicios  No debe sentir dolor  El dolor significa que usted no está listo para hacer el ejercicio todavía  Deténgase y llame a del valle fisioterapeuta o médico de inmediato  · Siempre trabaje ambos manguitos rotadores  Incluso si del valle lesión es solo en un lado, es importante hacer cada ejercicio en ambos lados  [place] ayuda a prevenir lesiones y a mantener el equilibrio [employer] hombros y la espalda  · La postura es importante  Del Valle fisioterapeuta o médico le mostrarán la postura adecuada para cada ejercicio  A usted se le mostrará cómo [employer] hombros hacia atrás y hacia abajo para comprometer los músculos correctos  Recuerde no permitir que catherine hombros se encojan linda un ejercicio a menos que sea parte del movimiento  Cómo realizar los ejercicios de estiramiento: Usted no sentirá cada ejercicio en el área de del valle hombro   Deedee Ronde sienta algunos de los [place] [employer] de la espalda, los costados o la parte superior del brazo  Usted necesita trabajar los músculos dentro y alrededor de los manguitos rotadores y hacia abajo de [place]  [place] ayuda a estabilizar los hombros  Jane fisioterapeuta o médico le dirán cuánto tiempo debe mantener cada estiramiento  También le dirán cuántas veces debe repetir cada estiramiento linda haydee sesión de ejercicio  Es posible que se le pida que tahira solo ciertos ejercicios o que los tahira en un orden específico  Velinda Delay son pautas generales que le ayudarán a recordar los ejercicios que le enseñaron:  · Movimiento pendular: Inclínese hacia adelante y apoye el brazo en haydee haney  No redondee la espalda ni trabe las rodillas linda el ejercicio  Deje que jane otro brazo cuelgue libremente a jane costado  Mueva con cuidado el brazo [employer] meagan y [place] atrás, de lado a lado y en círculos  · Estiramiento con brazo david: Relaje los hombros  Sostenga el antebrazo con la [place]  Presione el brazo sobre el pecho hasta que sienta un estiramiento  Mantenga el estiramiento  Regrese a la posición original de pie  · Tramo de cama: Recuéstese sobre jane lado sobre hyadee superficie firme y plana  Flexione el codo del brazo superior a 90°  Use la otra mano para empujar lentamente el brazo hacia abajo  Deténgase cuando sienta un estiramiento en la parte posterior del hombro  Mantenga el estiramiento  Despacio regrese a la posición inicial          · Movimiento de hombro, de arriba a abajo: Nemo ejercicio también puede llamarse extensión del hombro  Siéntese en haydee silla con respaldo camilo sin apoyabrazos  Levante el brazo yolis si estuviera tratando de alcanzar la pared que está frente a usted  Continúe levantando el brazo hasta que esté por encima de la trevin o tan alto yolis se le indique  Trate de levantarlo de manera que la parte interna del brazo toque jane [place]   Baje jane Dragan Montenegroe y páselo hacia la parte trasera de del valle cuerpo, lo más que pueda  Regrese del valle brazo a la posición inicial          · Movimiento de hombro, de lado a lado: Estos movimientos pueden llamarse flexión, rotación interna y rotación externa  Siéntese en haydee silla con respaldo camilo sin apoyabrazos  Levante el brazo hacia el costado y luego hacia arriba por encima de la trevin según le indiquen  Levante del valle brazo hacia el lado y [place] de del valle trevin lo más que pueda  Baje del valle Doyne Carp y páselo por el frente de del valle cuerpo y trate de tocar el hombro contrario  Regrese del valle brazo a la posición inicial          · Rotaciones de hombros: Párese y levante ambos hombros hacia catherine [place]  Bájelos a la posición Brissa Garcia los hombros  Tire los hombros hacia atrás  Luego relájelos otra vez  Realice un círculo continuo con catherine hombros con suavidad  Luego, mueva catherine hombros en la otra dirección con suavidad  · Ejercicio de alcance de pared: También puede llamarse estiramiento de flexión  Párese de frente a haydee pared  Lentamente camine hacia arriba con los dedos de la mano por la pared hasta que sienta un estiramiento  Mantenga el estiramiento  Regrese a la posición original de pie  · Ejercicio de brazos extendidos: Acuéstese boca arriba con las piernas estiradas  Extienda los brazos yolis si estuviera tratando de tocar el techo  Extiéndalos tan alto yolis pueda para sentir un estiramiento en la parte posterior de los brazos  Mantenga el estiramiento  Luego baje los [place]  · Flexión del codo: Párese con los brazos a los lados  [address] br[address]aney de catherine sukh hacia adelante  Doble el codo y trate de tocarse el hombro con la punta de los dedos  Regrese del valle brazo a la posición inicial          · Estiramiento con los brazos en la espalda: Párese y ponga ambos brazos detrás de del valle espalda  Coloque haydee mano debajo de la otra   Mueva la mano inferior y [facility] la trevin  Usted debe sentir un estiramiento en la parte frontal de del valle brazo y hombro  Tenga cuidado de no empujar demasiado herve  Mantenga el estiramiento  Luego vuelva a la posición inicial          · Estiramiento de tríceps: Párese y baje el antebrazo por la espalda de modo que del valle mano apunte hacia el suelo detrás de usted  El codo debe estar apuntando al techo  [place] la otra mano y colóquela sobre el codo  [place] y lentamente el codo hasta que sienta un estiramiento en la parte posterior del brazo  Mantenga el estiramiento  Suelte el codo y relaje el [place]  Es posible que se le [place] cómo hacer jennifer estiramiento con haydee toalla  La toalla se puede tirar suavemente con haydee mano detrás de la espalda a la altura de la cintura  Cómo realizar ejercicios de fortalecimiento: Los ejercicios de fortalecimiento pueden incluir pesas manuales o bandas de resistencia  [place] fisioterapeuta o médico le indicarán el peso o la resistencia que debe usar al ejercitarse  La guía general es usar pesas ligeras o haydee baja resistencia y hacer un gran número de repeticiones  Es posible que se le pida que tahira solo ciertos ejercicios o que los tahira en un orden Raynaldo Hunger son pautas generales que le ayudarán a recordar los ejercicios que le enseñaron:  · Retracción escapular: Párese con los brazos a los lados  Intente juntar los omóplatos y sosténgalos en holli posición  Luego relaje los músculos  Mantenga del valle hombro hacia atrás linda todo el ejercicio  · Lagartijas de pared: Jennifer ejercicio es similar a haydee flexión de brazos realizada en el suelo  El objetivo es usar los músculos de la espalda y los hombros para llevar la parte superior del cuerpo [place] y desde la pared  Párese de frente a haydee pared  Ponga catherine sukh en la pared  Doble los codos para llevar la parte superior del cuerpo hacia la pared   Estire los brazos para volver a la posición inicial  Mantenga los pies lo suficientemente cerca de la pared yolis para no benny un paso al [employer] codos  · Remada de pie con rizvi de ejercicios: Envuelva la rizvi de ejercicio alrededor de un objeto pesado y estable a la altura de la cintura  Roberto un ocampo en el extremo de la rizvi para crear Chiqui Record, si es necesario  Sostenga la manija o Ifrah Batman y tire de la rizvi hacia atrás hacia del valle cadera  Mantenga el hombro hacia abajo  Intente juntar los omóplatos  Mantenga esta posición  Luego vuelva lentamente a la posición inicial          · Rotación externa con brazo abducido 90 grados: Envuelva la rizvi de ejercicio alrededor de un objeto pesado y estable a la altura de la cintura  Roberto un ocampo en el extremo de la rizvi para crear Chiqui Record, si es necesario  Párese y sostenga la manija o Ifrah Batman  Doble el codo y levante el brazo a la altura del hombro  Mantenga el Lilo Huang en esta posición  Levante la mano yolis si estuviera apuntando al techo  Despacio regrese a la posición inicial  También se le puede mostrar cómo hacer jennifer ejercicio acostado y con haydee pesa  · Abducción de hombro con pesas: De pie, sostenga haydee pesa en la mano con la bennett hacia del valle cuerpo  Lentamente levante el brazo hacia el costado con el pulgar apuntando [place]  Luego levante el brazo tan lejos yolis pueda sin sentir dolor  Mantenga esta posición  Luego vuelva a la posición inicial          · Abducción de hombro con rizvi de ejercicio: Envuelva la rizvi de ejercicio alrededor de un objeto pesado y estable cerca de del valle pie  Agarre la rizvi  Mantenga el Padmini Bourgeois  Lentamente levante el brazo hacia el costado con el pulgar apuntando [place]  Luego, lentamente jale la rizvi cuevas lejos yolis pueda sin sentir dolor  Despacio regrese a la posición inicial          · Aducción de hombro con pesas: Acuéstese boca arriba sobre haydee superficie firme  Sostenga haydee pesa en la mano a la altura del hombro  Levante lentamente el Lilo Encinas hacia el techo y estire el codo  Mantenga esta posición   Abby Cee vuelva lentamente a la posición inicial          · Aducción de hombro con rizvi de ejercicio: Envuelva la rizvi de ejercicio alrededor de un objeto pesado y estable  Póngase de pie, de espaldas al [facility] donde está anclada la rizvi  Sujete cada extremo de la rizvi con ambas sukh con los codos flexionados  Los codos no deben estar detrás de del valle cuerpo  Mantenga los brazos paralelos al suelo y lentamente enderece los codos  Mantenga esta posición  Despacio regrese a la posición inicial        Llame a del valle médico o fisioterapeuta si:  · Tiene dolor randal o creciente cuando hace ejercicio o está en reposo  · Tiene preguntas o inquietudes acerca de los ejercicios para la lesión del manguito rotador  © Copyright [address] Information is for End User's use only and may not be sold, redistributed or otherwise used for commercial purposes  All illustrations and images included in CareNotes® are the copyrighted property of [employer] , [facility]  or [address] [place] [address] es sólo para uso en educación  Del Valle intención no es darle un consejo médico sobre enfermedades o tratamientos  Colsulte con del valle [place] Rusty farmacéutico antes de seguir cualquier régimen médico para saber si es seguro y efectivo para usted

## 2021-04-26 ENCOUNTER — OFFICE VISIT (OUTPATIENT)
Dept: FAMILY MEDICINE CLINIC | Facility: CLINIC | Age: 56
End: 2021-04-26
Payer: COMMERCIAL

## 2021-04-26 VITALS
BODY MASS INDEX: 29.07 KG/M2 | HEART RATE: 60 BPM | HEIGHT: 59 IN | DIASTOLIC BLOOD PRESSURE: 80 MMHG | TEMPERATURE: 95.6 F | OXYGEN SATURATION: 96 % | SYSTOLIC BLOOD PRESSURE: 144 MMHG | WEIGHT: 144.2 LBS

## 2021-04-26 DIAGNOSIS — M25.511 CHRONIC RIGHT SHOULDER PAIN: Primary | ICD-10-CM

## 2021-04-26 DIAGNOSIS — G89.29 CHRONIC RIGHT SHOULDER PAIN: Primary | ICD-10-CM

## 2021-04-26 PROCEDURE — T1015 CLINIC SERVICE: HCPCS | Performed by: FAMILY MEDICINE

## 2021-04-26 PROCEDURE — 99213 OFFICE O/P EST LOW 20 MIN: CPT | Performed by: FAMILY MEDICINE

## 2021-04-26 PROCEDURE — 3008F BODY MASS INDEX DOCD: CPT | Performed by: ANESTHESIOLOGY

## 2021-04-26 NOTE — PROGRESS NOTES
Assessment/Plan:     Diagnoses and all orders for this visit:    Chronic right shoulder pain  -     Diclofenac Sodium (VOLTAREN) 1 %; Apply 2 g topically 3 (three) times a day as needed (moderate pain)          Follow with Pain management as scheduled  RTC as needed       Subjective:        Patient ID: Tayler Christine is a 64 y o  female  Chief Complaint   Patient presents with    Follow-up     pt here for follow up for her shoulder pain - scheduled for pain management in may        63 yo female presents for follow up on R shoulder and L elbow pain  She is scheduled to see Pain management next month  The Naproxen was bothering her stomach so she stopped it  There has been no improvement in the pain        The following portions of the patient's history were reviewed and updated as appropriate: allergies, current medications, past family history, past medical history, past social history, past surgical history and problem list     Patient Active Problem List   Diagnosis    Abdominal pain, left lower quadrant    Chronic lumbar radiculopathy    Chronic pain syndrome    Dyslipidemia    Chronic pain of right knee    Chronic right hip pain    Myofascial pain syndrome    Atrophic vaginitis    Primary osteoarthritis of both knees    Lumbar radiculopathy    Left hip pain    Lumbar degenerative disc disease    Pharyngoesophageal dysphagia    Perforation of left tympanic membrane    Chronic otitis media of left ear    Atypical chest pain    Epigastric abdominal pain    Chronic musculoskeletal pain    Pain in finger of both hands    Tympanic membrane perforation, left    Strain of thoracic back region    Chronic right shoulder pain    Medial epicondylitis of elbow, left       Current Outpatient Medications   Medication Sig Dispense Refill    cyclobenzaprine (FLEXERIL) 10 mg tablet Take 1 tablet (10 mg total) by mouth 3 (three) times a day as needed for muscle spasms (Patient not taking: Reported on 4/14/2021) 30 tablet 0    Diclofenac Sodium (VOLTAREN) 1 % Apply 2 g topically 3 (three) times a day as needed (moderate pain) 100 g 1    meclizine (ANTIVERT) 12 5 MG tablet Take 1 tablet (12 5 mg total) by mouth every 12 (twelve) hours as needed for dizziness (Patient not taking: Reported on 4/14/2021) 30 tablet 0    naproxen (NAPROSYN) 500 mg tablet Take 1 tablet (500 mg total) by mouth 2 (two) times a day with meals for 10 doses 10 tablet 0    ondansetron (ZOFRAN) 4 mg tablet Take 1 tablet (4 mg total) by mouth every 8 (eight) hours as needed for nausea for up to 30 doses (Patient not taking: Reported on 4/26/2021) 30 tablet 0    oxyCODONE-acetaminophen (PERCOCET) 5-325 mg per tablet Take 1 tablet by mouth every 4 (four) hours as needed for moderate pain for up to 8 dosesMax Daily Amount: 6 tablets (Patient not taking: Reported on 4/26/2021) 8 tablet 0    topiramate (TOPAMAX) 25 mg tablet Take 1 tablet (25 mg total) by mouth 2 (two) times a day (Patient not taking: Reported on 4/14/2021) 60 tablet 1     No current facility-administered medications for this visit           Past Medical History:   Diagnosis Date    Fatigue     High cholesterol     Hypertension     Neuropathy     Osteoarthritis     knees    Shortness of breath         Past Surgical History:   Procedure Laterality Date    APPENDECTOMY      EPIDURAL BLOCK INJECTION Left 5/17/2018    Procedure: L4 AND L5 TRANSFORAMINAL EPIDURAL STEROID INJECTION;  Surgeon: Mamie Reed MD;  [facility];  Service: Pain Management     EPIDURAL BLOCK INJECTION Left 10/4/2018    Procedure: L4 AND L5 TRANSFORAMINAL EPIDURAL STEROID INJECTION;  Surgeon: Mamie Reed MD;  [facility];  Service: Pain Management     EPIDURAL BLOCK INJECTION Left 5/1/2019    Procedure: L4-5 and L5-S1 transforaminal epidural steroid injection;  Surgeon: Mamie Reed MD;  [facility];  Service: Pain Management     FL GUIDED NEEDLE PLAC BX/ASP/INJ  5/1/2019    WI COLONOSCOPY FLX DX W/COLLJ SPEC WHEN PFRMD N/A 5/31/2016    Procedure: COLONOSCOPY;  Surgeon: Galina Reid MD;  [facility];  Service: Gastroenterology    WI ESOPHAGOGASTRODUODENOSCOPY TRANSORAL DIAGNOSTIC N/A 11/15/2016    Procedure: ESOPHAGOGASTRODUODENOSCOPY (EGD);   Surgeon: Galina Reid MD;  [facility];  Service: Gastroenterology    WI TYMPANOPLASTY Left 10/25/2019    Procedure: LEFT TYMPANOPLASTY;  Surgeon: Frank Luna MD;  Location:  MAIN OR;  Service: ENT    TUBAL LIGATION          Social History     Socioeconomic History    Marital status: Single     Spouse name: Not on file    Number of children: Not on file    Years of education: Not on file    Highest education level: Not on file   Occupational History    Not on file   Social Needs    Financial resource strain: Not on file    Food insecurity     Worry: Not on file     Inability: Not on file    Transportation needs     Medical: Not on file     Non-medical: Not on file   Tobacco Use    Smoking status: Never Smoker    Smokeless tobacco: Never Used   Substance and Sexual Activity    Alcohol use: Not Currently    Drug use: No    Sexual activity: Yes     Birth control/protection: Female Sterilization   Lifestyle    Physical activity     Days per week: Not on file     Minutes per session: Not on file    Stress: Not on file   Relationships    Social connections     Talks on phone: Not on file     Gets together: Not on file     Attends Quaker service: Not on file     Active member of club or organization: Not on file     Attends meetings of clubs or organizations: Not on file     Relationship status: Not on file    Intimate partner violence     Fear of current or ex partner: Not on file     Emotionally abused: Not on file     Physically abused: Not on file     Forced sexual activity: Not on file   Other Topics Concern    Not on file   Social History Narrative    Not on file    Review of Systems   Constitutional: Negative  HENT: Negative  Eyes: Negative  Respiratory: Negative  Cardiovascular: Negative  Musculoskeletal:        Per HPI   Psychiatric/Behavioral: Negative  Objective:      /80   Pulse 60   Temp (!) 95 6 °F (35 3 °C) (Tympanic)   Ht 4' 11" (1 499 m)   Wt 65 4 kg (144 lb 3 2 oz)   SpO2 96%   BMI 29 12 kg/m²          Physical Exam  Vitals signs and nursing note reviewed  Constitutional:       Appearance: Normal appearance  HENT:      Head: Normocephalic  Right Ear: External ear normal       Left Ear: External ear normal    Eyes:      Extraocular Movements: Extraocular movements intact  Conjunctiva/sclera: Conjunctivae normal    Neck:      Musculoskeletal: Normal range of motion and neck supple  Cardiovascular:      Rate and Rhythm: Normal rate and regular rhythm  Pulmonary:      Effort: Pulmonary effort is normal       Breath sounds: Normal breath sounds  Musculoskeletal:      Right lower leg: No edema  Left lower leg: No edema  Comments: Moderate tenderness with palpation to anterior aspect and deltoid of R shoulder  Skin:     General: Skin is warm and dry  Neurological:      Mental Status: She is alert and oriented to person, place, and time  Psychiatric:         Mood and Affect: Mood normal          Behavior: Behavior normal        BMI Counseling: Body mass index is 29 12 kg/m²

## 2021-05-09 ENCOUNTER — HOSPITAL ENCOUNTER (OUTPATIENT)
Facility: HOSPITAL | Age: 56
Setting detail: OBSERVATION
Discharge: HOME | End: 2021-05-11
Attending: EMERGENCY MEDICINE | Admitting: FAMILY MEDICINE
Payer: COMMERCIAL

## 2021-05-09 DIAGNOSIS — R53.1 GENERALIZED WEAKNESS: Primary | ICD-10-CM

## 2021-05-09 DIAGNOSIS — R26.81 UNSTEADY GAIT: ICD-10-CM

## 2021-05-09 DIAGNOSIS — J01.00 ACUTE NON-RECURRENT MAXILLARY SINUSITIS: ICD-10-CM

## 2021-05-09 DIAGNOSIS — R55 NEAR SYNCOPE: ICD-10-CM

## 2021-05-09 DIAGNOSIS — R42 LIGHTHEADEDNESS: ICD-10-CM

## 2021-05-09 DIAGNOSIS — R00.1 BRADYCARDIA: ICD-10-CM

## 2021-05-09 PROCEDURE — 99284 EMERGENCY DEPT VISIT MOD MDM: CPT | Performed by: EMERGENCY MEDICINE

## 2021-05-09 PROCEDURE — 93005 ELECTROCARDIOGRAM TRACING: CPT

## 2021-05-09 PROCEDURE — 99285 EMERGENCY DEPT VISIT HI MDM: CPT

## 2021-05-10 ENCOUNTER — APPOINTMENT (OUTPATIENT)
Dept: NON INVASIVE DIAGNOSTICS | Facility: HOSPITAL | Age: 56
End: 2021-05-10
Payer: COMMERCIAL

## 2021-05-10 ENCOUNTER — APPOINTMENT (EMERGENCY)
Dept: CT IMAGING | Facility: HOSPITAL | Age: 56
End: 2021-05-10
Payer: COMMERCIAL

## 2021-05-10 ENCOUNTER — APPOINTMENT (EMERGENCY)
Dept: RADIOLOGY | Facility: HOSPITAL | Age: 56
End: 2021-05-10
Payer: COMMERCIAL

## 2021-05-10 PROBLEM — R55 NEAR SYNCOPE: Status: ACTIVE | Noted: 2021-05-10

## 2021-05-10 PROBLEM — G89.29 CHRONIC PAIN: Status: ACTIVE | Noted: 2021-05-10

## 2021-05-10 PROBLEM — R00.1 BRADYCARDIA: Status: ACTIVE | Noted: 2021-05-10

## 2021-05-10 PROBLEM — R42 EPISODE OF DIZZINESS: Status: ACTIVE | Noted: 2021-05-10

## 2021-05-10 LAB
ALBUMIN SERPL BCP-MCNC: 4 G/DL (ref 3.5–5)
ALBUMIN SERPL BCP-MCNC: 4.2 G/DL (ref 3.5–5)
ALP SERPL-CCNC: 89 U/L (ref 46–116)
ALP SERPL-CCNC: 94 U/L (ref 46–116)
ALT SERPL W P-5'-P-CCNC: 35 U/L (ref 12–78)
ALT SERPL W P-5'-P-CCNC: 36 U/L (ref 12–78)
ANION GAP SERPL CALCULATED.3IONS-SCNC: 11 MMOL/L (ref 4–13)
ANION GAP SERPL CALCULATED.3IONS-SCNC: 12 MMOL/L (ref 4–13)
AST SERPL W P-5'-P-CCNC: 23 U/L (ref 5–45)
AST SERPL W P-5'-P-CCNC: 25 U/L (ref 5–45)
ATRIAL RATE: 59 BPM
BASOPHILS # BLD AUTO: 0.04 THOUSANDS/ΜL (ref 0–0.1)
BASOPHILS NFR BLD AUTO: 1 % (ref 0–1)
BILIRUB SERPL-MCNC: 0.45 MG/DL (ref 0.2–1)
BILIRUB SERPL-MCNC: 0.5 MG/DL (ref 0.2–1)
BILIRUB UR QL STRIP: NEGATIVE
BUN SERPL-MCNC: 19 MG/DL (ref 5–25)
BUN SERPL-MCNC: 25 MG/DL (ref 5–25)
CALCIUM SERPL-MCNC: 9.2 MG/DL (ref 8.3–10.1)
CALCIUM SERPL-MCNC: 9.3 MG/DL (ref 8.3–10.1)
CHLORIDE SERPL-SCNC: 102 MMOL/L (ref 100–108)
CHLORIDE SERPL-SCNC: 105 MMOL/L (ref 100–108)
CHOLEST SERPL-MCNC: 218 MG/DL (ref 50–200)
CLARITY UR: CLEAR
CO2 SERPL-SCNC: 24 MMOL/L (ref 21–32)
CO2 SERPL-SCNC: 27 MMOL/L (ref 21–32)
COLOR UR: YELLOW
CREAT SERPL-MCNC: 0.81 MG/DL (ref 0.6–1.3)
CREAT SERPL-MCNC: 1.08 MG/DL (ref 0.6–1.3)
EOSINOPHIL # BLD AUTO: 0.18 THOUSAND/ΜL (ref 0–0.61)
EOSINOPHIL NFR BLD AUTO: 3 % (ref 0–6)
ERYTHROCYTE [DISTWIDTH] IN BLOOD BY AUTOMATED COUNT: 12.4 % (ref 11.6–15.1)
ERYTHROCYTE [DISTWIDTH] IN BLOOD BY AUTOMATED COUNT: 12.4 % (ref 11.6–15.1)
EST. AVERAGE GLUCOSE BLD GHB EST-MCNC: 120 MG/DL
GFR SERPL CREATININE-BSD FRML MDRD: 58 ML/MIN/1.73SQ M
GFR SERPL CREATININE-BSD FRML MDRD: 81 ML/MIN/1.73SQ M
GLUCOSE SERPL-MCNC: 121 MG/DL (ref 65–140)
GLUCOSE SERPL-MCNC: 132 MG/DL (ref 65–140)
GLUCOSE UR STRIP-MCNC: NEGATIVE MG/DL
HBA1C MFR BLD: 5.8 %
HCT VFR BLD AUTO: 37.4 % (ref 34.8–46.1)
HCT VFR BLD AUTO: 37.7 % (ref 34.8–46.1)
HDLC SERPL-MCNC: 53 MG/DL
HGB BLD-MCNC: 12.3 G/DL (ref 11.5–15.4)
HGB BLD-MCNC: 13 G/DL (ref 11.5–15.4)
HGB UR QL STRIP.AUTO: NEGATIVE
IMM GRANULOCYTES # BLD AUTO: 0.01 THOUSAND/UL (ref 0–0.2)
IMM GRANULOCYTES NFR BLD AUTO: 0 % (ref 0–2)
KETONES UR STRIP-MCNC: NEGATIVE MG/DL
LDLC SERPL CALC-MCNC: 130 MG/DL (ref 0–100)
LEUKOCYTE ESTERASE UR QL STRIP: NEGATIVE
LYMPHOCYTES # BLD AUTO: 2.97 THOUSANDS/ΜL (ref 0.6–4.47)
LYMPHOCYTES NFR BLD AUTO: 49 % (ref 14–44)
MAGNESIUM SERPL-MCNC: 2.5 MG/DL (ref 1.6–2.6)
MCH RBC QN AUTO: 29.7 PG (ref 26.8–34.3)
MCH RBC QN AUTO: 30.8 PG (ref 26.8–34.3)
MCHC RBC AUTO-ENTMCNC: 32.9 G/DL (ref 31.4–37.4)
MCHC RBC AUTO-ENTMCNC: 34.5 G/DL (ref 31.4–37.4)
MCV RBC AUTO: 89 FL (ref 82–98)
MCV RBC AUTO: 90 FL (ref 82–98)
MONOCYTES # BLD AUTO: 0.42 THOUSAND/ΜL (ref 0.17–1.22)
MONOCYTES NFR BLD AUTO: 7 % (ref 4–12)
NEUTROPHILS # BLD AUTO: 2.45 THOUSANDS/ΜL (ref 1.85–7.62)
NEUTS SEG NFR BLD AUTO: 40 % (ref 43–75)
NITRITE UR QL STRIP: NEGATIVE
NRBC BLD AUTO-RTO: 0 /100 WBCS
P AXIS: 45 DEGREES
PH UR STRIP.AUTO: 6.5 [PH]
PHOSPHATE SERPL-MCNC: 4.5 MG/DL (ref 2.7–4.5)
PLATELET # BLD AUTO: 241 THOUSANDS/UL (ref 149–390)
PLATELET # BLD AUTO: 249 THOUSANDS/UL (ref 149–390)
PMV BLD AUTO: 10 FL (ref 8.9–12.7)
PMV BLD AUTO: 10.1 FL (ref 8.9–12.7)
POTASSIUM SERPL-SCNC: 3.6 MMOL/L (ref 3.5–5.3)
POTASSIUM SERPL-SCNC: 4.3 MMOL/L (ref 3.5–5.3)
PR INTERVAL: 178 MS
PROT SERPL-MCNC: 7.4 G/DL (ref 6.4–8.2)
PROT SERPL-MCNC: 7.5 G/DL (ref 6.4–8.2)
PROT UR STRIP-MCNC: NEGATIVE MG/DL
QRS AXIS: -13 DEGREES
QRSD INTERVAL: 120 MS
QT INTERVAL: 500 MS
QTC INTERVAL: 495 MS
RBC # BLD AUTO: 4.14 MILLION/UL (ref 3.81–5.12)
RBC # BLD AUTO: 4.22 MILLION/UL (ref 3.81–5.12)
SARS-COV-2 RNA RESP QL NAA+PROBE: NEGATIVE
SODIUM SERPL-SCNC: 140 MMOL/L (ref 136–145)
SODIUM SERPL-SCNC: 141 MMOL/L (ref 136–145)
SP GR UR STRIP.AUTO: 1.01 (ref 1–1.03)
T WAVE AXIS: 31 DEGREES
TRIGL SERPL-MCNC: 173 MG/DL
TROPONIN I SERPL-MCNC: <0.02 NG/ML
UROBILINOGEN UR QL STRIP.AUTO: 0.2 E.U./DL
VENTRICULAR RATE: 59 BPM
WBC # BLD AUTO: 6.07 THOUSAND/UL (ref 4.31–10.16)
WBC # BLD AUTO: 6.27 THOUSAND/UL (ref 4.31–10.16)

## 2021-05-10 PROCEDURE — 80053 COMPREHEN METABOLIC PANEL: CPT | Performed by: EMERGENCY MEDICINE

## 2021-05-10 PROCEDURE — 70450 CT HEAD/BRAIN W/O DYE: CPT

## 2021-05-10 PROCEDURE — U0003 INFECTIOUS AGENT DETECTION BY NUCLEIC ACID (DNA OR RNA); SEVERE ACUTE RESPIRATORY SYNDROME CORONAVIRUS 2 (SARS-COV-2) (CORONAVIRUS DISEASE [COVID-19]), AMPLIFIED PROBE TECHNIQUE, MAKING USE OF HIGH THROUGHPUT TECHNOLOGIES AS DESCRIBED BY CMS-2020-01-R: HCPCS | Performed by: EMERGENCY MEDICINE

## 2021-05-10 PROCEDURE — 80053 COMPREHEN METABOLIC PANEL: CPT | Performed by: PHYSICIAN ASSISTANT

## 2021-05-10 PROCEDURE — 97163 PT EVAL HIGH COMPLEX 45 MIN: CPT

## 2021-05-10 PROCEDURE — 83735 ASSAY OF MAGNESIUM: CPT | Performed by: PHYSICIAN ASSISTANT

## 2021-05-10 PROCEDURE — 85025 COMPLETE CBC W/AUTO DIFF WBC: CPT | Performed by: EMERGENCY MEDICINE

## 2021-05-10 PROCEDURE — U0005 INFEC AGEN DETEC AMPLI PROBE: HCPCS | Performed by: EMERGENCY MEDICINE

## 2021-05-10 PROCEDURE — 84484 ASSAY OF TROPONIN QUANT: CPT | Performed by: EMERGENCY MEDICINE

## 2021-05-10 PROCEDURE — 93306 TTE W/DOPPLER COMPLETE: CPT

## 2021-05-10 PROCEDURE — 97166 OT EVAL MOD COMPLEX 45 MIN: CPT

## 2021-05-10 PROCEDURE — 99220 PR INITIAL OBSERVATION CARE/DAY 70 MINUTES: CPT | Performed by: INTERNAL MEDICINE

## 2021-05-10 PROCEDURE — 93306 TTE W/DOPPLER COMPLETE: CPT | Performed by: INTERNAL MEDICINE

## 2021-05-10 PROCEDURE — 93010 ELECTROCARDIOGRAM REPORT: CPT | Performed by: INTERNAL MEDICINE

## 2021-05-10 PROCEDURE — 80061 LIPID PANEL: CPT | Performed by: PHYSICIAN ASSISTANT

## 2021-05-10 PROCEDURE — 85027 COMPLETE CBC AUTOMATED: CPT | Performed by: PHYSICIAN ASSISTANT

## 2021-05-10 PROCEDURE — 36415 COLL VENOUS BLD VENIPUNCTURE: CPT | Performed by: EMERGENCY MEDICINE

## 2021-05-10 PROCEDURE — 84100 ASSAY OF PHOSPHORUS: CPT | Performed by: PHYSICIAN ASSISTANT

## 2021-05-10 PROCEDURE — 71045 X-RAY EXAM CHEST 1 VIEW: CPT

## 2021-05-10 PROCEDURE — 81003 URINALYSIS AUTO W/O SCOPE: CPT | Performed by: EMERGENCY MEDICINE

## 2021-05-10 PROCEDURE — 83036 HEMOGLOBIN GLYCOSYLATED A1C: CPT | Performed by: PHYSICIAN ASSISTANT

## 2021-05-10 RX ORDER — SODIUM CHLORIDE 9 MG/ML
75 INJECTION, SOLUTION INTRAVENOUS CONTINUOUS
Status: DISCONTINUED | OUTPATIENT
Start: 2021-05-10 | End: 2021-05-11 | Stop reason: HOSPADM

## 2021-05-10 RX ORDER — ONDANSETRON 2 MG/ML
4 INJECTION INTRAMUSCULAR; INTRAVENOUS EVERY 6 HOURS PRN
Status: DISCONTINUED | OUTPATIENT
Start: 2021-05-10 | End: 2021-05-11 | Stop reason: HOSPADM

## 2021-05-10 RX ORDER — DOXYCYCLINE HYCLATE 100 MG/1
100 CAPSULE ORAL EVERY 12 HOURS SCHEDULED
Status: DISCONTINUED | OUTPATIENT
Start: 2021-05-10 | End: 2021-05-11 | Stop reason: HOSPADM

## 2021-05-10 RX ORDER — MECLIZINE HCL 12.5 MG/1
12.5 TABLET ORAL EVERY 12 HOURS PRN
Status: DISCONTINUED | OUTPATIENT
Start: 2021-05-10 | End: 2021-05-11 | Stop reason: HOSPADM

## 2021-05-10 RX ORDER — ACETAMINOPHEN 325 MG/1
650 TABLET ORAL EVERY 6 HOURS PRN
Status: DISCONTINUED | OUTPATIENT
Start: 2021-05-10 | End: 2021-05-11 | Stop reason: HOSPADM

## 2021-05-10 RX ADMIN — DOXYCYCLINE HYCLATE 100 MG: 100 CAPSULE ORAL at 20:11

## 2021-05-10 RX ADMIN — DOXYCYCLINE HYCLATE 100 MG: 100 CAPSULE ORAL at 09:57

## 2021-05-10 RX ADMIN — SODIUM CHLORIDE 75 ML/HR: 0.9 INJECTION, SOLUTION INTRAVENOUS at 06:04

## 2021-05-10 RX ADMIN — SODIUM CHLORIDE 75 ML/HR: 0.9 INJECTION, SOLUTION INTRAVENOUS at 18:19

## 2021-05-10 NOTE — CASE MANAGEMENT
Cm met with the patient to evaluate the patients prior function and living situation and any barriers to d/c and form a safe d/c plan  Cm also evaluated the patient for any services in the home or needs for services  Pt resides at home with her son and DIL and is independent with her adls/ambulation  No services or DME  Hx of OP PT in [place]  PCP is Alice Pal and pharmacy is [facility]  Plans at this time are home on dc with OP follow up  Family to transport pt home on dc

## 2021-05-10 NOTE — ED NOTES
Per information and interpretation from family, patient got up to go to the bathroom and then "got sick"  Reports feeling dizzy/lightheaded  Initially denied falling, then reported to son that she did fall in the bathroom but "not to the ground"  She did not hit her head or pass out, but stated she felt like she was going to        Maryty Sicard, ISSAC  05/10/21 [id number]

## 2021-05-10 NOTE — OCCUPATIONAL THERAPY NOTE
Occupational Therapy Evaluation     Patient Name: Mary Cox  Today's Date: 5/10/2021  Problem List  Principal Problem:    Near syncope  Active Problems:    Chronic pain syndrome    Dyslipidemia    Bradycardia    Episode of dizziness    Past Medical History  Past Medical History:   Diagnosis Date    Fatigue     High cholesterol     Hypertension     Neuropathy     Osteoarthritis     knees    Shortness of breath      Past Surgical History  Past Surgical History:   Procedure Laterality Date    APPENDECTOMY      EPIDURAL BLOCK INJECTION Left 5/17/2018    Procedure: L4 AND L5 TRANSFORAMINAL EPIDURAL STEROID INJECTION;  Surgeon: Natalia Coleman MD;  [facility];  Service: Pain Management     EPIDURAL BLOCK INJECTION Left 10/4/2018    Procedure: L4 AND L5 TRANSFORAMINAL EPIDURAL STEROID INJECTION;  Surgeon: Natalia Coleman MD;  [facility];  Service: Pain Management     EPIDURAL BLOCK INJECTION Left 5/1/2019    Procedure: L4-5 and L5-S1 transforaminal epidural steroid injection;  Surgeon: Natalia Coleman MD;  [facility];  Service: Pain Management     FL GUIDED NEEDLE PLAC BX/ASP/INJ  5/1/2019    OR COLONOSCOPY FLX DX W/COLLJ SPEC WHEN PFRMD N/A 5/31/2016    Procedure: COLONOSCOPY;  Surgeon: Angela Carroll MD;  [facility]: [facility];  Service: Gastroenterology    OR ESOPHAGOGASTRODUODENOSCOPY TRANSORAL DIAGNOSTIC N/A 11/15/2016    Procedure: ESOPHAGOGASTRODUODENOSCOPY (EGD); Surgeon: Angela Carroll MD;  [facility];  Service: Gastroenterology    OR TYMPANOPLASTY Left 10/25/2019    Procedure: LEFT TYMPANOPLASTY;  Surgeon: Edmond Rosenthal MD;  Location:  MAIN OR;  Service: ENT    TUBAL LIGATION               05/10/21 0941   OT Last Visit   OT Visit Date 05/10/21   Note Type   Note type Evaluation   Restrictions/Precautions   Weight Bearing Precautions Per Order No   Other Precautions Fall Risk;Multiple lines; Chair Alarm   Pain Assessment   Pain Assessment Tool 0-10   Pain Score No Pain   Home Living   Type of [address] [facility] Two level   Bathroom Accessibility Accessible   Home Equipment Other (Comment)  (no DME)   Additional Comments FOS to 2nd floor    Prior Function   Level of [place] Independent with ADLs and functional mobility   Lives With Family   Receives Help From Family   ADL Assistance Independent   IADLs Independent   Falls in the last 6 months 1 to 4   Comments pt reports not using a device at baseline; pt reports she does not drive    Psychosocial   Psychosocial (WDL) WDL   Subjective   Subjective "lowell"   ADL   Where Assessed Other (Comment)  (bathroom)   LB Dressing Assistance 5  Supervision/Setup   LB Dressing Deficit Increased time to complete   Toileting Assistance  5  Supervision/Setup   Toileting Deficit Increased time to complete;Verbal cueing   Additional Comments pt performed toileting with increased time to compete    Bed Mobility   Additional Comments pt seated in chair at end of session with all needs within reach; O2 WNL during session   Transfers   Sit to Stand 5  Supervision   Additional items Bedrails; Increased time required   Stand to Sit 5  Supervision   Additional items Armrests   Additional Comments no device; utilizes IV pole for support    Functional Mobility   Functional Mobility 5  Supervision   Additional Comments pt performed funciotnal mobility ~60ft with min instability/sway; no LOB; pt reports feeling "a little" dizzy with funcitonal mobility   Additional items   (no device; initially utilizes IV pole for support)   Balance   Static Sitting Good   Dynamic Sitting Good   Static Standing Fair +   Dynamic Standing Fair   Ambulatory Fair   Activity Tolerance   Activity Tolerance Patient limited by fatigue   RUE Assessment   RUE Assessment WFL   LUE Assessment   LUE Assessment WFL   Hand Function   Gross Motor Coordination Functional   Fine Motor Coordination Functional   Sensation   Light Touch No apparent deficits Sharp/Dull No apparent deficits   Cognition   Overall Cognitive Status WFL   Arousal/Participation Alert; Responsive   Attention Within functional limits   Orientation Level Oriented X4   Memory Within functional limits   Following Commands Follows all commands and directions without difficulty   Assessment   Limitation Decreased ADL status; Decreased UE strength;Decreased endurance;Decreased self-care trans;Decreased high-level ADLs   Assessment Pt is a 64 y o  female seen for OT evaluation s/p admit to [facility] on 5/9/2021 w/ Near syncope  Comorbidities affecting pt's functional performance at time of assessment include: Osteoarthritis, High cholesterol, HTN  Personal factors affecting pt at time of IE include:difficulty performing ADLS, difficulty performing IADLS  and health management   Prior to admission, pt was (I) with ADLs and IADLs  Upon evaluation: Pt requires (S) with ADLs and functional mobility 2* the following deficits impacting occupational performance: weakness, decreased strength, decreased balance, decreased tolerance, impaired problem solving and decreased safety awareness  Pt to benefit from continued skilled OT tx while in the hospital to address deficits as defined above and maximize level of functional independence w ADL's and functional mobility  Occupational Performance areas to address include: grooming, bathing/shower, toilet hygiene, dressing, health maintenance, functional mobility, community mobility and clothing management  The patient's raw score on the AM-PAC Daily Activity inpatient short form is 21, standardized score is 44 27, greater than 39 4  Patients at this level are likely to benefit from discharge to home  Please refer to the recommendation of the Occupational Therapist for safe discharge planning     Goals   Patient Goals to go home   Short Term Goal  pt will perform UB strengthening exercises   Long Term Goal #1 pt will demonstrate UB/LB bathing and gooming at (I) level Long Term Goal #2 pt will dmeonstrate funcitonal mobility with SPC at modified (I) level    Long Term Goal pt will demonstrate toileting transfer and hygine at (I) level   Plan   Treatment Interventions ADL retraining;Functional transfer training;UE strengthening/ROM; Patient/family training; Activityengagement; Endurance training;Equipment evaluation/education   Goal Expiration Date 05/24/21   OT Frequency 3-5x/wk   Recommendation   OT Discharge Recommendation Home with outpatient rehabilitation   AM-PAC Daily Activity Inpatient   Lower Body Dressing 3   Bathing 3   Toileting 3   Upper Body Dressing 4   Grooming 4   Eating 4   Daily Activity Raw Score 21   Daily Activity Standardized Score (Calc for Raw Score >=11) 44 27   AM-PAC Applied Cognition Inpatient   Following a Speech/Presentation 4   Understanding Ordinary Conversation 4   Taking Medications 4   Remembering Where Things Are Placed or Put Away 4   Remembering List of 4-5 Errands 4   Taking Care of Complicated Tasks 4   Applied Cognition Raw Score 24   Applied Cognition Standardized Score 62 21     Pt will benefit from continued OT services in order to maximize (I) c ADL performance, FM c SPC, and improve overall endurance/strength required to complete functional tasks in preparation for d/c  Pt left seated in chair at end of session; all needs within reach; all lines intact

## 2021-05-10 NOTE — PLAN OF CARE
Problem: Potential for Falls  Goal: Patient will remain free of falls  Description: INTERVENTIONS:  - Assess patient frequently for physical needs  -  Identify cognitive and physical deficits and behaviors that affect risk of falls    -  [place] fall precautions as indicated by assessment   - Educate patient/family on patient safety including physical limitations  - Instruct patient to call for assistance with activity based on assessment  - Modify environment to reduce risk of injury  - Consider OT/PT consult to assist with strengthening/mobility  Outcome: Progressing     Problem: PAIN - ADULT  Goal: Verbalizes/displays adequate comfort level or baseline comfort level  Description: Interventions:  - Encourage patient to monitor pain and request assistance  - Assess pain using appropriate pain scale  - Administer analgesics based on type and severity of pain and evaluate response  - Implement non-pharmacological measures as appropriate and evaluate response  - Consider cultural and social influences on pain and pain management  - Notify physician/advanced practitioner if interventions unsuccessful or patient reports new pain  Outcome: Progressing     Problem: INFECTION - ADULT  Goal: Absence or prevention of progression during hospitalization  Description: INTERVENTIONS:  - Assess and monitor for signs and symptoms of infection  - Monitor lab/diagnostic results  - Monitor all insertion sites, i e  indwelling lines, tubes, and drains  - Monitor endotracheal if appropriate and nasal secretions for changes in amount and color  - [place] appropriate cooling/warming therapies per order  - Administer medications as ordered  - Instruct and encourage patient and family to use good hand hygiene technique  - Identify and instruct in appropriate isolation precautions for identified infection/condition  Outcome: Progressing     Problem: SAFETY ADULT  Goal: Patient will remain free of falls  Description: INTERVENTIONS:  - Assess patient frequently for physical needs  -  Identify cognitive and physical deficits and behaviors that affect risk of falls    -  [place] fall precautions as indicated by assessment   - Educate patient/family on patient safety including physical limitations  - Instruct patient to call for assistance with activity based on assessment  - Modify environment to reduce risk of injury  - Consider OT/PT consult to assist with strengthening/mobility  Outcome: Progressing  Goal: Maintain or return to baseline ADL function  Description: INTERVENTIONS:  -  Assess patient's ability to carry out ADLs; assess patient's baseline for ADL function and identify physical deficits which impact ability to perform ADLs (bathing, care of mouth/teeth, toileting, grooming, dressing, etc )  - Assess/evaluate cause of self-care deficits   - Assess range of motion  - Assess patient's mobility; develop plan if impaired  - Assess patient's need for assistive devices and provide as appropriate  - Encourage maximum independence but intervene and supervise when necessary  - Involve family in performance of ADLs  - Assess for home care needs following discharge   - Consider OT consult to assist with ADL evaluation and planning for discharge  - Provide patient education as appropriate  Outcome: Progressing  Goal: Maintain or return mobility status to optimal level  Description: INTERVENTIONS:  - Assess patient's baseline mobility status (ambulation, transfers, stairs, etc )    - Identify cognitive and physical deficits and behaviors that affect mobility  - Identify mobility aids required to assist with transfers and/or ambulation (gait belt, sit-to-stand, lift, walker, cane, etc )  - [place] fall precautions as indicated by assessment  - Record patient progress and toleration of activity level on Mobility SBAR; progress patient to next Phase/Stage  - Instruct patient to call for assistance with activity based on assessment  - Consider rehabilitation consult to assist with strengthening/weightbearing, etc   Outcome: Progressing     Problem: DISCHARGE PLANNING  Goal: Discharge to home or other facility with appropriate resources  Description: INTERVENTIONS:  - Identify barriers to discharge w/patient and caregiver  - Arrange for needed discharge resources and transportation as appropriate  - Identify discharge learning needs (meds, wound care, etc )  - Arrange for interpretive services to assist at discharge as needed  - Refer to Case Management Department for coordinating discharge planning if the patient needs post-hospital services based on physician/advanced practitioner order or complex needs related to functional status, cognitive ability, or social support system  Outcome: Progressing     Problem: Knowledge Deficit  Goal: Patient/family/caregiver demonstrates understanding of disease process, treatment plan, medications, and discharge instructions  Description: Complete learning assessment and assess knowledge base    Interventions:  - Provide teaching at level of understanding  - Provide teaching via preferred learning methods  Outcome: Progressing     Problem: NEUROSENSORY - ADULT  Goal: Achieves stable or improved neurological status  Description: INTERVENTIONS  - Monitor and report changes in neurological status  - Monitor vital signs such as temperature, blood pressure, glucose, and any other labs ordered   - Initiate measures to prevent increased intracranial pressure  - Monitor for seizure activity and implement precautions if appropriate      Outcome: Progressing     Problem: MUSCULOSKELETAL - ADULT  Goal: Maintain or return mobility to safest level of function  Description: INTERVENTIONS:  - Assess patient's ability to carry out ADLs; assess patient's baseline for ADL function and identify physical deficits which impact ability to perform ADLs (bathing, care of mouth/teeth, toileting, grooming, dressing, etc )  - Assess/evaluate cause of self-care deficits   - Assess range of motion  - Assess patient's mobility  - Assess patient's need for assistive devices and provide as appropriate  - Encourage maximum independence but intervene and supervise when necessary  - Involve family in performance of ADLs  - Assess for home care needs following discharge   - Consider OT consult to assist with ADL evaluation and planning for discharge  - Provide patient education as appropriate  Outcome: Progressing

## 2021-05-10 NOTE — H&P
Rhett Young 1965, 64 y o  female MRN: [id number]  Unit/Bed#: 405-01 Encounter: [id number]  Primary Care Provider: Sue Holman PA-C   Date and time admitted to hospital: 5/9/2021 11:50 PM    * Near syncope  Assessment & Plan  Possible near syncope episode  Presented to the emergency room via EMS with complaints of dizziness and lightheadedness, dark vision, and feeling as though she is going to pass out  She also reports episode of chest tightness which resolved prior to arrival to the ER  She reports symptoms started after using the bathroom  She was bradycardic upon arrival to the ER  EKG shows a sinus bradycardia at a rate of 59 beats per minute  Right bundle-branch block  Present with unsteady gait upon attempts to ambulate in the ER  CT head shows-No acute intracranial abnormality    There are some frothy secretions in the left maxillary sinus  There is partial opacification of the mastoid air cells bilaterally without evidence of acute calvarial fracture  Admit on observation  Telemetry monitoring  Check CT head  Gentle IV fluids  Orthostatic vital signs  Neuro checks  Monitor for new onset chest pain  Monitor vital signs closely  Fall precautions  OT, PT Eval  Am labs  Supportive care         Episode of dizziness  Assessment & Plan  Presented to the emergency room with complaints of dizziness lightheadedness  Associated with dark vision  She denies headaches,nausea vomiting  She appears to have been on meclizine in the past for dizziness    However she reports that she is not currently taking any medications  IV fluids  Meclizine 12 5 mg q 12 hours  Monitor for new onset headache  Monitor vital signs closely  Am labs  OT, PT eval   Supportive care    Bradycardia  Assessment & Plan  Presented to the emergency room with complaints of dizziness and lightheadedness  Symptoms started while using the batroom  EKG upon arrival to the ER shows-sinus bradycardia at a rate of 59 beats per minute  Right bundle-branch block  Review of her chart reveals EKG in May of 2020 showing a sinus bradycardia at a rate of 59 beats per minute  She reports episode of chest tightness after onset of dizziness and lightheadedness with resolve prior to arrival to the ER  Telemetry monitoring  Monitor for new onset of chest pain  Monitor blood pressure closely  Consider repeat EKG  Consider inpatient cardiology consult vs outpatient follow up if worsening symptomatic bradycardia    Dyslipidemia  Assessment & Plan  Not currently on medication regimen  Will check lipid panel    Chronic pain syndrome  Assessment & Plan  History of chronic pain syndrome  She reports that she is not currently taking any oral pain medications  She does report taking "pain injection" every 6 months  Continue PCP, outpatient pain management follow-up    VTE Prophylaxis: Low risk after VTE screen  / reason for no mechanical VTE prophylaxis Low risk after VTE screen   Code Status: level 1- Full code  POLST: POLST form is not discussed and not completed at this time  Discussion with family: one    Anticipated Length of Stay:  Patient will be admitted on an Observation basis with an anticipated length of stay of  < 2 midnights  Justification for Hospital Stay: Near Syncope, bradycardia, dizziness,    Total Time for Visit, including Counseling / Coordination of Care: 30 minutes  Greater than 50% of this total time spent on direct patient counseling and coordination of care  Chief Complaint:   Lightheadedness,Dizziness    History of Present Illness:    Prateek Collins is a 64 y o  female who presents emergency room via EMS for evaluation of possible near syncope episode at home  Patient has primarily [employer] interpreter number [id number] assisted with translation    Patient reported that she was at home when to use the bathroom started feeling dizzy and lightheaded and felt as though she is going to pass out  She also reported that she had a blurry/dark vision and chest tightness at that time  She denies shortness of breath, headaches, fevers, chills, cough, nausea, vomiting, diarrhea, abdominal pain  She also denies urinary symptoms falls or recent trauma  Labs completed in emergency room with results as shown below  Chest x-ray completed official report pending CT head completed with results shown below  While in the emergency room during attempts  to ambulate she reported feeling very dizzy and lightheaded and was noted to have unsteady gait  She was  unable to ambulate without assistance  Additionally patient was noted to be bradycardic while in the ER  At bedside she is awake and alert  She reports that she still feels dizzy  The patient is being admitted on observation status [facility] level care for further workup and management of possible near syncope episode, dizziness, bradycardia  Review of Systems:    Review of Systems   Constitutional: Negative for appetite change, diaphoresis, fatigue and fever  HENT: Negative for congestion, ear pain, rhinorrhea, sinus pressure, tinnitus and trouble swallowing  Eyes: Positive for visual disturbance  Respiratory: Positive for chest tightness  Negative for shortness of breath and wheezing  Cardiovascular: Negative for chest pain, palpitations and leg swelling  Gastrointestinal: Negative for abdominal pain, diarrhea, nausea and vomiting  Endocrine: Negative for polydipsia, polyphagia and polyuria  Genitourinary: Negative for difficulty urinating, dysuria, enuresis, flank pain and frequency  Musculoskeletal: Positive for gait problem  Negative for arthralgias and back pain  Skin: Negative for color change, pallor, rash and wound  Neurological: Positive for dizziness and light-headedness  Negative for syncope, weakness, numbness and headaches  Psychiatric/Behavioral: Negative for agitation, confusion and sleep disturbance   The patient is not nervous/anxious  Past Medical and Surgical History:     Past Medical History:   Diagnosis Date    Fatigue     High cholesterol     Hypertension     Neuropathy     Osteoarthritis     knees    Shortness of breath        Past Surgical History:   Procedure Laterality Date    APPENDECTOMY      EPIDURAL BLOCK INJECTION Left 5/17/2018    Procedure: L4 AND L5 TRANSFORAMINAL EPIDURAL STEROID INJECTION;  Surgeon: Spencer Bland MD;  [facility];  Service: Pain Management     EPIDURAL BLOCK INJECTION Left 10/4/2018    Procedure: L4 AND L5 TRANSFORAMINAL EPIDURAL STEROID INJECTION;  Surgeon: Spencer Bland MD;  [facility];  Service: Pain Management     EPIDURAL BLOCK INJECTION Left 5/1/2019    Procedure: L4-5 and L5-S1 transforaminal epidural steroid injection;  Surgeon: Spencer Bland MD;  [facility];  Service: Pain Management     FL GUIDED NEEDLE PLAC BX/ASP/INJ  5/1/2019    IA COLONOSCOPY FLX DX W/COLLJ SPEC WHEN PFRMD N/A 5/31/2016    Procedure: COLONOSCOPY;  Surgeon: Dilshad Ibrahim MD;  [facility];  Service: Gastroenterology    IA ESOPHAGOGASTRODUODENOSCOPY TRANSORAL DIAGNOSTIC N/A 11/15/2016    Procedure: ESOPHAGOGASTRODUODENOSCOPY (EGD); Surgeon: Dilshad Ibrahim MD;  [facility];  Service: Gastroenterology    IA TYMPANOPLASTY Left 10/25/2019    Procedure: LEFT TYMPANOPLASTY;  Surgeon: Ashly Wilson MD;  [facility];  Service: ENT    TUBAL LIGATION         Meds/Allergies:    Prior to Admission medications    Medication Sig Start Date End Date Taking?  Authorizing Provider   cyclobenzaprine (FLEXERIL) 10 mg tablet Take 1 tablet (10 mg total) by mouth 3 (three) times a day as needed for muscle spasms  Patient not taking: Reported on 4/14/2021 3/22/21 4/21/21  Joya Leaf, DO   Diclofenac Sodium (VOLTAREN) 1 % Apply 2 g topically 3 (three) times a day as needed (moderate pain)  Patient not taking: Reported on 5/9/2021 4/26/21 Esequiel Hampton PA-C   meclizine (ANTIVERT) 12 5 MG tablet Take 1 tablet (12 5 mg total) by mouth every 12 (twelve) hours as needed for dizziness  Patient not taking: Reported on 4/14/2021 10/16/20   Esequiel Hampton PA-C   naproxen (NAPROSYN) 500 mg tablet Take 1 tablet (500 mg total) by mouth 2 (two) times a day with meals for 10 doses 3/22/21 4/14/21  [facility] Hospital[facility]arshil Pert, DO   ondansetron [facility]) 4 mg tablet Take 1 tablet (4 mg total) by mouth every 8 (eight) hours as needed for nausea for up to 30 doses  Patient not taking: Reported on 4/26/2021 3/22/21   Min Lazo, DO   oxyCODONE-acetaminophen (PERCOCET) 5-325 mg per tablet Take 1 tablet by mouth every 4 (four) hours as needed for moderate pain for up to 8 dosesMax Daily Amount: 6 tablets  Patient not taking: Reported on 4/26/2021 3/22/21   Mehnazharshil Lazo, DO   topiramate (TOPAMAX) 25 mg tablet Take 1 tablet (25 mg total) by mouth 2 (two) times a day  Patient not taking: Reported on 4/14/2021 10/16/20   Esequiel Hampton PA-C     I have reviewed home medications with patient personally      Allergies: No Known Allergies    Social History:     Marital Status: Single   Occupation:  Currently unemployed  Patient Pre-hospital Living Situation: Live with her Son and her daughter-in-law  Patient Pre-hospital Level of Mobility: Active  Patient Pre-hospital Diet Restrictions: none reported  Substance Use History:   Social History     Substance and Sexual Activity   Alcohol Use Not Currently     Social History     Tobacco Use   Smoking Status Never Smoker   Smokeless Tobacco Never Used     Social History     Substance and Sexual Activity   Drug Use No       Family History:    Family History   Problem Relation Age of Onset    Uterine cancer Mother     Early death Father     Heart disease Brother     No Known Problems Sister     No Known Problems Daughter     No Known Problems Sister     No Known Problems Daughter     No Known Problems Maternal Aunt     No Known Problems Maternal Aunt     No Known Problems Maternal Aunt     No Known Problems Maternal Aunt     No Known Problems Maternal Grandmother     No Known Problems Maternal Grandfather     No Known Problems Paternal Grandmother     No Known Problems Paternal Grandfather        Physical Exam:     Vitals:   Blood Pressure: 135/77 (05/10/21 0320)  Pulse: (!) 49 (05/10/21 0320)  Temperature: 98 °F (36 7 °C) (05/10/21 0320)  Temp Source: Oral (05/10/21 0320)  Respirations: 16 (05/10/21 0320)  Height: 4' 11" (149 9 cm) (05/10/21 0320)  Weight - Scale: 66 9 kg (147 lb 7 8 oz) (05/10/21 0320)  SpO2: 96 % (05/10/21 0320)    Physical Exam  Vitals signs reviewed  Constitutional:       General: She is not in acute distress  Appearance: She is not ill-appearing or diaphoretic  HENT:      Right Ear: There is no impacted cerumen  Left Ear: There is no impacted cerumen  Nose: Nose normal  No congestion or rhinorrhea  Mouth/Throat:      Mouth: Mucous membranes are moist       Pharynx: Oropharynx is clear  No oropharyngeal exudate or posterior oropharyngeal erythema  Eyes:      General: No scleral icterus  Right eye: No discharge  Left eye: No discharge  Pupils: Pupils are equal, round, and reactive to light  Neck:      Musculoskeletal: Normal range of motion and neck supple  No neck rigidity or muscular tenderness  Cardiovascular:      Rate and Rhythm: Bradycardia present  Pulmonary:      Effort: No respiratory distress  Breath sounds: No wheezing, rhonchi or rales  Chest:      Chest wall: No tenderness  Abdominal:      General: There is no distension  Palpations: Abdomen is soft  Tenderness: There is no abdominal tenderness  Musculoskeletal: Normal range of motion  Right lower leg: No edema  Left lower leg: No edema  Skin:     Capillary Refill: Capillary refill takes less than 2 seconds  Coloration: Skin is not jaundiced or pale  Findings: No bruising, erythema or rash  Neurological:      Mental Status: She is alert and oriented to person, place, and time  Psychiatric:         Mood and Affect: Mood normal              Additional Data:     Lab Results: I have personally reviewed pertinent reports  Results from last 7 days   Lab Units 05/10/21  0012   WBC Thousand/uL 6 07   HEMOGLOBIN g/dL 13 0   HEMATOCRIT % 37 7   PLATELETS Thousands/uL 241   NEUTROS PCT % 40*   LYMPHS PCT % 49*   MONOS PCT % 7   EOS PCT % 3     Results from last 7 days   Lab Units 05/10/21  0012   SODIUM mmol/L 141   POTASSIUM mmol/L 3 6   CHLORIDE mmol/L 102   CO2 mmol/L 27   BUN mg/dL 25   CREATININE mg/dL 1 08   ANION GAP mmol/L 12   CALCIUM mg/dL 9 2   ALBUMIN g/dL 4 2   TOTAL BILIRUBIN mg/dL 0 50   ALK PHOS U/L 94   ALT U/L 36   AST U/L 25   GLUCOSE RANDOM mg/dL 132                       Imaging: I have personally reviewed pertinent reports  CT head without contrast   Final Result by Margot Ravi MD (05/10 9636)      No acute intracranial abnormality  There are some frothy secretions in the left maxillary sinus  There is partial opacification of the mastoid air cells bilaterally without evidence of acute calvarial fracture  Workstation performed: [id number]         XR chest 1 view portable   ED Interpretation by Osiel Holder MD (05/10 2815)   No cardiopulmonary disease          EKG, Pathology, and Other Studies Reviewed on Admission:   · EKG:  Sinus bradycardia at a rate of 59 beats per minute  Right bundle branch block  Allscripts / Epic Records Reviewed: Yes     ** Please Note: This note has been constructed using a voice recognition system   **

## 2021-05-10 NOTE — PHYSICAL THERAPY NOTE
Physical Therapy Evaluation    Patient Name: Yessy Johnson    IDHNH'X Date: 5/10/2021     Problem List  Principal Problem:    Near syncope  Active Problems:    Chronic pain syndrome    Dyslipidemia    Bradycardia    Episode of dizziness       Past Medical History  Past Medical History:   Diagnosis Date    Fatigue     High cholesterol     Hypertension     Neuropathy     Osteoarthritis     knees    Shortness of breath         Past Surgical History  Past Surgical History:   Procedure Laterality Date    APPENDECTOMY      EPIDURAL BLOCK INJECTION Left 5/17/2018    Procedure: L4 AND L5 TRANSFORAMINAL EPIDURAL STEROID INJECTION;  Surgeon: Wilner Christopher MD;  [facility];  Service: Pain Management     EPIDURAL BLOCK INJECTION Left 10/4/2018    Procedure: L4 AND L5 TRANSFORAMINAL EPIDURAL STEROID INJECTION;  Surgeon: Wilner Christopher MD;  [facility];  Service: Pain Management     EPIDURAL BLOCK INJECTION Left 5/1/2019    Procedure: L4-5 and L5-S1 transforaminal epidural steroid injection;  Surgeon: Wilner Christopher MD;  [facility];  Service: Pain Management     FL GUIDED NEEDLE PLAC BX/ASP/INJ  5/1/2019    MD COLONOSCOPY FLX DX W/COLLJ SPEC WHEN PFRMD N/A 5/31/2016    Procedure: COLONOSCOPY;  Surgeon: Jessica Fox MD;  [facility];  Service: Gastroenterology    MD ESOPHAGOGASTRODUODENOSCOPY TRANSORAL DIAGNOSTIC N/A 11/15/2016    Procedure: ESOPHAGOGASTRODUODENOSCOPY (EGD);   Surgeon: Jessica Fox MD;  [facility];  Service: Gastroenterology    MD TYMPANOPLASTY Left 10/25/2019    Procedure: LEFT TYMPANOPLASTY;  Surgeon: Rebecca Sadler MD;  Location:  MAIN OR;  Service: ENT    TUBAL LIGATION             05/10/21 0942   PT Last Visit   PT Visit Date 05/10/21   Note Type   Note type Evaluation   Pain Assessment   Pain Assessment Tool Pain Assessment not indicated - pt denies pain   Pain Score No Pain Home Living   Type of [facility] Accessibility Accessible   Additional Comments pt denies use of DME at baseline   Prior Function   Level of [place] Independent with ADLs and functional mobility   Lives With [facility] Help From Family   ADL Assistance Independent   IADLs Independent   Falls in the last 6 months 1 to 4   Comments (-) driving   Restrictions/Precautions   Weight Bearing Precautions Per Order No   Other Precautions Fall Risk;Multiple lines; Chair Alarm   General   Family/Caregiver Present No   Cognition   Overall Cognitive Status WFL   Attention Within functional limits   Orientation Level Oriented X4   Following Commands Follows all commands and directions without difficulty   RLE Assessment   RLE Assessment WFL   LLE Assessment   LLE Assessment WFL   Bed Mobility   Additional Comments pt OOB at start/end of session   Transfers   Sit to Stand 5  Supervision   Additional items Increased time required;Verbal cues   Stand to Sit 5  Supervision   Additional items Increased time required;Verbal cues   Additional Comments no device used   Ambulation/Elevation   Gait pattern Excessively slow; Short stride; Foward flexed;Decreased foot clearance;Narrow SONYA   Gait Assistance 5  Supervision   Additional items Verbal cues   Assistive Device Other (Comment)  (IV pole at times)   Distance 60'   Balance   Static Sitting Good   Dynamic Sitting Good   Static Standing Fair +   Dynamic Standing Fair   Ambulatory Fair   Endurance Deficit   Endurance Deficit Yes   Endurance Deficit Description pt easily fatigued with ambulation   Activity Tolerance   Activity Tolerance Patient limited by fatigue   Assessment   Prognosis Good   Problem List Decreased strength;Decreased endurance; Impaired balance;Decreased mobility   Assessment Patient is a 64 y o  female evaluated by Physical Therapy s/p admit to [address] on 5/9/2021 with admitting diagnosis of: Lightheadedness, Weakness, Unsteady gait, Generalized weakness, and principal problem of: Near syncope  PT was consulted to assess patient's functional mobility and discharge needs  Ordered are PT Evaluation and treatment with activity level of: up with assistance  Comorbidities affecting patient's physical performance at time of assessment include: osteoarthritis, high cholesterol, neuropathy, HTN  Personal factors affecting the patient at time of IE include: lives in 2 story home, inability to navigate community distances, history of fall(s), inability/difficulty performing IADLs and inability/difficulty performing ADLs  Please locate objective findings from PT assessment regarding body systems outlined above  Upon evaluation, pt able to perform all functional mobility with SUP and increased time  Occasional verbal cuing provided for safety awareness and direction via  on iPad  Pt ambulating 60' with and without pushing IV pole without LOB  Pt reports mild fatigue; HR and SpO2 remained WFL on RA throughout  The patient's AM-PAC Basic Mobility Inpatient Short Form Raw Score is 22, Standardized Score is 47 4  A standardized score greater than 42 9 suggests the patient may benefit from discharge to home  Please also refer to the recommendation of the Physical Therapist for safe discharge planning  Pt will benefit from continued PT intervention during LOS to address current deficits, increase LOF, and facilitate safe d/c to next level of care when medically appropriate  D/c recommendation at this time is home with OP PT follow up  Goals   Patient Goals to go home   Short Term Goal #1 Pt will participate in B LE strengthening exercises to faciliate improved functional mobility  LTG Expiration Date 05/24/21   Long Term Goal #1 Pt will perform all functional transfers and bed mobility mod(I) with good safety awareness     Long Term Goal #2 Pt will ambulate 200' with LRAD and SUP while maintaining good functional dynamic balance  Plan   Treatment/Interventions Functional transfer training;LE strengthening/ROM; Therapeutic exercise; Endurance training;Gait training;Bed mobility   PT Frequency Other (Comment)  (3-5x/week)   Recommendation   PT Discharge Recommendation Home with outpatient rehabilitation   AM-PAC Basic Mobility Inpatient   Turning in Bed Without Bedrails 4   Lying on Back to Sitting on Edge of Flat Bed 4   Moving Bed to Chair 4   Standing Up From Chair 4   Walk in Room 3   Climb 3-5 Stairs 3   Basic Mobility Inpatient Raw Score 22   Basic Mobility Standardized Score 47 4     Pt seated in recliner at end of session with all needs in reach

## 2021-05-10 NOTE — ED PROVIDER NOTES
History  Chief Complaint   Patient presents with    Medical Problem     possible syncope     HPI  54-year-old woman comes in for evaluation of near syncope, unsteadiness on her feet  Patient was in usual state of health when she went to bed  She states when she woke up she felt unsteady on her feet had to use bathroom  She states that she was unsteady walking to the bathroom  When she got up from the commode she felt very lightheaded and almost passed out  She had her hands abdominal wall the catch herself  Did hit her head or fall to ground  She was able to get back into bed and then she called family  She states right now she is not having any headache denies any neck pain does endorse chest pressure denies any abdominal pain nausea vomiting  EMS states when they did arrive to evaluator, she was very pale but she had a normal pressure  She did get aspirin in route to the hospital   Patient otherwise denies any fevers chills or cough denies any palpitations denies any back pain  Patient is [place]ese-speaking only, history of was obtained by patient patient's son  Prior to Admission Medications   Prescriptions Last Dose Informant Patient Reported? Taking?    Diclofenac Sodium (VOLTAREN) 1 % Not Taking at Unknown time  No No   Sig: Apply 2 g topically 3 (three) times a day as needed (moderate pain)   Patient not taking: Reported on 5/9/2021   cyclobenzaprine (FLEXERIL) 10 mg tablet   No No   Sig: Take 1 tablet (10 mg total) by mouth 3 (three) times a day as needed for muscle spasms   Patient not taking: Reported on 4/14/2021   meclizine (ANTIVERT) 12 5 MG tablet Not Taking at Unknown time  No No   Sig: Take 1 tablet (12 5 mg total) by mouth every 12 (twelve) hours as needed for dizziness   Patient not taking: Reported on 4/14/2021   naproxen (NAPROSYN) 500 mg tablet   No No   Sig: Take 1 tablet (500 mg total) by mouth 2 (two) times a day with meals for 10 doses   ondansetron (ZOFRAN) 4 mg tablet Not Taking at Unknown time  No No   Sig: Take 1 tablet (4 mg total) by mouth every 8 (eight) hours as needed for nausea for up to 30 doses   Patient not taking: Reported on 4/26/2021   oxyCODONE-acetaminophen (PERCOCET) 5-325 mg per tablet Not Taking at Unknown time  No No   Sig: Take 1 tablet by mouth every 4 (four) hours as needed for moderate pain for up to 8 dosesMax Daily Amount: 6 tablets   Patient not taking: Reported on 4/26/2021   topiramate (TOPAMAX) 25 mg tablet Not Taking at Unknown time  No No   Sig: Take 1 tablet (25 mg total) by mouth 2 (two) times a day   Patient not taking: Reported on 4/14/2021      Facility-Administered Medications: None       Past Medical History:   Diagnosis Date    Fatigue     High cholesterol     Hypertension     Neuropathy     Osteoarthritis     knees    Shortness of breath        Past Surgical History:   Procedure Laterality Date    APPENDECTOMY      EPIDURAL BLOCK INJECTION Left 5/17/2018    Procedure: L4 AND L5 TRANSFORAMINAL EPIDURAL STEROID INJECTION;  Surgeon: Stefani Roman MD;  [facility];  Service: Pain Management     EPIDURAL BLOCK INJECTION Left 10/4/2018    Procedure: L4 AND L5 TRANSFORAMINAL EPIDURAL STEROID INJECTION;  Surgeon: Stefani Roman MD;  [facility];  Service: Pain Management     EPIDURAL BLOCK INJECTION Left 5/1/2019    Procedure: L4-5 and L5-S1 transforaminal epidural steroid injection;  Surgeon: Stefani Roman MD;  [facility];  Service: Pain Management     FL GUIDED NEEDLE PLAC BX/ASP/INJ  5/1/2019    CA COLONOSCOPY FLX DX W/COLLJ SPEC WHEN PFRMD N/A 5/31/2016    Procedure: COLONOSCOPY;  Surgeon: Geovanni Beach MD;  [facility];  Service: Gastroenterology    CA ESOPHAGOGASTRODUODENOSCOPY TRANSORAL DIAGNOSTIC N/A 11/15/2016    Procedure: ESOPHAGOGASTRODUODENOSCOPY (EGD);   Surgeon: Geovanni Beach MD;  [facility];  Service: Gastroenterology    CA TYMPANOPLASTY Left 10/25/2019 Procedure: LEFT TYMPANOPLASTY;  Surgeon: Davy Mora MD;  [facility];  Service: ENT    TUBAL LIGATION         Family History   Problem Relation Age of Onset    Uterine cancer Mother     Early death Father     Heart disease Brother     No Known Problems Sister     No Known Problems Daughter     No Known Problems Sister     No Known Problems Daughter     No Known Problems Maternal Aunt     No Known Problems Maternal Aunt     No Known Problems Maternal Aunt     No Known Problems Maternal Aunt     No Known Problems Maternal Grandmother     No Known Problems Maternal Grandfather     No Known Problems Paternal Grandmother     No Known Problems Paternal Grandfather      I have reviewed and agree with the history as documented  E-Cigarette/Vaping    E-Cigarette Use Never User      E-Cigarette/Vaping Substances    Nicotine No     THC No     CBD No     Flavoring No     Other No     Unknown No      Social History     Tobacco Use    Smoking status: Never Smoker    Smokeless tobacco: Never Used   Substance Use Topics    Alcohol use: Not Currently    Drug use: No       Review of Systems   Constitutional: Positive for fatigue  Negative for chills and fever  HENT: Negative  Negative for congestion and sore throat  Eyes: Negative  Negative for discharge and redness  Respiratory: Negative  Negative for chest tightness and shortness of breath  Cardiovascular: Negative  Negative for chest pain and palpitations  Gastrointestinal: Negative  Negative for abdominal pain, nausea and vomiting  Endocrine: Negative  Negative for cold intolerance and polyphagia  Genitourinary: Negative  Negative for difficulty urinating and dysuria  Musculoskeletal: Negative  Negative for arthralgias and back pain  Skin: Negative  Negative for color change and wound  Allergic/Immunologic: Negative  Negative for environmental allergies     Neurological: Positive for dizziness and light-headedness  Negative for weakness and headaches  Hematological: Negative  Psychiatric/Behavioral: Negative  Negative for behavioral problems  The patient is not nervous/anxious  All other systems reviewed and are negative  Physical Exam  Physical Exam  Vitals signs and nursing note reviewed  Constitutional:       General: She is not in acute distress  Appearance: She is well-developed  She is not diaphoretic  HENT:      Head: Normocephalic and atraumatic  Right Ear: External ear normal       Left Ear: External ear normal       Nose: No congestion or rhinorrhea  Eyes:      General: No scleral icterus  Right eye: No discharge  Left eye: No discharge  Conjunctiva/sclera: Conjunctivae normal       Pupils: Pupils are equal, round, and reactive to light  Neck:      Musculoskeletal: Normal range of motion and neck supple  No neck rigidity or muscular tenderness  Thyroid: No thyromegaly  Trachea: No tracheal deviation  Cardiovascular:      Rate and Rhythm: Regular rhythm  Heart sounds: No murmur  No friction rub  No gallop  Pulmonary:      Effort: Pulmonary effort is normal  No respiratory distress  Breath sounds: Normal breath sounds  No stridor  No wheezing or rales  Abdominal:      General: Bowel sounds are normal  There is no distension  Palpations: Abdomen is soft  Tenderness: There is no abdominal tenderness  There is no guarding or rebound  Musculoskeletal: Normal range of motion  General: No tenderness, deformity or signs of injury  Skin:     General: Skin is warm and dry  Findings: No erythema or rash  Neurological:      General: No focal deficit present  Mental Status: She is alert and oriented to person, place, and time  Cranial Nerves: No cranial nerve deficit  Psychiatric:         Behavior: Behavior normal          Thought Content:  Thought content normal          Vital Signs  ED Triage Vitals [05/09/21 9444]   Temperature Pulse Respirations Blood Pressure SpO2   98 3 °F (36 8 °C) 58 22 (!) 189/79 98 %      Temp Source Heart Rate Source Patient Position - Orthostatic VS BP Location FiO2 (%)   Oral Monitor Sitting Left arm --      Pain Score       3           Vitals:    05/10/21 0100 05/10/21 0130 05/10/21 0200 05/10/21 0320   BP: 138/59 148/67 154/69 135/77   Pulse: 59 56 (!) 49 (!) 49   Patient Position - Orthostatic VS: Lying Lying Lying Lying         Visual Acuity      ED Medications  Medications   meclizine (ANTIVERT) tablet 12 5 mg (has no administration in time range)       Diagnostic Studies  Results Reviewed     Procedure Component Value Units Date/Time    Novel Coronavirus [facility] [place] [[id number]]  (Normal) Collected: 05/10/21 0150    Lab Status: Final result Specimen: Nares from Nasopharyngeal Swab Updated: 05/10/21 0255     SARS-CoV-2 Negative    Narrative: The specimen collection materials, transport medium, and/or testing methodology utilized in the production of these test results have been proven to be reliable in a limited validation with an abbreviated program under the Emergency Utilization Authorization provided by the FDA  Testing reported as "Presumptive positive" will be confirmed with secondary testing to ensure result accuracy  Clinical caution and judgement should be used with the interpretation of these results with consideration of the clinical impression and other laboratory testing  Testing reported as "Positive" or "Negative" has been proven to be accurate according to standard laboratory validation requirements  All testing is performed with control materials showing appropriate reactivity at standard intervals        UA (URINE) with reflex to Scope [[id number]] Collected: 05/10/21 0103    Lab Status: Final result Specimen: Urine, Clean Catch Updated: 05/10/21 0111     Color, UA Yellow     Clarity, UA Clear     Specific Gravity, UA 1 015     pH, UA 6 5 Leukocytes, UA Negative     Nitrite, UA Negative     Protein, UA Negative mg/dl      Glucose, UA Negative mg/dl      Ketones, UA Negative mg/dl      Urobilinogen, UA 0 2 E U /dl      Bilirubin, UA Negative     Blood, UA Negative    Troponin I [[id number]]  (Normal) Collected: 05/10/21 0012    Lab Status: Final result Specimen: Blood from Arm, Left Updated: 05/10/21 0035     Troponin I <0 02 ng/mL     Comprehensive metabolic panel [[id number]] Collected: 05/10/21 0012    Lab Status: Final result Specimen: Blood from Arm, Left Updated: 05/10/21 0033     Sodium 141 mmol/L      Potassium 3 6 mmol/L      Chloride 102 mmol/L      CO2 27 mmol/L      ANION GAP 12 mmol/L      BUN 25 mg/dL      Creatinine 1 08 mg/dL      Glucose 132 mg/dL      Calcium 9 2 mg/dL      AST 25 U/L      ALT 36 U/L      Alkaline Phosphatase 94 U/L      Total Protein 7 5 g/dL      Albumin 4 2 g/dL      Total Bilirubin 0 50 mg/dL      eGFR 58 ml/min/1 73sq m     Narrative:      [facility] guidelines for Chronic Kidney Disease (CKD):     Stage 1 with normal or high GFR (GFR > 90 mL/min/1 73 square meters)    Stage 2 Mild CKD (GFR = 60-89 mL/min/1 73 square meters)    Stage 3A Moderate CKD (GFR = 45-59 mL/min/1 73 square meters)    Stage 3B Moderate CKD (GFR = 30-44 mL/min/1 73 square meters)    Stage 4 Severe CKD (GFR = 15-29 mL/min/1 73 square meters)    Stage 5 End Stage CKD (GFR <15 mL/min/1 73 square meters)  Note: GFR calculation is accurate only with a steady state creatinine    CBC and differential [[id number]]  (Abnormal) Collected: 05/10/21 0012    Lab Status: Final result Specimen: Blood from Arm, Left Updated: 05/10/21 0019     WBC 6 07 Thousand/uL      RBC 4 22 Million/uL      Hemoglobin 13 0 g/dL      Hematocrit 37 7 %      MCV 89 fL      MCH 30 8 pg      MCHC 34 5 g/dL      RDW 12 4 %      MPV 10 0 fL      Platelets 163 Thousands/uL      nRBC 0 /100 WBCs      Neutrophils Relative 40 %      Immat GRANS % 0 % Lymphocytes Relative 49 %      Monocytes Relative 7 %      Eosinophils Relative 3 %      Basophils Relative 1 %      Neutrophils Absolute 2 45 Thousands/µL      Immature Grans Absolute 0 01 Thousand/uL      Lymphocytes Absolute 2 97 Thousands/µL      Monocytes Absolute 0 42 Thousand/µL      Eosinophils Absolute 0 18 Thousand/µL      Basophils Absolute 0 04 Thousands/µL                  CT head without contrast   Final Result by Marco Segal MD (05/10 0612)      No acute intracranial abnormality  There are some frothy secretions in the left maxillary sinus  There is partial opacification of the mastoid air cells bilaterally without evidence of acute calvarial fracture  Workstation performed: [id number]         XR chest 1 view portable   ED Interpretation by Aida Albright MD (05/10 3850)   No cardiopulmonary disease                 Procedures  Procedures         ED Course  ED Course as of May 10 0414   Mon May 10, 2021   0210 Patient's workup is negative  Tried to walk her, she is still very unsteady on her feet  Will move forward with COVID testing  Did call [facility] admitting to get her into the hospital   They are requesting CT of her head  I initially did not were this is she had no headache, she had normal neuro exam, will go ahead and order this and then sent patient up       0211 This EKG was interpreted by me  The EKG demonstrates sinus bradycardia, normal intervals and axis, normal QRS, no acute ST changes present  SBIRT 20yo+      Most Recent Value   SBIRT (22 yo +)   In order to provide better care to our patients, we are screening all of our patients for alcohol and drug use  Would it be okay to ask you these screening questions? Yes Filed at: 05/10/2021 0043   Initial Alcohol Screen: US AUDIT-C    1  How often do you have a drink containing alcohol?  0 Filed at: 05/10/2021 0043   2   How many drinks containing alcohol do you have on a typical day you are drinking? 0 Filed at: 05/10/2021 0043   3b  FEMALE Any Age, or MALE 65+: How often do you have 4 or more drinks on one occassion? 0 Filed at: 05/10/2021 0043   Audit-C Score  0 Filed at: 05/10/2021 3406   HAJA: How many times in the past year have you    Used an illegal drug or used a prescription medication for non-medical reasons? Never Filed at: 05/10/2021 0043                    [facility]  Number of Diagnoses or Management Options  Generalized weakness:   Lightheadedness:   Unsteady gait:   Diagnosis management comments: 59-year-old female presents with unsteadiness, near-syncope or chest pressure  Will evaluate with CBC, CMP, troponin  Will get chest x-ray  Will get EKG  Will get COVID  Disposition will be pending results of workup        Disposition  Final diagnoses:   Generalized weakness   Unsteady gait   Lightheadedness     Time reflects when diagnosis was documented in both MDM as applicable and the Disposition within this note     Time User Action Codes Description Comment    5/10/2021  2:51 AM Rafy Glaze Add [R53 1] Generalized weakness     5/10/2021  2:55 AM Rafy Glaze Add [R26 81] Unsteady gait     5/10/2021  2:55 AM Rafy Glaze Add [R42] Lightheadedness       ED Disposition     ED Disposition Condition Date/Time Comment    Admit Stable Mon May 10, 2021  2:51 AM [facility] obs        Follow-up Information    None         Current Discharge Medication List      CONTINUE these medications which have NOT CHANGED    Details   cyclobenzaprine (FLEXERIL) 10 mg tablet Take 1 tablet (10 mg total) by mouth 3 (three) times a day as needed for muscle spasms  Qty: 30 tablet, Refills: 0    Associated Diagnoses: Strain of thoracic back region      Diclofenac Sodium (VOLTAREN) 1 % Apply 2 g topically 3 (three) times a day as needed (moderate pain)  Qty: 100 g, Refills: 1    Associated Diagnoses: Chronic right shoulder pain      meclizine (ANTIVERT) 12 5 MG tablet Take 1 tablet (12 5 mg total) by mouth every 12 (twelve) hours as needed for dizziness  Qty: 30 tablet, Refills: 0    Associated Diagnoses: Dizziness, nonspecific      naproxen (NAPROSYN) 500 mg tablet Take 1 tablet (500 mg total) by mouth 2 (two) times a day with meals for 10 doses  Qty: 10 tablet, Refills: 0    Associated Diagnoses: Strain of thoracic back region      ondansetron (ZOFRAN) 4 mg tablet Take 1 tablet (4 mg total) by mouth every 8 (eight) hours as needed for nausea for up to 30 doses  Qty: 30 tablet, Refills: 0    Associated Diagnoses: Strain of thoracic back region      oxyCODONE-acetaminophen (PERCOCET) 5-325 mg per tablet Take 1 tablet by mouth every 4 (four) hours as needed for moderate pain for up to 8 dosesMax Daily Amount: 6 tablets  Qty: 8 tablet, Refills: 0    Associated Diagnoses: Strain of thoracic back region      topiramate (TOPAMAX) 25 mg tablet Take 1 tablet (25 mg total) by mouth 2 (two) times a day  Qty: 60 tablet, Refills: 1    Associated Diagnoses: Nonintractable episodic headache, unspecified headache type           No discharge procedures on file      PDMP Review       Value Time User    PDMP Reviewed  Yes 5/10/2021  3:07 AM Estelita Lafleur PA-C          ED Provider  Electronically Signed by           Ashley Allred MD  05/10/21 6047

## 2021-05-10 NOTE — PLAN OF CARE
Problem: PHYSICAL THERAPY ADULT  Goal: Performs mobility at highest level of function for planned discharge setting  See evaluation for individualized goals  Description: Treatment/Interventions: Functional transfer training, LE strengthening/ROM, Therapeutic exercise, Endurance training, Gait training, Bed mobility          See flowsheet documentation for full assessment, interventions and recommendations  Note: Prognosis: Good  Problem List: Decreased strength, Decreased endurance, Impaired balance, Decreased mobility  Assessment: Patient is a 64 y o  female evaluated by Physical Therapy s/p admit to [facility] on 5/9/2021 with admitting diagnosis of: Lightheadedness, Weakness, Unsteady gait, Generalized weakness, and principal problem of: Near syncope  PT was consulted to assess patient's functional mobility and discharge needs  Ordered are PT Evaluation and treatment with activity level of: up with assistance  Comorbidities affecting patient's physical performance at time of assessment include: osteoarthritis, high cholesterol, neuropathy, HTN  Personal factors affecting the patient at time of IE include: lives in 2 story home, inability to navigate community distances, history of fall(s), inability/difficulty performing IADLs and inability/difficulty performing ADLs  Please locate objective findings from PT assessment regarding body systems outlined above  Upon evaluation, pt able to perform all functional mobility with SUP and increased time  Occasional verbal cuing provided for safety awareness and direction via  on iPad  Pt ambulating 60' with and without pushing IV pole without LOB  Pt reports mild fatigue; HR and SpO2 remained WFL on RA throughout  The patient's AM-PAC Basic Mobility Inpatient Short Form Raw Score is 22, Standardized Score is 47 4  A standardized score greater than 42 9 suggests the patient may benefit from discharge to home   Please also refer to the recommendation of the Physical Therapist for safe discharge planning  Pt will benefit from continued PT intervention during LOS to address current deficits, increase LOF, and facilitate safe d/c to next level of care when medically appropriate  D/c recommendation at this time is home with OP PT follow up  PT Discharge Recommendation: Home with outpatient rehabilitation          See flowsheet documentation for full assessment

## 2021-05-10 NOTE — ASSESSMENT & PLAN NOTE
History of chronic pain syndrome  She reports that she is not currently taking any oral pain medications    She does report taking "pain injection" every 6 months  Continue PCP, outpatient pain management follow-up

## 2021-05-10 NOTE — PLAN OF CARE
Problem: OCCUPATIONAL THERAPY ADULT  Goal: Performs self-care activities at highest level of function for planned discharge setting  See evaluation for individualized goals  Description: Treatment Interventions: ADL retraining, Functional transfer training, UE strengthening/ROM, Patient/family training, Activityengagement, Endurance training, Equipment evaluation/education          See flowsheet documentation for full assessment, interventions and recommendations  Note: Limitation: Decreased ADL status, Decreased UE strength, Decreased endurance, Decreased self-care trans, Decreased high-level ADLs     Assessment: Pt is a 64 y o  female seen for OT evaluation s/p admit to [facility] on 5/9/2021 w/ Near syncope  Comorbidities affecting pt's functional performance at time of assessment include: Osteoarthritis, High cholesterol, HTN  Personal factors affecting pt at time of IE include:difficulty performing ADLS, difficulty performing IADLS  and health management   Prior to admission, pt was (I) with ADLs and IADLs  Upon evaluation: Pt requires (S) with ADLs and functional mobility 2* the following deficits impacting occupational performance: weakness, decreased strength, decreased balance, decreased tolerance, impaired problem solving and decreased safety awareness  Pt to benefit from continued skilled OT tx while in the hospital to address deficits as defined above and maximize level of functional independence w ADL's and functional mobility  Occupational Performance areas to address include: grooming, bathing/shower, toilet hygiene, dressing, health maintenance, functional mobility, community mobility and clothing management  The patient's raw score on the AM-PAC Daily Activity inpatient short form is 21, standardized score is 44 27, greater than 39 4  Patients at this level are likely to benefit from discharge to home  Please refer to the recommendation of the Occupational Therapist for safe discharge planning  OT Discharge Recommendation: Home with outpatient rehabilitation

## 2021-05-10 NOTE — ASSESSMENT & PLAN NOTE
Possible near syncope episode  Presented to the emergency room via EMS with complaints of dizziness and lightheadedness, dark vision, and feeling as though she is going to pass out  She also reports episode of chest tightness which resolved prior to arrival to the ER  She reports symptoms started after using the bathroom  She was bradycardic upon arrival to the ER  EKG shows a sinus bradycardia at a rate of 59 beats per minute  Right bundle-branch block  Present with unsteady gait upon attempts to ambulate in the ER  CT head shows-No acute intracranial abnormality    There are some frothy secretions in the left maxillary sinus  There is partial opacification of the mastoid air cells bilaterally without evidence of acute calvarial fracture     Admit on observation  Telemetry monitoring  Check CT head  Gentle IV fluids  Orthostatic vital signs  Neuro checks  Monitor for new onset chest pain  Monitor vital signs closely  Fall precautions  OT, PT Eval  Am labs  Supportive care

## 2021-05-10 NOTE — ASSESSMENT & PLAN NOTE
Presented to the emergency room with complaints of dizziness and lightheadedness  Symptoms started while using the batroom  EKG upon arrival to the ER shows-sinus bradycardia at a rate of 59 beats per minute  Right bundle-branch block  Review of her chart reveals EKG in May of 2020 showing a sinus bradycardia at a rate of 59 beats per minute    She reports episode of chest tightness after onset of dizziness and lightheadedness with resolve prior to arrival to the ER  Telemetry monitoring  Monitor for new onset of chest pain  Monitor blood pressure closely  Consider repeat EKG  Consider inpatient cardiology consult vs outpatient follow up if worsening symptomatic bradycardia

## 2021-05-10 NOTE — ED NOTES
Attempted to ambulate patient  Gait unsteady with assist x1  Not able to walk independently  States she feels dizzy and lightheaded  States she has to use the bathroom to have a BM; assisted to [facility]        Jazlyn Liu RN  05/10/21 3082

## 2021-05-10 NOTE — PLAN OF CARE
Problem: Potential for Falls  Goal: Patient will remain free of falls  Description: INTERVENTIONS:  - Assess patient frequently for physical needs  -  Identify cognitive and physical deficits and behaviors that affect risk of falls    -  Lucas fall precautions as indicated by assessment   - Educate patient/family on patient safety including physical limitations  - Instruct patient to call for assistance with activity based on assessment  - Modify environment to reduce risk of injury  - Consider OT/PT consult to assist with strengthening/mobility  Outcome: Progressing     Problem: PAIN - ADULT  Goal: Verbalizes/displays adequate comfort level or baseline comfort level  Description: Interventions:  - Encourage patient to monitor pain and request assistance  - Assess pain using appropriate pain scale  - Administer analgesics based on type and severity of pain and evaluate response  - Implement non-pharmacological measures as appropriate and evaluate response  - Consider cultural and social influences on pain and pain management  - Notify physician/advanced practitioner if interventions unsuccessful or patient reports new pain  Outcome: Progressing     Problem: INFECTION - ADULT  Goal: Absence or prevention of progression during hospitalization  Description: INTERVENTIONS:  - Assess and monitor for signs and symptoms of infection  - Monitor lab/diagnostic results  - Monitor all insertion sites, i e  indwelling lines, tubes, and drains  - Monitor endotracheal if appropriate and nasal secretions for changes in amount and color  - Lucas appropriate cooling/warming therapies per order  - Administer medications as ordered  - Instruct and encourage patient and family to use good hand hygiene technique  - Identify and instruct in appropriate isolation precautions for identified infection/condition  Outcome: Progressing     Problem: SAFETY ADULT  Goal: Patient will remain free of falls  Description: INTERVENTIONS:  - Assess patient frequently for physical needs  -  Identify cognitive and physical deficits and behaviors that affect risk of falls    -  Desha fall precautions as indicated by assessment   - Educate patient/family on patient safety including physical limitations  - Instruct patient to call for assistance with activity based on assessment  - Modify environment to reduce risk of injury  - Consider OT/PT consult to assist with strengthening/mobility  Outcome: Progressing  Goal: Maintain or return to baseline ADL function  Description: INTERVENTIONS:  -  Assess patient's ability to carry out ADLs; assess patient's baseline for ADL function and identify physical deficits which impact ability to perform ADLs (bathing, care of mouth/teeth, toileting, grooming, dressing, etc )  - Assess/evaluate cause of self-care deficits   - Assess range of motion  - Assess patient's mobility; develop plan if impaired  - Assess patient's need for assistive devices and provide as appropriate  - Encourage maximum independence but intervene and supervise when necessary  - Involve family in performance of ADLs  - Assess for home care needs following discharge   - Consider OT consult to assist with ADL evaluation and planning for discharge  - Provide patient education as appropriate  Outcome: Progressing  Goal: Maintain or return mobility status to optimal level  Description: INTERVENTIONS:  - Assess patient's baseline mobility status (ambulation, transfers, stairs, etc )    - Identify cognitive and physical deficits and behaviors that affect mobility  - Identify mobility aids required to assist with transfers and/or ambulation (gait belt, sit-to-stand, lift, walker, cane, etc )  - Desha fall precautions as indicated by assessment  - Record patient progress and toleration of activity level on Mobility SBAR; progress patient to next Phase/Stage  - Instruct patient to call for assistance with activity based on assessment  - Consider rehabilitation consult to assist with strengthening/weightbearing, etc   Outcome: Progressing     Problem: DISCHARGE PLANNING  Goal: Discharge to home or other facility with appropriate resources  Description: INTERVENTIONS:  - Identify barriers to discharge w/patient and caregiver  - Arrange for needed discharge resources and transportation as appropriate  - Identify discharge learning needs (meds, wound care, etc )  - Arrange for interpretive services to assist at discharge as needed  - Refer to Case Management Department for coordinating discharge planning if the patient needs post-hospital services based on physician/advanced practitioner order or complex needs related to functional status, cognitive ability, or social support system  Outcome: Progressing     Problem: Knowledge Deficit  Goal: Patient/family/caregiver demonstrates understanding of disease process, treatment plan, medications, and discharge instructions  Description: Complete learning assessment and assess knowledge base    Interventions:  - Provide teaching at level of understanding  - Provide teaching via preferred learning methods  Outcome: Progressing     Problem: NEUROSENSORY - ADULT  Goal: Achieves stable or improved neurological status  Description: INTERVENTIONS  - Monitor and report changes in neurological status  - Monitor vital signs such as temperature, blood pressure, glucose, and any other labs ordered   - Initiate measures to prevent increased intracranial pressure  - Monitor for seizure activity and implement precautions if appropriate      Outcome: Progressing     Problem: MUSCULOSKELETAL - ADULT  Goal: Maintain or return mobility to safest level of function  Description: INTERVENTIONS:  - Assess patient's ability to carry out ADLs; assess patient's baseline for ADL function and identify physical deficits which impact ability to perform ADLs (bathing, care of mouth/teeth, toileting, grooming, dressing, etc )  - Assess/evaluate cause of self-care deficits   - Assess range of motion  - Assess patient's mobility  - Assess patient's need for assistive devices and provide as appropriate  - Encourage maximum independence but intervene and supervise when necessary  - Involve family in performance of ADLs  - Assess for home care needs following discharge   - Consider OT consult to assist with ADL evaluation and planning for discharge  - Provide patient education as appropriate  Outcome: Progressing

## 2021-05-10 NOTE — ASSESSMENT & PLAN NOTE
Presented to the emergency room with complaints of dizziness lightheadedness  Associated with dark vision  She denies headaches,nausea vomiting  She appears to have been on meclizine in the past for dizziness    However she reports that she is not currently taking any medications  IV fluids  Meclizine 12 5 mg q 12 hours  Monitor for new onset headache  Monitor vital signs closely  Am labs  OT, PT eval   Supportive care

## 2021-05-11 VITALS
HEIGHT: 59 IN | BODY MASS INDEX: 28.22 KG/M2 | OXYGEN SATURATION: 97 % | TEMPERATURE: 97.9 F | DIASTOLIC BLOOD PRESSURE: 59 MMHG | SYSTOLIC BLOOD PRESSURE: 130 MMHG | WEIGHT: 139.99 LBS | HEART RATE: 64 BPM | RESPIRATION RATE: 16 BRPM

## 2021-05-11 PROBLEM — J01.00 ACUTE NON-RECURRENT MAXILLARY SINUSITIS: Status: ACTIVE | Noted: 2021-05-11

## 2021-05-11 PROCEDURE — 99217 PR OBSERVATION CARE DISCHARGE MANAGEMENT: CPT | Performed by: INTERNAL MEDICINE

## 2021-05-11 RX ORDER — DOXYCYCLINE HYCLATE 100 MG/1
100 TABLET, DELAYED RELEASE ORAL 2 TIMES DAILY
Qty: 12 TABLET | Refills: 0 | Status: SHIPPED | OUTPATIENT
Start: 2021-05-11 | End: 2021-05-17

## 2021-05-11 RX ADMIN — DOXYCYCLINE HYCLATE 100 MG: 100 CAPSULE ORAL at 09:59

## 2021-05-11 RX ADMIN — SODIUM CHLORIDE 75 ML/HR: 0.9 INJECTION, SOLUTION INTRAVENOUS at 05:10

## 2021-05-11 NOTE — NURSING NOTE
No medical changes with patient- Reviewed D/C instructions with patient and family - patient states that she understands instructions and has no questions at this time   Patient D/C in stable condition

## 2021-05-11 NOTE — UTILIZATION REVIEW
Initial Clinical Review    Admission: Date/Time/Statement:   Admission Orders (From admission, onward)     Ordered        05/10/21 0255  Place in Observation  Once                   Orders Placed This Encounter   Procedures    Place in Observation     Standing Status:   Standing     Number of Occurrences:   1     Order Specific Question:   Level of Care     Answer:   Med Surg [16]     ED Arrival Information     Expected Arrival Acuity Means of Arrival Escorted By Service Admission Type    - 5/9/2021 23:50 Urgent Ambulance [facility] pass Ambulance General Medicine Urgent    Arrival Complaint    -        Chief Complaint   Patient presents with    Medical Problem     possible syncope       Initial Presentation:   44-year-old woman comes in for evaluation of near syncope, unsteadiness on her feet  Patient was in usual state of health when she went to bed  She states when she woke up she felt unsteady on her feet had to use bathroom  She states that she was unsteady walking to the bathroom  When she got up from the commode she felt very lightheaded and almost passed out  She had her hands abdominal wall the catch herself  Did hit her head or fall to ground  She was able to get back into bed and then she called family  She states right now she is not having any headache denies any neck pain does endorse chest pressure denies any abdominal pain nausea vomiting  EMS states when they did arrive to evaluator, she was very pale but she had a normal pressure  She did get aspirin in route to the hospital   Patient otherwise denies any fevers chills or cough denies any palpitations denies any back pain  Near syncope  Assessment & Plan  Possible near syncope episode  Presented to the emergency room via EMS with complaints of dizziness and lightheadedness, dark vision, and feeling as though she is going to pass out    She also reports episode of chest tightness which resolved prior to arrival to the ER  She reports symptoms started after using the bathroom  She was bradycardic upon arrival to the ER  EKG shows a sinus bradycardia at a rate of 59 beats per minute  Right bundle-branch block  Present with unsteady gait upon attempts to ambulate in the ER  CT head shows-No acute intracranial abnormality    There are some frothy secretions in the left maxillary sinus  There is partial opacification of the mastoid air cells bilaterally without evidence of acute calvarial fracture  Admit on observation  Telemetry monitoring  Check CT head  Gentle IV fluids  Orthostatic vital signs  Neuro checks  Monitor for new onset chest pain  Monitor vital signs closely  Fall precautions  OT, PT Eval  Am labs  Supportive care            Episode of dizziness  Assessment & Plan  Presented to the emergency room with complaints of dizziness lightheadedness  Associated with dark vision  She denies headaches,nausea vomiting  She appears to have been on meclizine in the past for dizziness  However she reports that she is not currently taking any medications  IV fluids  Meclizine 12 5 mg q 12 hours  Monitor for new onset headache  Monitor vital signs closely  Am labs  OT, PT eval   Supportive care     Bradycardia  Assessment & Plan  Presented to the emergency room with complaints of dizziness and lightheadedness  Symptoms started while using the batroom  EKG upon arrival to the ER shows-sinus bradycardia at a rate of 59 beats per minute  Right bundle-branch block  Review of her chart reveals EKG in May of 2020 showing a sinus bradycardia at a rate of 59 beats per minute    She reports episode of chest tightness after onset of dizziness and lightheadedness with resolve prior to arrival to the ER  Telemetry monitoring  Monitor for new onset of chest pain  Monitor blood pressure closely  Consider repeat EKG  Consider inpatient cardiology consult vs outpatient follow up if worsening symptomatic bradycardia         ED Triage Vitals [05/09/21 2354]   Temperature Pulse Respirations Blood Pressure SpO2   98 3 °F (36 8 °C) 58 22 (!) 189/79 98 %      Temp Source Heart Rate Source Patient Position - Orthostatic VS BP Location FiO2 (%)   Oral Monitor Sitting Left arm --      Pain Score       3          Wt Readings from Last 1 Encounters:   05/11/21 63 5 kg (139 lb 15 9 oz)     Additional Vital Signs:   Date/Time  Temp  Pulse  Resp  BP  MAP (mmHg)  SpO2  O2 Device  Patient Position - Orthostatic VS   05/11/21 07:10:21  97 9 °F (36 6 °C)  64  16  130/59  83  97 %  None (Room air)  Lying   05/10/21 2324  98 1 °F (36 7 °C)  56  17  131/57  82  97 %  None (Room air)  Lying   05/10/21 15:15:54  97 5 °F (36 4 °C)  --  16  140/71  94  --  --  --   05/10/21 0957  --  --  --  --  --  --  None (Room air)  --   05/10/21 07:18:19  98 °F (36 7 °C)  --  20  115/61  79  --  --  --   05/10/21 06:20:09  --  --  --  115/61  79  --  --  --   05/10/21 06:18:31  --  --  --  115/61  79  --  --  Sitting - Orthostatic VS   05/10/21 06:16:51  --  --  --  136/72  93  --  --  Lying - Orthostatic VS   05/10/21 03:20:37  98 °F (36 7 °C)  49Abnormal   16  135/77  96  96 %  None (Room air)  Lying       Pertinent Labs/Diagnostic Test Results:   Results from last 7 days   Lab Units 05/10/21  0150   SARS-COV-2  Negative     Results from last 7 days   Lab Units 05/10/21  0612 05/10/21  0012   WBC Thousand/uL 6 27 6 07   HEMOGLOBIN g/dL 12 3 13 0   HEMATOCRIT % 37 4 37 7   PLATELETS Thousands/uL 249 241   NEUTROS ABS Thousands/µL  --  2 45         Results from last 7 days   Lab Units 05/10/21  0612 05/10/21  0012   SODIUM mmol/L 140 141   POTASSIUM mmol/L 4 3 3 6   CHLORIDE mmol/L 105 102   CO2 mmol/L 24 27   ANION GAP mmol/L 11 12   BUN mg/dL 19 25   CREATININE mg/dL 0 81 1 08   EGFR ml/min/1 73sq m 81 58   CALCIUM mg/dL 9 3 9 2   MAGNESIUM mg/dL 2 5  --    PHOSPHORUS mg/dL 4 5  --      Results from last 7 days   Lab Units 05/10/21  0612 05/10/21  0012   AST U/L 23 25   ALT U/L 35 36   ALK PHOS U/L 89 94   TOTAL PROTEIN g/dL 7 4 7 5   ALBUMIN g/dL 4 0 4 2   TOTAL BILIRUBIN mg/dL 0 45 0 50         Results from last 7 days   Lab Units 05/10/21  0612 05/10/21  0012   GLUCOSE RANDOM mg/dL 121 132         Results from last 7 days   Lab Units 05/10/21  0612   HEMOGLOBIN A1C % 5 8*   EAG mg/dl 120     No results found for: BETA-HYDROXYBUTYRATE                   Results from last 7 days   Lab Units 05/10/21  0012   TROPONIN I ng/mL <0 02                                                         Results from last 7 days   Lab Units 05/10/21  0103   CLARITY UA  Clear   COLOR UA  Yellow   SPEC GRAV UA  1 015   PH UA  6 5   GLUCOSE UA mg/dl Negative   KETONES UA mg/dl Negative   BLOOD UA  Negative   PROTEIN UA mg/dl Negative   NITRITE UA  Negative   BILIRUBIN UA  Negative   UROBILINOGEN UA E U /dl 0 2   LEUKOCYTES UA  Negative                                             5/10  Cxr=  No acute cardiopulmonary disease  CT head=  No acute intracranial abnormality  There are some frothy secretions in the left maxillary sinus    There is partial opacification of the mastoid air cells bilaterally without evidence of acute calvarial fracture  Ekg=  Sinus bradycardia  Right bundle branch block  Septal infarct , age undetermined    ED Treatment:   Medication Administration from 05/09/2021 2350 to 05/10/2021 0316     None        Past Medical History:   Diagnosis Date    Fatigue     High cholesterol     Hypertension     Neuropathy     Osteoarthritis     knees    Shortness of breath      Present on Admission:   Dyslipidemia   Chronic pain syndrome      Admitting Diagnosis: Lightheadedness [R42]  Weakness [R53 1]  Unsteady gait [R26 81]  Generalized weakness [R53 1]  Age/Sex: 64 y o  female  Admission Orders:  Neuro checks  Telemetry  Orthostatic bp  Pt/ot eval & tx  Contact & airborne isolation    Scheduled Medications:  doxycycline hyclate, 100 mg, Oral, Q12H [address]      Continuous IV Infusions:  sodium chloride, 75 mL/hr, Intravenous, Continuous      PRN Meds:  acetaminophen, 650 mg, Oral, Q6H PRN  meclizine, 12 5 mg, Oral, Q12H PRN  ondansetron, 4 mg, Intravenous, Q6H PRN        IP CONSULT TO CASE MANAGEMENT    Network Utilization Review Department  ATTENTION: Please call with any questions or concerns to [phone] and carefully listen to the prompts so that you are directed to the right person  All voicemails are confidential   Kennedy Mayberry all requests for admission clinical reviews, approved or denied determinations and any other requests to dedicated fax number below belonging to the campus where the patient is receiving treatment   List of dedicated fax numbers for the Facilities:  [address] DENIALS (Administrative/Medical Necessity) [phone]   [address] (Maternity/NICU/Pediatrics) [phone]   [address] Bethel Jernigan [address]883 [address] [place] [address] Fredis Stephanie Ambrosio [address] [facility] [place] [address] [phone]

## 2021-05-11 NOTE — DISCHARGE SUMMARY
[address] [facility] [address]  Discharge- Leanora Fruits 1965, 64 y o  female MRN: [id number]  Unit/Bed#: 405-01 Encounter: [id number]  Primary Care Provider: Sue Holman PA-C   Date and time admitted to hospital: 5/9/2021 11:50 PM    * Near syncope  Assessment & Plan  Patient with an episode of near syncope - presented to the ED with complaints of lightheadedness immediately following straining after a bowel movement, accompanied by dark vision and the sensation of 'going to pass out'  - Lab work essentially unremarkable  - Urinalysis and CXR without any evidence of infection  - EKG with mild sinus bradycardia - confirmed by Tele  Remained asymptomatic - see below  - CT head negative, but with potential sinusitis - initiated on antibiotic therapy  Again doubt as etiology for near syncope  - Orthostatic blood pressures with some degree of orthostasis noted - patient was fluid resuscitated and admits to poor hydration when home  - ECHO checked and essentially unremarkable  - Near syncopal episode may have been situational following straining after a bowel movement, in conjunction with dehydration     - May have also represented a vasovagal event  - Monitor as an outpatient  Patient advised to return to the hospital or to her PCP if she experienced another episode  Acute non-recurrent maxillary sinusitis  Assessment & Plan  Recommend short course of antibiotic therapy - will conclude a 7 day course of doxycycline  Bradycardia  Assessment & Plan  Mild apparent asymptomatic bradycardia - check Holter follow-up for interpretation with cardiology      Discharging Physician / Practitioner: Karly Wang MD  PCP: Sue Holman PA-C  Admission Date:   Admission Orders (From admission, onward)     Ordered        05/10/21 0255  Place in Observation  Once                   Discharge Date: 05/11/21    Resolved Problems  Date Reviewed: 5/11/2021    None          Consultations During Hospital Stay:  · None    Procedures Performed:   · None    Significant Findings / Test Results:   Xr Chest 1 View Portable    Result Date: 5/10/2021  Impression: No acute cardiopulmonary disease  Workstation performed: [id number]     Ct Head Without Contrast    Result Date: 5/10/2021  Impression: No acute intracranial abnormality  There are some frothy secretions in the left maxillary sinus  There is partial opacification of the mastoid air cells bilaterally without evidence of acute calvarial fracture  Workstation performed: [id number]     Transthoracic Echocardiogram  2D, M-mode, Doppler, and Color Doppler     Study date:  10-May-2021      Indications: Bradycardia, near syncope     Diagnoses: R42  - Dizziness and giddiness      SUMMARY     LEFT VENTRICLE:  Systolic function was normal  Ejection fraction was estimated to be 60 %  There were no regional wall motion abnormalities      RIGHT VENTRICLE:  The size was normal   Systolic function was normal      TRICUSPID VALVE:  There was trace regurgitation  Estimated peak PA pressure was 25 mmHg      HISTORY: PRIOR HISTORY: Patient has no history of cardiovascular disease      PROCEDURE: The procedure was performed at the bedside  This was a routine study  The transthoracic approach was used  The study included complete 2D imaging, M-mode, complete spectral Doppler, and color Doppler  The heart rate was 70 bpm,  at the start of the study  This was a technically difficult study      LEFT VENTRICLE: Size was normal  Systolic function was normal  Ejection fraction was estimated to be 60 %  There were no regional wall motion abnormalities  Wall thickness was normal  No evidence of apical thrombus   DOPPLER: Left  ventricular diastolic function parameters were normal      RIGHT VENTRICLE: The size was normal  Systolic function was normal  Wall thickness was normal      LEFT ATRIUM: Size was normal      RIGHT ATRIUM: Size was normal      MITRAL VALVE: Valve structure was normal  There was normal leaflet separation  DOPPLER: The transmitral velocity was within the normal range  There was no evidence for stenosis  There was no significant regurgitation      AORTIC VALVE: The valve was trileaflet  Leaflets exhibited normal thickness and normal cuspal separation  DOPPLER: Transaortic velocity was within the normal range  There was no evidence for stenosis  There was no significant  regurgitation      TRICUSPID VALVE: The valve structure was normal  There was normal leaflet separation  DOPPLER: The transtricuspid velocity was within the normal range  There was no evidence for stenosis  There was trace regurgitation  Estimated peak PA  pressure was 25 mmHg      PULMONIC VALVE: Leaflets exhibited normal thickness, no calcification, and normal cuspal separation  DOPPLER: The transpulmonic velocity was within the normal range  There was no significant regurgitation      PERICARDIUM: There was no pericardial effusion  The pericardium was normal in appearance      AORTA: The root exhibited normal size      SYSTEMIC VEINS: IVC: The inferior vena cava was normal in size  Respirophasic changes were normal     Incidental Findings:   · As above     Test Results Pending at Discharge (will require follow up): · None     Outpatient Tests Requested:  · None    Complications:  None    Reason for Admission:  Near syncopal episode    HPI from original H&P:     Kristie Joaquin is a 64 y o  female who presents emergency room via EMS for evaluation of possible near syncope episode at home  Patient has primarily Jpndryl-bxsdompk-Jzyvlci interpreter number [id number] assisted with translation  Patient reported that she was at home when to use the bathroom started feeling dizzy and lightheaded and felt as though she is going to pass out  She also reported that she had a blurry/dark vision and chest tightness at that time    She denies shortness of breath, headaches, fevers, chills, cough, nausea, vomiting, diarrhea, abdominal pain  She also denies urinary symptoms falls or recent trauma  Labs completed in emergency room with results as shown below  Chest x-ray completed official report pending CT head completed with results shown below  While in the emergency room during attempts  to ambulate she reported feeling very dizzy and lightheaded and was noted to have unsteady gait  She was  unable to ambulate without assistance  Additionally patient was noted to be bradycardic while in the ER  At bedside she is awake and alert  She reports that she still feels dizzy  The patient is being admitted on observation status [facility] for further workup and management of possible near syncope episode, dizziness, bradycardia  Please see above list of diagnoses and related plan for additional information  Condition at Discharge: stable     Discharge Day Visit / Exam:     * Please refer to separate progress note for these details *    Discussion with Family:  Yes    Discharge instructions/Information to patient and family:   See after visit summary for information provided to patient and family  Provisions for Follow-Up Care:  See after visit summary for information related to follow-up care and any pertinent home health orders  Disposition:     Home    For Discharges to [facility] SN[facility]:   · Not Applicable to this Patient - Not Applicable to this Patient    Planned Readmission: no     Discharge Statement:  I spent 30 minutes discharging the patient  This time was spent on the day of discharge  I had direct contact with the patient on the day of discharge  Greater than 50% of the total time was spent examining patient, answering all patient questions, arranging and discussing plan of care with patient as well as directly providing post-discharge instructions  Additional time then spent on discharge activities      Discharge Medications:  See after visit summary for reconciled discharge medications provided to patient and family        ** Please Note: This note has been constructed using a voice recognition system **

## 2021-05-11 NOTE — NURSING NOTE
14:41    Made Dr Page Ceja aware of issue that patients family was having getting patient's antibiotics from pharmacy  Dr Page Ceja is calling [address] in [address] [place] [address] to take care of the issue

## 2021-05-11 NOTE — PLAN OF CARE
Problem: Potential for Falls  Goal: Patient will remain free of falls  Description: INTERVENTIONS:  - Assess patient frequently for physical needs  -  Identify cognitive and physical deficits and behaviors that affect risk of falls    -  [place] fall precautions as indicated by assessment   - Educate patient/family on patient safety including physical limitations  - Instruct patient to call for assistance with activity based on assessment  - Modify environment to reduce risk of injury  - Consider OT/PT consult to assist with strengthening/mobility  Outcome: Adequate for Discharge     Problem: PAIN - ADULT  Goal: Verbalizes/displays adequate comfort level or baseline comfort level  Description: Interventions:  - Encourage patient to monitor pain and request assistance  - Assess pain using appropriate pain scale  - Administer analgesics based on type and severity of pain and evaluate response  - Implement non-pharmacological measures as appropriate and evaluate response  - Consider cultural and social influences on pain and pain management  - Notify physician/advanced practitioner if interventions unsuccessful or patient reports new pain  Outcome: Adequate for Discharge     Problem: INFECTION - ADULT  Goal: Absence or prevention of progression during hospitalization  Description: INTERVENTIONS:  - Assess and monitor for signs and symptoms of infection  - Monitor lab/diagnostic results  - Monitor all insertion sites, i e  indwelling lines, tubes, and drains  - Monitor endotracheal if appropriate and nasal secretions for changes in amount and color  - [place] appropriate cooling/warming therapies per order  - Administer medications as ordered  - Instruct and encourage patient and family to use good hand hygiene technique  - Identify and instruct in appropriate isolation precautions for identified infection/condition  Outcome: Adequate for Discharge     Problem: SAFETY ADULT  Goal: Patient will remain free of falls  Description: INTERVENTIONS:  - Assess patient frequently for physical needs  -  Identify cognitive and physical deficits and behaviors that affect risk of falls    -  [place] fall precautions as indicated by assessment   - Educate patient/family on patient safety including physical limitations  - Instruct patient to call for assistance with activity based on assessment  - Modify environment to reduce risk of injury  - Consider OT/PT consult to assist with strengthening/mobility  Outcome: Adequate for Discharge  Goal: Maintain or return to baseline ADL function  Description: INTERVENTIONS:  -  Assess patient's ability to carry out ADLs; assess patient's baseline for ADL function and identify physical deficits which impact ability to perform ADLs (bathing, care of mouth/teeth, toileting, grooming, dressing, etc )  - Assess/evaluate cause of self-care deficits   - Assess range of motion  - Assess patient's mobility; develop plan if impaired  - Assess patient's need for assistive devices and provide as appropriate  - Encourage maximum independence but intervene and supervise when necessary  - Involve family in performance of ADLs  - Assess for home care needs following discharge   - Consider OT consult to assist with ADL evaluation and planning for discharge  - Provide patient education as appropriate  Outcome: Adequate for Discharge  Goal: Maintain or return mobility status to optimal level  Description: INTERVENTIONS:  - Assess patient's baseline mobility status (ambulation, transfers, stairs, etc )    - Identify cognitive and physical deficits and behaviors that affect mobility  - Identify mobility aids required to assist with transfers and/or ambulation (gait belt, sit-to-stand, lift, walker, cane, etc )  - [place] fall precautions as indicated by assessment  - Record patient progress and toleration of activity level on Mobility SBAR; progress patient to next Phase/Stage  - Instruct patient to call for assistance with activity based on assessment  - Consider rehabilitation consult to assist with strengthening/weightbearing, etc   Outcome: Adequate for Discharge     Problem: DISCHARGE PLANNING  Goal: Discharge to home or other facility with appropriate resources  Description: INTERVENTIONS:  - Identify barriers to discharge w/patient and caregiver  - Arrange for needed discharge resources and transportation as appropriate  - Identify discharge learning needs (meds, wound care, etc )  - Arrange for interpretive services to assist at discharge as needed  - Refer to Case Management Department for coordinating discharge planning if the patient needs post-hospital services based on physician/advanced practitioner order or complex needs related to functional status, cognitive ability, or social support system  Outcome: Adequate for Discharge     Problem: Knowledge Deficit  Goal: Patient/family/caregiver demonstrates understanding of disease process, treatment plan, medications, and discharge instructions  Description: Complete learning assessment and assess knowledge base    Interventions:  - Provide teaching at level of understanding  - Provide teaching via preferred learning methods  Outcome: Adequate for Discharge     Problem: NEUROSENSORY - ADULT  Goal: Achieves stable or improved neurological status  Description: INTERVENTIONS  - Monitor and report changes in neurological status  - Monitor vital signs such as temperature, blood pressure, glucose, and any other labs ordered   - Initiate measures to prevent increased intracranial pressure  - Monitor for seizure activity and implement precautions if appropriate      Outcome: Adequate for Discharge     Problem: MUSCULOSKELETAL - ADULT  Goal: Maintain or return mobility to safest level of function  Description: INTERVENTIONS:  - Assess patient's ability to carry out ADLs; assess patient's baseline for ADL function and identify physical deficits which impact ability to perform ADLs (bathing, care of mouth/teeth, toileting, grooming, dressing, etc )  - Assess/evaluate cause of self-care deficits   - Assess range of motion  - Assess patient's mobility  - Assess patient's need for assistive devices and provide as appropriate  - Encourage maximum independence but intervene and supervise when necessary  - Involve family in performance of ADLs  - Assess for home care needs following discharge   - Consider OT consult to assist with ADL evaluation and planning for discharge  - Provide patient education as appropriate  Outcome: Adequate for Discharge

## 2021-05-11 NOTE — ASSESSMENT & PLAN NOTE
Patient with an episode of near syncope - presented to the ED with complaints of lightheadedness immediately following straining after a bowel movement, accompanied by dark vision and the sensation of 'going to pass out'  - Lab work essentially unremarkable  - Urinalysis and CXR without any evidence of infection  - EKG showed a mild sinus bradycardia - confirmed by telemetry  Patient remained asymptomatic - see below  - CT head negative, but with potential sinusitis - initiated on antibiotic therapy  Again doubt as etiology for near syncope  - Orthostatic blood pressures with some degree of orthostasis noted - patient was fluid resuscitated and admits to poor hydration when home  - ECHO checked and essentially unremarkable  - Near syncopal episode may have been situational following straining after a bowel movement, in conjunction with dehydration     - May have also represented a vasovagal event  - Monitor as an outpatient  Patient advised to return to the hospital or to her PCP if she experienced another episode

## 2021-05-11 NOTE — CASE MANAGEMENT
Pt was dc'd home on this date and has an appt on 5/25 with her PCP; and on 5/19 with spine and pain management  CM in [facility] Cardiology re: pt needing a follow up appt  Pt also needs a 48 hour holter and will need to call central scheduling to get an appointment to get it put on at [facility]

## 2021-05-12 ENCOUNTER — HOSPITAL ENCOUNTER (OUTPATIENT)
Dept: NON INVASIVE DIAGNOSTICS | Facility: HOSPITAL | Age: 56
Discharge: HOME | End: 2021-05-12
Attending: INTERNAL MEDICINE
Payer: COMMERCIAL

## 2021-05-12 DIAGNOSIS — R00.1 BRADYCARDIA: ICD-10-CM

## 2021-05-12 DIAGNOSIS — R55 NEAR SYNCOPE: ICD-10-CM

## 2021-05-12 PROCEDURE — 93226 XTRNL ECG REC<48 HR SCAN A/R: CPT

## 2021-05-12 PROCEDURE — 93225 XTRNL ECG REC<48 HRS REC: CPT

## 2021-05-14 PROCEDURE — 93227 XTRNL ECG REC<48 HR R&I: CPT | Performed by: INTERNAL MEDICINE

## 2021-05-19 ENCOUNTER — PROCEDURE VISIT (OUTPATIENT)
Dept: PAIN MEDICINE | Facility: CLINIC | Age: 56
End: 2021-05-19
Payer: COMMERCIAL

## 2021-05-19 DIAGNOSIS — G89.29 CHRONIC RIGHT SHOULDER PAIN: ICD-10-CM

## 2021-05-19 DIAGNOSIS — M77.02 MEDIAL EPICONDYLITIS OF ELBOW, LEFT: Primary | ICD-10-CM

## 2021-05-19 DIAGNOSIS — M25.511 CHRONIC RIGHT SHOULDER PAIN: ICD-10-CM

## 2021-05-19 PROCEDURE — 20551 NJX 1 TENDON ORIGIN/INSJ: CPT | Performed by: ANESTHESIOLOGY

## 2021-05-19 PROCEDURE — 20611 DRAIN/INJ JOINT/BURSA W/US: CPT | Performed by: ANESTHESIOLOGY

## 2021-05-19 PROCEDURE — 76942 ECHO GUIDE FOR BIOPSY: CPT | Performed by: ANESTHESIOLOGY

## 2021-05-19 RX ORDER — BUPIVACAINE HYDROCHLORIDE 2.5 MG/ML
10 INJECTION, SOLUTION EPIDURAL; INFILTRATION; INTRACAUDAL
Status: COMPLETED | OUTPATIENT
Start: 2021-05-19 | End: 2021-05-19

## 2021-05-19 RX ORDER — METHYLPREDNISOLONE ACETATE 40 MG/ML
1 INJECTION, SUSPENSION INTRA-ARTICULAR; INTRALESIONAL; INTRAMUSCULAR; SOFT TISSUE
Status: COMPLETED | OUTPATIENT
Start: 2021-05-19 | End: 2021-05-19

## 2021-05-19 RX ORDER — BUPIVACAINE HYDROCHLORIDE 2.5 MG/ML
1 INJECTION, SOLUTION INFILTRATION; PERINEURAL
Status: COMPLETED | OUTPATIENT
Start: 2021-05-19 | End: 2021-05-19

## 2021-05-19 RX ORDER — LIDOCAINE HYDROCHLORIDE 10 MG/ML
1 INJECTION, SOLUTION INFILTRATION; PERINEURAL
Status: COMPLETED | OUTPATIENT
Start: 2021-05-19 | End: 2021-05-19

## 2021-05-19 RX ORDER — LIDOCAINE HYDROCHLORIDE 10 MG/ML
2 INJECTION, SOLUTION INFILTRATION; PERINEURAL
Status: COMPLETED | OUTPATIENT
Start: 2021-05-19 | End: 2021-05-19

## 2021-05-19 RX ADMIN — LIDOCAINE HYDROCHLORIDE 1 ML: 10 INJECTION, SOLUTION INFILTRATION; PERINEURAL at 08:04

## 2021-05-19 RX ADMIN — BUPIVACAINE HYDROCHLORIDE 10 ML: 2.5 INJECTION, SOLUTION EPIDURAL; INFILTRATION; INTRACAUDAL at 08:04

## 2021-05-19 RX ADMIN — LIDOCAINE HYDROCHLORIDE 2 ML: 10 INJECTION, SOLUTION INFILTRATION; PERINEURAL at 08:04

## 2021-05-19 RX ADMIN — BUPIVACAINE HYDROCHLORIDE 1 ML: 2.5 INJECTION, SOLUTION INFILTRATION; PERINEURAL at 08:04

## 2021-05-19 RX ADMIN — METHYLPREDNISOLONE ACETATE 1 ML: 40 INJECTION, SUSPENSION INTRA-ARTICULAR; INTRALESIONAL; INTRAMUSCULAR; SOFT TISSUE at 08:04

## 2021-05-19 NOTE — PATIENT INSTRUCTIONS

## 2021-05-26 ENCOUNTER — TELEPHONE (OUTPATIENT)
Dept: PAIN MEDICINE | Facility: CLINIC | Age: 56
End: 2021-05-26

## 2021-06-15 ENCOUNTER — OFFICE VISIT (OUTPATIENT)
Dept: FAMILY MEDICINE CLINIC | Facility: CLINIC | Age: 56
End: 2021-06-15
Payer: COMMERCIAL

## 2021-06-15 VITALS
OXYGEN SATURATION: 98 % | BODY MASS INDEX: 29.27 KG/M2 | DIASTOLIC BLOOD PRESSURE: 84 MMHG | SYSTOLIC BLOOD PRESSURE: 128 MMHG | RESPIRATION RATE: 16 BRPM | HEIGHT: 59 IN | TEMPERATURE: 96.7 F | WEIGHT: 145.2 LBS | HEART RATE: 60 BPM

## 2021-06-15 DIAGNOSIS — R73.03 PREDIABETES: ICD-10-CM

## 2021-06-15 DIAGNOSIS — Z12.31 ENCOUNTER FOR SCREENING MAMMOGRAM FOR MALIGNANT NEOPLASM OF BREAST: ICD-10-CM

## 2021-06-15 DIAGNOSIS — E78.5 DYSLIPIDEMIA: Primary | ICD-10-CM

## 2021-06-15 PROCEDURE — T1015 CLINIC SERVICE: HCPCS | Performed by: FAMILY MEDICINE

## 2021-06-15 PROCEDURE — 99213 OFFICE O/P EST LOW 20 MIN: CPT | Performed by: FAMILY MEDICINE

## 2021-06-15 RX ORDER — ATORVASTATIN CALCIUM 20 MG/1
20 TABLET, FILM COATED ORAL DAILY
Qty: 60 TABLET | Refills: 3 | Status: SHIPPED | OUTPATIENT
Start: 2021-06-15 | End: 2021-11-02 | Stop reason: SDUPTHER

## 2021-06-15 NOTE — PROGRESS NOTES
Assessment/Plan:     Vital signs within normal limits on today's visit  Most recent lab work with a hemoglobin A1c of 5 8% improved from 5 9% we will continue to screen every 6 months  Mammography has been ordered  I will obtain records of her colonoscopy which was done 5 years ago to see when she is due for next one  F/u in 6 months  Diagnoses and all orders for this visit:    Dyslipidemia  -     atorvastatin (LIPITOR) 20 mg tablet; Take 1 tablet (20 mg total) by mouth daily    Encounter for screening mammogram for malignant neoplasm of breast  -     Mammo screening bilateral w 3d & cad; Future    Prediabetes          Subjective:     Patient ID: Giles Roger is a 64 y o  female  Here for follow-up  Is due for screening mammography  Last C scope was 5 years ago I do not have access to her report I will obtain with my  staff  Although she does claim that was normal   She was seen in the hospital in early May for near syncope lab work at that time and workup was unremarkable  Recent lab work shows good kidney function good liver function but she is at risk for prediabetes with hemoglobin A1c of 5 8%  She does have a family history of diabetes as her brother had type 2 DM  Review of Systems   Constitutional: Negative for chills and fever  HENT: Negative for ear pain and sore throat  Eyes: Negative for pain and visual disturbance  Respiratory: Negative for cough and shortness of breath  Cardiovascular: Negative for chest pain and palpitations  Gastrointestinal: Negative for abdominal pain and vomiting  Genitourinary: Negative for dysuria and hematuria  Musculoskeletal: Negative for arthralgias and back pain  Skin: Negative for color change and rash  Neurological: Negative for seizures and syncope  All other systems reviewed and are negative  Objective:     Physical Exam  Vitals reviewed  Constitutional:       General: She is not in acute distress  Appearance: Normal appearance  She is not ill-appearing  HENT:      Head: Normocephalic and atraumatic  Right Ear: Tympanic membrane, ear canal and external ear normal       Left Ear: Tympanic membrane, ear canal and external ear normal       Nose: Nose normal       Mouth/Throat:      Mouth: Mucous membranes are moist       Pharynx: Oropharynx is clear  No oropharyngeal exudate or posterior oropharyngeal erythema  Eyes:      Extraocular Movements: Extraocular movements intact  Conjunctiva/sclera: Conjunctivae normal       Pupils: Pupils are equal, round, and reactive to light  Cardiovascular:      Rate and Rhythm: Normal rate and regular rhythm  Pulses: Normal pulses  Heart sounds: Normal heart sounds  No murmur heard  No friction rub  No gallop  Pulmonary:      Effort: Pulmonary effort is normal       Breath sounds: Normal breath sounds  No wheezing  Abdominal:      General: There is no distension  Tenderness: There is no abdominal tenderness  Musculoskeletal:         General: Normal range of motion  Cervical back: Normal range of motion  Skin:     General: Skin is warm  Capillary Refill: Capillary refill takes less than 2 seconds  Neurological:      General: No focal deficit present  Mental Status: She is alert and oriented to person, place, and time  Mental status is at baseline  Psychiatric:         Mood and Affect: Mood normal          Behavior: Behavior normal          Thought Content:  Thought content normal          Judgment: Judgment normal            Wt Readings from Last 3 Encounters:   06/15/21 65 9 kg (145 lb 3 2 oz)   05/11/21 63 5 kg (139 lb 15 9 oz)   04/26/21 65 4 kg (144 lb 3 2 oz)     Temp Readings from Last 3 Encounters:   06/15/21 (!) 96 7 °F (35 9 °C)   05/11/21 97 9 °F (36 6 °C) (Oral)   04/26/21 (!) 95 6 °F (35 3 °C) (Tympanic)     BP Readings from Last 3 Encounters:   06/15/21 128/84   05/11/21 130/59   04/26/21 144/80     Pulse Readings from Last 3 Encounters:   06/15/21 60   05/11/21 64   04/26/21 60

## 2021-06-17 ENCOUNTER — OFFICE VISIT (OUTPATIENT)
Dept: PAIN MEDICINE | Facility: CLINIC | Age: 56
End: 2021-06-17
Payer: COMMERCIAL

## 2021-06-17 VITALS
DIASTOLIC BLOOD PRESSURE: 82 MMHG | WEIGHT: 145 LBS | HEART RATE: 54 BPM | SYSTOLIC BLOOD PRESSURE: 150 MMHG | BODY MASS INDEX: 29.23 KG/M2 | HEIGHT: 59 IN

## 2021-06-17 DIAGNOSIS — M25.511 CHRONIC RIGHT SHOULDER PAIN: ICD-10-CM

## 2021-06-17 DIAGNOSIS — G89.29 CHRONIC RIGHT SHOULDER PAIN: ICD-10-CM

## 2021-06-17 DIAGNOSIS — G89.4 CHRONIC PAIN SYNDROME: Primary | ICD-10-CM

## 2021-06-17 DIAGNOSIS — M77.02 MEDIAL EPICONDYLITIS OF ELBOW, LEFT: ICD-10-CM

## 2021-06-17 PROCEDURE — 3008F BODY MASS INDEX DOCD: CPT | Performed by: NURSE PRACTITIONER

## 2021-06-17 PROCEDURE — 1036F TOBACCO NON-USER: CPT | Performed by: NURSE PRACTITIONER

## 2021-06-17 PROCEDURE — 99213 OFFICE O/P EST LOW 20 MIN: CPT | Performed by: NURSE PRACTITIONER

## 2021-06-17 NOTE — PATIENT INSTRUCTIONS
May use Tylenol or ibuprofen as needed for pain  May try diclofenac gel to affected areas if needed

## 2021-06-17 NOTE — PROGRESS NOTES
Assessment:  1  Chronic pain syndrome    2  Chronic right shoulder pain    3  Medial epicondylitis of elbow, left        Plan:  While the patient was in the office today, I did have a thorough conversation regarding their chronic pain syndrome, medication management, and treatment plan options  Patient is being seen for follow-up visit  She underwent a right subacromial bursa injection and a  Left epicondyle injection on 05/19/2021  Overall she reports that both pains are 70-80% improved  Pain is much more tolerable at this time  She does not use any by mouth pain medications  During today's visit, we discussed the possibility of physical therapy  Patient states that she would rather not go to physical therapy as her pain is very minimal       May use Tylenol or ibuprofen as needed  May use diclofenac gel as needed  I discussed with the patient that at this point time she can follow up with our office on an as-needed basis  I did review the patient that if her pain symptoms should change, worsen, and/or if she would experience any new symptoms as she would like to be evaluated for, she should give our office a call  The patient was agreeable and verbalized an understanding  History of Present Illness: The patient is a 64 y o  female who presents for a follow up office visit in regards to Shoulder Pain, Arm Pain, and Knee Pain  The patients current symptoms include Complaints of right shoulder and left elbow pain  Current pain level is a 3/10  Quality pain is described as burning  Current pain medications includes:  None    I have personally reviewed and/or updated the patient's past medical history, past surgical history, family history, social history, current medications, allergies, and vital signs today  Review of Systems  Review of Systems   Constitutional: Negative for fatigue  HENT: Negative for ear pain and hearing loss  Eyes: Negative for redness     Respiratory: Negative for cough  Cardiovascular: Negative for leg swelling  Gastrointestinal: Negative for anal bleeding and rectal pain  Endocrine: Negative for polydipsia  Genitourinary: Negative for hematuria  Musculoskeletal: Positive for arthralgias, gait problem and joint swelling (hands)  Skin: Negative for rash  Neurological: Negative for headaches  Hematological: Does not bruise/bleed easily  Psychiatric/Behavioral: Negative for behavioral problems and hallucinations  Past Medical History:   Diagnosis Date    Fatigue     High cholesterol     Hypertension     Neuropathy     Osteoarthritis     knees    Shortness of breath        Past Surgical History:   Procedure Laterality Date    APPENDECTOMY      EPIDURAL BLOCK INJECTION Left 5/17/2018    Procedure: L4 AND L5 TRANSFORAMINAL EPIDURAL STEROID INJECTION;  Surgeon: Max Ambriz MD;  [facility];  Service: Pain Management     EPIDURAL BLOCK INJECTION Left 10/4/2018    Procedure: L4 AND L5 TRANSFORAMINAL EPIDURAL STEROID INJECTION;  Surgeon: Max Ambriz MD;  [facility];  Service: Pain Management     EPIDURAL BLOCK INJECTION Left 5/1/2019    Procedure: L4-5 and L5-S1 transforaminal epidural steroid injection;  Surgeon: Max Ambriz MD;  [facility];  Service: Pain Management     FL GUIDED NEEDLE PLAC BX/ASP/INJ  5/1/2019    MS COLONOSCOPY FLX DX W/COLLJ SPEC WHEN PFRMD N/A 5/31/2016    Procedure: COLONOSCOPY;  Surgeon: Marda Dubin, MD;  [facility];  Service: Gastroenterology    MS ESOPHAGOGASTRODUODENOSCOPY TRANSORAL DIAGNOSTIC N/A 11/15/2016    Procedure: ESOPHAGOGASTRODUODENOSCOPY (EGD);   Surgeon: Marda Dubin, MD;  [facility];  Service: Gastroenterology    MS TYMPANOPLASTY Left 10/25/2019    Procedure: LEFT TYMPANOPLASTY;  Surgeon: Macarena Garland MD;  Location:  MAIN OR;  Service: ENT    TUBAL LIGATION         Family History   Problem Relation Age of Onset    Uterine cancer Mother     Early death Father     Heart disease Brother     No Known Problems Sister     No Known Problems Daughter     No Known Problems Sister     No Known Problems Daughter     No Known Problems Maternal Aunt     No Known Problems Maternal Aunt     No Known Problems Maternal Aunt     No Known Problems Maternal Aunt     No Known Problems Maternal Grandmother     No Known Problems Maternal Grandfather     No Known Problems Paternal Grandmother     No Known Problems Paternal Grandfather        Social History     Occupational History    Not on file   Tobacco Use    Smoking status: Never Smoker    Smokeless tobacco: Never Used   Vaping Use    Vaping Use: Never used   Substance and Sexual Activity    Alcohol use: Not Currently    Drug use: No    Sexual activity: Yes     Birth control/protection: Female Sterilization         Current Outpatient Medications:     atorvastatin (LIPITOR) 20 mg tablet, Take 1 tablet (20 mg total) by mouth daily (Patient not taking: Reported on 6/17/2021), Disp: 60 tablet, Rfl: 3    No Known Allergies    Physical Exam:    /82 (BP Location: Left arm, Patient Position: Sitting, Cuff Size: Standard)   Pulse (!) 54   Ht 4' 11" (1 499 m)   Wt 65 8 kg (145 lb)   BMI 29 29 kg/m²     Constitutional:normal, well developed, well nourished, alert, in no distress and non-toxic and no overt pain behavior  Eyes:anicteric  HEENT:grossly intact  Neck:supple, symmetric, trachea midline and no masses   Pulmonary:even and unlabored  Cardiovascular:No edema or pitting edema present  Skin:Normal without rashes or lesions and well hydrated  Psychiatric:Mood and affect appropriate  Neurologic:Cranial Nerves II-XII grossly intact  Musculoskeletal:normal    Imaging  No orders to display       No orders of the defined types were placed in this encounter

## 2021-07-16 ENCOUNTER — CONSULT (OUTPATIENT)
Dept: CARDIOLOGY CLINIC | Facility: HOSPITAL | Age: 56
End: 2021-07-16
Attending: INTERNAL MEDICINE
Payer: COMMERCIAL

## 2021-07-16 VITALS
BODY MASS INDEX: 28.83 KG/M2 | HEART RATE: 63 BPM | HEIGHT: 59 IN | WEIGHT: 143 LBS | DIASTOLIC BLOOD PRESSURE: 74 MMHG | SYSTOLIC BLOOD PRESSURE: 118 MMHG

## 2021-07-16 DIAGNOSIS — R55 NEAR SYNCOPE: ICD-10-CM

## 2021-07-16 DIAGNOSIS — R00.1 BRADYCARDIA: ICD-10-CM

## 2021-07-16 DIAGNOSIS — I45.10 RBBB: Primary | ICD-10-CM

## 2021-07-16 PROCEDURE — 93000 ELECTROCARDIOGRAM COMPLETE: CPT | Performed by: INTERNAL MEDICINE

## 2021-07-16 PROCEDURE — 1036F TOBACCO NON-USER: CPT | Performed by: INTERNAL MEDICINE

## 2021-07-16 PROCEDURE — 99243 OFF/OP CNSLTJ NEW/EST LOW 30: CPT | Performed by: INTERNAL MEDICINE

## 2021-07-16 PROCEDURE — 3008F BODY MASS INDEX DOCD: CPT | Performed by: INTERNAL MEDICINE

## 2021-07-16 NOTE — PROGRESS NOTES
Consultation - Cardiology   Nelida Monk 64 y o  female MRN: [id number]  Unit/Bed#:  Encounter: [id number]  Physician Requesting Consult: Alcides Sloan MD  Reason for Consult / Principal Problem: near syncope    Assessment:  1  Near syncope  2  Sinus bradycardia  3  RBBB    Plan:  Her history is c/w vasovagal near syncope  There is no documented christa arrhthymias  That warrant pacemaker placement  No further testing is recommended  If she has recurrence of symptoms, I would recommend an Implanter Loop Recorder be placed  RTO as needed  History of Present Illness     HPI: Nelida Monk is a 64y o  year old female who is seen for cardiac evaluation after admission 5/10/2021 for near syncope  She apparently was in the bathroom and felt lightheaded as though she would pass out  She denies palpitations, SOB, CP  According to her ER record, she also felt lightheaded while ambulating in the ER  There was no SVT or severe bradycardia documented  ECG does show a RBBB which is chronic for her  She has not had any further episodes and denies any previous history of syncope, seizures, near syncope  ECHO 5/10/2021 - normal    Holter 5/12/2021 - SR average HR 62 BPM ( range 42 to 114 )  No significant tachyarrhythmias  She is very active                  Review of Systems:    Alert awake oriented, comfortable, denies any complaints  No fevers chills nausea vomiting  No weakness, dizziness, seizures  no cough, shortness of breath, or wheezing  Denies any palpitations, chest pain, diaphoresis  Denies leg edema, pain or paresthesias  Denies any skin rashes  Denies abdominal pain, bloody stools, masses  Denies any depression or suicidal ideations      Historical Information   Past Medical History:   Diagnosis Date    Fatigue     High cholesterol     Hypertension     Neuropathy     Osteoarthritis     knees    Shortness of breath      Past Surgical History:   Procedure Laterality Date    APPENDECTOMY  EPIDURAL BLOCK INJECTION Left 5/17/2018    Procedure: L4 AND L5 TRANSFORAMINAL EPIDURAL STEROID INJECTION;  Surgeon: Vazquez Baltazar MD;  [facility];  Service: Pain Management     EPIDURAL BLOCK INJECTION Left 10/4/2018    Procedure: L4 AND L5 TRANSFORAMINAL EPIDURAL STEROID INJECTION;  Surgeon: Vazquez Baltazar MD;  [facility];  Service: Pain Management     EPIDURAL BLOCK INJECTION Left 5/1/2019    Procedure: L4-5 and L5-S1 transforaminal epidural steroid injection;  Surgeon: Vazquez Baltazar MD;  [facility];  Service: Pain Management     FL GUIDED NEEDLE PLAC BX/ASP/INJ  5/1/2019    AR COLONOSCOPY FLX DX W/COLLJ SPEC WHEN PFRMD N/A 5/31/2016    Procedure: COLONOSCOPY;  Surgeon: Carlota Naranjo MD;  [facility];  Service: Gastroenterology    AR ESOPHAGOGASTRODUODENOSCOPY TRANSORAL DIAGNOSTIC N/A 11/15/2016    Procedure: ESOPHAGOGASTRODUODENOSCOPY (EGD); Surgeon: Carlota Naranjo MD;  [facility];  Service: Gastroenterology    AR TYMPANOPLASTY Left 10/25/2019    Procedure: LEFT TYMPANOPLASTY;  Surgeon: Emi Rivers MD;  [facility];  Service: ENT    TUBAL LIGATION       Social History     Substance and Sexual Activity   Alcohol Use Not Currently     Social History     Substance and Sexual Activity   Drug Use No     Social History     Tobacco Use   Smoking Status Never Smoker   Smokeless Tobacco Never Used     Family History: non-contributory    Meds/Allergies   all current active meds have been reviewed  No Known Allergies    Objective   Vitals: Blood pressure 118/74, pulse 63, height 4' 11" (1 499 m), weight 64 9 kg (143 lb), not currently breastfeeding , Body mass index is 28 88 kg/m²  ,   Weight (last 2 days)     Date/Time   Weight    07/16/21 1239   64 9 (143)                        Physical Exam:  GEN: Jerry Nair appears well, alert and oriented x 3, pleasant and cooperative   HEENT: pupils equal, round, and reactive to light; extraocular muscles intact  NECK: supple, no carotid bruits   HEART: regular rhythm, normal S1 and S2, no murmurs, clicks, gallops or rubs   LUNGS: clear to auscultation bilaterally; no wheezes, rales, or rhonchi   ABDOMEN: normal bowel sounds, soft, no tenderness, no distention  EXTREMITIES: peripheral pulses normal; no clubbing, cyanosis, or edema  NEURO: no focal findings   SKIN: normal without suspicious lesions on exposed skin    Lab Results:   Consult on 07/16/2021   Component Date Value Ref Range Status    Interpretation 07/16/2021    Final    NSR  RBBB  Imaging: I have personally reviewed pertinent reports

## 2021-08-09 ENCOUNTER — HOSPITAL ENCOUNTER (OUTPATIENT)
Dept: MAMMOGRAPHY | Facility: HOSPITAL | Age: 56
Discharge: HOME | End: 2021-08-09
Attending: OBSTETRICS & GYNECOLOGY
Payer: COMMERCIAL

## 2021-08-09 VITALS — WEIGHT: 143 LBS | HEIGHT: 59 IN | BODY MASS INDEX: 28.83 KG/M2

## 2021-08-09 DIAGNOSIS — Z12.31 ENCOUNTER FOR SCREENING MAMMOGRAM FOR MALIGNANT NEOPLASM OF BREAST: ICD-10-CM

## 2021-08-09 PROCEDURE — 77063 BREAST TOMOSYNTHESIS BI: CPT

## 2021-08-09 PROCEDURE — 77067 SCR MAMMO BI INCL CAD: CPT

## 2021-08-13 ENCOUNTER — PROCEDURE VISIT (OUTPATIENT)
Dept: OBGYN CLINIC | Facility: CLINIC | Age: 56
End: 2021-08-13
Payer: COMMERCIAL

## 2021-08-13 VITALS
DIASTOLIC BLOOD PRESSURE: 84 MMHG | SYSTOLIC BLOOD PRESSURE: 149 MMHG | HEIGHT: 59 IN | HEART RATE: 58 BPM | WEIGHT: 143 LBS | BODY MASS INDEX: 28.83 KG/M2

## 2021-08-13 DIAGNOSIS — M17.11 PRIMARY OSTEOARTHRITIS OF RIGHT KNEE: Primary | ICD-10-CM

## 2021-08-13 DIAGNOSIS — M17.12 PRIMARY OSTEOARTHRITIS OF LEFT KNEE: ICD-10-CM

## 2021-08-13 PROCEDURE — 20610 DRAIN/INJ JOINT/BURSA W/O US: CPT | Performed by: ORTHOPAEDIC SURGERY

## 2021-08-13 NOTE — PROGRESS NOTES
Assessment/Plan:       Problem List Items Addressed This Visit     None      Visit Diagnoses     Primary osteoarthritis of right knee    -  Primary    Primary osteoarthritis of left knee              Patient presented for bilateral knee Euflexxa injections  Patient tolerated procedure well  Risk and benefits of injection were discussed  Patient should follow-up next week  Supportive therapy as needed  Large joint arthrocentesis: R knee  Universal Protocol:  Consent: Verbal consent obtained  Consent given by: patient  Timeout called at: 8/13/2021 8:42 AM   Patient understanding: patient states understanding of the procedure being performed  Patient consent: the patient's understanding of the procedure matches consent given  Procedure consent: procedure consent matches procedure scheduled  Relevant documents: relevant documents present and verified  Patient identity confirmed: verbally with patient    Supporting Documentation  Indications: pain   Procedure Details  Location: knee - R knee  Needle size: 22 G  Approach: anterolateral  Medications administered: 2 mL sodium hyaluronate 16 8 MG/2ML  Specialty Pharmacy Supplied: received medications from pharmacy  Patient tolerance: patient tolerated the procedure well with no immediate complications  Dressing:  Sterile dressing applied    Large joint arthrocentesis: L knee  Universal Protocol:  Consent: Verbal consent obtained    Risks and benefits: risks, benefits and alternatives were discussed  Consent given by: patient  Timeout called at: 8/13/2021 8:45 AM   Patient understanding: patient states understanding of the procedure being performed  Patient consent: the patient's understanding of the procedure matches consent given  Procedure consent: procedure consent matches procedure scheduled    Supporting Documentation  Indications: pain   Procedure Details  Location: knee - L knee  Needle size: 22 G  Approach: anterolateral  Medications administered: 2 mL sodium hyaluronate 16 8 MG/2ML  Specialty Pharmacy Supplied: received medications from pharmacy  Patient tolerance: patient tolerated the procedure well with no immediate complications  Dressing:  Sterile dressing applied        Subjective:      Patient ID: Nic Phillips is a 64 y o  female who presents with bilateral knee pain  Patient is here for euflexxa injections to her bilateral knees  Patient is progressing well and tolerated her previous injections well  Denies any systemic sympthoms like fever, chills, numbness or weakness  The following portions of the patient's history were reviewed and updated as appropriate: allergies, current medications, past family history, past medical history, past social history, past surgical history and problem list       Review of Systems   Constitutional: Negative for chills and fever  HENT: Negative for ear pain and sore throat  Eyes: Negative for pain and visual disturbance  Respiratory: Negative for cough and shortness of breath  Cardiovascular: Negative for chest pain and palpitations  Gastrointestinal: Negative for abdominal pain and vomiting  Genitourinary: Negative for dysuria and hematuria  Musculoskeletal: Negative for arthralgias and back pain  Bilateral knee pain   Skin: Negative for color change and rash  Neurological: Negative for seizures and syncope  All other systems reviewed and are negative  Objective:    /84   Pulse 58   Ht 4' 11" (1 499 m)   Wt 64 9 kg (143 lb)   BMI 28 88 kg/m²        Physical Exam  Constitutional:       Appearance: Normal appearance  HENT:      Head: Normocephalic and atraumatic  Cardiovascular:      Rate and Rhythm: Normal rate and regular rhythm  Pulses: Normal pulses  Heart sounds: Normal heart sounds  Pulmonary:      Effort: Pulmonary effort is normal       Breath sounds: Normal breath sounds  Abdominal:      General: Abdomen is flat   Bowel sounds are normal  Palpations: Abdomen is soft  Musculoskeletal:         General: No swelling or tenderness  Normal range of motion  Comments: Palpable medial joint line tenderness  No effusion noted no signs of erythema or ecchymosis  No signs of infection  Neurologically intact  Varus and valgus stress test negative  Skin:     General: Skin is warm  Capillary Refill: Capillary refill takes less than 2 seconds  Neurological:      General: No focal deficit present  Mental Status: She is alert and oriented to person, place, and time     Psychiatric:         Mood and Affect: Mood normal          Behavior: Behavior normal

## 2021-08-20 ENCOUNTER — PROCEDURE VISIT (OUTPATIENT)
Dept: OBGYN CLINIC | Facility: CLINIC | Age: 56
End: 2021-08-20
Payer: COMMERCIAL

## 2021-08-20 VITALS
BODY MASS INDEX: 28.83 KG/M2 | HEIGHT: 59 IN | SYSTOLIC BLOOD PRESSURE: 160 MMHG | WEIGHT: 143 LBS | HEART RATE: 65 BPM | DIASTOLIC BLOOD PRESSURE: 78 MMHG

## 2021-08-20 DIAGNOSIS — M17.11 PRIMARY OSTEOARTHRITIS OF RIGHT KNEE: Primary | ICD-10-CM

## 2021-08-20 DIAGNOSIS — M17.12 PRIMARY OSTEOARTHRITIS OF LEFT KNEE: ICD-10-CM

## 2021-08-20 PROCEDURE — 20610 DRAIN/INJ JOINT/BURSA W/O US: CPT | Performed by: ORTHOPAEDIC SURGERY

## 2021-08-20 RX ORDER — HYALURONATE SODIUM 10 MG/ML
20 SYRINGE (ML) INTRAARTICULAR
Status: COMPLETED | OUTPATIENT
Start: 2021-08-20 | End: 2021-08-20

## 2021-08-20 RX ADMIN — Medication 20 MG: at 11:58

## 2021-08-20 NOTE — PROGRESS NOTES
Chief Complaint  Bilateral no pain    History Of Presenting Illness  Feliberto Samson 1965 presents with  Bilateral knee pain due to osteoarthritis  Patient presents for Euflexxa 2      Current Medications  Current Outpatient Medications   Medication Sig Dispense Refill    atorvastatin (LIPITOR) 20 mg tablet Take 1 tablet (20 mg total) by mouth daily 60 tablet 3     No current facility-administered medications for this visit         Current Problems    Active Problems:   Patient Active Problem List    Diagnosis Date Noted    RBBB 07/16/2021    Acute non-recurrent maxillary sinusitis 05/11/2021    Near syncope 05/10/2021    Chronic pain 05/10/2021    Bradycardia 05/10/2021    Episode of dizziness 05/10/2021    Chronic right shoulder pain 04/14/2021    Medial epicondylitis of elbow, left 04/14/2021    Strain of thoracic back region 03/22/2021    Tympanic membrane perforation, left 10/07/2019    Atypical chest pain 04/17/2019    Epigastric abdominal pain 04/17/2019    Chronic musculoskeletal pain 04/17/2019    Pain in finger of both hands 04/17/2019    Pharyngoesophageal dysphagia 04/12/2019    Perforation of left tympanic membrane 04/12/2019    Chronic otitis media of left ear 04/12/2019    Lumbar degenerative disc disease 04/01/2019    Left hip pain 11/08/2018    Lumbar radiculopathy 09/11/2018    Chronic lumbar radiculopathy 04/18/2018    Chronic pain syndrome 04/18/2018    Chronic right hip pain 11/20/2017    Myofascial pain syndrome 11/22/2016    Dyslipidemia 09/12/2016    Abdominal pain, left lower quadrant 05/31/2016    Atrophic vaginitis 05/11/2016    Primary osteoarthritis of both knees 02/05/2016    Chronic pain of right knee 08/12/2015         Review of Systems:    General: negative for - chills, fatigue, fever,  weight gain or weight loss  Psychological: negative for - anxiety, behavioral disorder, concentration difficulties  Ophthalmic: negative for - blurry vision, decreased vision, double vision,      Past Medical History:   Past Medical History:   Diagnosis Date    Fatigue     High cholesterol     Hypertension     Neuropathy     Osteoarthritis     knees    Shortness of breath        Past Surgical History:   Past Surgical History:   Procedure Laterality Date    APPENDECTOMY      EPIDURAL BLOCK INJECTION Left 5/17/2018    Procedure: L4 AND L5 TRANSFORAMINAL EPIDURAL STEROID INJECTION;  Surgeon: Mana Estrada MD;  [facility];  Service: Pain Management     EPIDURAL BLOCK INJECTION Left 10/4/2018    Procedure: L4 AND L5 TRANSFORAMINAL EPIDURAL STEROID INJECTION;  Surgeon: Mana Estrada MD;  [facility];  Service: Pain Management     EPIDURAL BLOCK INJECTION Left 5/1/2019    Procedure: L4-5 and L5-S1 transforaminal epidural steroid injection;  Surgeon: Mana Estrada MD;  [facility];  Service: Pain Management     FL GUIDED NEEDLE PLAC BX/ASP/INJ  5/1/2019    W[place] COLONOSCOPY FLX DX W/COLLJ SPEC WHEN PFRMD N/A 5/31/2016    Procedure: COLONOSCOPY;  Surgeon: Erna Laurent MD;  [facility];  Service: Gastroenterology    WV ESOPHAGOGASTRODUODENOSCOPY TRANSORAL DIAGNOSTIC N/A 11/15/2016    Procedure: ESOPHAGOGASTRODUODENOSCOPY (EGD);   Surgeon: Erna Laurent MD;  [facility];  Service: Gastroenterology    WV TYMPANOPLASTY Left 10/25/2019    Procedure: LEFT TYMPANOPLASTY;  Surgeon: Fredy Ellison MD;  Location:  MAIN OR;  Service: ENT    TUBAL LIGATION         Family History:  Family history reviewed and non-contributory  Family History   Problem Relation Age of Onset    Uterine cancer Mother     Early death Father     Heart disease Brother     No Known Problems Sister     No Known Problems Daughter     No Known Problems Sister     No Known Problems Daughter     No Known Problems Maternal Aunt     No Known Problems Maternal Aunt     No Known Problems Maternal Aunt     No Known Problems Maternal Aunt     No Known Problems Maternal Grandmother     No Known Problems Maternal Grandfather     No Known Problems Paternal Grandmother     No Known Problems Paternal Grandfather        Social History:  Social History     Socioeconomic History    Marital status: Single     Spouse name: None    Number of children: None    Years of education: None    Highest education level: None   Occupational History    None   Tobacco Use    Smoking status: Never Smoker    Smokeless tobacco: Never Used   Vaping Use    Vaping Use: Never used   Substance and Sexual Activity    Alcohol use: Not Currently    Drug use: No    Sexual activity: Yes     Birth control/protection: Female Sterilization   Other Topics Concern    None   Social History Narrative    None     Social Determinants of Health     Financial Resource Strain:     Difficulty of Paying Living Expenses:    Food Insecurity:     Worried About Running Out of Food in the Last Year:     Ran Out of Food in the Last Year:    Transportation Needs:     Lack of Transportation (Medical):  Lack of Transportation (Non-Medical):    Physical Activity:     Days of Exercise per Week:     Minutes of Exercise per Session:    Stress:     Feeling of Stress :    Social Connections:     Frequency of Communication with Friends and Family:     Frequency of Social Gatherings with Friends and Family:     Attends Yarsani Services:     Active Member of Clubs or Organizations:     Attends Club or Organization Meetings:     Marital Status:    Intimate Partner Violence:     Fear of Current or Ex-Partner:     Emotionally Abused:     Physically Abused:     Sexually Abused:         Allergies:   No Known Allergies        Physical ExaminationBP 160/78   Pulse 65   Ht 4' 11" (1 499 m)   Wt 64 9 kg (143 lb)   BMI 28 88 kg/m²   Gen: Alert and oriented to person, place, time  HEENT: EOMI, eyes clear, moist mucus membranes, hearing intact      Orthopedic Exam  Both knees examination unchanged 0-120 flexion joint line tenderness crepitus          Impression   bilateral knee osteoarthritis        Plan     both knees injected with Euflexxa 2  patient ice, use over-the-counter pain medication, do home exercise program and physical therapy   follow up next week for Euflexxa 3  Large joint arthrocentesis: L knee  Universal Protocol:  Risks and benefits: risks, benefits and alternatives were discussed  Consent given by: patient  Time out: Immediately prior to procedure a "time out" was called to verify the correct patient, procedure, equipment, support staff and site/side marked as required  Timeout called at: 8/20/2021 11:57 AM   Site marked: the operative site was marked  Patient identity confirmed: verbally with patient    Supporting Documentation  Indications: pain and joint swelling   Procedure Details  Location: knee - L knee  Preparation: Patient was prepped and draped in the usual sterile fashion  Needle size: 22 G  Ultrasound guidance: no  Approach: anterolateral  Medications administered: 20 mg Sodium Hyaluronate 20 MG/2ML    Patient tolerance: patient tolerated the procedure well with no immediate complications  Dressing:  Sterile dressing applied    Large joint arthrocentesis: R knee  Universal Protocol:  Risks and benefits: risks, benefits and alternatives were discussed  Consent given by: patient  Time out: Immediately prior to procedure a "time out" was called to verify the correct patient, procedure, equipment, support staff and site/side marked as required    Timeout called at: 8/20/2021 11:57 AM   Site marked: the operative site was marked  Patient identity confirmed: verbally with patient    Supporting Documentation  Indications: pain and joint swelling   Procedure Details  Location: knee - R knee  Preparation: Patient was prepped and draped in the usual sterile fashion  Needle size: 22 G  Ultrasound guidance: no  Approach: anterolateral  Medications administered: 20 mg Sodium Hyaluronate 20 MG/2ML    Patient tolerance: patient tolerated the procedure well with no immediate complications  Dressing:  Sterile dressing applied        Jackie Guzman MD        Portions of the record may have been created with voice recognition software  Occasional wrong word or "sound a like" substitutions may have occurred due to the inherent limitations of voice recognition software  Read the chart carefully and recognize, using context, where substitutions have occurred

## 2021-08-20 NOTE — PATIENT INSTRUCTIONS
Ayuda para la lynda de decisiones en casos de artrosis de rodilla   CUIDADO AMBULATORIO:   Lo que usted necesita saber acerca de la lynda de decisiones en casos de artrosis de rodilla: Usted puede ayudar a dmitri decisiones acerca de la detección de la artrosis  También puede ayudar a planificar el tratamiento si se determina la presencia de artrosis en la detección o si esta se desarrolla luego  La detección de la artrosis es haydee prueba que se realiza para determinar la presencia de artrosis de manera temprana  La detección es diferente del diagnóstico porque la detección se utiliza cuando comienza a tener signos o síntomas  Casa significa que el control o el tratamiento pueden comenzar de [place] temprana para ayudar a prevenir el daño articular  Los tratamientos [facility] y [facility] también puede controlar la artrosis con cambios de estilo de lisa  Lo que necesita saber acerca de la artrosis de rodilla:  · La artrosis es haydee afección que hace que el tejido que se encuentra entre los huesos se desgaste  Casa puede ocurrir lentamente con el paso del tiempo o rápidamente debido a haydee [place]  Los [employer] se frotan cuando usted se [place]  Casa causa dolor y puede limitar la movilidad  · La artrosis es la forma más común de la artritis  Unas 30 millones de [employer] [place] tienen artrosis  · La artrosis es más frecuente en mujeres que en hombres  Otros factores de riesgo incluyen sobrepeso o antecedentes familiares de artrosis  · Hable con del valle médico si usted piensa que tiene signos o síntomas de artrosis  Deliah Mola son dolor en las articulaciones con el movimiento, articulaciones blandas o rígidas, o haydee sensación de rechinido con el movimiento  También puede sentir bultos duros alrededor [employer] articulaciones, llamados espolones óseos      Cómo saber si es un buen candidato para la detección de la artrosis: La detección puede ser útil para usted si algo de lo siguiente es verdadero:  · Tiene 50 años o más de [place]  · Usted tiene sobrepeso u obesidad  · Del Valle trabajo o el deporte que practica hace que la articulación de del valle rodilla esté bajo presión continua  · Usted tiene antecedentes familiares de artrosis  · Usted tuvo haydee lesión en la articulación de la rodilla, aunque haya sucedido muchos años atrás  · Nació con un problema de articulación o cartílago  · Tiene leves signos o síntomas de artrosis  · Los síntomas empiezan a afectar del valle capacidad para realizar catherine actividades diarias  Cómo se realiza la detección de la artrosis: No se utiliza ninguna prueba para diagnosticar la artrosis  Del Valle médico controlará lo siguiente:  · Cuánto dolor, sensibilidad o inflamación tiene en la articulación    · Amplitud de movimiento de la articulación (hasta qué punto puede moverse en cada dirección)    · Del Valle capacidad de caminar sin dolor o cómo catherine síntomas cambiaron del valle manera de caminar    · La fuerza de los músculos que se encuentran alrededor de la articulación    Beneficios y riesgos de la detección: Hable con del valle médico sobre los riesgos y los beneficios de la detección:  · Los beneficios incluyen el hallazgo de la artrosis de manera temprana, cuando aún puede controlar la afección  Usted puede ser capaz de retardar el progreso del daño articular adelgazando o haciendo más ejercicio  También puede ayudar a hacer un plan de tratamiento que se puede cambiar con el tiempo según sea necesario  · Los riesgos incluyen haydee falsa creencia de que no desarrollará artrosis  La prueba de detección puede determinar que usted no tiene artrosis  [place] puede deberse a que los signos y síntomas son leves  De todos modos, puede desarrollar signos y síntomas más graves con el [place]  Es importante hablar con del valle médico sobre la frecuencia de la realización de la detección      Preguntas que debe hacerle a del valle médico para que lo ayude a dmitri decisiones sobre la detección:  · ¿Qué riesgo presento de tener artrosis? · ¿Con qué frecuencia miguelito realizarme la detección? · ¿Dónde se realiza la detección? · ¿Necesito hacer algo para estar listo para someterme a la detección? Lo que sucede después de la detección de la artrosis: Usted se reunirá con del valle médico para repasar los resultados de la detección  Usted, del valle oscar o quien lo [place], y del valle médico pueden hablar acerca de las opciones de [place]  Juntos pueden decidir qué tratamiento es adecuado para usted  Ramón vez necesite más pruebas para diagnosticar cualquier problema que se haya observado en la detección  Las pruebas comunes incluyen haydee radiografía o tomografía computarizada para verificar la cantidad de tejido [place] o para detectar espolones óseos  Cómo se trata la artrosis, y los beneficios del tratamiento:  · Cambios en el estilo de lisa incluyen la pérdida de peso y la realización de ejercicio  La pérdida de peso puede ayudar a quitar presión de la articulación  El ejercicio puede ayudar a formar masa muscular alrededor de la articulación  [place] ayuda a aumentar la estabilidad y la fuerza  Del Valle médico, un dietista o un fisioterapeuta puede ayudarlo a hacer planes nutricionales y de ejercicio  · Dispositivos pueden ayudarlo a mantenerse activo y aliviar catherine síntomas  Las plantillas brindan apoyo a las articulaciones y les quitan presión  [place] puede ayudar a reducir el dolor que siente cuando usted se para o camina  Haydee rodillera puede añadir estabilidad a la rodilla al caminar  Un bastón puede ayudar a quitar el peso de la jerson al caminar  · Los medicamentos incluyen el acetaminofeno, los medicamentos FANTASMA y ciertos antidepresivos  Los [employer] [place], yolis el ibuprofeno y el acetaminofeno, pueden tomarse en píldoras o aplicarse en la piel en crema  El acetaminofeno y los medicamentos FANTASMA ayudan a aliviar el dolor  Los medicamentos FANTASMA también ayudan a reducir la inflamación   Ambos medicamentos están disponibles sin receta médica  No tome estos medicamentos sin antes consultar con del valle A[place] Holiday fórmulas con mayor concentración de estos medicamentos están disponibles con receta médica  Los antidepresivos solo están disponibles con receta médica  · La terapia incluye fisioterapia y terapia ocupacional  Un fisioterapeuta puede ayudarlo a fortalecer los músculos alrededor de la articulación y aumentar del valle amplitud de movimiento  Un terapeuta ocupacional puede ayudarlo a encontrar formas más sencillas de hacer catherine actividades diarias  Por ejemplo, pueden enseñarle cómo subir escaleras sin ejercer presión [employer] rodilla  · Medicamentos esteroideos se puede inyectar en la stuart de la rodilla si catherine síntomas empeoran  [place] puede reducir la inflamación y aliviar el dolor  · La cirugía podría realizarse si otros tratamientos no alissa resultados y catherine síntomas empeoran  El desbridamiento es haydee cirugía para quitar porciones de hueso y cartílago de la articulación  [place] se hace si del valle rodilla se traba o deforma  La osteotomía es haydee cirugía para  los huesos a haydee posición diferente para que no rocen con otros huesos  La cirugía llamada artroplastia se utiliza para quitar la articulación dañada y sustituirla por haydee articulación artificial  La cirugía puede aliviar el dolor al eliminar el problema que está causando del valle dolor  Riesgos del tratamiento para la artrosis:  · Cambios en el estilo de lisa no revertirán el daño del tejido que se encuentra entre las articulaciones  Puede impedir que los síntomas empeoren  También es posible que le resulte difícil realizar ejercicio si usted tiene dolor intenso en la rodilla  · Los medicamentos deben tomarse en dosis limitadas  El acetaminofeno puede dañar el hígado y los medicamentos FANTASMA pueden dañar los riñones  El dolor puede regresar antes de que se pueda dmitri otra dosis  Los antidepresivos pueden causar ciertos efectos secundarios      · [address] [place] [address] esteroides normalmente se limitan a unas 4 cada año  [place] se debe a que el medicamento puede causar daño a las articulaciones con el tiempo  Los esteroides también pueden incrementar el riesgo de contraer infecciones, provocar cambios en del valle estado de ánimo y aumentar los niveles de azúcar en la lia  · La cirugía aumenta del valle riesgo de contraer haydee infección o de la formación de coágulos de [place]  Los nervios, los vasos sanguíneos o los tejidos que se encuentran alrededor de la rodilla pueden dañarse linda haydee osteotomía  [place] articulación artificial puede desgastarse con [employer]  También puede aflojarse  Deberá reemplazarse si se desgasta o se afloja  Usted deberá ejercitar la rodilla para evitar que se forme tejido cicatricial en osvaldo  [place] puede ser doloroso  Preguntas que debe hacerle a del valle médico para que lo ayude a dmitri decisiones sobre el tratamiento:  · ¿Cómo sé si los signos y síntomas se están volviendo lo suficientemente intensos yolis para necesitar tratamiento? · ¿Cuánto tiempo le tomará a cada opción de tratamiento aliviar el dolor de mi rodilla? · ¿Qué medicamentos para el dolor tendrán [employer] para mí? · ¿Soy un buen candidato para las inyecciones de esteroides? · ¿Soy un buen candidato para la cirugía de reemplazo de rodilla? · ¿Cuánto tiempo lleva la recuperación de la cirugía de reemplazo de rodilla? · ¿Deberé someterme a otras cirugías con el paso del tiempo? · ¿Con qué frecuencia deberé ejercitar la rodilla para evitar que se forme tejido cicatricial?    © Copyright [employer] 9116 Information is for End User's use only and may not be sold, redistributed or otherwise used for commercial purposes  All illustrations and images included in CareNotes® are the copyrighted property of A D A M , Inc  or [address] [place] [address] es sólo para uso en educación  Del Valle intención no es darle un consejo médico sobre enfermedades o tratamientos   Colsulte con del valle Mattie Cables farmacéutico antes de seguir cualquier régimen médico para saber si es seguro y efectivo para usted

## 2021-08-27 ENCOUNTER — PROCEDURE VISIT (OUTPATIENT)
Dept: OBGYN CLINIC | Facility: CLINIC | Age: 56
End: 2021-08-27
Payer: COMMERCIAL

## 2021-08-27 VITALS
HEART RATE: 57 BPM | HEIGHT: 59 IN | DIASTOLIC BLOOD PRESSURE: 63 MMHG | WEIGHT: 143 LBS | BODY MASS INDEX: 28.83 KG/M2 | SYSTOLIC BLOOD PRESSURE: 145 MMHG

## 2021-08-27 DIAGNOSIS — M17.12 PRIMARY OSTEOARTHRITIS OF LEFT KNEE: ICD-10-CM

## 2021-08-27 DIAGNOSIS — M17.11 PRIMARY OSTEOARTHRITIS OF RIGHT KNEE: Primary | ICD-10-CM

## 2021-08-27 PROCEDURE — 99213 OFFICE O/P EST LOW 20 MIN: CPT | Performed by: ORTHOPAEDIC SURGERY

## 2021-08-27 NOTE — PROGRESS NOTES
Progress Note - Orthopedics   [address] 64 y o  female MRN: [id number]  Unit/Bed#:  Encounter: [id number]  08/27/21  8:51 AM            Subjective:  Patient presents today for her 3rd injection of Euflexxa bilateral knees for symptomatic relief osteoarthritis    Vitals: Blood pressure 145/63, pulse 57, height 4' 11" (1 499 m), weight 64 9 kg (143 lb), not currently breastfeeding  ,Body mass index is 28 88 kg/m²  [unfilled]    Invasive Devices     Peripheral Intravenous Line            Peripheral IV 05/09/21 Left Hand 109 days    Peripheral IV 05/10/21 Left Antecubital 109 days              Current Outpatient Medications   Medication Sig Dispense Refill    atorvastatin (LIPITOR) 20 mg tablet Take 1 tablet (20 mg total) by mouth daily 60 tablet 3     No current facility-administered medications for this visit       Chief Complaint   Patient presents with    Left Knee - Follow-up    Right Knee - Follow-up     Patient Active Problem List   Diagnosis    Abdominal pain, left lower quadrant    Chronic lumbar radiculopathy    Chronic pain syndrome    Dyslipidemia    Chronic pain of right knee    Chronic right hip pain    Myofascial pain syndrome    Atrophic vaginitis    Primary osteoarthritis of both knees    Lumbar radiculopathy    Left hip pain    Lumbar degenerative disc disease    Pharyngoesophageal dysphagia    Perforation of left tympanic membrane    Chronic otitis media of left ear    Atypical chest pain    Epigastric abdominal pain    Chronic musculoskeletal pain    Pain in finger of both hands    Tympanic membrane perforation, left    Strain of thoracic back region    Chronic right shoulder pain    Medial epicondylitis of elbow, left    Near syncope    Chronic pain    Bradycardia    Episode of dizziness    Acute non-recurrent maxillary sinusitis    RBBB       Physical Exam: /63   Pulse 57   Ht 4' 11" (1 499 m)   Wt 64 9 kg (143 lb)   BMI 28 88 kg/m²     General Appearance:    Alert, cooperative, no distress, appears stated age   Head:    Normocephalic, without obvious abnormality, atraumatic   Eyes:    PERRL, conjunctiva/corneas clear, EOM's intact, fundi     benign, both eyes   Ears:    Normal TM's and external ear canals, both ears   Nose:   Nares normal, septum midline, mucosa normal, no drainage    or sinus tenderness   Throat:   Lips, mucosa, and tongue normal; teeth and gums normal   Neck:   Supple, symmetrical, trachea midline, no adenopathy;     thyroid:  no enlargement/tenderness/nodules; no carotid    bruit or JVD   Back:     Symmetric, no curvature, ROM normal, no CVA tenderness   Lungs:     Clear to auscultation bilaterally, respirations unlabored   Chest Wall:    No tenderness or deformity    Heart:    Regular rate and rhythm, S1 and S2 normal, no murmur, rub   or gallop   Breast Exam:    No tenderness, masses, or nipple abnormality   Abdomen:     Soft, non-tender, bowel sounds active all four quadrants,     no masses, no organomegaly   Genitalia:    Normal female without lesion, discharge or tenderness   Rectal:    Normal tone, no masses or tenderness; guaiac negative stool   Extremities:   Extremities normal, atraumatic, no cyanosis or edema   Pulses:   2+ and symmetric all extremities   Skin:   Skin color, texture, turgor normal, no rashes or lesions   Lymph nodes:   Cervical, supraclavicular, and axillary nodes normal   Neurologic:   CNII-XII intact, normal strength, sensation and reflexes     throughout     Ortho Exam: Knee:  positive exam findings: crepitus    Lab, Imaging and other studies: I have personally reviewed pertinent lab results                Assessment:  Osteoarthritis bilateral knees    Plan:  Knee Arthrocentesis with Injection Procedure Note    Pre-operative Diagnosis: bilateral   osteoarthritis    Post-operative Diagnosis: same     Indications: Symptom relief from osteoarthritis    Anesthesia: not required    Procedure Details     Verbal consent was obtained for the procedure  The joint was prepped with Betadine and a small wheel of anesthetic was injected into the subcutaneous tissue  A 22 gauge needle was inserted into the superior aspect of the joint from a lateral approach  0 ml of clear yellow fluid was removed from the joint and discarded  Euflexxa was then injected into the  Bilateral kneejoint through the same needle  The needle was removed and the area cleansed and dressed  Complications:  None; patient tolerated the procedure well      Lot #[id number]  Expiration# 2022-09-28    Nhi Damon MD

## 2021-09-08 ENCOUNTER — APPOINTMENT (EMERGENCY)
Dept: RADIOLOGY | Facility: HOSPITAL | Age: 56
End: 2021-09-08
Payer: COMMERCIAL

## 2021-09-08 ENCOUNTER — HOSPITAL ENCOUNTER (EMERGENCY)
Facility: HOSPITAL | Age: 56
Discharge: HOME | End: 2021-09-08
Attending: EMERGENCY MEDICINE
Payer: COMMERCIAL

## 2021-09-08 VITALS
BODY MASS INDEX: 29.23 KG/M2 | TEMPERATURE: 98.6 F | OXYGEN SATURATION: 98 % | RESPIRATION RATE: 16 BRPM | HEIGHT: 59 IN | HEART RATE: 64 BPM | WEIGHT: 145 LBS | DIASTOLIC BLOOD PRESSURE: 82 MMHG | SYSTOLIC BLOOD PRESSURE: 140 MMHG

## 2021-09-08 DIAGNOSIS — S62.641A NONDISPLACED FRACTURE OF PROXIMAL PHALANX OF LEFT INDEX FINGER, INITIAL ENCOUNTER FOR CLOSED FRACTURE: Primary | ICD-10-CM

## 2021-09-08 PROCEDURE — 99283 EMERGENCY DEPT VISIT LOW MDM: CPT

## 2021-09-08 PROCEDURE — 99284 EMERGENCY DEPT VISIT MOD MDM: CPT | Performed by: EMERGENCY MEDICINE

## 2021-09-08 PROCEDURE — 73130 X-RAY EXAM OF HAND: CPT

## 2021-09-08 NOTE — ED PROVIDER NOTES
History  Chief Complaint   Patient presents with    Finger Pain     injury to left hand pointer finger after smashing finger off mop this afternoon     63-year-old female presents for evaluation with her niece of left index/2nd finger pain  Patient was mopping yesterday when her finger got caught in the mop as she was draining liquid from it  Patient felt immediate pain and has tried ice at home without relief  Patient has a small amount of swelling to the finger, denies numbness or tingling  Prior to Admission Medications   Prescriptions Last Dose Informant Patient Reported? Taking?   atorvastatin (LIPITOR) 20 mg tablet   No No   Sig: Take 1 tablet (20 mg total) by mouth daily      Facility-Administered Medications: None       Past Medical History:   Diagnosis Date    Fatigue     High cholesterol     Hypertension     Neuropathy     Osteoarthritis     knees    Shortness of breath        Past Surgical History:   Procedure Laterality Date    APPENDECTOMY      EPIDURAL BLOCK INJECTION Left 5/17/2018    Procedure: L4 AND L5 TRANSFORAMINAL EPIDURAL STEROID INJECTION;  Surgeon: Moy Ramirez MD;  [facility];  Service: Pain Management     EPIDURAL BLOCK INJECTION Left 10/4/2018    Procedure: L4 AND L5 TRANSFORAMINAL EPIDURAL STEROID INJECTION;  Surgeon: Moy Ramirez MD;  [facility];  Service: Pain Management     EPIDURAL BLOCK INJECTION Left 5/1/2019    Procedure: L4-5 and L5-S1 transforaminal epidural steroid injection;  Surgeon: Moy Ramirez MD;  [facility];  Service: Pain Management     FL GUIDED NEEDLE PLAC BX/ASP/INJ  5/1/2019    RI COLONOSCOPY FLX DX W/COLLJ SPEC WHEN PFRMD N/A 5/31/2016    Procedure: COLONOSCOPY;  Surgeon: Shakila Lowery MD;  [facility];  Service: Gastroenterology    RI ESOPHAGOGASTRODUODENOSCOPY TRANSORAL DIAGNOSTIC N/A 11/15/2016    Procedure: ESOPHAGOGASTRODUODENOSCOPY (EGD);   Surgeon: Shakila Lowery MD;  [facility];  Service: Gastroenterology    NV TYMPANOPLASTY Left 10/25/2019    Procedure: LEFT TYMPANOPLASTY;  Surgeon: Antonia Peacock MD;  [facility];  Service: ENT    TUBAL LIGATION         Family History   Problem Relation Age of Onset    Uterine cancer Mother     Early death Father     Heart disease Brother     No Known Problems Sister     No Known Problems Daughter     No Known Problems Sister     No Known Problems Daughter     No Known Problems Maternal Aunt     No Known Problems Maternal Aunt     No Known Problems Maternal Aunt     No Known Problems Maternal Aunt     No Known Problems Maternal Grandmother     No Known Problems Maternal Grandfather     No Known Problems Paternal Grandmother     No Known Problems Paternal Grandfather      I have reviewed and agree with the history as documented  E-Cigarette/Vaping    E-Cigarette Use Never User      E-Cigarette/Vaping Substances    Nicotine No     THC No     CBD No     Flavoring No     Other No     Unknown No      Social History     Tobacco Use    Smoking status: Never Smoker    Smokeless tobacco: Never Used   Vaping Use    Vaping Use: Never used   Substance Use Topics    Alcohol use: Not Currently    Drug use: No       Review of Systems   Musculoskeletal: Positive for arthralgias and myalgias  Skin: Negative for wound  All other systems reviewed and are negative  Physical Exam  Physical Exam  Vitals reviewed  Constitutional:       General: She is not in acute distress  Appearance: Normal appearance  She is not ill-appearing, toxic-appearing or diaphoretic  HENT:      Head: Normocephalic and atraumatic  Right Ear: External ear normal       Left Ear: External ear normal    Eyes:      General:         Right eye: No discharge  Left eye: No discharge  Cardiovascular:      Rate and Rhythm: Normal rate  Pulmonary:      Effort: Pulmonary effort is normal  No respiratory distress     Musculoskeletal: General: Tenderness and signs of injury present  No deformity  Comments: Tenderness along left 2nd/index finger of around proximal phalange, she is able to extend fingers on this hand without difficulty however flexion of fingers is limited with range of motion due to pain, small amount of swelling without erythema or warmth, left radial pulse is 2/4   Skin:     General: Skin is warm  Coloration: Skin is not jaundiced or pale  Neurological:      General: No focal deficit present  Mental Status: She is alert  Mental status is at baseline  Vital Signs  ED Triage Vitals [09/08/21 1846]   Temperature Pulse Respirations Blood Pressure SpO2   98 6 °F (37 °C) 64 16 140/82 98 %      Temp Source Heart Rate Source Patient Position - Orthostatic VS BP Location FiO2 (%)   Temporal Monitor Sitting Right arm --      Pain Score       6           Vitals:    09/08/21 1846   BP: 140/82   Pulse: 64   Patient Position - Orthostatic VS: Sitting         Visual Acuity      ED Medications  Medications - No data to display    Diagnostic Studies  Results Reviewed     None                 XR hand 3+ views LEFT   ED Interpretation by Suzette Moss DO (09/08 1926)   Possible fracture to proximal phalange on 2nd/index finger proximal to PIP                 Procedures  Splint application    Date/Time: 9/8/2021 7:31 PM  Performed by: Szuette Moss DO  Authorized by: Suzette Moss DO   Universal Protocol:  Consent: Verbal consent obtained  Risks and benefits: risks, benefits and alternatives were discussed  Consent given by: patient  Radiology Images displayed and confirmed   If images not available, report reviewed: imaging studies available  Required items: required blood products, implants, devices, and special equipment available  Patient identity confirmed: verbally with patient      Pre-procedure details:     Sensation:  Normal    Skin color:  Pink  Procedure details:     Laterality:  Left Location:  Finger    Finger:  L index finger    Supplies:  Aluminum splint             ED Course                             SBIRT 22yo+      Most Recent Value   SBIRT (24 yo +)   In order to provide better care to our patients, we are screening all of our patients for alcohol and drug use  Would it be okay to ask you these screening questions? Yes Filed at: 09/08/2021 5218   Initial Alcohol Screen: US AUDIT-C    1  How often do you have a drink containing alcohol?  0 Filed at: 09/08/2021 1848   2  How many drinks containing alcohol do you have on a typical day you are drinking? 0 Filed at: 09/08/2021 1848   3a  Male UNDER 65: How often do you have five or more drinks on one occasion? 0 Filed at: 09/08/2021 1848   3b  FEMALE Any Age, or MALE 65+: How often do you have 4 or more drinks on one occassion? 0 Filed at: 09/08/2021 1848   Audit-C Score  0 Filed at: 09/08/2021 1848   HAJA: How many times in the past year have you    Used an illegal drug or used a prescription medication for non-medical reasons? Never Filed at: 09/08/2021 1848                    [facility]  Number of Diagnoses or Management Options  Nondisplaced fracture of proximal phalanx of left index finger, initial encounter for closed fracture  Diagnosis management comments: 31-year-old female presents for left finger injury  X-ray show possible small fracture to proximal phalange on her index finger, will place in aluminum splint and provide orthopedic follow-up        Disposition  Final diagnoses:   Nondisplaced fracture of proximal phalanx of left index finger, initial encounter for closed fracture     Time reflects when diagnosis was documented in both MDM as applicable and the Disposition within this note     Time User Action Codes Description Comment    9/8/2021  7:32 PM John Ryan Nondisplaced fracture of proximal phalanx of left index finger, initial encounter for closed fracture       ED Disposition     ED Disposition Condition Date/Time Comment    Discharge Stable Wed Sep 8, 2021  7:32 PM Shani Jason discharge to home/self care  Follow-up Information     Follow up With Specialties Details Why Contact Pillo Guzman MD Orthopedic Surgery Call   [address],  [address] [phone]            Discharge Medication List as of 9/8/2021  7:35 PM      CONTINUE these medications which have NOT CHANGED    Details   atorvastatin (LIPITOR) 20 mg tablet Take 1 tablet (20 mg total) by mouth daily, Starting Tue 6/15/2021, Normal           No discharge procedures on file      PDMP Review       Value Time User    PDMP Reviewed  Yes 5/10/2021  3:07 AM Nanda Eisenberg PA-C          ED Provider  Electronically Signed by           Gabriela Yusuf DO  09/09/21 0011

## 2021-09-13 ENCOUNTER — OFFICE VISIT (OUTPATIENT)
Dept: FAMILY MEDICINE CLINIC | Facility: CLINIC | Age: 56
End: 2021-09-13
Payer: COMMERCIAL

## 2021-09-13 VITALS
WEIGHT: 145.2 LBS | HEIGHT: 59 IN | SYSTOLIC BLOOD PRESSURE: 110 MMHG | OXYGEN SATURATION: 98 % | TEMPERATURE: 97.9 F | BODY MASS INDEX: 29.27 KG/M2 | DIASTOLIC BLOOD PRESSURE: 80 MMHG | HEART RATE: 81 BPM | RESPIRATION RATE: 16 BRPM

## 2021-09-13 DIAGNOSIS — G89.29 CHRONIC RIGHT SHOULDER PAIN: Primary | ICD-10-CM

## 2021-09-13 DIAGNOSIS — M25.512 CHRONIC LEFT SHOULDER PAIN: ICD-10-CM

## 2021-09-13 DIAGNOSIS — M25.511 CHRONIC RIGHT SHOULDER PAIN: Primary | ICD-10-CM

## 2021-09-13 DIAGNOSIS — E78.5 DYSLIPIDEMIA: ICD-10-CM

## 2021-09-13 DIAGNOSIS — G89.29 CHRONIC LEFT SHOULDER PAIN: ICD-10-CM

## 2021-09-13 PROCEDURE — T1015 CLINIC SERVICE: HCPCS | Performed by: FAMILY MEDICINE

## 2021-09-13 PROCEDURE — 99213 OFFICE O/P EST LOW 20 MIN: CPT | Performed by: FAMILY MEDICINE

## 2021-09-13 NOTE — PATIENT INSTRUCTIONS
Dolor de hombro   CUIDADO AMBULATORIO:   El dolor de hombro es un problema común que puede afectar catherine actividades diarias  El dolor puede ser causado por un problema en del valle hombro, yolis la irritación de un tendón o bursa  Los tendones son cuerdas de tejido resistente que [employer] músculos a los [place]  La bursa es haydee bolsa (saco) llena de líquido que actúa yolis colchón entre un hueso y un tendón  El dolor de hombro también puede ser causado por el dolor que se propaga hacia del valle hombro de otra parte de del valle cuerpo  Busque atención médica de inmediato si:  · Usted tiene dolor intenso  · Usted no puede  del valle brazo ni del valle hombro  · Usted tiene entumecimiento u hormigueo en el hombro o en el brazo  Comuníquese con del valle médico si:  · Del Val[place] dolor empeora o no desaparece con el tratamiento  · Usted tiene dificultad para  el brazo o el hombro  · Usted tiene preguntas o inquietudes acerca de del valle condición o cuidado  El tratamiento para el dolor de hombro: podría incluir cualquiera de los siguientes:  · Acetaminofén liza el dolor y baja la fiebre  Está disponible sin receta médica  Pregunte la cantidad y la frecuencia con que debe tomarlos  [address]  Rhea las etiquetas de todos los demás medicamentos que esté usando para saber si también contienen acetaminofén, o pregunte a del valle médico o farmacéutico  El acetaminofén puede causar daño en el hígado cuando no se lynda de forma correcta  No use más de 4 gramos (4000 miligramos) en total de acetaminofeno en un día  · [place], yolis el ibuprofeno, [place] [facility] a disminuir la inflamación, el dolor y la [place]  Jennifer medicamento está disponible con o sin haydee receta médica  Los FANTASMA pueden causar sangrado estomacal o problemas renales en ciertas personas  Si usted lynda un medicamento anticoagulante, siempre pregúntele a del valle médico si los FANTASMA son seguros para usted  Siempre rhea la etiqueta de jennifer medicamento y [place] instrucciones      · Haydee inyección de esteroides podrían ayudar a reducir el dolor y la inflamación  · La cirugía puede ser necesaria para un dolor a bill plazo y por la pérdida de función  El manejo de catherien síntomas:  · Aplique hielo en el hombro de 20 a 30 minutos cada 2 horas o yolis se lo indiquen  Use haydee compresa de hielo o ponga hielo triturado en haydee bolsa de plástico  Torres Hassan con haydee toalla antes de aplicarla sobre el hombro  El hielo ayuda a evitar daño al tejido y a disminuir la inflamación y el dolor  · Use calor si el hielo no le está ayudando con catherine síntomas  Aplique calor sobre el hombro linda 20 a 30 minutos cada 2 horas linda tantos días yolis le indiquen  El calor ayuda a disminuir el dolor y los espasmos musculares  · Limite las [address] yolis se le indique  Trate de evitar movimientos repetidos arriba de la trevin  · Asista a terapia física u ocupacional yolis se le indique  Un fisioterapeuta le puede enseñar ejercicios para ayudarle a mejorar el movimiento y la fuerza, y para disminuir el dolor  Un terapeuta ocupacional le enseña habilidades para ayudarlo con catherine actividades diarias  Prevenga el dolor de hombro:  · Mantenga un buen rango de movimiento en el hombro  Pregúntele a del valle médico qué ejercicios debe hacer de forma regular después de tanna sanado  · Haciendo ejercicios de estiramiento y fortalecimiento para del valle hombro  Use la técnica apropiada linda los ejercicios y deportes  Roberto haydee megan de seguimiento con del valle médico u ortopedista yolis se le indique: Anote catherine preguntas para que se acuerde de hacerlas linda catherine visitas  © Copyright [employer] [facility] 2021 Information is for End User's use only and may not be sold, redistributed or otherwise used for commercial purposes  All illustrations and images included in CareNotes® are the copyrighted property of A D A M , Inc  or [address] [place] [address] es sólo para uso en educación   Del Valle intención no es darle un consejo Alex Sanchez enfermedades o tratamientos  Colsulte con del valle Aniceto Correa farmacéutico antes de seguir cualquier régimen médico para saber si es seguro y efectivo para usted

## 2021-09-13 NOTE — PROGRESS NOTES
Assessment/Plan:     Diagnoses and all orders for this visit:    Chronic right shoulder pain  -     Diclofenac Sodium (VOLTAREN) 1 %; Apply 2 g topically 4 (four) times a day    Chronic left shoulder pain  -     Diclofenac Sodium (VOLTAREN) 1 %; Apply 2 g topically 4 (four) times a day    Dyslipidemia  -     Lipid panel; Future  -     TSH, 3rd generation with Free T4 reflex; Future    BMI 29 0-29 9,adult  -     CBC and differential; Future  -     Comprehensive metabolic panel; Future        Return in about 4 weeks (around 10/11/2021) for Annual physical        Subjective:        Patient ID: Nic Phillips is a 64 y o  female  Chief Complaint   Patient presents with    Shoulder Pain     bilateral, no injury reported       PMH dysphagia, near syncope, chronic otitis media of left ear with tympanic membrane perforation, OA, atrophic vaginitis, HLD, and chronic pain syndrome presents with her daughter for chronic bilateral shoulder pain  Has had injections previously by pain management - Kerline Granda  Per their note from June patient was to use acetaminophen and ibuprofen PRN with diclofenac PRN for shoulder pain  Patient does not have diclofenac in the home, so this was prescribed today  Patient has upcoming P T  appointment per pain mgmt recommendations  She will follow up with pain mgmt if this does not improve her pain  Shoulder Pain   The pain is present in the right shoulder and left shoulder  This is a chronic problem  The current episode started more than 1 month ago  There has been no history of extremity trauma  The problem occurs daily  The problem has been waxing and waning  The quality of the pain is described as aching  The pain is at a severity of 5/10  She has tried acetaminophen and NSAIDS for the symptoms  Her past medical history is significant for osteoarthritis         The following portions of the patient's history were reviewed and updated as appropriate: allergies, current medications, past family history, past medical history, past social history, past surgical history and problem list     Patient Active Problem List   Diagnosis    Abdominal pain, left lower quadrant    Chronic lumbar radiculopathy    Chronic pain syndrome    Dyslipidemia    Chronic pain of right knee    Chronic right hip pain    Myofascial pain syndrome    Atrophic vaginitis    Primary osteoarthritis of both knees    Lumbar radiculopathy    Left hip pain    Lumbar degenerative disc disease    Pharyngoesophageal dysphagia    Perforation of left tympanic membrane    Chronic otitis media of left ear    Atypical chest pain    Epigastric abdominal pain    Chronic musculoskeletal pain    Pain in finger of both hands    Tympanic membrane perforation, left    Strain of thoracic back region    Chronic right shoulder pain    Medial epicondylitis of elbow, left    Near syncope    Chronic pain    Bradycardia    Episode of dizziness    Acute non-recurrent maxillary sinusitis    RBBB       Current Outpatient Medications   Medication Sig Dispense Refill    atorvastatin (LIPITOR) 20 mg tablet Take 1 tablet (20 mg total) by mouth daily 60 tablet 3    Diclofenac Sodium (VOLTAREN) 1 % Apply 2 g topically 4 (four) times a day 350 g 1     No current facility-administered medications for this visit          Past Medical History:   Diagnosis Date    Fatigue     High cholesterol     Hypertension     Neuropathy     Osteoarthritis     knees    Shortness of breath         Past Surgical History:   Procedure Laterality Date    APPENDECTOMY      EPIDURAL BLOCK INJECTION Left 5/17/2018    Procedure: L4 AND L5 TRANSFORAMINAL EPIDURAL STEROID INJECTION;  Surgeon: Cristian Yu MD;  Location: [facility];  Service: Pain Management     EPIDURAL BLOCK INJECTION Left 10/4/2018    Procedure: L4 AND L5 TRANSFORAMINAL EPIDURAL STEROID INJECTION;  Surgeon: Cristian Yu MD;  [facility]: [facility];  Service: Pain Management     EPIDURAL BLOCK INJECTION Left 5/1/2019    Procedure: L4-5 and L5-S1 transforaminal epidural steroid injection;  Surgeon: Yesika Mar MD;  Location: [facility];  Service: Pain Management     FL GUIDED NEEDLE PLAC BX/ASP/INJ  5/1/2019    NC COLONOSCOPY FLX DX W/COLLJ SPEC WHEN PFRMD N/A 5/31/2016    Procedure: COLONOSCOPY;  Surgeon: Stuart Monet MD;  Location: [facility];  Service: Gastroenterology    NC ESOPHAGOGASTRODUODENOSCOPY TRANSORAL DIAGNOSTIC N/A 11/15/2016    Procedure: ESOPHAGOGASTRODUODENOSCOPY (EGD); Surgeon: Stuart Monet MD;  Location: [facility];  Service: Gastroenterology    NC TYMPANOPLASTY Left 10/25/2019    Procedure: LEFT TYMPANOPLASTY;  Surgeon: Ebony Abdul MD;  Location:  MAIN OR;  Service: ENT    TUBAL LIGATION          Social History     Socioeconomic History    Marital status: Single     Spouse name: Not on file    Number of children: Not on file    Years of education: Not on file    Highest education level: Not on file   Occupational History    Not on file   Tobacco Use    Smoking status: Never Smoker    Smokeless tobacco: Never Used   Vaping Use    Vaping Use: Never used   Substance and Sexual Activity    Alcohol use: Not Currently    Drug use: No    Sexual activity: Yes     Birth control/protection: Female Sterilization   Other Topics Concern    Not on file   Social History Narrative    Not on file     Social Determinants of Health     Financial Resource Strain:     Difficulty of Paying Living Expenses:    Food Insecurity:     Worried About Running Out of Food in the Last Year:     Ran Out of Food in the Last Year:    Transportation Needs:     Lack of Transportation (Medical):      Lack of Transportation (Non-Medical):    Physical Activity:     Days of Exercise per Week:     Minutes of Exercise per Session:    Stress:     Feeling of Stress :    Social Connections:     Frequency of Communication with Friends and Family:     Frequency of Social Gatherings with Friends and Family:     Attends Christian Services:     Active Member of Clubs or Organizations:     Attends Club or Organization Meetings:     Marital Status:    Intimate Partner Violence:     Fear of Current or Ex-Partner:     Emotionally Abused:     Physically Abused:     Sexually Abused:    Review of Systems   Constitutional: Negative for activity change, appetite change, fatigue and unexpected weight change  HENT: Negative for congestion, ear pain, hearing loss, nosebleeds, rhinorrhea, sinus pain and trouble swallowing  Eyes: Negative for photophobia  Respiratory: Negative for choking, shortness of breath and wheezing  Cardiovascular: Negative for chest pain, palpitations and leg swelling  Gastrointestinal: Negative for abdominal pain, blood in stool, constipation, diarrhea and nausea  Endocrine: Negative for polydipsia, polyphagia and polyuria  Genitourinary: Negative for difficulty urinating, dysuria, frequency, hematuria and urgency  Musculoskeletal: Positive for arthralgias (bilateral shoulders)  Negative for back pain and myalgias  Skin: Negative for color change, rash and wound  Neurological: Negative for dizziness, tremors, syncope, weakness and headaches  Psychiatric/Behavioral: Negative for agitation, confusion, self-injury and suicidal ideas  Objective:      /80   Pulse 81   Temp 97 9 °F (36 6 °C)   Resp 16   Ht 4' 11" (1 499 m)   Wt 65 9 kg (145 lb 3 2 oz)   SpO2 98%   BMI 29 33 kg/m²          Physical Exam  Vitals and nursing note reviewed  Constitutional:       Appearance: Normal appearance  HENT:      Head: Normocephalic and atraumatic  Nose: Nose normal       Mouth/Throat:      Mouth: Mucous membranes are moist    Eyes:      Extraocular Movements: Extraocular movements intact  Conjunctiva/sclera: Conjunctivae normal       Pupils: Pupils are equal, round, and reactive to light     Cardiovascular: Rate and Rhythm: Normal rate and regular rhythm  Pulmonary:      Effort: Pulmonary effort is normal       Breath sounds: Normal breath sounds  Abdominal:      General: Abdomen is flat  Bowel sounds are normal       Palpations: Abdomen is soft  Genitourinary:     Comments: Exam deferred  Musculoskeletal:         General: No signs of injury  Right shoulder: No bony tenderness  Decreased range of motion  Left shoulder: No bony tenderness  Decreased range of motion  Cervical back: Normal range of motion and neck supple  Skin:     General: Skin is warm and dry  Capillary Refill: Capillary refill takes less than 2 seconds  Neurological:      General: No focal deficit present  Mental Status: She is alert and oriented to person, place, and time     Psychiatric:         Mood and Affect: Mood normal          Behavior: Behavior normal

## 2021-09-15 ENCOUNTER — TELEPHONE (OUTPATIENT)
Dept: OBGYN CLINIC | Facility: CLINIC | Age: 56
End: 2021-09-15

## 2021-09-20 ENCOUNTER — EVALUATION (OUTPATIENT)
Dept: PHYSICAL THERAPY | Facility: HOME HEALTHCARE | Age: 56
End: 2021-09-20
Payer: COMMERCIAL

## 2021-09-20 DIAGNOSIS — M25.511 CHRONIC PAIN OF BOTH SHOULDERS: Primary | ICD-10-CM

## 2021-09-20 DIAGNOSIS — G89.29 CHRONIC PAIN OF BOTH SHOULDERS: Primary | ICD-10-CM

## 2021-09-20 DIAGNOSIS — M25.512 CHRONIC PAIN OF BOTH SHOULDERS: Primary | ICD-10-CM

## 2021-09-20 PROCEDURE — 97162 PT EVAL MOD COMPLEX 30 MIN: CPT | Performed by: PHYSICAL THERAPIST

## 2021-09-20 PROCEDURE — 97140 MANUAL THERAPY 1/> REGIONS: CPT | Performed by: PHYSICAL THERAPIST

## 2021-09-20 NOTE — PROGRESS NOTES
PT Evaluation     Today's date: 2021  Patient name: Jerry Nair  : 1965  MRN: [id number]  Referring provider: Govind Fabian, *  Dx:   Encounter Diagnosis     ICD-10-CM    1  Chronic pain of both shoulders  M25 511 Ambulatory referral to Physical Therapy    G89 29     M25 512                   Assessment  Assessment details: Pt Jerry Nair is a 64 y o  who presents to [facility] with s/s consistent with B shoulder pain  Pt presents with limited B shoulder mobility, decreased B UE strength, postural dysfunction, impaired soft tissue mobility, and pain with functional activities  Pt reporting difficulty with reaching [place] or behind her back, inability to lift/carry > 5#, disrupted sleep, and modifications to washing/dressing  Pt would benefit from skilled therapy services to address outlined impairments, work towards goals, and restore pts PLOF  Thank you! Impairments: abnormal or restricted ROM, abnormal movement, activity intolerance, impaired physical strength, lacks appropriate home exercise program, pain with function, scapular dyskinesis, weight-bearing intolerance and poor posture   Understanding of Dx/Px/POC: good   Prognosis: good    Goals  STGs to be achieved in 4 weeks:  -Pt to demonstrate reduced subjective pain rating "at worst" by at least 2-3 points from Initial Eval to allow for reduced pain with ADLs and improved functional activity tolerance    -Pt to demonstrate B shld ROM improved >10*  in order to maximize joint mobility and function and allow for progression of exercise program and achievement of goals    -Pt to demonstrate increased MMT of BUE by at least 1/2 grade in order to improve safety and stability with ADLs and functional mobility       LTGs to be achieved upon discharge:   -Pt will be I with HEP in order to continue to improve quality of life and independence and reduce risk for re-injury    -Pt to demonstrate return to activities of daily living without limiations or restrictions    -Pt will return to lifting/carrying > 10# to help facilitate return to community activities independently   -Pt to demonstrate improved function as noted by achieving or exceeding predicted score on FOTO outcomes assessment tool  Plan  Patient would benefit from: skilled physical therapy  Planned modality interventions: thermotherapy: hydrocollator packs and cryotherapy  Planned therapy interventions: manual therapy, neuromuscular re-education, patient education, postural training, therapeutic exercise, therapeutic activities, home exercise program, flexibility and functional ROM exercises  Frequency: 2x week  Duration in weeks: 4  Plan of Care beginning date: 2021  Plan of Care expiration date: 10/20/2021  Treatment plan discussed with: patient        Subjective Evaluation    History of Present Illness  Mechanism of injury: Pt reporting that she has been having bilateral shoulder pain for the past month without any specific incident  She notes that she has trouble with sleeping due to the pain  She saw PCP who told her it was OA  She will return to MD again on 21     Quality of life: fair    Pain  At best pain ratin  At worst pain ratin  Quality: dull ache, radiating, sharp, tight and pressure  Relieving factors: rest and medications  Aggravating factors: overhead activity and lifting    Hand dominance: right      Diagnostic Tests  X-ray: abnormal  Treatments  Previous treatment: injection treatment  Current treatment: physical therapy  Patient Goals  Patient goals for therapy: decreased edema, increased strength, independence with ADLs/IADLs, increased motion and decreased pain          Objective     Postural Observations  Seated posture: fair  Standing posture: fair    Additional Postural Observation Details  Bilateral rounded shoulders with mild FHP     Neurological Testing     Sensation     Shoulder   Left Shoulder   Intact: light touch  Paresthesia: light touch    Right Shoulder   Intact: light touch  Paresthesia: light touch    Active Range of Motion   Cervical/Thoracic Spine       Cervical    Flexion:  with pain Restriction level: minimal  Extension:  with pain Restriction level: maximal  Left lateral flexion:  with pain Restriction level: moderate  Right lateral flexion:  with pain Restriction level moderate  Left rotation:  with pain Restriction level: moderate  Right rotation:  with pain Restriction level: moderate  Left Shoulder   Flexion: 125 degrees with pain  Abduction: 90 degrees with pain  External rotation 0°: 25 degrees with pain    Right Shoulder   Flexion: 118 degrees with pain  Abduction: 115 degrees with pain  External rotation 0°: 25 degrees with pain    Strength/Myotome Testing     Left Shoulder     Planes of Motion   Flexion: 4   Abduction: 4-   External rotation at 0°: 4   Internal rotation at 45°: 4     Right Shoulder     Planes of Motion   Flexion: 4   Abduction: 4-   External rotation at 0°: 4   Internal rotation at 45°: 4                Re-eval Date: 10/20/21    Date 9/20       Visit Count 1       FOTO Completed              Precautions:        Manuals 9/20/21       B shoulders  HZ                                Neuro Re-Ed         MTP/LTP        Carlos tband ER         Ball on wall         Wall angels                                 Ther Ex        UBE         Pulleys: Flex/abd        Finger ladder        Wall slides         Scapular retractions         Cane flex/abd        Cane ER         Cane ext/IR        SA punches         Supine ABC        S/L Abd/ER        Doorway stretch                         Ther Activity                        Gait Training                        Modalities

## 2021-09-22 ENCOUNTER — OFFICE VISIT (OUTPATIENT)
Dept: PHYSICAL THERAPY | Facility: HOME HEALTHCARE | Age: 56
End: 2021-09-22
Payer: COMMERCIAL

## 2021-09-22 DIAGNOSIS — M25.511 CHRONIC PAIN OF BOTH SHOULDERS: Primary | ICD-10-CM

## 2021-09-22 DIAGNOSIS — G89.29 CHRONIC PAIN OF BOTH SHOULDERS: Primary | ICD-10-CM

## 2021-09-22 DIAGNOSIS — M25.512 CHRONIC PAIN OF BOTH SHOULDERS: Primary | ICD-10-CM

## 2021-09-22 PROCEDURE — 97140 MANUAL THERAPY 1/> REGIONS: CPT

## 2021-09-22 PROCEDURE — 97110 THERAPEUTIC EXERCISES: CPT

## 2021-09-22 NOTE — PROGRESS NOTES
Daily Note     Today's date: 2021  Patient name: Jerry Nair  : 1965  MRN: [id number]  Referring provider: Govind Fabian, *  Dx:   Encounter Diagnosis     ICD-10-CM    1  Chronic pain of both shoulders  M25 511     G89 29     M25 512                   Subjective: Pt reports she has pain in her shoulders  Objective: See treatment diary below      Assessment: Tolerated treatment well  Verbal cues needed t/o exercise to perform correctly  No increased pain reported both shoulders t/o session  ROM deficits noted bilateral shoulders  Patient would benefit from continued PT to improve ROM, strength and overall function  Plan: Continue per plan of care        Re-eval Date: 10/20/21    Date 21      Visit Count 1 2      FOTO Completed              Precautions:        Manuals 21      B shoulders  HZ  10 min                               Neuro Re-Ed         MTP/LTP        Carlos tband ER   Yellow  1 x10       Ball on wall         Wall angels                                 Ther Ex        UBE   Alt 8 min       Pulleys: Flex/abd        Finger ladder  1 x 3 ea flex/abd      Wall slides   1 x 10 ea flex/abd      Scapular retractions   1 x 10       Cane flex/abd  1 x 10 Flex       Cane ER         Cane ext/IR        SA punches         Supine ABC        S/L Abd/ER        Doorway stretch                         Ther Activity                        Gait Training                        Modalities

## 2021-09-27 ENCOUNTER — OFFICE VISIT (OUTPATIENT)
Dept: PHYSICAL THERAPY | Facility: HOME HEALTHCARE | Age: 56
End: 2021-09-27
Payer: COMMERCIAL

## 2021-09-27 DIAGNOSIS — G89.29 CHRONIC PAIN OF BOTH SHOULDERS: Primary | ICD-10-CM

## 2021-09-27 DIAGNOSIS — M25.512 CHRONIC PAIN OF BOTH SHOULDERS: Primary | ICD-10-CM

## 2021-09-27 DIAGNOSIS — M25.511 CHRONIC PAIN OF BOTH SHOULDERS: Primary | ICD-10-CM

## 2021-09-27 PROCEDURE — 97110 THERAPEUTIC EXERCISES: CPT

## 2021-09-27 PROCEDURE — 97140 MANUAL THERAPY 1/> REGIONS: CPT

## 2021-09-27 NOTE — PROGRESS NOTES
Daily Note     Today's date: 2021  Patient name: Dayanara Erickson  : 1965  MRN: [id number]  Referring provider: Madyson Lay, *  Dx: No diagnosis found  Start Time: 708          Subjective: I don't sleep well because of my sh pain  Objective: See treatment diary below    Assessment: Pt appeared to saman session well but difficult to determine pain levels 2* language barrier and no interperter available  Pt with good form during TE with vc's as needed  Pt with end range tightness noted in all planes of Carlos sh  PROM  Pt states feeling "OK" at end of Tx  Patient would benefit from continued PT    Plan: Continue per plan of care        Re-eval Date: 10/20/21    Date 21     Visit Count 1 2 3     FOTO Completed              Precautions:        Manuals 21     B shoulders  HZ  10 min  12'                             Neuro Re-Ed    21     MTP/LTP        Carlos tband ER   Yellow  1 x10  Yellow  1 x 10     Ball on wall         Wall angels                                 Ther Ex   21     UBE   Alt 8 min  Alt 8'     Pulleys: Flex/abd        Finger ladder  1 x 3 ea flex/abd 1 x 3 ea Carlos  Flex/abd     Wall slides   1 x 10 ea flex/abd 1 x 10 ea  Flex/abd     Scapular retractions   1 x 10  1 x 10     Cane flex/abd  1 x 10 Flex  1 x 10 Flex     Cane ER         Cane ext/IR        SA punches         Supine ABC        S/L Abd/ER        Doorway stretch                         Ther Activity                        Gait Training                        Modalities

## 2021-09-29 ENCOUNTER — OFFICE VISIT (OUTPATIENT)
Dept: PHYSICAL THERAPY | Facility: HOME HEALTHCARE | Age: 56
End: 2021-09-29
Payer: COMMERCIAL

## 2021-09-29 DIAGNOSIS — M25.511 CHRONIC PAIN OF BOTH SHOULDERS: Primary | ICD-10-CM

## 2021-09-29 DIAGNOSIS — G89.29 CHRONIC PAIN OF BOTH SHOULDERS: Primary | ICD-10-CM

## 2021-09-29 DIAGNOSIS — M25.512 CHRONIC PAIN OF BOTH SHOULDERS: Primary | ICD-10-CM

## 2021-09-29 PROCEDURE — 97110 THERAPEUTIC EXERCISES: CPT

## 2021-09-29 PROCEDURE — 97140 MANUAL THERAPY 1/> REGIONS: CPT

## 2021-09-29 NOTE — PROGRESS NOTES
Daily Note     Today's date: 2021  Patient name: Moreno Hernadez  : 1965  MRN: [id number]  Referring provider: Chuck Delcid, *  Dx: No diagnosis found  Start Time: 705          Subjective: I have Carlos sh pain of about 4/10 this morning  The pain is from my neck and down to my shoulders  Objective: See treatment diary below    Assessment: Tolerated treatment well  Pt denied increased sh pain t/o session and was able to saman added MTP/LTP  Pt did report L elbow pain at end of wall slides  Pt with improved form and less vc's needed today  Pt remains with B sh strength and ROM deficits and would benefit from continued PT    Plan: Continue per plan of care        Re-eval Date: 10/20/21    Date 21    Visit Count 1 2 3 4    FOTO Completed              Precautions:        Manuals 21    B shoulders  HZ  10 min  12' 12'                            Neuro Re-Ed    21    MTP/LTP    Red 1 x 10 ea    Carlos tband ER   Yellow  1 x10  Yellow  1 x 10 Yellow  1 x 15    Ball on wall         Wall angels                                 Ther Ex   21    UBE   Alt 8 min  Alt 8' Alt  8'    Pulleys: Flex/abd        Finger ladder  1 x 3 ea flex/abd 1 x 3 ea Carlos  Flex/abd 1 x 3 ea Carlos  Flex/abd    Wall slides   1 x 10 ea flex/abd 1 x 10 ea  Flex/abd 1 x 10 ea  Flex/abd    Scapular retractions   1 x 10  1 x 10 1 x 10    Standing   Cane flex/abd  1 x 10 Flex  1 x 10 Flex 1 x 10 Flex/abd    Cane ER         Cane ext/IR        SA punches     NV    Supine ABC        S/L Abd/ER        Doorway stretch                         Ther Activity                        Gait Training                        Modalities

## 2021-10-04 ENCOUNTER — APPOINTMENT (OUTPATIENT)
Dept: PHYSICAL THERAPY | Facility: HOME HEALTHCARE | Age: 56
End: 2021-10-04
Payer: COMMERCIAL

## 2021-10-05 ENCOUNTER — OFFICE VISIT (OUTPATIENT)
Dept: OBGYN CLINIC | Facility: CLINIC | Age: 56
End: 2021-10-05
Payer: COMMERCIAL

## 2021-10-05 ENCOUNTER — APPOINTMENT (OUTPATIENT)
Dept: RADIOLOGY | Facility: MEDICAL CENTER | Age: 56
End: 2021-10-05
Payer: COMMERCIAL

## 2021-10-05 ENCOUNTER — OFFICE VISIT (OUTPATIENT)
Dept: PHYSICAL THERAPY | Facility: HOME HEALTHCARE | Age: 56
End: 2021-10-05
Payer: COMMERCIAL

## 2021-10-05 VITALS
WEIGHT: 145 LBS | SYSTOLIC BLOOD PRESSURE: 122 MMHG | BODY MASS INDEX: 29.23 KG/M2 | HEIGHT: 59 IN | DIASTOLIC BLOOD PRESSURE: 76 MMHG | HEART RATE: 61 BPM

## 2021-10-05 DIAGNOSIS — M25.512 CHRONIC PAIN OF BOTH SHOULDERS: Primary | ICD-10-CM

## 2021-10-05 DIAGNOSIS — M25.511 CHRONIC PAIN OF BOTH SHOULDERS: Primary | ICD-10-CM

## 2021-10-05 DIAGNOSIS — M25.512 BILATERAL SHOULDER PAIN, UNSPECIFIED CHRONICITY: ICD-10-CM

## 2021-10-05 DIAGNOSIS — M75.32 CALCIFIC TENDINITIS OF BOTH SHOULDERS: Primary | ICD-10-CM

## 2021-10-05 DIAGNOSIS — M25.511 BILATERAL SHOULDER PAIN, UNSPECIFIED CHRONICITY: ICD-10-CM

## 2021-10-05 DIAGNOSIS — M75.31 CALCIFIC TENDINITIS OF BOTH SHOULDERS: Primary | ICD-10-CM

## 2021-10-05 DIAGNOSIS — G89.29 CHRONIC PAIN OF BOTH SHOULDERS: Primary | ICD-10-CM

## 2021-10-05 DIAGNOSIS — M17.12 PRIMARY OSTEOARTHRITIS OF LEFT KNEE: ICD-10-CM

## 2021-10-05 DIAGNOSIS — M17.11 PRIMARY OSTEOARTHRITIS OF RIGHT KNEE: ICD-10-CM

## 2021-10-05 PROCEDURE — 20610 DRAIN/INJ JOINT/BURSA W/O US: CPT | Performed by: PHYSICIAN ASSISTANT

## 2021-10-05 PROCEDURE — 97110 THERAPEUTIC EXERCISES: CPT

## 2021-10-05 PROCEDURE — 99213 OFFICE O/P EST LOW 20 MIN: CPT | Performed by: PHYSICIAN ASSISTANT

## 2021-10-05 PROCEDURE — 73030 X-RAY EXAM OF SHOULDER: CPT

## 2021-10-05 PROCEDURE — 97140 MANUAL THERAPY 1/> REGIONS: CPT

## 2021-10-05 RX ORDER — LIDOCAINE HYDROCHLORIDE 10 MG/ML
2 INJECTION, SOLUTION INFILTRATION; PERINEURAL
Status: COMPLETED | OUTPATIENT
Start: 2021-10-05 | End: 2021-10-05

## 2021-10-05 RX ORDER — BUPIVACAINE HYDROCHLORIDE 2.5 MG/ML
1 INJECTION, SOLUTION INFILTRATION; PERINEURAL
Status: COMPLETED | OUTPATIENT
Start: 2021-10-05 | End: 2021-10-05

## 2021-10-05 RX ORDER — ACETAMINOPHEN AND CODEINE PHOSPHATE 300; 30 MG/1; MG/1
1 TABLET ORAL 2 TIMES DAILY PRN
Qty: 15 TABLET | Refills: 0 | Status: SHIPPED | OUTPATIENT
Start: 2021-10-05 | End: 2022-01-25

## 2021-10-05 RX ORDER — BETAMETHASONE SODIUM PHOSPHATE AND BETAMETHASONE ACETATE 3; 3 MG/ML; MG/ML
6 INJECTION, SUSPENSION INTRA-ARTICULAR; INTRALESIONAL; INTRAMUSCULAR; SOFT TISSUE
Status: COMPLETED | OUTPATIENT
Start: 2021-10-05 | End: 2021-10-05

## 2021-10-05 RX ADMIN — LIDOCAINE HYDROCHLORIDE 2 ML: 10 INJECTION, SOLUTION INFILTRATION; PERINEURAL at 17:13

## 2021-10-05 RX ADMIN — BETAMETHASONE SODIUM PHOSPHATE AND BETAMETHASONE ACETATE 6 MG: 3; 3 INJECTION, SUSPENSION INTRA-ARTICULAR; INTRALESIONAL; INTRAMUSCULAR; SOFT TISSUE at 17:13

## 2021-10-05 RX ADMIN — BUPIVACAINE HYDROCHLORIDE 1 ML: 2.5 INJECTION, SOLUTION INFILTRATION; PERINEURAL at 17:13

## 2021-10-06 ENCOUNTER — OFFICE VISIT (OUTPATIENT)
Dept: PHYSICAL THERAPY | Facility: HOME HEALTHCARE | Age: 56
End: 2021-10-06
Payer: COMMERCIAL

## 2021-10-06 DIAGNOSIS — G89.29 CHRONIC PAIN OF BOTH SHOULDERS: Primary | ICD-10-CM

## 2021-10-06 DIAGNOSIS — M25.511 CHRONIC PAIN OF BOTH SHOULDERS: Primary | ICD-10-CM

## 2021-10-06 DIAGNOSIS — M25.512 CHRONIC PAIN OF BOTH SHOULDERS: Primary | ICD-10-CM

## 2021-10-06 PROCEDURE — 97110 THERAPEUTIC EXERCISES: CPT

## 2021-10-06 PROCEDURE — 97140 MANUAL THERAPY 1/> REGIONS: CPT

## 2021-10-11 ENCOUNTER — APPOINTMENT (OUTPATIENT)
Dept: PHYSICAL THERAPY | Facility: HOME HEALTHCARE | Age: 56
End: 2021-10-11
Payer: COMMERCIAL

## 2021-10-12 ENCOUNTER — HOSPITAL ENCOUNTER (EMERGENCY)
Facility: HOSPITAL | Age: 56
Discharge: HOME | End: 2021-10-12
Attending: EMERGENCY MEDICINE
Payer: COMMERCIAL

## 2021-10-12 ENCOUNTER — APPOINTMENT (EMERGENCY)
Dept: RADIOLOGY | Facility: HOSPITAL | Age: 56
End: 2021-10-12
Payer: COMMERCIAL

## 2021-10-12 ENCOUNTER — OFFICE VISIT (OUTPATIENT)
Dept: PHYSICAL THERAPY | Facility: HOME HEALTHCARE | Age: 56
End: 2021-10-12
Payer: COMMERCIAL

## 2021-10-12 VITALS
OXYGEN SATURATION: 96 % | WEIGHT: 145.06 LBS | SYSTOLIC BLOOD PRESSURE: 150 MMHG | HEART RATE: 50 BPM | RESPIRATION RATE: 15 BRPM | HEIGHT: 59 IN | BODY MASS INDEX: 29.24 KG/M2 | DIASTOLIC BLOOD PRESSURE: 68 MMHG | TEMPERATURE: 98 F

## 2021-10-12 DIAGNOSIS — G89.29 CHRONIC PAIN OF BOTH SHOULDERS: Primary | ICD-10-CM

## 2021-10-12 DIAGNOSIS — R55 NEAR SYNCOPE: Primary | ICD-10-CM

## 2021-10-12 DIAGNOSIS — M25.512 CHRONIC PAIN OF BOTH SHOULDERS: Primary | ICD-10-CM

## 2021-10-12 DIAGNOSIS — M25.511 CHRONIC PAIN OF BOTH SHOULDERS: Primary | ICD-10-CM

## 2021-10-12 LAB
ANION GAP SERPL CALCULATED.3IONS-SCNC: 9 MMOL/L (ref 4–13)
BASOPHILS # BLD AUTO: 0.03 THOUSANDS/ΜL (ref 0–0.1)
BASOPHILS NFR BLD AUTO: 1 % (ref 0–1)
BILIRUB UR QL STRIP: NEGATIVE
BUN SERPL-MCNC: 14 MG/DL (ref 5–25)
CALCIUM SERPL-MCNC: 9.2 MG/DL (ref 8.3–10.1)
CHLORIDE SERPL-SCNC: 103 MMOL/L (ref 100–108)
CLARITY UR: CLEAR
CO2 SERPL-SCNC: 29 MMOL/L (ref 21–32)
COLOR UR: YELLOW
CREAT SERPL-MCNC: 0.84 MG/DL (ref 0.6–1.3)
EOSINOPHIL # BLD AUTO: 0.13 THOUSAND/ΜL (ref 0–0.61)
EOSINOPHIL NFR BLD AUTO: 2 % (ref 0–6)
ERYTHROCYTE [DISTWIDTH] IN BLOOD BY AUTOMATED COUNT: 12.1 % (ref 11.6–15.1)
GFR SERPL CREATININE-BSD FRML MDRD: 78 ML/MIN/1.73SQ M
GLUCOSE SERPL-MCNC: 128 MG/DL (ref 65–140)
GLUCOSE UR STRIP-MCNC: NEGATIVE MG/DL
HCT VFR BLD AUTO: 38.8 % (ref 34.8–46.1)
HGB BLD-MCNC: 13.1 G/DL (ref 11.5–15.4)
HGB UR QL STRIP.AUTO: NEGATIVE
IMM GRANULOCYTES # BLD AUTO: 0.02 THOUSAND/UL (ref 0–0.2)
IMM GRANULOCYTES NFR BLD AUTO: 0 % (ref 0–2)
KETONES UR STRIP-MCNC: NEGATIVE MG/DL
LEUKOCYTE ESTERASE UR QL STRIP: NEGATIVE
LYMPHOCYTES # BLD AUTO: 2.71 THOUSANDS/ΜL (ref 0.6–4.47)
LYMPHOCYTES NFR BLD AUTO: 44 % (ref 14–44)
MCH RBC QN AUTO: 30.2 PG (ref 26.8–34.3)
MCHC RBC AUTO-ENTMCNC: 33.8 G/DL (ref 31.4–37.4)
MCV RBC AUTO: 89 FL (ref 82–98)
MONOCYTES # BLD AUTO: 0.33 THOUSAND/ΜL (ref 0.17–1.22)
MONOCYTES NFR BLD AUTO: 5 % (ref 4–12)
NEUTROPHILS # BLD AUTO: 2.93 THOUSANDS/ΜL (ref 1.85–7.62)
NEUTS SEG NFR BLD AUTO: 48 % (ref 43–75)
NITRITE UR QL STRIP: NEGATIVE
NRBC BLD AUTO-RTO: 0 /100 WBCS
PH UR STRIP.AUTO: 8 [PH]
PLATELET # BLD AUTO: 226 THOUSANDS/UL (ref 149–390)
PMV BLD AUTO: 9.5 FL (ref 8.9–12.7)
POTASSIUM SERPL-SCNC: 3.8 MMOL/L (ref 3.5–5.3)
PROT UR STRIP-MCNC: NEGATIVE MG/DL
RBC # BLD AUTO: 4.34 MILLION/UL (ref 3.81–5.12)
SODIUM SERPL-SCNC: 141 MMOL/L (ref 136–145)
SP GR UR STRIP.AUTO: 1.02 (ref 1–1.03)
TROPONIN I SERPL-MCNC: <0.02 NG/ML
TSH SERPL DL<=0.05 MIU/L-ACNC: 2.1 UIU/ML (ref 0.36–3.74)
UROBILINOGEN UR QL STRIP.AUTO: 0.2 E.U./DL
WBC # BLD AUTO: 6.15 THOUSAND/UL (ref 4.31–10.16)

## 2021-10-12 PROCEDURE — 36415 COLL VENOUS BLD VENIPUNCTURE: CPT | Performed by: EMERGENCY MEDICINE

## 2021-10-12 PROCEDURE — 99284 EMERGENCY DEPT VISIT MOD MDM: CPT

## 2021-10-12 PROCEDURE — 84484 ASSAY OF TROPONIN QUANT: CPT | Performed by: EMERGENCY MEDICINE

## 2021-10-12 PROCEDURE — 99284 EMERGENCY DEPT VISIT MOD MDM: CPT | Performed by: EMERGENCY MEDICINE

## 2021-10-12 PROCEDURE — 71045 X-RAY EXAM CHEST 1 VIEW: CPT

## 2021-10-12 PROCEDURE — 97110 THERAPEUTIC EXERCISES: CPT

## 2021-10-12 PROCEDURE — 81003 URINALYSIS AUTO W/O SCOPE: CPT | Performed by: EMERGENCY MEDICINE

## 2021-10-12 PROCEDURE — 93005 ELECTROCARDIOGRAM TRACING: CPT

## 2021-10-12 PROCEDURE — 96361 HYDRATE IV INFUSION ADD-ON: CPT

## 2021-10-12 PROCEDURE — 85025 COMPLETE CBC W/AUTO DIFF WBC: CPT | Performed by: EMERGENCY MEDICINE

## 2021-10-12 PROCEDURE — 84443 ASSAY THYROID STIM HORMONE: CPT | Performed by: EMERGENCY MEDICINE

## 2021-10-12 PROCEDURE — 97140 MANUAL THERAPY 1/> REGIONS: CPT

## 2021-10-12 PROCEDURE — 80048 BASIC METABOLIC PNL TOTAL CA: CPT | Performed by: EMERGENCY MEDICINE

## 2021-10-12 PROCEDURE — 96360 HYDRATION IV INFUSION INIT: CPT

## 2021-10-12 RX ADMIN — SODIUM CHLORIDE 1000 ML: 0.9 INJECTION, SOLUTION INTRAVENOUS at 16:28

## 2021-10-13 LAB
ATRIAL RATE: 50 BPM
QRS AXIS: -51 DEGREES
QRSD INTERVAL: 118 MS
QT INTERVAL: 488 MS
QTC INTERVAL: 444 MS
T WAVE AXIS: -1 DEGREES
VENTRICULAR RATE: 50 BPM

## 2021-10-13 PROCEDURE — 93010 ELECTROCARDIOGRAM REPORT: CPT | Performed by: INTERNAL MEDICINE

## 2021-10-18 ENCOUNTER — APPOINTMENT (OUTPATIENT)
Dept: PHYSICAL THERAPY | Facility: HOME HEALTHCARE | Age: 56
End: 2021-10-18
Payer: COMMERCIAL

## 2021-10-20 ENCOUNTER — OFFICE VISIT (OUTPATIENT)
Dept: PHYSICAL THERAPY | Facility: HOME HEALTHCARE | Age: 56
End: 2021-10-20
Payer: COMMERCIAL

## 2021-10-20 DIAGNOSIS — M25.512 CHRONIC PAIN OF BOTH SHOULDERS: Primary | ICD-10-CM

## 2021-10-20 DIAGNOSIS — G89.29 CHRONIC PAIN OF BOTH SHOULDERS: Primary | ICD-10-CM

## 2021-10-20 DIAGNOSIS — M25.511 CHRONIC PAIN OF BOTH SHOULDERS: Primary | ICD-10-CM

## 2021-10-20 PROCEDURE — 97110 THERAPEUTIC EXERCISES: CPT

## 2021-10-20 PROCEDURE — 97140 MANUAL THERAPY 1/> REGIONS: CPT

## 2021-10-22 ENCOUNTER — OFFICE VISIT (OUTPATIENT)
Dept: PHYSICAL THERAPY | Facility: HOME HEALTHCARE | Age: 56
End: 2021-10-22
Payer: COMMERCIAL

## 2021-10-22 DIAGNOSIS — M25.512 CHRONIC PAIN OF BOTH SHOULDERS: Primary | ICD-10-CM

## 2021-10-22 DIAGNOSIS — G89.29 CHRONIC PAIN OF BOTH SHOULDERS: Primary | ICD-10-CM

## 2021-10-22 DIAGNOSIS — M25.511 CHRONIC PAIN OF BOTH SHOULDERS: Primary | ICD-10-CM

## 2021-10-22 PROCEDURE — 97140 MANUAL THERAPY 1/> REGIONS: CPT

## 2021-10-22 PROCEDURE — 97110 THERAPEUTIC EXERCISES: CPT

## 2021-10-27 ENCOUNTER — EVALUATION (OUTPATIENT)
Dept: PHYSICAL THERAPY | Facility: HOME HEALTHCARE | Age: 56
End: 2021-10-27
Payer: COMMERCIAL

## 2021-10-27 DIAGNOSIS — M25.511 CHRONIC PAIN OF BOTH SHOULDERS: Primary | ICD-10-CM

## 2021-10-27 DIAGNOSIS — M25.512 CHRONIC PAIN OF BOTH SHOULDERS: Primary | ICD-10-CM

## 2021-10-27 DIAGNOSIS — G89.29 CHRONIC PAIN OF BOTH SHOULDERS: Primary | ICD-10-CM

## 2021-10-27 PROCEDURE — 97110 THERAPEUTIC EXERCISES: CPT

## 2021-11-02 DIAGNOSIS — E78.5 DYSLIPIDEMIA: ICD-10-CM

## 2021-11-02 RX ORDER — ATORVASTATIN CALCIUM 20 MG/1
20 TABLET, FILM COATED ORAL DAILY
Qty: 60 TABLET | Refills: 3 | Status: SHIPPED | OUTPATIENT
Start: 2021-11-02 | End: 2022-02-07 | Stop reason: SDUPTHER

## 2021-11-03 ENCOUNTER — APPOINTMENT (OUTPATIENT)
Dept: PHYSICAL THERAPY | Facility: HOME HEALTHCARE | Age: 56
End: 2021-11-03
Payer: COMMERCIAL

## 2021-11-05 ENCOUNTER — APPOINTMENT (OUTPATIENT)
Dept: PHYSICAL THERAPY | Facility: HOME HEALTHCARE | Age: 56
End: 2021-11-05
Payer: COMMERCIAL

## 2021-11-10 ENCOUNTER — OFFICE VISIT (OUTPATIENT)
Dept: PHYSICAL THERAPY | Facility: HOME HEALTHCARE | Age: 56
End: 2021-11-10
Payer: COMMERCIAL

## 2021-11-10 DIAGNOSIS — G89.29 CHRONIC PAIN OF BOTH SHOULDERS: Primary | ICD-10-CM

## 2021-11-10 DIAGNOSIS — M25.511 CHRONIC PAIN OF BOTH SHOULDERS: Primary | ICD-10-CM

## 2021-11-10 DIAGNOSIS — M25.512 CHRONIC PAIN OF BOTH SHOULDERS: Primary | ICD-10-CM

## 2021-11-10 PROCEDURE — 97110 THERAPEUTIC EXERCISES: CPT

## 2021-11-12 ENCOUNTER — OFFICE VISIT (OUTPATIENT)
Dept: PHYSICAL THERAPY | Facility: HOME HEALTHCARE | Age: 56
End: 2021-11-12
Payer: COMMERCIAL

## 2021-11-12 DIAGNOSIS — M25.512 CHRONIC PAIN OF BOTH SHOULDERS: Primary | ICD-10-CM

## 2021-11-12 DIAGNOSIS — M25.511 CHRONIC PAIN OF BOTH SHOULDERS: Primary | ICD-10-CM

## 2021-11-12 DIAGNOSIS — G89.29 CHRONIC PAIN OF BOTH SHOULDERS: Primary | ICD-10-CM

## 2021-11-12 PROCEDURE — 97110 THERAPEUTIC EXERCISES: CPT

## 2021-11-16 ENCOUNTER — OFFICE VISIT (OUTPATIENT)
Dept: OBGYN CLINIC | Facility: CLINIC | Age: 56
End: 2021-11-16
Payer: COMMERCIAL

## 2021-11-16 VITALS
HEIGHT: 59 IN | SYSTOLIC BLOOD PRESSURE: 144 MMHG | BODY MASS INDEX: 29.03 KG/M2 | DIASTOLIC BLOOD PRESSURE: 77 MMHG | HEART RATE: 63 BPM | WEIGHT: 144 LBS

## 2021-11-16 DIAGNOSIS — M75.32 CALCIFIC TENDINITIS OF BOTH SHOULDERS: Primary | ICD-10-CM

## 2021-11-16 DIAGNOSIS — M75.112 INCOMPLETE TEAR OF LEFT ROTATOR CUFF, UNSPECIFIED WHETHER TRAUMATIC: ICD-10-CM

## 2021-11-16 DIAGNOSIS — M75.31 CALCIFIC TENDINITIS OF BOTH SHOULDERS: Primary | ICD-10-CM

## 2021-11-16 PROCEDURE — 99213 OFFICE O/P EST LOW 20 MIN: CPT | Performed by: ORTHOPAEDIC SURGERY

## 2021-11-17 ENCOUNTER — OFFICE VISIT (OUTPATIENT)
Dept: PHYSICAL THERAPY | Facility: HOME HEALTHCARE | Age: 56
End: 2021-11-17
Payer: COMMERCIAL

## 2021-11-17 DIAGNOSIS — G89.29 CHRONIC PAIN OF BOTH SHOULDERS: Primary | ICD-10-CM

## 2021-11-17 DIAGNOSIS — M25.511 CHRONIC PAIN OF BOTH SHOULDERS: Primary | ICD-10-CM

## 2021-11-17 DIAGNOSIS — M25.512 CHRONIC PAIN OF BOTH SHOULDERS: Primary | ICD-10-CM

## 2021-11-17 PROCEDURE — 97110 THERAPEUTIC EXERCISES: CPT

## 2021-11-19 ENCOUNTER — OFFICE VISIT (OUTPATIENT)
Dept: PHYSICAL THERAPY | Facility: HOME HEALTHCARE | Age: 56
End: 2021-11-19
Payer: COMMERCIAL

## 2021-11-19 DIAGNOSIS — M25.512 CHRONIC PAIN OF BOTH SHOULDERS: Primary | ICD-10-CM

## 2021-11-19 DIAGNOSIS — G89.29 CHRONIC PAIN OF BOTH SHOULDERS: Primary | ICD-10-CM

## 2021-11-19 DIAGNOSIS — M25.511 CHRONIC PAIN OF BOTH SHOULDERS: Primary | ICD-10-CM

## 2021-11-19 PROCEDURE — 97110 THERAPEUTIC EXERCISES: CPT

## 2021-11-22 ENCOUNTER — OFFICE VISIT (OUTPATIENT)
Dept: PHYSICAL THERAPY | Facility: HOME HEALTHCARE | Age: 56
End: 2021-11-22
Payer: COMMERCIAL

## 2021-11-22 DIAGNOSIS — G89.29 CHRONIC PAIN OF BOTH SHOULDERS: Primary | ICD-10-CM

## 2021-11-22 DIAGNOSIS — M25.511 CHRONIC PAIN OF BOTH SHOULDERS: Primary | ICD-10-CM

## 2021-11-22 DIAGNOSIS — M25.512 CHRONIC PAIN OF BOTH SHOULDERS: Primary | ICD-10-CM

## 2021-11-22 PROCEDURE — 97110 THERAPEUTIC EXERCISES: CPT

## 2021-11-22 PROCEDURE — 97112 NEUROMUSCULAR REEDUCATION: CPT

## 2021-11-23 ENCOUNTER — HOSPITAL ENCOUNTER (OUTPATIENT)
Dept: MRI IMAGING | Facility: HOSPITAL | Age: 56
Discharge: HOME | End: 2021-11-23
Attending: ORTHOPAEDIC SURGERY
Payer: COMMERCIAL

## 2021-11-23 DIAGNOSIS — M75.32 CALCIFIC TENDINITIS OF BOTH SHOULDERS: ICD-10-CM

## 2021-11-23 DIAGNOSIS — M75.112 INCOMPLETE TEAR OF LEFT ROTATOR CUFF, UNSPECIFIED WHETHER TRAUMATIC: ICD-10-CM

## 2021-11-23 DIAGNOSIS — M75.31 CALCIFIC TENDINITIS OF BOTH SHOULDERS: ICD-10-CM

## 2021-11-23 PROCEDURE — 73221 MRI JOINT UPR EXTREM W/O DYE: CPT

## 2021-11-23 PROCEDURE — G1004 CDSM NDSC: HCPCS

## 2021-12-28 ENCOUNTER — OFFICE VISIT (OUTPATIENT)
Dept: FAMILY MEDICINE CLINIC | Facility: CLINIC | Age: 56
End: 2021-12-28
Payer: COMMERCIAL

## 2021-12-28 ENCOUNTER — LAB (OUTPATIENT)
Dept: LAB | Facility: HOSPITAL | Age: 56
End: 2021-12-28
Payer: COMMERCIAL

## 2021-12-28 VITALS
RESPIRATION RATE: 18 BRPM | SYSTOLIC BLOOD PRESSURE: 140 MMHG | OXYGEN SATURATION: 97 % | HEART RATE: 61 BPM | HEIGHT: 59 IN | BODY MASS INDEX: 28.75 KG/M2 | DIASTOLIC BLOOD PRESSURE: 80 MMHG | WEIGHT: 142.6 LBS | TEMPERATURE: 96 F

## 2021-12-28 DIAGNOSIS — Z00.00 ANNUAL PHYSICAL EXAM: Primary | ICD-10-CM

## 2021-12-28 DIAGNOSIS — R73.01 ELEVATED FASTING GLUCOSE: ICD-10-CM

## 2021-12-28 DIAGNOSIS — R55 SYNCOPE, UNSPECIFIED SYNCOPE TYPE: ICD-10-CM

## 2021-12-28 DIAGNOSIS — E78.5 DYSLIPIDEMIA: ICD-10-CM

## 2021-12-28 LAB
ALBUMIN SERPL BCP-MCNC: 4.4 G/DL (ref 3.5–5)
ALP SERPL-CCNC: 91 U/L (ref 46–116)
ALT SERPL W P-5'-P-CCNC: 56 U/L (ref 12–78)
ANION GAP SERPL CALCULATED.3IONS-SCNC: 8 MMOL/L (ref 4–13)
AST SERPL W P-5'-P-CCNC: 24 U/L (ref 5–45)
BASOPHILS # BLD AUTO: 0.04 THOUSANDS/ΜL (ref 0–0.1)
BASOPHILS NFR BLD AUTO: 1 % (ref 0–1)
BILIRUB SERPL-MCNC: 1.06 MG/DL (ref 0.2–1)
BUN SERPL-MCNC: 15 MG/DL (ref 5–25)
CALCIUM SERPL-MCNC: 9.6 MG/DL (ref 8.3–10.1)
CHLORIDE SERPL-SCNC: 105 MMOL/L (ref 100–108)
CHOLEST SERPL-MCNC: 137 MG/DL
CO2 SERPL-SCNC: 26 MMOL/L (ref 21–32)
CREAT SERPL-MCNC: 0.9 MG/DL (ref 0.6–1.3)
EOSINOPHIL # BLD AUTO: 0.1 THOUSAND/ΜL (ref 0–0.61)
EOSINOPHIL NFR BLD AUTO: 2 % (ref 0–6)
ERYTHROCYTE [DISTWIDTH] IN BLOOD BY AUTOMATED COUNT: 12.2 % (ref 11.6–15.1)
EST. AVERAGE GLUCOSE BLD GHB EST-MCNC: 123 MG/DL
GFR SERPL CREATININE-BSD FRML MDRD: 71 ML/MIN/1.73SQ M
GLUCOSE P FAST SERPL-MCNC: 115 MG/DL (ref 65–99)
HBA1C MFR BLD: 5.9 %
HCT VFR BLD AUTO: 42 % (ref 34.8–46.1)
HDLC SERPL-MCNC: 56 MG/DL
HGB BLD-MCNC: 14.3 G/DL (ref 11.5–15.4)
IMM GRANULOCYTES # BLD AUTO: 0 THOUSAND/UL (ref 0–0.2)
IMM GRANULOCYTES NFR BLD AUTO: 0 % (ref 0–2)
LDLC SERPL CALC-MCNC: 49 MG/DL (ref 0–100)
LYMPHOCYTES # BLD AUTO: 2.03 THOUSANDS/ΜL (ref 0.6–4.47)
LYMPHOCYTES NFR BLD AUTO: 39 % (ref 14–44)
MCH RBC QN AUTO: 30.7 PG (ref 26.8–34.3)
MCHC RBC AUTO-ENTMCNC: 34 G/DL (ref 31.4–37.4)
MCV RBC AUTO: 90 FL (ref 82–98)
MONOCYTES # BLD AUTO: 0.27 THOUSAND/ΜL (ref 0.17–1.22)
MONOCYTES NFR BLD AUTO: 5 % (ref 4–12)
NEUTROPHILS # BLD AUTO: 2.8 THOUSANDS/ΜL (ref 1.85–7.62)
NEUTS SEG NFR BLD AUTO: 53 % (ref 43–75)
NONHDLC SERPL-MCNC: 81 MG/DL
NRBC BLD AUTO-RTO: 0 /100 WBCS
PLATELET # BLD AUTO: 252 THOUSANDS/UL (ref 149–390)
PMV BLD AUTO: 10.3 FL (ref 8.9–12.7)
POTASSIUM SERPL-SCNC: 4.3 MMOL/L (ref 3.5–5.3)
PROT SERPL-MCNC: 8 G/DL (ref 6.4–8.2)
RBC # BLD AUTO: 4.66 MILLION/UL (ref 3.81–5.12)
SODIUM SERPL-SCNC: 139 MMOL/L (ref 136–145)
TRIGL SERPL-MCNC: 158 MG/DL
TSH SERPL DL<=0.05 MIU/L-ACNC: 1.83 UIU/ML (ref 0.36–3.74)
WBC # BLD AUTO: 5.24 THOUSAND/UL (ref 4.31–10.16)

## 2021-12-28 PROCEDURE — 36415 COLL VENOUS BLD VENIPUNCTURE: CPT

## 2021-12-28 PROCEDURE — 99396 PREV VISIT EST AGE 40-64: CPT | Performed by: FAMILY MEDICINE

## 2021-12-28 PROCEDURE — T1015 CLINIC SERVICE: HCPCS | Performed by: FAMILY MEDICINE

## 2021-12-28 PROCEDURE — 85025 COMPLETE CBC W/AUTO DIFF WBC: CPT

## 2021-12-28 PROCEDURE — 80053 COMPREHEN METABOLIC PANEL: CPT

## 2021-12-28 PROCEDURE — 84443 ASSAY THYROID STIM HORMONE: CPT

## 2021-12-28 PROCEDURE — 80061 LIPID PANEL: CPT

## 2021-12-28 PROCEDURE — 83036 HEMOGLOBIN GLYCOSYLATED A1C: CPT

## 2021-12-28 RX ORDER — ACETAMINOPHEN AND CODEINE PHOSPHATE 300; 30 MG/1; MG/1
1 TABLET ORAL 2 TIMES DAILY PRN
Qty: 15 TABLET | Refills: 0 | Status: CANCELLED | OUTPATIENT
Start: 2021-12-28

## 2021-12-29 ENCOUNTER — TELEPHONE (OUTPATIENT)
Dept: OBGYN CLINIC | Facility: HOSPITAL | Age: 56
End: 2021-12-29

## 2021-12-29 DIAGNOSIS — M75.31 CALCIFIC TENDINITIS OF BOTH SHOULDERS: Primary | ICD-10-CM

## 2021-12-29 DIAGNOSIS — M75.32 CALCIFIC TENDINITIS OF BOTH SHOULDERS: Primary | ICD-10-CM

## 2021-12-29 RX ORDER — IBUPROFEN 800 MG/1
800 TABLET ORAL EVERY 8 HOURS SCHEDULED
Qty: 15 TABLET | Refills: 0 | Status: SHIPPED | OUTPATIENT
Start: 2021-12-29 | End: 2022-01-04

## 2022-01-04 ENCOUNTER — APPOINTMENT (EMERGENCY)
Dept: CT IMAGING | Facility: HOSPITAL | Age: 57
End: 2022-01-04
Payer: COMMERCIAL

## 2022-01-04 ENCOUNTER — APPOINTMENT (EMERGENCY)
Dept: RADIOLOGY | Facility: HOSPITAL | Age: 57
End: 2022-01-04
Payer: COMMERCIAL

## 2022-01-04 ENCOUNTER — HOSPITAL ENCOUNTER (EMERGENCY)
Facility: HOSPITAL | Age: 57
Discharge: HOME | End: 2022-01-04
Attending: EMERGENCY MEDICINE | Admitting: EMERGENCY MEDICINE
Payer: COMMERCIAL

## 2022-01-04 VITALS
TEMPERATURE: 96.8 F | OXYGEN SATURATION: 95 % | HEART RATE: 50 BPM | WEIGHT: 138.45 LBS | RESPIRATION RATE: 14 BRPM | BODY MASS INDEX: 27.96 KG/M2 | DIASTOLIC BLOOD PRESSURE: 65 MMHG | SYSTOLIC BLOOD PRESSURE: 148 MMHG

## 2022-01-04 DIAGNOSIS — R07.89 ATYPICAL CHEST PAIN: Primary | ICD-10-CM

## 2022-01-04 LAB
2HR DELTA HS TROPONIN: 0 NG/L
ALBUMIN SERPL BCP-MCNC: 4.2 G/DL (ref 3.5–5)
ALP SERPL-CCNC: 93 U/L (ref 46–116)
ALT SERPL W P-5'-P-CCNC: 81 U/L (ref 12–78)
ANION GAP SERPL CALCULATED.3IONS-SCNC: 9 MMOL/L (ref 4–13)
AST SERPL W P-5'-P-CCNC: 42 U/L (ref 5–45)
BASOPHILS # BLD AUTO: 0.04 THOUSANDS/ΜL (ref 0–0.1)
BASOPHILS NFR BLD AUTO: 1 % (ref 0–1)
BILIRUB SERPL-MCNC: 1.24 MG/DL (ref 0.2–1)
BUN SERPL-MCNC: 16 MG/DL (ref 5–25)
CALCIUM SERPL-MCNC: 9.1 MG/DL (ref 8.3–10.1)
CARDIAC TROPONIN I PNL SERPL HS: 4 NG/L
CARDIAC TROPONIN I PNL SERPL HS: 4 NG/L
CHLORIDE SERPL-SCNC: 105 MMOL/L (ref 100–108)
CO2 SERPL-SCNC: 28 MMOL/L (ref 21–32)
CREAT SERPL-MCNC: 0.89 MG/DL (ref 0.6–1.3)
D DIMER PPP FEU-MCNC: 0.63 UG/ML FEU
EOSINOPHIL # BLD AUTO: 0.1 THOUSAND/ΜL (ref 0–0.61)
EOSINOPHIL NFR BLD AUTO: 2 % (ref 0–6)
ERYTHROCYTE [DISTWIDTH] IN BLOOD BY AUTOMATED COUNT: 11.9 % (ref 11.6–15.1)
FLUAV RNA RESP QL NAA+PROBE: NEGATIVE
FLUBV RNA RESP QL NAA+PROBE: NEGATIVE
GFR SERPL CREATININE-BSD FRML MDRD: 72 ML/MIN/1.73SQ M
GLUCOSE SERPL-MCNC: 170 MG/DL (ref 65–140)
HCT VFR BLD AUTO: 39.3 % (ref 34.8–46.1)
HGB BLD-MCNC: 13.6 G/DL (ref 11.5–15.4)
IMM GRANULOCYTES # BLD AUTO: 0.01 THOUSAND/UL (ref 0–0.2)
IMM GRANULOCYTES NFR BLD AUTO: 0 % (ref 0–2)
LYMPHOCYTES # BLD AUTO: 2.26 THOUSANDS/ΜL (ref 0.6–4.47)
LYMPHOCYTES NFR BLD AUTO: 41 % (ref 14–44)
MCH RBC QN AUTO: 30.2 PG (ref 26.8–34.3)
MCHC RBC AUTO-ENTMCNC: 34.6 G/DL (ref 31.4–37.4)
MCV RBC AUTO: 87 FL (ref 82–98)
MONOCYTES # BLD AUTO: 0.3 THOUSAND/ΜL (ref 0.17–1.22)
MONOCYTES NFR BLD AUTO: 6 % (ref 4–12)
NEUTROPHILS # BLD AUTO: 2.78 THOUSANDS/ΜL (ref 1.85–7.62)
NEUTS SEG NFR BLD AUTO: 50 % (ref 43–75)
NRBC BLD AUTO-RTO: 0 /100 WBCS
NT-PROBNP SERPL-MCNC: 172 PG/ML
PLATELET # BLD AUTO: 238 THOUSANDS/UL (ref 149–390)
PMV BLD AUTO: 10.3 FL (ref 8.9–12.7)
POTASSIUM SERPL-SCNC: 3.6 MMOL/L (ref 3.5–5.3)
PROT SERPL-MCNC: 7.3 G/DL (ref 6.4–8.2)
RBC # BLD AUTO: 4.5 MILLION/UL (ref 3.81–5.12)
RSV RNA RESP QL NAA+PROBE: NEGATIVE
SARS-COV-2 RNA RESP QL NAA+PROBE: NEGATIVE
SODIUM SERPL-SCNC: 142 MMOL/L (ref 136–145)
WBC # BLD AUTO: 5.49 THOUSAND/UL (ref 4.31–10.16)

## 2022-01-04 PROCEDURE — 99285 EMERGENCY DEPT VISIT HI MDM: CPT | Performed by: EMERGENCY MEDICINE

## 2022-01-04 PROCEDURE — 80053 COMPREHEN METABOLIC PANEL: CPT | Performed by: EMERGENCY MEDICINE

## 2022-01-04 PROCEDURE — 83880 ASSAY OF NATRIURETIC PEPTIDE: CPT | Performed by: EMERGENCY MEDICINE

## 2022-01-04 PROCEDURE — 84484 ASSAY OF TROPONIN QUANT: CPT | Performed by: EMERGENCY MEDICINE

## 2022-01-04 PROCEDURE — 93005 ELECTROCARDIOGRAM TRACING: CPT

## 2022-01-04 PROCEDURE — 99285 EMERGENCY DEPT VISIT HI MDM: CPT

## 2022-01-04 PROCEDURE — 71275 CT ANGIOGRAPHY CHEST: CPT

## 2022-01-04 PROCEDURE — 85025 COMPLETE CBC W/AUTO DIFF WBC: CPT | Performed by: EMERGENCY MEDICINE

## 2022-01-04 PROCEDURE — 71045 X-RAY EXAM CHEST 1 VIEW: CPT

## 2022-01-04 PROCEDURE — 0241U HB NFCT DS VIR RESP RNA 4 TRGT: CPT | Performed by: EMERGENCY MEDICINE

## 2022-01-04 PROCEDURE — 36415 COLL VENOUS BLD VENIPUNCTURE: CPT | Performed by: EMERGENCY MEDICINE

## 2022-01-04 PROCEDURE — 85379 FIBRIN DEGRADATION QUANT: CPT | Performed by: EMERGENCY MEDICINE

## 2022-01-04 RX ADMIN — IOHEXOL 85 ML: 350 INJECTION, SOLUTION INTRAVENOUS at 17:41

## 2022-01-04 NOTE — ED PROVIDER NOTES
History  Chief Complaint   Patient presents with    Shortness of Breath     sob since yesterday not covid vaccinated     Patient is a 51-year-old female  Cardiac risk factors include obesity, hyperlipidemia, hypertension and family history  She denies history of diabetes  She does not smoke  She has no known history of cardiac disease or pulmonary disease  No history of thromboembolic disease  No history of coronary artery disease  She presents to the emergency room complaining yesterday morning  It gradually became worse  She did have some chest pain yesterday  None today  There is no cough, congestion or fever  No hemoptysis, calf pain or unilateral leg swelling  The shortness of breath is not exertional   She denies any anxiety  No peripheral edema  Symptoms are moderate in severity  Prior to Admission Medications   Prescriptions Last Dose Informant Patient Reported? Taking?    Diclofenac Sodium (VOLTAREN) 1 % Not Taking at Unknown time  No No   Sig: Apply 2 g topically 4 (four) times a day   Patient not taking: Reported on 1/4/2022    acetaminophen-codeine (TYLENOL #3) 300-30 mg per tablet Not Taking at Unknown time  No No   Sig: Take 1 tablet by mouth 2 (two) times a day as needed for moderate pain   Patient not taking: Reported on 1/4/2022    atorvastatin (LIPITOR) 20 mg tablet   No Yes   Sig: Take 1 tablet (20 mg total) by mouth daily      Facility-Administered Medications: None       Past Medical History:   Diagnosis Date    Fatigue     High cholesterol     Hypertension     Neuropathy     Osteoarthritis     knees    Shortness of breath        Past Surgical History:   Procedure Laterality Date    APPENDECTOMY      EPIDURAL BLOCK INJECTION Left 5/17/2018    Procedure: L4 AND L5 TRANSFORAMINAL EPIDURAL STEROID INJECTION;  Surgeon: Maya Mukherjee MD;  [facility];  Service: Pain Management     EPIDURAL BLOCK INJECTION Left 10/4/2018    Procedure: L4 AND L5 TRANSFORAMINAL EPIDURAL STEROID INJECTION;  Surgeon: Stefani Roman MD;  [facility];  Service: Pain Management     EPIDURAL BLOCK INJECTION Left 5/1/2019    Procedure: L4-5 and L5-S1 transforaminal epidural steroid injection;  Surgeon: Stefani Roman MD;  [facility];  Service: Pain Management     FL GUIDED NEEDLE PLAC BX/ASP/INJ  5/1/2019    TN COLONOSCOPY FLX DX W/COLLJ SPEC WHEN PFRMD N/A 5/31/2016    Procedure: COLONOSCOPY;  Surgeon: Geovanni Beach MD;  [facility];  Service: Gastroenterology    TN ESOPHAGOGASTRODUODENOSCOPY TRANSORAL DIAGNOSTIC N/A 11/15/2016    Procedure: ESOPHAGOGASTRODUODENOSCOPY (EGD); Surgeon: Geovanni Beach MD;  [facility];  Service: Gastroenterology    TN TYMPANOPLASTY Left 10/25/2019    Procedure: LEFT TYMPANOPLASTY;  Surgeon: Tim Velazquez MD;  [facility];  Service: ENT    TUBAL LIGATION         Family History   Problem Relation Age of Onset    Uterine cancer Mother     Early death Father     Heart disease Brother     No Known Problems Sister     No Known Problems Daughter     No Known Problems Sister     No Known Problems Daughter     No Known Problems Maternal Aunt     No Known Problems Maternal Aunt     No Known Problems Maternal Aunt     No Known Problems Maternal Aunt     No Known Problems Maternal Grandmother     No Known Problems Maternal Grandfather     No Known Problems Paternal Grandmother     No Known Problems Paternal Grandfather      I have reviewed and agree with the history as documented      E-Cigarette/Vaping    E-Cigarette Use Never User      E-Cigarette/Vaping Substances    Nicotine No     THC No     CBD No     Flavoring No     Other No     Unknown No      Social History     Tobacco Use    Smoking status: Never Smoker    Smokeless tobacco: Never Used   Vaping Use    Vaping Use: Never used   Substance Use Topics    Alcohol use: Not Currently    Drug use: No       Review of Systems Constitutional: Negative for chills and fever  HENT: Negative for rhinorrhea and sore throat  Eyes: Negative for pain, redness and visual disturbance  Respiratory: Positive for shortness of breath  Negative for cough  Cardiovascular: Negative for chest pain and leg swelling  Gastrointestinal: Negative for abdominal pain, diarrhea and vomiting  Endocrine: Negative for polydipsia and polyuria  Genitourinary: Negative for dysuria, frequency, hematuria, vaginal bleeding and vaginal discharge  Musculoskeletal: Negative for back pain and neck pain  Skin: Negative for rash and wound  Allergic/Immunologic: Negative for immunocompromised state  Neurological: Negative for weakness, numbness and headaches  Hematological: Does not bruise/bleed easily  Psychiatric/Behavioral: Negative for hallucinations and suicidal ideas  All other systems reviewed and are negative  Physical Exam  Physical Exam  Vitals reviewed  Constitutional:       General: She is not in acute distress  HENT:      Head: Normocephalic and atraumatic  Nose: Nose normal       Mouth/Throat:      Mouth: Mucous membranes are moist    Eyes:      General:         Right eye: No discharge  Left eye: No discharge  Conjunctiva/sclera: Conjunctivae normal    Cardiovascular:      Rate and Rhythm: Normal rate and regular rhythm  Pulses: Normal pulses  Heart sounds: Normal heart sounds  No murmur heard  No friction rub  No gallop  Pulmonary:      Effort: Pulmonary effort is normal  No respiratory distress  Breath sounds: Normal breath sounds  No stridor  No wheezing, rhonchi or rales  Abdominal:      General: Bowel sounds are normal  There is no distension  Palpations: Abdomen is soft  Tenderness: There is no abdominal tenderness  There is no right CVA tenderness, left CVA tenderness, guarding or rebound     Musculoskeletal:         General: No swelling, tenderness, deformity or signs of injury  Normal range of motion  Cervical back: Normal range of motion and neck supple  No rigidity  Right lower leg: No edema  Left lower leg: No edema  Comments: No calf tenderness or unilateral leg swelling  Skin:     General: Skin is warm and dry  Coloration: Skin is not jaundiced  Findings: No rash  Neurological:      General: No focal deficit present  Mental Status: She is alert and oriented to person, place, and time  Sensory: No sensory deficit  Motor: Motor function is intact     Psychiatric:         Mood and Affect: Mood normal          Behavior: Behavior normal          Vital Signs  ED Triage Vitals   Temperature Pulse Respirations Blood Pressure SpO2   01/04/22 1546 01/04/22 1548 01/04/22 1546 01/04/22 1549 01/04/22 1548   (!) 96 8 °F (36 °C) 60 18 151/73 98 %      Temp Source Heart Rate Source Patient Position - Orthostatic VS BP Location FiO2 (%)   01/04/22 1546 01/04/22 1548 01/04/22 1700 01/04/22 1700 --   Temporal Monitor Lying Right arm       Pain Score       01/04/22 1546       6           Vitals:    01/04/22 1700 01/04/22 1745 01/04/22 1900 01/04/22 1930   BP: 129/60 165/72 137/63 148/65   Pulse: (!) 54 (!) 54 (!) 52 (!) 50   Patient Position - Orthostatic VS: Lying Lying  Lying         Visual Acuity      ED Medications  Medications   iohexol (OMNIPAQUE) 350 MG/ML injection (SINGLE-DOSE) 85 mL (85 mL Intravenous Given 1/4/22 1741)       Diagnostic Studies  Results Reviewed     Procedure Component Value Units Date/Time    HS Troponin I 2hr [[id number]] Collected: 01/04/22 1841    Lab Status: Final result Specimen: Blood from Arm, Left Updated: 01/04/22 1917     hs TnI 2hr 4 ng/L      Delta 2hr hsTnI 0 ng/L     COVID/FLU/RSV - 2 hour TAT [[id number]]  (Normal) Collected: 01/04/22 1629    Lab Status: Final result Specimen: Nares from Nasopharyngeal Swab Updated: 01/04/22 1726     SARS-CoV-2 Negative     INFLUENZA A PCR Negative     INFLUENZA B PCR Negative     RSV PCR Negative    Narrative:      FOR PEDIATRIC PATIENTS - copy/paste COVID Guidelines URL to browser: [email or web address]    SARS-CoV-2 assay is a Nucleic Acid Amplification assay intended for the  qualitative detection of nucleic acid from SARS-CoV-2 in nasopharyngeal  swabs  Results are for the presumptive identification of SARS-CoV-2 RNA  Positive results are indicative of infection with SARS-CoV-2, the virus  causing COVID-19, but do not rule out bacterial infection or co-infection  with other viruses  Laboratories within the [place] and its  territories are required to report all positive results to the appropriate  public health authorities  Negative results do not preclude SARS-CoV-2  infection and should not be used as the sole basis for treatment or other  patient management decisions  Negative results must be combined with  clinical observations, patient history, and epidemiological information  This test has not been FDA cleared or approved  This test has been authorized by FDA under an Emergency Use Authorization  (EUA)  This test is only authorized for the duration of time the  declaration that circumstances exist justifying the authorization of the  emergency use of an in vitro diagnostic tests for detection of SARS-CoV-2  virus and/or diagnosis of COVID-19 infection under section 564(b)(1) of  the Act, 21 U  S C  294AFU-3(S)(8), unless the authorization is terminated  or revoked sooner  The test has been validated but independent review by FDA  and CLIA is pending  Test performed using [employer] Gene[employer]pert: This RT-PCR assay targets N2,  a region unique to SARS-CoV-2  A conserved region in the E-gene was chosen  for pan-Sarbecovirus detection which includes SARS-CoV-2      Comprehensive metabolic panel [[id number]]  (Abnormal) Collected: 01/04/22 1878    Lab Status: Final result Specimen: Blood from Arm, Left Updated: 01/04/22 1708     Sodium 142 mmol/L      Potassium 3 6 mmol/L      Chloride 105 mmol/L      CO2 28 mmol/L      ANION GAP 9 mmol/L      BUN 16 mg/dL      Creatinine 0 89 mg/dL      Glucose 170 mg/dL      Calcium 9 1 mg/dL      AST 42 U/L      ALT 81 U/L      Alkaline Phosphatase 93 U/L      Total Protein 7 3 g/dL      Albumin 4 2 g/dL      Total Bilirubin 1 24 mg/dL      eGFR 72 ml/min/1 73sq m     Narrative:      National Kidney Disease Foundation guidelines for Chronic Kidney Disease (CKD):     Stage 1 with normal or high GFR (GFR > 90 mL/min/1 73 square meters)    Stage 2 Mild CKD (GFR = 60-89 mL/min/1 73 square meters)    Stage 3A Moderate CKD (GFR = 45-59 mL/min/1 73 square meters)    Stage 3B Moderate CKD (GFR = 30-44 mL/min/1 73 square meters)    Stage 4 Severe CKD (GFR = 15-29 mL/min/1 73 square meters)    Stage 5 End Stage CKD (GFR <15 mL/min/1 73 square meters)  Note: GFR calculation is accurate only with a steady state creatinine    HS Troponin 0hr (reflex protocol) [[id number]]  (Normal) Collected: 01/04/22 1629    Lab Status: Final result Specimen: Blood from Arm, Left Updated: 01/04/22 1708     hs TnI 0hr 4 ng/L     NT-BNP PRO [[id number]]  (Abnormal) Collected: 01/04/22 1629    Lab Status: Final result Specimen: Blood from Arm, Left Updated: 01/04/22 1704     NT-proBNP 172 pg/mL     D-Dimer [[id number]]  (Abnormal) Collected: 01/04/22 1629    Lab Status: Final result Specimen: Blood from Arm, Left Updated: 01/04/22 1702     D-Dimer, Quant 0 63 ug/ml FEU     CBC and differential [[id number]] Collected: 01/04/22 1629    Lab Status: Final result Specimen: Blood from Arm, Left Updated: 01/04/22 1646     WBC 5 49 Thousand/uL      RBC 4 50 Million/uL      Hemoglobin 13 6 g/dL      Hematocrit 39 3 %      MCV 87 fL      MCH 30 2 pg      MCHC 34 6 g/dL      RDW 11 9 %      MPV 10 3 fL      Platelets 937 Thousands/uL      nRBC 0 /100 WBCs      Neutrophils Relative 50 %      Immat GRANS % 0 %      Lymphocytes Relative 41 %      Monocytes Relative 6 %      Eosinophils Relative 2 %      Basophils Relative 1 %      Neutrophils Absolute 2 78 Thousands/µL      Immature Grans Absolute 0 01 Thousand/uL      Lymphocytes Absolute 2 26 Thousands/µL      Monocytes Absolute 0 30 Thousand/µL      Eosinophils Absolute 0 10 Thousand/µL      Basophils Absolute 0 04 Thousands/µL                  CTA ED chest PE study   Final Result by Elsa Souza MD (01/04 1801)      No acute finding; no pulmonary arterial embolism, pulmonary infiltrate/consolidation or pulmonary edema  Borderline heart size  Workstation performed: [id number]         XR chest 1 view portable   ED Interpretation by Becca Washington MD (01/04 1703)   No infiltrates  No CHF  Final Result by Gabriele Law MD (01/04 1645)      No acute cardiopulmonary disease  Workstation performed: [id number]                    Procedures  ECG 12 Lead Documentation Only    Date/Time: 1/4/2022 5:01 PM  Performed by: Becca Washington MD  Authorized by: Becca Washington MD     ECG reviewed by me, the ED Provider: yes    Patient location:  ED  Comments:      Sinus bradycardia at 57 beats per minute  PVC present  Left axis deviation  Right bundle branch block  Nonspecific ST wave changes  ED Course             HEART Risk Score      Most Recent Value   Heart Score Risk Calculator    History 1 Filed at: 01/04/2022 1945   ECG 1 Filed at: 01/04/2022 1945   Age 1 Filed at: 01/04/2022 1945   Risk Factors 2 Filed at: 01/04/2022 1945   Troponin 0 Filed at: 01/04/2022 1945   HEART Score 5 Filed at: 01/04/2022 1945                        SBIRT 22yo+      Most Recent Value   SBIRT (25 yo +)    In order to provide better care to our patients, we are screening all of our patients for alcohol and drug use  Would it be okay to ask you these screening questions?  Yes Filed at: 01/04/2022 1718   Initial Alcohol Screen: US AUDIT-C 1  How often do you have a drink containing alcohol? 0 Filed at: 01/04/2022 1718   2  How many drinks containing alcohol do you have on a typical day you are drinking? 0 Filed at: 01/04/2022 1718   3b  FEMALE Any Age, or MALE 65+: How often do you have 4 or more drinks on one occassion? 0 Filed at: 01/04/2022 1718   Audit-C Score 0 Filed at: 01/04/2022 1718   HAJA: How many times in the past year have you    Used an illegal drug or used a prescription medication for non-medical reasons? Never Filed at: 01/04/2022 1718                    [facility]  Number of Diagnoses or Management Options  Diagnosis management comments: On re-evaluation patient added that she currently does have chest pain  On examination it does appear to be reproducible  It might be musculoskeletal   Her heart score is 5  Which puts her at moderate risk  Troponin was negative  Only 1 troponin was necessary as her symptoms have been going on since yesterday  EKG was without ischemia  As per [facility]'s risk stratification policy, patient is appropriate for discharge and referral to Cardiology  There is no acute myocardial infarction at this time  D-dimer was elevated  However subsequent CT scan was negative for pulmonary embolism  There is no dissection  Chest x-ray was negative for pneumonia or pneumothorax  Chest pain might be musculoskeletal   Could be anxiety  At this time patient is appropriate for discharge and outpatient management        Disposition  Final diagnoses:   Atypical chest pain     Time reflects when diagnosis was documented in both MDM as applicable and the Disposition within this note     Time User Action Codes Description Comment    1/4/2022  7:47 PM Brandi Gale Add [R07 89] Atypical chest pain     1/4/2022  7:49 PM Brandi Gale Modify [R07 89] Atypical chest pain       ED Disposition     ED Disposition Condition Date/Time Comment    Discharge Stable Tue Jan 4, 2022  7:47 PM Nae Pa discharge to home/self care              Follow-up Information     Follow up With Specialties Details Why Contact Info Additional Information    [facility] In 2 days  [address] [id number] [phone] [facility] [place], [address], [place], [place], [address]          Discharge Medication List as of 1/4/2022  7:49 PM      CONTINUE these medications which have NOT CHANGED    Details   atorvastatin (LIPITOR) 20 mg tablet Take 1 tablet (20 mg total) by mouth daily, Starting Tue 11/2/2021, Normal      acetaminophen-codeine (TYLENOL #3) 300-30 mg per tablet Take 1 tablet by mouth 2 (two) times a day as needed for moderate pain, Starting Tue 10/5/2021, Normal      Diclofenac Sodium (VOLTAREN) 1 % Apply 2 g topically 4 (four) times a day, Starting Mon 9/13/2021, Normal                 PDMP Review       Value Time User    PDMP Reviewed  Yes 10/5/2021  5:04 PM Tania Lopez PA-C          ED Provider  Electronically Signed by           Yovanny Roblero MD  01/05/22 4457

## 2022-01-05 LAB
ATRIAL RATE: 57 BPM
P AXIS: -69 DEGREES
PR INTERVAL: 128 MS
QRS AXIS: -36 DEGREES
QRSD INTERVAL: 118 MS
QT INTERVAL: 494 MS
QTC INTERVAL: 480 MS
T WAVE AXIS: 23 DEGREES
VENTRICULAR RATE: 57 BPM

## 2022-01-05 PROCEDURE — 93010 ELECTROCARDIOGRAM REPORT: CPT | Performed by: INTERNAL MEDICINE

## 2022-01-11 NOTE — PROGRESS NOTES
Cardiology Follow Up    Luis Antonio Gipson  1965  [id number]  [facility] [place]  Deya Dutton [id number] [facility]  [phone]    1  Atypical chest pain     2  Dyslipidemia     3  Near syncope         Discussion/Summary:  Her chest pain is atypical for myocardial ischemia  It comes on at rest and lasts for up to 2-3 days at a time  When she had the pain for >24 hours, high sensitivity troponin levels were wnl and EKG did not show any ischemic changes  Her PCP did order a stress echo which was not scheduled  I encouraged her to schedule this today  Although symptoms do not appear cardiac in nature, she does report a family history of sudden cardiac death  Prior echocardiogram and Holter monitor did not show any structural abnormalities nor significant arrhythmias  Her blood pressure was elevated initially but came down upon my recheck  Lifestyle modifications were discussed with patient and her daughter  Lipids are well controlled on current dose of statin therapy  She will RTO in 6 months with Dr Lawyer Shea or sooner if necessary  She will call with any concerns  Interval History:   Luis Antonio Gipson is a 62 y o  female with prior near syncope, dyslipidemia, chronic pain syndrome who presents to the office today for ER follow up  She was seen in the ER on 1/4 with complaints of shortness of breath and chest pain  Workup was unremarkable including EKG (no change compared to prior), normal hs troponin levels  She had a CXR and CTA which were negative for acute cardiopulmonary process  She presents today with her daughter who interprets for patient  Patient developed symptoms of chest pain when she was reaching over her head trying to clean  She states the pain lasted for 2-3 days total  She had associated shortness of breath  No radiation of the discomfort   She has had similar pain without shortness of breath over the past 6 months  All episodes have occurred at rest  It is no precipitated by exertion  She denies lower extremity edema  She denies orthopnea but sometimes has paroxysmal nocturnal dyspnea  She denies lightheadedness, dizziness, and syncope  She has occasional palpations  She is a never smoker and denies alcohol use  She does report family history of sudden cardiac death  She states one of her brothers  at age 44 and another in his late 52's, both presumably from a heart attack  She also states 4-5 other people in her family  suddenly at a younger age      Medical Problems             Problem List     Abdominal pain, left lower quadrant (Chronic)    Chronic lumbar radiculopathy    Chronic pain syndrome    Dyslipidemia    Chronic pain of right knee    Chronic right hip pain    Myofascial pain syndrome    Atrophic vaginitis    Primary osteoarthritis of both knees    Lumbar radiculopathy    Overview Signed 2018  8:48 AM by Silvio Reece MA     Added automatically from request for surgery [id number]         Left hip pain    Lumbar degenerative disc disease    Pharyngoesophageal dysphagia    Perforation of left tympanic membrane    Chronic otitis media of left ear    Atypical chest pain    Epigastric abdominal pain    Chronic musculoskeletal pain    Pain in finger of both hands    Tympanic membrane perforation, left    Overview Signed 10/7/2019  1:44 PM by Twan Ridley     Added automatically from request for surgery [id number]         Strain of thoracic back region    Chronic right shoulder pain    Medial epicondylitis of elbow, left    Near syncope    Chronic pain    Bradycardia    Episode of dizziness    Acute non-recurrent maxillary sinusitis    RBBB              Past Medical History:   Diagnosis Date    Fatigue     High cholesterol     Hypertension     Neuropathy     Osteoarthritis     knees    Shortness of breath      Social History     Socioeconomic History    Marital status: Single     Spouse name: Not on file    Number of children: Not on file    Years of education: Not on file    Highest education level: Not on file   Occupational History    Not on file   Tobacco Use    Smoking status: Never Smoker    Smokeless tobacco: Never Used   Vaping Use    Vaping Use: Never used   Substance and Sexual Activity    Alcohol use: Not Currently    Drug use: No    Sexual activity: Yes     Birth control/protection: Female Sterilization   Other Topics Concern    Not on file   Social History Narrative    Not on file     Social Determinants of Health     Financial Resource Strain: Not on file   Food Insecurity: Not on file   Transportation Needs: Not on file   Physical Activity: Not on file   Stress: Not on file   Social Connections: Not on file   Intimate Partner Violence: Not on file   Housing Stability: Not on file      Family History   Problem Relation Age of Onset    Uterine cancer Mother     Early death Father     Heart disease Brother     No Known Problems Sister     No Known Problems Daughter     No Known Problems Sister     No Known Problems Daughter     No Known Problems Maternal Aunt     No Known Problems Maternal Aunt     No Known Problems Maternal Aunt     No Known Problems Maternal Aunt     No Known Problems Maternal Grandmother     No Known Problems Maternal Grandfather     No Known Problems Paternal Grandmother     No Known Problems Paternal Grandfather      Past Surgical History:   Procedure Laterality Date    APPENDECTOMY      EPIDURAL BLOCK INJECTION Left 5/17/2018    Procedure: L4 AND L5 TRANSFORAMINAL EPIDURAL STEROID INJECTION;  Surgeon: Stefani Roman MD;  Location: [facility];  Service: Pain Management     EPIDURAL BLOCK INJECTION Left 10/4/2018    Procedure: L4 AND L5 TRANSFORAMINAL EPIDURAL STEROID INJECTION;  Surgeon: Stefani Roman MD;  [facility];  Service: Pain Management     EPIDURAL BLOCK INJECTION Left 5/1/2019 Procedure: L4-5 and L5-S1 transforaminal epidural steroid injection;  Surgeon: Eufemia Marley MD;  Location: [facility];  Service: Pain Management     FL GUIDED NEEDLE PLAC BX/ASP/INJ  5/1/2019    MA COLONOSCOPY FLX DX W/COLLJ SPEC WHEN PFRMD N/A 5/31/2016    Procedure: COLONOSCOPY;  Surgeon: Abiodun Doe MD;  [facility]: [facility];  Service: Gastroenterology    MA ESOPHAGOGASTRODUODENOSCOPY TRANSORAL DIAGNOSTIC N/A 11/15/2016    Procedure: ESOPHAGOGASTRODUODENOSCOPY (EGD);   Surgeon: Abiodun Doe MD;  [facility]: [facility];  Service: Gastroenterology    MA TYMPANOPLASTY Left 10/25/2019    Procedure: LEFT TYMPANOPLASTY;  Surgeon: Jerald Bacon MD;  Location:  MAIN OR;  Service: ENT    TUBAL LIGATION         Current Outpatient Medications:     atorvastatin (LIPITOR) 20 mg tablet, Take 1 tablet (20 mg total) by mouth daily, Disp: 60 tablet, Rfl: 3    acetaminophen-codeine (TYLENOL #3) 300-30 mg per tablet, Take 1 tablet by mouth 2 (two) times a day as needed for moderate pain (Patient not taking: Reported on 1/4/2022 ), Disp: 15 tablet, Rfl: 0    Diclofenac Sodium (VOLTAREN) 1 %, Apply 2 g topically 4 (four) times a day (Patient not taking: Reported on 1/4/2022 ), Disp: 350 g, Rfl: 1  No Known Allergies    Labs:     Chemistry        Component Value Date/Time     07/14/2015 0737    K 3 6 01/04/2022 1629    K 4 4 07/14/2015 0737     01/04/2022 1629     07/14/2015 0737    CO2 28 01/04/2022 1629    CO2 26 6 07/14/2015 0737    BUN 16 01/04/2022 1629    BUN 17 07/14/2015 0737    CREATININE 0 89 01/04/2022 1629    CREATININE 0 72 07/14/2015 0737        Component Value Date/Time    CALCIUM 9 1 01/04/2022 1629    CALCIUM 9 0 07/14/2015 0737    ALKPHOS 93 01/04/2022 1629    ALKPHOS 77 07/14/2015 0737    AST 42 01/04/2022 1629    AST 22 07/14/2015 0737    ALT 81 (H) 01/04/2022 1629    ALT 38 07/14/2015 0737    BILITOT 0 81 07/14/2015 0737            No results found for: CHOL  Lab Results   Component Value Date    HDL 56 12/28/2021    HDL 53 05/10/2021    HDL 44 06/23/2018     Lab Results   Component Value Date    LDLCALC 49 12/28/2021    LDLCALC 130 (H) 05/10/2021    LDLCALC 89 06/23/2018     Lab Results   Component Value Date    TRIG 158 (H) 12/28/2021    TRIG 173 (H) 05/10/2021    TRIG 160 (H) 06/23/2018     No results found for: CHOLHDL    Imaging: XR chest 1 view portable    Result Date: 1/4/2022  Narrative: CHEST INDICATION:   sob  Patient has suspected COVID-19  COMPARISON:  Chest radiograph from 10/12/2021  Chest CT from 5/15/2020  EXAM PERFORMED/VIEWS:  XR CHEST PORTABLE FINDINGS: Cardiomediastinal silhouette appears unremarkable  The lungs are clear  No pneumothorax or pleural effusion  Osseous structures appear within normal limits for patient age  Calcification at the right rotator cuff insertion  Impression: No acute cardiopulmonary disease  Workstation performed: [id number]     CTA ED chest PE study    Result Date: 1/4/2022  Narrative: CTA - CHEST WITH IV CONTRAST - PULMONARY ANGIOGRAM INDICATION:   Short of breath, positive D-dimer  COMPARISON: CTA chest 5/15/2020 TECHNIQUE: CTA examination of the chest was performed using angiographic technique according to a protocol specifically tailored to evaluate for pulmonary embolism  Axial, sagittal, and coronal 2D reformatted images were created from the source data and  submitted for interpretation  In addition, coronal 3D MIP postprocessing was performed on the acquisition scanner  Radiation dose length product (DLP) for this visit:  216 01 mGy-cm   This examination, like all CT scans performed in the [facility], was performed utilizing techniques to minimize radiation dose exposure, including the use of iterative  reconstruction and automated exposure control  IV Contrast:  85 mL of iohexol (OMNIPAQUE)  FINDINGS: PULMONARY ARTERIAL TREE:  No acute pulmonary arterial embolism  LUNGS:  Lungs are clear    There is no tracheal or endobronchial lesion  PLEURA:  Unremarkable  HEART/GREAT VESSELS:  Borderline heart size  No thoracic aortic aneurysm or dissection  No pericardial effusion  MEDIASTINUM AND JUSTINE:  Unremarkable  CHEST WALL AND LOWER NECK:   Unremarkable  VISUALIZED STRUCTURES IN THE UPPER ABDOMEN:  Unremarkable  OSSEOUS STRUCTURES:  No acute fracture or destructive osseous lesion  Impression: No acute finding; no pulmonary arterial embolism, pulmonary infiltrate/consolidation or pulmonary edema  Borderline heart size  Workstation performed: [id number]           Review of Systems   Constitutional: Positive for malaise/fatigue  Negative for chills and fever  HENT: Negative  Cardiovascular: Positive for chest pain and palpitations  Negative for leg swelling and orthopnea  Respiratory: Negative for cough and shortness of breath  Gastrointestinal: Negative for diarrhea, nausea and vomiting  Genitourinary: Negative  Neurological: Negative for dizziness and light-headedness  All other systems reviewed and are negative  Vitals:    01/12/22 1539   BP: 134/82   Pulse:      Vitals:    01/12/22 1439   Weight: 65 4 kg (144 lb 3 2 oz)     Height: 4' 11" (149 9 cm)   Body mass index is 29 12 kg/m²  Physical Exam:  Physical Exam  Vitals reviewed  Constitutional:       General: She is not in acute distress  Appearance: She is not diaphoretic  HENT:      Head: Normocephalic and atraumatic  Eyes:      Pupils: Pupils are equal, round, and reactive to light  Neck:      Vascular: No carotid bruit  Cardiovascular:      Rate and Rhythm: Normal rate and regular rhythm  Pulses:           Radial pulses are 2+ on the right side and 2+ on the left side  Dorsalis pedis pulses are 2+ on the right side and 2+ on the left side  Heart sounds: S1 normal and S2 normal  No murmur heard  Pulmonary:      Effort: Pulmonary effort is normal  No respiratory distress  Breath sounds: Normal breath sounds  No wheezing or rales  Abdominal:      General: There is no distension  Palpations: Abdomen is soft  Tenderness: There is no abdominal tenderness  Musculoskeletal:         General: No deformity  Normal range of motion  Cervical back: Normal range of motion  Right lower leg: No edema  Left lower leg: No edema  Skin:     General: Skin is warm and dry  Findings: No erythema  Neurological:      General: No focal deficit present  Mental Status: She is alert and oriented to person, place, and time     Psychiatric:         Mood and Affect: Mood normal          Behavior: Behavior normal

## 2022-01-12 ENCOUNTER — OFFICE VISIT (OUTPATIENT)
Dept: CARDIOLOGY CLINIC | Facility: HOSPITAL | Age: 57
End: 2022-01-12
Payer: COMMERCIAL

## 2022-01-12 VITALS
SYSTOLIC BLOOD PRESSURE: 134 MMHG | DIASTOLIC BLOOD PRESSURE: 82 MMHG | HEART RATE: 60 BPM | HEIGHT: 59 IN | WEIGHT: 144.2 LBS | BODY MASS INDEX: 29.07 KG/M2

## 2022-01-12 DIAGNOSIS — R55 NEAR SYNCOPE: ICD-10-CM

## 2022-01-12 DIAGNOSIS — E78.5 DYSLIPIDEMIA: ICD-10-CM

## 2022-01-12 DIAGNOSIS — R07.89 ATYPICAL CHEST PAIN: Primary | ICD-10-CM

## 2022-01-12 PROCEDURE — 99214 OFFICE O/P EST MOD 30 MIN: CPT | Performed by: INTERNAL MEDICINE

## 2022-01-25 ENCOUNTER — OFFICE VISIT (OUTPATIENT)
Dept: FAMILY MEDICINE CLINIC | Facility: CLINIC | Age: 57
End: 2022-01-25
Payer: COMMERCIAL

## 2022-01-25 VITALS
DIASTOLIC BLOOD PRESSURE: 82 MMHG | WEIGHT: 144.4 LBS | BODY MASS INDEX: 29.11 KG/M2 | SYSTOLIC BLOOD PRESSURE: 142 MMHG | TEMPERATURE: 96.8 F | OXYGEN SATURATION: 97 % | RESPIRATION RATE: 18 BRPM | HEIGHT: 59 IN | HEART RATE: 60 BPM

## 2022-01-25 DIAGNOSIS — G89.29 OTHER CHRONIC PAIN: ICD-10-CM

## 2022-01-25 DIAGNOSIS — M75.52 BURSITIS OF BOTH SHOULDERS: ICD-10-CM

## 2022-01-25 DIAGNOSIS — I10 BENIGN ESSENTIAL HTN: Primary | ICD-10-CM

## 2022-01-25 DIAGNOSIS — M75.51 BURSITIS OF BOTH SHOULDERS: ICD-10-CM

## 2022-01-25 PROCEDURE — T1015 CLINIC SERVICE: HCPCS | Performed by: NURSE PRACTITIONER

## 2022-01-25 PROCEDURE — 99214 OFFICE O/P EST MOD 30 MIN: CPT | Performed by: NURSE PRACTITIONER

## 2022-01-25 RX ORDER — MELOXICAM 7.5 MG/1
7.5 TABLET ORAL DAILY
Qty: 30 TABLET | Refills: 1 | Status: SHIPPED | OUTPATIENT
Start: 2022-01-25 | End: 2022-01-25

## 2022-01-25 RX ORDER — AMLODIPINE BESYLATE 5 MG/1
5 TABLET ORAL DAILY
Qty: 30 TABLET | Refills: 1 | Status: SHIPPED | OUTPATIENT
Start: 2022-01-25 | End: 2022-02-07 | Stop reason: SDUPTHER

## 2022-01-25 RX ORDER — MELOXICAM 7.5 MG/1
7.5 TABLET ORAL DAILY
Qty: 30 TABLET | Refills: 1 | Status: SHIPPED | OUTPATIENT
Start: 2022-01-25 | End: 2022-03-08 | Stop reason: SDUPTHER

## 2022-01-25 NOTE — PATIENT INSTRUCTIONS
Bursitis del hombro   CUIDADO AMBULATORIO:   La bursitis de hombro es la inflamación de la bolsa del hombro  La bursa es haydee bolsa (saco) llena de líquido que actúa yolis colchón entre un hueso y un tendón  Los tendones son cordones de tejido resistente que [employer] a los [place]  Los signos y síntomas más comunes incluyen los siguientes:  · Dolor al  del valle hombro o al levantar del valle Shane Sickle sobre del valle trevin    · Dificultad para  el hombro y el brazo    · Enrojecimiento o inflamación    · Aayush de chasquido o crujido al  del valle hombro    · Pleas Luis en del valle hombro y Darcia Aron a del valle médico si:  · Tiene más dolor, enrojecimiento e Nilda Reamer  · Ludy síntomas no mejoran con el tratamiento  · Tiene fiebre  · Usted tiene preguntas o inquietudes acerca de del valle condición o cuidado  El tratamiento podría incluir cualquiera de los siguientes:  · Medicamentos:     ? Los [place], yolis el ibuprofeno, [facility] a disminuir la inflamación, el dolor y la [place]  Los FANTASMA pueden causar sangrado estomacal o problemas renales en ciertas personas  Si usted lynda un medicamento anticoagulante, siempre pregúntele a del valle médico si los FANTASMA son seguros para usted  Siempre maria ines la etiqueta de jennifer medicamento y [place] instrucciones  ? La aspirina ayuda a aliviar el dolor y la hinchazón  Eden Roc la aspirina exactamente yolis le indicó del valle médico  No le de aspirina a un deborah gustavo de 18 años  La aspirina aumenta el riesgo de sufrir haydee afección potencialmente mortal llamada síndrome de [place]  ? Los antibióticos ayudan a combatir infección provocada por haydee bacteria  ? Los esteroides ayudan a aliviar el dolor y la hinchazón  Las inyecciones de esteroides se aplican directamente en el área adolorida  Pueden administrarse píldoras de esteroides por un breve período de tiempo para aliviar el dolor randal  · La bursectomía es [place] para extirpar la bolsa del hombro   Puede recurrirse a la bursectomía cuando otros tratamientos no funcionan  Controle la bursitis de hombro:  · Descanse del valle hombro tan frecuentemente para reducir el dolor y la inflamación  Empiece a hacer un poco más día a día  Regrese a catherine actividades diarias yolis se le haya indicado  · Aplique hielo para ayudar a disminuir la inflamación y el dolor  Use haydee compresa de hielo o ponga hielo triturado en haydee bolsa de plástico  [place] la bolsa con haydee toalla antes de aplicarla sobre el hombro  Aplique hielo linda 15 a 20 minutos, 3 a 4 veces por día, yolis se le haya indicado  · Encuentre haydee posición cómoda para dormir  Acuéstese sobre el lado que no esté lesionado  Es posible que esté más cómodo si duerme boca arriba o boca abajo  · Vaya a fisioterapia según las indicaciones  Un fisioterapeuta le puede enseñar ejercicios para ayudarle a mejorar el movimiento y la fuerza, y para disminuir el dolor  Prevenga la bursitis de hombro:  · No abuse de los hombros  Acorte el tiempo que pasa nadando, jugando al tenis o haciendo otros movimientos de brazos por encima de la trevin  [place] descansos mientras realiza estas actividades  Intente no hacer las 2 Ross Allouez cada día  Por ejemplo, nade cada 2 días o cada 3 días en lugar de diariamente  · Siempre entre en calor y elongue antes de hacer ejercicio  [place] ayudará a aflojar catherine músculos y reducir la tensión en del valle hombro  Estabilícese después de hacer ejercicio  · Evite las lesiones en los hombros  Use hombreras o protectores cuando tahira deportes  · Intente evitar la presión [employer] hombros  Si necesita dormir de lado, no se acueste sobre el mismo lado cada noche  · Controle las afecciones que pueden causar bursitis de hombro: Del Valle médico podría recomendarle que pito a un especialista, yolis un especialista en artritis  Acuda a la consulta de control con del valle médico según las indicaciones: Anote catherine preguntas para que se acuerde de hacerlas linda catherine visitas    © Copyright [employer] 2021 Information is for End User's use only and may not be sold, redistributed or otherwise used for commercial purposes  All illustrations and images included in CareNotes® are the copyrighted property of A D A M , Inc  or [address] [place] [address] es sólo para uso en educación  Del Valle intención no es darle un consejo médico sobre enfermedades o tratamientos  Colsulte con del valle Richard Pinta farmacéutico antes de seguir cualquier régimen médico para saber si es seguro y efectivo para usted  Hipertensión   CUIDADO AMBULATORIO:   Hipertensión es la presión arterial eric  La presión arterial es la fuerza que ejerce la lia al [employer] contra las kahn de las arterias  La hipertensión causa que del valle presión arterial se eleve tanto que del valle corazón se ve forzado a trabajar mucho más herve que lo normal  [place] puede dañar del valle corazón  Puede que no se conozca la causa de la hipertensión  [place] se denomina hipertensión esencial o primaria  La hipertensión causada por otra afección médica, tales yolis la enfermedad renal, se denomina hipertensión secundaria  Los síntomas más comunes incluyen los siguientes:  · Dolor de trevin    · Visión borrosa    · Dolor de pecho    · Mareos o debilidad    · Dificultad para respirar    · Hemorragias nasales (sangrado de la nariz)    Llame al número local de emergencias (911 en los [place]) o pídale a alguien que llame si:  · Usted tiene dolor en el pecho  · Tiene alguno de los siguientes signos de un ataque cardíaco:      ? Estrujamiento, presión o tensión en del valle pecho    ? Usted también podría presentar alguno de los siguientes:     § Malestar o dolor en del valle espalda, shante, mandíbula, abdomen, o brazo    § Falta de [employer] o vómitos    § Desvanecimiento o sudor frío repentino    · Usted se siente confundido o tiene dificultad para hablar  · Repentinamente se siente aturdido o con dificultad para respirar      Busque atención médica de inmediato si:  · Usted tiene un herve dolor de trevin o pérdida de la visión  · Usted tiene debilidad en un brazo o en haydee pierna  Llame a del valle médico o cardiólogo si:  · Usted se siente mareado, confundido, somnoliento o yolis si se fuera a desmayar  · Usted ha estado tomando medicamento para la presión arterial camilo todavía está en un nivel más elevado del que del valle médico le indicó que debería estar  · Usted tiene preguntas o inquietudes acerca de del valle condición o cuidado  Lo que necesita saber sobre las etapas de la hipertensión:      · Mollie Sable presión arterial normal es 119/79 o inferior   Del Valle médico podría comprobar del valle presión arterial solamente cada año si se mantiene en un nivel normal     · Haydee presión arterial elevada es de 120/79 a 129/79   A veces esto se llama prehipertensión  Del Valle médico puede sugerir cambios de estilo de lisa para ayudar a bajar la presión arterial a un nivel normal  Entonces él podría comprobar del valle presión otra vez en 3 a 6 meses  · La etapa 1 de la hipertensión es de 130/80  a 139/89   Del Valle médico podría recomendar cambios de estilo de lisa, medicamentos y controles cada 3 a 6 meses hasta que del valle presión arterial esté controlada  · La etapa 2 de la hipertensión es de 140/90 o mayor   Del Valle médico le recomendará cambios en el estilo de lisa y le indicará 2 clases de medicamentos para la hipertensión  También necesitará controlar del valle presión arterial cada mes hasta que esté controlada  El tratamiento para la hipertensión puede incluir medicamentos para bajar la presión arterial y reducir el nivel de colesterol  Un nivel bajo de colesterol ayuda a prevenir enfermedades cardíacas y facilita el control de la presión arterial  Tómese catherine medicamentos exactamente yolis se lo indicaron  Es probable que usted también necesite hacer algunos cambios en del valle estilo de lisa  Maneje del valle hipertensión:  · Controle del valle presión arterial en casa   Evite fumar, consumir cafeína y hacer ejercicio al menos 30 minutos antes de controlar del valle presión arterial  Siéntese y descanse por 5 minutos antes de tomarse la presión arterial  Extienda del valle brazo y apóyelo en haydee superficie plana  Del Valle brazo debe estar a la misma altura que del valle corazón  Siga las instrucciones que vienen con el monitor para la presión arterial  Controle del valle presión arterial 2 veces, con diferencia de 1 minuto, antes de dmitri del valle medicamento por la mañana  También controle del valle presión arterial antes de la elysia  Mantenga un registro de del valle peso y llévelo con usted a las citas de control  Pregúntele a del valle médico cuál debería ser del valle presión arterial          · Controle cualquier otra condición médica que usted tenga  Algunas condiciones médicas yolis la diabetes pueden aumentar del valle riesgo de hipertensión  [address] del valle médico y tómese catherine medicamentos según dichas instrucciones  · Pregunte [employer]  Ciertos medicamentos pueden aumentar del valle presión arterial  Los ejemplos incluyen las píldoras anticonceptivas orales, los descongestivos, los suplementos herbales y los [place], yolis el ibuprofeno  Del Valle médico puede indicarle qué medicamentos son seguros para usted  Estos medicamentos incluyen los recetados y de [place]  Cambios de estilo de lisa que usted puede hacer para manejar la hipertensión: Del Valle médico puede recomendarle que trabaje con un equipo para controlar la hipertensión  El equipo puede incluir expertos médicos, yolis un dietista, un fisioterapeuta o un terapeuta del comportamiento  Los [employer] de del valle oscar pueden participar en la creación de cambios en el estilo de lisa  · Limite el sodio (la sal) yolis se le haya indicado  Demasiado sodio puede afectar el equilibrio de líquidos  Revise las etiquetas para buscar alimentos bajos en sodio o sin sal agregada  Algunos alimentos bajos en sodio utilizan sales de potasio para añadir sabor  Demasiado potasio también puede causar problemas de [place]   Del Valle médico le dirá qué cantidad de sodio y potasio es ramos para el consumo en un día  Puede recomendarle que limite el sodio a 2,300 mg al día  · Siga el plan de comidas recomendado por del valle médico  Un dietista o médico puede darle más información sobre planes de bajo contenido de sodio o el plan de alimentación DASH (enfoques dietéticos para detener la hipertensión)  El plan DASH es bajo en sodio, azúcar procesada, grasas dañinas y grasas totales  Es alto en potasio, calcio y Linda  Estos se encuentran en las verduras, las frutas y los alimentos integrales  · Manténgase físicamente activo linda todo el día  La actividad física, yolis el ejercicio, puede ayudar a controlar del valle presión arterial y del valle peso  Roberto actividad física por lo menos 30 minutos al día, la mayoría de los días de la [place]  Incluya haydee actividad aeróbica, yolis caminar o montar en bicicleta  Incluya también entrenamiento de fuerza al menos 2 veces por semana  Catherine médicos pueden ayudarle a crear un plan de [place]  · [address] [place] [address] estrés  Es posible que esto contribuya a bajar del valle presión arterial  Aprenda sobre formas de [place], yolis respiración profunda o escuchar música  · Limite el consumo de alcohol según le indicaron  El alcohol puede aumentar la presión arterial  Un trago equivale a 12 onzas de cerveza, 5 onzas de vino o 1 onza y ½ de licor  · No fume  La nicotina y otros químicos en los cigarrillos y cigarros pueden aumentar del valle presión arterial y también provocar daño al pulmón  Pida información a del valle médico si usted actualmente fuma y necesita ayuda para dejar de fumar  Los cigarrillos electrónicos o el tabaco sin humo igualmente contienen nicotina  Consulte con del valle médico antes de [employer]  Acuda a catherine consultas de control con del valle médico o cardiólogo según le indicaron: Usted necesitará regresar para medir del valle presión arterial y realizar otros exámenes de laboratorio   Anote catherine preguntas para que se acuerde de hacerlas linda catherine visitas  © Copyright [employer] 2021 Information is for End User's use only and may not be sold, redistributed or otherwise used for commercial purposes  All illustrations and images included in CareNotes® are the copyrighted property of A [employer]  or [address] [place] [address] es sólo para uso en educación  Del Valle intención no es darle un consejo médico sobre enfermedades o tratamientos  Colsulte con del valle Pablo Harada farmacéutico antes de seguir cualquier régimen médico para saber si es seguro y efectivo para usted

## 2022-01-25 NOTE — PROGRESS NOTES
Assessment/Plan:     Diagnoses and all orders for this visit:    Benign essential HTN  Comments:  BP in 140s to 150s  Patient reports intermittent headache  Denies visual changes  Start amlodipine 5 mg  RTC 2 weeks  Orders:  -     amLODIPine (NORVASC) 5 mg tablet; Take 1 tablet (5 mg total) by mouth daily    Bursitis of both shoulders  Comments:  As seen on MRI from November 2021  Meloxicam 7 5 mg po daily for pain  Contact orthopedics for RTC  Orders:  -     meloxicam (Mobic) 7 5 mg tablet; Take 1 tablet (7 5 mg total) by mouth daily    Other chronic pain  Comments:  ALECIA positive in 2019  Will repeat ALECIA and CRP  Consider rheumatology referral pending results  Orders:  -     ALECIA Screen w/ Reflex to Titer/Pattern; Future  -     C-reactive protein; Future    Other orders  -     Discontinue: meloxicam (Mobic) 7 5 mg tablet; Take 1 tablet (7 5 mg total) by mouth daily        Return in about 2 weeks (around 2/8/2022) for Recheck HTN, review echo results, and shoudler pain  Subjective:        Patient ID: Niurka Chi is a 62 y o  female  Chief Complaint   Patient presents with    Follow-up     pt complaining of pain in her shoulders        PMH dysphagia, near syncope, chronic otitis media of left ear with tympanic membrane perforation, OA, atrophic vaginitis, HLD, and chornic pain syndrome presents with her daughter for recheck of syncope, lab review, and echo review  Echo scheduled for tomorrow for patient  Labs are stable at this time and patient denies any syncope - stating this has resolved  Endorses shoulder pain with orthopedics treating her with voltaren gel  MRI showed bilateral bursitis  Patient states injections provided by ortho helped somewhat but she has not been back to follow up with them since then  Encouraged to follow through with same  Of note patient had elevated ALECIA in 2019 without follow up  Will repeat lab for further evaluation      Shoulder Pain   The pain is present in the right shoulder and left shoulder  This is a chronic problem  The current episode started more than 1 month ago  There has been no history of extremity trauma  The problem occurs 2 to 4 times per day  The problem has been unchanged  The pain is at a severity of 5/10  Associated symptoms include a limited range of motion (raising arms above head elicits pain)  Treatments tried: Diclofenac topical  The treatment provided mild relief         The following portions of the patient's history were reviewed and updated as appropriate: allergies, current medications, past family history, past medical history, past social history, past surgical history and problem list     Patient Active Problem List   Diagnosis    Abdominal pain, left lower quadrant    Chronic lumbar radiculopathy    Chronic pain syndrome    Dyslipidemia    Chronic pain of right knee    Chronic right hip pain    Myofascial pain syndrome    Atrophic vaginitis    Primary osteoarthritis of both knees    Lumbar radiculopathy    Left hip pain    Lumbar degenerative disc disease    Pharyngoesophageal dysphagia    Perforation of left tympanic membrane    Chronic otitis media of left ear    Atypical chest pain    Epigastric abdominal pain    Chronic musculoskeletal pain    Pain in finger of both hands    Tympanic membrane perforation, left    Strain of thoracic back region    Chronic right shoulder pain    Medial epicondylitis of elbow, left    Near syncope    Chronic pain    Bradycardia    Episode of dizziness    Acute non-recurrent maxillary sinusitis    RBBB    Bursitis of both shoulders    Benign essential HTN       Current Outpatient Medications   Medication Sig Dispense Refill    atorvastatin (LIPITOR) 20 mg tablet Take 1 tablet (20 mg total) by mouth daily 60 tablet 3    amLODIPine (NORVASC) 5 mg tablet Take 1 tablet (5 mg total) by mouth daily 30 tablet 1    meloxicam (Mobic) 7 5 mg tablet Take 1 tablet (7 5 mg total) by mouth daily 30 tablet 1     No current facility-administered medications for this visit  Past Medical History:   Diagnosis Date    Fatigue     High cholesterol     Hypertension     Neuropathy     Osteoarthritis     knees    Shortness of breath         Past Surgical History:   Procedure Laterality Date    APPENDECTOMY      EPIDURAL BLOCK INJECTION Left 5/17/2018    Procedure: L4 AND L5 TRANSFORAMINAL EPIDURAL STEROID INJECTION;  Surgeon: Treasure Atkins MD;  [facility];  Service: Pain Management     EPIDURAL BLOCK INJECTION Left 10/4/2018    Procedure: L4 AND L5 TRANSFORAMINAL EPIDURAL STEROID INJECTION;  Surgeon: Treasure Atkins MD;  [facility];  Service: Pain Management     EPIDURAL BLOCK INJECTION Left 5/1/2019    Procedure: L4-5 and L5-S1 transforaminal epidural steroid injection;  Surgeon: Treasure Atkins MD;  [facility];  Service: Pain Management     FL GUIDED NEEDLE PLAC BX/ASP/INJ  5/1/2019    NM COLONOSCOPY FLX DX W/COLLJ SPEC WHEN PFRMD N/A 5/31/2016    Procedure: COLONOSCOPY;  Surgeon: Tyrone Velasquez MD;  [facility];  Service: Gastroenterology    NM ESOPHAGOGASTRODUODENOSCOPY TRANSORAL DIAGNOSTIC N/A 11/15/2016    Procedure: ESOPHAGOGASTRODUODENOSCOPY (EGD);   Surgeon: Tyrone Velasquez MD;  [facility];  Service: Gastroenterology    NM TYMPANOPLASTY Left 10/25/2019    Procedure: LEFT TYMPANOPLASTY;  Surgeon: Johann Shelton MD;  [facility];  Service: ENT    TUBAL LIGATION          Social History     Socioeconomic History    Marital status: Single     Spouse name: Not on file    Number of children: Not on file    Years of education: Not on file    Highest education level: Not on file   Occupational History    Not on file   Tobacco Use    Smoking status: Never Smoker    Smokeless tobacco: Never Used   Vaping Use    Vaping Use: Never used   Substance and Sexual Activity    Alcohol use: Not Currently    Drug use: No    Sexual activity: Yes     Birth control/protection: Female Sterilization   Other Topics Concern    Not on file   Social History Narrative    Not on file     Social Determinants of Health     Financial Resource Strain: Not on file   Food Insecurity: Not on file   Transportation Needs: Not on file   Physical Activity: Not on file   Stress: Not on file   Social Connections: Not on file   Intimate Partner Violence: Not on file   Housing Stability: Not on file        Review of Systems   Constitutional: Negative for activity change, appetite change, fatigue and unexpected weight change  HENT: Negative for congestion, ear pain, hearing loss, nosebleeds, rhinorrhea, sinus pain and trouble swallowing  Eyes: Negative for photophobia  Respiratory: Negative for choking, shortness of breath and wheezing  Cardiovascular: Negative for chest pain, palpitations and leg swelling  Gastrointestinal: Negative for abdominal pain, blood in stool, constipation, diarrhea and nausea  Endocrine: Negative for polydipsia, polyphagia and polyuria  Genitourinary: Negative for difficulty urinating, dysuria, frequency, hematuria and urgency  Musculoskeletal: Positive for arthralgias (shoulder pain bilaterally)  Negative for back pain and myalgias  Skin: Negative for color change, rash and wound  Neurological: Negative for dizziness, tremors, syncope, weakness and headaches  Psychiatric/Behavioral: Negative for agitation, confusion, self-injury and suicidal ideas  Objective:      /82   Pulse 60   Temp (!) 96 8 °F (36 °C) (Tympanic)   Resp 18   Ht 4' 11" (1 499 m)   Wt 65 5 kg (144 lb 6 4 oz)   SpO2 97%   BMI 29 17 kg/m²          Physical Exam  Vitals and nursing note reviewed  Constitutional:       Appearance: Normal appearance  HENT:      Head: Normocephalic and atraumatic        Nose: Nose normal       Mouth/Throat:      Mouth: Mucous membranes are moist    Eyes:      Conjunctiva/sclera: Conjunctivae normal    Cardiovascular:      Rate and Rhythm: Normal rate and regular rhythm  Pulmonary:      Effort: Pulmonary effort is normal       Breath sounds: Normal breath sounds  Abdominal:      General: Abdomen is flat  Bowel sounds are normal       Palpations: Abdomen is soft  Genitourinary:     Comments: Exam deferred  Musculoskeletal:      Right shoulder: Decreased range of motion  Left shoulder: Decreased range of motion  Skin:     General: Skin is warm and dry  Capillary Refill: Capillary refill takes less than 2 seconds  Neurological:      General: No focal deficit present  Mental Status: She is alert and oriented to person, place, and time     Psychiatric:         Mood and Affect: Mood normal          Behavior: Behavior normal

## 2022-01-26 ENCOUNTER — HOSPITAL ENCOUNTER (OUTPATIENT)
Dept: NON INVASIVE DIAGNOSTICS | Facility: HOSPITAL | Age: 57
Discharge: HOME | End: 2022-01-26
Payer: COMMERCIAL

## 2022-01-26 VITALS
DIASTOLIC BLOOD PRESSURE: 84 MMHG | HEIGHT: 59 IN | WEIGHT: 144 LBS | BODY MASS INDEX: 29.03 KG/M2 | HEART RATE: 61 BPM | SYSTOLIC BLOOD PRESSURE: 128 MMHG

## 2022-01-26 DIAGNOSIS — R55 SYNCOPE, UNSPECIFIED SYNCOPE TYPE: ICD-10-CM

## 2022-01-26 LAB
MAX HR PERCENT: 93 %
RATE PRESSURE PRODUCT: NORMAL
SL CV STRESS RECOVERY BP: NORMAL MMHG
SL CV STRESS RECOVERY HR: 74 BPM
SL CV STRESS STAGE REACHED: 4
STRESS ANGINA INDEX: 0
STRESS BASELINE BP: NORMAL MMHG
STRESS BASELINE HR: 61 BPM
STRESS DUKE TREADMILL SCORE: 9
STRESS O2 SAT REST: 98 %
STRESS PEAK HR: 153 BPM
STRESS PERCENT HR: 93 %
STRESS POST ESTIMATED WORKLOAD: 10.8 METS
STRESS POST EXERCISE DUR MIN: 9 MIN
STRESS POST EXERCISE DUR SEC: 28 SEC
STRESS POST PEAK BP: 156 MMHG
STRESS ST DEPRESSION: 0 MM
STRESS TARGET HR: 153 BPM

## 2022-01-26 PROCEDURE — 93350 STRESS TTE ONLY: CPT

## 2022-01-26 PROCEDURE — 93351 STRESS TTE COMPLETE: CPT | Performed by: INTERNAL MEDICINE

## 2022-02-07 ENCOUNTER — OFFICE VISIT (OUTPATIENT)
Dept: FAMILY MEDICINE CLINIC | Facility: CLINIC | Age: 57
End: 2022-02-07
Payer: COMMERCIAL

## 2022-02-07 VITALS
SYSTOLIC BLOOD PRESSURE: 132 MMHG | HEIGHT: 59 IN | RESPIRATION RATE: 18 BRPM | WEIGHT: 146 LBS | TEMPERATURE: 97.3 F | HEART RATE: 61 BPM | DIASTOLIC BLOOD PRESSURE: 78 MMHG | BODY MASS INDEX: 29.43 KG/M2 | OXYGEN SATURATION: 98 %

## 2022-02-07 DIAGNOSIS — H60.8X2 CHRONIC ECZEMATOUS OTITIS EXTERNA OF LEFT EAR: ICD-10-CM

## 2022-02-07 DIAGNOSIS — E78.5 DYSLIPIDEMIA: ICD-10-CM

## 2022-02-07 DIAGNOSIS — I10 BENIGN ESSENTIAL HTN: Primary | ICD-10-CM

## 2022-02-07 PROCEDURE — 99213 OFFICE O/P EST LOW 20 MIN: CPT | Performed by: FAMILY MEDICINE

## 2022-02-07 PROCEDURE — T1015 CLINIC SERVICE: HCPCS | Performed by: FAMILY MEDICINE

## 2022-02-07 RX ORDER — AMLODIPINE BESYLATE 5 MG/1
5 TABLET ORAL DAILY
Qty: 30 TABLET | Refills: 5 | Status: SHIPPED | OUTPATIENT
Start: 2022-02-07 | End: 2022-06-13 | Stop reason: SDUPTHER

## 2022-02-07 RX ORDER — ATORVASTATIN CALCIUM 20 MG/1
20 TABLET, FILM COATED ORAL DAILY
Qty: 30 TABLET | Refills: 5 | Status: SHIPPED | OUTPATIENT
Start: 2022-02-07

## 2022-02-07 RX ORDER — NEOMYCIN SULFATE, POLYMYXIN B SULFATE AND HYDROCORTISONE 10; 3.5; 1 MG/ML; MG/ML; [USP'U]/ML
4 SUSPENSION/ DROPS AURICULAR (OTIC) 4 TIMES DAILY
Qty: 10 ML | Refills: 0 | Status: SHIPPED | OUTPATIENT
Start: 2022-02-07 | End: 2022-06-13 | Stop reason: ALTCHOICE

## 2022-02-07 NOTE — PATIENT INSTRUCTIONS
Otitis Externa   WHAT YOU NEED TO KNOW:   Otitis externa, or swimmer's ear, is an infection in the outer ear canal  This canal goes from the outside of the ear to the eardrum  DISCHARGE INSTRUCTIONS:   Seek care immediately if:   · You have severe ear pain  · You are suddenly unable to hear at all  · You have new swelling in your face, behind your ears, or in your neck  · You suddenly cannot move part of your face  · Your face suddenly feels numb  Contact your healthcare provider if:   · You have a fever  · Your signs and symptoms do not get better after 2 days of treatment  · Your signs and symptoms go away for a time, but then come back  · You have questions or concerns about your condition or care  Medicines:   · NSAIDs , such as ibuprofen, help decrease swelling, pain, and fever  This medicine is available with or without a doctor's order  NSAIDs can cause stomach bleeding or kidney problems in certain people  If you take blood thinner medicine, always ask if NSAIDs are safe for you  Always read the medicine label and follow directions  Do not give these medicines to children under 10months of age without direction from your child's healthcare provider  · Acetaminophen  decreases pain and fever  It is available without a doctor's order  Ask how much to take and how often to take it  Follow directions  Acetaminophen can cause liver damage if not taken correctly  · Ear drops  that contain an antibiotic may be given  The antibiotic helps treat a bacterial infection  You may also be given steroid medicine  The steroid helps decrease redness, swelling, and pain  · Take your medicine as directed  Contact your healthcare provider if you think your medicine is not helping or if you have side effects  Tell him or her if you are allergic to any medicine  Keep a list of the medicines, vitamins, and herbs you take  Include the amounts, and when and why you take them   Bring the list or the pill bottles to follow-up visits  Carry your medicine list with you in case of an emergency  Follow up with your healthcare provider as directed:  Write down your questions so you remember to ask them during your visits  How to use eardrops:   · Lie down on your side with your infected ear facing up  · Carefully drip the correct number of eardrops into your ear  Have another person help you if possible  · Gently move the outside part of your ear back and forth to help the medicine reach your ear canal      · Stay lying down in the same position (with your ear facing up) for 3 to 5 minutes  Prevent otitis externa:   · Do not put cotton swabs or foreign objects in your ears  · Wrap a clean moist washcloth around your finger, and use it to clean your outer ear and remove extra ear wax  · Use ear plugs when you swim  Dry your outer ears completely after you swim or bathe  © Copyright [address] Information is for End User's use only and may not be sold, redistributed or otherwise used for commercial purposes  All illustrations and images included in CareNotes® are the copyrighted property of [employer]  or [address] [place], [place] [address]   The above information is an  only  It is not intended as medical advice for individual conditions or treatments  Talk to your doctor, nurse or pharmacist before following any medical regimen to see if it is safe and effective for you

## 2022-02-07 NOTE — PROGRESS NOTES
Assessment/Plan     Diagnoses and all orders for this visit:    Benign essential HTN  -     amLODIPine (NORVASC) 5 mg tablet; Take 1 tablet (5 mg total) by mouth daily    Dyslipidemia  -     atorvastatin (LIPITOR) 20 mg tablet; Take 1 tablet (20 mg total) by mouth daily    Chronic eczematous otitis externa of left ear  -     neomycin-polymyxin-hydrocortisone (CORTISPORIN) 0 35%-10,000 units/mL-1% otic suspension; Administer 4 drops into the left ear 4 (four) times a day for 7 days      Stable  Continue current medications  Refills provided  Cortisporin provided to likely eczematous otitis externa (otic steroid only not on formulary)  Patient in agreement with plan  RTO in 3 months or sooner if needed or no improvement in symptoms  Subjective     Patient Id: Ashok Durant is a 62 y o  female    HPI    Patient here today for follow-up on her hypertension  Accompanied by her daughter who is acting as   Blood pressure looks stable today  Notes good adherence with her medications  Currently taking amlodipine 5 mg  She is also taking statin  Patient notes some left ear itching today  Ongoing for past few weeks  No pain or drainage  No hearing loss  No other complaints today  Review of Systems   Constitutional: Negative for chills and fever  HENT: Negative for ear discharge and ear pain  Respiratory: Negative for shortness of breath  Cardiovascular: Negative for chest pain  Gastrointestinal: Negative for abdominal pain, constipation, diarrhea, nausea and vomiting  Genitourinary: Negative for dysuria  Musculoskeletal: Negative for arthralgias and myalgias  Skin: Negative for rash  Neurological: Negative for headaches  Psychiatric/Behavioral: Negative for dysphoric mood         Past Medical History:   Diagnosis Date    Fatigue     High cholesterol     Hypertension     Neuropathy     Osteoarthritis     knees    Shortness of breath        Past Surgical History:   Procedure Laterality Date    APPENDECTOMY      EPIDURAL BLOCK INJECTION Left 5/17/2018    Procedure: L4 AND L5 TRANSFORAMINAL EPIDURAL STEROID INJECTION;  Surgeon: Sudhir Sanchez MD;  [facility];  Service: Pain Management     EPIDURAL BLOCK INJECTION Left 10/4/2018    Procedure: L4 AND L5 TRANSFORAMINAL EPIDURAL STEROID INJECTION;  Surgeon: Sudhir Sanchez MD;  [facility];  Service: Pain Management     EPIDURAL BLOCK INJECTION Left 5/1/2019    Procedure: L4-5 and L5-S1 transforaminal epidural steroid injection;  Surgeon: Sudhir Sanchez MD;  [facility];  Service: Pain Management     FL GUIDED NEEDLE PLAC BX/ASP/INJ  5/1/2019    LA COLONOSCOPY FLX DX W/COLLJ SPEC WHEN PFRMD N/A 5/31/2016    Procedure: COLONOSCOPY;  Surgeon: Laura Larsen MD;  [facility];  Service: Gastroenterology    LA ESOPHAGOGASTRODUODENOSCOPY TRANSORAL DIAGNOSTIC N/A 11/15/2016    Procedure: ESOPHAGOGASTRODUODENOSCOPY (EGD);   Surgeon: Laura Larsen MD;  [facility];  Service: Gastroenterology    LA TYMPANOPLASTY Left 10/25/2019    Procedure: LEFT TYMPANOPLASTY;  Surgeon: Candie Garner MD;  [facility];  Service: ENT    TUBAL LIGATION         Family History   Problem Relation Age of Onset    Uterine cancer Mother     Early death Father     Heart disease Brother     No Known Problems Sister     No Known Problems Daughter     No Known Problems Sister     No Known Problems Daughter     No Known Problems Maternal Aunt     No Known Problems Maternal Aunt     No Known Problems Maternal Aunt     No Known Problems Maternal Aunt     No Known Problems Maternal Grandmother     No Known Problems Maternal Grandfather     No Known Problems Paternal Grandmother     No Known Problems Paternal Grandfather        No Known Allergies      Current Outpatient Medications:     amLODIPine (NORVASC) 5 mg tablet, Take 1 tablet (5 mg total) by mouth daily, Disp: 30 tablet, Rfl: 5    atorvastatin (LIPITOR) 20 mg tablet, Take 1 tablet (20 mg total) by mouth daily, Disp: 30 tablet, Rfl: 5    meloxicam (Mobic) 7 5 mg tablet, Take 1 tablet (7 5 mg total) by mouth daily (Patient not taking: Reported on 2/7/2022 ), Disp: 30 tablet, Rfl: 1    neomycin-polymyxin-hydrocortisone (CORTISPORIN) 0 35%-10,000 units/mL-1% otic suspension, Administer 4 drops into the left ear 4 (four) times a day for 7 days, Disp: 10 mL, Rfl: 0    Objective     Vitals:    02/07/22 1544   BP: 132/78   Pulse: 61   Resp: 18   Temp: (!) 97 3 °F (36 3 °C)   SpO2: 98%   Weight: 66 2 kg (146 lb)   Height: 4' 11" (1 499 m)       Physical Exam  Vitals and nursing note reviewed  Constitutional:       General: She is not in acute distress  Appearance: Normal appearance  She is well-developed  She is not ill-appearing or toxic-appearing  HENT:      Head: Normocephalic and atraumatic  Right Ear: Tympanic membrane and external ear normal  There is no impacted cerumen  Left Ear: Tympanic membrane and external ear normal  There is no impacted cerumen  Ears:      Comments: Left ear canal with erythema and scaling  No purulence  Eyes:      Extraocular Movements: Extraocular movements intact  Conjunctiva/sclera: Conjunctivae normal    Neck:      Thyroid: No thyromegaly  Cardiovascular:      Rate and Rhythm: Normal rate and regular rhythm  Heart sounds: Normal heart sounds  No murmur heard  Pulmonary:      Effort: Pulmonary effort is normal  No respiratory distress  Breath sounds: Normal breath sounds  No wheezing  Abdominal:      Palpations: Abdomen is soft  Tenderness: There is no abdominal tenderness  Genitourinary:     Comments: Exam deferred  Musculoskeletal:      Cervical back: Neck supple  Right lower leg: No edema  Left lower leg: No edema  Skin:     General: Skin is warm     Neurological:      Mental Status: She is alert and oriented to person, place, and time    Psychiatric:         Behavior: Behavior normal          Sarahi Church

## 2022-02-22 ENCOUNTER — ANNUAL EXAM (OUTPATIENT)
Dept: OBGYN CLINIC | Facility: CLINIC | Age: 57
End: 2022-02-22
Payer: COMMERCIAL

## 2022-02-22 VITALS — BODY MASS INDEX: 29.64 KG/M2 | WEIGHT: 147 LBS | HEIGHT: 59 IN

## 2022-02-22 DIAGNOSIS — Z01.419 ENCOUNTER FOR WELL WOMAN EXAM WITH ROUTINE GYNECOLOGICAL EXAM: Primary | ICD-10-CM

## 2022-02-22 DIAGNOSIS — Z12.31 ENCOUNTER FOR SCREENING MAMMOGRAM FOR MALIGNANT NEOPLASM OF BREAST: ICD-10-CM

## 2022-02-22 DIAGNOSIS — Z01.419 WOMEN'S ANNUAL ROUTINE GYNECOLOGICAL EXAMINATION: ICD-10-CM

## 2022-02-22 PROCEDURE — G0145 SCR C/V CYTO,THINLAYER,RESCR: HCPCS | Performed by: STUDENT IN AN ORGANIZED HEALTH CARE EDUCATION/TRAINING PROGRAM

## 2022-02-22 PROCEDURE — 99396 PREV VISIT EST AGE 40-64: CPT | Performed by: STUDENT IN AN ORGANIZED HEALTH CARE EDUCATION/TRAINING PROGRAM

## 2022-02-22 PROCEDURE — G0476 HPV COMBO ASSAY CA SCREEN: HCPCS | Performed by: STUDENT IN AN ORGANIZED HEALTH CARE EDUCATION/TRAINING PROGRAM

## 2022-02-22 PROCEDURE — 0503F POSTPARTUM CARE VISIT: CPT | Performed by: STUDENT IN AN ORGANIZED HEALTH CARE EDUCATION/TRAINING PROGRAM

## 2022-02-22 NOTE — PROGRESS NOTES
[facility]  [address], [place]    ASSESSMENT/PLAN: Elias De Oliveira is a 62 y o  [id number] who presents for annual gynecologic exam     Encounter for routine gynecologic examination  - Routine well woman exam completed today  - Cervical Cancer Screening: Current ASCCP Guidelines reviewed  Last Pap: 09/28/2020 [facility] positive  Pap co-testing done today  - HPV Vaccination status: Not immunized  - STI screening offered including HIV: Declines  - Breast Cancer Screening: Last Mammogram 08/09/2021, ordered for 2022   - Colorectal cancer screening up to date  Additional problems addressed at this visit:  1  Encounter for well woman exam with routine gynecological exam  -     Liquid-based pap, screening    2  Women's annual routine gynecological examination    3  Encounter for screening mammogram for malignant neoplasm of breast  -     Mammo screening bilateral w 3d & cad; Future; Expected date: 02/22/2022          CC:  Annual Gynecologic Examination    HPI: Elias De Oliveira is a 62 y o  [id number] who presents for annual gynecologic examination  She reports no new changes to her health  She reports no breast concerns  Her last mammogram was 2021  Her last colonoscopy was 6 years ago and recommended follow up is in 10 years  She is postmenopausal and reports no postmenopausal bleeding  She has no vaginal discharge, vulvar or vaginal lesions, pelvic pain  She has no sexual health concerns and is currently sexually active  She has no symptoms of pelvic organ prolapse, urinary, or fecal incontinence  She denies intimate partner violence  The following portions of the patient's history were reviewed and updated as appropriate: allergies, current medications, past family history, past medical history, obstetric history, gynecologic history, past social history, past surgical history and problem list     Review of Systems   Constitutional: Negative for chills and fever  Respiratory: Negative for cough and shortness of breath  Cardiovascular: Negative for chest pain and leg swelling  Gastrointestinal: Negative for abdominal pain, nausea and vomiting  Genitourinary: Negative for dysuria, frequency and urgency  Neurological: Negative for dizziness, light-headedness and headaches  Objective:  Ht 4' 11" (1 499 m)   Wt 66 7 kg (147 lb)   BMI 29 69 kg/m²    Physical Exam  Exam conducted with a chaperone present  Constitutional:       Appearance: Normal appearance  HENT:      Head: Normocephalic and atraumatic  Cardiovascular:      Rate and Rhythm: Normal rate  Pulmonary:      Effort: Pulmonary effort is normal    Chest:   Breasts: Breasts are symmetrical       Right: Normal  No swelling, bleeding, inverted nipple, mass, nipple discharge, skin change, tenderness or axillary adenopathy  Left: Normal  No swelling, bleeding, inverted nipple, mass, nipple discharge, skin change, tenderness or axillary adenopathy  Abdominal:      General: There is no distension  Tenderness: There is no abdominal tenderness  There is no guarding  Genitourinary:     Exam position: Lithotomy position  Pubic Area: No rash  Labia:         Right: No rash, tenderness or lesion  Left: No rash, tenderness or lesion  Urethra: No prolapse, urethral swelling or urethral lesion  Vagina: Normal  No vaginal discharge, erythema, tenderness, bleeding or lesions  Cervix: No cervical motion tenderness, discharge, friability or erythema  Uterus: Not enlarged, not tender and no uterine prolapse  Adnexa:         Right: No mass, tenderness or fullness  Left: No mass, tenderness or fullness  Lymphadenopathy:      Upper Body:      Right upper body: No axillary adenopathy  Left upper body: No axillary adenopathy  Lower Body: No right inguinal adenopathy  No left inguinal adenopathy     Neurological:      Mental Status: She is alert               Marcela Perry MD  [facility]  02/22/22 10:32 AM

## 2022-02-23 LAB
HPV HR 12 DNA CVX QL NAA+PROBE: POSITIVE
HPV16 DNA CVX QL NAA+PROBE: NEGATIVE
HPV18 DNA CVX QL NAA+PROBE: NEGATIVE

## 2022-02-28 LAB
LAB AP GYN PRIMARY INTERPRETATION: NORMAL
Lab: NORMAL

## 2022-03-02 ENCOUNTER — HOSPITAL ENCOUNTER (EMERGENCY)
Facility: HOSPITAL | Age: 57
Discharge: HOME | End: 2022-03-03
Attending: EMERGENCY MEDICINE
Payer: COMMERCIAL

## 2022-03-02 DIAGNOSIS — R10.9 RIGHT FLANK PAIN: Primary | ICD-10-CM

## 2022-03-02 DIAGNOSIS — R79.89 ELEVATED TSH: ICD-10-CM

## 2022-03-02 LAB
ANION GAP SERPL CALCULATED.3IONS-SCNC: 11 MMOL/L (ref 4–13)
BASOPHILS # BLD AUTO: 0.04 THOUSANDS/ΜL (ref 0–0.1)
BASOPHILS NFR BLD AUTO: 1 % (ref 0–1)
BILIRUB UR QL STRIP: NEGATIVE
BUN SERPL-MCNC: 23 MG/DL (ref 5–25)
CALCIUM SERPL-MCNC: 9.5 MG/DL (ref 8.3–10.1)
CHLORIDE SERPL-SCNC: 101 MMOL/L (ref 100–108)
CLARITY UR: CLEAR
CO2 SERPL-SCNC: 27 MMOL/L (ref 21–32)
COLOR UR: NORMAL
CREAT SERPL-MCNC: 0.86 MG/DL (ref 0.6–1.3)
EOSINOPHIL # BLD AUTO: 0.1 THOUSAND/ΜL (ref 0–0.61)
EOSINOPHIL NFR BLD AUTO: 2 % (ref 0–6)
ERYTHROCYTE [DISTWIDTH] IN BLOOD BY AUTOMATED COUNT: 11.9 % (ref 11.6–15.1)
EXT PREG TEST URINE: NEGATIVE
EXT. CONTROL ED NAV: NORMAL
GFR SERPL CREATININE-BSD FRML MDRD: 75 ML/MIN/1.73SQ M
GLUCOSE SERPL-MCNC: 119 MG/DL (ref 65–140)
GLUCOSE UR STRIP-MCNC: NEGATIVE MG/DL
HCT VFR BLD AUTO: 37.8 % (ref 34.8–46.1)
HGB BLD-MCNC: 13.5 G/DL (ref 11.5–15.4)
HGB UR QL STRIP.AUTO: NEGATIVE
IMM GRANULOCYTES # BLD AUTO: 0.02 THOUSAND/UL (ref 0–0.2)
IMM GRANULOCYTES NFR BLD AUTO: 0 % (ref 0–2)
KETONES UR STRIP-MCNC: NEGATIVE MG/DL
LEUKOCYTE ESTERASE UR QL STRIP: NEGATIVE
LIPASE SERPL-CCNC: 139 U/L (ref 73–393)
LYMPHOCYTES # BLD AUTO: 2.9 THOUSANDS/ΜL (ref 0.6–4.47)
LYMPHOCYTES NFR BLD AUTO: 46 % (ref 14–44)
MCH RBC QN AUTO: 30.2 PG (ref 26.8–34.3)
MCHC RBC AUTO-ENTMCNC: 35.7 G/DL (ref 31.4–37.4)
MCV RBC AUTO: 85 FL (ref 82–98)
MONOCYTES # BLD AUTO: 0.43 THOUSAND/ΜL (ref 0.17–1.22)
MONOCYTES NFR BLD AUTO: 7 % (ref 4–12)
NEUTROPHILS # BLD AUTO: 2.69 THOUSANDS/ΜL (ref 1.85–7.62)
NEUTS SEG NFR BLD AUTO: 44 % (ref 43–75)
NITRITE UR QL STRIP: NEGATIVE
NRBC BLD AUTO-RTO: 0 /100 WBCS
PH UR STRIP.AUTO: 6 [PH]
PLATELET # BLD AUTO: 239 THOUSANDS/UL (ref 149–390)
PMV BLD AUTO: 10.4 FL (ref 8.9–12.7)
POTASSIUM SERPL-SCNC: 3.8 MMOL/L (ref 3.5–5.3)
PROT UR STRIP-MCNC: NEGATIVE MG/DL
RBC # BLD AUTO: 4.47 MILLION/UL (ref 3.81–5.12)
SODIUM SERPL-SCNC: 139 MMOL/L (ref 136–145)
SP GR UR STRIP.AUTO: 1.01 (ref 1–1.03)
TSH SERPL DL<=0.05 MIU/L-ACNC: 5.64 UIU/ML (ref 0.36–3.74)
UROBILINOGEN UR QL STRIP.AUTO: 0.2 E.U./DL
WBC # BLD AUTO: 6.18 THOUSAND/UL (ref 4.31–10.16)

## 2022-03-02 PROCEDURE — 83690 ASSAY OF LIPASE: CPT | Performed by: EMERGENCY MEDICINE

## 2022-03-02 PROCEDURE — 80048 BASIC METABOLIC PNL TOTAL CA: CPT | Performed by: EMERGENCY MEDICINE

## 2022-03-02 PROCEDURE — 99284 EMERGENCY DEPT VISIT MOD MDM: CPT | Performed by: EMERGENCY MEDICINE

## 2022-03-02 PROCEDURE — 99284 EMERGENCY DEPT VISIT MOD MDM: CPT

## 2022-03-02 PROCEDURE — 84439 ASSAY OF FREE THYROXINE: CPT

## 2022-03-02 PROCEDURE — 81025 URINE PREGNANCY TEST: CPT | Performed by: EMERGENCY MEDICINE

## 2022-03-02 PROCEDURE — 84443 ASSAY THYROID STIM HORMONE: CPT | Performed by: EMERGENCY MEDICINE

## 2022-03-02 PROCEDURE — 36415 COLL VENOUS BLD VENIPUNCTURE: CPT | Performed by: EMERGENCY MEDICINE

## 2022-03-02 PROCEDURE — 96361 HYDRATE IV INFUSION ADD-ON: CPT

## 2022-03-02 PROCEDURE — 85025 COMPLETE CBC W/AUTO DIFF WBC: CPT | Performed by: EMERGENCY MEDICINE

## 2022-03-02 PROCEDURE — 81003 URINALYSIS AUTO W/O SCOPE: CPT | Performed by: EMERGENCY MEDICINE

## 2022-03-02 PROCEDURE — 96374 THER/PROPH/DIAG INJ IV PUSH: CPT

## 2022-03-02 RX ORDER — KETOROLAC TROMETHAMINE 30 MG/ML
15 INJECTION, SOLUTION INTRAMUSCULAR; INTRAVENOUS ONCE
Status: COMPLETED | OUTPATIENT
Start: 2022-03-02 | End: 2022-03-02

## 2022-03-02 RX ADMIN — KETOROLAC TROMETHAMINE 15 MG: 30 INJECTION, SOLUTION INTRAMUSCULAR at 23:18

## 2022-03-02 RX ADMIN — SODIUM CHLORIDE 1000 ML: 0.9 INJECTION, SOLUTION INTRAVENOUS at 23:18

## 2022-03-03 ENCOUNTER — APPOINTMENT (EMERGENCY)
Dept: CT IMAGING | Facility: HOSPITAL | Age: 57
End: 2022-03-03
Payer: COMMERCIAL

## 2022-03-03 VITALS
BODY MASS INDEX: 28.22 KG/M2 | OXYGEN SATURATION: 97 % | WEIGHT: 140 LBS | HEIGHT: 59 IN | HEART RATE: 66 BPM | SYSTOLIC BLOOD PRESSURE: 148 MMHG | RESPIRATION RATE: 18 BRPM | DIASTOLIC BLOOD PRESSURE: 68 MMHG | TEMPERATURE: 98.2 F

## 2022-03-03 LAB — T4 FREE SERPL-MCNC: 1.01 NG/DL (ref 0.76–1.46)

## 2022-03-03 PROCEDURE — 74177 CT ABD & PELVIS W/CONTRAST: CPT

## 2022-03-03 RX ADMIN — IOHEXOL 100 ML: 350 INJECTION, SOLUTION INTRAVENOUS at 00:26

## 2022-03-03 NOTE — ED PROVIDER NOTES
History  Chief Complaint   Patient presents with    Flank Pain     right flank pain radiating to the front since sunday, denies any n/v/d     80-year-old female presents for evaluation of right flank pain  Pain started for patient over the weekend and has been nearly continuous, pain starts in her right flank and radiates down into her abdomen  She describes pain as chewing  Pain is worse with movement and she has not had this pain before  She has pain medication but does not know the name of it  She has had a lack of appetite since the onset but denies nausea or vomiting episodes  She has had no constipation or diarrhea  No symptoms of dysuria  Prior to Admission Medications   Prescriptions Last Dose Informant Patient Reported? Taking?    amLODIPine (NORVASC) 5 mg tablet 3/1/2022 at Unknown time  No Yes   Sig: Take 1 tablet (5 mg total) by mouth daily   atorvastatin (LIPITOR) 20 mg tablet 3/1/2022 at Unknown time  No Yes   Sig: Take 1 tablet (20 mg total) by mouth daily   meloxicam (Mobic) 7 5 mg tablet   No No   Sig: Take 1 tablet (7 5 mg total) by mouth daily   Patient not taking: Reported on 2/7/2022    neomycin-polymyxin-hydrocortisone (CORTISPORIN) 0 35%-10,000 units/mL-1% otic suspension   No No   Sig: Administer 4 drops into the left ear 4 (four) times a day for 7 days      Facility-Administered Medications: None       Past Medical History:   Diagnosis Date    Fatigue     High cholesterol     Hypertension     Neuropathy     Osteoarthritis     knees    Shortness of breath        Past Surgical History:   Procedure Laterality Date    APPENDECTOMY      EPIDURAL BLOCK INJECTION Left 5/17/2018    Procedure: L4 AND L5 TRANSFORAMINAL EPIDURAL STEROID INJECTION;  Surgeon: Roger Sams MD;  [facility];  Service: Pain Management     EPIDURAL BLOCK INJECTION Left 10/4/2018    Procedure: L4 AND L5 TRANSFORAMINAL EPIDURAL STEROID INJECTION;  Surgeon: Roger Sams MD; [facility];  Service: Pain Management     EPIDURAL BLOCK INJECTION Left 5/1/2019    Procedure: L4-5 and L5-S1 transforaminal epidural steroid injection;  Surgeon: Edmond Yeung MD;  [facility]: [facility];  Service: Pain Management     FL GUIDED NEEDLE PLAC BX/ASP/INJ  5/1/2019    NH COLONOSCOPY FLX DX W/COLLJ SPEC WHEN PFRMD N/A 5/31/2016    Procedure: COLONOSCOPY;  Surgeon: Jayna Fisher MD;  [facility];  Service: Gastroenterology    NH ESOPHAGOGASTRODUODENOSCOPY TRANSORAL DIAGNOSTIC N/A 11/15/2016    Procedure: ESOPHAGOGASTRODUODENOSCOPY (EGD); Surgeon: Jayna Fisher MD;  [facility];  Service: Gastroenterology    NH TYMPANOPLASTY Left 10/25/2019    Procedure: LEFT TYMPANOPLASTY;  Surgeon: Rhea Dutton MD;  Location:  MAIN OR;  Service: ENT    TUBAL LIGATION         Family History   Problem Relation Age of Onset    Uterine cancer Mother     Early death Father     Heart disease Brother     No Known Problems Sister     No Known Problems Daughter     No Known Problems Sister     No Known Problems Daughter     No Known Problems Maternal Aunt     No Known Problems Maternal Aunt     No Known Problems Maternal Aunt     No Known Problems Maternal Aunt     No Known Problems Maternal Grandmother     No Known Problems Maternal Grandfather     No Known Problems Paternal Grandmother     No Known Problems Paternal Grandfather      I have reviewed and agree with the history as documented  E-Cigarette/Vaping    E-Cigarette Use Never User      E-Cigarette/Vaping Substances    Nicotine No     THC No     CBD No     Flavoring No     Other No     Unknown No      Social History     Tobacco Use    Smoking status: Never Smoker    Smokeless tobacco: Never Used   Vaping Use    Vaping Use: Never used   Substance Use Topics    Alcohol use: Not Currently    Drug use: No       Review of Systems   Constitutional: Positive for appetite change  Negative for chills and fever  Respiratory: Negative for shortness of breath  Cardiovascular: Negative for chest pain  Gastrointestinal: Positive for abdominal pain  Negative for constipation, diarrhea, nausea and vomiting  Genitourinary: Positive for flank pain  Negative for dysuria and hematuria  All other systems reviewed and are negative  Physical Exam  Physical Exam  Vitals reviewed  Constitutional:       General: She is not in acute distress  Appearance: Normal appearance  She is not ill-appearing, toxic-appearing or diaphoretic  HENT:      Head: Normocephalic  Right Ear: External ear normal       Left Ear: External ear normal    Eyes:      General:         Right eye: No discharge  Left eye: No discharge  Cardiovascular:      Rate and Rhythm: Normal rate and regular rhythm  Pulmonary:      Effort: Pulmonary effort is normal  No respiratory distress  Breath sounds: Normal breath sounds  Abdominal:      General: There is no distension  Palpations: Abdomen is soft  Tenderness: There is abdominal tenderness (RUQ)  There is right CVA tenderness  There is no left CVA tenderness, guarding or rebound  Musculoskeletal:         General: No deformity or signs of injury  Right lower leg: No edema  Left lower leg: No edema  Skin:     General: Skin is warm  Coloration: Skin is not jaundiced or pale  Neurological:      General: No focal deficit present  Mental Status: She is alert  Mental status is at baseline        Gait: Gait normal          Vital Signs  ED Triage Vitals [03/02/22 2241]   Temperature Pulse Respirations Blood Pressure SpO2   98 2 °F (36 8 °C) 65 18 151/65 97 %      Temp Source Heart Rate Source Patient Position - Orthostatic VS BP Location FiO2 (%)   Tympanic Monitor Lying Right arm --      Pain Score       5           Vitals:    03/02/22 2241 03/03/22 0059   BP: 151/65 148/68   Pulse: 65 66   Patient Position - Orthostatic VS: Lying Lying         Visual Acuity      ED Medications  Medications   sodium chloride 0 9 % bolus 1,000 mL (0 mL Intravenous Stopped 3/3/22 0018)   ketorolac (TORADOL) injection 15 mg (15 mg Intravenous Given 3/2/22 2318)   iohexol (OMNIPAQUE) 350 MG/ML injection (SINGLE-DOSE) 100 mL (100 mL Intravenous Given 3/3/22 0026)       Diagnostic Studies  Results Reviewed     Procedure Component Value Units Date/Time    T4, free [[id number]]  (Normal) Collected: 03/02/22 2317    Lab Status: Final result Specimen: Blood from Arm, Left Updated: 03/03/22 1524     Free T4 1 01 ng/dL     Basic metabolic panel [[id number]] Collected: 03/02/22 2317    Lab Status: Final result Specimen: Blood from Arm, Left Updated: 03/02/22 2346     Sodium 139 mmol/L      Potassium 3 8 mmol/L      Chloride 101 mmol/L      CO2 27 mmol/L      ANION GAP 11 mmol/L      BUN 23 mg/dL      Creatinine 0 86 mg/dL      Glucose 119 mg/dL      Calcium 9 5 mg/dL      eGFR 75 ml/min/1 73sq m     Narrative:      [facility] guidelines for Chronic Kidney Disease (CKD):     Stage 1 with normal or high GFR (GFR > 90 mL/min/1 73 square meters)    Stage 2 Mild CKD (GFR = 60-89 mL/min/1 73 square meters)    Stage 3A Moderate CKD (GFR = 45-59 mL/min/1 73 square meters)    Stage 3B Moderate CKD (GFR = 30-44 mL/min/1 73 square meters)    Stage 4 Severe CKD (GFR = 15-29 mL/min/1 73 square meters)    Stage 5 End Stage CKD (GFR <15 mL/min/1 73 square meters)  Note: GFR calculation is accurate only with a steady state creatinine    TSH [[id number]]  (Abnormal) Collected: 03/02/22 2317    Lab Status: Final result Specimen: Blood from Arm, Left Updated: 03/02/22 2346     TSH 3RD GENERATON 5 644 uIU/mL     Narrative:      Patients undergoing fluorescein dye angiography may retain small amounts of fluorescein in the body for 48-72 hours post procedure  Samples containing fluorescein can produce falsely depressed TSH values   If the patient had this procedure,a specimen should be resubmitted post fluorescein clearance        Lipase [[id number]]  (Normal) Collected: 03/02/22 2317    Lab Status: Final result Specimen: Blood from Arm, Left Updated: 03/02/22 2346     Lipase 139 u/L     UA w Reflex to Microscopic w Reflex to Culture [[id number]] Collected: 03/02/22 2317    Lab Status: Final result Specimen: Urine, Clean Catch Updated: 03/02/22 2333     Color, UA Light Yellow     Clarity, UA Clear     Specific Gravity, UA 1 010     pH, UA 6 0     Leukocytes, UA Negative     Nitrite, UA Negative     Protein, UA Negative mg/dl      Glucose, UA Negative mg/dl      Ketones, UA Negative mg/dl      Urobilinogen, UA 0 2 E U /dl      Bilirubin, UA Negative     Blood, UA Negative    CBC and differential [[id number]]  (Abnormal) Collected: 03/02/22 2317    Lab Status: Final result Specimen: Blood from Arm, Left Updated: 03/02/22 2323     WBC 6 18 Thousand/uL      RBC 4 47 Million/uL      Hemoglobin 13 5 g/dL      Hematocrit 37 8 %      MCV 85 fL      MCH 30 2 pg      MCHC 35 7 g/dL      RDW 11 9 %      MPV 10 4 fL      Platelets 603 Thousands/uL      nRBC 0 /100 WBCs      Neutrophils Relative 44 %      Immat GRANS % 0 %      Lymphocytes Relative 46 %      Monocytes Relative 7 %      Eosinophils Relative 2 %      Basophils Relative 1 %      Neutrophils Absolute 2 69 Thousands/µL      Immature Grans Absolute 0 02 Thousand/uL      Lymphocytes Absolute 2 90 Thousands/µL      Monocytes Absolute 0 43 Thousand/µL      Eosinophils Absolute 0 10 Thousand/µL      Basophils Absolute 0 04 Thousands/µL     POCT pregnancy, urine [[id number]]  (Normal) Resulted: 03/02/22 2314    Lab Status: Final result Updated: 03/02/22 2314     EXT PREG TEST UR (Ref: Negative) negative     Control valid                 CT abdomen pelvis with contrast   Final Result by Brayden Rowe MD (03/03 0303)      No acute pathology visualized on CT of the abdomen and pelvis with IV without oral contrast             Workstation performed: [id number] Procedures  Procedures         ED Course  ED Course as of 03/03/22 2105   Wed Mar 02, 2022   2238 Ambulatory to room                                             [facility]  Number of Diagnoses or Management Options  Right flank pain  Diagnosis management comments: 63-year-old female presents for evaluation of right flank pain  She is overall well-appearing, has reproducible tenderness in right upper quadrant as well as right flank  Will evaluate with abdominal labs, urinalysis, CT abdomen pelvis  Disposition  Final diagnoses:   Right flank pain     Time reflects when diagnosis was documented in both MDM as applicable and the Disposition within this note     Time User Action Codes Description Comment    3/3/2022  1:13 AM Doyal Grams Add [R10 9] Right flank pain     3/3/2022  9:08 AM Torrey Brewster Add [R79 89] Elevated TSH       ED Disposition     ED Disposition Condition Date/Time Comment    Discharge Stable u Mar 3, 2022  1:13 AM Santos Harness discharge to home/self care  Follow-up Information     Follow up With Specialties Details Why [address] [facility], [address] [facility]   [address] [place] [address], [place] [address]  [phone]            Discharge Medication List as of 3/3/2022  3:11 AM      CONTINUE these medications which have NOT CHANGED    Details   amLODIPine (NORVASC) 5 mg tablet Take 1 tablet (5 mg total) by mouth daily, Starting Mon 2/7/2022, Normal      atorvastatin (LIPITOR) 20 mg tablet Take 1 tablet (20 mg total) by mouth daily, Starting Mon 2/7/2022, Normal      meloxicam (Mobic) 7 5 mg tablet Take 1 tablet (7 5 mg total) by mouth daily, Starting Tue 1/25/2022, Normal      neomycin-polymyxin-hydrocortisone (CORTISPORIN) 0 35%-10,000 units/mL-1% otic suspension Administer 4 drops into the left ear 4 (four) times a day for 7 days, Starting Mon 2/7/2022, Until Mon 2/14/2022, Normal             No discharge procedures on file      PDMP Review Value Time User    PDMP Reviewed  Yes 10/5/2021  5:04 PM Jeaneth Lubin PA-C          ED Provider  Electronically Signed by           Bruno Luong DO  03/03/22 6773

## 2022-03-03 NOTE — DISCHARGE INSTRUCTIONS
No findings on CT scan  Slightly low thyroid level follow up with family doctor/pcp  Return if symptoms worsen

## 2022-03-08 ENCOUNTER — OFFICE VISIT (OUTPATIENT)
Dept: FAMILY MEDICINE CLINIC | Facility: CLINIC | Age: 57
End: 2022-03-08
Payer: COMMERCIAL

## 2022-03-08 VITALS
SYSTOLIC BLOOD PRESSURE: 132 MMHG | WEIGHT: 145.2 LBS | DIASTOLIC BLOOD PRESSURE: 80 MMHG | HEIGHT: 59 IN | RESPIRATION RATE: 18 BRPM | TEMPERATURE: 97.3 F | OXYGEN SATURATION: 97 % | HEART RATE: 60 BPM | BODY MASS INDEX: 29.27 KG/M2

## 2022-03-08 DIAGNOSIS — M75.52 BURSITIS OF BOTH SHOULDERS: ICD-10-CM

## 2022-03-08 DIAGNOSIS — R10.9 RIGHT FLANK PAIN: Primary | ICD-10-CM

## 2022-03-08 DIAGNOSIS — M75.51 BURSITIS OF BOTH SHOULDERS: ICD-10-CM

## 2022-03-08 PROCEDURE — T1015 CLINIC SERVICE: HCPCS | Performed by: FAMILY MEDICINE

## 2022-03-08 PROCEDURE — 99213 OFFICE O/P EST LOW 20 MIN: CPT | Performed by: FAMILY MEDICINE

## 2022-03-08 RX ORDER — MELOXICAM 7.5 MG/1
7.5 TABLET ORAL DAILY
Qty: 30 TABLET | Refills: 0 | Status: SHIPPED | OUTPATIENT
Start: 2022-03-08 | End: 2022-04-19 | Stop reason: SDUPTHER

## 2022-03-08 NOTE — PATIENT INSTRUCTIONS
Lower Back Exercises   WHAT YOU NEED TO KNOW:   Lower back exercises help heal and strengthen your back muscles to prevent another injury  Ask your healthcare provider if you need to see a physical therapist for more advanced exercises  DISCHARGE INSTRUCTIONS:   Return to the emergency department if:   · You have severe pain that prevents you from moving  Contact your healthcare provider if:   · Your pain becomes worse  · You have new pain  · You have questions or concerns about your condition or care  Do lower back exercises safely:   · Do the exercises on a mat or firm surface  (not on a bed) to support your spine and prevent low back pain  · Move slowly and smoothly  Avoid fast or jerky motions  · Breathe normally  Do not hold your breath  · Stop if you feel pain  It is normal to feel some discomfort at first  Regular exercise will help decrease your discomfort over time  Lower back exercises: Your healthcare provider may recommend that you do back exercises 10 to 30 minutes each day  He may also recommend that you do exercises 1 to 3 times each day  Ask your healthcare provider which exercises are best for you and how often to do them  · Ankle pumps:  Lie on your back  Move your foot up (with your toes pointing toward your head)  Then, move your foot down (with your toes pointing away from you)  Repeat this exercise 10 times on each side  · Heel slides:  Lie on your back  Slowly bend one leg and then straighten it  Next, bend the other leg and then straighten it  Repeat 10 times on each side  · Pelvic tilt:  Lie on your back with your knees bent and feet flat on the floor  Place your arms in a relaxed position beside your body  Tighten the muscles of your abdomen and flatten your back against the floor  Hold for 5 seconds  Repeat 5 times  · Back stretch:  Lie on your back with your hands behind your head   Bend your knees and turn the lower half of your body to one side  Hold this position for 10 seconds  Repeat 3 times on each side  · Straight leg raises:  Lie on your back with one leg straight  Bend the other knee  Tighten your abdomen and then slowly lift the straight leg up about 6 to 12 inches off the floor  Hold for 1 to 5 seconds  Lower your leg slowly  Repeat 10 times on each leg  · Knee-to-chest:  Lie on your back with your knees bent and feet flat on the floor  Pull one of your knees toward your chest and hold it there for 5 seconds  Return your leg to the starting position  Lift the other knee toward your chest and hold for 5 seconds  Do this 5 times on each side  · Cat and camel:  Place your hands and knees on the floor  Arch your back upward toward the ceiling and lower your head  Round out your spine as much as you can  Hold for 5 seconds  Lift your head upward and push your chest downward toward the floor  Hold for 5 seconds  Do 3 sets or as directed  · Wall squats:  Stand with your back against a wall  Tighten the muscles of your abdomen  Slowly lower your body until your knees are bent at a 45 degree angle  Hold this position for 5 seconds  Slowly move back up to a standing position  Repeat 10 times  · Curl up:  Lie on your back with your knees bent and feet flat on the floor  Place your hands, palms down, underneath the curve in your lower back  Next, with your elbows on the floor, lift your shoulders and chest 2 to 3 inches  Keep your head in line with your shoulders  Hold this position for 5 seconds  When you can do this exercise without pain for 10 to 15 seconds, you may add a rotation  While your shoulders and chest are lifted off the ground, turn slightly to the left and hold  Repeat on the other side  · Bird dog:  Place your hands and knees on the floor  Keep your wrists directly below your shoulders and your knees directly below your hips  Pull your belly button in toward your spine   Do not flatten or arch your back  Tighten your abdominal muscles  Raise one arm straight out so that it is aligned with your head  Next, raise the leg opposite your arm  Hold this position for 15 seconds  Lower your arm and leg slowly and change sides  Do 5 sets  © Copyright [employer] 2022 Information is for End User's use only and may not be sold, redistributed or otherwise used for commercial purposes  All illustrations and images included in CareNotes® are the copyrighted property of [employer]  or [facility] Elvira Trivedi   The above information is an  only  It is not intended as medical advice for individual conditions or treatments  Talk to your doctor, nurse or pharmacist before following any medical regimen to see if it is safe and effective for you

## 2022-03-08 NOTE — PROGRESS NOTES
Assessment/Plan:      Diagnoses and all orders for this visit:    Right flank pain  Comments:  Normal CT Abd/Pelvis and UA performed in ER on 3/2  Likely costochondritis, pain is reproducible with deep inspiration and percussion  CXR in Jan WNL  Bursitis of both shoulders  Comments:  As seen on MRI from November 2021  Meloxicam 7 5 mg po daily for pain  Contact orthopedics for RTC  Orders:  -     meloxicam (Mobic) 7 5 mg tablet; Take 1 tablet (7 5 mg total) by mouth daily      Continue to monitor, if symptoms persist >4 weeks will reassess  Subjective:     Patient ID: Mary Cox is a 62 y o  female  In office encounter, ER follow up  She was seen in the ER 3 4/2 for right pain  Workup at that time including CT abdomen pelvis with IV contrast, CBC CMP and UA was unremarkable  In the office today she reports persistent right flank pain reproducible with deep inspiration percussion  She denies fevers and/or chills  Denies shortness of breath and/or chest pain  Denies leg swelling and or leg pain  Chest x-ray performed in January showed no abnormalities  She is accompanied by her son in the office today  Review of Systems   Constitutional: Negative for chills and fever  HENT: Negative for ear pain and sore throat  Eyes: Negative for pain and visual disturbance  Respiratory: Negative for cough and shortness of breath  Cardiovascular: Negative for chest pain and palpitations  Gastrointestinal: Negative for abdominal pain and vomiting  Genitourinary: Positive for flank pain, frequency and urgency  Negative for difficulty urinating, dysuria and hematuria  Musculoskeletal: Negative for arthralgias and back pain  Skin: Negative for color change and rash  Neurological: Negative for seizures and syncope  Psychiatric/Behavioral: Negative for sleep disturbance  All other systems reviewed and are negative  Objective:     Physical Exam  Vitals reviewed     Constitutional: General: She is not in acute distress  Appearance: Normal appearance  She is not ill-appearing  HENT:      Head: Normocephalic and atraumatic  Right Ear: Tympanic membrane, ear canal and external ear normal       Left Ear: Tympanic membrane, ear canal and external ear normal       Nose: Nose normal       Mouth/Throat:      Mouth: Mucous membranes are moist       Pharynx: Oropharynx is clear  No oropharyngeal exudate or posterior oropharyngeal erythema  Eyes:      Extraocular Movements: Extraocular movements intact  Conjunctiva/sclera: Conjunctivae normal       Pupils: Pupils are equal, round, and reactive to light  Cardiovascular:      Rate and Rhythm: Normal rate and regular rhythm  Pulses: Normal pulses  Heart sounds: Normal heart sounds  No murmur heard  No friction rub  No gallop  Pulmonary:      Effort: Pulmonary effort is normal       Breath sounds: Normal breath sounds  No wheezing or rales  Abdominal:      General: There is no distension  Tenderness: There is no abdominal tenderness  Musculoskeletal:         General: Tenderness present  Normal range of motion  Cervical back: Normal range of motion  Comments: Right costal tenderness to percussion  Skin:     General: Skin is warm  Capillary Refill: Capillary refill takes less than 2 seconds  Neurological:      General: No focal deficit present  Mental Status: She is alert and oriented to person, place, and time  Mental status is at baseline  Psychiatric:         Mood and Affect: Mood normal          Behavior: Behavior normal          Thought Content:  Thought content normal          Judgment: Judgment normal            Wt Readings from Last 3 Encounters:   03/08/22 65 9 kg (145 lb 3 2 oz)   03/02/22 63 5 kg (140 lb)   02/22/22 66 7 kg (147 lb)     Temp Readings from Last 3 Encounters:   03/08/22 (!) 97 3 °F (36 3 °C) (Tympanic)   03/03/22 98 2 °F (36 8 °C) (Tympanic)   02/07/22 (!) 97 3 °F (36 3 °C)     BP Readings from Last 3 Encounters:   03/08/22 132/80   03/03/22 148/68   02/07/22 132/78     Pulse Readings from Last 3 Encounters:   03/08/22 60   03/03/22 66   02/07/22 61

## 2022-03-15 ENCOUNTER — OFFICE VISIT (OUTPATIENT)
Dept: OBGYN CLINIC | Facility: CLINIC | Age: 57
End: 2022-03-15
Payer: COMMERCIAL

## 2022-03-15 VITALS
DIASTOLIC BLOOD PRESSURE: 79 MMHG | SYSTOLIC BLOOD PRESSURE: 147 MMHG | HEIGHT: 59 IN | WEIGHT: 145 LBS | HEART RATE: 60 BPM | BODY MASS INDEX: 29.23 KG/M2

## 2022-03-15 DIAGNOSIS — M75.32 CALCIFIC TENDINITIS OF BOTH SHOULDERS: Primary | ICD-10-CM

## 2022-03-15 DIAGNOSIS — M75.31 CALCIFIC TENDINITIS OF BOTH SHOULDERS: Primary | ICD-10-CM

## 2022-03-15 PROCEDURE — 99213 OFFICE O/P EST LOW 20 MIN: CPT | Performed by: ORTHOPAEDIC SURGERY

## 2022-03-15 NOTE — PROGRESS NOTES
Chief Complaint  Bilateral shoulder pain    History Of Presenting Illness  Mary Cox 1965 presents with bilateral shoulder pain for the last 1 year  Aggravated by overhead activities  Decreased with rest   Patient had only 1 session of physical therapy  Patient MRI in the past which showed  tendinitis in the right shoulder  Current Medications  Current Outpatient Medications   Medication Sig Dispense Refill    amLODIPine (NORVASC) 5 mg tablet Take 1 tablet (5 mg total) by mouth daily 30 tablet 5    atorvastatin (LIPITOR) 20 mg tablet Take 1 tablet (20 mg total) by mouth daily 30 tablet 5    meloxicam (Mobic) 7 5 mg tablet Take 1 tablet (7 5 mg total) by mouth daily 30 tablet 0    neomycin-polymyxin-hydrocortisone (CORTISPORIN) 0 35%-10,000 units/mL-1% otic suspension Administer 4 drops into the left ear 4 (four) times a day for 7 days 10 mL 0     No current facility-administered medications for this visit         Current Problems    Active Problems:   Patient Active Problem List    Diagnosis Date Noted    Bursitis of both shoulders 01/25/2022    Benign essential HTN 01/25/2022    RBBB 07/16/2021    Acute non-recurrent maxillary sinusitis 05/11/2021    Near syncope 05/10/2021    Chronic pain 05/10/2021    Bradycardia 05/10/2021    Episode of dizziness 05/10/2021    Chronic right shoulder pain 04/14/2021    Medial epicondylitis of elbow, left 04/14/2021    Strain of thoracic back region 03/22/2021    Tympanic membrane perforation, left 10/07/2019    Atypical chest pain 04/17/2019    Epigastric abdominal pain 04/17/2019    Chronic musculoskeletal pain 04/17/2019    Pain in finger of both hands 04/17/2019    Pharyngoesophageal dysphagia 04/12/2019    Perforation of left tympanic membrane 04/12/2019    Chronic otitis media of left ear 04/12/2019    Lumbar degenerative disc disease 04/01/2019    Left hip pain 11/08/2018    Lumbar radiculopathy 09/11/2018    Chronic lumbar radiculopathy 04/18/2018    Chronic pain syndrome 04/18/2018    Chronic right hip pain 11/20/2017    Myofascial pain syndrome 11/22/2016    Dyslipidemia 09/12/2016    Abdominal pain, left lower quadrant 05/31/2016    Atrophic vaginitis 05/11/2016    Primary osteoarthritis of both knees 02/05/2016    Chronic pain of right knee 08/12/2015         Review of Systems:    General: negative for - chills, fatigue, fever,  weight gain or weight loss  Psychological: negative for - anxiety, behavioral disorder, concentration difficulties  Ophthalmic: negative for - blurry vision, decreased vision, double vision,      Past Medical History:   Past Medical History:   Diagnosis Date    Fatigue     High cholesterol     Hypertension     Neuropathy     Osteoarthritis     knees    Shortness of breath        Past Surgical History:   Past Surgical History:   Procedure Laterality Date    APPENDECTOMY      EPIDURAL BLOCK INJECTION Left 5/17/2018    Procedure: L4 AND L5 TRANSFORAMINAL EPIDURAL STEROID INJECTION;  Surgeon: Marimar Mckeon MD;  [facility];  Service: Pain Management     EPIDURAL BLOCK INJECTION Left 10/4/2018    Procedure: L4 AND L5 TRANSFORAMINAL EPIDURAL STEROID INJECTION;  Surgeon: Marimar Mckeon MD;  [facility];  Service: Pain Management     EPIDURAL BLOCK INJECTION Left 5/1/2019    Procedure: L4-5 and L5-S1 transforaminal epidural steroid injection;  Surgeon: Marimar Mckeon MD;  [facility];  Service: Pain Management     FL GUIDED NEEDLE PLAC BX/ASP/INJ  5/1/2019    TN COLONOSCOPY FLX DX W/COLLJ SPEC WHEN PFRMD N/A 5/31/2016    Procedure: COLONOSCOPY;  Surgeon: Brittany Wood MD;  [facility];  Service: Gastroenterology    TN ESOPHAGOGASTRODUODENOSCOPY TRANSORAL DIAGNOSTIC N/A 11/15/2016    Procedure: ESOPHAGOGASTRODUODENOSCOPY (EGD);   Surgeon: Brittany Wood MD;  [facility];  Service: Gastroenterology    TN TYMPANOPLASTY Left 10/25/2019 Procedure: LEFT TYMPANOPLASTY;  Surgeon: Neptali Rasheed MD;  Location:  MAIN OR;  Service: ENT    TUBAL LIGATION         Family History:  Family history reviewed and non-contributory  Family History   Problem Relation Age of Onset    Uterine cancer Mother     Early death Father     Heart disease Brother     No Known Problems Sister     No Known Problems Daughter     No Known Problems Sister     No Known Problems Daughter     No Known Problems Maternal Aunt     No Known Problems Maternal Aunt     No Known Problems Maternal Aunt     No Known Problems Maternal Aunt     No Known Problems Maternal Grandmother     No Known Problems Maternal Grandfather     No Known Problems Paternal Grandmother     No Known Problems Paternal Grandfather        Social History:  Social History     Socioeconomic History    Marital status: Single     Spouse name: None    Number of children: None    Years of education: None    Highest education level: None   Occupational History    None   Tobacco Use    Smoking status: Never Smoker    Smokeless tobacco: Never Used   Vaping Use    Vaping Use: Never used   Substance and Sexual Activity    Alcohol use: Not Currently    Drug use: No    Sexual activity: Yes     Partners: Male     Birth control/protection: Female Sterilization   Other Topics Concern    None   Social History Narrative    None     Social Determinants of Health     Financial Resource Strain: Not on file   Food Insecurity: Not on file   Transportation Needs: Not on file   Physical Activity: Not on file   Stress: Not on file   Social Connections: Not on file   Intimate Partner Violence: Not on file   Housing Stability: Not on file       Allergies:   No Known Allergies        Physical ExaminationBP 147/79   Pulse 60   Ht 4' 11" (1 499 m)   Wt 65 8 kg (145 lb)   BMI 29 29 kg/m²   Gen: Alert and oriented to person, place, time  HEENT: EOMI, eyes clear, moist mucus membranes, hearing intact      Orthopedic Exam  Both shoulders no swelling or deformity  Signs of impingement positive Cuff strength is 4+ out of 5 good range of motion pain-free no distal deficits          Impression  Bilateral shoulder pain due to rotator cuff impingement and tendinitis        Plan    Discussed treatment with the patient she is aware of the importance of a regular exercise icing over-the-counter pain medication home exercise program  Will recommenced formal physical therapy if no better we could consider injection therapy in 3 months    Miranda Brown MD        Portions of the record may have been created with voice recognition software  Occasional wrong word or "sound a like" substitutions may have occurred due to the inherent limitations of voice recognition software  Read the chart carefully and recognize, using context, where substitutions have occurred

## 2022-03-18 NOTE — TELEPHONE ENCOUNTER
Patients daughter notified    ----- Message from Mimi Carpenter PA-C sent at 2/20/2020  4:12 PM EST -----  Call patient with normal results  yes

## 2022-04-13 NOTE — DISCHARGE INSTRUCTIONS
Chief Complaint   Patient presents with   • Back Pain   • Office Visit       Date of Injury: 2022  Initial Treatment Date: 2022  Date Last Seen: 2022  Mechanism of Onset: Other  Occupation: Student-C[employer]  Referred by: Self  Primary Care Physician: Marly Zavaleta PA-C  Date informed consent: 2022  I have reviewed the past medical history, family history, social history, medications and allergies listed in the medical record as obtained by my nursing staff and support staff and agree with their documentation.  Sonja  reports that she has never smoked. She has never used smokeless tobacco.  Sonja is allergic to amoxicillin, basil, cilantro   (food), and penicillins.  Advance Directive Status:No  Visit Number of Current Episode: 12  Initial pain ratin/10  Discharged from care: No    Patient Emotional screening was completed 2022;   [place]D/[place] Pain Supplemental Questionnaire score was 28/40. 70%  During the past 24 hours, how has pain interfered with normal activities: 7/10  During the past 24 hours, how has pain interfered with your sleep: 7/10  During the past 24 hours, how has pain affected your mood: 7/10  During the past 24 hours, how has pain contributed to your stress: 7/10    Relevant Diagnostic Imaging/Testin2020 Scoliosis Spine X-rays    Hemivertebra at T11 with compensatory scoliosis  Rib Fracture of #5-7    SUBJECTIVE:  Sanket' is a pleasant 27 year old female that presents to our office today for continued care and treatment of neck and back pain. Patient rates her pain today at 7/10 with 10 being worst. Patient states she woke up yesterday with upper back pain. She does a lot of lifting of totes at work. She is having upper back pain between the shoulder blades mainly on the left side. Yesterday it hurt to take a deep breath. She can tell it's starting to radiate up the neck.     OBJECTIVE FINDINGS:     Problem focus examination  Inyección epidural de esteroides   CUIDADO AMBULATORIO:   Lo que necesita saber acerca de haydee inyección epidural de esteroides (IEE):  Haydee inyección epidural de esteroides (IEE) es un procedimiento para inyectar un medicamento esteroideo en el espacio epidural  El espacio epidural se encuentra entre jane médula manrique y las Carbajal  Los esteroides reducen la inflamación y la acumulación de líquido en jane columna que podrían estar provocando el dolor  Es posible que le administren un medicamento para el dolor junto con esteroides  La forma para preparse para haydee inyección epidural de esteroides:  Jane médico hablará con usted acerca de cómo prepararse para jane procedimiento  Le dirán qué medicamentos dmitri o no dmitri el día de jane procedimiento  Podría ser necesario que deje de dmitri anticoagulantes u otros medicamentos unos días antes de jane procedimiento  Es posible que usted necesite ajustar cualquier medicamento para la diabetes que esté tomando el día de jane procedimiento  Los medicamentos de esteroides pueden aumentar los niveles de azúcar en jane lia  Qué sucederá linda haydee inyección epidural de esteroides:   · A usted le administrarán un medicamento para adormecer el área del procedimiento  Usted estará despierto linda el procedimiento, camilo no sentirá dolor  Es posible que también le administren medicamento que sirve para que se relaje linda el procedimiento  Se usará un medio de contraste para ayudar a que jane médico pito el [employer]  Dígale al médico si usted alguna vez ha tenido haydee reacción alérgica al líquido de [place]  · Jane médico podría colocar la aguja en el área de jane shante, en la parte media de jane espalda, o en el área del cóccix o hueso sacro  Le podrían inyectar el medicamento junto a los nervios que están provocándole el dolor  El médico en vez podría inyectarle el medicamento en haydee área más bandar del espacio epidural  [place] ayuda a que el medicamento se desplace a más nervios  Jane médico usará un fluoroscopio que sirve para guiar la aguja hacia el lugar correcto  Un fluoroscopio es un tipo de radiografía  Después del procedimiento, se colocará un vendaje sobre el área de la inyección para evitar haydee infección  Los riesgos de haydee inyección epidural de esteroides:  Es posible que usted sufra daño a los nervios o parálisis temporal o Rub[place]  Usted podría presentar haydee hemorragia o desarrollar haydee infección seria, yolis meningitis (inflamación del revestimiento del cerebro)  También podría desarrollar un absceso  Usted podría necesitar haydee cirugía para reparar el absceso  Es posible que usted tenga haydee convulsión, ansiedad o dificultad para dormir  Si usted es un hombre podría tener disfunción eréctil temporal (ser [employer] de Darylene Bird erección)  Siga las instrucciones de jane médico sobre el cuidado de catherine heridas:  Usted puede quitarse el vendaje antes de acostarse el día de jane procedimiento  Usted puede dmitri haydee ducha, camilo no tome un baño en bailee por lo menos en 24 horas  Cuidados personales:   · No maneje un vehículo,  ni use máquinas ni realice actividades agotadoras por 24 horas después de jane procedimiento o yolis se le indique  · Continúe otros tratamientos  según las indicaciones  Las inyecciones de esteroides no controlarán jane dolor por sí solas  Las inyecciones tienen el propósito de usarse junto con otros tratamiento, yolis la fisioterapia  Busque atención médica de inmediato si:   · La lia empapa el vendaje  · Jane herida está janneth, inflamada o drena pus  · Usted tiene fiebre o escalofríos, dolor de espalda severo y el área del procedimiento está sensible al tacto  · Usted sufre haydee convulsión  · Usted tiene dificultad para  las piernas  · Usted tiene debilidad o entumecimiento en catherine piernas  · Usted no puede controlar cuándo [place] o tiene haydee evacuación intestinal   Pregúntele a jane médico qué vitaminas y minerales son adecuados para usted  revealed:  (C-spine) Cervical spine facet joint function is within normal limits except for her C4/5 facet joints that exhibited limited passive range of motion and segmental restriction with tenderness upon palpation. The following muscles were examined for normal flexibility and tone; right and left upper trapezius muscle: right and left scalene muscle; right and left levator scapulae muscle; deep neck flexor muscle; right and left Sterno cleidomastoid muscle(SCM); right and left suboccipital muscle; these muscles were within normal limits except for her right middle trapezius muscles and right upper cervical paraspinals muscles that exhibited limited flexibility and were hypertonic at rest.    (Shoulder) Scapulothoracic,acromioclavicular and glenohumeral joint range of motion is within normal limitsThe following muscles were examined for normal flexibility and tone: right and left supraspinatus muscle; right and left subscapularis muscle; right and left biceps muscle; right and left teres muscle.; left and right pectoral minor muscle.; left and right pectoral major muscle.; left and right infraspinatus muscle. These muscles were found to be within normal limits except for her left supraspinatus muscle that exhibited limited flexibility and were hypertonic at rest.    (Thoracic spine) Thoracic spine facet joint function is within normal limits except for her left 5th costovertebral joint   that exhibited limited passive range of motion and segmental restriction and tenderness upon palpation; The following muscles were examined for normal flexibility and tone; right and left pectoralis major muscles; right and left rhomboid muscle; right and left serratus muscle; left and right latissimus dorsi muscle; These muscles were within normal limits except for her right rhomboid muscles, left mid thoracic paraspinals, her right upper thoracic paraspinals muscles that exhibited limited flexibility and abnormal resting  tone.    (Hip) Range of motion is within normal limits.    Orthopedic/Neurological tests:  None today    Assessment:   1. Cervicalgia    2. Cervical somatic dysfunction    3. Thoracic region somatic dysfunction    4. Pain in thoracic spine      Complicating Factors/Co-morbidities:   Scoliosis Hemivertebra at T11 with compensatory scoliosis  Rib Fracture of #5-7    Plan:  Patient was evaluated and then treated with manipulation to her cervical spine and left shoulder via diversified manipulation technique and to her thoracic spine via anterior manipulation technique to her cervical and thoracic spine via Christianson distraction technique to improve function and passive range of motion of facet joints.  Patient also treated with contract/relax stretch to muscle noted as taut in objective findings to improve flexibility and decrease strain to spinal structures.    Therapeutic Exercise/Rehab/Modalities performed today:   None performed today    Patient Instruction/Education:   Patient educated in the nature of condition and likely pain generators as well as plan of care to resolve symptoms and improve muscular and skeletal function.  Patient noted verbal understanding of condition and is agreeable to plan of care.  Patient stated understanding of, and was in agreement with, the discussed instructions.    Goals of Care: Goal of care is to improve muscular and skeletal function and provide symptom relief. Care is planned at 2-3 times/week times 2-3 weeks dependent on patient response to care.    Other treatment options discussed with patient: None discussed today    Patient is to return next week for continued care and treatment of her condition consistent with plan of care.    Length of Visit: 15 mins    On 4/13/2022, Gavi FISHER scribed the services personally performed by Pedro Gonzales DC      The documentation recorded by the scribe accurately and completely reflects the service(s) I personally performed and the  · Usted tiene náuseas o está vomitando  · [place] radha o shante están rojos por unos días y usted siente calor  · Usted tiene más dolor del que tenía antes del procedimiento  · Usted tiene inflamación en catherine sukh y pies  · Usted tiene preguntas o inquietudes acerca de del valle condición o cuidado  Acuda a catherine consultas de control con del valle médico según le indicaron  Anote catherine preguntas para que se acuerde de hacerlas linda catherine visitas  © 2017 [address] [employer] Information is for End User's use only and may not be sold, redistributed or otherwise used for commercial purposes  All illustrations and images included in CareNotes® are the copyrighted property of A D A M , Inc  or [employer]  Esta información es sólo para uso en educación  [place] intención no es darle un consejo médico sobre enfermedades o tratamientos  Colsulte con del valle Stewart Keas farmacéutico antes de seguir cualquier régimen médico para saber si es seguro y efectivo para usted  Epidural Steroid Injection   WHAT YOU NEED TO KNOW:   An epidural steroid injection (LANDRY) is a procedure to inject steroid medicine into the epidural space  The epidural space is between your spinal cord and vertebrae  Steroids reduce inflammation and fluid buildup in your spine that may be causing pain  You may be given pain medicine along with the steroids  ACTIVITY  · Do not drive or operate machinery today  · No strenuous activity today - bending, lifting, etc   · You may resume normal activites starting tomorrow - start slowly and as tolerated  · You may shower today, but no tub baths or hot tubs  · You may have numbness for several hours from the local anesthetic  Please use caution and common sense, especially with weight-bearing activities  CARE OF THE INJECTION SITE  · If you have soreness or pain, apply ice to the area today (20 minutes on/20 minutes off)    · Starting tomorrow, you may use warm, moist heat or ice if decisions made by me.        needed  · You may have an increase or change in your discomfort for 36-48 hours after your treatment  · Apply ice and continue with any pain medication you have been prescribed  · Notify [facility] if you have any of the following: redness, drainage, swelling, headache, stiff neck or fever above 100°F     SPECIAL INSTRUCTIONS  · Our office will contact you in approximately 7 days for a progress report  MEDICATIONS  · Continue to take all routine medications  · Our office may have instructed you to hold some medications  If you have a problem specifically related to your procedure, please call our office at [phone]  Problems not related to your procedure should be directed to your primary care physician

## 2022-04-19 DIAGNOSIS — M75.52 BURSITIS OF BOTH SHOULDERS: ICD-10-CM

## 2022-04-19 DIAGNOSIS — M75.51 BURSITIS OF BOTH SHOULDERS: ICD-10-CM

## 2022-04-20 RX ORDER — MELOXICAM 7.5 MG/1
7.5 TABLET ORAL DAILY
Qty: 30 TABLET | Refills: 0 | Status: SHIPPED | OUTPATIENT
Start: 2022-04-20 | End: 2022-06-13

## 2022-04-25 ENCOUNTER — EVALUATION (OUTPATIENT)
Dept: PHYSICAL THERAPY | Facility: HOME HEALTHCARE | Age: 57
End: 2022-04-25
Payer: COMMERCIAL

## 2022-04-25 DIAGNOSIS — M75.32 CALCIFIC TENDINITIS OF BOTH SHOULDERS: Primary | ICD-10-CM

## 2022-04-25 DIAGNOSIS — M75.31 CALCIFIC TENDINITIS OF BOTH SHOULDERS: Primary | ICD-10-CM

## 2022-04-25 PROCEDURE — 97162 PT EVAL MOD COMPLEX 30 MIN: CPT

## 2022-04-25 PROCEDURE — 97110 THERAPEUTIC EXERCISES: CPT

## 2022-04-25 PROCEDURE — 97112 NEUROMUSCULAR REEDUCATION: CPT

## 2022-04-25 NOTE — PROGRESS NOTES
PT Evaluation     Today's date: 2022  Patient name: Brandi Patterson  : 1965  MRN: [id number]  Referring provider: Trenton Colón MD  Dx:   Encounter Diagnosis     ICD-10-CM    1  Calcific tendinitis of both shoulders  M75 31     M75 32        Start Time: 710  Stop Time: 018  Total time in clinic (min): 40 minutes    Assessment  Assessment details: Pt is a 61 y/o female presenting with chronic bilateral shoulder pain consistent with diagnosis of bilateral subacromial impingement, posture dysfunction, and bilateral upper trap muscle spasms  Pt is presenting with significant pain levels, C/S and shoulder ROM deficits, strength deficits, and decreased overall functional mobility  Pt requires skilled PT in order to improve in pain levels, strength, ROM, functional mobility, quality of life, and return to PLOF  Thank you! Impairments: abnormal or restricted ROM, abnormal movement, activity intolerance, impaired physical strength, lacks appropriate home exercise program, pain with function, poor posture  and poor body mechanics  Understanding of Dx/Px/POC: good   Prognosis: good    Goals  STG: 3-4 weeks  Pt will be independent with HEP in order to continue to progress outside of PT treatment sessions  Pt will improve in quality of life as demonstrated by worst pain levels of 5/10 or less  Pt will improve in functional mobility as demonstrated by a 50% improvement in resting posture  LT-8 weeks  Pt will improve in quality of life as demonstrated by worst pain levels of 2/10 or less  Pt will improve in functional mobility as demonstrated by strength levels of 4+/5 or greater  Pt will improve in functional mobility as demonstrated by bilateral shoulder ROM WFL  Pt will improve in functional mobility as demonstrated by cervical AROM WFL      Plan  Patient would benefit from: skilled physical therapy  Planned modality interventions: thermotherapy: hydrocollator packs  Planned therapy interventions: body mechanics training, flexibility, functional ROM exercises, home exercise program, joint mobilization, manual therapy, massage, neuromuscular re-education, patient education, postural training, strengthening, stretching, therapeutic activities and therapeutic exercise  Frequency: 2x week  Duration in weeks: 4  Plan of Care beginning date: 2022  Plan of Care expiration date: 2022  Treatment plan discussed with: patient        Subjective Evaluation    History of Present Illness  Mechanism of injury: Pt reports that she is presenting with bilateral lateral shoulder pain which started approximately 1 year ago  Pt reports that she also experiences a lot of pain in her upper traps and into her neck  Pt was previously receiving physical therapy at the clinic  Pt reports that she has difficulty grabbing something to the side of behind her due to the pain levels  She also reports that if she reaches for something suddenly she experiences the most pain  Pain  Current pain rating: 3  At best pain ratin  At worst pain ratin  Quality: burning and tight  Alleviating factors: Physical therapy and massages  Aggravating factors: overhead activity and lifting    Patient Goals  Patient goals for therapy: decreased pain, increased motion, increased strength, independence with ADLs/IADLs and return to sport/leisure activities  Patient goal: To get better and deal with the pain levels        Objective     Static Posture     Head  Forward  Shoulders  Rounded  Postural Observations  Seated posture: poor  Standing posture: poor        Palpation   Left   Hypertonic in the levator scapulae, scalenes, suboccipitals and upper trapezius  Tenderness of the levator scapulae, scalenes, suboccipitals and upper trapezius  Trigger point to levator scapulae, scalenes, suboccipitals and upper trapezius  Right   Hypertonic in the levator scapulae, scalenes, suboccipitals and upper trapezius     Tenderness of the levator scapulae, scalenes, suboccipitals and upper trapezius  Trigger point to levator scapulae, scalenes, suboccipitals and upper trapezius  Neurological Testing     Sensation   Cervical/Thoracic   Left   Intact: light touch    Right   Intact: light touch    Active Range of Motion   Cervical/Thoracic Spine       Cervical  Subcranial protraction:   Restriction level: minimal  Subcranial retraction:  with pain   Restriction level: moderate  Flexion:  Restriction level: moderate  Extension:  Restriction level: maximal  Left lateral flexion:  with pain Restriction level: maximal  Right lateral flexion:  with pain Restriction level maximal  Left rotation:  with pain Restriction level: moderate  Right rotation:  with pain Restriction level: moderate  Left Shoulder   Flexion: 114 degrees with pain  Abduction: 90 degrees with pain  External rotation 0°: 55 degrees with pain    Right Shoulder   Flexion: 132 degrees with pain  Abduction: 105 degrees with pain  External rotation 0°: 60 degrees with pain    Strength/Myotome Testing     Left Shoulder     Planes of Motion   Flexion: 3+   Abduction: 3+   External rotation at 0°: 3+   Internal rotation at 0°: 3+     Isolated Muscles   Upper trapezius: 3     Right Shoulder     Planes of Motion   Flexion: 3+   Abduction: 3+   External rotation at 0°: 3+   Internal rotation at 0°: 3+     Isolated Muscles   Upper trapezius: 3     Left Elbow   Flexion: 4  Extension: 4    Right Elbow   Flexion: 4  Extension: 4    Tests     Left Shoulder   Positive Hawkin's and painful arc  Right Shoulder   Positive Hawkin's and painful arc                Precautions: None    Re-eval Date: 5/25/2022      Manuals 4/25            SOR, distraction, C/S PROM 8'            STM Carlos UT/LS             Carlos shoulder PROM                          Neuro Re-Ed             Cervical retractions             Scapular retractions 5"x10            MTP/LTP             Carlos TB ER Patient education 5'            Ther Ex             UBE             Pulley's             Carlos UT/LS stretch 20"x3            Wall slide             Finger ladder             Cane flex             Cane abd             Cane ER             Ball roll on wall             S/L ER             Prone T's             Prone Y's                          Ther Activity                                       Gait Training                                       Modalities             P

## 2022-04-28 ENCOUNTER — OFFICE VISIT (OUTPATIENT)
Dept: PHYSICAL THERAPY | Facility: HOME HEALTHCARE | Age: 57
End: 2022-04-28
Payer: COMMERCIAL

## 2022-04-28 DIAGNOSIS — M75.32 CALCIFIC TENDINITIS OF BOTH SHOULDERS: Primary | ICD-10-CM

## 2022-04-28 DIAGNOSIS — M75.31 CALCIFIC TENDINITIS OF BOTH SHOULDERS: Primary | ICD-10-CM

## 2022-04-28 PROCEDURE — 97140 MANUAL THERAPY 1/> REGIONS: CPT

## 2022-04-28 PROCEDURE — 97110 THERAPEUTIC EXERCISES: CPT

## 2022-04-28 NOTE — PROGRESS NOTES
Daily Note     Today's date: 2022  Patient name: Delonte Sparks  : 1965  MRN: [id number]  Referring provider: Nette Burkett MD  Dx: No diagnosis found  Start Time: 07          Subjective: Pain of about 4/10  My R sh is worse than the L      Objective: See treatment diary below    Assessment: Tolerated treatment well  Difficult to determine benefits 2* language barrier and no   Pt with pain and ROM limitations B UE R>L and at cerv area  Pt reports relief at end with MHP to cerv in seated position  Patient would benefit from continued PT    Plan: Continue per plan of care        Precautions: None    Re-eval Date: 2022      Manuals -28           SOR, distraction, C/S PROM 8' 5'           STM Carlos UT/LS             Carlos shoulder PROM  10'                        Neuro Re-Ed             Cervical retractions  Supine  5"x10           Scapular retractions 5"x10 5" x10           MTP/LTP  NV           Carlos TB ER  Yellow  1x10                                     Patient education 5'            Ther Ex  4-28           UBE             Pulley's  X 10ea           Carlos UT/LS stretch 20"x3 20" x3           Wall slide  Flex/abd  1x10            Finger ladder  1x3 flex           Cane flex  Supine x10           Cane abd             Cane ER             Ball roll on wall             S/L ER             Prone T's             Prone Y's                          Ther Activity                                       Gait Training                                       Modalities             MHP  Cerv  5'

## 2022-05-02 ENCOUNTER — OFFICE VISIT (OUTPATIENT)
Dept: PHYSICAL THERAPY | Facility: HOME HEALTHCARE | Age: 57
End: 2022-05-02
Payer: COMMERCIAL

## 2022-05-02 DIAGNOSIS — M75.32 CALCIFIC TENDINITIS OF BOTH SHOULDERS: Primary | ICD-10-CM

## 2022-05-02 DIAGNOSIS — M75.31 CALCIFIC TENDINITIS OF BOTH SHOULDERS: Primary | ICD-10-CM

## 2022-05-02 PROCEDURE — 97110 THERAPEUTIC EXERCISES: CPT

## 2022-05-02 PROCEDURE — 97140 MANUAL THERAPY 1/> REGIONS: CPT

## 2022-05-02 NOTE — PROGRESS NOTES
Daily Note     Today's date: 2022  Patient name: Delonte Sparks  : 1965  MRN: [id number]  Referring provider: Nette Burkett MD  Dx: No diagnosis found  Start Time: 705          Subjective: Pain of 2-3/10 at L sh and neck  Some pain at R sh but not to much  Objective: See treatment diary below    Assessment: Tolerated treatment well  Pt with end range pain in all plane of cerv and Carlos sh PROM and tightness/small nodules noted at L UT and scap border  Difficult to determine benefits 2* language barrier  Patient would benefit from continued PT    Plan: Continue per plan of care        Precautions: None    Re-eval Date: 2022      Manuals  5-2          SOR, distraction, C/S PROM 8' 5' 5'          STM Carlos UT/LS             Cralos shoulder PROM  10' 10'                       Neuro Re-Ed   5-2          Cervical retractions  Supine  5"x10 Supine  5"x10          Scapular retractions 5"x10 5" x10 NP          MTP/LTP  NV Red 1x10 ea          Carlos TB ER  Yellow  1x10 Yellow  1x10                                    Patient education 5'            Ther Ex   5-2          UBE             Pulley's  X 10ea x10 ea          Carlos UT/LS stretch 20"x3 20" x3 20" x3          Wall slide  Flex/abd  1x10 ea  Carlos 1x10 ea Carlos          Finger ladder  1x3 flex 1x3 ea          Cane flex  Supine x10 Supine   x10          Cane abd             Cane ER             Ball roll on wall             S/L ER             Prone T's             Prone Y's                          Ther Activity                                       Gait Training                                       Modalities             MHP  Cerv  5' Cerv 5'

## 2022-05-04 ENCOUNTER — OFFICE VISIT (OUTPATIENT)
Dept: PHYSICAL THERAPY | Facility: HOME HEALTHCARE | Age: 57
End: 2022-05-04
Payer: COMMERCIAL

## 2022-05-04 DIAGNOSIS — M75.31 CALCIFIC TENDINITIS OF BOTH SHOULDERS: Primary | ICD-10-CM

## 2022-05-04 DIAGNOSIS — M75.32 CALCIFIC TENDINITIS OF BOTH SHOULDERS: Primary | ICD-10-CM

## 2022-05-04 PROCEDURE — 97110 THERAPEUTIC EXERCISES: CPT

## 2022-05-04 PROCEDURE — 97112 NEUROMUSCULAR REEDUCATION: CPT

## 2022-05-04 PROCEDURE — 97140 MANUAL THERAPY 1/> REGIONS: CPT

## 2022-05-09 ENCOUNTER — OFFICE VISIT (OUTPATIENT)
Dept: PHYSICAL THERAPY | Facility: HOME HEALTHCARE | Age: 57
End: 2022-05-09
Payer: COMMERCIAL

## 2022-05-09 DIAGNOSIS — M75.32 CALCIFIC TENDINITIS OF BOTH SHOULDERS: Primary | ICD-10-CM

## 2022-05-09 DIAGNOSIS — M75.31 CALCIFIC TENDINITIS OF BOTH SHOULDERS: Primary | ICD-10-CM

## 2022-05-09 PROCEDURE — 97112 NEUROMUSCULAR REEDUCATION: CPT

## 2022-05-09 PROCEDURE — 97140 MANUAL THERAPY 1/> REGIONS: CPT

## 2022-05-09 NOTE — PROGRESS NOTES
Daily Note     Today's date: 2022  Patient name: Pipe Coreas  : 1965  MRN: [id number]  Referring provider: Brandon Dempsey MD  Dx: No diagnosis found  Start Time: 705          Subjective: It's ok  Pain at B cerv and shoulders about 2-3/10  Objective: See treatment diary below    Assessment: Tolerated treatment well  Pt with report of mild pain reported at B sh t/o ex but able to complete without the need to reduce  Pt remains with tightness in all planes of B Sh A/PROM and with cerv ROM  Pt reports reduced pain at end of Tx and declined MHP  Patient would benefit from continued PT    Plan: Continue per plan of care  Precautions: None  And sub occipital release     Re-eval Date: 2022      Manuals - 5-9        SOR, distraction, C/S PROM 8' 5' 5' 5' 5'        STM Carlos UT/LS             Carlos shoulder PROM  10' 10' 10' 10'                     Neuro Re-Ed   -  5-9        Cervical retractions  Supine  5"x10 Supine  5"x10 supine 5" x 10 Supine  5" x10        Scapular retractions 5"x10 5" x10 NP          MTP/LTP  NV Red 1x10 ea Red   1 x 10 ea  Red   1x10  ea        Carlos TB ER  Yellow  1x10 Yellow  1x10 yellow  1 x10 Yellow  1x10                                  Patient education '            Ther Ex   5-  5-9        UBE    10' alt 10'  alt        Pulley's  X 10ea x10 ea  x10 ea        Carlos UT/LS stretch 20"x3 20" x3 20" x3  20" x3        Wall slide  Flex/abd  1x10 ea  Carlos 1x10 ea Carlos 1x10  Ea Carlos 1x10   Ea Carlos        Finger ladder  1x3 flex 1x3 ea 1 x 3 ea 1x3        Cane flex  Supine x10 Supine   x10 supine x10 NV        Cane abd             Cane ER             Ball roll on wall             S/L ER             Prone T's             Prone Y's                          Ther Activity                                       Gait Training                                       Modalities             MHP  Cerv  5' Cerv 5' Carlos shld   10'  Pt declined

## 2022-05-11 ENCOUNTER — OFFICE VISIT (OUTPATIENT)
Dept: PHYSICAL THERAPY | Facility: HOME HEALTHCARE | Age: 57
End: 2022-05-11
Payer: COMMERCIAL

## 2022-05-11 DIAGNOSIS — M75.32 CALCIFIC TENDINITIS OF BOTH SHOULDERS: Primary | ICD-10-CM

## 2022-05-11 DIAGNOSIS — M75.31 CALCIFIC TENDINITIS OF BOTH SHOULDERS: Primary | ICD-10-CM

## 2022-05-11 PROCEDURE — 97112 NEUROMUSCULAR REEDUCATION: CPT

## 2022-05-11 PROCEDURE — 97110 THERAPEUTIC EXERCISES: CPT

## 2022-05-11 PROCEDURE — 97140 MANUAL THERAPY 1/> REGIONS: CPT

## 2022-05-11 NOTE — PROGRESS NOTES
Daily Note     Today's date: 2022  Patient name: Chai Carrizales  : 1965  MRN: [id number]  Referring provider: Mario Vázquez MD  Dx:   Encounter Diagnosis     ICD-10-CM    1  Calcific tendinitis of both shoulders  M75 31     M75 32        Start Time: 5596  Stop Time: 0745  Total time in clinic (min): 40 minutes    Subjective: Pt reports that she has a 3-4/10 pain today in the back of both of her shoulders  Objective: See treatment diary below      Assessment: Pt demonstrated good tolerance to treatment session and is showing improved bilateral shoulder ROM  She continues to be upper trap dominant with multiple soft tissue restrictions noted in her bilateral upper traps, levator scapulae, and scalenes  Pt demonstrated symptom improvement following STM  Pt would benefit from continued PT  Plan: Continue per plan of care        Precautions: None    Re-eval Date: 2022      Manuals  5- 5-9        SOR, distraction, C/S PROM 8' 5' 5' 5' 5' 8'       STM Carlos UT/LS             Carlos shoulder PROM  10' 10' 10' 10'                     Neuro Re-Ed   5-2  5-9        Cervical retractions  Supine  5"x10 Supine  5"x10 supine 5" x 10 Supine  5" x10 Supine 5"x10       Scapular retractions 5"x10 5" x10 NP          MTP/LTP  NV Red 1x10 ea Red   1 x 10 ea  Red   1x10  ea Green 1x15 ea       Carlos TB ER  Yellow  1x10 Yellow  1x10 yellow  1 x10 Yellow  1x10 Red 1x15                                 Patient education 5'            Ther Ex   5-2  5-9        UBE    10' alt 10'  alt 10' alt       Pulley's  X 10ea x10 ea  x10 ea x10 ea       Carlos UT/LS stretch 20"x3 20" x3 20" x3  20" x3 20" x3       Wall slide  Flex/abd  1x10 ea  Carlos 1x10 ea Carlos 1x10  Ea Carlos 1x10   Ea Carlos 1x10 ea Carlos       Finger ladder  1x3 flex 1x3 ea 1 x 3 ea 1x3 1x3 carlos       Cane flex  Supine x10 Supine   x10 supine x10 NV        Cane abd             Cane ER             Ball roll on wall             S/L ER             Prone T's             Prone Y's             Serratus punches             Ther Activity                                       Gait Training                                       Modalities             MHP  Cerv  5' Cerv 5' Carlos shld   10'  Pt declined

## 2022-05-16 ENCOUNTER — OFFICE VISIT (OUTPATIENT)
Dept: PHYSICAL THERAPY | Facility: HOME HEALTHCARE | Age: 57
End: 2022-05-16
Payer: COMMERCIAL

## 2022-05-16 DIAGNOSIS — M75.32 CALCIFIC TENDINITIS OF BOTH SHOULDERS: Primary | ICD-10-CM

## 2022-05-16 DIAGNOSIS — M75.31 CALCIFIC TENDINITIS OF BOTH SHOULDERS: Primary | ICD-10-CM

## 2022-05-16 PROCEDURE — 97112 NEUROMUSCULAR REEDUCATION: CPT

## 2022-05-16 PROCEDURE — 97110 THERAPEUTIC EXERCISES: CPT

## 2022-05-16 PROCEDURE — 97140 MANUAL THERAPY 1/> REGIONS: CPT

## 2022-05-16 NOTE — PROGRESS NOTES
Daily Note     Today's date: 2022  Patient name: Wm Maria  : 1965  MRN: [id number]  Referring provider: Madina Grant MD  Dx: No diagnosis found  Subjective: Pain of 2-3/10 at Carlos sh  The R is worse than the L      Objective: See treatment diary below    Assessment: Tolerated treatment well  Pt with strength, endurance and ROM deficits in B sh and cerv  Pt with tightness at R sh and cerv > L  with report of radiating S/S to B biceps area t/o session  Pt with average pain of 2/10 today  Patient would benefit from continued PT    Plan: Continue per plan of care        Precautions: None    Re-eval Date: 2022      Manuals -- 5-16      SOR, distraction, C/S PROM 8' 5' 5' 5' 5' 8' 8'      STM Carlos UT/LS             Carlos shoulder PROM  10' 10' 10' 10'  10'                   Neuro Re-Ed   -  5-9  5-16      Cervical retractions  Supine  5"x10 Supine  5"x10 supine 5" x 10 Supine  5" x10 Supine 5"x10 Supine  5" x10      Scapular retractions 5"x10 5" x10 NP          MTP/LTP  NV Red 1x10 ea Red   1 x 10 ea  Red   1x10  ea Green 1x15 ea Green  1x15 ea      Carlos TB ER  Yellow  1x10 Yellow  1x10 yellow  1 x10 Yellow  1x10 Red 1x15 Red  1x15                                Patient education '            Ther Ex   5-  5-9  5-16      UBE    10' alt 10'  alt 10' alt 10' alt      Pulley's  X 10ea x10 ea  x10 ea x10 ea x10 ea      Carlos UT/LS stretch 20"x3 20" x3 20" x3  20" x3 20" x3 20" x3      Wall slide  Flex/abd  1x10 ea  Carlos 1x10 ea Carlos 1x10  Ea Carlos 1x10   Ea Carlos 1x10 ea Carlos 1x10 ea Carlos      Finger ladder  1x3 flex 1x3 ea 1 x 3 ea 1x3 1x3 carlos NP      Cane flex  Supine x10 Supine   x10 supine x10 NV  1x10      Cane abd             Cane ER             Ball roll on wall             S/L ER             Prone T's             Prone Y's             Serratus punches             Ther Activity                                       Gait Training Modalities             MHP  Cerv  5' Cerv 5' Carlos shld   10'  Pt declined  R sh  5'

## 2022-05-18 ENCOUNTER — OFFICE VISIT (OUTPATIENT)
Dept: PHYSICAL THERAPY | Facility: HOME HEALTHCARE | Age: 57
End: 2022-05-18
Payer: COMMERCIAL

## 2022-05-18 DIAGNOSIS — M75.32 CALCIFIC TENDINITIS OF BOTH SHOULDERS: Primary | ICD-10-CM

## 2022-05-18 DIAGNOSIS — M75.31 CALCIFIC TENDINITIS OF BOTH SHOULDERS: Primary | ICD-10-CM

## 2022-05-18 PROCEDURE — 97140 MANUAL THERAPY 1/> REGIONS: CPT

## 2022-05-18 PROCEDURE — 97112 NEUROMUSCULAR REEDUCATION: CPT

## 2022-05-18 PROCEDURE — 97110 THERAPEUTIC EXERCISES: CPT

## 2022-05-18 NOTE — PROGRESS NOTES
Daily Note     Today's date: 2022  Patient name: Brandi Patterson  : 1965  MRN: [id number]  Referring provider: Trenton Colón MD  Dx: No diagnosis found  Start Time: 0700          Subjective: Same pain of about 3/10 at Carlos cerv to shoulders but the R is still worse than the L      Objective: See treatment diary below    Assessment: Tolerated treatment well  Pt with tightness and end range pain remaining at B cerv, UT and sh  Slight improvement in ROM noted since [facility]  Patient would benefit from continued PT    Plan: Continue per plan of care        Precautions: None    Re-eval Date: 2022      Manuals - 5-2  5-9 5 5-16 5-18     SOR, distraction, C/S PROM 8' 5' 5' 5' 5' 8' 8' 8'     STM Carlos UT/LS             Carlos shoulder PROM  10' 10' 10' 10'  10' 10'                  Neuro Re-Ed   5-2  5-9  5-16 5-18     Cervical retractions  Supine  5"x10 Supine  5"x10 supine 5" x 10 Supine  5" x10 Supine 5"x10 Supine  5" x10 Supine  5" x10     Scapular retractions 5"x10 5" x10 NP          MTP/LTP  NV Red 1x10 ea Red   1 x 10 ea  Red   1x10  ea Green 1x15 ea Green  1x15 ea Green  1x15 ea     Carlos TB ER  Yellow  1x10 Yellow  1x10 yellow  1 x10 Yellow  1x10 Red 1x15 Red  1x15 Red   1x15                               Patient education 5'            Ther Ex  - 5-2  5-9  5-16 5-18     UBE    10' alt 10'  alt 10' alt 10' alt 10' alt     Pulley's  X 10ea x10 ea  x10 ea x10 ea x10 ea x10 ea     Carlos UT/LS stretch 20"x3 20" x3 20" x3  20" x3 20" x3 20" x3 20" x 3     Wall slide  Flex/abd  1x10 ea  Carlos 1x10 ea Carlos 1x10  Ea Carlos 1x10   Ea Carlos 1x10 ea Carlos 1x10 ea Carlos 1x10 ea Carlos      Finger ladder  1x3 flex 1x3 ea 1 x 3 ea 1x3 1x3 carlos NP NP     Cane flex  Supine x10 Supine   x10 supine x10 NV  1x10 1x10     Cane abd             Cane ER             Ball roll on wall             S/L ER             Prone T's             Prone Y's             Serratus punches             Ther Activity Gait Training                                       Modalities             MHP  Cerv  5' Cerv 5' Carlos shld   10'  Pt declined  R sh  5' Declined

## 2022-05-19 ENCOUNTER — TELEPHONE (OUTPATIENT)
Dept: OBGYN CLINIC | Facility: CLINIC | Age: 57
End: 2022-05-19

## 2022-05-23 ENCOUNTER — OFFICE VISIT (OUTPATIENT)
Dept: PHYSICAL THERAPY | Facility: HOME HEALTHCARE | Age: 57
End: 2022-05-23
Payer: COMMERCIAL

## 2022-05-23 DIAGNOSIS — M75.32 CALCIFIC TENDINITIS OF BOTH SHOULDERS: Primary | ICD-10-CM

## 2022-05-23 DIAGNOSIS — M75.31 CALCIFIC TENDINITIS OF BOTH SHOULDERS: Primary | ICD-10-CM

## 2022-05-23 PROCEDURE — 97140 MANUAL THERAPY 1/> REGIONS: CPT

## 2022-05-23 PROCEDURE — 97110 THERAPEUTIC EXERCISES: CPT

## 2022-05-23 PROCEDURE — 97112 NEUROMUSCULAR REEDUCATION: CPT

## 2022-05-23 NOTE — PROGRESS NOTES
Daily Note     Today's date: 2022  Patient name: Gianna Tellez  : 1965  MRN: [id number]  Referring provider: Robyn Ga MD  Dx:   Encounter Diagnosis     ICD-10-CM    1  Calcific tendinitis of both shoulders  M75 31     M75 32        Start Time: 0700  Stop Time:   Total time in clinic (min): 50 minutes    Subjective: Pt reports that she has 3-4/10 pain in her bilateral shoulders and neck  Objective: See treatment diary below      Assessment: Pt demonstrated good tolerance to treatment session, but continues to experience pain at end range shoulder elevation  Pt also continues to demonstrate bilateral shoulder ROM deficits, primarily limited by pain levels  Pt generally demonstrated good form for her exercises, but continues to be very upper trap dominant  Plan: Continue per plan of care        Precautions: None    Re-eval Date: 2022      Manuals  5-2  5-9  5-16 -    SOR, distraction, C/S PROM 8' 5' 5' 5' 5' 8' 8' 8' 5'    STM Carlos UT/LS             Carlos shoulder PROM  10' 10' 10' 10'  10' 10' 5'                 Neuro Re-Ed   5-2  5-9  5-16 5-18     Cervical retractions  Supine  5"x10 Supine  5"x10 supine 5" x 10 Supine  5" x10 Supine 5"x10 Supine  5" x10 Supine  5" x10 Supine 5"x10    Scapular retractions 5"x10 5" x10 NP          MTP/LTP  NV Red 1x10 ea Red   1 x 10 ea  Red   1x10  ea Green 1x15 ea Green  1x15 ea Green  1x15 ea Green 1x20 ea    Carlos TB ER  Yellow  1x10 Yellow  1x10 yellow  1 x10 Yellow  1x10 Red 1x15 Red  1x15 Red   1x15 Red 1x20                              Patient education 5'            Ther Ex  - 5-2  5-9  5-16 5-18     UBE    10' alt 10'  alt 10' alt 10' alt 10' alt 10' alt    Pulley's  X 10ea x10 ea  x10 ea x10 ea x10 ea x10 ea x10 ea    Carlos UT/LS stretch 20"x3 20" x3 20" x3  20" x3 20" x3 20" x3 20" x 3 20"x3    Wall slide  Flex/abd  1x10 ea  Carlos 1x10 ea Carlos 1x10  Ea Carlos 1x10   Ea Carlos 1x10 ea Carlos 1x10 ea Carlos 1x10 ea Carlos  1x10 ea Carlos    Finger ladder  1x3 flex 1x3 ea 1 x 3 ea 1x3 1x3 carlos NP NP     Cane flex  Supine x10 Supine   x10 supine x10 NV  1x10 1x10 1x10    Cane abd             Cane ER             Ball roll on wall             S/L ER             Prone T's             Prone Y's             Serratus punches             Ther Activity                                       Gait Training                                       Modalities             MHP  Cerv  5' Cerv 5' Carlos shld   10'  Pt declined  R sh  5' Declined

## 2022-05-25 ENCOUNTER — EVALUATION (OUTPATIENT)
Dept: PHYSICAL THERAPY | Facility: HOME HEALTHCARE | Age: 57
End: 2022-05-25
Payer: COMMERCIAL

## 2022-05-25 DIAGNOSIS — M75.32 CALCIFIC TENDINITIS OF BOTH SHOULDERS: Primary | ICD-10-CM

## 2022-05-25 DIAGNOSIS — M75.31 CALCIFIC TENDINITIS OF BOTH SHOULDERS: Primary | ICD-10-CM

## 2022-05-25 PROCEDURE — 97110 THERAPEUTIC EXERCISES: CPT

## 2022-05-25 PROCEDURE — 97112 NEUROMUSCULAR REEDUCATION: CPT

## 2022-05-25 PROCEDURE — 97140 MANUAL THERAPY 1/> REGIONS: CPT

## 2022-05-25 NOTE — PROGRESS NOTES
PT Re-Evaluation     Today's date: 2022  Patient name: Pipe Coreas  : 1965  MRN: [id number]  Referring provider: Brandon Dempsey MD  Dx:   Encounter Diagnosis     ICD-10-CM    1  Calcific tendinitis of both shoulders  M75 31     M75 32        Start Time: 7004  Stop Time: 0800  Total time in clinic (min): 55 minutes    Assessment  Assessment details: Pt has made significant improvements in pain, strength, ROM, and overall function since beginning PT  She has met her short-term goals for independence with HEP, pain, and posture, however, she is still progressing toward her long-term goals for pain, strength, ROM, and overall mobility  The pt continues to demonstrate significant soft-tissue restrictions and tenderness in her bilateral upper traps, but had symptom improvement with STM  Although her strength is improving, the pt also continues to demonstrate some bilateral shoulder strength deficits, with the L side being weaker than the R  The pt would benefit from continued PT in order to further improve in pain, strength, ROM, functional mobility, quality of life, and return to PLOF  Thank you! Impairments: abnormal or restricted ROM, abnormal movement, activity intolerance, impaired physical strength, lacks appropriate home exercise program, pain with function, poor posture  and poor body mechanics  Understanding of Dx/Px/POC: good   Prognosis: good    Goals  STG: 3-4 weeks  Pt will be independent with HEP in order to continue to progress outside of PT treatment sessions  Met  Pt will improve in quality of life as demonstrated by worst pain levels of 5/10 or less  Met  Pt will improve in functional mobility as demonstrated by a 50% improvement in resting posture  Met  LT-8 weeks  Pt will improve in quality of life as demonstrated by worst pain levels of 2/10 or less  Pt will improve in functional mobility as demonstrated by strength levels of 4+/5 or greater    Pt will improve in functional mobility as demonstrated by bilateral shoulder ROM WFL  Pt will improve in functional mobility as demonstrated by cervical AROM WFL  Plan  Patient would benefit from: skilled physical therapy  Planned modality interventions: thermotherapy: hydrocollator packs  Planned therapy interventions: body mechanics training, flexibility, functional ROM exercises, home exercise program, joint mobilization, manual therapy, massage, neuromuscular re-education, patient education, postural training, strengthening, stretching, therapeutic activities and therapeutic exercise  Frequency: 2x week  Duration in weeks: 4  Plan of Care beginning date: 2022  Plan of Care expiration date: 2022  Treatment plan discussed with: patient        Subjective Evaluation    History of Present Illness  Mechanism of injury: Pt reports that she is presenting with bilateral lateral shoulder pain which started approximately 1 year ago  Pt reports that she also experiences a lot of pain in her upper traps and into her neck  Pt was previously receiving physical therapy at the clinic  Pt reports that she has difficulty grabbing something to the side of behind her due to the pain levels  She also reports that if she reaches for something suddenly she experiences the most pain  UPDATE 2022:  Pt reports that she feels so-so today and that most of her pain is in her L upper trap/shoulder  The patient feels like she has been improving a little bit since beginning PT  Pain  Current pain ratin  At best pain ratin  At worst pain ratin  Quality: burning and tight  Alleviating factors: Physical therapy and massages    Aggravating factors: overhead activity and lifting    Patient Goals  Patient goals for therapy: decreased pain, increased motion, increased strength, independence with ADLs/IADLs and return to sport/leisure activities  Patient goal: To get better and deal with the pain levels        Objective     Static Posture Head  Forward  Shoulders  Rounded  Postural Observations  Seated posture: fair  Standing posture: fair        Palpation   Left   Hypertonic in the levator scapulae, scalenes, suboccipitals and upper trapezius  Tenderness of the levator scapulae, scalenes, suboccipitals and upper trapezius  Trigger point to levator scapulae, scalenes, suboccipitals and upper trapezius  Right   Hypertonic in the levator scapulae, scalenes, suboccipitals and upper trapezius  Tenderness of the levator scapulae, scalenes, suboccipitals and upper trapezius  Trigger point to levator scapulae, scalenes, suboccipitals and upper trapezius       Neurological Testing     Sensation   Cervical/Thoracic   Left   Intact: light touch    Right   Intact: light touch    Active Range of Motion   Cervical/Thoracic Spine       Cervical  Subcranial protraction:   Restriction level: minimal  Subcranial retraction:  with pain   Restriction level: moderate  Flexion:  Restriction level: moderate  Extension:  Restriction level: maximal  Left lateral flexion:  with pain Restriction level: moderate  Right lateral flexion:  with pain Restriction level moderate  Left rotation:  with pain Restriction level: moderate  Right rotation:  with pain Restriction level: moderate  Left Shoulder   Flexion: 142 degrees with pain  Abduction: 95 degrees with pain  External rotation 0°: 60 degrees with pain  Internal rotation BTB: L2 with pain    Right Shoulder   Flexion: 133 degrees with pain  Abduction: 105 degrees with pain  External rotation 0°: 60 degrees with pain  Internal rotation BTB: L2 with pain    Strength/Myotome Testing     Left Shoulder     Planes of Motion   Flexion: 4-   Abduction: 4-   External rotation at 0°: 4   Internal rotation at 0°: 4     Isolated Muscles   Biceps: 4+   Triceps: 4+   Upper trapezius: 4     Right Shoulder     Planes of Motion   Flexion: 4   Abduction: 4   External rotation at 0°: 4   Internal rotation at 0°: 4 Isolated Muscles   Biceps: 4+   Triceps: 4+   Upper trapezius: 4     Left Elbow   Flexion: 4  Extension: 4    Right Elbow   Flexion: 4  Extension: 4    Tests     Left Shoulder   Positive Hawkin's and painful arc  Right Shoulder   Positive Hawkin's and painful arc                Precautions: None    Re-eval Date: 6/25/2022      Manuals 4/25 4-28 5-2 5/4 5-9 5/11 5-16 5-18 5/23 5/25   SOR, distraction, C/S PROM 8' 5' 5' 5' 5' 8' 8' 8' 5' 5'   STM Carlos UT/LS          5'   Carlos shoulder PROM  10' 10' 10' 10'  10' 10' 5'                 Neuro Re-Ed   5-2 5-9 5-16 5-18     Cervical retractions  Supine  5"x10 Supine  5"x10 supine 5" x 10 Supine  5" x10 Supine 5"x10 Supine  5" x10 Supine  5" x10 Supine 5"x10 Supine 5"x10   Scapular retractions 5"x10 5" x10 NP          MTP/LTP  NV Red 1x10 ea Red   1 x 10 ea  Red   1x10  ea Green 1x15 ea Green  1x15 ea Green  1x15 ea Green 1x20 ea Green 1x20 ea   Carlos TB ER  Yellow  1x10 Yellow  1x10 yellow  1 x10 Yellow  1x10 Red 1x15 Red  1x15 Red   1x15 Red 1x20 Red 1x20                             Patient education 5'            Ther Ex  4-28 5-2 5-9  5-16 5-18     UBE    10' alt 10'  alt 10' alt 10' alt 10' alt 10' alt 10' alt   Pulley's  X 10ea x10 ea  x10 ea x10 ea x10 ea x10 ea x10 ea x10 ea   Carlos UT/LS stretch 20"x3 20" x3 20" x3  20" x3 20" x3 20" x3 20" x 3 20"x3 20"x3   Wall slide  Flex/abd  1x10 ea  Carlos 1x10 ea Carlos 1x10  Ea Carlos 1x10   Ea Carlos 1x10 ea Carlos 1x10 ea Acrlos 1x10 ea Carlos  1x10 ea Carlos 1x10 ea Carlos   Finger ladder  1x3 flex 1x3 ea 1 x 3 ea 1x3 1x3 carlos NP NP     Cane flex  Supine x10 Supine   x10 supine x10 NV  1x10 1x10 1x10 1x10   Cane abd             Cane ER             Ball roll on wall             S/L ER             Prone T's             Prone Y's             Serratus punches             Ther Activity                                       Gait Training                                       Modalities             MHP  Cerv  5' Cerv 5' Carlos shld   10'  Pt declined  R sh  5' Declined

## 2022-06-01 ENCOUNTER — OFFICE VISIT (OUTPATIENT)
Dept: PHYSICAL THERAPY | Facility: HOME HEALTHCARE | Age: 57
End: 2022-06-01
Payer: COMMERCIAL

## 2022-06-01 DIAGNOSIS — M75.32 CALCIFIC TENDINITIS OF BOTH SHOULDERS: Primary | ICD-10-CM

## 2022-06-01 DIAGNOSIS — M75.31 CALCIFIC TENDINITIS OF BOTH SHOULDERS: Primary | ICD-10-CM

## 2022-06-01 PROCEDURE — 97112 NEUROMUSCULAR REEDUCATION: CPT

## 2022-06-01 PROCEDURE — 97140 MANUAL THERAPY 1/> REGIONS: CPT

## 2022-06-01 PROCEDURE — 97110 THERAPEUTIC EXERCISES: CPT

## 2022-06-01 NOTE — PROGRESS NOTES
Daily Note     Today's date: 2022  Patient name: Jaswant Delaney  : 1965  MRN: [id number]  Referring provider: Sherren Fry, MD  Dx: No diagnosis found  Start Time: 705          Subjective: Pt with c/o pain of 6/10 at L UT upon arrival to PT clinic  Objective: See treatment diary below    Assessment: Tolerated treatment well  Pt progressing well with TE and with minimal VC's needed for correct form  Pt remains with significant B UE and cerv ROM deficits and well as soft tissue restrictions at B UT  Patient would benefit from continued PT    Plan: Continue per plan of care        Precautions: None    Re-eval Date: 2022      Manuals 6-- 5-2 --16 5-   SOR, distraction, C/S PROM 5' 5' 5' 5' 5' 8' 8' 8' 5' 5'   STM Carlos UT/LS 5'         5'   Carlos shoulder PROM 5' 10' 10' 10' 10'  10' 10' 5'                 Neuro Re-Ed 6-1  5-2  5-9  5-16 5-18     Cervical retractions Supine  5" x 10 Supine  5"x10 Supine  5"x10 supine 5" x 10 Supine  5" x10 Supine 5"x10 Supine  5" x10 Supine  5" x10 Supine 5"x10 Supine 5"x10   Scapular retractions  5" x10 NP          MTP/LTP Green  1x20 ea NV Red 1x10 ea Red   1 x 10 ea  Red   1x10  ea Green 1x15 ea Green  1x15 ea Green  1x15 ea Green 1x20 ea Green 1x20 ea   Carlos TB ER Red  1x20 Yellow  1x10 Yellow  1x10 yellow  1 x10 Yellow  1x10 Red 1x15 Red  1x15 Red   1x15 Red 1x20 Red 1x20                             Patient education             Ther Ex 6-1 - 5-2  5-9  5-16 5-18     UBE 10' alt   10' alt 10'  alt 10' alt 10' alt 10' alt 10' alt 10' alt   Pulley's x10 ea X 10ea x10 ea  x10 ea x10 ea x10 ea x10 ea x10 ea x10 ea   Carlos UT/LS stretch 20"x3 20" x3 20" x3  20" x3 20" x3 20" x3 20" x 3 20"x3 20"x3   Wall slide  Flex/abd 1x10   Carlos 1x10 ea  Carlos 1x10 ea Carlos 1x10  Ea Carlos 1x10   Ea Carlos 1x10 ea Carlos 1x10 ea Carlos 1x10 ea Carlos  1x10 ea Carlos 1x10 ea Carlos   Finger ladder  1x3 flex 1x3 ea 1 x 3 ea 1x3 1x3 carlos NP NP     Cane flex Supine  1x10 Supine x10 Supine   x10 supine x10 NV  1x10 1x10 1x10 1x10   Cane abd             Cane ER             Ball roll on wall             S/L ER             Prone T's             Prone Y's             Serratus punches             Ther Activity                                       Gait Training                                       Modalities             MHP  Cerv  5' Cerv 5' Carlos shld   10'  Pt declined  R sh  5' Declined

## 2022-06-06 ENCOUNTER — PROCEDURE VISIT (OUTPATIENT)
Dept: OBGYN CLINIC | Facility: CLINIC | Age: 57
End: 2022-06-06
Payer: COMMERCIAL

## 2022-06-06 ENCOUNTER — OFFICE VISIT (OUTPATIENT)
Dept: PHYSICAL THERAPY | Facility: HOME HEALTHCARE | Age: 57
End: 2022-06-06
Payer: COMMERCIAL

## 2022-06-06 VITALS
HEIGHT: 59 IN | WEIGHT: 147 LBS | DIASTOLIC BLOOD PRESSURE: 70 MMHG | SYSTOLIC BLOOD PRESSURE: 140 MMHG | BODY MASS INDEX: 29.64 KG/M2

## 2022-06-06 DIAGNOSIS — M75.32 CALCIFIC TENDINITIS OF BOTH SHOULDERS: Primary | ICD-10-CM

## 2022-06-06 DIAGNOSIS — M75.31 CALCIFIC TENDINITIS OF BOTH SHOULDERS: Primary | ICD-10-CM

## 2022-06-06 DIAGNOSIS — R87.810 CERVICAL HIGH RISK HPV (HUMAN PAPILLOMAVIRUS) TEST POSITIVE: Primary | ICD-10-CM

## 2022-06-06 PROCEDURE — 97110 THERAPEUTIC EXERCISES: CPT

## 2022-06-06 PROCEDURE — 97112 NEUROMUSCULAR REEDUCATION: CPT

## 2022-06-06 PROCEDURE — 57456 ENDOCERV CURETTAGE W/SCOPE: CPT | Performed by: STUDENT IN AN ORGANIZED HEALTH CARE EDUCATION/TRAINING PROGRAM

## 2022-06-06 PROCEDURE — 97140 MANUAL THERAPY 1/> REGIONS: CPT

## 2022-06-06 NOTE — PROGRESS NOTES
Daily Note     Today's date: 2022  Patient name: Geovanni Laboy  : 1965  MRN: [id number]  Referring provider: Hayde Peoples MD  Dx:   Encounter Diagnosis     ICD-10-CM    1  Calcific tendinitis of both shoulders  M75 31     M75 32        Start Time: 377  Stop Time: 0745  Total time in clinic (min): 40 minutes    Subjective: Pt reports that she has a 4/10 pain in both of her shoulders  Objective: See treatment diary below      Assessment: Pt demonstrated good tolerance to treatment session  She continues to present with soft tissue restrictions in her Carlos UT/LS, but improved with STM  Pt continues to demonstrate bilateral shoulder ROM deficits, with an empty end-feel  Pt would benefit from continued PT  Plan: Continue per plan of care        Precautions: None    Re-eval Date: 2022      Manuals 6-1  5-9 - 5-   SOR, distraction, C/S PROM 5' 2'  5' 5' 8' 8' 8' 5' 5'   STM Carlos UT/LS 5' 2'        5'   Carlos shoulder PROM 5' 4'  10' 10'  10' 10' 5'                 Neuro Re-Ed 6-1    5-9  5-16 5-18     Cervical retractions Supine  5" x 10 Supine 5"x10  supine 5" x 10 Supine  5" x10 Supine 5"x10 Supine  5" x10 Supine  5" x10 Supine 5"x10 Supine 5"x10   Scapular retractions             MTP/LTP Green  1x20 ea Green 1x20 ea  Red   1 x 10 ea  Red   1x10  ea Green 1x15 ea Green  1x15 ea Green  1x15 ea Green 1x20 ea Green 1x20 ea   Carlos TB ER Red  1x20 Red 1x20  yellow  1 x10 Yellow  1x10 Red 1x15 Red  1x15 Red   1x15 Red 1x20 Red 1x20                             Patient education             Ther Ex 6-1    5-9  5-16 5-18     UBE 10' alt 10' alt  10' alt 10'  alt 10' alt 10' alt 10' alt 10' alt 10' alt   Pulley's x10 ea x10 ea   x10 ea x10 ea x10 ea x10 ea x10 ea x10 ea   Carlos UT/LS stretch 20"x3 20"x3   20" x3 20" x3 20" x3 20" x 3 20"x3 20"x3   Wall slide  Flex/abd 1x10   Carlos 1x10 Carlos  1x10  Ea Carlos 1x10   Ea Carlos 1x10 ea Carlos 1x10 ea Carlos 1x10 ea Carlos  1x10 ea Carlos 1x10 ea Carlos Finger ladder    1 x 3 ea 1x3 1x3 carlos NP NP     Cane flex Supine  1x10 nv  supine x10 NV  1x10 1x10 1x10 1x10   Cane abd             Cane ER             Ball roll on wall             S/L ER             Prone T's             Prone Y's             Serratus punches             Ther Activity                                       Gait Training                                       Modalities             MHP    Carlos shld   10'  Pt declined  R sh  5' Declined

## 2022-06-06 NOTE — PROGRESS NOTES
[facility] [address] [place] [address]       Colposcopy     Date/Time 6/6/2022 12:03 PM     Universal Protocol   Consent: Written consent obtained  Risks and benefits: risks, benefits and alternatives were discussed  Consent given by: patient  Time out: Immediately prior to procedure a "time out" was called to verify the correct patient, procedure, equipment, support staff and site/side marked as required  Timeout called at: 6/6/2022 11:00 AM   Patient understanding: patient states understanding of the procedure being performed  Patient consent: the patient's understanding of the procedure matches consent given  Procedure consent: procedure consent matches procedure scheduled  Required items: required blood products, implants, devices, and special equipment available  Patient identity confirmed: verbally with patient       Performed by  Buckley Barban Mercy Goldmann, MD     Authorized by Anastasio Maris Mercy Goldmann, MD        Pre-procedure details     Pre-procedure timeout performed: yes       Indication    Indications: NILM OHR pos  Procedure Details   Procedure: Colposcopy w/ endocervical curettage      Under satisfactory analgesia the patient was prepped and draped in the dorsal lithotomy position: yes      [place] speculum was placed in the vagina: yes      Under colposcopic examination the transition zone was seen in entirety: no      Biopsy(s): yes      Location:  [facility]    Specimen to pathology: yes       Post-procedure      Findings: Bleeding      Impression: Low grade cervical dysplasia      Patient tolerance of procedure:   Tolerated well, no immediate complications         Esdras Angela MD  [address] [place] [address]  6/6/2022 12:48 PM

## 2022-06-08 ENCOUNTER — OFFICE VISIT (OUTPATIENT)
Dept: PHYSICAL THERAPY | Facility: HOME HEALTHCARE | Age: 57
End: 2022-06-08
Payer: COMMERCIAL

## 2022-06-08 DIAGNOSIS — M75.32 CALCIFIC TENDINITIS OF BOTH SHOULDERS: Primary | ICD-10-CM

## 2022-06-08 DIAGNOSIS — M75.31 CALCIFIC TENDINITIS OF BOTH SHOULDERS: Primary | ICD-10-CM

## 2022-06-08 PROCEDURE — 97112 NEUROMUSCULAR REEDUCATION: CPT

## 2022-06-08 PROCEDURE — 97110 THERAPEUTIC EXERCISES: CPT

## 2022-06-08 PROCEDURE — 97140 MANUAL THERAPY 1/> REGIONS: CPT

## 2022-06-08 NOTE — PROGRESS NOTES
Daily Note     Today's date: 2022  Patient name: Felicity Spencer  : 1965  MRN: [id number]  Referring provider: Phuong Livingston MD  Dx:   Encounter Diagnosis     ICD-10-CM    1  Calcific tendinitis of both shoulders  M75 31     M75 32               Subjective: Pt reports she has pain in her L shoulder and L side of her neck  Objective: See treatment diary below      Assessment: Tolerated treatment well  No increased pain reported t/o session  Some verbal cues needed t/o exercise to perform correctly  Bilateral shoulder ROM deficits still noted  Soft tissue restrictions noted bilateral  UT areas  Patient would benefit from continued PT      Plan: Continue per plan of care        Precautions: None    Re-eval Date: 2022      Manuals 6-1  5--16 -   SOR, distraction, C/S PROM 5' 2' 2' 5' 5' 8' 8' 8' 5' 5'   STM Carlos UT/LS 5' 2' 2'       5'   Carlos shoulder PROM 5' 4' 4' 10' 10'  10' 10' 5'                 Neuro Re-Ed 6-1    5-9  5-16 5-18     Cervical retractions Supine  5" x 10 Supine 5"x10 supine 5"x10 supine 5" x 10 Supine  5" x10 Supine 5"x10 Supine  5" x10 Supine  5" x10 Supine 5"x10 Supine 5"x10   Scapular retractions             MTP/LTP Green  1x20 ea Green 1x20 ea green 1x20 ea  Red   1 x 10 ea  Red   1x10  ea Green 1x15 ea Green  1x15 ea Green  1x15 ea Green 1x20 ea Green 1x20 ea   Carlos TB ER Red  1x20 Red 1x20 Red 1x20 yellow  1 x10 Yellow  1x10 Red 1x15 Red  1x15 Red   1x15 Red 1x20 Red 1x20                             Patient education             Ther Ex 6-1    5-9  5-16 5-18     UBE 10' alt 10' alt 10'alt  10' alt 10'  alt 10' alt 10' alt 10' alt 10' alt 10' alt   Pulley's x10 ea x10 ea X 10 ea   x10 ea x10 ea x10 ea x10 ea x10 ea x10 ea   Carlos UT/LS stretch 20"x3 20"x3 20"x3  20" x3 20" x3 20" x3 20" x 3 20"x3 20"x3   Wall slide  Flex/abd 1x10   Carlos 1x10 Carlos 1x10 Carlos 1x10  Ea Carlos 1x10   Ea Carlos 1x10 ea Carlos 1x10 ea Carlos 1x10 ea Carlos  1x10 ea Carlos 1x10 ea Carlos Finger ladder   1 x 3 ea  1 x 3 ea 1x3 1x3 carlos NP NP     Cane flex Supine  1x10 nv supine 1x10 supine x10 NV  1x10 1x10 1x10 1x10   Cane abd             Cane ER             Ball roll on wall             S/L ER             Prone T's             Prone Y's             Serratus punches             Ther Activity                                       Gait Training                                       Modalities             MHP    Carlos shld   10'  Pt declined  R sh  5' Declined

## 2022-06-10 DIAGNOSIS — N95.8 GENITOURINARY SYNDROME OF MENOPAUSE: Primary | ICD-10-CM

## 2022-06-10 RX ORDER — ESTRADIOL 0.1 MG/G
0.5 CREAM VAGINAL 2 TIMES WEEKLY
Qty: 42.5 G | Refills: 0 | Status: SHIPPED | OUTPATIENT
Start: 2022-06-13 | End: 2022-07-13

## 2022-06-10 NOTE — PROGRESS NOTES
During colposcopy earlier this week, had discussed vaginal estrace twice weekly for genitourinary syndrome of menopause  Rx sent to pharmacy       Rona Ramirez MD  [address] [place] [address]  6/10/2022 12:06 PM

## 2022-06-13 ENCOUNTER — OFFICE VISIT (OUTPATIENT)
Dept: FAMILY MEDICINE CLINIC | Facility: CLINIC | Age: 57
End: 2022-06-13
Payer: COMMERCIAL

## 2022-06-13 ENCOUNTER — OFFICE VISIT (OUTPATIENT)
Dept: PHYSICAL THERAPY | Facility: HOME HEALTHCARE | Age: 57
End: 2022-06-13
Payer: COMMERCIAL

## 2022-06-13 VITALS
TEMPERATURE: 98 F | DIASTOLIC BLOOD PRESSURE: 86 MMHG | RESPIRATION RATE: 16 BRPM | OXYGEN SATURATION: 96 % | HEIGHT: 59 IN | BODY MASS INDEX: 29.6 KG/M2 | SYSTOLIC BLOOD PRESSURE: 148 MMHG | WEIGHT: 146.8 LBS | HEART RATE: 75 BPM

## 2022-06-13 DIAGNOSIS — G89.29 CHRONIC LEFT SHOULDER PAIN: ICD-10-CM

## 2022-06-13 DIAGNOSIS — M75.32 CALCIFIC TENDINITIS OF BOTH SHOULDERS: Primary | ICD-10-CM

## 2022-06-13 DIAGNOSIS — M25.512 CHRONIC LEFT SHOULDER PAIN: ICD-10-CM

## 2022-06-13 DIAGNOSIS — M75.51 BURSITIS OF BOTH SHOULDERS: ICD-10-CM

## 2022-06-13 DIAGNOSIS — M75.52 BURSITIS OF BOTH SHOULDERS: ICD-10-CM

## 2022-06-13 DIAGNOSIS — R06.02 SOB (SHORTNESS OF BREATH): ICD-10-CM

## 2022-06-13 DIAGNOSIS — I10 BENIGN ESSENTIAL HTN: Primary | ICD-10-CM

## 2022-06-13 DIAGNOSIS — M75.31 CALCIFIC TENDINITIS OF BOTH SHOULDERS: Primary | ICD-10-CM

## 2022-06-13 DIAGNOSIS — M75.52 ACUTE BURSITIS OF LEFT SHOULDER: ICD-10-CM

## 2022-06-13 PROCEDURE — 99214 OFFICE O/P EST MOD 30 MIN: CPT | Performed by: FAMILY MEDICINE

## 2022-06-13 PROCEDURE — 93005 ELECTROCARDIOGRAM TRACING: CPT | Performed by: FAMILY MEDICINE

## 2022-06-13 PROCEDURE — 97112 NEUROMUSCULAR REEDUCATION: CPT

## 2022-06-13 PROCEDURE — 97140 MANUAL THERAPY 1/> REGIONS: CPT

## 2022-06-13 PROCEDURE — T1015 CLINIC SERVICE: HCPCS | Performed by: FAMILY MEDICINE

## 2022-06-13 PROCEDURE — 97110 THERAPEUTIC EXERCISES: CPT

## 2022-06-13 RX ORDER — ACETAMINOPHEN 500 MG
500 TABLET ORAL EVERY 6 HOURS PRN
COMMUNITY

## 2022-06-13 RX ORDER — AMLODIPINE BESYLATE 10 MG/1
10 TABLET ORAL DAILY
Qty: 30 TABLET | Refills: 1 | Status: SHIPPED | OUTPATIENT
Start: 2022-06-13 | End: 2022-08-05

## 2022-06-13 NOTE — PATIENT INSTRUCTIONS
Bursitis del hombro   CUIDADO AMBULATORIO:   La bursitis de hombro es la inflamación de la bolsa del hombro  La bursa es haydee bolsa (saco) llena de líquido que actúa yolis colchón entre un hueso y un tendón  Los tendones son cordones de tejido resistente que [employer] a los [place]  Los signos y síntomas más comunes incluyen los siguientes:  Dolor al  del valle hombro o al levantar del valle Marene Meaghan sobre del valle trevin    Dificultad para  el hombro y el brazo    Enrojecimiento o inflamación    Aayush de chasquido o crujido al  del valle hombro    Debilidad en del valle hombro y Tasha Lowers a del valle médico si:  Tiene más dolor, enrojecimiento e Deborha Peel  Ludy síntomas no mejoran con el tratamiento  Tiene fiebre  Usted tiene preguntas o inquietudes acerca de del valle condición o cuidado  El tratamiento podría incluir cualquiera de los siguientes:  Medicamentos:     Los [place], yolis el [facility], [facility] a disminuir la inflamación, el dolor y la fiebre  Los FANTASMA pueden causar sangrado estomacal o problemas renales en ciertas personas  Si usted lynda un medicamento anticoagulante, siempre pregúntele a del valle médico si los FANTASMA son seguros para usted  Siempre maria ines la etiqueta de jennifer medicamento y [place] instrucciones  La aspirina ayuda a aliviar el dolor y la hinchazón  [place] la aspirina exactamente yolis le indicó del valle médico  No le de aspirina a un deobrah gustavo de 18 años  La aspirina aumenta el riesgo de sufrir haydee afección potencialmente mortal llamada síndrome de [place]  Los antibióticos ayudan a combatir infección provocada por haydee bacteria  Los esteroides ayudan a aliviar el dolor y la hinchazón  Las inyecciones de esteroides se aplican directamente en el área adolorida  Pueden administrarse píldoras de esteroides por un breve período de tiempo para aliviar el dolor randal  La bursectomía es [place] para extirpar la bolsa del hombro   Puede recurrirse a la bursectomía cuando otros tratamientos no funcionan  Controle la bursitis de hombro:  Descanse jane hombro tan frecuentemente para reducir el dolor y la inflamación  Empiece a hacer un poco más día a día  Regrese a catherine actividades diarias yolis se le haya indicado  Aplique hielo para ayudar a disminuir la inflamación y el dolor  Use haydee compresa de hielo o ponga hielo triturado en haydee bolsa de plástico  [place] la bolsa con haydee toalla antes de aplicarla sobre el hombro  Aplique hielo linda 15 a 20 minutos, 3 a 4 veces por día, yolis se le haya indicado  Encuentre haydee posición cómoda para dormir  Acuéstese sobre el lado que no esté lesionado  Es posible que esté más cómodo si duerme boca arriba o boca abajo  Vaya a fisioterapia según las indicaciones  Un fisioterapeuta le puede enseñar ejercicios para ayudarle a mejorar el movimiento y la fuerza, y para disminuir el dolor  Prevenga la bursitis de hombro:  No abuse de los hombros  Acorte el tiempo que pasa nadando, jugando al tenis o haciendo otros movimientos de brazos por encima de la trevin  [place] descansos mientras realiza estas actividades  Intente no hacer las 2 [place] cada día  Por ejemplo, nade cada 2 días o cada 3 días en lugar de diariamente  Siempre entre en calor y elongue antes de hacer ejercicio  [place] ayudará a aflojar catherine músculos y reducir la tensión en jane hombro  Estabilícese después de hacer ejercicio  Evite las lesiones en los hombros  Use hombreras o protectores cuando tahira deportes  Intente evitar la presión [place]  Si necesita dormir de lado, no se acueste sobre el mismo lado cada noche  Controle las afecciones que pueden causar bursitis de hombro: Jane médico podría recomendarle que pito a un especialista, yolis un especialista en artritis  Acuda a la consulta de control con jane médico según las indicaciones: Anote catherine preguntas para que se acuerde de hacerlas linda catherine visitas    © Copyright [employer] 2022 Information is for End User's use only and may not be sold, redistributed or otherwise used for commercial purposes  All illustrations and images included in CareNotes® are the copyrighted property of A D A M , Inc  or [address] [place] [address] es sólo para uso en educación  Jane intención no es darle un consejo médico sobre enfermedades o tratamientos  Colsulte con jane [place] farmacéutico antes de seguir cualquier régimen médico para saber si es seguro y efectivo para usted  Hipertensión   CUIDADO AMBULATORIO:   Hipertensión es la presión arterial eric  La presión arterial es la fuerza que ejerce la lia al [employer] contra las kahn de las arterias  La hipertensión causa que jane presión arterial se eleve tanto que jane corazón se ve forzado a trabajar mucho más herve que lo normal  [place] puede dañar jane corazón  Puede que no se conozca la causa de la hipertensión  [place] se denomina hipertensión esencial o primaria  La hipertensión causada por otra afección médica, tales yolis la enfermedad renal, se denomina hipertensión secundaria  Los síntomas más comunes [place] siguientes:  Dolor de trevin    Visión borrosa    Dolor de pecho    Mareos o debilidad    Dificultad para respirar    Hemorragias nasales (sangrado de la nariz)    Llame al [facility] emerg[facility]ias (911 en los [place]) o pídale a alguien que llame si:  Usted tiene dolor en el pecho  Tiene alguno de los siguientes signos de un ataque cardíaco:      Estrujamiento, presión o tensión en jane pecho    Usted también podría presentar alguno de los siguientes:     Malestar o dolor en jane espalda, shante, mandíbula, abdomen, o brazo    Falta de [employer] o vómitos    Desvanecimiento o sudor frío repentino    Usted se siente confundido o tiene dificultad para hablar  Repentinamente se siente aturdido o con dificultad para respirar  Busque atención médica de inmediato si:  Usted tiene un herve dolor de trevin o pérdida de la visión      Shayna Shaffer tiene debilidad en un brazo o en haydee pierna  Llame a del valle médico o cardiólogo si:  Usted se siente mareado, confundido, somnoliento o yolis si se fuera a desmayar  Usted ha estado tomando medicamento para la presión arterial camilo todavía está en un nivel más elevado del que del valle médico le indicó que debería estar  Usted tiene preguntas o inquietudes acerca de del valle condición o cuidado  Lo que necesita saber sobre las etapas de la hipertensión:      Arrie Conner presión arterial normal es 119/79 o inferior   Del Valle médico podría comprobar del valle presión arterial solamente cada año si se mantiene en un nivel normal     [place] presión arterial elevada es de 120/79 a 129/79   A veces esto se llama prehipertensión  Del Valle médico puede sugerir cambios de estilo de lisa para ayudar a bajar la presión arterial a un nivel normal  Entonces él podría comprobar del valle presión otra vez en 3 a 6 meses  La etapa 1 de la hipertensión es de 130/80  a 139/89   Del Valle médico podría recomendar cambios de estilo de lisa, medicamentos y controles cada 3 a 6 meses hasta que del valle presión arterial esté controlada  La etapa 2 de la hipertensión es de 140/90 o mayor   Del Valle médico le recomendará cambios en el estilo de lisa y le indicará 2 clases de medicamentos para la hipertensión  También necesitará controlar del valle presión arterial cada mes hasta que esté controlada  El tratamiento para la hipertensión puede incluir medicamentos para bajar la presión arterial y reducir el nivel de colesterol  Un nivel bajo de colesterol ayuda a prevenir enfermedades cardíacas y facilita el control de la presión arterial  Tómese catherine medicamentos exactamente yolis se lo indicaron  Es probable que usted también necesite hacer algunos cambios en del valle estilo de lisa  Maneje del valle hipertensión:  Controle del valle presión arterial en casa   Evite fumar, consumir cafeína y hacer ejercicio al menos 30 minutos antes de controlar del valle presión arterial  Siéntese y descanse por 5 minutos antes de tomarse la presión arterial  Extienda del valle brazo y apóyelo en haydee superficie plana  Del Valle brazo debe estar a la misma altura que del valle corazón  Siga las instrucciones que vienen con el monitor para la presión arterial  Controle del valle presión arterial 2 veces, con diferencia de 1 minuto, antes de dmitri del valle medicamento por la mañana  También controle del valle presión arterial antes de la elysia  Mantenga un registro de del valle peso y llévelo con usted a las citas de control  Pregúntele a del valle médico cuál debería ser del valle presión arterial          Controle cualquier otra condición médica que usted tenga  Algunas condiciones médicas yolis la diabetes pueden aumentar del valle riesgo de hipertensión  [address] del valle médico y tómese catherine medicamentos según dichas instrucciones  Pregunte [employer]  Ciertos medicamentos pueden aumentar del valle presión arterial  Los ejemplos incluyen las píldoras anticonceptivas orales, los descongestivos, los suplementos herbales y los [place], yolis el ibuprofeno  Del Valle médico puede indicarle qué medicamentos son seguros para usted  Estos medicamentos incluyen los recetados y de [place]  Cambios de estilo de lisa que usted puede hacer para manejar la hipertensión: Del Valle médico puede recomendarle que trabaje con un equipo para controlar la hipertensión  El equipo puede incluir expertos médicos, yolis un dietista, un fisioterapeuta o un terapeuta del comportamiento  Los [employer] de del valle oscar pueden participar en la creación de cambios en el estilo de lisa  Limite el sodio (la sal) yolis se le haya indicado  Demasiado sodio puede afectar el equilibrio de líquidos  Revise las etiquetas para buscar alimentos bajos en sodio o sin sal agregada  Algunos alimentos bajos en sodio utilizan sales de potasio para añadir sabor  Demasiado potasio también puede causar problemas de [place]  Del Valle médico le dirá qué cantidad de sodio y potasio es ramos para el consumo en un día   Puede recomendarle que limite el sodio a 2,300 mg al día          Siga el plan de comidas recomendado por del valle médico  Un dietista o médico puede darle más información sobre planes de bajo contenido de sodio o el plan de alimentación DASH (enfoques dietéticos para detener la hipertensión)  El plan DASH es bajo en sodio, azúcar procesada, grasas dañinas y grasas totales  Es alto en potasio, calcio y [place]  Estos se encuentran en las verduras, las frutas y los alimentos integrales  Manténgase físicamente activo linda todo el día  La actividad física, yolis el ejercicio, puede ayudar a controlar del valle presión arterial y del valle peso  Roberto actividad física por lo menos 30 minutos al día, la mayoría de los días de la [place]  Incluya haydee actividad aeróbica, yolis caminar o montar en bicicleta  Incluya también entrenamiento de fuerza al menos 2 veces por semana  Catherine médicos pueden ayudarle a crear un plan de [place]  [id number]  Es posible que esto contribuya a bajar del valle presión arterial  Aprenda sobre formas de [place], yolis respiración profunda o escuchar música  Limite el consumo de alcohol según le indicaron  El alcohol puede aumentar la presión arterial  Un trago equivale a 12 onzas de cerveza, 5 onzas de vino o 1 onza y ½ de licor  No fume  La nicotina y otros químicos en los cigarrillos y cigarros pueden aumentar del valle presión arterial y también provocar daño al pulmón  Pida información a del valle médico si usted actualmente fuma y necesita ayuda para dejar de fumar  Los cigarrillos electrónicos o el tabaco sin humo igualmente contienen nicotina  Consulte con del valle médico antes de [employer]  Acuda a catherine consultas de control con del valle médico o cardiólogo según le indicaron: Usted necesitará regresar para medir del valle presión arterial y realizar otros exámenes de laboratorio  Anote catherine preguntas para que se acuerde de hacerlas linda catherine visitas    © Copyright [employer] 2022 Information is for End User's use only and may not be sold, redistributed or otherwise used for commercial purposes  All illustrations and images included in CareNotes® are the copyrighted property of A D A M , Inc  or [address] [place] [address] es sólo para uso en educación  Del Valle intención no es darle un consejo médico sobre enfermedades o tratamientos  Colsulte con del valle [place] farmacéutico antes de seguir cualquier régimen médico para saber si es seguro y efectivo para usted

## 2022-06-13 NOTE — PROGRESS NOTES
Assessment/Plan:     Diagnoses and all orders for this visit:    Benign essential HTN  Comments:  Increase amlodipine to 10 mg  RTC 2 weeks  Orders:  -     amLODIPine (NORVASC) 10 mg tablet; Take 1 tablet (10 mg total) by mouth daily    Chronic left shoulder pain  Comments:  Acetaminophen and diclofenac topical PRN  Felt dizzy when taking meloxicam 7 5 mg  Follow up with Pain mgmt due to est relationship  Orders:  -     Ambulatory Referral to Pain Management; Future    Acute bursitis of left shoulder  Comments:  Acetaminophen and diclofenac topical PRN  Felt dizzy when taking meloxicam 7 5 mg  Follow up with Pain mgmt due to est relationship  Orders:  -     Ambulatory Referral to Pain Management; Future    Bursitis of both shoulders  Comments:  Acetaminophen and diclofenac topical PRN  Felt dizzy when taking meloxicam 7 5 mg  Follow up with Pain mgmt due to est relationship  Orders:  -     Diclofenac Sodium (VOLTAREN) 1 %; Apply 2 g topically 4 (four) times a day    SOB (shortness of breath)  Comments:  Irregular HR; denies syncope  POCT ECG    Orders:  -     POCT ECG    Other orders  -     acetaminophen (TYLENOL) 500 mg tablet; Take 500 mg by mouth every 6 (six) hours as needed for pain        Return in about 2 weeks (around 6/27/2022) for Recheck HTN  Subjective:        Patient ID: Jaswant Delaney is a 62 y o  female  Chief Complaint   Patient presents with    Follow-up    Hypertension       PMH dysphagia, near syncope, chronic otitis media of left ear with tympanic membrane perforation, OA, atrophic vaginitis, HLD, and chronic pain syndrome presents with her daughter for recheck of HTN  BP elevated in office today  Will increase amlodipine given repeat readings of systolic above 990  Additionally, left shoulder continues to cause pain for patient  Prior MRI showed bursitis and small subacromial spur  Patient has been engaging in P  T  without improvement   Will return to pain mgmt for further therapeutic recommendations given established relationship  Patient also complains of intermittent SOB  HR irregular on ausculation  ECG shows NSR with PAC and concerning for bifascicular block  IB msg sent to cardiology to notify of same  Patient denies recent presyncopal or syncopal episodes  Shoulder Pain   The pain is present in the right shoulder and left shoulder  This is a chronic problem  The current episode started more than 1 month ago  There has been no history of extremity trauma  The problem occurs 2 to 4 times per day  The problem has been unchanged  The pain is at a severity of 5/10  Associated symptoms include a limited range of motion (raising arms above head elicits pain)  She has tried rest, movement, cold and heat (Diclofenac topical) for the symptoms  The treatment provided mild relief  Hypertension  This is a chronic problem  The current episode started more than 1 year ago  The problem has been gradually worsening since onset  The problem is uncontrolled  Associated symptoms include shortness of breath  Pertinent negatives include no anxiety, blurred vision, chest pain, headaches, malaise/fatigue, neck pain, orthopnea, palpitations, peripheral edema, PND or sweats  Agents associated with hypertension include NSAIDs  Risk factors for coronary artery disease include obesity, post-menopausal state and sedentary lifestyle  Past treatments include calcium channel blockers  The current treatment provides mild improvement  Compliance problems include diet          The following portions of the patient's history were reviewed and updated as appropriate: allergies, current medications, past family history, past medical history, past social history, past surgical history and problem list     Patient Active Problem List   Diagnosis    Abdominal pain, left lower quadrant    Chronic lumbar radiculopathy    Chronic pain syndrome    Dyslipidemia    Chronic pain of right knee    Chronic right hip pain    Myofascial pain syndrome    Atrophic vaginitis    Primary osteoarthritis of both knees    Lumbar radiculopathy    Left hip pain    Lumbar degenerative disc disease    Pharyngoesophageal dysphagia    Perforation of left tympanic membrane    Chronic otitis media of left ear    Atypical chest pain    Epigastric abdominal pain    Chronic musculoskeletal pain    Pain in finger of both hands    Tympanic membrane perforation, left    Strain of thoracic back region    Chronic right shoulder pain    Medial epicondylitis of elbow, left    Near syncope    Chronic pain    Bradycardia    Episode of dizziness    Acute non-recurrent maxillary sinusitis    RBBB    Bursitis of both shoulders    Benign essential HTN       Current Outpatient Medications   Medication Sig Dispense Refill    acetaminophen (TYLENOL) 500 mg tablet Take 500 mg by mouth every 6 (six) hours as needed for pain      amLODIPine (NORVASC) 10 mg tablet Take 1 tablet (10 mg total) by mouth daily 30 tablet 1    atorvastatin (LIPITOR) 20 mg tablet Take 1 tablet (20 mg total) by mouth daily 30 tablet 5    Diclofenac Sodium (VOLTAREN) 1 % Apply 2 g topically 4 (four) times a day 350 g 1    estradiol (ESTRACE VAGINAL) 0 1 mg/g vaginal cream Insert 0 5 g into the vagina 2 (two) times a week 42 5 g 0     No current facility-administered medications for this visit          Past Medical History:   Diagnosis Date    Fatigue     High cholesterol     Hypertension     Neuropathy     Osteoarthritis     knees    Shortness of breath         Past Surgical History:   Procedure Laterality Date    APPENDECTOMY      EPIDURAL BLOCK INJECTION Left 5/17/2018    Procedure: L4 AND L5 TRANSFORAMINAL EPIDURAL STEROID INJECTION;  Surgeon: Micaela Gonzalez MD;  [facility];  Service: Pain Management     EPIDURAL BLOCK INJECTION Left 10/4/2018    Procedure: L4 AND L5 TRANSFORAMINAL EPIDURAL STEROID INJECTION;  Surgeon: Micaela Gonzalez MD; [facility];  Service: Pain Management     EPIDURAL BLOCK INJECTION Left 5/1/2019    Procedure: L4-5 and L5-S1 transforaminal epidural steroid injection;  Surgeon: Shad Toussaint MD;  [facility];  Service: Pain Management     FL GUIDED NEEDLE PLAC BX/ASP/INJ  5/1/2019    SD COLONOSCOPY FLX DX W/COLLJ SPEC WHEN PFRMD N/A 5/31/2016    Procedure: COLONOSCOPY;  Surgeon: Emma Clemens MD;  [facility];  Service: Gastroenterology    SD ESOPHAGOGASTRODUODENOSCOPY TRANSORAL DIAGNOSTIC N/A 11/15/2016    Procedure: ESOPHAGOGASTRODUODENOSCOPY (EGD); Surgeon: Emma Clemens MD;  [facility];  Service: Gastroenterology    SD TYMPANOPLASTY Left 10/25/2019    Procedure: LEFT TYMPANOPLASTY;  Surgeon: Asia Shipley MD;  Location:  MAIN OR;  Service: ENT    TUBAL LIGATION          Social History     Socioeconomic History    Marital status: Single     Spouse name: Not on file    Number of children: Not on file    Years of education: Not on file    Highest education level: Not on file   Occupational History    Not on file   Tobacco Use    Smoking status: Never Smoker    Smokeless tobacco: Never Used   Vaping Use    Vaping Use: Never used   Substance and Sexual Activity    Alcohol use: Not Currently    Drug use: No    Sexual activity: Yes     Partners: Male     Birth control/protection: Female Sterilization   Other Topics Concern    Not on file   Social History Narrative    Not on file     Social Determinants of Health     Financial Resource Strain: Not on file   Food Insecurity: Not on file   Transportation Needs: Not on file   Physical Activity: Not on file   Stress: Not on file   Social Connections: Not on file   Intimate Partner Violence: Not on file   Housing Stability: Not on file        Review of Systems   Constitutional: Negative for activity change, appetite change, fatigue, malaise/fatigue and unexpected weight change  Eyes: Negative for blurred vision     Respiratory: Positive for shortness of breath  Negative for choking and wheezing  Cardiovascular: Negative for chest pain, palpitations, orthopnea, leg swelling and PND  Gastrointestinal: Negative for abdominal pain, blood in stool, constipation, diarrhea and nausea  Genitourinary: Negative for difficulty urinating, dysuria, frequency, hematuria and urgency  Musculoskeletal: Positive for arthralgias (left shoulder pain)  Negative for back pain, myalgias and neck pain  Skin: Negative for color change, rash and wound  Neurological: Negative for dizziness, tremors, syncope, weakness and headaches  Psychiatric/Behavioral: Negative for agitation, confusion, self-injury and suicidal ideas  Objective:      /86   Pulse 75   Temp 98 °F (36 7 °C)   Resp 16   Ht 4' 11" (1 499 m)   Wt 66 6 kg (146 lb 12 8 oz)   SpO2 96%   BMI 29 65 kg/m²          Physical Exam  Vitals and nursing note reviewed  Constitutional:       Appearance: Normal appearance  HENT:      Head: Normocephalic and atraumatic  Nose: Nose normal       Mouth/Throat:      Mouth: Mucous membranes are moist    Eyes:      Conjunctiva/sclera: Conjunctivae normal    Cardiovascular:      Rate and Rhythm: Normal rate  Rhythm irregular  Pulmonary:      Effort: Pulmonary effort is normal       Breath sounds: Normal breath sounds  Abdominal:      General: Abdomen is flat  Bowel sounds are normal       Palpations: Abdomen is soft  Genitourinary:     Comments: Exam deferred  Musculoskeletal:         General: Tenderness (left shoulder) present  Left shoulder: Tenderness present  Decreased range of motion  Skin:     General: Skin is warm and dry  Capillary Refill: Capillary refill takes less than 2 seconds  Neurological:      General: No focal deficit present  Mental Status: She is alert and oriented to person, place, and time     Psychiatric:         Mood and Affect: Mood normal          Behavior: Behavior normal

## 2022-06-13 NOTE — PROGRESS NOTES
Daily Note     Today's date: 2022  Patient name: Felicity Spencer  : 1965  MRN: [id number]  Referring provider: Phuong Livingston MD  Dx:   Encounter Diagnosis     ICD-10-CM    1  Calcific tendinitis of both shoulders  M75 31     M75 32               Subjective: Pt reports she has a lot of pain in her L shoulder and L side of her neck  Objective: See treatment diary below      Assessment: Tolerated treatment fairly well  Some verbal cues needed to perform exercises correctly  No increased pain reported t/o session  Bilateral shoulder ROM deficits noted  Patient would benefit from continued PT      Plan: Continue per plan of care        Precautions: None    Re-eval Date: 2022      Manuals 6-1 ---   SOR, distraction, C/S PROM 5' 2' 2' 2' 5' 8' 8' 8' 5' 5'   STM Carlos UT/LS 5' 2' 2' 2'      5'   Carlos shoulder PROM 5' 4' 4' 4' 10'  10' 10' 5'                 Neuro Re-Ed 6-1    5-9  5-16 5-18     Cervical retractions Supine  5" x 10 Supine 5"x10 supine 5"x10 supine 5" x 10 Supine  5" x10 Supine 5"x10 Supine  5" x10 Supine  5" x10 Supine 5"x10 Supine 5"x10   Scapular retractions             MTP/LTP Green  1x20 ea Green 1x20 ea green 1x20 ea  green  1 x 20 ea  Red   1x10  ea Green 1x15 ea Green  1x15 ea Green  1x15 ea Green 1x20 ea Green 1x20 ea   Carlos TB ER Red  1x20 Red 1x20 Red 1x20 Red   1 x20 Yellow  1x10 Red 1x15 Red  1x15 Red   1x15 Red 1x20 Red 1x20                             Patient education             Ther Ex 6-1    5-9  5-16 5-18     UBE 10' alt 10' alt 10'alt  10' alt 10'  alt 10' alt 10' alt 10' alt 10' alt 10' alt   Pulley's x10 ea x10 ea X 10 ea  1 x 10 ea  x10 ea x10 ea x10 ea x10 ea x10 ea x10 ea   Carlos UT/LS stretch 20"x3 20"x3 20"x3 20" x3 20" x3 20" x3 20" x3 20" x 3 20"x3 20"x3   Wall slide  Flex/abd 1x10   Carlos 1x10 Carlos 1x10 Carlos 1x10  Ea Carlos 1x10   Ea Carlos 1x10 ea Carlos 1x10 ea Carlos 1x10 ea Carlos  1x10 ea Carlos 1x10 ea Carlos   Finger ladder   1 x 3 ea  1 x 3 ea 1x3 1x3 gabi NP NP     Cane flex Supine  1x10 nv supine 1x10 supine x10 NV  1x10 1x10 1x10 1x10   Cane abd             Cane ER             Ball roll on wall             S/L ER             Prone T's             Prone Y's             Serratus punches             Ther Activity                                       Gait Training                                       Modalities             MHP      Pt declined  R sh  5' Declined

## 2022-06-15 ENCOUNTER — OFFICE VISIT (OUTPATIENT)
Dept: PHYSICAL THERAPY | Facility: HOME HEALTHCARE | Age: 57
End: 2022-06-15
Payer: COMMERCIAL

## 2022-06-15 DIAGNOSIS — M75.31 CALCIFIC TENDINITIS OF BOTH SHOULDERS: Primary | ICD-10-CM

## 2022-06-15 DIAGNOSIS — M75.32 CALCIFIC TENDINITIS OF BOTH SHOULDERS: Primary | ICD-10-CM

## 2022-06-15 PROCEDURE — 97140 MANUAL THERAPY 1/> REGIONS: CPT

## 2022-06-15 PROCEDURE — 97112 NEUROMUSCULAR REEDUCATION: CPT

## 2022-06-15 PROCEDURE — 97110 THERAPEUTIC EXERCISES: CPT

## 2022-06-15 NOTE — PROGRESS NOTES
Daily Note     Today's date: 6/15/2022  Patient name: Chai Carrizales  : 1965  MRN: [id number]  Referring provider: Mario Vázquez MD  Dx: No diagnosis found  Start Time: 705          Subjective: Pain of 3-4/10 at B sh, UT and neck with the L side being more painful  Objective: See treatment diary below    Assessment: Tolerated treatment well  No increased pain reported t/o session  Some verbal cues needed t/o exercise to perform correctly  Bilateral shoulder ROM deficits still noted  Soft tissue restrictions noted bilateral  UT areas  Pt reports relief of cerv tightness with SOR  Patient would benefit from continued PT    Plan: Continue per plan of care        Precautions: None    Re-eval Date: 2022      Manuals 6-1  6-15 5/11 5-16 -   SOR, distraction, C/S PROM 5' 2' 2' 2' 5' 8' 8' 8' 5' 5'   STM Carlos UT/LS 5' 2' 2' 2' 2'     5'   Carlos shoulder PROM 5' 4' 4' 4' 10'  10' 10' 5'                 Neuro Re-Ed 6-1    -15  5-16 5-18     Cervical retractions Supine  5" x 10 Supine 5"x10 supine 5"x10 supine 5" x 10 Supine  5" x10 Supine 5"x10 Supine  5" x10 Supine  5" x10 Supine 5"x10 Supine 5"x10   Scapular retractions             MTP/LTP Green  1x20 ea Green 1x20 ea green 1x20 ea  green  1 x 20 ea  Green  1x20  ea Green 1x15 ea Green  1x15 ea Green  1x15 ea Green 1x20 ea Green 1x20 ea   Carlos TB ER Red  1x20 Red 1x20 Red 1x20 Red   1 x20 Red   1x20 Red 1x15 Red  1x15 Red   1x15 Red 1x20 Red 1x20                             Patient education             Ther Ex 6-1    -15  5-16 5-18     UBE 10' alt 10' alt 10'alt  10' alt 10'  alt 10' alt 10' alt 10' alt 10' alt 10' alt   Pulley's x10 ea x10 ea X 10 ea  1 x 10 ea  x10 ea x10 ea x10 ea x10 ea x10 ea x10 ea   Carlos UT/LS stretch 20"x3 20"x3 20"x3 20" x3 20" x3 20" x3 20" x3 20" x 3 20"x3 20"x3   Wall slide  Flex/abd 1x10   Carlos 1x10 Carlos 1x10 Carlos 1x10  Ea Carlos 1x10   Ea Carlos 1x10 ea Carlos 1x10 ea Carlos 1x10 ea Carlos  1x10 ea Carlos 1x10 ea Carlos Finger ladder   1 x 3 ea  1 x 3 ea  1x3 gabi NP NP     Cane flex Supine  1x10 nv supine 1x10 supine x10 Supine  x10  1x10 1x10 1x10 1x10   Cane abd             Cane ER             Ball roll on wall             S/L ER             Prone T's             Prone Y's             Serratus punches             Ther Activity                                       Gait Training                                       Modalities             [facility]  5' Declined

## 2022-06-20 ENCOUNTER — OFFICE VISIT (OUTPATIENT)
Dept: PHYSICAL THERAPY | Facility: HOME HEALTHCARE | Age: 57
End: 2022-06-20
Payer: COMMERCIAL

## 2022-06-20 DIAGNOSIS — M75.32 CALCIFIC TENDINITIS OF BOTH SHOULDERS: Primary | ICD-10-CM

## 2022-06-20 DIAGNOSIS — M75.31 CALCIFIC TENDINITIS OF BOTH SHOULDERS: Primary | ICD-10-CM

## 2022-06-20 PROCEDURE — 97140 MANUAL THERAPY 1/> REGIONS: CPT

## 2022-06-20 PROCEDURE — 97112 NEUROMUSCULAR REEDUCATION: CPT

## 2022-06-20 PROCEDURE — 97110 THERAPEUTIC EXERCISES: CPT

## 2022-06-20 NOTE — PROGRESS NOTES
Daily Note     Today's date: 2022  Patient name: Ramiro Abdul  : 1965  MRN: [id number]  Referring provider: Kathy Banks MD  Dx:   Encounter Diagnosis     ICD-10-CM    1  Calcific tendinitis of both shoulders  M75 31     M75 32        Start Time: 706  Stop Time: 5717  Total time in clinic (min): 64 minutes    Subjective: Patient states that (L)>(R) lateral shoulder is painful when grabbing objects and lifting  She rates her pain as moderate, but states pain decreases with rest  She states that (L) shoulder is even painful in the shower when warm water hits arm especially at UT  Objective: See treatment diary below    Assessment: Patient responded well to manuals with visibly increased ROM noted post manuals  PROM limited by pain and gaurding in flexion in abduction  Moderate tone noted in bilateral UT, L>R  Tactile cues utilized t/o to prevent shoulder hiking with banded TE  Patient would benefit from continued PT to increase ROM of bilateral shoulders and improve function  Plan: Continue per plan of care        Precautions: None    Re-eval Date: 2022      Manuals 6-1 6/6 6/8 6/13 6-15 6-20 5-16 5-   SOR, distraction, C/S PROM 5' 2' 2' 2' 5' 5' 8' 8' 5' 5'   STM Carlos UT/LS 5' 2' 2' 2' 2' 5'    5'   Carlos shoulder PROM 5' 4' 4' 4' 10' 12' 10' 10' 5'                 Neuro Re-Ed 6-1    6-15 6-20 5-16 5-18     Cervical retractions Supine  5" x 10 Supine 5"x10 supine 5"x10 supine 5" x 10 Supine  5" x10 NT, NV Supine  5" x10 Supine  5" x10 Supine 5"x10 Supine 5"x10   Scapular retractions             MTP/LTP Green  1x20 ea Green 1x20 ea green 1x20 ea  green  1 x 20 ea  Green  1x20  ea Green  1x20  ea Green  1x15 ea Green  1x15 ea Green 1x20 ea Green 1x20 ea   Carlos TB ER Red  1x20 Red 1x20 Red 1x20 Red   1 x20 Red   1x20 Red   1x20 Red  1x15 Red   1x15 Red 1x20 Red 1x20                             Patient education             Ther Ex 6-1    -15 6-20 5-16 5-18     UBE 10' alt 10' alt 10'alt  10' alt 10'  alt 10' alt 10' alt 10' alt 10' alt 10' alt   Pulley's x10 ea x10 ea X 10 ea  1 x 10 ea  x10 ea x10 ea x10 ea x10 ea x10 ea x10 ea   Carlos UT/LS stretch 20"x3 20"x3 20"x3 20" x3 20" x3 20" x3 20" x3 20" x 3 20"x3 20"x3   Wall slide  Flex/abd 1x10   Carlos 1x10 Carlos 1x10 Carlos 1x10  Ea Carlos 1x10   Ea Carlos 1x10   Ea Carlos 1x10 ea Carlos 1x10 ea Carlos  1x10 ea Carlos 1x10 ea Carlos   Finger ladder   1 x 3 ea  1 x 3 ea  np NP NP     Cane flex Supine  1x10 nv supine 1x10 supine x10 Supine  x10 Supine  x10 1x10 1x10 1x10 1x10   Cane abd             Cane ER             Ball roll on wall             S/L ER             Prone T's             Prone Y's             Serratus punches             Ther Activity                                       Gait Training                                       Modalities             MHP        R sh  5' Declined

## 2022-06-22 ENCOUNTER — OFFICE VISIT (OUTPATIENT)
Dept: PHYSICAL THERAPY | Facility: HOME HEALTHCARE | Age: 57
End: 2022-06-22
Payer: COMMERCIAL

## 2022-06-22 DIAGNOSIS — M75.31 CALCIFIC TENDINITIS OF BOTH SHOULDERS: Primary | ICD-10-CM

## 2022-06-22 DIAGNOSIS — M75.32 CALCIFIC TENDINITIS OF BOTH SHOULDERS: Primary | ICD-10-CM

## 2022-06-22 PROCEDURE — 97110 THERAPEUTIC EXERCISES: CPT

## 2022-06-22 PROCEDURE — 97112 NEUROMUSCULAR REEDUCATION: CPT

## 2022-06-22 PROCEDURE — 97140 MANUAL THERAPY 1/> REGIONS: CPT

## 2022-06-22 NOTE — PROGRESS NOTES
History and Physical  Sundar Ch 48 y o  female MRN: [id number]      Assessment:   Vaginal itch  Dysphagia  Lumbar radiculopathy      Plan:  Diflucan of vaginal itch  GYN referral for pap smear  ENT evaluation for dysphagia  Pain management referral   Labs as ordered  RTC 6 months      Chief Complaint   Patient presents with    Medication Refill     Traimcinolone she will like that refill   other     having hard time swallowing on left side of neck feels discomfort  HPI:  Sundar Ch is a 48 y o  female who presents for med refill  She is requesting refill on Triamcinolone creme which was originally used for athletes feet but now using for vaginal itching  She also c/o L sided throat pain with swallowing this is becoming more frequent and is occurring 3-4 x per week  It is not associated with one type of food  Denies any heart burn or reflux sx's  She is also interested in new pain management as she was recently discharged from [facility] for cancelling and missing appts  Her daughter reports she had to cancel one and the others were cancelled by that office  Historical Information   Past Medical History:   Diagnosis Date    Fatigue     High cholesterol     Neuropathy     Osteoarthritis     knees     Past Surgical History:   Procedure Laterality Date    APPENDECTOMY      EPIDURAL BLOCK INJECTION Left 5/17/2018    Procedure: L4 AND L5 TRANSFORAMINAL EPIDURAL STEROID INJECTION;  Surgeon: Deepa Neri MD;  Location: [facility];  Service: Pain Management     ID COLONOSCOPY FLX DX W/COLLJ SPEC WHEN PFRMD N/A 5/31/2016    Procedure: COLONOSCOPY;  Surgeon: Veronica Mcleod MD;  Location: [facility];  Service: Gastroenterology    ID ESOPHAGOGASTRODUODENOSCOPY TRANSORAL DIAGNOSTIC N/A 11/15/2016    Procedure: ESOPHAGOGASTRODUODENOSCOPY (EGD);   Surgeon: Veronica Mcleod MD;  Location: [facility];  Service: Gastroenterology     Social History   History   Alcohol Use No     History   Drug Use No History   Smoking Status    Never Smoker   Smokeless Tobacco    Never Used     No family history on file  Meds/Allergies   No Known Allergies    Meds:  No current outpatient prescriptions on file  REVIEW OF SYSTEMS  Review of Systems   Constitutional: Negative  HENT: Positive for trouble swallowing  Eyes: Negative  Respiratory: Negative  Cardiovascular: Negative  Gastrointestinal: Negative  Endocrine: Negative  Genitourinary: Negative  Musculoskeletal: Positive for arthralgias  Allergic/Immunologic: Negative  Neurological: Negative  Hematological: Negative  Psychiatric/Behavioral: Negative  Current Vitals:   Blood Pressure: 136/76 (06/22/18 1029)  Pulse: 85 (06/22/18 1029)  Temperature: (!) 96 8 °F (36 °C) (06/22/18 1029)  Respirations: 18 (06/22/18 1029)  Height: 5' 4" (162 6 cm) (06/22/18 1029)  Weight - Scale: 65 3 kg (144 lb) (06/22/18 1029)  SpO2: 98 % (06/22/18 1029)  Body mass index is 24 72 kg/m²  PHYSICAL EXAMS:  Physical Exam   Constitutional: She is oriented to person, place, and time  She appears well-developed and well-nourished  HENT:   Head: Normocephalic and atraumatic  Right Ear: External ear normal    Left Ear: External ear normal    Mouth/Throat: Oropharynx is clear and moist    Eyes: EOM are normal  Pupils are equal, round, and reactive to light  Neck: Normal range of motion  Neck supple  No thyromegaly present  Cardiovascular: Normal rate, regular rhythm, normal heart sounds and intact distal pulses  Pulmonary/Chest: Effort normal and breath sounds normal  She has no wheezes  She has no rales  Abdominal: Soft  Bowel sounds are normal  There is no tenderness  Musculoskeletal: She exhibits no edema  Neurological: She is alert and oriented to person, place, and time  No cranial nerve deficit  Skin: Skin is warm and dry  Psychiatric: She has a normal mood and affect  Lab Results:          Chetan Vidal LEIGHTON  6/22/2018, 10:34 AM Tetracycline Counseling: Patient counseled regarding possible photosensitivity and increased risk for sunburn.  Patient instructed to avoid sunlight, if possible.  When exposed to sunlight, patients should wear protective clothing, sunglasses, and sunscreen.  The patient was instructed to call the office immediately if the following severe adverse effects occur:  hearing changes, easy bruising/bleeding, severe headache, or vision changes.  The patient verbalized understanding of the proper use and possible adverse effects of tetracycline.  All of the patient's questions and concerns were addressed. Patient understands to avoid pregnancy while on therapy due to potential birth defects.

## 2022-06-22 NOTE — PROGRESS NOTES
Daily Note     Today's date: 2022  Patient name: Gustavo Dietrich  : 1965  MRN: [id number]  Referring provider: Alvarez Joy MD  Dx:   Encounter Diagnosis     ICD-10-CM    1  Calcific tendinitis of both shoulders  M75 31     M75 32                   Subjective: Patient reports that she is having 3-4/10 pain in both shoulders upon presentation  Objective: See treatment diary below    Assessment: Patient continues to respond well to current POC today  A tight end feel was present in all shoulder directions during PROM  A good understanding of all exercises present with minimal cuing needed  Patient would benefit from continued PT to increase ROM of bilateral shoulders and improve function  Plan: Continue per plan of care        Precautions: None    Re-eval Date: 2022      Manuals 6-1 6/6 6/8 6/13 6-15 6-20 6/22      SOR, distraction, C/S PROM 5' 2' 2' 2' 5' 5' 5'      STM Carlos UT/LS 5' 2' 2' 2' 2' 5' 5'      Carlos shoulder PROM 5' 4' 4' 4' 10' 12' 12'                   Neuro Re-Ed 6-1    6-15 6-20 6/22      Cervical retractions Supine  5" x 10 Supine 5"x10 supine 5"x10 supine 5" x 10 Supine  5" x10 NT, NV NV      Scapular retractions             MTP/LTP Green  1x20 ea Green 1x20 ea green 1x20 ea  green  1 x 20 ea  Green  1x20  ea Green  1x20  ea Green  1x20  ea      Carlos TB ER Red  1x20 Red 1x20 Red 1x20 Red   1 x20 Red   1x20 Red   1x20 Red   1x20                                Patient education             Ther Ex 6-1    6-15 6-20 6/22      UBE 10' alt 10' alt 10'alt  10' alt 10'  alt 10' alt 10' alt      Pulley's x10 ea x10 ea X 10 ea  1 x 10 ea  x10 ea x10 ea x10 ea      Carlos UT/LS stretch 20"x3 20"x3 20"x3 20" x3 20" x3 20" x3 20"x3      Wall slide  Flex/abd 1x10   Carlos 1x10 Carlos 1x10 Carlos 1x10  Ea Carlos 1x10   Ea Carlos 1x10   Ea Carlos 1x10 ea carlos      Finger ladder   1 x 3 ea  1 x 3 ea  np       Cane flex Supine  1x10 nv supine 1x10 supine x10 Supine  x10 Supine  x10 Supine x10      Cane abd Willam ER             Ball roll on wall             S/L ER             Prone T's             Prone Y's             Serratus punches             Ther Activity                                       Gait Training                                       Modalities             P

## 2022-06-27 ENCOUNTER — APPOINTMENT (OUTPATIENT)
Dept: PHYSICAL THERAPY | Facility: HOME HEALTHCARE | Age: 57
End: 2022-06-27
Payer: COMMERCIAL

## 2022-06-29 ENCOUNTER — OFFICE VISIT (OUTPATIENT)
Dept: PHYSICAL THERAPY | Facility: HOME HEALTHCARE | Age: 57
End: 2022-06-29
Payer: COMMERCIAL

## 2022-06-29 DIAGNOSIS — M75.32 CALCIFIC TENDINITIS OF BOTH SHOULDERS: Primary | ICD-10-CM

## 2022-06-29 DIAGNOSIS — M75.31 CALCIFIC TENDINITIS OF BOTH SHOULDERS: Primary | ICD-10-CM

## 2022-06-29 PROCEDURE — 97140 MANUAL THERAPY 1/> REGIONS: CPT

## 2022-06-29 PROCEDURE — 97110 THERAPEUTIC EXERCISES: CPT

## 2022-06-29 PROCEDURE — 97112 NEUROMUSCULAR REEDUCATION: CPT

## 2022-06-29 NOTE — PROGRESS NOTES
Daily Note     Today's date: 2022  Patient name: Carley Altamirano  : 1965  MRN: [id number]  Referring provider: Baudilio Ames MD  Dx: No diagnosis found  Start Time: 705          Subjective: A lot of pain of 6-7/10 at my neck to B sh  The L is worse than the R      Objective: See treatment diary below    Assessment: Tolerated treatment well  Pt with pain and ROM limitations evident at B sh L > R t/o ex and MT  Pt states she has an injection makayla for L sh in August  Pt has appeared to have reached a plateau with saman to PT and is difficult to progress 2* pain  Patient would benefit from continued PT    Plan: Continue per plan of care        Precautions: None    Re-eval Date: 2022      Manuals 6-1 6/6 6/8 6/13 6-15 6-20 6/22 6-29     SOR, distraction, C/S PROM 5' 2' 2' 2' 5' 5' 5' 5'     STM Gabi UT/LS 5' 2' 2' 2' 2' 5' 5' 5'     Gabi shoulder PROM 5' 4' 4' 4' 10' 12' 12' 12'                  Neuro Re-Ed 6--15 6- 6-     Cervical retractions Supine  5" x 10 Supine 5"x10 supine 5"x10 supine 5" x 10 Supine  5" x10 NT, NV NV Supine  5" x10     Scapular retractions             MTP/LTP Green  1x20 ea Green 1x20 ea green 1x20 ea  green  1 x 20 ea  Green  1x20  ea Green  1x20  ea Green  1x20  ea Green  1x20  ea     Gabi TB ER Red  1x20 Red 1x20 Red 1x20 Red   1 x20 Red   1x20 Red   1x20 Red   1x20 Red  1x20                               Patient education             Ther Ex 6--15 6-     UBE 10' alt 10' alt 10'alt  10' alt 10'  alt 10' alt 10' alt 10'  alt     Pulley's x10 ea x10 ea X 10 ea  1 x 10 ea  x10 ea x10 ea x10 ea x10 ea     Gabi UT/LS stretch 20"x3 20"x3 20"x3 20" x3 20" x3 20" x3 20"x3 20" x3     Wall slide  Flex/abd 1x10   Gabi 1x10 Gabi 1x10 Gabi 1x10  Ea Gabi 1x10   Ea Gabi 1x10   Ea Gabi 1x10 ea gabi 1x10 ea Gabi     Finger ladder   1 x 3 ea  1 x 3 ea  np       Cane flex Supine  1x10 nv supine 1x10 supine x10 Supine  x10 Supine  x10 Supine x10 Supine  x10     Cane abd Willam ER             Ball roll on wall             S/L ER             Prone T's             Prone Y's             Serratus punches             Ther Activity                                       Gait Training                                       Modalities             P

## 2022-07-12 ENCOUNTER — APPOINTMENT (OUTPATIENT)
Dept: PHYSICAL THERAPY | Facility: HOME HEALTHCARE | Age: 57
End: 2022-07-12
Payer: COMMERCIAL

## 2022-07-13 ENCOUNTER — EVALUATION (OUTPATIENT)
Dept: PHYSICAL THERAPY | Facility: HOME HEALTHCARE | Age: 57
End: 2022-07-13
Payer: COMMERCIAL

## 2022-07-13 DIAGNOSIS — N95.8 GENITOURINARY SYNDROME OF MENOPAUSE: ICD-10-CM

## 2022-07-13 DIAGNOSIS — M75.32 CALCIFIC TENDINITIS OF BOTH SHOULDERS: Primary | ICD-10-CM

## 2022-07-13 DIAGNOSIS — M75.31 CALCIFIC TENDINITIS OF BOTH SHOULDERS: Primary | ICD-10-CM

## 2022-07-13 PROCEDURE — 97112 NEUROMUSCULAR REEDUCATION: CPT

## 2022-07-13 PROCEDURE — 97110 THERAPEUTIC EXERCISES: CPT

## 2022-07-13 PROCEDURE — 97140 MANUAL THERAPY 1/> REGIONS: CPT

## 2022-07-13 RX ORDER — ESTRADIOL 0.1 MG/G
CREAM VAGINAL
Qty: 42.5 G | Refills: 0 | Status: SHIPPED | OUTPATIENT
Start: 2022-07-13 | End: 2022-08-17

## 2022-07-13 NOTE — PROGRESS NOTES
PT Re-Evaluation     Today's date: 2022  Patient name: Giles Roger  : 1965  MRN: [id number]  Referring provider: Geovanna Stinson MD, Johnna Kaminski  Dx:   Encounter Diagnosis     ICD-10-CM    1  Calcific tendinitis of both shoulders  M75 31     M75 32        Start Time: 0700  Stop Time: 0745  Total time in clinic (min): 45 minutes    Assessment  Assessment details: Pt has made improvements in pain, strength, ROM, and overall function since beginning PT  She has met her short-term goals for independence with HEP, pain, and posture, however, she is still progressing toward her long-term goals for pain, strength, ROM, and overall mobility  The pt continues to demonstrate significant soft-tissue restrictions and tenderness in her bilateral upper traps, but had symptom improvement with STM  Although her strength is improving, the pt also continues to demonstrate some bilateral shoulder strength deficits, with the L side being weaker than the R   Pt also continues to demonstrate bilateral shoulder ROM deficits, primarily limited by pain levels  The pt would benefit from continued PT in order to further improve in pain, strength, ROM, functional mobility, quality of life, and return to PLOF  Thank you! Impairments: abnormal or restricted ROM, abnormal movement, activity intolerance, impaired physical strength, lacks appropriate home exercise program, pain with function, poor posture  and poor body mechanics  Understanding of Dx/Px/POC: good   Prognosis: good    Goals  STG: 3-4 weeks  Pt will be independent with HEP in order to continue to progress outside of PT treatment sessions  Met  Pt will improve in quality of life as demonstrated by worst pain levels of 5/10 or less  Met  Pt will improve in functional mobility as demonstrated by a 50% improvement in resting posture  Met  LT-8 weeks  Pt will improve in quality of life as demonstrated by worst pain levels of 2/10 or less    Pt will improve in functional mobility as demonstrated by strength levels of 4+/5 or greater  Progressing  Pt will improve in functional mobility as demonstrated by bilateral shoulder ROM WFL  Pt will improve in functional mobility as demonstrated by cervical AROM WFL  Plan  Patient would benefit from: skilled physical therapy  Planned modality interventions: thermotherapy: hydrocollator packs  Planned therapy interventions: body mechanics training, flexibility, functional ROM exercises, home exercise program, joint mobilization, manual therapy, massage, neuromuscular re-education, patient education, postural training, strengthening, stretching, therapeutic activities and therapeutic exercise  Frequency: 2x week  Duration in weeks: 4  Plan of Care beginning date: 2022  Plan of Care expiration date: 2022  Treatment plan discussed with: patient        Subjective Evaluation    History of Present Illness  Mechanism of injury: Pt reports that she is presenting with bilateral lateral shoulder pain which started approximately 1 year ago  Pt reports that she also experiences a lot of pain in her upper traps and into her neck  Pt was previously receiving physical therapy at the clinic  Pt reports that she has difficulty grabbing something to the side of behind her due to the pain levels  She also reports that if she reaches for something suddenly she experiences the most pain  UPDATE 2022:  Pt reports that she feels so-so today and that most of her pain is in her L upper trap/shoulder  The patient feels like she has been improving a little bit since beginning PT  UPDATE 2022:  Pt reports that she feels that she has seen some improvement since beginning PT, but that the continues to experience some pain in her bilateral upper traps and shoulders  Pt reports that she would like to continue with PT for a few more weeks    Pain  Current pain rating: 3  At best pain ratin  At worst pain ratin  Quality: burning and tight  Alleviating factors: Physical therapy and massages  Aggravating factors: overhead activity and lifting    Patient Goals  Patient goals for therapy: decreased pain, increased motion, increased strength, independence with ADLs/IADLs and return to sport/leisure activities  Patient goal: To get better and deal with the pain levels        Objective     Static Posture     Head  Forward  Shoulders  Rounded  Postural Observations  Seated posture: fair  Standing posture: fair        Palpation   Left   Hypertonic in the levator scapulae, scalenes, suboccipitals and upper trapezius  Tenderness of the levator scapulae, scalenes, suboccipitals and upper trapezius  Trigger point to levator scapulae, scalenes, suboccipitals and upper trapezius  Right   Hypertonic in the levator scapulae, scalenes, suboccipitals and upper trapezius  Tenderness of the levator scapulae, scalenes, suboccipitals and upper trapezius  Trigger point to levator scapulae, scalenes, suboccipitals and upper trapezius       Neurological Testing     Sensation   Cervical/Thoracic   Left   Intact: light touch    Right   Intact: light touch    Active Range of Motion   Cervical/Thoracic Spine       Cervical  Subcranial protraction:   Restriction level: minimal  Subcranial retraction:  with pain   Restriction level: moderate  Flexion:  Restriction level: moderate  Extension:  Restriction level: maximal  Left lateral flexion:  with pain Restriction level: moderate  Right lateral flexion:  with pain Restriction level moderate  Left rotation:  with pain Restriction level: moderate  Right rotation:  with pain Restriction level: moderate  Left Shoulder   Flexion: 130 degrees with pain  Abduction: 98 degrees with pain  External rotation 0°: 60 degrees with pain  Internal rotation BTB: L3 with pain    Right Shoulder   Flexion: 135 degrees with pain  Abduction: 107 degrees with pain  External rotation 0°: 60 degrees   Internal rotation BTB: L3 with pain    Strength/Myotome Testing     Left Shoulder     Planes of Motion   Flexion: 4-   Abduction: 4   External rotation at 0°: 4+   Internal rotation at 0°: 4+     Isolated Muscles   Biceps: 4+   Triceps: 4+   Upper trapezius: 4+     Right Shoulder     Planes of Motion   Flexion: 4+   Abduction: 4   External rotation at 0°: 4+   Internal rotation at 0°: 4+     Isolated Muscles   Biceps: 4+   Triceps: 4+   Upper trapezius: 4+     Left Elbow   Flexion: 4  Extension: 4    Right Elbow   Flexion: 4  Extension: 4    Tests     Left Shoulder   Positive Hawkin's and painful arc  Right Shoulder   Positive Hawkin's and painful arc                Precautions: None    Re-eval Date: 8/13/2022      Manuals 6-1 6/6 6/8 6/13 6-15 6-20 6/22 6-29 7/13    SOR, distraction, C/S PROM 5' 2' 2' 2' 5' 5' 5' 5' 4'    STM Carlos UT/LS 5' 2' 2' 2' 2' 5' 5' 5' 4'    Carlos shoulder PROM 5' 4' 4' 4' 10' 12' 12' 12' 4'                 Neuro Re-Ed 6-1 6-15 6-20 6/22 6-28     Cervical retractions Supine  5" x 10 Supine 5"x10 supine 5"x10 supine 5" x 10 Supine  5" x10 NT, NV NV Supine  5" x10 nv    Scapular retractions             MTP/LTP Green  1x20 ea Green 1x20 ea green 1x20 ea  green  1 x 20 ea  Green  1x20  ea Green  1x20  ea Green  1x20  ea Green  1x20  ea Blue 1x20 ea    Carlos TB ER Red  1x20 Red 1x20 Red 1x20 Red   1 x20 Red   1x20 Red   1x20 Red   1x20 Red  1x20 Red 1x20                              Patient education             Ther Ex 6-1 6-15 6-20 6/22 6-29     UBE 10' alt 10' alt 10'alt  10' alt 10'  alt 10' alt 10' alt 10'  alt 10' alt    Pulley's x10 ea x10 ea X 10 ea  1 x 10 ea  x10 ea x10 ea x10 ea x10 ea x10 ea    Carlos UT/LS stretch 20"x3 20"x3 20"x3 20" x3 20" x3 20" x3 20"x3 20" x3 20"x3    Wall slide  Flex/abd 1x10   Carlos 1x10 Carlos 1x10 Carlos 1x10  Ea Carlos 1x10   Ea Carlos 1x10   Ea Carlos 1x10 ea carlos 1x10 ea Carlos 1x10 ea Carlos    Finger ladder   1 x 3 ea  1 x 3 ea  np       Cane flex Supine  1x10 nv supine 1x10 supine x10 Supine  x10 Supine  x10 Supine x10 Supine  x10 nv    Cane abd             Cane ER             Ball roll on wall             S/L ER             Prone T's             Prone Y's             Serratus punches             Ther Activity                                       Gait Training                                       Modalities             [facility]

## 2022-07-14 ENCOUNTER — OFFICE VISIT (OUTPATIENT)
Dept: PHYSICAL THERAPY | Facility: HOME HEALTHCARE | Age: 57
End: 2022-07-14
Payer: COMMERCIAL

## 2022-07-14 DIAGNOSIS — M75.32 CALCIFIC TENDINITIS OF BOTH SHOULDERS: Primary | ICD-10-CM

## 2022-07-14 DIAGNOSIS — M75.31 CALCIFIC TENDINITIS OF BOTH SHOULDERS: Primary | ICD-10-CM

## 2022-07-14 PROCEDURE — 97140 MANUAL THERAPY 1/> REGIONS: CPT

## 2022-07-14 PROCEDURE — 97112 NEUROMUSCULAR REEDUCATION: CPT

## 2022-07-14 PROCEDURE — 97110 THERAPEUTIC EXERCISES: CPT

## 2022-07-14 NOTE — PROGRESS NOTES
Daily Note     Today's date: 2022  Patient name: Raúl Farah  : 1965  MRN: [id number]  Referring provider: Montse Mayfield MD  Dx: No diagnosis found  Start Time: 705          Subjective: Still having a lot of pain at B side of neck and B shoulders  L side of my neck hurts te most      Objective: See treatment diary below    Assessment: Tolerated treatment well  Pt remains with pain evident in all planes of B sh and cerv PROM L > R  Pt does report improvements in pain levels since SOC  Pt denied increased pain at end of session  Patient would benefit from continued PT    Plan: Continue per plan of care   Pt has appt with pain management on 22     Precautions: None    Re-eval Date: 2022      Manuals 6-1 6/6 6/8 6/13 6-15 6-20 6/22 6-29 7/13 7-14   SOR, distraction, C/S PROM 5' 2' 2' 2' 5' 5' 5' 5' 4' 4'   STM Carlos UT/LS 5' 2' 2' 2' 2' 5' 5' 5' 4' 4'   Carlos shoulder PROM 5' 4' 4' 4' 10' 12' 12' 12' 4' 4'                Neuro Re-Ed 6--15 6-  7-14   Cervical retractions Supine  5" x 10 Supine 5"x10 supine 5"x10 supine 5" x 10 Supine  5" x10 NT, NV NV Supine  5" x10 nv Supine  5" x10   Scapular retractions             MTP/LTP Green  1x20 ea Green 1x20 ea green 1x20 ea  green  1 x 20 ea  Green  1x20  ea Green  1x20  ea Green  1x20  ea Green  1x20  ea Blue 1x20 ea Green  1x20  ea   Carlos TB ER Red  1x20 Red 1x20 Red 1x20 Red   1 x20 Red   1x20 Red   1x20 Red   1x20 Red  1x20 Red 1x20 Red 1x20                             Patient education             Ther Ex 6--15 6-20 6/22 6-29  7-14   UBE 10' alt 10' alt 10'alt  10' alt 10'  alt 10' alt 10' alt 10'  alt 10' alt 10' alt   Pulley's x10 ea x10 ea X 10 ea  1 x 10 ea  x10 ea x10 ea x10 ea x10 ea x10 ea NP   NV     Carlos UT/LS stretch 20"x3 20"x3 20"x3 20" x3 20" x3 20" x3 20"x3 20" x3 20"x3 20" x3   Wall slide  Flex/abd 1x10   Carlos 1x10 Carlos 1x10 Carlos 1x10  Ea Carlos 1x10   Ea Carlos 1x10   Ea Carlos 1x10 ea carlos 1x10 ea Carlos 1x10 ea Carlos 1x10 Carlos  ea   Finger ladder   1 x 3 ea  1 x 3 ea  np       Cane flex Supine  1x10 nv supine 1x10 supine x10 Supine  x10 Supine  x10 Supine x10 Supine  x10 nv Supine  x10   Cane abd             Cane ER             Ball roll on wall             S/L ER             Prone T's             Prone Y's             Serratus punches             Ther Activity                                       Gait Training                                       Modalities             [facility]

## 2022-07-18 ENCOUNTER — APPOINTMENT (OUTPATIENT)
Dept: PHYSICAL THERAPY | Facility: HOME HEALTHCARE | Age: 57
End: 2022-07-18
Payer: COMMERCIAL

## 2022-07-20 ENCOUNTER — OFFICE VISIT (OUTPATIENT)
Dept: PHYSICAL THERAPY | Facility: HOME HEALTHCARE | Age: 57
End: 2022-07-20
Payer: COMMERCIAL

## 2022-07-20 DIAGNOSIS — M75.32 CALCIFIC TENDINITIS OF BOTH SHOULDERS: Primary | ICD-10-CM

## 2022-07-20 DIAGNOSIS — M75.31 CALCIFIC TENDINITIS OF BOTH SHOULDERS: Primary | ICD-10-CM

## 2022-07-20 PROCEDURE — 97110 THERAPEUTIC EXERCISES: CPT

## 2022-07-20 PROCEDURE — 97112 NEUROMUSCULAR REEDUCATION: CPT

## 2022-07-20 PROCEDURE — 97140 MANUAL THERAPY 1/> REGIONS: CPT

## 2022-07-20 NOTE — PROGRESS NOTES
Daily Note     Today's date: 2022  Patient name: Britt Lockhart  : 1965  MRN: [id number]  Referring provider: Stanislav Peña MD  Dx: No diagnosis found  Start Time: 705          Subjective: No new c/o  I still have the pain at my neck and B sh      Objective: See treatment diary below    Assessment: Tolerated treatment well  Pt remains with strength and ROM limitations at B cerv and B sh and would benefit from continued PT    Plan: Continue per plan of care        Precautions: None    Re-eval Date: 2022      Manuals 7-20 6/6 6/8 6/13 6-15 6-20 6/22 6-29 7/13 7-14   SOR, distraction, C/S PROM 4' 2' 2' 2' 5' 5' 5' 5' 4' 4'   STM Carlos UT/LS 4' 2' 2' 2' 2' 5' 5' 5' 4' 4'   Carlos shoulder PROM 4' 4' 4' 4' 10' 12' 12' 12' 4' 4'                Neuro Re-Ed 7-20    6-15 6-20 6/22 6-28  7-14   Cervical retractions Supine  5" x 10 Supine 5"x10 supine 5"x10 supine 5" x 10 Supine  5" x10 NT, NV NV Supine  5" x10 nv Supine  5" x10   Scapular retractions             MTP/LTP Green  1x20 ea Green 1x20 ea green 1x20 ea  green  1 x 20 ea  Green  1x20  ea Green  1x20  ea Green  1x20  ea Green  1x20  ea Blue 1x20 ea Green  1x20  ea   Carlos TB ER Red  1x20 Red 1x20 Red 1x20 Red   1 x20 Red   1x20 Red   1x20 Red   1x20 Red  1x20 Red 1x20 Red 1x20                             Patient education             Ther Ex 7-20    6-15 6--  7-14   UBE 10' alt 10' alt 10'alt  10' alt 10'  alt 10' alt 10' alt 10'  alt 10' alt 10' alt   Pulley's DC x10 ea X 10 ea  1 x 10 ea  x10 ea x10 ea x10 ea x10 ea x10 ea NP   NV     Carlos UT/LS stretch 20"x3 20"x3 20"x3 20" x3 20" x3 20" x3 20"x3 20" x3 20"x3 20" x3   Wall slide  Flex/abd 1x10   Carlos 1x10 Carlos 1x10 Carlos 1x10  Ea Carlos 1x10   Ea Carlos 1x10   Ea Carlos 1x10 ea carlos 1x10 ea Carlos 1x10 ea Carlos 1x10 Carlos  ea   Finger ladder   1 x 3 ea  1 x 3 ea  np       Cane flex Supine  1x10 nv supine 1x10 supine x10 Supine  x10 Supine  x10 Supine x10 Supine  x10 nv Supine  x10   Cane abd             Cane ER Ball roll on wall             S/L ER             Prone T's             Prone Y's             Serratus punches             Ther Activity                                       Gait Training                                       Modalities             [facility]

## 2022-07-25 ENCOUNTER — APPOINTMENT (OUTPATIENT)
Dept: PHYSICAL THERAPY | Facility: HOME HEALTHCARE | Age: 57
End: 2022-07-25
Payer: COMMERCIAL

## 2022-07-27 ENCOUNTER — OFFICE VISIT (OUTPATIENT)
Dept: PHYSICAL THERAPY | Facility: HOME HEALTHCARE | Age: 57
End: 2022-07-27
Payer: COMMERCIAL

## 2022-07-27 DIAGNOSIS — M75.32 CALCIFIC TENDINITIS OF BOTH SHOULDERS: Primary | ICD-10-CM

## 2022-07-27 DIAGNOSIS — M75.31 CALCIFIC TENDINITIS OF BOTH SHOULDERS: Primary | ICD-10-CM

## 2022-07-27 PROCEDURE — 97112 NEUROMUSCULAR REEDUCATION: CPT

## 2022-07-27 PROCEDURE — 97110 THERAPEUTIC EXERCISES: CPT

## 2022-07-27 PROCEDURE — 97140 MANUAL THERAPY 1/> REGIONS: CPT

## 2022-07-27 NOTE — PROGRESS NOTES
Daily Note     Today's date: 2022  Patient name: Lashanda Lipscomb  : 1965  MRN: [id number]  Referring provider: Bryson Roldan MD  Dx:   Encounter Diagnosis     ICD-10-CM    1  Calcific tendinitis of both shoulders  M75 31     M75 32                   Subjective: Pt reports 3-4/10 pain both shoulders  Objective: See treatment diary below      Assessment: Tolerated treatment well  No increased pain reported t/o session or at end of session  Patient would benefit from continued PT to improve ROM, strength and overall mobility  Plan: Continue per plan of care        Precautions: None    Re-eval Date: 2022      Manuals 7-20 7/27 6/8 6/13 6-15 6 7-14   SOR, distraction, C/S PROM 4' 4' 2' 2' 5' 5' 5' 5' 4' 4'   STM Carlos UT/LS 4' 2' 2' 2' 2' 5' 5' 5' 4' 4'   Carlos shoulder PROM 4' 4' 4' 4' 10' 12' 12' 12' 4' 4'                Neuro Re-Ed 7-20    6-15 6- 6-  7-14   Cervical retractions Supine  5" x 10 Supine 5"x10 supine 5"x10 supine 5" x 10 Supine  5" x10 NT, NV NV Supine  5" x10 nv Supine  5" x10   Scapular retractions             MTP/LTP Green  1x20 ea Green 1x20 ea green 1x20 ea  green  1 x 20 ea  Green  1x20  ea Green  1x20  ea Green  1x20  ea Green  1x20  ea Blue 1x20 ea Green  1x20  ea   Carlos TB ER Red  1x20 Red 1x20 Red 1x20 Red   1 x20 Red   1x20 Red   1x20 Red   1x20 Red  1x20 Red 1x20 Red 1x20                             Patient education             Ther Ex 7-20    6-15 6- 6  7-14   UBE 10' alt 10' alt 10'alt  10' alt 10'  alt 10' alt 10' alt 10'  alt 10' alt 10' alt   Pulley's DC DC X 10 ea  1 x 10 ea  x10 ea x10 ea x10 ea x10 ea x10 ea NP   NV     Carlos UT/LS stretch 20"x3 20"x3 20"x3 20" x3 20" x3 20" x3 20"x3 20" x3 20"x3 20" x3   Wall slide  Flex/abd 1x10   Carlos 1x10 Carlos 1x10 Carlos 1x10  Ea Carlos 1x10   Ea Carlos 1x10   Ea Carlos 1x10 ea carlos 1x10 ea Carlos 1x10 ea Carlos 1x10 Carlos  ea   Finger ladder   1 x 3 ea  1 x 3 ea  np       Cane flex Supine  1x10 Supine   1 x 10  supine 1x10 supine x10 Supine  x10 Supine  x10 Supine x10 Supine  x10 nv Supine  x10   Cane abd             Cane ER             Ball roll on wall             S/L ER             Prone T's             Prone Y's             Serratus punches             Ther Activity                                       Gait Training                                       Modalities             [facility]

## 2022-07-28 ENCOUNTER — TELEPHONE (OUTPATIENT)
Dept: FAMILY MEDICINE CLINIC | Facility: CLINIC | Age: 57
End: 2022-07-28

## 2022-07-28 ENCOUNTER — OFFICE VISIT (OUTPATIENT)
Dept: CARDIOLOGY CLINIC | Facility: HOSPITAL | Age: 57
End: 2022-07-28
Payer: COMMERCIAL

## 2022-07-28 VITALS
SYSTOLIC BLOOD PRESSURE: 116 MMHG | HEIGHT: 59 IN | WEIGHT: 147 LBS | DIASTOLIC BLOOD PRESSURE: 62 MMHG | BODY MASS INDEX: 29.64 KG/M2 | HEART RATE: 64 BPM

## 2022-07-28 DIAGNOSIS — I45.10 RBBB: ICD-10-CM

## 2022-07-28 DIAGNOSIS — I10 BENIGN ESSENTIAL HTN: Primary | ICD-10-CM

## 2022-07-28 PROCEDURE — 93000 ELECTROCARDIOGRAM COMPLETE: CPT | Performed by: INTERNAL MEDICINE

## 2022-07-28 PROCEDURE — 99213 OFFICE O/P EST LOW 20 MIN: CPT | Performed by: INTERNAL MEDICINE

## 2022-07-28 NOTE — PROGRESS NOTES
Cardiology Follow Up    Filemon Grey  1965  [id number]  [facility] [address]  [phone]    1  Benign essential HTN  POCT ECG   2  RBBB  POCT ECG         Discussion/Summary: All of her assessed cardiac problems are stable  I have reviewed her medications and made no changes  No further cardiac testing is needed  I will now see her on an as needed basis  Interval History: She has not had any cardiac problems since her last OV  She is active and denies any further dizziness, near syncope  She denies CP, SOB  Previous echo and Holter Monitor were unremarkable  /62  ECG - NSR, RBBB      Patient Active Problem List   Diagnosis    Abdominal pain, left lower quadrant    Chronic lumbar radiculopathy    Chronic pain syndrome    Dyslipidemia    Chronic pain of right knee    Chronic right hip pain    Myofascial pain syndrome    Atrophic vaginitis    Primary osteoarthritis of both knees    Lumbar radiculopathy    Left hip pain    Lumbar degenerative disc disease    Pharyngoesophageal dysphagia    Perforation of left tympanic membrane    Chronic otitis media of left ear    Atypical chest pain    Epigastric abdominal pain    Chronic musculoskeletal pain    Pain in finger of both hands    Tympanic membrane perforation, left    Strain of thoracic back region    Chronic right shoulder pain    Medial epicondylitis of elbow, left    Near syncope    Chronic pain    Bradycardia    Episode of dizziness    Acute non-recurrent maxillary sinusitis    RBBB    Bursitis of both shoulders    Benign essential HTN     Past Medical History:   Diagnosis Date    Fatigue     High cholesterol     Hypertension     Neuropathy     Osteoarthritis     knees    Shortness of breath      Social History     Socioeconomic History    Marital status: Single     Spouse name: Not on file    Number of children: Not on file    Years of education: Not on file    Highest education level: Not on file   Occupational History    Not on file   Tobacco Use    Smoking status: Never Smoker    Smokeless tobacco: Never Used   Vaping Use    Vaping Use: Never used   Substance and Sexual Activity    Alcohol use: Not Currently    Drug use: No    Sexual activity: Yes     Partners: Male     Birth control/protection: Female Sterilization   Other Topics Concern    Not on file   Social History Narrative    Not on file     Social Determinants of Health     Financial Resource Strain: Not on file   Food Insecurity: Not on file   Transportation Needs: Not on file   Physical Activity: Not on file   Stress: Not on file   Social Connections: Not on file   Intimate Partner Violence: Not on file   Housing Stability: Not on file      Family History   Problem Relation Age of Onset    Uterine cancer Mother     Early death Father     Heart disease Brother     No Known Problems Sister     No Known Problems Daughter     No Known Problems Sister     No Known Problems Daughter     No Known Problems Maternal Aunt     No Known Problems Maternal Aunt     No Known Problems Maternal Aunt     No Known Problems Maternal Aunt     No Known Problems Maternal Grandmother     No Known Problems Maternal Grandfather     No Known Problems Paternal Grandmother     No Known Problems Paternal Grandfather      Past Surgical History:   Procedure Laterality Date    APPENDECTOMY      EPIDURAL BLOCK INJECTION Left 5/17/2018    Procedure: L4 AND L5 TRANSFORAMINAL EPIDURAL STEROID INJECTION;  Surgeon: Yuki Armendariz MD;  [facility];  Service: Pain Management     EPIDURAL BLOCK INJECTION Left 10/4/2018    Procedure: L4 AND L5 TRANSFORAMINAL EPIDURAL STEROID INJECTION;  Surgeon: Yuki Armendariz MD;  [facility];  Service: Pain Management     EPIDURAL BLOCK INJECTION Left 5/1/2019    Procedure: L4-5 and L5-S1 transforaminal epidural steroid injection;  Surgeon: Yuki Armendariz MD;  [facility];  Service: Pain Management     FL GUIDED NEEDLE PLAC BX/ASP/INJ  5/1/2019    OK COLONOSCOPY FLX DX W/COLLJ SPEC WHEN PFRMD N/A 5/31/2016    Procedure: COLONOSCOPY;  Surgeon: Johny Green MD;  [facility];  Service: Gastroenterology    OK ESOPHAGOGASTRODUODENOSCOPY TRANSORAL DIAGNOSTIC N/A 11/15/2016    Procedure: ESOPHAGOGASTRODUODENOSCOPY (EGD); Surgeon: Johny Green MD;  [facility];  Service: Gastroenterology    OK TYMPANOPLASTY Left 10/25/2019    Procedure: LEFT TYMPANOPLASTY;  Surgeon: Neptali Rasheed MD;  [facility];  Service: ENT    TUBAL LIGATION         Current Outpatient Medications:     acetaminophen (TYLENOL) 500 mg tablet, Take 500 mg by mouth every 6 (six) hours as needed for pain, Disp: , Rfl:     amLODIPine (NORVASC) 10 mg tablet, Take 1 tablet (10 mg total) by mouth daily, Disp: 30 tablet, Rfl: 1    atorvastatin (LIPITOR) 20 mg tablet, Take 1 tablet (20 mg total) by mouth daily, Disp: 30 tablet, Rfl: 5    Diclofenac Sodium (VOLTAREN) 1 %, Apply 2 g topically 4 (four) times a day, Disp: 350 g, Rfl: 1    estradiol (ESTRACE) 0 1 mg/g vaginal cream, INSERT 0 5 G INTO THE VAGINA TWICE A WEEK, Disp: 42 5 g, Rfl: 0  No Known Allergies  Vitals:    07/28/22 1332   BP: 116/62   Pulse: 64   Weight: 66 7 kg (147 lb)   Height: 4' 11" (1 499 m)     Weight (last 2 days)     Date/Time Weight    07/28/22 1332 66 7 (147)         Blood pressure 116/62, pulse 64, height 4' 11" (1 499 m), weight 66 7 kg (147 lb), not currently breastfeeding , Body mass index is 29 69 kg/m²  Labs:  Procedure visit on 06/06/2022   Component Date Value    Case Report 06/06/2022                      Value:Surgical Pathology Report                         Case: [id number]                                   Authorizing Provider:  Traci Ríos MD    Collected:           06/06/2022 1118 Ordering Location:     [facility]      Received:            06/06/2022 1117                                     [facility]                                                         Pathologist:           Leana Kearns DO                                                     Specimen:    Endocervical, ECC                                                                          Final Diagnosis 06/06/2022                      Value: This result contains rich text formatting which cannot be displayed here   Additional Information 06/06/2022                      Value: This result contains rich text formatting which cannot be displayed here  Kathryn Sorensen Gross Description 06/06/2022                      Value: This result contains rich text formatting which cannot be displayed here     Admission on 03/02/2022, Discharged on 03/03/2022   Component Date Value    WBC 03/02/2022 6 18     RBC 03/02/2022 4 47     Hemoglobin 03/02/2022 13 5     Hematocrit 03/02/2022 37 8     MCV 03/02/2022 85     MCH 03/02/2022 30 2     MCHC 03/02/2022 35 7     RDW 03/02/2022 11 9     MPV 03/02/2022 10 4     Platelets 53/05/6901 239     nRBC 03/02/2022 0     Neutrophils Relative 03/02/2022 44     Immat GRANS % 03/02/2022 0     Lymphocytes Relative 03/02/2022 46 (A)    Monocytes Relative 03/02/2022 7     Eosinophils Relative 03/02/2022 2     Basophils Relative 03/02/2022 1     Neutrophils Absolute 03/02/2022 2 69     Immature Grans Absolute 03/02/2022 0 02     Lymphocytes Absolute 03/02/2022 2 90     Monocytes Absolute 03/02/2022 0 43     Eosinophils Absolute 03/02/2022 0 10     Basophils Absolute 03/02/2022 0 04     Sodium 03/02/2022 139     Potassium 03/02/2022 3 8     Chloride 03/02/2022 101     CO2 03/02/2022 27     ANION GAP 03/02/2022 11     BUN 03/02/2022 23     Creatinine 03/02/2022 0 86     Glucose 03/02/2022 119     Calcium 03/02/2022 9 5     eGFR 03/02/2022 75     TSH 3RD Ananya Solera 03/02/2022 5 644 (A)    Lipase 03/02/2022 139     EXT PREG TEST UR (Ref: N* 03/02/2022 negative     Control 03/02/2022 valid     Color, UA 03/02/2022 Light Yellow     Clarity, UA 03/02/2022 Clear     Specific Gravity, UA 03/02/2022 1 010     pH, UA 03/02/2022 6 0     Leukocytes, UA 03/02/2022 Negative     Nitrite, UA 03/02/2022 Negative     Protein, UA 03/02/2022 Negative     Glucose, UA 03/02/2022 Negative     Ketones, UA 03/02/2022 Negative     Urobilinogen, UA 03/02/2022 0 2     Bilirubin, UA 03/02/2022 Negative     Occult Blood, UA 03/02/2022 Negative     Free T4 03/02/2022 1 01    Annual Exam on 02/22/2022   Component Date Value    Case Report 02/22/2022                      Value:Gynecologic Cytology Report                       Case: [id number]                                  Authorizing Provider:  Traci Ríos MD    Collected:           02/22/2022 1028              [facility]     [facility]  Received:            02/22/2022 [address]                                                             First Screen:          Aliya Finney, [place]                                                       Specimen:    LIQUID-BASED PAP, SCREENING, Cervix                                                        Primary Interpretation 02/22/2022 Negative for intraepithelial lesion or malignancy     Specimen Adequacy 02/22/2022 Satisfactory for evaluation  Endocervical/transformation zone component present   Additional Information 02/22/2022                      Value: This result contains rich text formatting which cannot be displayed here   HPV Other HR 02/22/2022 Positive (A)    HPV16 02/22/2022 Negative     HPV18 02/22/2022 Negative      Imaging: No results found  Review of Systems:  Review of Systems   Constitutional: Negative for diaphoresis, fatigue, fever and unexpected weight change  HENT: Negative  Respiratory: Negative for cough, shortness of breath and wheezing  Cardiovascular: Negative for chest pain, palpitations and leg swelling  Gastrointestinal: Negative for abdominal pain, diarrhea and nausea  Musculoskeletal: Negative for gait problem and myalgias  Skin: Negative for rash  Neurological: Negative for dizziness and numbness  Psychiatric/Behavioral: Negative  Physical Exam:  Physical Exam  Constitutional:       Appearance: She is well-developed  HENT:      Head: Normocephalic and atraumatic  Eyes:      Pupils: Pupils are equal, round, and reactive to light  Neck:      Vascular: No JVD  Cardiovascular:      Rate and Rhythm: Regular rhythm  Pulses: Normal pulses  Carotid pulses are 2+ on the right side and 2+ on the left side  Heart sounds: S1 normal and S2 normal    Pulmonary:      Effort: Pulmonary effort is normal       Breath sounds: Normal breath sounds  No wheezing or rales  Abdominal:      General: Bowel sounds are normal       Palpations: Abdomen is soft  Tenderness: There is no abdominal tenderness  Musculoskeletal:         General: No tenderness  Normal range of motion  Cervical back: Normal range of motion and neck supple  Skin:     General: Skin is warm  Neurological:      Mental Status: She is alert and oriented to person, place, and time  Cranial Nerves: No cranial nerve deficit  Deep Tendon Reflexes: Reflexes are normal and symmetric

## 2022-08-01 ENCOUNTER — OFFICE VISIT (OUTPATIENT)
Dept: PHYSICAL THERAPY | Facility: HOME HEALTHCARE | Age: 57
End: 2022-08-01
Payer: COMMERCIAL

## 2022-08-01 DIAGNOSIS — M75.31 CALCIFIC TENDINITIS OF BOTH SHOULDERS: Primary | ICD-10-CM

## 2022-08-01 DIAGNOSIS — M75.32 CALCIFIC TENDINITIS OF BOTH SHOULDERS: Primary | ICD-10-CM

## 2022-08-01 PROCEDURE — 97110 THERAPEUTIC EXERCISES: CPT

## 2022-08-01 PROCEDURE — 97140 MANUAL THERAPY 1/> REGIONS: CPT

## 2022-08-01 PROCEDURE — 97112 NEUROMUSCULAR REEDUCATION: CPT

## 2022-08-01 NOTE — PROGRESS NOTES
PT Discharge    Today's date: 2022  Patient name: Niurka Chi  : 1965  MRN: [id number]  Referring provider: Pastora Mercado MD, Primus Line  Dx:   Encounter Diagnosis     ICD-10-CM    1  Calcific tendinitis of both shoulders  M75 31     M75 32        Start Time: 5933  Stop Time: 0745  Total time in clinic (min): 40 minutes    Assessment  Assessment details: Pt has made improvements in pain, strength, ROM, and overall function since beginning PT  She has met her short-term goals for independence with HEP, pain, and posture, however, she is still progressing toward her long-term goals for pain, strength, ROM, and overall mobility  Although the pt has made some improvements in pain, strength, ROM, and overall function, she has reached a plateau for this course of physical therapy  Pt has demonstrated independence with her HEP and she is confident in her ability to adhere to her HEP and further progress on her own  Pt will be D/C to HEP at this time  Impairments: abnormal or restricted ROM, abnormal movement, activity intolerance, impaired physical strength, lacks appropriate home exercise program, pain with function, poor posture  and poor body mechanics  Understanding of Dx/Px/POC: good   Prognosis: good    Goals  STG: 3-4 weeks  Pt will be independent with HEP in order to continue to progress outside of PT treatment sessions  Met  Pt will improve in quality of life as demonstrated by worst pain levels of 5/10 or less  Met  Pt will improve in functional mobility as demonstrated by a 50% improvement in resting posture  Met  LT-8 weeks  Pt will improve in quality of life as demonstrated by worst pain levels of 2/10 or less  Pt will improve in functional mobility as demonstrated by strength levels of 4+/5 or greater  Progressing  Pt will improve in functional mobility as demonstrated by bilateral shoulder ROM WFL   Progressing  Pt will improve in functional mobility as demonstrated by cervical AROM WFL  Progressing    Plan  Plan details: D/C to HEP  Treatment plan discussed with: patient        Subjective Evaluation    History of Present Illness  Mechanism of injury: Pt reports that she is presenting with bilateral lateral shoulder pain which started approximately 1 year ago  Pt reports that she also experiences a lot of pain in her upper traps and into her neck  Pt was previously receiving physical therapy at the clinic  Pt reports that she has difficulty grabbing something to the side of behind her due to the pain levels  She also reports that if she reaches for something suddenly she experiences the most pain  UPDATE 2022:  Pt reports that she feels so-so today and that most of her pain is in her L upper trap/shoulder  The patient feels like she has been improving a little bit since beginning PT  UPDATE 2022:  Pt reports that she feels that she has seen some improvement since beginning PT, but that the continues to experience some pain in her bilateral upper traps and shoulders  Pt reports that she would like to continue with PT for a few more weeks  UPDATE 2022:  Pt reports that she has a 3-4/10 pain today in her shoulders  Pt reports that she has improved a little bit since beginning PT  Pain  Current pain rating: 3  At best pain ratin  At worst pain ratin  Quality: burning and tight  Alleviating factors: Physical therapy and massages  Aggravating factors: overhead activity and lifting    Patient Goals  Patient goals for therapy: decreased pain, increased motion, increased strength, independence with ADLs/IADLs and return to sport/leisure activities  Patient goal: To get better and deal with the pain levels        Objective     Static Posture     Head  Forward  Shoulders  Rounded      Postural Observations  Seated posture: fair  Standing posture: fair        Palpation   Left   Hypertonic in the levator scapulae, scalenes, suboccipitals and upper trapezius  Tenderness of the levator scapulae, scalenes, suboccipitals and upper trapezius  Trigger point to levator scapulae, scalenes, suboccipitals and upper trapezius  Right   Hypertonic in the levator scapulae, scalenes, suboccipitals and upper trapezius  Tenderness of the levator scapulae, scalenes, suboccipitals and upper trapezius  Trigger point to levator scapulae, scalenes, suboccipitals and upper trapezius  Neurological Testing     Sensation   Cervical/Thoracic   Left   Intact: light touch    Right   Intact: light touch    Active Range of Motion   Cervical/Thoracic Spine       Cervical  Subcranial protraction:   Restriction level: minimal  Subcranial retraction:  with pain   Restriction level: moderate  Flexion:  Restriction level: moderate  Extension:  Restriction level: maximal  Left lateral flexion:  with pain Restriction level: moderate  Right lateral flexion:  with pain Restriction level moderate  Left rotation:  with pain Restriction level: moderate  Right rotation:  with pain Restriction level: moderate  Left Shoulder   Flexion: 130 degrees with pain  Abduction: 98 degrees with pain  External rotation 0°: 60 degrees with pain  Internal rotation BTB: L3 with pain    Right Shoulder   Flexion: 140 degrees with pain  Abduction: 107 degrees with pain  External rotation 0°: 60 degrees   Internal rotation BTB: L3 with pain    Strength/Myotome Testing     Left Shoulder     Planes of Motion   Flexion: 4   Abduction: 4+   External rotation at 0°: 4+   Internal rotation at 0°: 4+     Isolated Muscles   Biceps: 4+   Triceps: 4+   Upper trapezius: 4+     Right Shoulder     Planes of Motion   Flexion: 4+   Abduction: 4+   External rotation at 0°: 4+   Internal rotation at 0°: 4+     Isolated Muscles   Biceps: 4+   Triceps: 4+   Upper trapezius: 4+     Left Elbow   Flexion: 4+  Extension: 4+    Right Elbow   Flexion: 4+  Extension: 4+    Tests     Left Shoulder   Positive Hawkin's and painful arc  Right Shoulder   Positive Hawkin's and painful arc                Precautions: None    Re-eval Date: 8/13/2022      Manuals 7-20 7/27 8/1 6-15 6-20 6/22 6-29 7/13 7-14   SOR, distraction, C/S PROM 4' 4' 2'  5' 5' 5' 5' 4' 4'   STM Carlos UT/LS 4' 2' 2'  2' 5' 5' 5' 4' 4'   Carlos shoulder PROM 4' 4' 4'  10' 12' 12' 12' 4' 4'                Neuro Re-Ed 7-20    6-15 6-20 6/22 6-28  7-14   Cervical retractions Supine  5" x 10 Supine 5"x10 Supine 5"x10  Supine  5" x10 NT, NV NV Supine  5" x10 nv Supine  5" x10   Scapular retractions             MTP/LTP Green  1x20 ea Green 1x20 ea Green 1x20 ea  Green  1x20  ea Green  1x20  ea Green  1x20  ea Green  1x20  ea Blue 1x20 ea Green  1x20  ea   Carlos TB ER Red  1x20 Red 1x20 Red 1x20  Red   1x20 Red   1x20 Red   1x20 Red  1x20 Red 1x20 Red 1x20                             Patient education             Ther Ex 7-20    6-15 6-20 6/22 6-29  7-14   UBE 10' alt 10' alt 10' alt  10'  alt 10' alt 10' alt 10'  alt 10' alt 10' alt   Pulley's DC DC D/C  x10 ea x10 ea x10 ea x10 ea x10 ea NP   NV     Carlos UT/LS stretch 20"x3 20"x3 20"x3  20" x3 20" x3 20"x3 20" x3 20"x3 20" x3   Wall slide  Flex/abd 1x10   Carlos 1x10 Carlos 1x10 Carlos  1x10   Ea Carlos 1x10   Ea Carlos 1x10 ea carlos 1x10 ea Carlos 1x10 ea Carlos 1x10 Carlos  ea   Finger ladder      np       Cane flex Supine  1x10 Supine   1 x 10  Supine 1x10  Supine  x10 Supine  x10 Supine x10 Supine  x10 nv Supine  x10   Cane abd             Cane ER             Ball roll on wall             S/L ER             Prone T's             Prone Y's             Serratus punches             Ther Activity                                       Gait Training                                       Modalities             [facility]

## 2022-08-05 DIAGNOSIS — I10 BENIGN ESSENTIAL HTN: ICD-10-CM

## 2022-08-05 RX ORDER — AMLODIPINE BESYLATE 10 MG/1
TABLET ORAL
Qty: 30 TABLET | Refills: 1 | Status: SHIPPED | OUTPATIENT
Start: 2022-08-05 | End: 2022-08-15

## 2022-08-07 ENCOUNTER — APPOINTMENT (EMERGENCY)
Dept: RADIOLOGY | Facility: HOSPITAL | Age: 57
End: 2022-08-07
Payer: COMMERCIAL

## 2022-08-07 ENCOUNTER — HOSPITAL ENCOUNTER (EMERGENCY)
Facility: HOSPITAL | Age: 57
Discharge: HOME | End: 2022-08-08
Attending: EMERGENCY MEDICINE
Payer: COMMERCIAL

## 2022-08-07 DIAGNOSIS — R60.0 BILATERAL LOWER EXTREMITY EDEMA: Primary | ICD-10-CM

## 2022-08-07 PROCEDURE — 99284 EMERGENCY DEPT VISIT MOD MDM: CPT

## 2022-08-07 PROCEDURE — 99285 EMERGENCY DEPT VISIT HI MDM: CPT | Performed by: EMERGENCY MEDICINE

## 2022-08-07 PROCEDURE — 71045 X-RAY EXAM CHEST 1 VIEW: CPT

## 2022-08-08 VITALS
HEIGHT: 59 IN | SYSTOLIC BLOOD PRESSURE: 122 MMHG | HEART RATE: 55 BPM | DIASTOLIC BLOOD PRESSURE: 77 MMHG | RESPIRATION RATE: 16 BRPM | TEMPERATURE: 98.1 F | WEIGHT: 147 LBS | BODY MASS INDEX: 29.64 KG/M2 | OXYGEN SATURATION: 99 %

## 2022-08-08 LAB
ALBUMIN SERPL BCP-MCNC: 4.1 G/DL (ref 3.5–5)
ALP SERPL-CCNC: 90 U/L (ref 46–116)
ALT SERPL W P-5'-P-CCNC: 38 U/L (ref 12–78)
ANION GAP SERPL CALCULATED.3IONS-SCNC: 10 MMOL/L (ref 4–13)
AST SERPL W P-5'-P-CCNC: 22 U/L (ref 5–45)
ATRIAL RATE: 53 BPM
BASOPHILS # BLD AUTO: 0.04 THOUSANDS/ΜL (ref 0–0.1)
BASOPHILS NFR BLD AUTO: 1 % (ref 0–1)
BILIRUB SERPL-MCNC: 0.75 MG/DL (ref 0.2–1)
BILIRUB UR QL STRIP: NEGATIVE
BUN SERPL-MCNC: 19 MG/DL (ref 5–25)
CALCIUM SERPL-MCNC: 8.7 MG/DL (ref 8.3–10.1)
CHLORIDE SERPL-SCNC: 104 MMOL/L (ref 96–108)
CLARITY UR: CLEAR
CO2 SERPL-SCNC: 29 MMOL/L (ref 21–32)
COLOR UR: NORMAL
CREAT SERPL-MCNC: 0.91 MG/DL (ref 0.6–1.3)
D DIMER PPP FEU-MCNC: 0.96 UG/ML FEU
EOSINOPHIL # BLD AUTO: 0.11 THOUSAND/ΜL (ref 0–0.61)
EOSINOPHIL NFR BLD AUTO: 2 % (ref 0–6)
ERYTHROCYTE [DISTWIDTH] IN BLOOD BY AUTOMATED COUNT: 12.2 % (ref 11.6–15.1)
GFR SERPL CREATININE-BSD FRML MDRD: 70 ML/MIN/1.73SQ M
GLUCOSE SERPL-MCNC: 104 MG/DL (ref 65–140)
GLUCOSE UR STRIP-MCNC: NEGATIVE MG/DL
HCT VFR BLD AUTO: 35.4 % (ref 34.8–46.1)
HGB BLD-MCNC: 12 G/DL (ref 11.5–15.4)
HGB UR QL STRIP.AUTO: NEGATIVE
IMM GRANULOCYTES # BLD AUTO: 0.01 THOUSAND/UL (ref 0–0.2)
IMM GRANULOCYTES NFR BLD AUTO: 0 % (ref 0–2)
KETONES UR STRIP-MCNC: NEGATIVE MG/DL
LEUKOCYTE ESTERASE UR QL STRIP: NEGATIVE
LYMPHOCYTES # BLD AUTO: 2.47 THOUSANDS/ΜL (ref 0.6–4.47)
LYMPHOCYTES NFR BLD AUTO: 43 % (ref 14–44)
MCH RBC QN AUTO: 29.8 PG (ref 26.8–34.3)
MCHC RBC AUTO-ENTMCNC: 33.9 G/DL (ref 31.4–37.4)
MCV RBC AUTO: 88 FL (ref 82–98)
MONOCYTES # BLD AUTO: 0.49 THOUSAND/ΜL (ref 0.17–1.22)
MONOCYTES NFR BLD AUTO: 9 % (ref 4–12)
NEUTROPHILS # BLD AUTO: 2.62 THOUSANDS/ΜL (ref 1.85–7.62)
NEUTS SEG NFR BLD AUTO: 45 % (ref 43–75)
NITRITE UR QL STRIP: NEGATIVE
NRBC BLD AUTO-RTO: 0 /100 WBCS
NT-PROBNP SERPL-MCNC: 192 PG/ML
P AXIS: -59 DEGREES
PH UR STRIP.AUTO: 6 [PH]
PLATELET # BLD AUTO: 234 THOUSANDS/UL (ref 149–390)
PMV BLD AUTO: 9.8 FL (ref 8.9–12.7)
POTASSIUM SERPL-SCNC: 3.5 MMOL/L (ref 3.5–5.3)
PR INTERVAL: 168 MS
PROT SERPL-MCNC: 7.4 G/DL (ref 6.4–8.4)
PROT UR STRIP-MCNC: NEGATIVE MG/DL
QRS AXIS: -27 DEGREES
QRSD INTERVAL: 120 MS
QT INTERVAL: 496 MS
QTC INTERVAL: 465 MS
RBC # BLD AUTO: 4.03 MILLION/UL (ref 3.81–5.12)
SODIUM SERPL-SCNC: 143 MMOL/L (ref 135–147)
SP GR UR STRIP.AUTO: 1.01 (ref 1–1.03)
T WAVE AXIS: 37 DEGREES
T4 FREE SERPL-MCNC: 0.99 NG/DL (ref 0.76–1.46)
TSH SERPL DL<=0.05 MIU/L-ACNC: 6.73 UIU/ML (ref 0.45–4.5)
UROBILINOGEN UR QL STRIP.AUTO: 0.2 E.U./DL
VENTRICULAR RATE: 53 BPM
WBC # BLD AUTO: 5.74 THOUSAND/UL (ref 4.31–10.16)

## 2022-08-08 PROCEDURE — 93005 ELECTROCARDIOGRAM TRACING: CPT

## 2022-08-08 PROCEDURE — 83880 ASSAY OF NATRIURETIC PEPTIDE: CPT | Performed by: EMERGENCY MEDICINE

## 2022-08-08 PROCEDURE — 84439 ASSAY OF FREE THYROXINE: CPT | Performed by: EMERGENCY MEDICINE

## 2022-08-08 PROCEDURE — 85025 COMPLETE CBC W/AUTO DIFF WBC: CPT | Performed by: EMERGENCY MEDICINE

## 2022-08-08 PROCEDURE — 85379 FIBRIN DEGRADATION QUANT: CPT | Performed by: EMERGENCY MEDICINE

## 2022-08-08 PROCEDURE — 36415 COLL VENOUS BLD VENIPUNCTURE: CPT | Performed by: EMERGENCY MEDICINE

## 2022-08-08 PROCEDURE — 93010 ELECTROCARDIOGRAM REPORT: CPT | Performed by: INTERNAL MEDICINE

## 2022-08-08 PROCEDURE — 81003 URINALYSIS AUTO W/O SCOPE: CPT | Performed by: EMERGENCY MEDICINE

## 2022-08-08 PROCEDURE — 80053 COMPREHEN METABOLIC PANEL: CPT | Performed by: EMERGENCY MEDICINE

## 2022-08-08 PROCEDURE — 84443 ASSAY THYROID STIM HORMONE: CPT | Performed by: EMERGENCY MEDICINE

## 2022-08-08 NOTE — DISCHARGE INSTRUCTIONS
You have been seen for lower extremity edema  Please call this morning to make an appointment to complete a lower extremity ultrasound to evaluate for blood clots  Return to the emergency department if you develop worsening swelling, pain, trouble breathing, lightheadedness or any other symptoms of concern  Please follow up with your PCP by calling the number provided

## 2022-08-08 NOTE — ED PROVIDER NOTES
History  Chief Complaint   Patient presents with    Leg Swelling     Increase in bilateral leg swelling over the past two days  Denies any recent travel, no known cardiac history  59-year-old female presents for evaluation of bilateral lower extremity swelling  Patient presents with her daughter who helps with translation  Daughter states over the last few days patient has had increased lower extremity edema to her bilateral legs, 1st starting in her feet and ankles then migrating up her calves  Patient states this has happened to her frequently in the summer with the heat, elevation as well as massage tends to decrease the swelling  Patient has a soreness sensation due to the tightness, she denies numbness or tingling, is able to walk and bear weight  She further denies dyspnea, chest pain  Denies history of blood clots  Patient has otherwise been in her usual state of health with no recent illnesses, travel  Pre the patient does have history of hypertension and high cholesterol  Daughter notes patient has appointment with her PCP on 08/15/2022  Prior to Admission Medications   Prescriptions Last Dose Informant Patient Reported? Taking?    Diclofenac Sodium (VOLTAREN) 1 %   No No   Sig: Apply 2 g topically 4 (four) times a day   acetaminophen (TYLENOL) 500 mg tablet   Yes No   Sig: Take 500 mg by mouth every 6 (six) hours as needed for pain   amLODIPine (NORVASC) 10 mg tablet   No No   Sig: take 1 tablet by mouth once daily   atorvastatin (LIPITOR) 20 mg tablet   No No   Sig: Take 1 tablet (20 mg total) by mouth daily   estradiol (ESTRACE) 0 1 mg/g vaginal cream   No No   Sig: INSERT 0 5 G INTO THE VAGINA TWICE A WEEK      Facility-Administered Medications: None       Past Medical History:   Diagnosis Date    Fatigue     High cholesterol     Hypertension     Neuropathy     Osteoarthritis     knees    Shortness of breath        Past Surgical History:   Procedure Laterality Date    APPENDECTOMY      EPIDURAL BLOCK INJECTION Left 5/17/2018    Procedure: L4 AND L5 TRANSFORAMINAL EPIDURAL STEROID INJECTION;  Surgeon: Stefani Roman MD;  [facility];  Service: Pain Management     EPIDURAL BLOCK INJECTION Left 10/4/2018    Procedure: L4 AND L5 TRANSFORAMINAL EPIDURAL STEROID INJECTION;  Surgeon: Stefani Roman MD;  [facility];  Service: Pain Management     EPIDURAL BLOCK INJECTION Left 5/1/2019    Procedure: L4-5 and L5-S1 transforaminal epidural steroid injection;  Surgeon: Stefani Roman MD;  [facility];  Service: Pain Management     FL GUIDED NEEDLE PLAC BX/ASP/INJ  5/1/2019    [place] COLONOSCOPY FLX DX W/COLLJ SPEC WHEN PFRMD N/A 5/31/2016    Procedure: COLONOSCOPY;  Surgeon: Geovanni Beach MD;  [facility];  Service: Gastroenterology    [place] ESOPHAGOGASTRODUODENOSCOPY TRANSORAL DIAGNOSTIC N/A 11/15/2016    Procedure: ESOPHAGOGASTRODUODENOSCOPY (EGD); Surgeon: Geovanni Beach MD;  [facility];  Service: Gastroenterology    OK TYMPANOPLASTY Left 10/25/2019    Procedure: LEFT TYMPANOPLASTY;  Surgeon: Tim Velazquez MD;  [facility];  Service: ENT    TUBAL LIGATION         Family History   Problem Relation Age of Onset    Uterine cancer Mother     Early death Father     Heart disease Brother     No Known Problems Sister     No Known Problems Daughter     No Known Problems Sister     No Known Problems Daughter     No Known Problems Maternal Aunt     No Known Problems Maternal Aunt     No Known Problems Maternal Aunt     No Known Problems Maternal Aunt     No Known Problems Maternal Grandmother     No Known Problems Maternal Grandfather     No Known Problems Paternal Grandmother     No Known Problems Paternal Grandfather      I have reviewed and agree with the history as documented      E-Cigarette/Vaping    E-Cigarette Use Never User      E-Cigarette/Vaping Substances    Nicotine No     THC No     CBD No     Flavoring No     Other No     Unknown No      Social History     Tobacco Use    Smoking status: Never Smoker    Smokeless tobacco: Never Used   Vaping Use    Vaping Use: Never used   Substance Use Topics    Alcohol use: Not Currently    Drug use: No       Review of Systems   Constitutional: Negative for appetite change, chills and fever  Respiratory: Negative for cough and shortness of breath  Cardiovascular: Positive for leg swelling  Negative for chest pain  Gastrointestinal: Negative for abdominal pain, nausea and vomiting  Genitourinary: Positive for frequency  Musculoskeletal: Negative for gait problem  Neurological: Negative for weakness and numbness  All other systems reviewed and are negative  Physical Exam  Physical Exam  Vitals reviewed  Constitutional:       General: She is not in acute distress  Appearance: Normal appearance  She is not ill-appearing, toxic-appearing or diaphoretic  HENT:      Head: Normocephalic and atraumatic  Right Ear: External ear normal       Left Ear: External ear normal       Nose: Nose normal    Eyes:      General: No scleral icterus  Right eye: No discharge  Left eye: No discharge  Cardiovascular:      Rate and Rhythm: Normal rate and regular rhythm  Pulses: Normal pulses  Comments: Edema to mid shin b/l  Pulmonary:      Effort: Pulmonary effort is normal  No respiratory distress  Abdominal:      General: There is no distension  Palpations: Abdomen is soft  Tenderness: There is no abdominal tenderness  There is no guarding or rebound  Musculoskeletal:         General: No deformity or signs of injury  Skin:     General: Skin is warm  Coloration: Skin is not jaundiced or pale  Findings: No bruising or erythema  Neurological:      General: No focal deficit present  Mental Status: She is alert  Mental status is at baseline           Vital Signs  ED Triage Vitals [08/07/22 2241] Temperature Pulse Respirations Blood Pressure SpO2   97 5 °F (36 4 °C) 63 18 134/61 97 %      Temp Source Heart Rate Source Patient Position - Orthostatic VS BP Location FiO2 (%)   Temporal Monitor Lying Left arm --      Pain Score       4           Vitals:    08/07/22 2241 08/08/22 0045 08/08/22 0100 08/08/22 0134   BP: 134/61 122/63 115/58 122/77   Pulse: 63 57 (!) 49 55   Patient Position - Orthostatic VS: Lying            Visual Acuity      ED Medications  Medications - No data to display    Diagnostic Studies  Results Reviewed     Procedure Component Value Units Date/Time    T4, free [[id number]]  (Normal) Collected: 08/08/22 0019    Lab Status: Final result Specimen: Blood from Arm, Right Updated: 08/08/22 1125     Free T4 0 99 ng/dL     TSH, 3rd generation with Free T4 reflex [[id number]]  (Abnormal) Collected: 08/08/22 0019    Lab Status: Final result Specimen: Blood from Arm, Right Updated: 08/08/22 0057     TSH 3RD GENERATON 6 728 uIU/mL     Narrative:      Patients undergoing fluorescein dye angiography may retain small amounts of fluorescein in the body for 48-72 hours post procedure  Samples containing fluorescein can produce falsely depressed TSH values  If the patient had this procedure,a specimen should be resubmitted post fluorescein clearance        NT-BNP PRO [[id number]]  (Abnormal) Collected: 08/08/22 0019    Lab Status: Final result Specimen: Blood from Arm, Right Updated: 08/08/22 0057     NT-proBNP 192 pg/mL     Comprehensive metabolic panel [[id number]] Collected: 08/08/22 0019    Lab Status: Final result Specimen: Blood from Arm, Right Updated: 08/08/22 0049     Sodium 143 mmol/L      Potassium 3 5 mmol/L      Chloride 104 mmol/L      CO2 29 mmol/L      ANION GAP 10 mmol/L      BUN 19 mg/dL      Creatinine 0 91 mg/dL      Glucose 104 mg/dL      Calcium 8 7 mg/dL      AST 22 U/L      ALT 38 U/L      Alkaline Phosphatase 90 U/L      Total Protein 7 4 g/dL      Albumin 4 1 g/dL      Total Bilirubin 0 75 mg/dL      eGFR 70 ml/min/1 73sq m     Narrative:      [place] guidelines for Chronic Kidney Disease (CKD):     Stage 1 with normal or high GFR (GFR > 90 mL/min/1 73 square meters)    Stage 2 Mild CKD (GFR = 60-89 mL/min/1 73 square meters)    Stage 3A Moderate CKD (GFR = 45-59 mL/min/1 73 square meters)    Stage 3B Moderate CKD (GFR = 30-44 mL/min/1 73 square meters)    Stage 4 Severe CKD (GFR = 15-29 mL/min/1 73 square meters)    Stage 5 End Stage CKD (GFR <15 mL/min/1 73 square meters)  Note: GFR calculation is accurate only with a steady state creatinine    D-Dimer [[id number]]  (Abnormal) Collected: 08/08/22 0019    Lab Status: Final result Specimen: Blood from Arm, Right Updated: 08/08/22 0048     D-Dimer, Quant 0 96 ug/ml FEU     Narrative: In the evaluation for possible pulmonary embolism, in the appropriate (Well's Score of 4 or less) patient, the age adjusted d-dimer cutoff for this patient can be calculated as:    Age x 0 01 (in ug/mL) for Age-adjusted D-dimer exclusion threshold for a patient over 50 years      UA w Reflex to Microscopic w Reflex to Culture [[id number]] Collected: 08/08/22 0037    Lab Status: Final result Specimen: Urine, Clean Catch Updated: 08/08/22 0042     Color, UA Light Yellow     Clarity, UA Clear     Specific Gravity, UA 1 010     pH, UA 6 0     Leukocytes, UA Negative     Nitrite, UA Negative     Protein, UA Negative mg/dl      Glucose, UA Negative mg/dl      Ketones, UA Negative mg/dl      Urobilinogen, UA 0 2 E U /dl      Bilirubin, UA Negative     Occult Blood, UA Negative    CBC and differential [[id number]] Collected: 08/08/22 0019    Lab Status: Final result Specimen: Blood from Arm, Right Updated: 08/08/22 0028     WBC 5 74 Thousand/uL      RBC 4 03 Million/uL      Hemoglobin 12 0 g/dL      Hematocrit 35 4 %      MCV 88 fL      MCH 29 8 pg      MCHC 33 9 g/dL      RDW 12 2 %      MPV 9 8 fL      Platelets 056 Thousands/uL nRBC 0 /100 WBCs      Neutrophils Relative 45 %      Immat GRANS % 0 %      Lymphocytes Relative 43 %      Monocytes Relative 9 %      Eosinophils Relative 2 %      Basophils Relative 1 %      Neutrophils Absolute 2 62 Thousands/µL      Immature Grans Absolute 0 01 Thousand/uL      Lymphocytes Absolute 2 47 Thousands/µL      Monocytes Absolute 0 49 Thousand/µL      Eosinophils Absolute 0 11 Thousand/µL      Basophils Absolute 0 04 Thousands/µL                  XR chest 1 view portable   Final Result by Pool Blair MD (08/08 5611)      No acute cardiopulmonary disease  Workstation performed: [id number]         VAS lower limb venous duplex study, complete bilateral    (Results Pending)              Procedures  Procedures         ED Course  ED Course as of 08/09/22 0031   Mon Aug 08, 2022   0013 XR chest 1 view portable  Cardiomegaly on portable, comparable to previous   0028     0029 Procedure Note: EKG  Date/Time: 08/08/22 12:29 AM   Interpreted by: Alverto Stallings  Indications / Diagnosis: peripheral edema  ECG reviewed by me, the ED Provider: yes   The EKG demonstrates:  Rhythm: sinus christa 53bpm  Intervals: normal intervals  Axis: left axis  QRS/Blocks: RBBB  ST Changes: No acute ST Changes, no STD/LEIGHTON                                                  MDM  Number of Diagnoses or Management Options  Bilateral lower extremity edema  Diagnosis management comments: 31-year-old female presents for evaluation of bilateral lower extremity peripheral edema  Patient is overall well-appearing with normal vital signs  She does have history of hypertension hypercholesterolemia, no history of previous VTE  Will evaluate with basic labs to check cardiac, hepatic, renal functions  Will also obtain EKG and portable chest x-ray  Patient likely be discharged home and follow-up with her PCP next week, will supply Ace bandages, daughter also advised of compression stockings        Disposition  Final diagnoses:   Bilateral lower extremity edema     Time reflects when diagnosis was documented in both MDM as applicable and the Disposition within this note     Time User Action Codes Description Comment    8/8/2022  1:09 AM Ria Ellis Add [R60 0] Bilateral lower extremity edema       ED Disposition     ED Disposition   Discharge    Condition   Stable    Date/Time   Mon Aug 8, 2022  1:09 AM    Comment   Nathaly Rashid discharge to home/self care  Follow-up Information     Follow up With Specialties Details Why [address], [facility] Schedule an appointment as soon as possible for a visit   [address], [facility] [address] [phone]            Discharge Medication List as of 8/8/2022  1:12 AM      CONTINUE these medications which have NOT CHANGED    Details   acetaminophen (TYLENOL) 500 mg tablet Take 500 mg by mouth every 6 (six) hours as needed for pain, Historical Med      amLODIPine (NORVASC) 10 mg tablet take 1 tablet by mouth once daily, Normal      atorvastatin (LIPITOR) 20 mg tablet Take 1 tablet (20 mg total) by mouth daily, Starting Mon 2/7/2022, Normal      Diclofenac Sodium (VOLTAREN) 1 % Apply 2 g topically 4 (four) times a day, Starting Mon 6/13/2022, Normal      estradiol (ESTRACE) 0 1 mg/g vaginal cream INSERT 0 5 G INTO THE VAGINA TWICE A WEEK, Normal             Outpatient Discharge Orders   VAS lower limb venous duplex study, complete bilateral   Standing Status: Future Standing Exp   Date: 08/08/26       PDMP Review       Value Time User    PDMP Reviewed  Yes 10/5/2021  5:04 PM En Barrett PA-C          ED Provider  Electronically Signed by           Jose Alfredo Montoya DO  08/09/22 6737

## 2022-08-09 ENCOUNTER — HOSPITAL ENCOUNTER (OUTPATIENT)
Dept: NON INVASIVE DIAGNOSTICS | Facility: HOSPITAL | Age: 57
Discharge: HOME | End: 2022-08-09
Attending: EMERGENCY MEDICINE
Payer: COMMERCIAL

## 2022-08-09 DIAGNOSIS — R60.0 BILATERAL LOWER EXTREMITY EDEMA: ICD-10-CM

## 2022-08-09 PROCEDURE — 93970 EXTREMITY STUDY: CPT | Performed by: SURGERY

## 2022-08-09 PROCEDURE — 93970 EXTREMITY STUDY: CPT

## 2022-08-15 ENCOUNTER — OFFICE VISIT (OUTPATIENT)
Dept: FAMILY MEDICINE CLINIC | Facility: CLINIC | Age: 57
End: 2022-08-15
Payer: COMMERCIAL

## 2022-08-15 VITALS
DIASTOLIC BLOOD PRESSURE: 78 MMHG | SYSTOLIC BLOOD PRESSURE: 126 MMHG | HEART RATE: 67 BPM | BODY MASS INDEX: 29.23 KG/M2 | HEIGHT: 59 IN | WEIGHT: 145 LBS | OXYGEN SATURATION: 97 % | TEMPERATURE: 97.3 F

## 2022-08-15 DIAGNOSIS — R60.0 LEG EDEMA: ICD-10-CM

## 2022-08-15 DIAGNOSIS — I10 BENIGN ESSENTIAL HTN: Primary | ICD-10-CM

## 2022-08-15 DIAGNOSIS — N95.8 GENITOURINARY SYNDROME OF MENOPAUSE: ICD-10-CM

## 2022-08-15 PROCEDURE — 99213 OFFICE O/P EST LOW 20 MIN: CPT | Performed by: FAMILY MEDICINE

## 2022-08-15 PROCEDURE — T1015 CLINIC SERVICE: HCPCS | Performed by: FAMILY MEDICINE

## 2022-08-15 RX ORDER — AMLODIPINE BESYLATE 5 MG/1
10 TABLET ORAL DAILY
Qty: 30 TABLET | Refills: 1 | Status: SHIPPED | OUTPATIENT
Start: 2022-08-15 | End: 2022-10-10 | Stop reason: SDUPTHER

## 2022-08-15 RX ORDER — CHLORTHALIDONE 25 MG/1
25 TABLET ORAL DAILY
Qty: 30 TABLET | Refills: 1 | Status: SHIPPED | OUTPATIENT
Start: 2022-08-15 | End: 2022-10-03

## 2022-08-15 NOTE — PROGRESS NOTES
Assessment/Plan:     Diagnoses and all orders for this visit:    Benign essential HTN  Comments:  Decrease amlodipine to 5 mg due to LE edema  Start chlorthalidone 25 mg daily  BMP in 2 weeks  RTC 1 month  Orders:  -     amLODIPine (NORVASC) 5 mg tablet; Take 2 tablets (10 mg total) by mouth daily  -     chlorthalidone 25 mg tablet; Take 1 tablet (25 mg total) by mouth daily  -     Basic metabolic panel; Future    Leg edema  Comments:  Amlodipine reduceed to 5 mg  Start Chlorthalidone 25 mg  BMP in 2 weeks  Orders:  -     Basic metabolic panel; Future        Return in about 4 weeks (around 9/12/2022) for Recheck HTN and LE edema  Subjective:        Patient ID: Yessy Johnson is a 62 y o  female  Chief Complaint   Patient presents with    Foot Swelling       PMH dysphagia, near syncope, chronic otitis media of left ear with tympanic membrane perforation, OA, atrophic vaginitis, HLD, and chronic pain syndrome presents with her daughter for recheck of HTN  LE edema began after taking amlodipine 10 mg  Denies any other concerns  Shoulder pain greatly improved with P T  and supportive care  Hypertension  This is a chronic problem  The current episode started more than 1 year ago  The problem has been waxing and waning since onset  The problem is controlled  Pertinent negatives include no anxiety, blurred vision, chest pain, headaches, malaise/fatigue, neck pain, orthopnea, palpitations, peripheral edema, PND, shortness of breath or sweats  Agents associated with hypertension include NSAIDs  Risk factors for coronary artery disease include obesity, post-menopausal state and sedentary lifestyle  Past treatments include calcium channel blockers  The current treatment provides moderate improvement  Compliance problems include diet  Shoulder Pain   The pain is present in the left shoulder  This is a chronic problem  The current episode started more than 1 month ago   There has been no history of extremity trauma  The problem occurs daily  The problem has been gradually improving  The pain is at a severity of 3/10  Pertinent negatives include no limited range of motion (raising arms above head elicits pain)  She has tried movement, cold and heat (Diclofenac topical; P T ) for the symptoms  The treatment provided significant relief         The following portions of the patient's history were reviewed and updated as appropriate: allergies, current medications, past family history, past medical history, past social history, past surgical history and problem list     Patient Active Problem List   Diagnosis    Abdominal pain, left lower quadrant    Chronic lumbar radiculopathy    Chronic pain syndrome    Dyslipidemia    Chronic pain of right knee    Chronic right hip pain    Myofascial pain syndrome    Atrophic vaginitis    Primary osteoarthritis of both knees    Lumbar radiculopathy    Left hip pain    Lumbar degenerative disc disease    Pharyngoesophageal dysphagia    Perforation of left tympanic membrane    Chronic otitis media of left ear    Atypical chest pain    Epigastric abdominal pain    Chronic musculoskeletal pain    Pain in finger of both hands    Tympanic membrane perforation, left    Strain of thoracic back region    Chronic right shoulder pain    Medial epicondylitis of elbow, left    Near syncope    Chronic pain    Bradycardia    Episode of dizziness    Acute non-recurrent maxillary sinusitis    RBBB    Bursitis of both shoulders    Benign essential HTN    Leg edema       Current Outpatient Medications   Medication Sig Dispense Refill    amLODIPine (NORVASC) 5 mg tablet Take 2 tablets (10 mg total) by mouth daily 30 tablet 1    chlorthalidone 25 mg tablet Take 1 tablet (25 mg total) by mouth daily 30 tablet 1    acetaminophen (TYLENOL) 500 mg tablet Take 500 mg by mouth every 6 (six) hours as needed for pain      atorvastatin (LIPITOR) 20 mg tablet Take 1 tablet (20 mg total) by mouth daily 30 tablet 5    Diclofenac Sodium (VOLTAREN) 1 % Apply 2 g topically 4 (four) times a day 350 g 1    estradiol (ESTRACE) 0 1 mg/g vaginal cream INSERT 0 5 G INTO THE VAGINA TWICE A WEEK 42 5 g 0     No current facility-administered medications for this visit  Past Medical History:   Diagnosis Date    Fatigue     High cholesterol     Hypertension     Neuropathy     Osteoarthritis     knees    Shortness of breath         Past Surgical History:   Procedure Laterality Date    APPENDECTOMY      EPIDURAL BLOCK INJECTION Left 5/17/2018    Procedure: L4 AND L5 TRANSFORAMINAL EPIDURAL STEROID INJECTION;  Surgeon: Spencer Bland MD;  [facility];  Service: Pain Management     EPIDURAL BLOCK INJECTION Left 10/4/2018    Procedure: L4 AND L5 TRANSFORAMINAL EPIDURAL STEROID INJECTION;  Surgeon: Spencer Bland MD;  [facility];  Service: Pain Management     EPIDURAL BLOCK INJECTION Left 5/1/2019    Procedure: L4-5 and L5-S1 transforaminal epidural steroid injection;  Surgeon: Spencer Bland MD;  [facility];  Service: Pain Management     FL GUIDED NEEDLE PLAC BX/ASP/INJ  5/1/2019    [place] COLONOSCOPY FLX DX W/COLLJ SPEC WHEN PFRMD N/A 5/31/2016    Procedure: COLONOSCOPY;  Surgeon: Dilshad Ibrahim MD;  [facility];  Service: Gastroenterology    [place] ESOPHAGOGASTRODUODENOSCOPY TRANSORAL DIAGNOSTIC N/A 11/15/2016    Procedure: ESOPHAGOGASTRODUODENOSCOPY (EGD);   Surgeon: Dilshad Ibrahim MD;  [facility];  Service: Gastroenterology    [place] TYMPANOPLASTY Left 10/25/2019    Procedure: LEFT TYMPANOPLASTY;  Surgeon: Ashly Wilson MD;  [facility];  Service: ENT    TUBAL LIGATION          Social History     Socioeconomic History    Marital status: Single     Spouse name: Not on file    Number of children: Not on file    Years of education: Not on file    Highest education level: Not on file   Occupational History    Not on file   Tobacco Use    Smoking status: Never Smoker    Smokeless tobacco: Never Used   Vaping Use    Vaping Use: Never used   Substance and Sexual Activity    Alcohol use: Not Currently    Drug use: No    Sexual activity: Yes     Partners: Male     Birth control/protection: Female Sterilization   Other Topics Concern    Not on file   Social History Narrative    Not on file     Social Determinants of Health     Financial Resource Strain: Not on file   Food Insecurity: Not on file   Transportation Needs: Not on file   Physical Activity: Not on file   Stress: Not on file   Social Connections: Not on file   Intimate Partner Violence: Not on file   Housing Stability: Not on file        Review of Systems   Constitutional: Negative for activity change, appetite change, fatigue, malaise/fatigue and unexpected weight change  Eyes: Negative for blurred vision  Respiratory: Negative for choking, shortness of breath and wheezing  Cardiovascular: Positive for leg swelling  Negative for chest pain, palpitations, orthopnea and PND  Gastrointestinal: Negative for abdominal pain, blood in stool, constipation, diarrhea and nausea  Genitourinary: Negative for difficulty urinating, dysuria, frequency, hematuria and urgency  Musculoskeletal: Positive for arthralgias (left shoulder pain; intermittent)  Negative for back pain, myalgias and neck pain  Skin: Negative for color change, rash and wound  Neurological: Negative for dizziness, tremors, syncope, weakness and headaches  Psychiatric/Behavioral: Negative for agitation, confusion, self-injury and suicidal ideas  Objective:      /78   Pulse 67   Temp (!) 97 3 °F (36 3 °C)   Ht 4' 11" (1 499 m)   Wt 65 8 kg (145 lb)   SpO2 97%   BMI 29 29 kg/m²          Physical Exam  Vitals and nursing note reviewed  Constitutional:       Appearance: Normal appearance  HENT:      Head: Normocephalic and atraumatic        Nose: Nose normal       Mouth/Throat: Mouth: Mucous membranes are moist    Eyes:      Conjunctiva/sclera: Conjunctivae normal    Cardiovascular:      Rate and Rhythm: Normal rate and regular rhythm  Pulmonary:      Effort: Pulmonary effort is normal       Breath sounds: Normal breath sounds  Abdominal:      General: Abdomen is flat  Bowel sounds are normal       Palpations: Abdomen is soft  Genitourinary:     Comments: Exam deferred  Musculoskeletal:      Left shoulder: Normal range of motion  Right lower leg: Edema (mild) present  Left lower leg: Edema (mild) present  Skin:     General: Skin is warm and dry  Capillary Refill: Capillary refill takes less than 2 seconds  Neurological:      General: No focal deficit present  Mental Status: She is alert and oriented to person, place, and time     Psychiatric:         Mood and Affect: Mood normal          Behavior: Behavior normal

## 2022-08-15 NOTE — PATIENT INSTRUCTIONS
Hypertension   AMBULATORY CARE:   Hypertension  is high blood pressure  Your blood pressure is the force of your blood moving against the walls of your arteries  Hypertension causes your blood pressure to get so high that your heart has to work much harder than normal  This can damage your heart  The cause of hypertension may not be known  This is called essential or primary hypertension  Hypertension caused by another medical condition, such as kidney disease, is called secondary hypertension  Common symptoms include the following:   Headache    Blurred vision    Chest pain    Dizziness or weakness    Trouble breathing    Nosebleeds    Call your local emergency number (911 in the [address] [facility]) or have someone call if:   You have chest pain  You have any of the following signs of a heart attack:      Squeezing, pressure, or pain in your chest    You may  also have any of the following:     Discomfort or pain in your back, neck, jaw, stomach, or arm    Shortness of breath    Nausea or vomiting    Lightheadedness or a sudden cold sweat    You become confused or have trouble speaking  You suddenly feel lightheaded or have trouble breathing  Seek care immediately if:   You have a severe headache or vision loss  You have weakness in an arm or leg  Call your doctor or cardiologist if:   You feel faint, dizzy, confused, or drowsy  You have been taking your blood pressure medicine but your pressure is higher than your provider says it should be  You have questions or concerns about your condition or care  What you need to know about the stages of hypertension:       Normal blood pressure is 119/79 or lower   Your healthcare provider may only check your blood pressure each year if it stays at a normal level  Elevated blood pressure is 120/79 to 129/79   This is sometimes called prehypertension  Your healthcare provider may suggest lifestyle changes to help lower your blood pressure to a normal level   He or she may then check it again in 3 to 6 months  Stage 1 hypertension is 130/80  to 139/89   Your provider may recommend lifestyle changes, medication, and checks every 3 to 6 months until your blood pressure is controlled  Stage 2 hypertension is 140/90 or higher   Your provider will recommend lifestyle changes and have you take 2 kinds of hypertension medicines  You will also need to have your blood pressure checked monthly until it is controlled  Treatment for hypertension  may include medicine to lower your blood pressure and lower your cholesterol level  A low cholesterol level helps prevent heart disease and makes it easier to control your blood pressure  Take your medicine exactly as directed  You may also need to make lifestyle changes  Manage hypertension:   Check your blood pressure at home  Avoid smoking, caffeine, and exercise at least 30 minutes before checking your blood pressure  Sit and rest for 5 minutes before you take your blood pressure  Extend your arm and support it on a flat surface  Your arm should be at the same level as your heart  Follow the directions that came with your blood pressure monitor  Check your blood pressure 2 times, 1 minute apart, before you take your medicine in the morning  Also check your blood pressure before your evening meal  Keep a record of your readings and bring it to your follow-up visits  Ask your healthcare provider what your blood pressure should be  Manage any other health conditions you have  Health conditions such as diabetes can increase your risk for hypertension  Follow your healthcare provider's instructions and take all your medicines as directed  Ask about all medicines  Certain medicines can increase your blood pressure  Examples include oral birth control pills, decongestants, herbal supplements, and NSAIDs, such as ibuprofen  Your healthcare provider can tell you which medicines are safe for you to take   This includes prescription and over-the-counter medicines  Lifestyle changes you can make to manage hypertension:  Your healthcare provider may recommend you work with a team to manage hypertension  The team may include medical experts such as a dietitian, an exercise or physical therapist, and a behavior therapist  Your family members may be included in helping you create lifestyle changes  Limit sodium (salt) as directed  Too much sodium can affect your fluid balance  Check labels to find low-sodium or no-salt-added foods  Some low-sodium foods use potassium salts for flavor  Too much potassium can also cause health problems  Your healthcare provider will tell you how much sodium and potassium are safe for you to have in a day  He or she may recommend that you limit sodium to 2,300 mg a day  Follow the meal plan recommended by your healthcare provider  A dietitian or your provider can give you more information on low-sodium plans or the DASH (Dietary Approaches to Stop Hypertension) eating plan  The DASH plan is low in sodium, processed sugar, unhealthy fats, and total fat  It is high in potassium, calcium, and fiber  These can be found in vegetables, fruit, and whole-grain foods  Be physically active throughout the day  Physical activity, such as exercise, can help control your blood pressure and your weight  Be physically active for at least 30 minutes per day, on most days of the week  Include aerobic activity, such as walking or riding a bicycle  Also include strength training at least 2 times each week  Your healthcare providers can help you create a physical activity plan  Decrease stress  This may help lower your blood pressure  Learn ways to relax, such as deep breathing or listening to music  Limit alcohol as directed  Alcohol can increase your blood pressure  A drink of alcohol is 12 ounces of beer, 5 ounces of wine, or 1½ ounces of liquor  Do not smoke    Nicotine and other chemicals in cigarettes and cigars can increase your blood pressure and also cause lung damage  Ask your healthcare provider for information if you currently smoke and need help to quit  E-cigarettes or smokeless tobacco still contain nicotine  Talk to your healthcare provider before you use these products  Follow up with your doctor or cardiologist as directed: You will need to return to have your blood pressure checked and to have other lab tests done  Write down your questions so you remember to ask them during your visits  © Copyright [employer] 2022 Information is for End User's use only and may not be sold, redistributed or otherwise used for commercial purposes  All illustrations and images included in CareNotes® are the copyrighted property of A D A M , Inc  or [facility] Elvira Trivedi   The above information is an  only  It is not intended as medical advice for individual conditions or treatments  Talk to your doctor, nurse or pharmacist before following any medical regimen to see if it is safe and effective for you

## 2022-08-17 RX ORDER — ESTRADIOL 0.1 MG/G
CREAM VAGINAL
Qty: 42.5 G | Refills: 0 | Status: SHIPPED | OUTPATIENT
Start: 2022-08-17 | End: 2022-10-03

## 2022-08-22 ENCOUNTER — OFFICE VISIT (OUTPATIENT)
Dept: PAIN MEDICINE | Facility: CLINIC | Age: 57
End: 2022-08-22
Payer: COMMERCIAL

## 2022-08-22 VITALS
HEIGHT: 59 IN | WEIGHT: 141 LBS | HEART RATE: 47 BPM | SYSTOLIC BLOOD PRESSURE: 132 MMHG | DIASTOLIC BLOOD PRESSURE: 69 MMHG | BODY MASS INDEX: 28.43 KG/M2

## 2022-08-22 DIAGNOSIS — G89.29 CHRONIC LEFT SHOULDER PAIN: ICD-10-CM

## 2022-08-22 DIAGNOSIS — G89.4 CHRONIC PAIN SYNDROME: Primary | ICD-10-CM

## 2022-08-22 DIAGNOSIS — M75.51 BURSITIS OF RIGHT SHOULDER: ICD-10-CM

## 2022-08-22 DIAGNOSIS — M25.512 CHRONIC LEFT SHOULDER PAIN: ICD-10-CM

## 2022-08-22 DIAGNOSIS — M77.02 MEDIAL EPICONDYLITIS OF ELBOW, LEFT: ICD-10-CM

## 2022-08-22 PROCEDURE — 99214 OFFICE O/P EST MOD 30 MIN: CPT | Performed by: NURSE PRACTITIONER

## 2022-08-22 NOTE — PROGRESS NOTES
Assessment:  1  Chronic pain syndrome    2  Chronic left shoulder pain    3  Bursitis of right shoulder    4  Medial epicondylitis of elbow, left        Plan:  While the patient was in the office today, I did have a thorough conversation regarding their chronic pain syndrome, medication management, and treatment plan options  Patient is being seen for follow-up visit  She was last seen here in June of 2021 for follow-up visit from of right subacromial bursa injection and a left medial epicondyle injection  At that time her pain was 70-80% improved  She returns to the office today complaining of increasing right shoulder and left elbow pain  At this point, she will be scheduled for ultrasound-guided right subacromial bursa injection and left medial epicondyle injection  Complete risks and benefits including bleeding, infection, tissue reaction, nerve injury and allergic reaction were discussed  The approach was demonstrated using models and literature was provided  Verbal and written consent was obtained  Continue over-the-counter Tylenol as needed  Continue diclofenac gel as needed  Follow-up 1 month after the injection  History of Present Illness: The patient is a 62 y o  female who presents for a follow up office visit in regards to Shoulder Pain and Arm Pain  The patients current symptoms include complaints of right shoulder pain, left elbow pain  Current pain level is a 4/10  Pain increases with activity  Quality pain is described as sharp, pressure-like, numb  Current pain medications includes:  Over-the-counter Tylenol as needed, diclofenac gel as needed   The patient reports that this regimen is providing 25-30 % pain relief  The patient is reporting no side effects from this pain medication regimen      I have personally reviewed and/or updated the patient's past medical history, past surgical history, family history, social history, current medications, allergies, and vital signs today  Review of Systems  Review of Systems   Respiratory: Negative for shortness of breath  Cardiovascular: Negative for chest pain  Gastrointestinal: Negative for constipation, diarrhea, nausea and vomiting  Musculoskeletal: Positive for gait problem  Negative for arthralgias, joint swelling (feet & legs) and myalgias  Neurological: Negative for dizziness, seizures and weakness  All other systems reviewed and are negative  Past Medical History:   Diagnosis Date    Fatigue     High cholesterol     Hypertension     Neuropathy     Osteoarthritis     knees    Shortness of breath        Past Surgical History:   Procedure Laterality Date    APPENDECTOMY      EPIDURAL BLOCK INJECTION Left 5/17/2018    Procedure: L4 AND L5 TRANSFORAMINAL EPIDURAL STEROID INJECTION;  Surgeon: Baltazar Matt MD;  [facility];  Service: Pain Management     EPIDURAL BLOCK INJECTION Left 10/4/2018    Procedure: L4 AND L5 TRANSFORAMINAL EPIDURAL STEROID INJECTION;  Surgeon: Baltazar Matt MD;  [facility];  Service: Pain Management     EPIDURAL BLOCK INJECTION Left 5/1/2019    Procedure: L4-5 and L5-S1 transforaminal epidural steroid injection;  Surgeon: Baltazar Matt MD;  [facility];  Service: Pain Management     FL GUIDED NEEDLE PLAC BX/ASP/INJ  5/1/2019    MO COLONOSCOPY FLX DX W/COLLJ SPEC WHEN PFRMD N/A 5/31/2016    Procedure: COLONOSCOPY;  Surgeon: Ariadna Nguyen MD;  [facility];  Service: Gastroenterology    MO ESOPHAGOGASTRODUODENOSCOPY TRANSORAL DIAGNOSTIC N/A 11/15/2016    Procedure: ESOPHAGOGASTRODUODENOSCOPY (EGD);   Surgeon: Ariadna Nguyen MD;  [facility];  Service: Gastroenterology    MO TYMPANOPLASTY Left 10/25/2019    Procedure: LEFT TYMPANOPLASTY;  Surgeon: Deniz Archibald MD;  Location:  MAIN OR;  Service: ENT    TUBAL LIGATION         Family History   Problem Relation Age of Onset    Uterine cancer Mother     Early death Father     Heart disease Brother     No Known Problems Sister     No Known Problems Daughter     No Known Problems Sister     No Known Problems Daughter     No Known Problems Maternal Aunt     No Known Problems Maternal Aunt     No Known Problems Maternal Aunt     No Known Problems Maternal Aunt     No Known Problems Maternal Grandmother     No Known Problems Maternal Grandfather     No Known Problems Paternal Grandmother     No Known Problems Paternal Grandfather        Social History     Occupational History    Not on file   Tobacco Use    Smoking status: Never Smoker    Smokeless tobacco: Never Used   Vaping Use    Vaping Use: Never used   Substance and Sexual Activity    Alcohol use: Not Currently    Drug use: No    Sexual activity: Yes     Partners: Male     Birth control/protection: Female Sterilization         Current Outpatient Medications:     acetaminophen (TYLENOL) 500 mg tablet, Take 500 mg by mouth every 6 (six) hours as needed for pain, Disp: , Rfl:     amLODIPine (NORVASC) 5 mg tablet, Take 2 tablets (10 mg total) by mouth daily, Disp: 30 tablet, Rfl: 1    atorvastatin (LIPITOR) 20 mg tablet, Take 1 tablet (20 mg total) by mouth daily, Disp: 30 tablet, Rfl: 5    chlorthalidone 25 mg tablet, Take 1 tablet (25 mg total) by mouth daily, Disp: 30 tablet, Rfl: 1    Diclofenac Sodium (VOLTAREN) 1 %, Apply 2 g topically 4 (four) times a day, Disp: 350 g, Rfl: 1    estradiol (ESTRACE) 0 1 mg/g vaginal cream, INSERT 0 5 G INTO THE VAGINA TWICE A WEEK, Disp: 42 5 g, Rfl: 0    No Known Allergies    Physical Exam:    /69 (BP Location: Right arm, Patient Position: Sitting, Cuff Size: Standard)   Pulse (!) 47   Ht 4' 11" (1 499 m)   Wt 64 kg (141 lb)   BMI 28 48 kg/m²     Constitutional:normal, well developed, well nourished, alert, in no distress and non-toxic and no overt pain behavior    Eyes:anicteric  HEENT:grossly intact  Neck:supple, symmetric, trachea midline and no masses Pulmonary:even and unlabored  Cardiovascular:No edema or pitting edema present  Skin:Normal without rashes or lesions and well hydrated  Psychiatric:Mood and affect appropriate  Neurologic:Cranial Nerves II-XII grossly intact  Musculoskeletal:There is tenderness over the right subacromial bursa  increased pain with right shoulder abduction  There is tenderness over the left medial epicondyle  Imaging  No orders to display       No orders of the defined types were placed in this encounter

## 2022-09-21 ENCOUNTER — APPOINTMENT (OUTPATIENT)
Dept: LAB | Facility: HOSPITAL | Age: 57
End: 2022-09-21
Payer: COMMERCIAL

## 2022-09-21 DIAGNOSIS — R73.03 PREDIABETES: Primary | ICD-10-CM

## 2022-09-21 DIAGNOSIS — I10 BENIGN ESSENTIAL HTN: ICD-10-CM

## 2022-09-21 DIAGNOSIS — R60.0 LEG EDEMA: ICD-10-CM

## 2022-09-21 LAB
ANION GAP SERPL CALCULATED.3IONS-SCNC: 8 MMOL/L (ref 4–13)
BUN SERPL-MCNC: 23 MG/DL (ref 5–25)
CALCIUM SERPL-MCNC: 9.7 MG/DL (ref 8.3–10.1)
CHLORIDE SERPL-SCNC: 100 MMOL/L (ref 96–108)
CO2 SERPL-SCNC: 32 MMOL/L (ref 21–32)
CREAT SERPL-MCNC: 0.91 MG/DL (ref 0.6–1.3)
GFR SERPL CREATININE-BSD FRML MDRD: 70 ML/MIN/1.73SQ M
GLUCOSE P FAST SERPL-MCNC: 126 MG/DL (ref 65–99)
POTASSIUM SERPL-SCNC: 3.7 MMOL/L (ref 3.5–5.3)
SODIUM SERPL-SCNC: 140 MMOL/L (ref 135–147)

## 2022-09-21 PROCEDURE — 80048 BASIC METABOLIC PNL TOTAL CA: CPT

## 2022-09-21 PROCEDURE — 36415 COLL VENOUS BLD VENIPUNCTURE: CPT

## 2022-10-01 DIAGNOSIS — N95.8 GENITOURINARY SYNDROME OF MENOPAUSE: ICD-10-CM

## 2022-10-03 DIAGNOSIS — I10 BENIGN ESSENTIAL HTN: ICD-10-CM

## 2022-10-03 RX ORDER — CHLORTHALIDONE 25 MG/1
TABLET ORAL
Qty: 30 TABLET | Refills: 1 | Status: SHIPPED | OUTPATIENT
Start: 2022-10-03

## 2022-10-03 RX ORDER — ESTRADIOL 0.1 MG/G
CREAM VAGINAL
Qty: 42.5 G | Refills: 0 | Status: SHIPPED | OUTPATIENT
Start: 2022-10-03

## 2022-10-04 ENCOUNTER — TELEPHONE (OUTPATIENT)
Dept: FAMILY MEDICINE CLINIC | Facility: CLINIC | Age: 57
End: 2022-10-04

## 2022-10-10 DIAGNOSIS — I10 BENIGN ESSENTIAL HTN: ICD-10-CM

## 2022-10-11 RX ORDER — AMLODIPINE BESYLATE 5 MG/1
10 TABLET ORAL DAILY
Qty: 30 TABLET | Refills: 1 | Status: SHIPPED | OUTPATIENT
Start: 2022-10-11

## 2022-10-12 PROBLEM — J01.00 ACUTE NON-RECURRENT MAXILLARY SINUSITIS: Status: RESOLVED | Noted: 2021-05-11 | Resolved: 2022-10-12

## 2022-10-20 DIAGNOSIS — E78.5 DYSLIPIDEMIA: ICD-10-CM

## 2022-10-20 RX ORDER — ATORVASTATIN CALCIUM 20 MG/1
20 TABLET, FILM COATED ORAL DAILY
Qty: 30 TABLET | Refills: 5 | Status: SHIPPED | OUTPATIENT
Start: 2022-10-20

## 2022-11-05 DIAGNOSIS — N95.8 GENITOURINARY SYNDROME OF MENOPAUSE: ICD-10-CM

## 2022-11-06 RX ORDER — ESTRADIOL 0.1 MG/G
CREAM VAGINAL
Qty: 42.5 G | Refills: 0 | Status: SHIPPED | OUTPATIENT
Start: 2022-11-06

## 2022-11-08 ENCOUNTER — HOSPITAL ENCOUNTER (EMERGENCY)
Facility: HOSPITAL | Age: 57
Discharge: HOME | End: 2022-11-08
Attending: EMERGENCY MEDICINE

## 2022-11-08 ENCOUNTER — APPOINTMENT (EMERGENCY)
Dept: RADIOLOGY | Facility: HOSPITAL | Age: 57
End: 2022-11-08

## 2022-11-08 VITALS
RESPIRATION RATE: 18 BRPM | SYSTOLIC BLOOD PRESSURE: 138 MMHG | OXYGEN SATURATION: 98 % | HEART RATE: 71 BPM | TEMPERATURE: 98 F | DIASTOLIC BLOOD PRESSURE: 74 MMHG

## 2022-11-08 DIAGNOSIS — M79.671 RIGHT FOOT PAIN: Primary | ICD-10-CM

## 2022-11-09 NOTE — ED PROVIDER NOTES
History  Chief Complaint   Patient presents with   • Foot Pain     Patient states that she has had right foot pain for a few weeks     41-year-old female with a past medical history of chronic knee pain, chronic back pain, neuropathy, who presents for foot pain  Her pain is in the right foot  She describes it has been going on for several weeks to months now  She states that it is at the bottom, the plantar aspect of the foot  Seems to spread from the heel to the ball of the foot  She states that it hurts more when she is initially walking, seems to improve once the foot is warmed up  She has been taking Motrin as well as placing it in hot water with only minimal relief  She denies any numbness, weakness, tingling  Denies any pain in the calf or in the top of the foot  She does not describe any wounds or any trauma  ROS otherwise negative  Prior to Admission Medications   Prescriptions Last Dose Informant Patient Reported? Taking?    Diclofenac Sodium (VOLTAREN) 1 % Past Week at Unknown time  No Yes   Sig: Apply 2 g topically 4 (four) times a day   acetaminophen (TYLENOL) 500 mg tablet Past Week at Unknown time  Yes Yes   Sig: Take 500 mg by mouth every 6 (six) hours as needed for pain   amLODIPine (NORVASC) 5 mg tablet 11/8/2022 at Unknown time  No Yes   Sig: Take 2 tablets (10 mg total) by mouth daily   atorvastatin (LIPITOR) 20 mg tablet 11/8/2022 at Unknown time  No Yes   Sig: Take 1 tablet (20 mg total) by mouth daily   chlorthalidone 25 mg tablet 11/8/2022 at Unknown time  No Yes   Sig: take 1 tablet by mouth once daily   estradiol (ESTRACE) 0 1 mg/g vaginal cream Past Week at Unknown time  No Yes   Sig: INSERT 0 5 GRAMS INTO THE VAGINA TWICE WEEKLY      Facility-Administered Medications: None       Past Medical History:   Diagnosis Date   • Fatigue    • High cholesterol    • Hypertension    • Neuropathy    • Osteoarthritis     knees   • Shortness of breath        Past Surgical History: Procedure Laterality Date   • APPENDECTOMY     • EPIDURAL BLOCK INJECTION Left 5/17/2018    Procedure: L4 AND L5 TRANSFORAMINAL EPIDURAL STEROID INJECTION;  Surgeon: Shelli Wu MD;  [facility];  Service: Pain Management    • EPIDURAL BLOCK INJECTION Left 10/4/2018    Procedure: L4 AND L5 TRANSFORAMINAL EPIDURAL STEROID INJECTION;  Surgeon: Shelli Wu MD;  [facility];  Service: Pain Management    • EPIDURAL BLOCK INJECTION Left 5/1/2019    Procedure: L4-5 and L5-S1 transforaminal epidural steroid injection;  Surgeon: Shelli Wu MD;  [facility];  Service: Pain Management    • FL GUIDED NEEDLE PLAC BX/ASP/INJ  5/1/2019   • DC COLONOSCOPY FLX DX W/COLLJ SPEC WHEN PFRMD N/A 5/31/2016    Procedure: COLONOSCOPY;  Surgeon: Luther Mullen MD;  [facility];  Service: Gastroenterology   • DC ESOPHAGOGASTRODUODENOSCOPY TRANSORAL DIAGNOSTIC N/A 11/15/2016    Procedure: ESOPHAGOGASTRODUODENOSCOPY (EGD); Surgeon: Luther Mullen MD;  [facility];  Service: Gastroenterology   • DC TYMPANOPLASTY Left 10/25/2019    Procedure: LEFT TYMPANOPLASTY;  Surgeon: Nieves Xie MD;  Location:  MAIN OR;  Service: ENT   • TUBAL LIGATION         Family History   Problem Relation Age of Onset   • Uterine cancer Mother    • Early death Father    • Heart disease Brother    • No Known Problems Sister    • No Known Problems Daughter    • No Known Problems Sister    • No Known Problems Daughter    • No Known Problems Maternal Aunt    • No Known Problems Maternal Aunt    • No Known Problems Maternal Aunt    • No Known Problems Maternal Aunt    • No Known Problems Maternal Grandmother    • No Known Problems Maternal Grandfather    • No Known Problems Paternal Grandmother    • No Known Problems Paternal Grandfather      I have reviewed and agree with the history as documented      E-Cigarette/Vaping   • E-Cigarette Use Never User      E-Cigarette/Vaping Substances   • Nicotine No    • THC No    • CBD No    • Flavoring No    • Other No    • Unknown No      Social History     Tobacco Use   • Smoking status: Never Smoker   • Smokeless tobacco: Never Used   Vaping Use   • Vaping Use: Never used   Substance Use Topics   • Alcohol use: Not Currently   • Drug use: No       Review of Systems   Constitutional: Negative for chills and fever  HENT: Negative for congestion, rhinorrhea and sore throat  Respiratory: Negative for cough and shortness of breath  Cardiovascular: Negative for chest pain and palpitations  Gastrointestinal: Negative for abdominal pain, constipation, diarrhea, nausea and vomiting  Genitourinary: Negative for difficulty urinating and flank pain  Musculoskeletal: Positive for arthralgias and myalgias  Neurological: Negative for dizziness, weakness, light-headedness and headaches  Psychiatric/Behavioral: Negative for agitation, behavioral problems and confusion  All other systems reviewed and are negative  Physical Exam  Physical Exam  Constitutional:       Appearance: She is well-developed  HENT:      Head: Normocephalic and atraumatic  Cardiovascular:      Rate and Rhythm: Normal rate and regular rhythm  Heart sounds: Normal heart sounds  No murmur heard  No friction rub  Pulmonary:      Effort: Pulmonary effort is normal  No respiratory distress  Breath sounds: Normal breath sounds  No wheezing or rales  Abdominal:      General: Bowel sounds are normal  There is no distension  Palpations: Abdomen is soft  Tenderness: There is no abdominal tenderness  Musculoskeletal:         General: Tenderness present  Normal range of motion  Cervical back: Normal range of motion and neck supple  Comments: Patient's right foot grossly appears normal   She seems to have tenderness on the plantar aspect, around the mid to lateral plantar aspect of the foot  There is no swelling  There is no erythema    No pain in the ankle or throughout the calf  Skin:     General: Skin is warm  Capillary Refill: Capillary refill takes less than 2 seconds  Patient has strong DP pulse, PT pulse in the right foot     Comments: No wounds appreciated on the right foot  Neurological:      Mental Status: She is alert and oriented to person, place, and time  Coordination: Coordination normal    Psychiatric:         Behavior: Behavior normal          Thought Content: Thought content normal          Judgment: Judgment normal          Vital Signs  ED Triage Vitals [11/08/22 2153]   Temperature Pulse Respirations Blood Pressure SpO2   98 °F (36 7 °C) 71 18 138/74 98 %      Temp Source Heart Rate Source Patient Position - Orthostatic VS BP Location FiO2 (%)   Tympanic Monitor -- Right arm --      Pain Score       6           Vitals:    11/08/22 2153   BP: 138/74   Pulse: 71         Visual Acuity      ED Medications  Medications - No data to display    Diagnostic Studies  Results Reviewed     None                 XR foot 3+ views RIGHT   ED Interpretation by Zach Jackson MD (11/08 2225)   No acute osseous abnormality  Procedures  Procedures         ED Course                                             MDM  Number of Diagnoses or Management Options  Right foot pain  Diagnosis management comments: Patient's presentation may be consistent with plantar fasciitis, however at this time I have low suspicion for emergent pathology  Will provide patient with Podiatry follow-up, continue Tylenol/Motrin  Strict return precautions        Disposition  Final diagnoses:   Right foot pain     Time reflects when diagnosis was documented in both MDM as applicable and the Disposition within this note     Time User Action Codes Description Comment    11/8/2022 10:11 PM Richrd Link Add [K53 [id number]] Right foot pain       ED Disposition     ED Disposition   Discharge    Condition   Stable    Date/Time   Tue Nov 8, 2022 10:26 PM Mychal 1765 discharge to home/self care  Follow-up Information     Follow up With Specialties Details Why Contact Info Additional Information    [facility] Call  For re-evaluation [address] [address] [address], [place], [place], [address] [phone]          Patient's Medications   Discharge Prescriptions    No medications on file       No discharge procedures on file      PDMP Review       Value Time User    PDMP Reviewed  Yes 10/5/2021  5:04 PM Keshawn Real PA-C          ED Provider  Electronically Signed by           Jahaira Ramos MD  11/08/22 [address], [place]  11/08/22 4444

## 2022-11-14 DIAGNOSIS — I10 BENIGN ESSENTIAL HTN: ICD-10-CM

## 2022-11-14 RX ORDER — AMLODIPINE BESYLATE 5 MG/1
10 TABLET ORAL DAILY
Qty: 30 TABLET | Refills: 1 | Status: SHIPPED | OUTPATIENT
Start: 2022-11-14

## 2022-11-23 ENCOUNTER — TELEPHONE (OUTPATIENT)
Dept: PAIN MEDICINE | Facility: CLINIC | Age: 57
End: 2022-11-23

## 2022-11-23 ENCOUNTER — TELEPHONE (OUTPATIENT)
Dept: PAIN MEDICINE | Facility: MEDICAL CENTER | Age: 57
End: 2022-11-23

## 2022-11-23 NOTE — TELEPHONE ENCOUNTER
Caller: Gume Ellis, daughter    Doctor: Aston Madera    Reason for call: patient does not have active health insurance today.  recommends patient to reschedule but caller will call  and call SPA back.     Call back#: [phone]

## 2022-11-23 NOTE — TELEPHONE ENCOUNTER
Caller: Lindsey Wilson     Doctor: Dr Collins     Reason for call: Patient daughter calling stating she called  and they asking for code for procedure.    Call back#: [phone]

## 2022-11-30 ENCOUNTER — OFFICE VISIT (OUTPATIENT)
Dept: FAMILY MEDICINE CLINIC | Facility: CLINIC | Age: 57
End: 2022-11-30

## 2022-11-30 VITALS
RESPIRATION RATE: 16 BRPM | SYSTOLIC BLOOD PRESSURE: 136 MMHG | WEIGHT: 139.4 LBS | HEART RATE: 83 BPM | DIASTOLIC BLOOD PRESSURE: 74 MMHG | TEMPERATURE: 98.1 F | BODY MASS INDEX: 28.1 KG/M2 | OXYGEN SATURATION: 98 % | HEIGHT: 59 IN

## 2022-11-30 DIAGNOSIS — M72.2 PLANTAR FASCIITIS: ICD-10-CM

## 2022-11-30 DIAGNOSIS — Z11.59 NEED FOR HEPATITIS B SCREENING TEST: ICD-10-CM

## 2022-11-30 DIAGNOSIS — I49.9 IRREGULAR HEART RHYTHM: ICD-10-CM

## 2022-11-30 DIAGNOSIS — Z23 NEED FOR IMMUNIZATION AGAINST INFLUENZA: ICD-10-CM

## 2022-11-30 DIAGNOSIS — I10 BENIGN ESSENTIAL HTN: Primary | ICD-10-CM

## 2022-11-30 DIAGNOSIS — I49.9 IRREGULAR HEART RHYTHM: Primary | ICD-10-CM

## 2022-11-30 NOTE — PATIENT INSTRUCTIONS
Hypertension   AMBULATORY CARE:   Hypertension  is high blood pressure  Your blood pressure is the force of your blood moving against the walls of your arteries  Hypertension causes your blood pressure to get so high that your heart has to work much harder than normal  This can damage your heart  The cause of hypertension may not be known  This is called essential or primary hypertension  Hypertension caused by another medical condition, such as kidney disease, is called secondary hypertension  Common symptoms include the following:   Headache    Blurred vision    Chest pain    Dizziness or weakness    Trouble breathing    Nosebleeds    Call your local emergency number (911 in the [address] [facility]) or have someone call if:   You have chest pain  You have any of the following signs of a heart attack:      Squeezing, pressure, or pain in your chest    You may  also have any of the following:     Discomfort or pain in your back, neck, jaw, stomach, or arm    Shortness of breath    Nausea or vomiting    Lightheadedness or a sudden cold sweat    You become confused or have trouble speaking  You suddenly feel lightheaded or have trouble breathing  Seek care immediately if:   You have a severe headache or vision loss  You have weakness in an arm or leg  Call your doctor or cardiologist if:   You feel faint, dizzy, confused, or drowsy  You have been taking your blood pressure medicine but your pressure is higher than your provider says it should be  You have questions or concerns about your condition or care  What you need to know about the stages of hypertension:       Normal blood pressure is 119/79 or lower   Your healthcare provider may only check your blood pressure each year if it stays at a normal level  Elevated blood pressure is 120/79 to 129/79   This is sometimes called prehypertension  Your healthcare provider may suggest lifestyle changes to help lower your blood pressure to a normal level   He or she may then check it again in 3 to 6 months  Stage 1 hypertension is 130/80  to 139/89   Your provider may recommend lifestyle changes, medication, and checks every 3 to 6 months until your blood pressure is controlled  Stage 2 hypertension is 140/90 or higher   Your provider will recommend lifestyle changes and have you take 2 kinds of hypertension medicines  You will also need to have your blood pressure checked monthly until it is controlled  Treatment for hypertension  may include medicine to lower your blood pressure and lower your cholesterol level  A low cholesterol level helps prevent heart disease and makes it easier to control your blood pressure  Take your medicine exactly as directed  You may also need to make lifestyle changes  Manage hypertension:   Check your blood pressure at home  Avoid smoking, caffeine, and exercise at least 30 minutes before checking your blood pressure  Sit and rest for 5 minutes before you take your blood pressure  Extend your arm and support it on a flat surface  Your arm should be at the same level as your heart  Follow the directions that came with your blood pressure monitor  Check your blood pressure 2 times, 1 minute apart, before you take your medicine in the morning  Also check your blood pressure before your evening meal  Keep a record of your readings and bring it to your follow-up visits  Ask your healthcare provider what your blood pressure should be  Manage any other health conditions you have  Health conditions such as diabetes can increase your risk for hypertension  Follow your healthcare provider's instructions and take all your medicines as directed  Ask about all medicines  Certain medicines can increase your blood pressure  Examples include oral birth control pills, decongestants, herbal supplements, and NSAIDs, such as ibuprofen  Your healthcare provider can tell you which medicines are safe for you to take   This includes prescription and over-the-counter medicines  Lifestyle changes you can make to manage hypertension:  Your healthcare provider may recommend you work with a team to manage hypertension  The team may include medical experts such as a dietitian, an exercise or physical therapist, and a behavior therapist  Your family members may be included in helping you create lifestyle changes  Limit sodium (salt) as directed  Too much sodium can affect your fluid balance  Check labels to find low-sodium or no-salt-added foods  Some low-sodium foods use potassium salts for flavor  Too much potassium can also cause health problems  Your healthcare provider will tell you how much sodium and potassium are safe for you to have in a day  He or she may recommend that you limit sodium to 2,300 mg a day  Follow the meal plan recommended by your healthcare provider  A dietitian or your provider can give you more information on low-sodium plans or the DASH (Dietary Approaches to Stop Hypertension) eating plan  The DASH plan is low in sodium, processed sugar, unhealthy fats, and total fat  It is high in potassium, calcium, and fiber  These can be found in vegetables, fruit, and whole-grain foods  Be physically active throughout the day  Physical activity, such as exercise, can help control your blood pressure and your weight  Be physically active for at least 30 minutes per day, on most days of the week  Include aerobic activity, such as walking or riding a bicycle  Also include strength training at least 2 times each week  Your healthcare providers can help you create a physical activity plan  Decrease stress  This may help lower your blood pressure  Learn ways to relax, such as deep breathing or listening to music  Limit alcohol as directed  Alcohol can increase your blood pressure  A drink of alcohol is 12 ounces of beer, 5 ounces of wine, or 1½ ounces of liquor  Do not smoke    Nicotine and other chemicals in cigarettes and cigars can increase your blood pressure and also cause lung damage  Ask your healthcare provider for information if you currently smoke and need help to quit  E-cigarettes or smokeless tobacco still contain nicotine  Talk to your healthcare provider before you use these products  Follow up with your doctor or cardiologist as directed: You will need to return to have your blood pressure checked and to have other lab tests done  Write down your questions so you remember to ask them during your visits  © Copyright [employer] 2022 Information is for End User's use only and may not be sold, redistributed or otherwise used for commercial purposes  All illustrations and images included in CareNotes® are the copyrighted property of [employer]  or [facility] Elvira Trivedi   The above information is an  only  It is not intended as medical advice for individual conditions or treatments  Talk to your doctor, nurse or pharmacist before following any medical regimen to see if it is safe and effective for you

## 2022-11-30 NOTE — PROGRESS NOTES
Assessment/Plan:     Diagnoses and all orders for this visit:    Benign essential HTN  Comments:  Labs as ordered  Monitor BP at home  RTC 1 month  Orders:  -     Comprehensive metabolic panel; Future  -     Magnesium; Future    Irregular heart rhythm  Comments:  Asymptomatic  Holter monitor and labs for further eval  Follow up with Dr Burgess Zuniga after holter  Alarm findings prompting emergent care reviewed  Orders:  -     POCT ECG  -     Holter monitor; Future    Plantar fasciitis  Comments:  Stretching, heat, and supportive care reviewed  Podiatry referral provided  Orders:  -     Ambulatory Referral to Podiatry; Future    Need for hepatitis B screening test  -     Hepatitis B vaccine adult IM    Need for immunization against influenza  -     influenza vaccine, quadrivalent, recombinant, PF, 0 5 mL, for patients 18 yr+ (FLUBLOK)        Return in about 4 weeks (around 12/28/2022) for Recheck HTN, labs, and holter  Subjective:        Patient ID: Lorraine Villeda is a 62 y o  female  Chief Complaint   Patient presents with   • Hypertension     Also having pain along the right side to the bottom of her right foot  Pain level is an 8       PMH dysphagia, near syncope, chronic otitis media of left ear with tympanic membrane perforation, OA, atrophic vaginitis, HLD, and chronic pain syndrome presents with her daughter for recheck of HTN  Endorses pain in plantar aspect of right foot  Hypertension  This is a chronic problem  The current episode started more than 1 year ago  The problem has been waxing and waning since onset  The problem is controlled  Pertinent negatives include no anxiety, blurred vision, chest pain, headaches, malaise/fatigue, neck pain, orthopnea, palpitations, peripheral edema, PND, shortness of breath or sweats  Agents associated with hypertension include NSAIDs  Risk factors for coronary artery disease include obesity, post-menopausal state and sedentary lifestyle   Past treatments include calcium channel blockers and diuretics  The current treatment provides moderate improvement  Compliance problems include diet          The following portions of the patient's history were reviewed and updated as appropriate: allergies, current medications, past family history, past medical history, past social history, past surgical history and problem list     Patient Active Problem List   Diagnosis   • Abdominal pain, left lower quadrant   • Chronic lumbar radiculopathy   • Chronic pain syndrome   • Dyslipidemia   • Chronic pain of right knee   • Chronic right hip pain   • Myofascial pain syndrome   • Atrophic vaginitis   • Primary osteoarthritis of both knees   • Lumbar radiculopathy   • Left hip pain   • Lumbar degenerative disc disease   • Pharyngoesophageal dysphagia   • Perforation of left tympanic membrane   • Chronic otitis media of left ear   • Atypical chest pain   • Epigastric abdominal pain   • Chronic musculoskeletal pain   • Pain in finger of both hands   • Tympanic membrane perforation, left   • Strain of thoracic back region   • Chronic right shoulder pain   • Medial epicondylitis of elbow, left   • Near syncope   • Chronic pain   • Bradycardia   • Episode of dizziness   • RBBB   • Bursitis of right shoulder   • Benign essential HTN   • Leg edema       Current Outpatient Medications   Medication Sig Dispense Refill   • amLODIPine (NORVASC) 5 mg tablet Take 2 tablets (10 mg total) by mouth daily 30 tablet 1   • atorvastatin (LIPITOR) 20 mg tablet Take 1 tablet (20 mg total) by mouth daily 30 tablet 5   • chlorthalidone 25 mg tablet take 1 tablet by mouth once daily 30 tablet 1   • Diclofenac Sodium (VOLTAREN) 1 % Apply 2 g topically 4 (four) times a day 350 g 1   • estradiol (ESTRACE) 0 1 mg/g vaginal cream INSERT 0 5 GRAMS INTO THE VAGINA TWICE WEEKLY 42 5 g 0   • acetaminophen (TYLENOL) 500 mg tablet Take 500 mg by mouth every 6 (six) hours as needed for pain (Patient not taking: Reported on 11/30/2022)       No current facility-administered medications for this visit  Past Medical History:   Diagnosis Date   • Fatigue    • High cholesterol    • Hypertension    • Neuropathy    • Osteoarthritis     knees   • Shortness of breath         Past Surgical History:   Procedure Laterality Date   • APPENDECTOMY     • EPIDURAL BLOCK INJECTION Left 5/17/2018    Procedure: L4 AND L5 TRANSFORAMINAL EPIDURAL STEROID INJECTION;  Surgeon: Brice Chung MD;  Location: [facility];  Service: Pain Management    • EPIDURAL BLOCK INJECTION Left 10/4/2018    Procedure: L4 AND L5 TRANSFORAMINAL EPIDURAL STEROID INJECTION;  Surgeon: Brice Chung MD;  Location: [facility];  Service: Pain Management    • EPIDURAL BLOCK INJECTION Left 5/1/2019    Procedure: L4-5 and L5-S1 transforaminal epidural steroid injection;  Surgeon: Brice Chung MD;  Location: [facility];  Service: Pain Management    • FL GUIDED NEEDLE PLAC BX/ASP/INJ  5/1/2019   • NE COLONOSCOPY FLX DX W/COLLJ SPEC WHEN PFRMD N/A 5/31/2016    Procedure: COLONOSCOPY;  Surgeon: Marcos Cortes MD;  Location: [facility];  Service: Gastroenterology   • NE ESOPHAGOGASTRODUODENOSCOPY TRANSORAL DIAGNOSTIC N/A 11/15/2016    Procedure: ESOPHAGOGASTRODUODENOSCOPY (EGD);   Surgeon: Marcos Cortes MD;  Location: [facility];  Service: Gastroenterology   • NE TYMPANOPLASTY Left 10/25/2019    Procedure: LEFT TYMPANOPLASTY;  Surgeon: Elías Meier MD;  Location:  MAIN OR;  Service: ENT   • TUBAL LIGATION          Social History     Socioeconomic History   • Marital status: Single     Spouse name: Not on file   • Number of children: Not on file   • Years of education: Not on file   • Highest education level: Not on file   Occupational History   • Not on file   Tobacco Use   • Smoking status: Never   • Smokeless tobacco: Never   Vaping Use   • Vaping Use: Never used   Substance and Sexual Activity   • Alcohol use: Not Currently   • Drug use: No   • Sexual activity: Yes Partners: Male     Birth control/protection: Female Sterilization   Other Topics Concern   • Not on file   Social History Narrative   • Not on file     Social Determinants of Health     Financial Resource Strain: Not on file   Food Insecurity: Not on file   Transportation Needs: Not on file   Physical Activity: Not on file   Stress: Not on file   Social Connections: Not on file   Intimate Partner Violence: Not on file   Housing Stability: Not on file        Review of Systems   Constitutional: Negative for activity change, appetite change, fatigue, malaise/fatigue and unexpected weight change  Eyes: Negative for blurred vision  Respiratory: Negative for choking, shortness of breath and wheezing  Cardiovascular: Negative for chest pain, palpitations, orthopnea, leg swelling and PND  Gastrointestinal: Negative for abdominal pain, blood in stool, constipation, diarrhea and nausea  Genitourinary: Negative for difficulty urinating, dysuria, frequency, hematuria and urgency  Musculoskeletal: Positive for myalgias (right plantar foot)  Negative for arthralgias, back pain and neck pain  Skin: Negative for color change, rash and wound  Neurological: Negative for dizziness, tremors, syncope, weakness and headaches  Psychiatric/Behavioral: Negative for agitation, confusion, self-injury and suicidal ideas  Objective:      /74 (BP Location: Left arm, Patient Position: Sitting, Cuff Size: Large)   Pulse 83   Temp 98 1 °F (36 7 °C) (Tympanic)   Resp 16   Ht 4' 11" (1 499 m)   Wt 63 2 kg (139 lb 6 4 oz)   SpO2 98%   BMI 28 16 kg/m²          Physical Exam  Vitals and nursing note reviewed  Constitutional:       Appearance: Normal appearance  HENT:      Head: Normocephalic and atraumatic  Nose: Nose normal       Mouth/Throat:      Mouth: Mucous membranes are moist    Eyes:      Conjunctiva/sclera: Conjunctivae normal    Cardiovascular:      Rate and Rhythm: Normal rate   Rhythm irregular  Pulmonary:      Effort: Pulmonary effort is normal       Breath sounds: Normal breath sounds  Abdominal:      General: Abdomen is flat  Bowel sounds are normal       Palpations: Abdomen is soft  Genitourinary:     Comments: Exam deferred  Musculoskeletal:      Left shoulder: Normal range of motion  Feet:    Skin:     General: Skin is warm and dry  Capillary Refill: Capillary refill takes less than 2 seconds  Neurological:      General: No focal deficit present  Mental Status: She is alert and oriented to person, place, and time     Psychiatric:         Mood and Affect: Mood normal          Behavior: Behavior normal

## 2022-12-16 DIAGNOSIS — I10 BENIGN ESSENTIAL HTN: ICD-10-CM

## 2022-12-16 RX ORDER — CHLORTHALIDONE 25 MG/1
TABLET ORAL
Qty: 30 TABLET | Refills: 1 | Status: SHIPPED | OUTPATIENT
Start: 2022-12-16

## 2022-12-28 ENCOUNTER — APPOINTMENT (OUTPATIENT)
Dept: LAB | Facility: MEDICAL CENTER | Age: 57
End: 2022-12-28

## 2022-12-28 ENCOUNTER — OFFICE VISIT (OUTPATIENT)
Dept: FAMILY MEDICINE CLINIC | Facility: CLINIC | Age: 57
End: 2022-12-28

## 2022-12-28 VITALS
DIASTOLIC BLOOD PRESSURE: 84 MMHG | WEIGHT: 140 LBS | RESPIRATION RATE: 18 BRPM | SYSTOLIC BLOOD PRESSURE: 126 MMHG | OXYGEN SATURATION: 98 % | HEIGHT: 59 IN | TEMPERATURE: 97.7 F | HEART RATE: 73 BPM | BODY MASS INDEX: 28.22 KG/M2

## 2022-12-28 DIAGNOSIS — E78.5 DYSLIPIDEMIA: ICD-10-CM

## 2022-12-28 DIAGNOSIS — E55.9 VITAMIN D DEFICIENCY: ICD-10-CM

## 2022-12-28 DIAGNOSIS — I10 BENIGN ESSENTIAL HTN: Primary | ICD-10-CM

## 2022-12-28 DIAGNOSIS — R07.89 ATYPICAL CHEST PAIN: ICD-10-CM

## 2022-12-28 DIAGNOSIS — Z23 ENCOUNTER FOR IMMUNIZATION: ICD-10-CM

## 2022-12-28 DIAGNOSIS — E03.9 HYPOTHYROIDISM, UNSPECIFIED TYPE: ICD-10-CM

## 2022-12-28 DIAGNOSIS — I10 BENIGN ESSENTIAL HTN: ICD-10-CM

## 2022-12-28 DIAGNOSIS — F41.9 MILD ANXIETY: ICD-10-CM

## 2022-12-28 DIAGNOSIS — M72.2 PLANTAR FASCIITIS: ICD-10-CM

## 2022-12-28 LAB
25(OH)D3 SERPL-MCNC: 19.1 NG/ML (ref 30–100)
ALBUMIN SERPL BCP-MCNC: 4.4 G/DL (ref 3.5–5)
ALP SERPL-CCNC: 85 U/L (ref 46–116)
ALT SERPL W P-5'-P-CCNC: 37 U/L (ref 12–78)
ANION GAP SERPL CALCULATED.3IONS-SCNC: 5 MMOL/L (ref 4–13)
AST SERPL W P-5'-P-CCNC: 24 U/L (ref 5–45)
BILIRUB SERPL-MCNC: 1.33 MG/DL (ref 0.2–1)
BUN SERPL-MCNC: 20 MG/DL (ref 5–25)
CALCIUM SERPL-MCNC: 9.9 MG/DL (ref 8.3–10.1)
CHLORIDE SERPL-SCNC: 100 MMOL/L (ref 96–108)
CHOLEST SERPL-MCNC: 129 MG/DL
CO2 SERPL-SCNC: 32 MMOL/L (ref 21–32)
CREAT SERPL-MCNC: 0.88 MG/DL (ref 0.6–1.3)
GFR SERPL CREATININE-BSD FRML MDRD: 73 ML/MIN/1.73SQ M
GLUCOSE P FAST SERPL-MCNC: 115 MG/DL (ref 65–99)
HDLC SERPL-MCNC: 60 MG/DL
LDLC SERPL CALC-MCNC: 38 MG/DL (ref 0–100)
MAGNESIUM SERPL-MCNC: 2.5 MG/DL (ref 1.6–2.6)
POTASSIUM SERPL-SCNC: 3.3 MMOL/L (ref 3.5–5.3)
PROT SERPL-MCNC: 7.9 G/DL (ref 6.4–8.4)
SODIUM SERPL-SCNC: 137 MMOL/L (ref 135–147)
TRIGL SERPL-MCNC: 153 MG/DL

## 2022-12-28 RX ORDER — LEVOTHYROXINE SODIUM 0.03 MG/1
25 TABLET ORAL DAILY
Qty: 30 TABLET | Refills: 1 | Status: SHIPPED | OUTPATIENT
Start: 2022-12-28

## 2022-12-28 NOTE — ASSESSMENT & PLAN NOTE
Chest pain intermittently at rest and with activity  Prior stress echo negative  Holter and labs ordered  Pt to return to cardiology to discuss loop recorder

## 2022-12-28 NOTE — PROGRESS NOTES
Assessment/Plan:     Diagnoses and all orders for this visit:    Benign essential HTN  Comments:  Continue HCTZ and amlodipine  Low Na diet and exercise discussed  RTC 3 months    Atypical chest pain  Comments:  Chest pain intermittently at rest and with activity  Prior stress echo negative  Holter and labs ordered  Pt to return to cardiology to discuss loop recorder    Mild anxiety  Comments:  Discussed techniques to manage same  Pt to incorporate same  Denies panic attacks  RTC if no improvement    Plantar fasciitis  Comments:  Slight improvement today  Continue supportive care  Follow up with podiatry as previously ordered    Hypothyroidism, unspecified type  Comments:  TSH trending upward  Pt reports fatigue  Start levothyroxine 25 mcg  TSH recheck in 6 weeks  Orders:  -     TSH, 3rd generation with Free T4 reflex; Future  -     levothyroxine (Euthyrox) 25 mcg tablet; Take 1 tablet (25 mcg total) by mouth daily    Encounter for immunization  -     Hepatitis B vaccine adult IM    Vitamin D deficiency  -     Vitamin D 25 hydroxy; Future    Dyslipidemia  -     Lipid Panel with Direct LDL reflex; Future            Return in about 2 months (around 2/28/2023) for Annual physical        Subjective:        Patient ID: Helene Artis is a 62 y o  female  Chief Complaint   Patient presents with   • Follow-up       PMH dysphagia, near syncope, chronic otitis media of left ear with tympanic membrane perforation, OA, atrophic vaginitis, HLD, and chronic pain syndrome presents for follow up of HTN, holter monitor, and lab review  Labs and holter monitor not yet completed  Will reprint orders for pt today  BP better  Continues with intermittent atypical chest pain  Has had negative stress test and is followed by cardiology  Will check holter monitor and labs  Advised pt to schedule follow up with Dr Alfredo Anguiano to discuss loop recorder  Hypertension  This is a chronic problem   The current episode started more than 1 year ago  The problem has been gradually improving since onset  The problem is controlled  Associated symptoms include chest pain (atypical; with activity and rest)  Pertinent negatives include no anxiety, blurred vision, headaches, malaise/fatigue, neck pain, orthopnea, palpitations, peripheral edema, PND, shortness of breath or sweats  There are no associated agents to hypertension  Risk factors for coronary artery disease include post-menopausal state and dyslipidemia  Past treatments include diuretics and calcium channel blockers  The current treatment provides moderate improvement  There are no compliance problems          The following portions of the patient's history were reviewed and updated as appropriate: allergies, current medications, past family history, past medical history, past social history, past surgical history and problem list     Patient Active Problem List   Diagnosis   • Abdominal pain, left lower quadrant   • Chronic lumbar radiculopathy   • Chronic pain syndrome   • Dyslipidemia   • Chronic pain of right knee   • Chronic right hip pain   • Myofascial pain syndrome   • Atrophic vaginitis   • Primary osteoarthritis of both knees   • Lumbar radiculopathy   • Left hip pain   • Lumbar degenerative disc disease   • Pharyngoesophageal dysphagia   • Perforation of left tympanic membrane   • Chronic otitis media of left ear   • Atypical chest pain   • Epigastric abdominal pain   • Chronic musculoskeletal pain   • Pain in finger of both hands   • Tympanic membrane perforation, left   • Strain of thoracic back region   • Chronic right shoulder pain   • Medial epicondylitis of elbow, left   • Near syncope   • Chronic pain   • Bradycardia   • Episode of dizziness   • RBBB   • Bursitis of right shoulder   • Benign essential HTN   • Leg edema   • Mild anxiety   • Plantar fasciitis   • Hypothyroidism       Current Outpatient Medications   Medication Sig Dispense Refill   • amLODIPine (NORVASC) 5 mg tablet Take 2 tablets (10 mg total) by mouth daily 30 tablet 1   • atorvastatin (LIPITOR) 20 mg tablet Take 1 tablet (20 mg total) by mouth daily 30 tablet 5   • chlorthalidone 25 mg tablet take 1 tablet by mouth once daily 30 tablet 1   • Diclofenac Sodium (VOLTAREN) 1 % Apply 2 g topically 4 (four) times a day 350 g 1   • estradiol (ESTRACE) 0 1 mg/g vaginal cream INSERT 0 5 GRAMS INTO THE VAGINA TWICE WEEKLY 42 5 g 0   • levothyroxine (Euthyrox) 25 mcg tablet Take 1 tablet (25 mcg total) by mouth daily 30 tablet 1   • acetaminophen (TYLENOL) 500 mg tablet Take 500 mg by mouth every 6 (six) hours as needed for pain (Patient not taking: Reported on 11/30/2022)       No current facility-administered medications for this visit  Past Medical History:   Diagnosis Date   • Fatigue    • High cholesterol    • Hypertension    • Neuropathy    • Osteoarthritis     knees   • Shortness of breath         Past Surgical History:   Procedure Laterality Date   • APPENDECTOMY     • EPIDURAL BLOCK INJECTION Left 5/17/2018    Procedure: L4 AND L5 TRANSFORAMINAL EPIDURAL STEROID INJECTION;  Surgeon: Asher Chavarria MD;  Location: [facility];  Service: Pain Management    • EPIDURAL BLOCK INJECTION Left 10/4/2018    Procedure: L4 AND L5 TRANSFORAMINAL EPIDURAL STEROID INJECTION;  Surgeon: Asher Chavarria MD;  Location: [facility];  Service: Pain Management    • EPIDURAL BLOCK INJECTION Left 5/1/2019    Procedure: L4-5 and L5-S1 transforaminal epidural steroid injection;  Surgeon: Asher Chavarria MD;  Location: [facility];  Service: Pain Management    • FL GUIDED NEEDLE PLAC BX/ASP/INJ  5/1/2019   • VT COLONOSCOPY FLX DX W/COLLJ SPEC WHEN PFRMD N/A 5/31/2016    Procedure: COLONOSCOPY;  Surgeon: Criselda Bansal MD;  Location: [facility];  Service: Gastroenterology   • VT ESOPHAGOGASTRODUODENOSCOPY TRANSORAL DIAGNOSTIC N/A 11/15/2016    Procedure: ESOPHAGOGASTRODUODENOSCOPY (EGD);   Surgeon: Criselda Bansal MD;  Location: [facility]; Service: Gastroenterology   • DE TYMPANOPLASTY W/O MASTOIDECT W/O OSSICLE RECNSTJ Left 10/25/2019    Procedure: LEFT TYMPANOPLASTY;  Surgeon: Carrol Whiting MD;  [facility]: [facility];  Service: ENT   • TUBAL LIGATION          Social History     Socioeconomic History   • Marital status: Single     Spouse name: Not on file   • Number of children: Not on file   • Years of education: Not on file   • Highest education level: Not on file   Occupational History   • Not on file   Tobacco Use   • Smoking status: Never   • Smokeless tobacco: Never   Vaping Use   • Vaping Use: Never used   Substance and Sexual Activity   • Alcohol use: Not Currently   • Drug use: No   • Sexual activity: Not Currently     Partners: Male     Birth control/protection: Female Sterilization   Other Topics Concern   • Not on file   Social History Narrative   • Not on file     Social Determinants of Health     Financial Resource Strain: Not on file   Food Insecurity: Not on file   Transportation Needs: Not on file   Physical Activity: Not on file   Stress: Not on file   Social Connections: Not on file   Intimate Partner Violence: Not on file   Housing Stability: Not on file        Review of Systems   Constitutional: Positive for fatigue  Negative for activity change, appetite change, malaise/fatigue and unexpected weight change  HENT: Negative for congestion, ear pain, hearing loss, nosebleeds, rhinorrhea, sinus pain and trouble swallowing  Eyes: Negative for blurred vision and photophobia  Respiratory: Negative for cough, shortness of breath and wheezing  Cardiovascular: Positive for chest pain (atypical; with activity and rest)  Negative for palpitations, orthopnea, leg swelling and PND  Gastrointestinal: Negative for abdominal pain, blood in stool, constipation, diarrhea and nausea  Endocrine: Negative for polydipsia, polyphagia and polyuria  Genitourinary: Negative for difficulty urinating, dysuria, frequency, hematuria and urgency  Musculoskeletal: Positive for myalgias (right foot)  Negative for arthralgias, back pain and neck pain  Skin: Negative for color change and rash  Neurological: Negative for dizziness, tremors, syncope, weakness and headaches  Psychiatric/Behavioral: Negative for agitation, confusion, self-injury and suicidal ideas  Objective:      /84 (BP Location: Left arm, Patient Position: Sitting, Cuff Size: Adult)   Pulse 73   Temp 97 7 °F (36 5 °C) (Tympanic)   Resp 18   Ht 4' 11" (1 499 m)   Wt 63 5 kg (140 lb)   SpO2 98%   BMI 28 28 kg/m²          Physical Exam  Vitals and nursing note reviewed  Constitutional:       Appearance: Normal appearance  HENT:      Head: Normocephalic and atraumatic  Nose: Nose normal       Mouth/Throat:      Mouth: Mucous membranes are moist    Eyes:      Conjunctiva/sclera: Conjunctivae normal       Pupils: Pupils are equal, round, and reactive to light  Cardiovascular:      Rate and Rhythm: Normal rate and regular rhythm  Pulmonary:      Effort: Pulmonary effort is normal       Breath sounds: Normal breath sounds  Abdominal:      General: Abdomen is flat  Bowel sounds are normal       Palpations: Abdomen is soft  Genitourinary:     Comments: Exam deferred  Musculoskeletal:        Feet:    Skin:     General: Skin is warm and dry  Capillary Refill: Capillary refill takes less than 2 seconds  Neurological:      General: No focal deficit present  Mental Status: She is alert and oriented to person, place, and time     Psychiatric:         Mood and Affect: Mood normal          Behavior: Behavior normal

## 2022-12-29 ENCOUNTER — TELEPHONE (OUTPATIENT)
Dept: FAMILY MEDICINE CLINIC | Facility: CLINIC | Age: 57
End: 2022-12-29

## 2022-12-29 DIAGNOSIS — E55.9 VITAMIN D DEFICIENCY: Primary | ICD-10-CM

## 2022-12-29 RX ORDER — MELATONIN
2000 DAILY
Qty: 180 TABLET | Refills: 1 | Status: SHIPPED | OUTPATIENT
Start: 2022-12-29 | End: 2023-08-02

## 2022-12-29 NOTE — TELEPHONE ENCOUNTER
Spoke with patients daughter and she states that she hasn't had a good appetite prior to her lab work  She states some days she eats good and others not at all

## 2022-12-29 NOTE — TELEPHONE ENCOUNTER
----- Message from [address] [place] [address] [address] sent at 12/29/2022  9:50 AM EST -----  Please let patient know vitamin D level is low  I sent in 2000 IU vitamin D for her to take daily  Also her potassium is slightly low  Has she been eating and drinking normally prior to her labs being drawn? Thank you

## 2023-01-23 DIAGNOSIS — E78.5 DYSLIPIDEMIA: ICD-10-CM

## 2023-01-23 DIAGNOSIS — I10 BENIGN ESSENTIAL HTN: ICD-10-CM

## 2023-01-23 RX ORDER — ATORVASTATIN CALCIUM 20 MG/1
20 TABLET, FILM COATED ORAL DAILY
Qty: 30 TABLET | Refills: 1 | Status: SHIPPED | OUTPATIENT
Start: 2023-01-23

## 2023-01-23 RX ORDER — AMLODIPINE BESYLATE 5 MG/1
10 TABLET ORAL DAILY
Qty: 30 TABLET | Refills: 1 | Status: SHIPPED | OUTPATIENT
Start: 2023-01-23

## 2023-01-30 ENCOUNTER — HOSPITAL ENCOUNTER (OUTPATIENT)
Dept: NON INVASIVE DIAGNOSTICS | Facility: HOSPITAL | Age: 58
Discharge: HOME | End: 2023-01-30

## 2023-01-30 DIAGNOSIS — I49.9 IRREGULAR HEART RHYTHM: ICD-10-CM

## 2023-01-31 DIAGNOSIS — R06.09 EXERTIONAL DYSPNEA: ICD-10-CM

## 2023-01-31 DIAGNOSIS — R06.02 SOB (SHORTNESS OF BREATH): Primary | ICD-10-CM

## 2023-02-01 ENCOUNTER — HOSPITAL ENCOUNTER (OUTPATIENT)
Dept: MAMMOGRAPHY | Facility: HOSPITAL | Age: 58
Discharge: HOME | End: 2023-02-01
Attending: STUDENT IN AN ORGANIZED HEALTH CARE EDUCATION/TRAINING PROGRAM

## 2023-02-01 VITALS — HEIGHT: 59 IN | WEIGHT: 140 LBS | BODY MASS INDEX: 28.22 KG/M2

## 2023-02-01 DIAGNOSIS — Z12.31 ENCOUNTER FOR SCREENING MAMMOGRAM FOR MALIGNANT NEOPLASM OF BREAST: ICD-10-CM

## 2023-02-03 ENCOUNTER — HOSPITAL ENCOUNTER (OUTPATIENT)
Dept: PULMONOLOGY | Facility: HOSPITAL | Age: 58
End: 2023-02-03

## 2023-02-03 DIAGNOSIS — R06.02 SOB (SHORTNESS OF BREATH): ICD-10-CM

## 2023-02-03 DIAGNOSIS — R06.09 EXERTIONAL DYSPNEA: ICD-10-CM

## 2023-02-03 DIAGNOSIS — R06.09 OTHER FORMS OF DYSPNEA: ICD-10-CM

## 2023-02-03 RX ORDER — ALBUTEROL SULFATE 2.5 MG/3ML
2.5 SOLUTION RESPIRATORY (INHALATION) ONCE AS NEEDED
Status: COMPLETED | OUTPATIENT
Start: 2023-02-03 | End: 2023-02-03

## 2023-02-03 RX ADMIN — ALBUTEROL SULFATE 2.5 MG: 2.5 SOLUTION RESPIRATORY (INHALATION) at 12:35

## 2023-02-06 ENCOUNTER — TELEPHONE (OUTPATIENT)
Dept: FAMILY MEDICINE CLINIC | Facility: CLINIC | Age: 58
End: 2023-02-06

## 2023-02-06 NOTE — TELEPHONE ENCOUNTER
----- Message from [address] sent at 2/6/2023  9:16 AM EST -----  Please let patient know of normal results  Thanks!

## 2023-02-07 ENCOUNTER — TELEPHONE (OUTPATIENT)
Dept: OBGYN CLINIC | Facility: HOSPITAL | Age: 58
End: 2023-02-07

## 2023-02-07 NOTE — TELEPHONE ENCOUNTER
Caller: daughter     Doctor/Office: [facility]    Call regarding :  rescheduling appt     Call was transferred to: [facility]

## 2023-02-07 NOTE — TELEPHONE ENCOUNTER
Caller: Raman Palacios    Doctor: Dr Mac Borrero     Reason for call: Patient daughter calling to cancel appt and please call back to reschedule      Call back#: [phone]

## 2023-02-09 NOTE — TELEPHONE ENCOUNTER
Attempted to call patient daughter to reschedule injection that was cancelled 2/8/23  LMTCB w/ CB# and OH provided

## 2023-02-10 ENCOUNTER — HOSPITAL ENCOUNTER (EMERGENCY)
Facility: HOSPITAL | Age: 58
Discharge: HOME | End: 2023-02-10
Attending: EMERGENCY MEDICINE

## 2023-02-10 ENCOUNTER — APPOINTMENT (EMERGENCY)
Dept: CT IMAGING | Facility: HOSPITAL | Age: 58
End: 2023-02-10

## 2023-02-10 ENCOUNTER — APPOINTMENT (EMERGENCY)
Dept: RADIOLOGY | Facility: HOSPITAL | Age: 58
End: 2023-02-10

## 2023-02-10 VITALS
TEMPERATURE: 98 F | WEIGHT: 140 LBS | OXYGEN SATURATION: 96 % | HEIGHT: 59 IN | HEART RATE: 62 BPM | RESPIRATION RATE: 20 BRPM | DIASTOLIC BLOOD PRESSURE: 79 MMHG | BODY MASS INDEX: 28.22 KG/M2 | SYSTOLIC BLOOD PRESSURE: 130 MMHG

## 2023-02-10 DIAGNOSIS — E87.6 HYPOKALEMIA: ICD-10-CM

## 2023-02-10 DIAGNOSIS — R07.9 CHEST PAIN, UNSPECIFIED: Primary | ICD-10-CM

## 2023-02-10 DIAGNOSIS — R93.5 ABNORMAL CT OF THE ABDOMEN: ICD-10-CM

## 2023-02-10 DIAGNOSIS — R74.01 TRANSAMINITIS: ICD-10-CM

## 2023-02-10 LAB
2HR DELTA HS TROPONIN: -2 NG/L
ALBUMIN SERPL BCP-MCNC: 4.6 G/DL (ref 3.5–5)
ALP SERPL-CCNC: 187 U/L (ref 34–104)
ALT SERPL W P-5'-P-CCNC: 78 U/L (ref 7–52)
ANION GAP SERPL CALCULATED.3IONS-SCNC: 11 MMOL/L (ref 4–13)
APTT PPP: 37 SECONDS (ref 23–37)
AST SERPL W P-5'-P-CCNC: 41 U/L (ref 13–39)
BASOPHILS # BLD AUTO: 0.06 THOUSANDS/ÂΜL (ref 0–0.1)
BASOPHILS NFR BLD AUTO: 1 % (ref 0–1)
BILIRUB SERPL-MCNC: 0.97 MG/DL (ref 0.2–1)
BUN SERPL-MCNC: 18 MG/DL (ref 5–25)
CALCIUM SERPL-MCNC: 10 MG/DL (ref 8.4–10.2)
CARDIAC TROPONIN I PNL SERPL HS: 5 NG/L
CARDIAC TROPONIN I PNL SERPL HS: 7 NG/L
CHLORIDE SERPL-SCNC: 98 MMOL/L (ref 96–108)
CO2 SERPL-SCNC: 30 MMOL/L (ref 21–32)
CREAT SERPL-MCNC: 0.79 MG/DL (ref 0.6–1.3)
D DIMER PPP FEU-MCNC: 1.3 UG/ML FEU
EOSINOPHIL # BLD AUTO: 0.11 THOUSAND/ÂΜL (ref 0–0.61)
EOSINOPHIL NFR BLD AUTO: 2 % (ref 0–6)
ERYTHROCYTE [DISTWIDTH] IN BLOOD BY AUTOMATED COUNT: 12.3 % (ref 11.6–15.1)
GFR SERPL CREATININE-BSD FRML MDRD: 82 ML/MIN/1.73SQ M
GLUCOSE SERPL-MCNC: 96 MG/DL (ref 65–140)
HCT VFR BLD AUTO: 40.4 % (ref 34.8–46.1)
HGB BLD-MCNC: 14.3 G/DL (ref 11.5–15.4)
IMM GRANULOCYTES # BLD AUTO: 0.03 THOUSAND/UL (ref 0–0.2)
IMM GRANULOCYTES NFR BLD AUTO: 0 % (ref 0–2)
INR PPP: 0.91 (ref 0.84–1.19)
LIPASE SERPL-CCNC: 29 U/L (ref 11–82)
LYMPHOCYTES # BLD AUTO: 2.57 THOUSANDS/ÂΜL (ref 0.6–4.47)
LYMPHOCYTES NFR BLD AUTO: 35 % (ref 14–44)
MAGNESIUM SERPL-MCNC: 2.2 MG/DL (ref 1.9–2.7)
MCH RBC QN AUTO: 30.8 PG (ref 26.8–34.3)
MCHC RBC AUTO-ENTMCNC: 35.4 G/DL (ref 31.4–37.4)
MCV RBC AUTO: 87 FL (ref 82–98)
MONOCYTES # BLD AUTO: 0.41 THOUSAND/ÂΜL (ref 0.17–1.22)
MONOCYTES NFR BLD AUTO: 6 % (ref 4–12)
NEUTROPHILS # BLD AUTO: 4.24 THOUSANDS/ÂΜL (ref 1.85–7.62)
NEUTS SEG NFR BLD AUTO: 56 % (ref 43–75)
NRBC BLD AUTO-RTO: 0 /100 WBCS
PLATELET # BLD AUTO: 271 THOUSANDS/UL (ref 149–390)
PMV BLD AUTO: 10.5 FL (ref 8.9–12.7)
POTASSIUM SERPL-SCNC: 3.1 MMOL/L (ref 3.5–5.3)
PROT SERPL-MCNC: 7.6 G/DL (ref 6.4–8.4)
PROTHROMBIN TIME: 12.5 SECONDS (ref 11.6–14.5)
RBC # BLD AUTO: 4.65 MILLION/UL (ref 3.81–5.12)
SODIUM SERPL-SCNC: 139 MMOL/L (ref 135–147)
WBC # BLD AUTO: 7.42 THOUSAND/UL (ref 4.31–10.16)

## 2023-02-10 RX ORDER — KETOROLAC TROMETHAMINE 30 MG/ML
15 INJECTION, SOLUTION INTRAMUSCULAR; INTRAVENOUS ONCE
Status: COMPLETED | OUTPATIENT
Start: 2023-02-10 | End: 2023-02-10

## 2023-02-10 RX ORDER — POTASSIUM CHLORIDE 20MEQ/15ML
40 LIQUID (ML) ORAL ONCE
Status: COMPLETED | OUTPATIENT
Start: 2023-02-10 | End: 2023-02-10

## 2023-02-10 RX ORDER — ONDANSETRON 2 MG/ML
4 INJECTION INTRAMUSCULAR; INTRAVENOUS ONCE
Status: COMPLETED | OUTPATIENT
Start: 2023-02-10 | End: 2023-02-10

## 2023-02-10 RX ADMIN — KETOROLAC TROMETHAMINE 15 MG: 30 INJECTION, SOLUTION INTRAMUSCULAR; INTRAVENOUS at 17:32

## 2023-02-10 RX ADMIN — IOHEXOL 100 ML: 350 INJECTION, SOLUTION INTRAVENOUS at 18:48

## 2023-02-10 RX ADMIN — ONDANSETRON 4 MG: 2 INJECTION INTRAMUSCULAR; INTRAVENOUS at 19:40

## 2023-02-10 RX ADMIN — POTASSIUM CHLORIDE 40 MEQ: 1.5 SOLUTION ORAL at 19:29

## 2023-02-10 NOTE — ED PROVIDER NOTES
History  Chief Complaint   Patient presents with   • Chest Pain     Chest pain/SOB/back pain beginning yesterday; patient has cardiology appointment March 20th; patient had recent EKG and recently wore a Holter monitor and was told her heart rhythm was "irregular"     55-year-old female presents for evaluation of chest pain, dyspnea  Patient arrives with a daughter who helps with history, states that pain started last night around 5 PM   Patient describes the pain as pressure as if someone is pushing on the inside, she points to an area along her left mid lower chest wall  Pain is made worse with laying down flat and somewhat improved when she is upright  Patient does report a sense of dyspnea at rest and with exertion  She denies other associated symptoms such as lightheadedness, syncope, palpitations, nausea or vomiting, diaphoresis  She denies history of VTE or lower extremity swelling  Daughter does note the patient had a viral illness at the start of this week with fever, cough, and congestion  Daughter states that she had poor p o  intake at the beginning of the week and is still recovering this  Patient did have a Holter monitor placed recently that did show abnormalities with multiple ectopic beats, he has an appointment with cardiology scheduled in March  Prior to Admission Medications   Prescriptions Last Dose Informant Patient Reported? Taking?    Diclofenac Sodium (VOLTAREN) 1 %   No No   Sig: Apply 2 g topically 4 (four) times a day   acetaminophen (TYLENOL) 500 mg tablet   Yes No   Sig: Take 500 mg by mouth every 6 (six) hours as needed for pain   Patient not taking: Reported on 11/30/2022   amLODIPine (NORVASC) 5 mg tablet   No No   Sig: Take 2 tablets (10 mg total) by mouth daily   atorvastatin (LIPITOR) 20 mg tablet   No No   Sig: Take 1 tablet (20 mg total) by mouth daily   chlorthalidone 25 mg tablet   No No   Sig: take 1 tablet by mouth once daily   cholecalciferol (VITAMIN D3) 1,000 units tablet   No No   Sig: Take 2 tablets (2,000 Units total) by mouth daily   estradiol (ESTRACE) 0 1 mg/g vaginal cream   No No   Sig: INSERT 0 5 GRAMS INTO THE VAGINA TWICE WEEKLY   levothyroxine (Euthyrox) 25 mcg tablet   No No   Sig: Take 1 tablet (25 mcg total) by mouth daily      Facility-Administered Medications: None       Past Medical History:   Diagnosis Date   • Fatigue    • High cholesterol    • Hypertension    • Neuropathy    • Osteoarthritis     knees   • Shortness of breath        Past Surgical History:   Procedure Laterality Date   • APPENDECTOMY     • EPIDURAL BLOCK INJECTION Left 5/17/2018    Procedure: L4 AND L5 TRANSFORAMINAL EPIDURAL STEROID INJECTION;  Surgeon: Criss Primrose, MD;  Location: [facility];  Service: Pain Management    • EPIDURAL BLOCK INJECTION Left 10/4/2018    Procedure: L4 AND L5 TRANSFORAMINAL EPIDURAL STEROID INJECTION;  Surgeon: Criss Primrose, MD;  Location: [facility];  Service: Pain Management    • EPIDURAL BLOCK INJECTION Left 5/1/2019    Procedure: L4-5 and L5-S1 transforaminal epidural steroid injection;  Surgeon: Criss Primrose, MD;  Location: [facility];  Service: Pain Management    • FL GUIDED NEEDLE PLAC BX/ASP/INJ  5/1/2019   • IN COLONOSCOPY FLX DX W/COLLJ SPEC WHEN PFRMD N/A 5/31/2016    Procedure: COLONOSCOPY;  Surgeon: Adalid Manjarrez MD;  Location: [facility];  Service: Gastroenterology   • IN ESOPHAGOGASTRODUODENOSCOPY TRANSORAL DIAGNOSTIC N/A 11/15/2016    Procedure: ESOPHAGOGASTRODUODENOSCOPY (EGD);   Surgeon: Adalid Manjarrez MD;  Location: [facility];  Service: Gastroenterology   • IN TYMPANOPLASTY W/O MASTOIDECT W/O OSSICLE RECNSTJ Left 10/25/2019    Procedure: LEFT TYMPANOPLASTY;  Surgeon: Sandhya Ogden MD;  Location:  MAIN OR;  Service: ENT   • TUBAL LIGATION         Family History   Problem Relation Age of Onset   • Uterine cancer Mother    • Early death Father    • Heart disease Brother    • No Known Problems Sister    • No Known Problems Daughter    • No Known Problems Sister    • No Known Problems Daughter    • No Known Problems Maternal Aunt    • No Known Problems Maternal Aunt    • No Known Problems Maternal Aunt    • No Known Problems Maternal Aunt    • No Known Problems Maternal Grandmother    • No Known Problems Maternal Grandfather    • No Known Problems Paternal Grandmother    • No Known Problems Paternal Grandfather      I have reviewed and agree with the history as documented  E-Cigarette/Vaping   • E-Cigarette Use Never User      E-Cigarette/Vaping Substances   • Nicotine No    • THC No    • CBD No    • Flavoring No    • Other No    • Unknown No      Social History     Tobacco Use   • Smoking status: Never   • Smokeless tobacco: Never   Vaping Use   • Vaping Use: Never used   Substance Use Topics   • Alcohol use: Not Currently   • Drug use: No       Review of Systems   Constitutional: Positive for appetite change  Negative for diaphoresis and fever  Respiratory: Positive for shortness of breath  Cardiovascular: Positive for chest pain  Negative for palpitations and leg swelling  Gastrointestinal: Negative for abdominal pain, nausea and vomiting  Neurological: Negative for syncope and light-headedness  All other systems reviewed and are negative  Physical Exam  Physical Exam  Vitals reviewed  Constitutional:       General: She is not in acute distress  Appearance: She is well-developed  She is not ill-appearing, toxic-appearing or diaphoretic  HENT:      Head: Normocephalic and atraumatic  Eyes:      General:         Right eye: No discharge  Left eye: No discharge  Extraocular Movements: Extraocular movements intact  Cardiovascular:      Rate and Rhythm: Normal rate and regular rhythm  Heart sounds: Normal heart sounds  Pulmonary:      Effort: Pulmonary effort is normal  No tachypnea or respiratory distress     Chest:      Chest wall: Tenderness (reproducible tenderness to left chest wall) present  Abdominal:      General: There is no distension  Tenderness: There is no abdominal tenderness  There is no guarding or rebound  Musculoskeletal:         General: No deformity or signs of injury  Right lower leg: No edema  Left lower leg: No edema  Skin:     General: Skin is warm  Coloration: Skin is not jaundiced or pale  Neurological:      General: No focal deficit present  Mental Status: She is alert  Mental status is at baseline           Vital Signs  ED Triage Vitals   Temperature Pulse Respirations Blood Pressure SpO2   02/10/23 1634 02/10/23 1637 02/10/23 1634 02/10/23 1634 02/10/23 1634   98 °F (36 7 °C) 77 18 130/60 96 %      Temp Source Heart Rate Source Patient Position - Orthostatic VS BP Location FiO2 (%)   02/10/23 1634 02/10/23 1634 -- -- --   Temporal Monitor         Pain Score       02/10/23 1634       8           Vitals:    02/10/23 1634 02/10/23 1637 02/10/23 1930   BP: 130/60  130/79   Pulse:  77 62         Visual Acuity      ED Medications  Medications   ketorolac (TORADOL) injection 15 mg (15 mg Intravenous Given 2/10/23 1732)   potassium chloride oral solution 40 mEq (40 mEq Oral Given 2/10/23 1929)   iohexol (OMNIPAQUE) 350 MG/ML injection (SINGLE-DOSE) 100 mL (100 mL Intravenous Given 2/10/23 1848)   ondansetron (ZOFRAN) injection 4 mg (4 mg Intravenous Given 2/10/23 1940)       Diagnostic Studies  Results Reviewed     Procedure Component Value Units Date/Time    HS Troponin I 2hr [[id number]]  (Normal) Collected: 02/10/23 1937    Lab Status: Final result Specimen: Blood from Arm, Left Updated: 02/10/23 2021     hs TnI 2hr 5 ng/L      Delta 2hr hsTnI -2 ng/L     HS Troponin I 4hr [[id number]]     Lab Status: No result Specimen: Blood     HS Troponin 0hr (reflex protocol) [[id number]]  (Normal) Collected: 02/10/23 1726    Lab Status: Final result Specimen: Blood from Hand, Left Updated: 02/10/23 1802     hs TnI 0hr 7 ng/L     Comprehensive metabolic panel [[id number]]  (Abnormal) Collected: 02/10/23 1726    Lab Status: Final result Specimen: Blood from Hand, Left Updated: 02/10/23 1755     Sodium 139 mmol/L      Potassium 3 1 mmol/L      Chloride 98 mmol/L      CO2 30 mmol/L      ANION GAP 11 mmol/L      BUN 18 mg/dL      Creatinine 0 79 mg/dL      Glucose 96 mg/dL      Calcium 10 0 mg/dL      AST 41 U/L      ALT 78 U/L      Alkaline Phosphatase 187 U/L      Total Protein 7 6 g/dL      Albumin 4 6 g/dL      Total Bilirubin 0 97 mg/dL      eGFR 82 ml/min/1 73sq m     Narrative:      [place] guidelines for Chronic Kidney Disease (CKD):   •  Stage 1 with normal or high GFR (GFR > 90 mL/min/1 73 square meters)  •  Stage 2 Mild CKD (GFR = 60-89 mL/min/1 73 square meters)  •  Stage 3A Moderate CKD (GFR = 45-59 mL/min/1 73 square meters)  •  Stage 3B Moderate CKD (GFR = 30-44 mL/min/1 73 square meters)  •  Stage 4 Severe CKD (GFR = 15-29 mL/min/1 73 square meters)  •  Stage 5 End Stage CKD (GFR <15 mL/min/1 73 square meters)  Note: GFR calculation is accurate only with a steady state creatinine    Lipase [[id number]]  (Normal) Collected: 02/10/23 1726    Lab Status: Final result Specimen: Blood from Hand, Left Updated: 02/10/23 1755     Lipase 29 u/L     Magnesium [[id number]]  (Normal) Collected: 02/10/23 1726    Lab Status: Final result Specimen: Blood from Hand, Left Updated: 02/10/23 1755     Magnesium 2 2 mg/dL     D-Dimer [[id number]]  (Abnormal) Collected: 02/10/23 1726    Lab Status: Final result Specimen: Blood from Hand, Left Updated: 02/10/23 1752     D-Dimer, Quant 1 30 ug/ml FEU     Narrative: In the evaluation for possible pulmonary embolism, in the appropriate (Well's Score of 4 or less) patient, the age adjusted d-dimer cutoff for this patient can be calculated as:    Age x 0 01 (in ug/mL) for Age-adjusted D-dimer exclusion threshold for a patient over 50 years      Leidy Mendoza [[id number]]  (Normal) Collected: 02/10/23 1726    Lab Status: Final result Specimen: Blood from Hand, Left Updated: 02/10/23 1750     Protime 12 5 seconds      INR 0 91    APTT [[id number]]  (Normal) Collected: 02/10/23 1726    Lab Status: Final result Specimen: Blood from Hand, Left Updated: 02/10/23 1750     PTT 37 seconds     CBC and differential [[id number]] Collected: 02/10/23 1726    Lab Status: Final result Specimen: Blood from Hand, Left Updated: 02/10/23 1738     WBC 7 42 Thousand/uL      RBC 4 65 Million/uL      Hemoglobin 14 3 g/dL      Hematocrit 40 4 %      MCV 87 fL      MCH 30 8 pg      MCHC 35 4 g/dL      RDW 12 3 %      MPV 10 5 fL      Platelets 502 Thousands/uL      nRBC 0 /100 WBCs      Neutrophils Relative 56 %      Immat GRANS % 0 %      Lymphocytes Relative 35 %      Monocytes Relative 6 %      Eosinophils Relative 2 %      Basophils Relative 1 %      Neutrophils Absolute 4 24 Thousands/µL      Immature Grans Absolute 0 03 Thousand/uL      Lymphocytes Absolute 2 57 Thousands/µL      Monocytes Absolute 0 41 Thousand/µL      Eosinophils Absolute 0 11 Thousand/µL      Basophils Absolute 0 06 Thousands/µL                  PE Study with CT Abdomen and Pelvis with contrast   Final Result by Heena Lee MD (02/10 1951)      1  No evidence of pulmonary embolism  No acute pulmonary findings  2   The liver is unremarkable  3   Incidentally noted linear hyperdensity within the lumen of the transverse colon measuring up to 9 mm, question ingested bone fragment or possibly foreign body  No associated bowel wall thickening or infiltrative changes however                 Workstation performed: [id number]         XR chest 1 view portable    (Results Pending)              Procedures  Procedures         ED Course  ED Course as of 02/10/23 2150   Fri Feb 10, 2023   1640 Procedure Note: EKG  Date/Time: 02/10/23 4:40 PM   Interpreted by: Kulwinder Dumont  Indications / Diagnosis: CP  ECG reviewed by me, the ED Provider: yes   The EKG demonstrates:  Rhythm: normal sinus  Intervals: normal   Axis: left axis  QRS/Blocks: RBBB  ST Changes: No acute ST Changes, no STD/LEIGHTON  No significant changes from previous EKGs  1755 Potassium(!): 3 1  Will replete   1756 D-Dimer, Quant(!): 1 30  Will obtain dimer to r/o PE, may also extend to abdomen with LFTs   1756 AST(!): 41   1756 ALT(!): 78   1756 Alkaline Phosphatase(!): 187   1756 LFT elevations unexpected, possibly related to recent viral illness  No tenderness on examination in RUQ    1758 XR chest 1 view portable  No acute cardiopulmonary disease   1816 hs TnI 0hr: 7  Was 4 yr ago   1941 Nauseous after potassium   1953 PE Study with CT Abdomen and Pelvis with contrast  IMPRESSION:     1  No evidence of pulmonary embolism  No acute pulmonary findings  2   The liver is unremarkable  3   Incidentally noted linear hyperdensity within the lumen of the transverse colon measuring up to 9 mm, question ingested bone fragment or possibly foreign body  No associated bowel wall thickening or infiltrative changes however  2001 Patient feeling improved  Updated to results of labs so far, awaiting delta trop  Liver enzyme elevation likely due to viral illness  Advised of CT results showing no PE, biliary-hepatic disease  Discussed possible FB ingestion, advised on symptoms to watch for at home and when to RTED  Should be able to pass at this point  Patient does not recall swallowing anything  2022 hs TnI 2hr: 5             HEART Risk Score    Flowsheet Row Most Recent Value   Heart Score Risk Calculator    History 0 Filed at: 02/10/2023 1712   ECG 0 Filed at: 02/10/2023 1712   Age 1 Filed at: 02/10/2023 1712   Risk Factors 1 Filed at: 02/10/2023 1712   Troponin 0 Filed at: 02/10/2023 1712   HEART Score 2 Filed at: 02/10/2023 855 [facility] Making  42-year-old female presents for evaluation of chest pain, dyspnea    Patient is overall well-appearing on examination, initial EKG shows a right bundle branch block similar to previous EKGs  She does not have any ectopy on this EKG however in room does exhibit PVCs, nonsustained  Patient however denies palpitations with her symptoms  Given duration of symptoms and no ST elevation or deviation on EKG, doubt MI  Will obtain a troponin to rule out ACS  With seemingly viral illness earlier in the week, is possible she has pericarditis or pleuritis  Patient is unable to be PERC out due to age and will obtain D-dimer assay, previous D-dimers have been elevated as she may require CT imaging  Will obtain a chest x-ray to rule out pneumothorax, pneumonia  Abnormal CT of the abdomen: acute illness or injury  Chest pain, unspecified: acute illness or injury  Hypokalemia: acute illness or injury  Transaminitis: acute illness or injury  Amount and/or Complexity of Data Reviewed  Labs: ordered  Decision-making details documented in ED Course  Radiology: ordered  Decision-making details documented in ED Course  Risk  Prescription drug management  Disposition  Final diagnoses:   Chest pain, unspecified   Transaminitis   Abnormal CT of the abdomen   Hypokalemia     Time reflects when diagnosis was documented in both MDM as applicable and the Disposition within this note     Time User Action Codes Description Comment    2/10/2023  8:23 PM Patsey Raring Add [R07 9] Chest pain, unspecified     2/10/2023  8:23 PM Patsey Raring Add [R74 01] Transaminitis     2/10/2023  8:23 PM Patsey Raring Add [R93 5] Abnormal CT of the abdomen     2/10/2023  8:23 PM Patsey Raring Add [E87 6] Hypokalemia       ED Disposition     ED Disposition   Discharge    Condition   Stable    Date/Time   Fri Feb 10, 2023  8:23 PM    Mychal Townsend discharge to home/self care                 Follow-up Information     Follow up With Specialties Details Why Contact Info Additional [id number] [facility],[address], [address] [facility] Medicine Call   [address] [place] [id number] [facility] [place] [address]  145 [facility] Emergency Medicine  If symptoms worsen [phone]  70 [facility], [address], [address]          Discharge Medication List as of 2/10/2023  8:24 PM      CONTINUE these medications which have NOT CHANGED    Details   cholecalciferol (VITAMIN D3) 1,000 units tablet Take 2 tablets (2,000 Units total) by mouth daily, Starting Thu 12/29/2022, Normal      acetaminophen (TYLENOL) 500 mg tablet Take 500 mg by mouth every 6 (six) hours as needed for pain, Historical Med      amLODIPine (NORVASC) 5 mg tablet Take 2 tablets (10 mg total) by mouth daily, Starting Mon 1/23/2023, Normal      atorvastatin (LIPITOR) 20 mg tablet Take 1 tablet (20 mg total) by mouth daily, Starting Mon 1/23/2023, Normal      chlorthalidone 25 mg tablet take 1 tablet by mouth once daily, Normal      Diclofenac Sodium (VOLTAREN) 1 % Apply 2 g topically 4 (four) times a day, Starting Mon 6/13/2022, Normal      estradiol (ESTRACE) 0 1 mg/g vaginal cream INSERT 0 5 GRAMS INTO THE VAGINA TWICE WEEKLY, Normal      levothyroxine (Euthyrox) 25 mcg tablet Take 1 tablet (25 mcg total) by mouth daily, Starting Wed 12/28/2022, Normal             No discharge procedures on file      PDMP Review       Value Time User    PDMP Reviewed  Yes 10/5/2021  5:04 PM Frankie Sánchez PA-C          ED Provider  Electronically Signed by           Aleksander Strickland DO  02/10/23 [id number]

## 2023-02-17 DIAGNOSIS — I10 BENIGN ESSENTIAL HTN: ICD-10-CM

## 2023-02-17 RX ORDER — AMLODIPINE BESYLATE 5 MG/1
TABLET ORAL
Qty: 30 TABLET | Refills: 1 | Status: SHIPPED | OUTPATIENT
Start: 2023-02-17

## 2023-02-18 DIAGNOSIS — E03.9 HYPOTHYROIDISM, UNSPECIFIED TYPE: ICD-10-CM

## 2023-02-18 RX ORDER — LEVOTHYROXINE SODIUM 0.03 MG/1
TABLET ORAL
Qty: 30 TABLET | Refills: 1 | Status: SHIPPED | OUTPATIENT
Start: 2023-02-18

## 2023-03-06 DIAGNOSIS — I10 BENIGN ESSENTIAL HTN: ICD-10-CM

## 2023-03-06 DIAGNOSIS — E78.5 DYSLIPIDEMIA: ICD-10-CM

## 2023-03-06 RX ORDER — ATORVASTATIN CALCIUM 20 MG/1
20 TABLET, FILM COATED ORAL DAILY
Qty: 30 TABLET | Refills: 1 | Status: SHIPPED | OUTPATIENT
Start: 2023-03-06

## 2023-03-07 RX ORDER — CHLORTHALIDONE 25 MG/1
25 TABLET ORAL DAILY
Qty: 30 TABLET | Refills: 1 | Status: SHIPPED | OUTPATIENT
Start: 2023-03-07

## 2023-03-20 ENCOUNTER — OFFICE VISIT (OUTPATIENT)
Dept: CARDIOLOGY CLINIC | Facility: HOSPITAL | Age: 58
End: 2023-03-20

## 2023-03-20 VITALS
OXYGEN SATURATION: 97 % | HEIGHT: 59 IN | BODY MASS INDEX: 28.26 KG/M2 | SYSTOLIC BLOOD PRESSURE: 110 MMHG | WEIGHT: 140.2 LBS | HEART RATE: 75 BPM | DIASTOLIC BLOOD PRESSURE: 60 MMHG

## 2023-03-20 DIAGNOSIS — R06.02 SOB (SHORTNESS OF BREATH): ICD-10-CM

## 2023-03-20 DIAGNOSIS — E87.6 HYPOKALEMIA: Primary | ICD-10-CM

## 2023-03-20 DIAGNOSIS — I10 BENIGN ESSENTIAL HTN: ICD-10-CM

## 2023-03-20 DIAGNOSIS — I45.10 RBBB: ICD-10-CM

## 2023-03-20 DIAGNOSIS — I49.3 PVC'S (PREMATURE VENTRICULAR CONTRACTIONS): ICD-10-CM

## 2023-03-20 DIAGNOSIS — R42 LIGHTHEADEDNESS: ICD-10-CM

## 2023-03-20 RX ORDER — POTASSIUM CHLORIDE 20 MEQ/1
20 TABLET, EXTENDED RELEASE ORAL DAILY
Qty: 90 TABLET | Refills: 4 | Status: SHIPPED | OUTPATIENT
Start: 2023-03-20

## 2023-03-21 PROBLEM — R42 EPISODE OF DIZZINESS: Status: RESOLVED | Noted: 2021-05-10 | Resolved: 2023-03-21

## 2023-03-21 PROBLEM — R42 LIGHTHEADEDNESS: Status: ACTIVE | Noted: 2023-03-21

## 2023-03-21 PROBLEM — I49.3 PVC'S (PREMATURE VENTRICULAR CONTRACTIONS): Status: ACTIVE | Noted: 2023-03-21

## 2023-03-21 NOTE — PROGRESS NOTES
Cardiology Follow Up    Tayler Christine  1965  [id number]  [address] [facility] [address] [phone]    1  Hypokalemia  potassium chloride (K-DUR,KLOR-CON) 20 mEq tablet      2  RBBB        3  Benign essential HTN        4  SOB (shortness of breath)        5  Lightheadedness        6  PVC's (premature ventricular contractions)              Discussion/Summary: She is on a strong dose of Chlorthalidone which has caused hypokalemia  This appears associated with weakness, lightheadedness, PVCs  1  Decrease Chlorthalidone to 12 5 mg daily  2  Add K+ 20 meq daily  3  If symptoms persist, call office  4  RTO 6 months      Interval History: She is active and gets occasional atypical CP, occasional SOB, weakness, and lightheadedness  She was started on Chlorthalidone 25 mg daily last August for her BP  Since then her Potassium has been on the low side and BP has been low normal   She was seen in the ER for atypical CP on 2/10/2023  Troponin levels were normal  K + was 3 1  ECG did not show ischemic abnormalities      BP today 110/60    Holter Monitor 1/2023 - 21 % PVCs    Stress echo 1/2022- 9:28 Danish protocol, Normal EF, no ischemia              Patient Active Problem List   Diagnosis   • Abdominal pain, left lower quadrant   • Chronic lumbar radiculopathy   • Chronic pain syndrome   • Dyslipidemia   • Chronic pain of right knee   • Chronic right hip pain   • Myofascial pain syndrome   • Atrophic vaginitis   • Primary osteoarthritis of both knees   • Lumbar radiculopathy   • Left hip pain   • Lumbar degenerative disc disease   • Pharyngoesophageal dysphagia   • Perforation of left tympanic membrane   • Chronic otitis media of left ear   • Atypical chest pain   • Epigastric abdominal pain   • Chronic musculoskeletal pain   • Pain in finger of both hands   • Tympanic membrane perforation, left   • Strain of thoracic back region   • Chronic right shoulder pain   • Medial epicondylitis of elbow, left   • Near syncope   • Chronic pain   • Bradycardia   • RBBB   • Bursitis of right shoulder   • Benign essential HTN   • Leg edema   • Mild anxiety   • Plantar fasciitis   • Hypothyroidism   • Exertional dyspnea   • SOB (shortness of breath)   • Lightheadedness   • PVC's (premature ventricular contractions)     Past Medical History:   Diagnosis Date   • Fatigue    • High cholesterol    • Hypertension    • Neuropathy    • Osteoarthritis     knees   • Shortness of breath      Social History     Socioeconomic History   • Marital status: Single     Spouse name: Not on file   • Number of children: Not on file   • Years of education: Not on file   • Highest education level: Not on file   Occupational History   • Not on file   Tobacco Use   • Smoking status: Never   • Smokeless tobacco: Never   Vaping Use   • Vaping Use: Never used   Substance and Sexual Activity   • Alcohol use: Not Currently   • Drug use: No   • Sexual activity: Not Currently     Partners: Male     Birth control/protection: Female Sterilization   Other Topics Concern   • Not on file   Social History Narrative   • Not on file     Social Determinants of Health     Financial Resource Strain: Not on file   Food Insecurity: Not on file   Transportation Needs: Not on file   Physical Activity: Not on file   Stress: Not on file   Social Connections: Not on file   Intimate Partner Violence: Not on file   Housing Stability: Not on file      Family History   Problem Relation Age of Onset   • Uterine cancer Mother    • Early death Father    • Heart disease Brother    • No Known Problems Sister    • No Known Problems Daughter    • No Known Problems Sister    • No Known Problems Daughter    • No Known Problems Maternal Aunt    • No Known Problems Maternal Aunt    • No Known Problems Maternal Aunt    • No Known Problems Maternal Aunt    • No Known Problems Maternal Grandmother • No Known Problems Maternal Grandfather    • No Known Problems Paternal Grandmother    • No Known Problems Paternal Grandfather      Past Surgical History:   Procedure Laterality Date   • APPENDECTOMY     • EPIDURAL BLOCK INJECTION Left 5/17/2018    Procedure: L4 AND L5 TRANSFORAMINAL EPIDURAL STEROID INJECTION;  Surgeon: Ludwig Moss MD;  [facility];  Service: Pain Management    • EPIDURAL BLOCK INJECTION Left 10/4/2018    Procedure: L4 AND L5 TRANSFORAMINAL EPIDURAL STEROID INJECTION;  Surgeon: Ludwig Moss MD;  [facility];  Service: Pain Management    • EPIDURAL BLOCK INJECTION Left 5/1/2019    Procedure: L4-5 and L5-S1 transforaminal epidural steroid injection;  Surgeon: Ludwig Moss MD;  [facility];  Service: Pain Management    • FL GUIDED NEEDLE PLAC BX/ASP/INJ  5/1/2019   • VT COLONOSCOPY FLX DX W/COLLJ SPEC WHEN PFRMD N/A 5/31/2016    Procedure: COLONOSCOPY;  Surgeon: Damon Segovia MD;  [facility];  Service: Gastroenterology   • VT ESOPHAGOGASTRODUODENOSCOPY TRANSORAL DIAGNOSTIC N/A 11/15/2016    Procedure: ESOPHAGOGASTRODUODENOSCOPY (EGD);   Surgeon: Damon Segovia MD;  [facility];  Service: Gastroenterology   • VT TYMPANOPLASTY W/O MASTOIDECT W/O OSSICLE RECNSTJ Left 10/25/2019    Procedure: LEFT TYMPANOPLASTY;  Surgeon: Kian Jacome MD;  [facility];  Service: ENT   • TUBAL LIGATION         Current Outpatient Medications:   •  amLODIPine (NORVASC) 5 mg tablet, take 2 tablets by mouth once daily, Disp: 30 tablet, Rfl: 1  •  atorvastatin (LIPITOR) 20 mg tablet, Take 1 tablet (20 mg total) by mouth daily, Disp: 30 tablet, Rfl: 1  •  chlorthalidone 25 mg tablet, Take 1 tablet (25 mg total) by mouth daily, Disp: 30 tablet, Rfl: 1  •  cholecalciferol (VITAMIN D3) 1,000 units tablet, Take 2 tablets (2,000 Units total) by mouth daily, Disp: 180 tablet, Rfl: 1  •  Diclofenac Sodium (VOLTAREN) 1 %, Apply 2 g topically 4 (four) times a day, Disp: 350 g, Rfl: 1  •  estradiol (ESTRACE) 0 1 mg/g vaginal cream, INSERT 0 5 GRAMS INTO THE VAGINA TWICE WEEKLY, Disp: 42 5 g, Rfl: 0  •  levothyroxine 25 mcg tablet, take 1 tablet by mouth once daily, Disp: 30 tablet, Rfl: 1  •  potassium chloride (K-DUR,KLOR-CON) 20 mEq tablet, Take 1 tablet (20 mEq total) by mouth daily, Disp: 90 tablet, Rfl: 4  •  acetaminophen (TYLENOL) 500 mg tablet, Take 500 mg by mouth every 6 (six) hours as needed for pain (Patient not taking: Reported on 11/30/2022), Disp: , Rfl:   No Known Allergies  Vitals:    03/20/23 1533   BP: 110/60   Pulse: 75   SpO2: 97%   Weight: 63 6 kg (140 lb 3 2 oz)   Height: 4' 11" (1 499 m)     Weight (last 2 days)     Date/Time Weight    03/20/23 1533 63 6 (140 2)         Blood pressure 110/60, pulse 75, height 4' 11" (1 499 m), weight 63 6 kg (140 lb 3 2 oz), SpO2 97 %, not currently breastfeeding , Body mass index is 28 32 kg/m²      Labs:  Admission on 02/10/2023, Discharged on 02/10/2023   Component Date Value   • WBC 02/10/2023 7 42    • RBC 02/10/2023 4 65    • Hemoglobin 02/10/2023 14 3    • Hematocrit 02/10/2023 40 4    • MCV 02/10/2023 87    • MCH 02/10/2023 30 8    • MCHC 02/10/2023 35 4    • RDW 02/10/2023 12 3    • MPV 02/10/2023 10 5    • Platelets 81/43/9081 271    • nRBC 02/10/2023 0    • Neutrophils Relative 02/10/2023 56    • Immat GRANS % 02/10/2023 0    • Lymphocytes Relative 02/10/2023 35    • Monocytes Relative 02/10/2023 6    • Eosinophils Relative 02/10/2023 2    • Basophils Relative 02/10/2023 1    • Neutrophils Absolute 02/10/2023 4 24    • Immature Grans Absolute 02/10/2023 0 03    • Lymphocytes Absolute 02/10/2023 2 57    • Monocytes Absolute 02/10/2023 0 41    • Eosinophils Absolute 02/10/2023 0 11    • Basophils Absolute 02/10/2023 0 06    • Protime 02/10/2023 12 5    • INR 02/10/2023 0 91    • PTT 02/10/2023 37    • Sodium 02/10/2023 139    • Potassium 02/10/2023 3 1 (L)    • Chloride 02/10/2023 98    • CO2 02/10/2023 30    • ANION GAP 02/10/2023 11    • BUN 02/10/2023 18    • Creatinine 02/10/2023 0 79    • Glucose 02/10/2023 96    • Calcium 02/10/2023 10 0    • AST 02/10/2023 41 (H)    • ALT 02/10/2023 78 (H)    • Alkaline Phosphatase 02/10/2023 187 (H)    • Total Protein 02/10/2023 7 6    • Albumin 02/10/2023 4 6    • Total Bilirubin 02/10/2023 0 97    • eGFR 02/10/2023 82    • Lipase 02/10/2023 29    • Magnesium 02/10/2023 2 2    • D-Dimer, Quant 02/10/2023 1 30 (H)    • hs TnI 0hr 02/10/2023 7    • hs TnI 2hr 02/10/2023 5    • Delta 2hr hsTnI 02/10/2023 -2    Appointment on 12/28/2022   Component Date Value   • Sodium 12/28/2022 137    • Potassium 12/28/2022 3 3 (L)    • Chloride 12/28/2022 100    • CO2 12/28/2022 32    • ANION GAP 12/28/2022 5    • BUN 12/28/2022 20    • Creatinine 12/28/2022 0 88    • Glucose, Fasting 12/28/2022 115 (H)    • Calcium 12/28/2022 9 9    • AST 12/28/2022 24    • ALT 12/28/2022 37    • Alkaline Phosphatase 12/28/2022 85    • Total Protein 12/28/2022 7 9    • Albumin 12/28/2022 4 4    • Total Bilirubin 12/28/2022 1 33 (H)    • eGFR 12/28/2022 73    • Magnesium 12/28/2022 2 5    • Cholesterol 12/28/2022 129    • Triglycerides 12/28/2022 153 (H)    • HDL, Direct 12/28/2022 60    • LDL Calculated 12/28/2022 38    • Vit D, 25-Hydroxy 12/28/2022 19 1 (L)    Appointment on 09/21/2022   Component Date Value   • Sodium 09/21/2022 140    • Potassium 09/21/2022 3 7    • Chloride 09/21/2022 100    • CO2 09/21/2022 32    • ANION GAP 09/21/2022 8    • BUN 09/21/2022 23    • Creatinine 09/21/2022 0 91    • Glucose, Fasting 09/21/2022 126 (H)    • Calcium 09/21/2022 9 7    • eGFR 09/21/2022 70      Imaging: No results found  Review of Systems:  Review of Systems   Constitutional: Negative for diaphoresis, fatigue, fever and unexpected weight change  HENT: Negative  Respiratory: Positive for shortness of breath  Negative for cough and wheezing  Cardiovascular: Positive for chest pain   Negative for palpitations and leg swelling  Gastrointestinal: Negative for abdominal pain, diarrhea and nausea  Musculoskeletal: Negative for gait problem and myalgias  Skin: Negative for rash  Neurological: Positive for weakness and light-headedness  Negative for dizziness and numbness  Psychiatric/Behavioral: Negative  Physical Exam:  Physical Exam  Constitutional:       Appearance: She is well-developed  HENT:      Head: Normocephalic and atraumatic  Eyes:      Pupils: Pupils are equal, round, and reactive to light  Neck:      Vascular: No JVD  Cardiovascular:      Rate and Rhythm: Regular rhythm  Pulses: Normal pulses  Carotid pulses are 2+ on the right side and 2+ on the left side  Heart sounds: S1 normal and S2 normal    Pulmonary:      Effort: Pulmonary effort is normal       Breath sounds: Normal breath sounds  No wheezing or rales  Abdominal:      General: Bowel sounds are normal       Palpations: Abdomen is soft  Tenderness: There is no abdominal tenderness  Musculoskeletal:         General: No tenderness  Normal range of motion  Cervical back: Normal range of motion and neck supple  Skin:     General: Skin is warm  Neurological:      Mental Status: She is alert and oriented to person, place, and time  Cranial Nerves: No cranial nerve deficit  Deep Tendon Reflexes: Reflexes are normal and symmetric

## 2023-03-23 DIAGNOSIS — I10 BENIGN ESSENTIAL HTN: ICD-10-CM

## 2023-03-23 RX ORDER — AMLODIPINE BESYLATE 5 MG/1
TABLET ORAL
Qty: 30 TABLET | Refills: 1 | Status: SHIPPED | OUTPATIENT
Start: 2023-03-23

## 2023-03-28 DIAGNOSIS — E78.5 DYSLIPIDEMIA: ICD-10-CM

## 2023-03-28 DIAGNOSIS — I10 BENIGN ESSENTIAL HTN: ICD-10-CM

## 2023-03-28 RX ORDER — CHLORTHALIDONE 25 MG/1
12.5 TABLET ORAL DAILY
Qty: 30 TABLET | Refills: 1 | Status: SHIPPED | OUTPATIENT
Start: 2023-03-28 | End: 2023-04-06 | Stop reason: SDUPTHER

## 2023-03-28 RX ORDER — ATORVASTATIN CALCIUM 20 MG/1
20 TABLET, FILM COATED ORAL DAILY
Qty: 30 TABLET | Refills: 1 | Status: SHIPPED | OUTPATIENT
Start: 2023-03-28

## 2023-04-06 DIAGNOSIS — R06.02 SOB (SHORTNESS OF BREATH): Primary | ICD-10-CM

## 2023-04-06 DIAGNOSIS — I10 BENIGN ESSENTIAL HTN: ICD-10-CM

## 2023-04-06 RX ORDER — CHLORTHALIDONE 25 MG/1
25 TABLET ORAL DAILY
Qty: 90 TABLET | Refills: 3 | Status: SHIPPED | OUTPATIENT
Start: 2023-04-06

## 2023-04-23 DIAGNOSIS — E03.9 HYPOTHYROIDISM, UNSPECIFIED TYPE: ICD-10-CM

## 2023-04-23 RX ORDER — LEVOTHYROXINE SODIUM 0.03 MG/1
TABLET ORAL
Qty: 30 TABLET | Refills: 1 | Status: SHIPPED | OUTPATIENT
Start: 2023-04-23

## 2023-05-08 ENCOUNTER — ANNUAL EXAM (OUTPATIENT)
Dept: OBGYN CLINIC | Facility: CLINIC | Age: 58
End: 2023-05-08

## 2023-05-08 VITALS
BODY MASS INDEX: 29.19 KG/M2 | HEIGHT: 59 IN | WEIGHT: 144.8 LBS | DIASTOLIC BLOOD PRESSURE: 78 MMHG | SYSTOLIC BLOOD PRESSURE: 120 MMHG

## 2023-05-08 DIAGNOSIS — Z12.31 ENCOUNTER FOR SCREENING MAMMOGRAM FOR MALIGNANT NEOPLASM OF BREAST: ICD-10-CM

## 2023-05-08 DIAGNOSIS — Z01.419 ENCOUNTER FOR WELL WOMAN EXAM WITH ROUTINE GYNECOLOGICAL EXAM: Primary | ICD-10-CM

## 2023-05-08 RX ORDER — CYCLOSPORINE 0.5 MG/ML
EMULSION OPHTHALMIC
COMMUNITY
Start: 2023-02-03

## 2023-05-08 NOTE — PROGRESS NOTES
ASSESSMENT & PLAN: Osmar Solano was seen today for gynecologic exam     Diagnoses and all orders for this visit:    Encounter for well woman exam with routine gynecological exam  -     Liquid-based pap, screening    Encounter for screening mammogram for malignant neoplasm of breast  -     Mammo screening bilateral w 3d & cad; Future        1  Routine well woman exam done today  2  Pap and HPV:  The patient's pap is not up to date  Pap and cotesting was done today  Current ASCCP Guidelines reviewed  3   Mammogram was ordered  4  Colorectal cancer screening is up to date  4  The following were reviewed in today's visit: adequate intake of calcium and vitamin D, exercise and healthy diet  5  F/u 1 year for next routine GYN exam       CC:  Annual Gynecologic Examination    HPI: Sacha Burnham is a 62 y o  who presents for annual gynecologic examination  She has the following concerns:  No issues  Daughter is present and is doing the interpreting  Health Maintenance:    Patient describes her health as good  Patient does not have weight concerns  She exercises with walking  She does wear her seatbelt routinely  She does perform regular monthly self breast exams  She does feel safe at home  Patients does follow a healthy diet  Patient gets 1 servings of dairy or calcium rich foods a day  Last pap: 2022, +HPV and colpo 2022    Last mammogram: 2023  Last colorectal cancer screening: colonoscopy 2016    Patient Active Problem List   Diagnosis   • Abdominal pain, left lower quadrant   • Chronic lumbar radiculopathy   • Chronic pain syndrome   • Dyslipidemia   • Chronic pain of right knee   • Chronic right hip pain   • Myofascial pain syndrome   • Atrophic vaginitis   • Primary osteoarthritis of both knees   • Lumbar radiculopathy   • Left hip pain   • Lumbar degenerative disc disease   • Pharyngoesophageal dysphagia   • Perforation of left tympanic membrane   • Chronic otitis media of left ear   • Atypical chest pain   • Epigastric abdominal pain   • Chronic musculoskeletal pain   • Pain in finger of both hands   • Tympanic membrane perforation, left   • Strain of thoracic back region   • Chronic right shoulder pain   • Medial epicondylitis of elbow, left   • Near syncope   • Chronic pain   • Bradycardia   • RBBB   • Bursitis of right shoulder   • Benign essential HTN   • Leg edema   • Mild anxiety   • Plantar fasciitis   • Hypothyroidism   • Exertional dyspnea   • SOB (shortness of breath)   • Lightheadedness   • PVC's (premature ventricular contractions)       Past Medical History:   Diagnosis Date   • Fatigue    • High cholesterol    • Hypertension    • Neuropathy    • Osteoarthritis     knees   • Shortness of breath        Past Surgical History:   Procedure Laterality Date   • APPENDECTOMY     • EPIDURAL BLOCK INJECTION Left 5/17/2018    Procedure: L4 AND L5 TRANSFORAMINAL EPIDURAL STEROID INJECTION;  Surgeon: Robyn Florence MD;  [facility];  Service: Pain Management    • EPIDURAL BLOCK INJECTION Left 10/4/2018    Procedure: L4 AND L5 TRANSFORAMINAL EPIDURAL STEROID INJECTION;  Surgeon: Robyn Florence MD;  [facility];  Service: Pain Management    • EPIDURAL BLOCK INJECTION Left 5/1/2019    Procedure: L4-5 and L5-S1 transforaminal epidural steroid injection;  Surgeon: Robyn Florence MD;  [facility];  Service: Pain Management    • FL GUIDED NEEDLE PLAC BX/ASP/INJ  5/1/2019   • [place] COLONOSCOPY FLX DX W/COLLJ SPEC WHEN PFRMD N/A 5/31/2016    Procedure: COLONOSCOPY;  Surgeon: Clive Bowman MD;  [facility];  Service: Gastroenterology   • KS ESOPHAGOGASTRODUODENOSCOPY TRANSORAL DIAGNOSTIC N/A 11/15/2016    Procedure: ESOPHAGOGASTRODUODENOSCOPY (EGD);   Surgeon: Clive Bowman MD;  [facility];  Service: Gastroenterology   • KS TYMPANOPLASTY W/O MASTOIDECT W/O OSSICLE RECNSTJ Left 10/25/2019    Procedure: LEFT TYMPANOPLASTY;  Surgeon: Dee Kehr, MD; [facility];  Service: ENT   • TUBAL LIGATION         Past OB/Gyn History:    History of abnormal pap smears: Yes  Patient is not currently sexually active  heterosexual  Patient's family planning method is post menopausal status  Family History   Problem Relation Age of Onset   • Uterine cancer Mother    • Early death Father    • Heart disease Brother    • No Known Problems Sister    • No Known Problems Daughter    • No Known Problems Sister    • No Known Problems Daughter    • No Known Problems Maternal Aunt    • No Known Problems Maternal Aunt    • No Known Problems Maternal Aunt    • No Known Problems Maternal Aunt    • No Known Problems Maternal Grandmother    • No Known Problems Maternal Grandfather    • No Known Problems Paternal Grandmother    • No Known Problems Paternal Grandfather        Social History:  Social History     Socioeconomic History   • Marital status: Single     Spouse name: Not on file   • Number of children: Not on file   • Years of education: Not on file   • Highest education level: Not on file   Occupational History   • Not on file   Tobacco Use   • Smoking status: Never   • Smokeless tobacco: Never   Vaping Use   • Vaping Use: Never used   Substance and Sexual Activity   • Alcohol use: Not Currently   • Drug use: No   • Sexual activity: Not Currently     Partners: Male     Birth control/protection: Female Sterilization   Other Topics Concern   • Not on file   Social History Narrative   • Not on file     Social Determinants of Health     Financial Resource Strain: Not on file   Food Insecurity: Not on file   Transportation Needs: Not on file   Physical Activity: Not on file   Stress: Not on file   Social Connections: Not on file   Intimate Partner Violence: Not on file   Housing Stability: Not on file     Presently lives with daughter and son  Patient is currently not working      No Known Allergies      Current Outpatient Medications:   •  amLODIPine (NORVASC) 5 mg tablet, take 2 tablets by mouth once daily, Disp: 30 tablet, Rfl: 1  •  atorvastatin (LIPITOR) 20 mg tablet, Take 1 tablet (20 mg total) by mouth daily, Disp: 30 tablet, Rfl: 1  •  chlorthalidone 25 mg tablet, Take 1 tablet (25 mg total) by mouth daily, Disp: 90 tablet, Rfl: 3  •  cholecalciferol (VITAMIN D3) 1,000 units tablet, Take 2 tablets (2,000 Units total) by mouth daily, Disp: 180 tablet, Rfl: 1  •  Diclofenac Sodium (VOLTAREN) 1 %, Apply 2 g topically 4 (four) times a day, Disp: 350 g, Rfl: 1  •  estradiol (ESTRACE) 0 1 mg/g vaginal cream, INSERT 0 5 GRAMS INTO THE VAGINA TWICE WEEKLY, Disp: 42 5 g, Rfl: 0  •  levothyroxine 25 mcg tablet, take 1 tablet by mouth once daily, Disp: 30 tablet, Rfl: 1  •  potassium chloride (K-DUR,KLOR-CON) 20 mEq tablet, Take 1 tablet (20 mEq total) by mouth daily, Disp: 90 tablet, Rfl: 4  •  Restasis 0 05 % ophthalmic emulsion, instill 1 drop into both eyes twice a day, Disp: , Rfl:   •  acetaminophen (TYLENOL) 500 mg tablet, Take 500 mg by mouth every 6 (six) hours as needed for pain (Patient not taking: Reported on 11/30/2022), Disp: , Rfl:     Review of Systems  Constitutional :no fever, feels well, no tiredness, no recent weight gain or loss  ENT: no ear ache, no loss of hearing, no nosebleeds or nasal discharge, no sore throat or hoarseness  Cardiovascular: no complaints of slow or fast heart beat, no chest pain, no palpitations, no leg claudication or lower extremity edema  Respiratory: no complaints of shortness of shortness of breath, no RAIN  Breasts:no complaints of breast pain, breast lump, or nipple discharge  Gastrointestinal: no complaints of abdominal pain, constipation, nausea, vomiting, or diarrhea or bloody stools  Genitourinary : no complaints of dysuria, incontinence, pelvic pain, no dysmenorrhea, vaginal discharge or abnormal vaginal bleeding and as noted in HPI    Musculoskeletal: no complaints of arthralgia, no myalgia, no joint swelling or stiffness, no limb "pain or swelling  Integumentary: no complaints of skin rash or lesion, itching or dry skin  Neurological: no complaints of headache, no confusion, no numbness or tingling, no dizziness or fainting    Physical Exam:     /78   Ht 4' 11\" (1 499 m)   Wt 65 7 kg (144 lb 12 8 oz)   BMI 29 25 kg/m²     General: appears stated age, cooperative, alert normal mood and affect   Psychiatric oriented to person, place and time  Mood and affect normal   Neck: normal, supple,trachea midline, no masses  Thyroid: normal, no thyromegaly   Heart: regular rate and rhythm, S1, S2 normal, no murmur, click, rub or gallop   Lungs: clear to auscultation bilaterally, no increased work of breathing or signs of respiratory distress   Breasts: normal, no dimpling or skin changes noted   Abdomen: soft, non-tender, without masses or organomegaly   Vulva: normal , no lesions   Vagina: normal , no lesions or atrophy   Urethra: normal   Urethal meatus normal   Bladder Normal, soft, non-tender and no prolapse or masses appreciated   Cervix: normal, no palpable masses    Uterus: normal , non-tender, not enlarged, no palpable masses   Adnexa: normal, non-tender without fullness or masses   Lymphatic Palpation of lymph nodes in neck, axilla, groin and/or other locations: no lymphadenopathy or masses noted   Skin Normal skin turgor and no rashes    Palpation of skin and subcutaneous tissue normal      "

## 2023-05-08 NOTE — PATIENT INSTRUCTIONS
Thank you for your confidence in our team    We appreciate you and welcome your feedback  If you receive a survey from us, please take a few moments to let us know how we are doing     Sincerely,   Shakira Atkins MD

## 2023-05-12 LAB
LAB AP GYN PRIMARY INTERPRETATION: NORMAL
Lab: NORMAL

## 2023-05-22 ENCOUNTER — TELEPHONE (OUTPATIENT)
Dept: FAMILY MEDICINE CLINIC | Facility: CLINIC | Age: 58
End: 2023-05-22

## 2023-05-26 ENCOUNTER — HOSPITAL ENCOUNTER (EMERGENCY)
Facility: HOSPITAL | Age: 58
Discharge: HOME | End: 2023-05-27
Attending: EMERGENCY MEDICINE

## 2023-05-26 ENCOUNTER — APPOINTMENT (EMERGENCY)
Dept: RADIOLOGY | Facility: HOSPITAL | Age: 58
End: 2023-05-26

## 2023-05-26 VITALS
RESPIRATION RATE: 19 BRPM | OXYGEN SATURATION: 98 % | SYSTOLIC BLOOD PRESSURE: 132 MMHG | HEART RATE: 68 BPM | DIASTOLIC BLOOD PRESSURE: 77 MMHG | TEMPERATURE: 97 F

## 2023-05-26 DIAGNOSIS — R05.9 COUGH: ICD-10-CM

## 2023-05-26 DIAGNOSIS — B34.9 VIRAL SYNDROME: Primary | ICD-10-CM

## 2023-05-27 NOTE — ED PROVIDER NOTES
History  Chief Complaint   Patient presents with   • Flu Symptoms     Fevers for 1 week, Body aches, cough  59-year-old female presents with her daughter for evaluation of cold symptoms  About a week ago patient started experiencing a fever, this is since remitted  Patient also began to experience nasal congestion, sore throat, postnasal drip, cough  Patient did have a generalized abdominal pain at the beginning of symptoms with fever however this has since remitted  She is experienced no nausea or vomiting, diarrhea episodes  Patient does report persistent body aches  She admits to generalized headaches  Patient has been taking over-the-counter cough and cold medication, she denies known sick contacts  Prior to Admission Medications   Prescriptions Last Dose Informant Patient Reported? Taking?    Diclofenac Sodium (VOLTAREN) 1 %   No No   Sig: Apply 2 g topically 4 (four) times a day   Restasis 0 05 % ophthalmic emulsion   Yes No   Sig: instill 1 drop into both eyes twice a day   acetaminophen (TYLENOL) 500 mg tablet   Yes No   Sig: Take 500 mg by mouth every 6 (six) hours as needed for pain   Patient not taking: Reported on 11/30/2022   amLODIPine (NORVASC) 5 mg tablet   No No   Sig: take 2 tablets by mouth once daily   atorvastatin (LIPITOR) 20 mg tablet   No No   Sig: Take 1 tablet (20 mg total) by mouth daily   chlorthalidone 25 mg tablet   No No   Sig: Take 1 tablet (25 mg total) by mouth daily   cholecalciferol (VITAMIN D3) 1,000 units tablet   No No   Sig: Take 2 tablets (2,000 Units total) by mouth daily   estradiol (ESTRACE) 0 1 mg/g vaginal cream   No No   Sig: INSERT 0 5 GRAMS INTO THE VAGINA TWICE WEEKLY   levothyroxine 25 mcg tablet   No No   Sig: take 1 tablet by mouth once daily   potassium chloride (K-DUR,KLOR-CON) 20 mEq tablet   No No   Sig: Take 1 tablet (20 mEq total) by mouth daily      Facility-Administered Medications: None       Past Medical History:   Diagnosis Date   • Fatigue    • High cholesterol    • Hypertension    • Neuropathy    • Osteoarthritis     knees   • Shortness of breath        Past Surgical History:   Procedure Laterality Date   • APPENDECTOMY     • EPIDURAL BLOCK INJECTION Left 5/17/2018    Procedure: L4 AND L5 TRANSFORAMINAL EPIDURAL STEROID INJECTION;  Surgeon: Mustapha Bach MD;  [facility];  Service: Pain Management    • EPIDURAL BLOCK INJECTION Left 10/4/2018    Procedure: L4 AND L5 TRANSFORAMINAL EPIDURAL STEROID INJECTION;  Surgeon: Mustapha Bach MD;  [facility];  Service: Pain Management    • EPIDURAL BLOCK INJECTION Left 5/1/2019    Procedure: L4-5 and L5-S1 transforaminal epidural steroid injection;  Surgeon: Mustapha Bach MD;  [facility];  Service: Pain Management    • FL GUIDED NEEDLE PLAC BX/ASP/INJ  5/1/2019   • MS COLONOSCOPY FLX DX W/COLLJ SPEC WHEN PFRMD N/A 5/31/2016    Procedure: COLONOSCOPY;  Surgeon: Enzo Azar MD;  [facility];  Service: Gastroenterology   • MS ESOPHAGOGASTRODUODENOSCOPY TRANSORAL DIAGNOSTIC N/A 11/15/2016    Procedure: ESOPHAGOGASTRODUODENOSCOPY (EGD);   Surgeon: Enzo Azar MD;  [facility];  Service: Gastroenterology   • MS TYMPANOPLASTY W/O MASTOIDECT W/O OSSICLE RECNSTJ Left 10/25/2019    Procedure: LEFT TYMPANOPLASTY;  Surgeon: Pati Arreola MD;  Location:  MAIN OR;  Service: ENT   • TUBAL LIGATION         Family History   Problem Relation Age of Onset   • Uterine cancer Mother    • Early death Father    • Heart disease Brother    • No Known Problems Sister    • No Known Problems Daughter    • No Known Problems Sister    • No Known Problems Daughter    • No Known Problems Maternal Aunt    • No Known Problems Maternal Aunt    • No Known Problems Maternal Aunt    • No Known Problems Maternal Aunt    • No Known Problems Maternal Grandmother    • No Known Problems Maternal Grandfather    • No Known Problems Paternal Grandmother    • No Known Problems Paternal Grandfather      I have reviewed and agree with the history as documented  E-Cigarette/Vaping   • E-Cigarette Use Never User      E-Cigarette/Vaping Substances   • Nicotine No    • THC No    • CBD No    • Flavoring No    • Other No    • Unknown No      Social History     Tobacco Use   • Smoking status: Never   • Smokeless tobacco: Never   Vaping Use   • Vaping Use: Never used   Substance Use Topics   • Alcohol use: Not Currently   • Drug use: No       Review of Systems   Constitutional: Positive for fever  Negative for appetite change  HENT: Positive for congestion, postnasal drip and sore throat  Respiratory: Positive for cough  Negative for shortness of breath  Gastrointestinal: Positive for abdominal pain  Negative for diarrhea, nausea and vomiting  Musculoskeletal: Positive for myalgias  Neurological: Positive for headaches  All other systems reviewed and are negative  Physical Exam  Physical Exam  Vitals reviewed  Constitutional:       General: She is not in acute distress  Appearance: Normal appearance  She is not ill-appearing, toxic-appearing or diaphoretic  HENT:      Head: Normocephalic and atraumatic  Right Ear: External ear normal       Left Ear: External ear normal       Nose: Congestion present  Mouth/Throat:      Mouth: Mucous membranes are moist       Pharynx: Oropharynx is clear  No oropharyngeal exudate or posterior oropharyngeal erythema  Comments: No significant posterior oropharynx erythema, edema, uvula midline and symmetric  Eyes:      General:         Right eye: No discharge  Left eye: No discharge  Extraocular Movements: Extraocular movements intact  Cardiovascular:      Rate and Rhythm: Normal rate and regular rhythm  Pulmonary:      Effort: Pulmonary effort is normal  No respiratory distress  Breath sounds: Normal breath sounds  No stridor  No wheezing, rhonchi or rales     Musculoskeletal:         General: No deformity or signs of injury  Skin:     General: Skin is warm  Coloration: Skin is not jaundiced or pale  Neurological:      General: No focal deficit present  Mental Status: She is alert  Mental status is at baseline  Gait: Gait normal          Vital Signs  ED Triage Vitals [05/26/23 2324]   Temperature Pulse Respirations Blood Pressure SpO2   (!) 97 °F (36 1 °C) 68 19 132/77 98 %      Temp Source Heart Rate Source Patient Position - Orthostatic VS BP Location FiO2 (%)   Temporal Monitor Sitting Right arm --      Pain Score       No Pain           Vitals:    05/26/23 2324   BP: 132/77   Pulse: 68   Patient Position - Orthostatic VS: Sitting         Visual Acuity      ED Medications  Medications - No data to display    Diagnostic Studies  Results Reviewed     None                 XR chest 1 view portable    (Results Pending)              Procedures  Procedures         ED Course  ED Course as of 05/27/23 0001   Fri May 26, 2023   2330 Blood Pressure: 132/77   2330 Temperature(!): 97 °F (36 1 °C)   2330 Temp Source: Temporal   2330 Pulse: 68   2330 Respirations: 19   2330 SpO2: 98 %   2354 XR chest 1 view portable  My interpretation, no focal pneumonia, no acute cardiopulmonary disease                               SBIRT 20yo+    Flowsheet Row Most Recent Value   Initial Alcohol Screen: US AUDIT-C     1  How often do you have a drink containing alcohol? 0 Filed at: 05/26/2023 2326   2  How many drinks containing alcohol do you have on a typical day you are drinking? 0 Filed at: 05/26/2023 2326   3a  Male UNDER 65: How often do you have five or more drinks on one occasion? 0 Filed at: 05/26/2023 2326   3b  FEMALE Any Age, or MALE 65+: How often do you have 4 or more drinks on one occassion? 0 Filed at: 05/26/2023 2326   Audit-C Score 0 Filed at: 05/26/2023 2326                    Medical Decision Making  59-year-old female presents for evaluation of cold symptoms    At the onset of symptoms patient experienced a fever as well as generalized abdominal pain, this is since remitted  Patient is overall well-appearing, lungs are clear to auscultation all posterior lung fields  Will obtain chest x-ray to rule out occult pneumonia, patient declines viral panel testing at this time  Symptoms likely viral in nature, will provide cough medication  Patient is encouraged to follow-up with her primary care provider  Amount and/or Complexity of Data Reviewed  Radiology: ordered  Decision-making details documented in ED Course  Risk  OTC drugs  Disposition  Final diagnoses:   Viral syndrome   Cough     Time reflects when diagnosis was documented in both MDM as applicable and the Disposition within this note     Time User Action Codes Description Comment    5/26/2023 11:56 PM Yasmine Prabhakar [B34 9] Viral syndrome     5/26/2023 11:56 PM Yasmine Jean Baptiste Add [R05 9] Cough       ED Disposition     ED Disposition   Discharge    Condition   Stable    Date/Time   Fri May 26, 2023 11:56 PM    Mychal 1765 discharge to home/self care  Follow-up Information     Follow up With Specialties Details Why Contact Info    [facility] Schedule an appointment as soon as possible for a visit   [address]  [address]  [phone]            Patient's Medications   Discharge Prescriptions    DEXTROMETHORPHAN-GUAIFENESIN (MUCINEX DM)  MG PER 12 HR TABLET    Take 1 tablet by mouth every 12 (twelve) hours       Start Date: 5/26/2023 End Date: --       Order Dose: 1 tablet       Quantity: 20 tablet    Refills: 0       No discharge procedures on file      PDMP Review       Value Time User    PDMP Reviewed  Yes 10/5/2021  5:04 PM Neal Arreguin PA-C          ED Provider  Electronically Signed by           Farzana Mccullough DO  05/27/23 0001

## 2023-05-30 DIAGNOSIS — E78.5 DYSLIPIDEMIA: ICD-10-CM

## 2023-05-30 RX ORDER — ATORVASTATIN CALCIUM 20 MG/1
TABLET, FILM COATED ORAL
Qty: 30 TABLET | Refills: 1 | Status: SHIPPED | OUTPATIENT
Start: 2023-05-30

## 2023-06-06 DIAGNOSIS — E78.5 DYSLIPIDEMIA: ICD-10-CM

## 2023-06-06 RX ORDER — ATORVASTATIN CALCIUM 20 MG/1
20 TABLET, FILM COATED ORAL DAILY
Qty: 30 TABLET | Refills: 1 | OUTPATIENT
Start: 2023-06-06

## 2023-06-09 ENCOUNTER — PROCEDURE VISIT (OUTPATIENT)
Dept: OBGYN CLINIC | Facility: MEDICAL CENTER | Age: 58
End: 2023-06-09
Payer: COMMERCIAL

## 2023-06-09 VITALS
DIASTOLIC BLOOD PRESSURE: 80 MMHG | HEIGHT: 59 IN | WEIGHT: 144 LBS | SYSTOLIC BLOOD PRESSURE: 136 MMHG | BODY MASS INDEX: 29.03 KG/M2

## 2023-06-09 DIAGNOSIS — N72 HIGH RISK HUMAN PAPILLOMA VIRUS (HPV) INFECTION OF CERVIX: Primary | ICD-10-CM

## 2023-06-09 DIAGNOSIS — B97.7 HIGH RISK HUMAN PAPILLOMA VIRUS (HPV) INFECTION OF CERVIX: Primary | ICD-10-CM

## 2023-06-09 DIAGNOSIS — B97.7 HPV IN FEMALE: ICD-10-CM

## 2023-06-09 PROCEDURE — 57454 BX/CURETT OF CERVIX W/SCOPE: CPT | Performed by: STUDENT IN AN ORGANIZED HEALTH CARE EDUCATION/TRAINING PROGRAM

## 2023-06-09 PROCEDURE — 88344 IMHCHEM/IMCYTCHM EA MLT ANTB: CPT | Performed by: PATHOLOGY

## 2023-06-09 PROCEDURE — 88305 TISSUE EXAM BY PATHOLOGIST: CPT | Performed by: PATHOLOGY

## 2023-06-09 NOTE — PROGRESS NOTES
"[facility] [address] [place] [address]       Colposcopy     Date/Time 6/9/2023 8:20 AM     [place] Protocol   Consent: Written consent obtained  Risks and benefits: risks, benefits and alternatives were discussed  Consent given by: patient  Time out: Immediately prior to procedure a \"time out\" was called to verify the correct patient, procedure, equipment, support staff and site/side marked as required  Timeout called at: 6/9/2023 8:20 AM   Patient understanding: patient states understanding of the procedure being performed  Patient consent: the patient's understanding of the procedure matches consent given  Procedure consent: procedure consent matches procedure scheduled  Relevant documents: relevant documents present and verified  Test results: test results available and properly labeled  Site marked: the operative site was marked     Performed by  Preston Okeefe MD   Authorized by Sarah Okeefe MD       Pre-procedure details     Pre-procedure timeout performed: yes      Prepped with: acetic acid       Indication    LSIL     Procedure Details   Procedure: Colposcopy w/ cervical biopsy and ECC      Leonard speculum was placed in the vagina: yes      Endocervix was curetted using a Kevorkian curette: yes      Cervical biopsy performed with a cervical biopsy punch: yes      Biopsy(s): yes      Location:  6, 12, ECC    Specimen to pathology: yes       Post-procedure      Findings: Bleeding      Impression: Low grade cervical dysplasia      Patient tolerance of procedure:  Procedure terminated at patient's request       Sharri Chappell MD  [address] [place] [address]  6/9/2023 11:44 AM      "

## 2023-06-15 PROCEDURE — 88305 TISSUE EXAM BY PATHOLOGIST: CPT | Performed by: PATHOLOGY

## 2023-06-15 PROCEDURE — 88344 IMHCHEM/IMCYTCHM EA MLT ANTB: CPT | Performed by: PATHOLOGY

## 2023-06-16 ENCOUNTER — TELEPHONE (OUTPATIENT)
Dept: OBGYN CLINIC | Facility: MEDICAL CENTER | Age: 58
End: 2023-06-16

## 2023-06-16 DIAGNOSIS — R10.2 PELVIC PAIN: Primary | ICD-10-CM

## 2023-06-19 DIAGNOSIS — E03.9 HYPOTHYROIDISM, UNSPECIFIED TYPE: ICD-10-CM

## 2023-06-19 RX ORDER — LEVOTHYROXINE SODIUM 0.03 MG/1
TABLET ORAL
Qty: 30 TABLET | Refills: 1 | Status: SHIPPED | OUTPATIENT
Start: 2023-06-19

## 2023-06-30 ENCOUNTER — OFFICE VISIT (OUTPATIENT)
Dept: FAMILY MEDICINE CLINIC | Facility: CLINIC | Age: 58
End: 2023-06-30
Payer: COMMERCIAL

## 2023-06-30 VITALS
HEIGHT: 59 IN | BODY MASS INDEX: 29.35 KG/M2 | OXYGEN SATURATION: 97 % | TEMPERATURE: 96.9 F | HEART RATE: 67 BPM | WEIGHT: 145.6 LBS | RESPIRATION RATE: 18 BRPM | SYSTOLIC BLOOD PRESSURE: 140 MMHG | DIASTOLIC BLOOD PRESSURE: 92 MMHG

## 2023-06-30 DIAGNOSIS — R10.2 PELVIC PAIN: Primary | ICD-10-CM

## 2023-06-30 DIAGNOSIS — N89.8 VAGINAL DISCHARGE: ICD-10-CM

## 2023-06-30 LAB
SL AMB  POCT GLUCOSE, UA: ABNORMAL
SL AMB LEUKOCYTE ESTERASE,UA: ABNORMAL
SL AMB POCT BILIRUBIN,UA: ABNORMAL
SL AMB POCT BLOOD,UA: ABNORMAL
SL AMB POCT CLARITY,UA: CLEAR
SL AMB POCT COLOR,UA: ABNORMAL
SL AMB POCT KETONES,UA: ABNORMAL
SL AMB POCT NITRITE,UA: ABNORMAL
SL AMB POCT PH,UA: 7
SL AMB POCT SPECIFIC GRAVITY,UA: 1
SL AMB POCT URINE PROTEIN: ABNORMAL
SL AMB POCT UROBILINOGEN: 0.2

## 2023-06-30 PROCEDURE — 87491 CHLMYD TRACH DNA AMP PROBE: CPT | Performed by: FAMILY MEDICINE

## 2023-06-30 PROCEDURE — 87591 N.GONORRHOEAE DNA AMP PROB: CPT | Performed by: FAMILY MEDICINE

## 2023-06-30 PROCEDURE — 99213 OFFICE O/P EST LOW 20 MIN: CPT | Performed by: FAMILY MEDICINE

## 2023-06-30 PROCEDURE — T1015 CLINIC SERVICE: HCPCS | Performed by: FAMILY MEDICINE

## 2023-06-30 NOTE — PROGRESS NOTES
Assessment/Plan     Diagnoses and all orders for this visit:    Pelvic pain  -     POCT urine dip  -     Chlamydia/GC amplified DNA by PCR; Future    Vaginal discharge  -     Molecular Vaginal Panel; Future  -     Chlamydia/GC amplified DNA by PCR; Future      Plan:     Suspect BV vs yeast vaginitis  Affirm collected today, will treat accordingly pending results  Urine dipstick non-concerning for UTI today  Check GC/C as well per patient request  Await results of pelvic US  Reviewed recent pap and colpo results with patient today  The patient indicates understanding of these issues and agrees with the plan  Return if symptoms worsen or fail to improve  Subjective     Patient Id: Travon Duron is a 62 y o  female    HPI    Due to language barrier, an  was present during the history-taking and subsequent discussion (and for part of the physical exam) with this patient  Patient presents today with complaint of 2-month history of pelvic pain, vaginial odor and vaginal discharge  Had Pap performed 5/8/2023 that was normal but did have positive high-risk HPV  Of note, HPV 16 and 18 were negative  Her OB/GYN subsequently recommended a colposcopy which was performed on 6/9/2023  Pathology showed chronic cervicitis but no dysplasia or malignancy detected  Her OB/GYN did order a pelvic ultrasound that has not yet been completed  She notes that she was having this pain prior to seeing her OB/GYN  She notes a vaginal odor and white vaginal discharge that has been present for 2 weeks or so  Notes some vulvar irritation and itching as well  No vaginal bleeding  She is not concerned about STIs but would like testing today  Denies any fever, chills, n/v/d or flank pain  No other concerns today  Review of Systems   Constitutional: Negative for chills and fever  Respiratory: Negative for shortness of breath  Cardiovascular: Negative for chest pain     Gastrointestinal: Negative for abdominal pain, constipation, diarrhea, nausea and vomiting  Genitourinary: Positive for pelvic pain and vaginal discharge  Negative for dysuria, flank pain, hematuria, vaginal bleeding and vaginal pain  Musculoskeletal: Negative for arthralgias and myalgias  Skin: Negative for rash  Neurological: Negative for headaches  Psychiatric/Behavioral: Negative for dysphoric mood  All other systems reviewed and are negative  Past Medical History:   Diagnosis Date   • Fatigue    • High cholesterol    • Hypertension    • Neuropathy    • Osteoarthritis     knees   • Shortness of breath        Past Surgical History:   Procedure Laterality Date   • APPENDECTOMY     • EPIDURAL BLOCK INJECTION Left 5/17/2018    Procedure: L4 AND L5 TRANSFORAMINAL EPIDURAL STEROID INJECTION;  Surgeon: Viviana Melvin MD;  [facility];  Service: Pain Management    • EPIDURAL BLOCK INJECTION Left 10/4/2018    Procedure: L4 AND L5 TRANSFORAMINAL EPIDURAL STEROID INJECTION;  Surgeon: Viviana Melvin MD;  [facility];  Service: Pain Management    • EPIDURAL BLOCK INJECTION Left 5/1/2019    Procedure: L4-5 and L5-S1 transforaminal epidural steroid injection;  Surgeon: Viviana Melvin MD;  [facility];  Service: Pain Management    • FL GUIDED NEEDLE PLAC BX/ASP/INJ  5/1/2019   • MA COLONOSCOPY FLX DX W/COLLJ SPEC WHEN PFRMD N/A 5/31/2016    Procedure: COLONOSCOPY;  Surgeon: Fany Prieto MD;  [facility];  Service: Gastroenterology   • MA ESOPHAGOGASTRODUODENOSCOPY TRANSORAL DIAGNOSTIC N/A 11/15/2016    Procedure: ESOPHAGOGASTRODUODENOSCOPY (EGD);   Surgeon: Fany Prieto MD;  [facility];  Service: Gastroenterology   • MA TYMPANOPLASTY W/O MASTOIDECT W/O OSSICLE RECNSTJ Left 10/25/2019    Procedure: LEFT TYMPANOPLASTY;  Surgeon: Viviane Chatterjee MD;  Location:  MAIN OR;  Service: ENT   • TUBAL LIGATION         Family History   Problem Relation Age of Onset   • Uterine cancer Mother    • Early death Father    • Heart disease Brother    • No Known Problems Sister    • No Known Problems Daughter    • No Known Problems Sister    • No Known Problems Daughter    • No Known Problems Maternal Aunt    • No Known Problems Maternal Aunt    • No Known Problems Maternal Aunt    • No Known Problems Maternal Aunt    • No Known Problems Maternal Grandmother    • No Known Problems Maternal Grandfather    • No Known Problems Paternal Grandmother    • No Known Problems Paternal Grandfather        Social History     Socioeconomic History   • Marital status: Single     Spouse name: None   • Number of children: None   • Years of education: None   • Highest education level: None   Occupational History   • None   Tobacco Use   • Smoking status: Never   • Smokeless tobacco: Never   Vaping Use   • Vaping Use: Never used   Substance and Sexual Activity   • Alcohol use: Not Currently   • Drug use: No   • Sexual activity: Not Currently     Partners: Male     Birth control/protection: Female Sterilization   Other Topics Concern   • None   Social History Narrative   • None     Social Determinants of Health     Financial Resource Strain: Not on file   Food Insecurity: Not on file   Transportation Needs: Not on file   Physical Activity: Not on file   Stress: Not on file   Social Connections: Not on file   Intimate Partner Violence: Not on file   Housing Stability: Not on file       No Known Allergies      Current Outpatient Medications:   •  amLODIPine (NORVASC) 5 mg tablet, take 2 tablets by mouth once daily, Disp: 30 tablet, Rfl: 1  •  atorvastatin (LIPITOR) 20 mg tablet, take 1 tablet by mouth once daily, Disp: 30 tablet, Rfl: 1  •  chlorthalidone 25 mg tablet, Take 1 tablet (25 mg total) by mouth daily, Disp: 90 tablet, Rfl: 3  •  cholecalciferol (VITAMIN D3) 1,000 units tablet, Take 2 tablets (2,000 Units total) by mouth daily, Disp: 180 tablet, Rfl: 1  •  dextromethorphan-guaifenesin (MUCINEX DM)  MG per 12 hr tablet, Take 1 "tablet by mouth every 12 (twelve) hours, Disp: 20 tablet, Rfl: 0  •  Diclofenac Sodium (VOLTAREN) 1 %, Apply 2 g topically 4 (four) times a day, Disp: 350 g, Rfl: 1  •  estradiol (ESTRACE) 0 1 mg/g vaginal cream, INSERT 0 5 GRAMS INTO THE VAGINA TWICE WEEKLY, Disp: 42 5 g, Rfl: 0  •  levothyroxine 25 mcg tablet, take 1 tablet by mouth once daily, Disp: 30 tablet, Rfl: 1  •  potassium chloride (K-DUR,KLOR-CON) 20 mEq tablet, Take 1 tablet (20 mEq total) by mouth daily, Disp: 90 tablet, Rfl: 4  •  Restasis 0 05 % ophthalmic emulsion, instill 1 drop into both eyes twice a day, Disp: , Rfl:   •  acetaminophen (TYLENOL) 500 mg tablet, Take 500 mg by mouth every 6 (six) hours as needed for pain (Patient not taking: Reported on 6/9/2023), Disp: , Rfl:     Objective     Vitals:    06/30/23 1139   BP: 140/92   BP Location: Left arm   Patient Position: Sitting   Cuff Size: Large   Pulse: 67   Resp: 18   Temp: (!) 96 9 °F (36 1 °C)   SpO2: 97%   Weight: 66 kg (145 lb 9 6 oz)   Height: 4' 11\" (1 499 m)       Physical Exam  Vitals and nursing note reviewed  Constitutional:       General: She is not in acute distress  Appearance: She is well-developed  She is not ill-appearing or toxic-appearing  HENT:      Head: Normocephalic and atraumatic  Eyes:      Extraocular Movements: Extraocular movements intact  Neck:      Thyroid: No thyromegaly  Cardiovascular:      Rate and Rhythm: Normal rate and regular rhythm  Heart sounds: Normal heart sounds  No murmur heard  No gallop  Pulmonary:      Effort: Pulmonary effort is normal  No respiratory distress  Breath sounds: Normal breath sounds  No wheezing or rhonchi  Abdominal:      General: There is no distension  Palpations: Abdomen is soft  There is no mass  Tenderness: There is no abdominal tenderness  There is no right CVA tenderness, left CVA tenderness, guarding or rebound     Genitourinary:     Comments: Exam deferred  Musculoskeletal:      " Cervical back: Neck supple  Skin:     General: Skin is warm  Neurological:      General: No focal deficit present  Mental Status: She is alert and oriented to person, place, and time     Psychiatric:         Mood and Affect: Mood normal          Behavior: Behavior normal          Sarahi Matta

## 2023-07-03 ENCOUNTER — TELEPHONE (OUTPATIENT)
Dept: LAB | Facility: HOSPITAL | Age: 58
End: 2023-07-03

## 2023-07-04 DIAGNOSIS — E03.9 HYPOTHYROIDISM, UNSPECIFIED TYPE: ICD-10-CM

## 2023-07-04 DIAGNOSIS — I10 BENIGN ESSENTIAL HTN: ICD-10-CM

## 2023-07-04 RX ORDER — AMLODIPINE BESYLATE 5 MG/1
TABLET ORAL
Qty: 30 TABLET | Refills: 1 | Status: SHIPPED | OUTPATIENT
Start: 2023-07-04

## 2023-07-04 RX ORDER — LEVOTHYROXINE SODIUM 0.03 MG/1
TABLET ORAL
Qty: 30 TABLET | Refills: 1 | Status: SHIPPED | OUTPATIENT
Start: 2023-07-04

## 2023-07-06 LAB
C TRACH DNA SPEC QL NAA+PROBE: NEGATIVE
N GONORRHOEA DNA SPEC QL NAA+PROBE: NEGATIVE

## 2023-07-10 ENCOUNTER — TELEPHONE (OUTPATIENT)
Dept: FAMILY MEDICINE CLINIC | Facility: CLINIC | Age: 58
End: 2023-07-10

## 2023-07-10 NOTE — TELEPHONE ENCOUNTER
----- Message from Cathleen Tellez DO sent at 7/9/2023  9:07 PM EDT -----  Gonorrhea/chlamydia negative. Can you please have patient return to office and submit another swab for vaginitis panel? I received a message stating it was collected in wrong tube.

## 2023-07-28 ENCOUNTER — OFFICE VISIT (OUTPATIENT)
Dept: FAMILY MEDICINE CLINIC | Facility: CLINIC | Age: 58
End: 2023-07-28
Payer: COMMERCIAL

## 2023-07-28 VITALS
HEIGHT: 59 IN | BODY MASS INDEX: 29.56 KG/M2 | HEART RATE: 55 BPM | RESPIRATION RATE: 18 BRPM | TEMPERATURE: 97.5 F | DIASTOLIC BLOOD PRESSURE: 78 MMHG | WEIGHT: 146.6 LBS | OXYGEN SATURATION: 96 % | SYSTOLIC BLOOD PRESSURE: 110 MMHG

## 2023-07-28 DIAGNOSIS — N95.8 GENITOURINARY SYNDROME OF MENOPAUSE: ICD-10-CM

## 2023-07-28 DIAGNOSIS — R30.0 DYSURIA: ICD-10-CM

## 2023-07-28 DIAGNOSIS — R10.2 PELVIC PAIN: ICD-10-CM

## 2023-07-28 DIAGNOSIS — N89.8 VAGINAL DISCHARGE: ICD-10-CM

## 2023-07-28 DIAGNOSIS — N30.00 ACUTE CYSTITIS WITHOUT HEMATURIA: Primary | ICD-10-CM

## 2023-07-28 LAB
BILIRUB UR QL STRIP: NEGATIVE
CLARITY UR: CLEAR
COLOR UR: NORMAL
GLUCOSE UR STRIP-MCNC: NEGATIVE MG/DL
HGB UR QL STRIP.AUTO: NEGATIVE
KETONES UR STRIP-MCNC: NEGATIVE MG/DL
LEUKOCYTE ESTERASE UR QL STRIP: NEGATIVE
NITRITE UR QL STRIP: NEGATIVE
PH UR STRIP.AUTO: 7.5 [PH]
PROT UR STRIP-MCNC: NEGATIVE MG/DL
SL AMB  POCT GLUCOSE, UA: ABNORMAL
SL AMB LEUKOCYTE ESTERASE,UA: ABNORMAL
SL AMB POCT BILIRUBIN,UA: ABNORMAL
SL AMB POCT BLOOD,UA: ABNORMAL
SL AMB POCT CLARITY,UA: CLEAR
SL AMB POCT COLOR,UA: ABNORMAL
SL AMB POCT KETONES,UA: ABNORMAL
SL AMB POCT NITRITE,UA: ABNORMAL
SL AMB POCT PH,UA: 7
SL AMB POCT SPECIFIC GRAVITY,UA: 1.01
SL AMB POCT URINE PROTEIN: ABNORMAL
SL AMB POCT UROBILINOGEN: 0.2
SP GR UR STRIP.AUTO: 1.01 (ref 1–1.03)
UROBILINOGEN UR STRIP-ACNC: <2 MG/DL

## 2023-07-28 PROCEDURE — 99214 OFFICE O/P EST MOD 30 MIN: CPT | Performed by: NURSE PRACTITIONER

## 2023-07-28 PROCEDURE — 81514 NFCT DS BV&VAGINITIS DNA ALG: CPT | Performed by: NURSE PRACTITIONER

## 2023-07-28 PROCEDURE — T1015 CLINIC SERVICE: HCPCS | Performed by: FAMILY MEDICINE

## 2023-07-28 PROCEDURE — 81003 URINALYSIS AUTO W/O SCOPE: CPT | Performed by: NURSE PRACTITIONER

## 2023-07-28 PROCEDURE — 81002 URINALYSIS NONAUTO W/O SCOPE: CPT | Performed by: FAMILY MEDICINE

## 2023-07-28 RX ORDER — ESTRADIOL 0.1 MG/G
0.5 CREAM VAGINAL 2 TIMES WEEKLY
Qty: 42.5 G | Refills: 0 | Status: SHIPPED | OUTPATIENT
Start: 2023-07-31

## 2023-07-28 RX ORDER — NITROFURANTOIN 25; 75 MG/1; MG/1
100 CAPSULE ORAL 2 TIMES DAILY
Qty: 14 CAPSULE | Refills: 0 | Status: SHIPPED | OUTPATIENT
Start: 2023-07-28 | End: 2023-08-04

## 2023-07-28 NOTE — PROGRESS NOTES
Assessment/Plan:     Diagnoses and all orders for this visit:    Acute cystitis without hematuria  Comments:  Dysuria/frequency; +leuk -nitr  Treat empirically while awaiting culture results  Denies any alarm findings today  Orders:  -     nitrofurantoin (MACROBID) 100 mg capsule; Take 1 capsule (100 mg total) by mouth 2 (two) times a day for 7 days    Vaginal discharge  Comments:  Nonspecific symptoms  Molecular vaginal panel collected today for further evaluation  Orders:  -     Molecular Vaginal Panel; Future    Dysuria  Comments:  UA collected and abx started  If symptoms persist with neg vag culture, consider interstitial cystitis v. irritation from frequent estrace use  Orders:  -     POCT urine dip  -     UA w Reflex to Microscopic w Reflex to Culture - Clinic Collect    Genitourinary syndrome of menopause  Comments:  Estrace refilled  Instructed patient to use 2x a week instead of 2x daily  Orders:  -     estradiol (ESTRACE) 0.1 mg/g vaginal cream; Insert 0.5 g into the vagina 2 (two) times a week    Pelvic pain  Comments:  Mild LLQ tenderness  Reprinted pelvic U/S as ordered by gyn; dtr assisting with scheduling        Return in about 4 weeks (around 8/25/2023) for pelvic pain, Recheck 3 months HTN. Subjective:        Patient ID: Deyanira Jain is a 62 y.o. female. Chief Complaint   Patient presents with   • Follow-up     Re-swab  Dysuria        PMH dysphagia, near syncope, chronic otitis media of left ear with tympanic membrane perforation, OA, atrophic vaginitis, HLD, and chronic pain syndrome presents with daughter for continued pelvic pain and vaginal discharge. Defers video  use today; prefers to have daughter assist with visit. Endorses urinary frequency and dysuria. States vaginal discharge is white; denies any itching. Defers gyn exam today; has recently been evaluated by gyn with colposcopy. Has been using estrace cream 2x a day instead of twice weekly.  Reviewed and clarified same with patient and daughter. Reprinted pelvic U/S order for daughter to assist patient with scheduling as well.       The following portions of the patient's history were reviewed and updated as appropriate: allergies, current medications, past family history, past medical history, past social history, past surgical history and problem list.    Patient Active Problem List   Diagnosis   • Abdominal pain, left lower quadrant   • Chronic lumbar radiculopathy   • Chronic pain syndrome   • Dyslipidemia   • Chronic pain of right knee   • Chronic right hip pain   • Myofascial pain syndrome   • Atrophic vaginitis   • Primary osteoarthritis of both knees   • Lumbar radiculopathy   • Left hip pain   • Lumbar degenerative disc disease   • Pharyngoesophageal dysphagia   • Perforation of left tympanic membrane   • Chronic otitis media of left ear   • Atypical chest pain   • Epigastric abdominal pain   • Chronic musculoskeletal pain   • Pain in finger of both hands   • Tympanic membrane perforation, left   • Strain of thoracic back region   • Chronic right shoulder pain   • Medial epicondylitis of elbow, left   • Near syncope   • Chronic pain   • Bradycardia   • RBBB   • Bursitis of right shoulder   • Benign essential HTN   • Leg edema   • Mild anxiety   • Plantar fasciitis   • Hypothyroidism   • Exertional dyspnea   • SOB (shortness of breath)   • Lightheadedness   • PVC's (premature ventricular contractions)   • Acute cystitis without hematuria   • Vaginal discharge   • Dysuria   • Genitourinary syndrome of menopause   • Pelvic pain       Current Outpatient Medications   Medication Sig Dispense Refill   • amLODIPine (NORVASC) 5 mg tablet take 2 tablets by mouth once daily 30 tablet 1   • atorvastatin (LIPITOR) 20 mg tablet take 1 tablet by mouth once daily 30 tablet 1   • chlorthalidone 25 mg tablet Take 1 tablet (25 mg total) by mouth daily 90 tablet 3   • cholecalciferol (VITAMIN D3) 1,000 units tablet Take 2 tablets (2,000 Units total) by mouth daily 180 tablet 1   • dextromethorphan-guaifenesin (MUCINEX DM)  MG per 12 hr tablet Take 1 tablet by mouth every 12 (twelve) hours 20 tablet 0   • Diclofenac Sodium (VOLTAREN) 1 % Apply 2 g topically 4 (four) times a day 350 g 1   • [START ON 7/31/2023] estradiol (ESTRACE) 0.1 mg/g vaginal cream Insert 0.5 g into the vagina 2 (two) times a week 42.5 g 0   • levothyroxine 25 mcg tablet take 1 tablet by mouth once daily 30 tablet 1   • nitrofurantoin (MACROBID) 100 mg capsule Take 1 capsule (100 mg total) by mouth 2 (two) times a day for 7 days 14 capsule 0   • potassium chloride (K-DUR,KLOR-CON) 20 mEq tablet Take 1 tablet (20 mEq total) by mouth daily 90 tablet 4   • Restasis 0.05 % ophthalmic emulsion instill 1 drop into both eyes twice a day     • acetaminophen (TYLENOL) 500 mg tablet Take 500 mg by mouth every 6 (six) hours as needed for pain (Patient not taking: Reported on 6/9/2023)       No current facility-administered medications for this visit.         Past Medical History:   Diagnosis Date   • Fatigue    • High cholesterol    • Hypertension    • Neuropathy    • Osteoarthritis     knees   • Shortness of breath         Past Surgical History:   Procedure Laterality Date   • APPENDECTOMY     • EPIDURAL BLOCK INJECTION Left 5/17/2018    Procedure: L4 AND L5 TRANSFORAMINAL EPIDURAL STEROID INJECTION;  Surgeon: Adam Corral MD;  [facility]: [facility];  Service: Pain Management    • EPIDURAL BLOCK INJECTION Left 10/4/2018    Procedure: L4 AND L5 TRANSFORAMINAL EPIDURAL STEROID INJECTION;  Surgeon: Adam Corral MD;  [facility]: [facility];  Service: Pain Management    • EPIDURAL BLOCK INJECTION Left 5/1/2019    Procedure: L4-5 and L5-S1 transforaminal epidural steroid injection;  Surgeon: Adam Corral MD;  Location: [facility];  Service: Pain Management    • [place] GUIDED NEEDLE PLAC BX/ASP/INJ  5/1/2019   • [place] COLONOSCOPY FLX DX W/COLLJ SPEC WHEN PFRMD N/A 5/31/2016 Procedure: COLONOSCOPY;  Surgeon: Gladys Yates MD;  Location: [facility];  Service: Gastroenterology   • [place] ESOPHAGOGASTRODUODENOSCOPY TRANSORAL DIAGNOSTIC N/A 11/15/2016    Procedure: ESOPHAGOGASTRODUODENOSCOPY (EGD); Surgeon: Gladys Yates MD;  Location: [facility];  Service: Gastroenterology   • [place] TYMPANOPLASTY W/O MASTOIDECT W/O OSSICLE RECNSTJ Left 10/25/2019    Procedure: LEFT TYMPANOPLASTY;  Surgeon: Alden Ryan MD;  Location:  MAIN OR;  Service: ENT   • TUBAL LIGATION          Social History     Socioeconomic History   • Marital status: Single     Spouse name: Not on file   • Number of children: Not on file   • Years of education: Not on file   • Highest education level: Not on file   Occupational History   • Not on file   Tobacco Use   • Smoking status: Never   • Smokeless tobacco: Never   Vaping Use   • Vaping Use: Never used   Substance and Sexual Activity   • Alcohol use: Not Currently   • Drug use: No   • Sexual activity: Not Currently     Partners: Male     Birth control/protection: Female Sterilization   Other Topics Concern   • Not on file   Social History Narrative   • Not on file     Social Determinants of Health     Financial Resource Strain: Not on file   Food Insecurity: Not on file   Transportation Needs: Not on file   Physical Activity: Not on file   Stress: Not on file   Social Connections: Not on file   Intimate Partner Violence: Not on file   Housing Stability: Not on file      . Review of Systems   Constitutional: Negative for activity change, appetite change, chills and fatigue. Respiratory: Negative for shortness of breath. Cardiovascular: Negative for chest pain. Gastrointestinal: Negative for abdominal pain, constipation, diarrhea and nausea. Genitourinary: Positive for dysuria, frequency, pelvic pain and vaginal discharge (white). Negative for flank pain. Musculoskeletal: Negative for arthralgias and myalgias. Skin: Negative for color change and rash.    Neurological: Negative for dizziness, weakness and headaches. Psychiatric/Behavioral: Negative for confusion and sleep disturbance. Objective:      /78 (BP Location: Left arm, Patient Position: Sitting, Cuff Size: Large)   Pulse 55   Temp 97.5 °F (36.4 °C)   Resp 18   Ht 4' 11" (1.499 m)   Wt 66.5 kg (146 lb 9.6 oz)   SpO2 96%   BMI 29.61 kg/m²          Physical Exam  Vitals and nursing note reviewed. HENT:      Head: Normocephalic and atraumatic. Nose: Nose normal.      Mouth/Throat:      Mouth: Mucous membranes are moist.   Eyes:      Conjunctiva/sclera: Conjunctivae normal.   Cardiovascular:      Rate and Rhythm: Normal rate and regular rhythm. Pulmonary:      Effort: Pulmonary effort is normal.      Breath sounds: Normal breath sounds. Abdominal:      General: Abdomen is flat. Bowel sounds are normal.      Palpations: Abdomen is soft. Tenderness: There is abdominal tenderness. There is no right CVA tenderness or left CVA tenderness. Genitourinary:     Comments: Exam deferred  Skin:     General: Skin is warm and dry. Capillary Refill: Capillary refill takes less than 2 seconds. Neurological:      General: No focal deficit present. Mental Status: She is alert and oriented to person, place, and time.    Psychiatric:         Mood and Affect: Mood normal.         Behavior: Behavior normal.

## 2023-07-29 LAB
C GLABRATA DNA VAG QL NAA+PROBE: NEGATIVE
C KRUSEI DNA VAG QL NAA+PROBE: NEGATIVE
CANDIDA SP 6 PNL VAG NAA+PROBE: NEGATIVE
T VAGINALIS DNA VAG QL NAA+PROBE: NEGATIVE
VAGINOSIS/ITIS DNA PNL VAG PROBE+SIG AMP: NEGATIVE

## 2023-08-02 DIAGNOSIS — E55.9 VITAMIN D DEFICIENCY: ICD-10-CM

## 2023-08-02 DIAGNOSIS — E78.5 DYSLIPIDEMIA: ICD-10-CM

## 2023-08-02 RX ORDER — ATORVASTATIN CALCIUM 20 MG/1
TABLET, FILM COATED ORAL
Qty: 30 TABLET | Refills: 1 | Status: SHIPPED | OUTPATIENT
Start: 2023-08-02

## 2023-08-02 RX ORDER — MELATONIN
2000 DAILY
Qty: 180 TABLET | Refills: 1 | Status: SHIPPED | OUTPATIENT
Start: 2023-08-02

## 2023-08-03 DIAGNOSIS — I10 BENIGN ESSENTIAL HTN: ICD-10-CM

## 2023-08-03 RX ORDER — AMLODIPINE BESYLATE 5 MG/1
TABLET ORAL
Qty: 30 TABLET | Refills: 1 | Status: SHIPPED | OUTPATIENT
Start: 2023-08-03

## 2023-08-08 ENCOUNTER — TELEPHONE (OUTPATIENT)
Dept: FAMILY MEDICINE CLINIC | Facility: CLINIC | Age: 58
End: 2023-08-08

## 2023-08-08 NOTE — TELEPHONE ENCOUNTER
Spoke to patient in regards labs results and pelvic ultrasound order requested by Tomi Mariano. Also patient requested to reschedule her appts due to personal issues.

## 2023-08-25 DIAGNOSIS — I10 BENIGN ESSENTIAL HTN: ICD-10-CM

## 2023-08-25 RX ORDER — AMLODIPINE BESYLATE 5 MG/1
TABLET ORAL
Qty: 30 TABLET | Refills: 1 | Status: SHIPPED | OUTPATIENT
Start: 2023-08-25

## 2023-08-28 DIAGNOSIS — N95.8 GENITOURINARY SYNDROME OF MENOPAUSE: ICD-10-CM

## 2023-08-28 RX ORDER — ESTRADIOL 0.1 MG/G
CREAM VAGINAL
Qty: 42.5 G | Refills: 0 | Status: SHIPPED | OUTPATIENT
Start: 2023-08-28

## 2023-09-07 DIAGNOSIS — E03.9 HYPOTHYROIDISM, UNSPECIFIED TYPE: ICD-10-CM

## 2023-09-07 RX ORDER — LEVOTHYROXINE SODIUM 0.03 MG/1
TABLET ORAL
Qty: 30 TABLET | Refills: 1 | OUTPATIENT
Start: 2023-09-07

## 2023-09-11 DIAGNOSIS — E78.5 DYSLIPIDEMIA: ICD-10-CM

## 2023-09-11 RX ORDER — ATORVASTATIN CALCIUM 20 MG/1
20 TABLET, FILM COATED ORAL DAILY
Qty: 30 TABLET | Refills: 1 | Status: SHIPPED | OUTPATIENT
Start: 2023-09-11

## 2023-09-12 DIAGNOSIS — N95.8 GENITOURINARY SYNDROME OF MENOPAUSE: ICD-10-CM

## 2023-09-12 RX ORDER — ESTRADIOL 0.1 MG/G
CREAM VAGINAL
Qty: 42.5 G | Refills: 0 | Status: SHIPPED | OUTPATIENT
Start: 2023-09-12

## 2023-09-15 NOTE — PROGRESS NOTES
Daily Note     Today's date: 2018  Patient name: Lilia Reno  : 1965  MRN: [id number]  Referring provider: Lynne Miller PA-C  Dx:   Encounter Diagnosis     ICD-10-CM    1  Chronic lumbar radiculopathy M54 16                   Subjective: No report of back pain today      Objective: See treatment diary below      Assessment: Tolerated treatment well  Patient exhibited good technique with therapeutic exercises      Plan: Continue per plan of care       Precautions: None     Daily Treatment Diary      Manual                             Exercise Diary                NuStep: S , A               BACK EXT: F 6  P 3, C 0 P 4 2/10  P 4 3/10           PYR AB: S 4,   P 1 P 4 2/10  P 4 3/10           Rotary Torso:   S 2, P 3, C 3 P 3 2/10  P 3 3/10           Hoist Row: S 5 P 2 2/10  P 2 3/10           Lat Pulldown: S 5 P 3 2/10  P 3 3/10           SA Pulldown P 4 2/10  P 4 3/10           Oblique Press P 2 2/10  P 2 3/10                           Modalities                HP                                     Improved

## 2023-09-18 ENCOUNTER — OFFICE VISIT (OUTPATIENT)
Dept: FAMILY MEDICINE CLINIC | Facility: CLINIC | Age: 58
End: 2023-09-18
Payer: COMMERCIAL

## 2023-09-18 VITALS
SYSTOLIC BLOOD PRESSURE: 118 MMHG | HEIGHT: 59 IN | DIASTOLIC BLOOD PRESSURE: 68 MMHG | TEMPERATURE: 97.5 F | OXYGEN SATURATION: 96 % | HEART RATE: 56 BPM | WEIGHT: 149.2 LBS | BODY MASS INDEX: 30.08 KG/M2 | RESPIRATION RATE: 18 BRPM

## 2023-09-18 DIAGNOSIS — R60.0 LOWER EXTREMITY EDEMA: ICD-10-CM

## 2023-09-18 DIAGNOSIS — M62.838 TRAPEZIUS MUSCLE SPASM: Primary | ICD-10-CM

## 2023-09-18 DIAGNOSIS — M75.91 SUPRASPINATUS TENDINITIS, RIGHT: ICD-10-CM

## 2023-09-18 PROCEDURE — 99213 OFFICE O/P EST LOW 20 MIN: CPT | Performed by: NURSE PRACTITIONER

## 2023-09-18 PROCEDURE — T1015 CLINIC SERVICE: HCPCS | Performed by: FAMILY MEDICINE

## 2023-09-18 NOTE — PROGRESS NOTES
Assessment/Plan:     Diagnoses and all orders for this visit:    Trapezius muscle spasm  Comments:  Stretching and supportive care reviewed  Topical mgmt and heat for 20 mins at a time  P.T. for evaluation    Supraspinatus tendinitis, right  Comments:  Acetaminophen, ice, and supportive care reviewed  P.T. for strengthening and evaluation  RTC if no improvement  Consider MRI if symptoms persist  Orders:  -     Ambulatory Referral to Physical Therapy; Future    Lower extremity edema  Comments:  Trace to mild  Add compression stockings due to lengthy time on feet  RTC if no improvement  Orders:  -     Compression Stocking            Return in about 2 months (around 11/18/2023) for Recheck right shoulder tendonitis. Subjective:        Patient ID: Bhavik Russell is a 62 y.o. female. Chief Complaint   Patient presents with   • Follow-up     HTN  Right shoulder pain for about 3 weeks, now pain is moving to right shoulder. PMH dysphagia, near syncope, chronic otitis media of left ear with tympanic membrane perforation, OA, atrophic vaginitis, HLD, and chronic pain syndrome presents with her granddaughter for HTN follow up. Defers [place] video ; prefers granddaughter translate today. Endorses chronic left shoulder pain which has been worsening - along with right-sided neck pain. Hypertension  This is a chronic problem. The current episode started more than 1 year ago. The problem is controlled. Pertinent negatives include no anxiety, blurred vision, chest pain, headaches, malaise/fatigue, orthopnea, palpitations, peripheral edema, PND, shortness of breath or sweats. There are no associated agents to hypertension. Risk factors for coronary artery disease include obesity, post-menopausal state, dyslipidemia and sedentary lifestyle. Past treatments include calcium channel blockers and diuretics. The current treatment provides moderate improvement. Compliance problems include diet.         The following portions of the patient's history were reviewed and updated as appropriate: allergies, current medications, past family history, past medical history, past social history, past surgical history and problem list.    Patient Active Problem List   Diagnosis   • Abdominal pain, left lower quadrant   • Chronic lumbar radiculopathy   • Chronic pain syndrome   • Dyslipidemia   • Chronic pain of right knee   • Chronic right hip pain   • Myofascial pain syndrome   • Atrophic vaginitis   • Primary osteoarthritis of both knees   • Lumbar radiculopathy   • Left hip pain   • Lumbar degenerative disc disease   • Pharyngoesophageal dysphagia   • Perforation of left tympanic membrane   • Chronic otitis media of left ear   • Atypical chest pain   • Epigastric abdominal pain   • Chronic musculoskeletal pain   • Pain in finger of both hands   • Tympanic membrane perforation, left   • Strain of thoracic back region   • Chronic right shoulder pain   • Medial epicondylitis of elbow, left   • Near syncope   • Chronic pain   • Bradycardia   • RBBB   • Bursitis of right shoulder   • Benign essential HTN   • Leg edema   • Mild anxiety   • Plantar fasciitis   • Hypothyroidism   • Exertional dyspnea   • SOB (shortness of breath)   • Lightheadedness   • PVC's (premature ventricular contractions)   • Acute cystitis without hematuria   • Vaginal discharge   • Dysuria   • Genitourinary syndrome of menopause   • Pelvic pain   • Trapezius muscle spasm   • Supraspinatus tendinitis, right   • Lower extremity edema       Current Outpatient Medications   Medication Sig Dispense Refill   • acetaminophen (TYLENOL) 500 mg tablet Take 500 mg by mouth every 6 (six) hours as needed for pain     • amLODIPine (NORVASC) 5 mg tablet take 2 tablets by mouth once daily 30 tablet 1   • atorvastatin (LIPITOR) 20 mg tablet Take 1 tablet (20 mg total) by mouth daily 30 tablet 1   • chlorthalidone 25 mg tablet Take 1 tablet (25 mg total) by mouth daily 90 tablet 3   • cholecalciferol (VITAMIN D3) 1,000 units tablet TAKE 2 TABLETS BY MOUTH DAILY 180 tablet 1   • Diclofenac Sodium (VOLTAREN) 1 % Apply 2 g topically 4 (four) times a day 350 g 1   • estradiol (ESTRACE) 0.1 mg/g vaginal cream insert 0.5 grams vaginally TWICE A WEEK 42.5 g 0   • levothyroxine 25 mcg tablet take 1 tablet by mouth once daily 30 tablet 1   • potassium chloride (K-DUR,KLOR-CON) 20 mEq tablet Take 1 tablet (20 mEq total) by mouth daily 90 tablet 4   • Restasis 0.05 % ophthalmic emulsion instill 1 drop into both eyes twice a day       No current facility-administered medications for this visit. Past Medical History:   Diagnosis Date   • Fatigue    • High cholesterol    • Hypertension    • Neuropathy    • Osteoarthritis     knees   • Shortness of breath         Past Surgical History:   Procedure Laterality Date   • APPENDECTOMY     • EPIDURAL BLOCK INJECTION Left 5/17/2018    Procedure: L4 AND L5 TRANSFORAMINAL EPIDURAL STEROID INJECTION;  Surgeon: Hilda Pantoja MD;  [facility];  Service: Pain Management    • EPIDURAL BLOCK INJECTION Left 10/4/2018    Procedure: L4 AND L5 TRANSFORAMINAL EPIDURAL STEROID INJECTION;  Surgeon: Hilda Pantoja MD;  [facility];  Service: Pain Management    • EPIDURAL BLOCK INJECTION Left 5/1/2019    Procedure: L4-5 and L5-S1 transforaminal epidural steroid injection;  Surgeon: Hilda Pantoja MD;  [facility];  Service: Pain Management    • [place] GUIDED NEEDLE PLAC BX/ASP/INJ  5/1/2019   • [place] COLONOSCOPY FLX DX W/COLLJ SPEC WHEN PFRMD N/A 5/31/2016    Procedure: COLONOSCOPY;  Surgeon: Janes Jesus MD;  [facility];  Service: Gastroenterology   • [place] ESOPHAGOGASTRODUODENOSCOPY TRANSORAL DIAGNOSTIC N/A 11/15/2016    Procedure: ESOPHAGOGASTRODUODENOSCOPY (EGD);   Surgeon: Janes Jesus MD;  [facility];  Service: Gastroenterology   • [place] TYMPANOPLASTY W/O MASTOIDECT W/O OSSICLE RECNSTJ Left 10/25/2019 Procedure: LEFT TYMPANOPLASTY;  Surgeon: John Naidu MD;  [facility];  Service: ENT   • TUBAL LIGATION          Social History     Socioeconomic History   • Marital status: Single     Spouse name: Not on file   • Number of children: Not on file   • Years of education: Not on file   • Highest education level: Not on file   Occupational History   • Not on file   Tobacco Use   • Smoking status: Never   • Smokeless tobacco: Never   Vaping Use   • Vaping Use: Never used   Substance and Sexual Activity   • Alcohol use: Not Currently   • Drug use: No   • Sexual activity: Not Currently     Partners: Male     Birth control/protection: Female Sterilization   Other Topics Concern   • Not on file   Social History Narrative   • Not on file     Social Determinants of Health     Financial Resource Strain: Not on file   Food Insecurity: Not on file   Transportation Needs: Not on file   Physical Activity: Not on file   Stress: Not on file   Social Connections: Not on file   Intimate Partner Violence: Not on file   Housing Stability: Not on file      . Review of Systems   Constitutional: Negative for activity change, appetite change, fatigue and malaise/fatigue. HENT: Negative for trouble swallowing. Eyes: Negative for blurred vision. Respiratory: Negative for cough, shortness of breath and wheezing. Cardiovascular: Negative for chest pain, palpitations, orthopnea, leg swelling and PND. Gastrointestinal: Negative for abdominal pain, constipation, diarrhea and nausea. Genitourinary: Negative for difficulty urinating, dysuria, hematuria and pelvic pain. Musculoskeletal: Positive for myalgias (muscles of right shoulder up into neck; chronic x years. comes and goes). Negative for arthralgias. Skin: Negative for rash. Neurological: Negative for dizziness, weakness and headaches. Psychiatric/Behavioral: Negative for agitation, confusion and sleep disturbance. The patient is not nervous/anxious. Objective:      /68 (BP Location: Left arm, Patient Position: Sitting, Cuff Size: Large)   Pulse 56   Temp 97.5 °F (36.4 °C)   Resp 18   Ht 4' 11" (1.499 m)   Wt 67.7 kg (149 lb 3.2 oz)   SpO2 96%   BMI 30.13 kg/m²          Physical Exam  Vitals and nursing note reviewed. HENT:      Head: Normocephalic and atraumatic. Nose: Nose normal.      Mouth/Throat:      Mouth: Mucous membranes are moist.   Eyes:      Conjunctiva/sclera: Conjunctivae normal.   Neck:     Cardiovascular:      Rate and Rhythm: Normal rate and regular rhythm. Pulmonary:      Effort: Pulmonary effort is normal.      Breath sounds: Normal breath sounds. Abdominal:      General: Abdomen is flat. Bowel sounds are normal.      Palpations: Abdomen is soft. Genitourinary:     Comments: Exam deferred  Musculoskeletal:      Right shoulder: Tenderness present. Decreased range of motion. Decreased strength. Cervical back: No rigidity. Pain with movement and muscular tenderness present. Comments: + empty can test  Difficulty raising arm above head   Skin:     General: Skin is warm and dry. Capillary Refill: Capillary refill takes less than 2 seconds. Neurological:      General: No focal deficit present. Mental Status: She is alert and oriented to person, place, and time.    Psychiatric:         Mood and Affect: Mood normal.         Behavior: Behavior normal.

## 2023-09-26 PROBLEM — N30.00 ACUTE CYSTITIS WITHOUT HEMATURIA: Status: RESOLVED | Noted: 2023-07-28 | Resolved: 2023-09-26

## 2023-09-28 ENCOUNTER — APPOINTMENT (OUTPATIENT)
Dept: LAB | Facility: MEDICAL CENTER | Age: 58
End: 2023-09-28
Payer: COMMERCIAL

## 2023-09-28 NOTE — PROGRESS NOTES
Cardiology Follow Up    Diann Chin  1965  [id number]  [address] [place] [address] [facility] [address]  [address] [facility] [address] [facility] [address]  [phone]    1. Benign essential HTN  Echo complete w/ contrast if indicated      2. PVC's (premature ventricular contractions)  Echo complete w/ contrast if indicated      3. Dyslipidemia        4. Hypokalemia  Basic metabolic panel    Magnesium          Discussion/Summary:    Essential HTN  controlled  She is maintained on amlodipine 5 mg QD and chlorthalidone 12.5 mg QD  Low sodium diet reinforced    Hyperlipidemia  Lipid panel from December 2022  Triglycerides 153  HDL 60  LDL 38  Continue Lipitor 20 mg QD    PVC's  Prior holter from January 2023 showed 21% PVC's and 3.1% PAC's   Thought to possibly be due to hypokalemia in the setting of being on chlorthalidone 25 mg QD  Dr. Ildefonso Donaldson saw patient in March 2023 and reduced chlorthalidone to 12.5 mg QD and added Kdur 20 mEq QD  She does have some PVC's today on exam  BMP from yesterday shows K+ is still 3.3 despite taking Kdur 20 mEq daily  She does note palpitations and some occasional SOB but the SOB can occur both with exertion and at rest  Will have her increase Kdur to 40 mEq daily and recheck BMP/mag in 10 days  Once her K+ is WNL, we will order updated 24 hr holter to assess PVC burden  We will order updated echo  TSH from August 2022 was 6.728 but T4 was 0.99; will order updated TSH level as well  She did have a prior EST with no evidence of ischemia  I will contact her daughter Margot Mars at [phone] with results    Interval History:   Diann Chin is a 62 y.o. female with PMH of HTN, hypothyroidism and PVC's who presents to the office today for routine follow up visit. Today, the patient is accompanied by her daughter who assists with history.   The patient does not have a formal exercise routine but is fairly active throughout the day performing house chores and she does like to walk outside. She can perform these activities with no complaints of chest discomfort. She does get some palpitations throughout the week but it may not be every day. She also does get some shortness of breath but this can occur both at rest and with exertion and only happens a few times a month. She cannot give me any particular exacerbating factors that cause the shortness of breath but she states it is usually short-lived. She rarely gets lower extremity edema but if she does it is typically when she is on her feet too long and usually resolves by the next morning, following a venous insufficiency pattern. She denies any orthopnea. She also has dizziness/lightheadedness or near syncope/syncope. Her blood pressure is acceptable today on her current medication regimen that consist of amlodipine 10 mg daily and chlorthalidone 12.5 mg daily. Her chlorthalidone was reduced from 25 mg to 12.5 mg by Dr. Pablo Kay during her March 2023 appointment secondary to hypokalemia and 20 mill equivalents of K-Dur was added. Her BMP from yesterday shows that her potassium is still only 3.3 and she remains hypokalemic. I have asked her to increase her Kdur to 40 mill equivalents daily and to have blood work which includes a BMP and magnesium drawn in 10 days. Once her potassium has come into a normal range we will then order a 24-hour monitor to determine her PVC burden. I will also order an updated echocardiogram at this time in order to assess LVEF and valvular function given her intermittent shortness of breath. I did encourage her to continue following a low-sodium diet as well as a heart healthy diet.     Medical Problems     Problem List     Abdominal pain, left lower quadrant (Chronic)    Chronic lumbar radiculopathy    Chronic pain syndrome    Dyslipidemia    Chronic pain of right knee    Chronic right hip pain    Myofascial pain syndrome    Atrophic vaginitis Primary osteoarthritis of both knees    Lumbar radiculopathy    Overview Signed 9/11/2018  8:48 AM by Mook Dao MA     Added automatically from request for surgery [id number]         Left hip pain    Lumbar degenerative disc disease    Pharyngoesophageal dysphagia    Perforation of left tympanic membrane    Chronic otitis media of left ear    Atypical chest pain    Epigastric abdominal pain    Chronic musculoskeletal pain    Pain in finger of both hands    Tympanic membrane perforation, left    Overview Signed 10/7/2019  1:44 PM by Madi Mcgee     Added automatically from request for surgery [id number]         Strain of thoracic back region    Chronic right shoulder pain    Medial epicondylitis of elbow, left    Near syncope    Chronic pain    Bradycardia    RBBB    Bursitis of right shoulder    Overview Signed 1/25/2022  3:30 PM by Adebayo Last [address] [place] [address]     As seen on MRI from November 2021  Meloxicam 7.5 mg po daily for pain  Contact orthopedics for RTC         Benign essential HTN    Overview Addendum 1/25/2022  3:31 PM by Adebayo Last [address] [place] [address]     BP in 140s to 150s  Patient reports intermittent headache  Denies visual changes  Start amlodipine 5 mg daily  RTC 2 weeks         Leg edema    Overview Signed 8/15/2022 12:00 PM by REJI Aguilar     Amlodipine reduceed to 5 mg  Start Chlorthalidone 25 mg  BMP in 2 weeks         Mild anxiety    Overview Signed 12/28/2022  9:05 AM by Karel Prieto     Discussed techniques to manage same  Pt to incorporate same  Denies panic attacks  RTC if no improvement         Plantar fasciitis    Overview Signed 12/28/2022  9:06 AM by REJI Aguilar     Slight improvement today  Continue supportive care  Follow up with podiatry as previously ordered         Hypothyroidism    Overview Signed 12/28/2022  9:11 AM by REJI Aguilar     TSH trending upward  Pt reports fatigue  Start levothyroxine 25 mcg  TSH recheck in 6 weeks         Exertional dyspnea    SOB (shortness of breath)    Lightheadedness    PVC's (premature ventricular contractions)    Vaginal discharge    Overview Signed 7/28/2023  9:05 AM by REJI Ayala     Nonspecific symptoms  Vaginal swab collected today for further evaluation         Dysuria    Overview Signed 7/28/2023  9:05 AM by REJI Ayala     UA collected and abx started  If symptoms persist with neg vag culture, consider interstitial cystitis v. irritation from frequent estrace use         Genitourinary syndrome of menopause    Overview Signed 7/28/2023  9:05 AM by REJI Ayala     Estrace refilled  Instructed patient to use 2x a week instead of 2x daily         Pelvic pain    Overview Signed 7/28/2023  9:07 AM by REJI Ayala     LLQ tenderness  Reprinted pelvic U/S; dtr assisting with scheduling         Trapezius muscle spasm    Overview Signed 9/18/2023 11:05 AM by REJI Ayala     Stretching and supportive care reviewed  Topical mgmt and heat for 20 mins at a time  P.T. for evaluation         Supraspinatus tendinitis, right    Overview Signed 9/18/2023 11:05 AM by REJI Ayala     Acetaminophen, ice, and supportive care reviewed  P.T. for strengthening and evaluation  RTC if no improvement  Consider MRI if symptoms persist         Lower extremity edema        Past Medical History:   Diagnosis Date   • Fatigue    • High cholesterol    • Hypertension    • Neuropathy    • Osteoarthritis     knees   • Shortness of breath      Social History     Socioeconomic History   • Marital status: Single     Spouse name: Not on file   • Number of children: Not on file   • Years of education: Not on file   • Highest education level: Not on file   Occupational History   • Not on file   Tobacco Use   • Smoking status: Never   • Smokeless tobacco: Never   Vaping Use   • Vaping Use: Never used   Substance and Sexual Activity   • Alcohol use: Not Currently   • Drug use: No   • Sexual activity: Not Currently     Partners: Male     Birth control/protection: Female Sterilization   Other Topics Concern   • Not on file   Social History Narrative   • Not on file     Social Determinants of Health     Financial Resource Strain: Not on file   Food Insecurity: Not on file   Transportation Needs: Not on file   Physical Activity: Not on file   Stress: Not on file   Social Connections: Not on file   Intimate Partner Violence: Not on file   Housing Stability: Not on file      Family History   Problem Relation Age of Onset   • Uterine cancer Mother    • Early death Father    • Heart disease Brother    • No Known Problems Sister    • No Known Problems Daughter    • No Known Problems Sister    • No Known Problems Daughter    • No Known Problems Maternal Aunt    • No Known Problems Maternal Aunt    • No Known Problems Maternal Aunt    • No Known Problems Maternal Aunt    • No Known Problems Maternal Grandmother    • No Known Problems Maternal Grandfather    • No Known Problems Paternal Grandmother    • No Known Problems Paternal Grandfather      Past Surgical History:   Procedure Laterality Date   • APPENDECTOMY     • EPIDURAL BLOCK INJECTION Left 5/17/2018    Procedure: L4 AND L5 TRANSFORAMINAL EPIDURAL STEROID INJECTION;  Surgeon: Ingrid Easton MD;  Location: [facility];  Service: Pain Management    • EPIDURAL BLOCK INJECTION Left 10/4/2018    Procedure: L4 AND L5 TRANSFORAMINAL EPIDURAL STEROID INJECTION;  Surgeon: Ingrid Easton MD;  Location: [facility];  Service: Pain Management    • EPIDURAL BLOCK INJECTION Left 5/1/2019    Procedure: L4-5 and L5-S1 transforaminal epidural steroid injection;  Surgeon: Ingrid Easton MD;  Location: [facility];  Service: Pain Management    • FL GUIDED NEEDLE PLAC BX/ASP/INJ  5/1/2019   • WV COLONOSCOPY FLX DX W/COLLJ SPEC WHEN PFRMD N/A 5/31/2016    Procedure: COLONOSCOPY;  Surgeon: Bill Ortiz MD;  Location: [facility];  Service: Gastroenterology   • ID ESOPHAGOGASTRODUODENOSCOPY TRANSORAL DIAGNOSTIC N/A 11/15/2016    Procedure: ESOPHAGOGASTRODUODENOSCOPY (EGD);   Surgeon: Toshia Eckert MD;  Location: [facility];  Service: Gastroenterology   • ID TYMPANOPLASTY W/O MASTOIDECT W/O OSSICLE RECNSTJ Left 10/25/2019    Procedure: LEFT TYMPANOPLASTY;  Surgeon: Tayla Root MD;  Location:  MAIN OR;  Service: ENT   • TUBAL LIGATION         Current Outpatient Medications:   •  acetaminophen (TYLENOL) 500 mg tablet, Take 500 mg by mouth every 6 (six) hours as needed for pain, Disp: , Rfl:   •  amLODIPine (NORVASC) 5 mg tablet, take 2 tablets by mouth once daily, Disp: 30 tablet, Rfl: 1  •  atorvastatin (LIPITOR) 20 mg tablet, Take 1 tablet (20 mg total) by mouth daily, Disp: 30 tablet, Rfl: 1  •  chlorthalidone 25 mg tablet, Take 1 tablet (25 mg total) by mouth daily, Disp: 90 tablet, Rfl: 3  •  cholecalciferol (VITAMIN D3) 1,000 units tablet, TAKE 2 TABLETS BY MOUTH DAILY, Disp: 180 tablet, Rfl: 1  •  Diclofenac Sodium (VOLTAREN) 1 %, Apply 2 g topically 4 (four) times a day, Disp: 350 g, Rfl: 1  •  estradiol (ESTRACE) 0.1 mg/g vaginal cream, insert 0.5 grams vaginally TWICE A WEEK, Disp: 42.5 g, Rfl: 0  •  levothyroxine 25 mcg tablet, take 1 tablet by mouth once daily, Disp: 30 tablet, Rfl: 1  •  potassium chloride (K-DUR,KLOR-CON) 20 mEq tablet, Take 1 tablet (20 mEq total) by mouth daily, Disp: 90 tablet, Rfl: 4  •  Restasis 0.05 % ophthalmic emulsion, instill 1 drop into both eyes twice a day, Disp: , Rfl:   Allergies   Allergen Reactions   • Latex Rash     Powdered latex gloves       Labs:     Chemistry        Component Value Date/Time     07/14/2015 0737    K 3.3 (L) 09/28/2023 1314    K 4.4 07/14/2015 0737     09/28/2023 1314     07/14/2015 0737    CO2 29 09/28/2023 1314    CO2 26.6 07/14/2015 0737    BUN 20 09/28/2023 1314    BUN 17 07/14/2015 0737    CREATININE 0.91 09/28/2023 1314    CREATININE 0.72 07/14/2015 0737        Component Value Date/Time    CALCIUM 10.1 09/28/2023 1314    CALCIUM 9.0 07/14/2015 0737    ALKPHOS 187 (H) 02/10/2023 1726    ALKPHOS 77 07/14/2015 0737    AST 41 (H) 02/10/2023 1726    AST 22 07/14/2015 0737    ALT 78 (H) 02/10/2023 1726    ALT 38 07/14/2015 0737    BILITOT 0.81 07/14/2015 0737            No results found for: "CHOL"  Lab Results   Component Value Date    HDL 60 12/28/2022    HDL 56 12/28/2021    HDL 53 05/10/2021     Lab Results   Component Value Date    LDLCALC 38 12/28/2022    LDLCALC 49 12/28/2021    LDLCALC 130 (H) 05/10/2021     Lab Results   Component Value Date    TRIG 153 (H) 12/28/2022    TRIG 158 (H) 12/28/2021    TRIG 173 (H) 05/10/2021     No results found for: "CHOLHDL"    Imaging: No results found. Review of Systems   Constitutional: Negative for chills, fever and malaise/fatigue. HENT: Negative for congestion. Cardiovascular: Positive for dyspnea on exertion (occasional) and palpitations. Negative for chest pain, leg swelling and orthopnea. Respiratory: Negative for cough, shortness of breath (no SOB at rest) and wheezing. Endocrine: Negative. Hematologic/Lymphatic: Negative. Skin: Negative. Musculoskeletal: Negative. Gastrointestinal: Negative for bloating, abdominal pain, nausea and vomiting. Genitourinary: Negative. Neurological: Negative for dizziness and light-headedness. Psychiatric/Behavioral: Negative. All other systems reviewed and are negative. Vitals:    09/29/23 1257   BP: 130/70   Pulse: 60   SpO2: 97%     Vitals:    09/29/23 1257   Weight: 66.8 kg (147 lb 3.2 oz)     Height: 4' 11" (149.9 cm)   Body mass index is 29.73 kg/m². Physical Exam:  Physical Exam  Vitals reviewed. Constitutional:       General: She is not in acute distress. HENT:      Head: Normocephalic and atraumatic.       Mouth/Throat:      Mouth: Mucous membranes are moist.   Cardiovascular:      Rate and Rhythm: Normal rate and regular rhythm. Occasional Extrasystoles are present. Heart sounds: Normal heart sounds, S1 normal and S2 normal. No murmur heard. Pulmonary:      Effort: Pulmonary effort is normal. No respiratory distress. Breath sounds: Normal breath sounds. Abdominal:      General: Bowel sounds are normal. There is no distension. Palpations: Abdomen is soft. Musculoskeletal:         General: Normal range of motion. Cervical back: Normal range of motion and neck supple. Right lower leg: No edema. Left lower leg: No edema. Skin:     General: Skin is warm and dry. Neurological:      Mental Status: She is alert and oriented to person, place, and time.    Psychiatric:         Mood and Affect: Mood normal.

## 2023-09-29 ENCOUNTER — OFFICE VISIT (OUTPATIENT)
Dept: CARDIOLOGY CLINIC | Facility: HOSPITAL | Age: 58
End: 2023-09-29
Payer: COMMERCIAL

## 2023-09-29 VITALS
SYSTOLIC BLOOD PRESSURE: 130 MMHG | HEART RATE: 60 BPM | WEIGHT: 147.2 LBS | BODY MASS INDEX: 29.68 KG/M2 | DIASTOLIC BLOOD PRESSURE: 70 MMHG | HEIGHT: 59 IN | OXYGEN SATURATION: 97 %

## 2023-09-29 DIAGNOSIS — E87.6 HYPOKALEMIA: ICD-10-CM

## 2023-09-29 DIAGNOSIS — I49.3 PVC'S (PREMATURE VENTRICULAR CONTRACTIONS): ICD-10-CM

## 2023-09-29 DIAGNOSIS — E78.5 DYSLIPIDEMIA: ICD-10-CM

## 2023-09-29 DIAGNOSIS — I10 BENIGN ESSENTIAL HTN: Primary | ICD-10-CM

## 2023-09-29 LAB
ANION GAP SERPL CALCULATED.3IONS-SCNC: 10 MMOL/L
BUN SERPL-MCNC: 20 MG/DL (ref 5–25)
CALCIUM SERPL-MCNC: 10.1 MG/DL (ref 8.4–10.2)
CHLORIDE SERPL-SCNC: 100 MMOL/L (ref 96–108)
CO2 SERPL-SCNC: 29 MMOL/L (ref 21–32)
CREAT SERPL-MCNC: 0.91 MG/DL (ref 0.6–1.3)
GFR SERPL CREATININE-BSD FRML MDRD: 69 ML/MIN/1.73SQ M
GLUCOSE P FAST SERPL-MCNC: 115 MG/DL (ref 65–99)
POTASSIUM SERPL-SCNC: 3.3 MMOL/L (ref 3.5–5.3)
SODIUM SERPL-SCNC: 139 MMOL/L (ref 135–147)

## 2023-09-29 PROCEDURE — 99214 OFFICE O/P EST MOD 30 MIN: CPT | Performed by: NURSE PRACTITIONER

## 2023-09-29 NOTE — PATIENT INSTRUCTIONS
Start taking TWO of your potassium pills each day  Have blood work drawn on 10/10/2023 or 10/11/2023  Once I have the results of the blood work I will order a monitor to wear for one day to see how many extra heart beats she is having  We will also order an echo to evaluate heart function

## 2023-10-03 ENCOUNTER — APPOINTMENT (EMERGENCY)
Dept: RADIOLOGY | Facility: HOSPITAL | Age: 58
End: 2023-10-03
Payer: COMMERCIAL

## 2023-10-03 ENCOUNTER — HOSPITAL ENCOUNTER (EMERGENCY)
Facility: HOSPITAL | Age: 58
Discharge: HOME | End: 2023-10-04
Attending: EMERGENCY MEDICINE | Admitting: EMERGENCY MEDICINE
Payer: COMMERCIAL

## 2023-10-03 VITALS
OXYGEN SATURATION: 98 % | WEIGHT: 147 LBS | RESPIRATION RATE: 18 BRPM | BODY MASS INDEX: 29.64 KG/M2 | SYSTOLIC BLOOD PRESSURE: 147 MMHG | TEMPERATURE: 96.8 F | DIASTOLIC BLOOD PRESSURE: 78 MMHG | HEART RATE: 61 BPM | HEIGHT: 59 IN

## 2023-10-03 DIAGNOSIS — M62.838 TRAPEZIUS MUSCLE SPASM: Primary | ICD-10-CM

## 2023-10-03 PROCEDURE — 99284 EMERGENCY DEPT VISIT MOD MDM: CPT | Performed by: EMERGENCY MEDICINE

## 2023-10-03 PROCEDURE — 72070 X-RAY EXAM THORAC SPINE 2VWS: CPT

## 2023-10-03 PROCEDURE — 99283 EMERGENCY DEPT VISIT LOW MDM: CPT

## 2023-10-03 RX ORDER — LIDOCAINE 50 MG/G
1 PATCH TOPICAL ONCE
Status: DISCONTINUED | OUTPATIENT
Start: 2023-10-03 | End: 2023-10-04 | Stop reason: HOSPADM

## 2023-10-03 RX ORDER — METHOCARBAMOL 500 MG/1
500 TABLET, FILM COATED ORAL EVERY 8 HOURS PRN
Qty: 20 TABLET | Refills: 0 | Status: SHIPPED | OUTPATIENT
Start: 2023-10-03

## 2023-10-03 RX ORDER — IBUPROFEN 400 MG/1
400 TABLET ORAL ONCE
Status: COMPLETED | OUTPATIENT
Start: 2023-10-03 | End: 2023-10-03

## 2023-10-03 RX ORDER — ACETAMINOPHEN 325 MG/1
975 TABLET ORAL ONCE
Status: COMPLETED | OUTPATIENT
Start: 2023-10-03 | End: 2023-10-03

## 2023-10-03 RX ORDER — METHOCARBAMOL 500 MG/1
500 TABLET, FILM COATED ORAL ONCE
Status: COMPLETED | OUTPATIENT
Start: 2023-10-03 | End: 2023-10-03

## 2023-10-03 RX ADMIN — METHOCARBAMOL 500 MG: 500 TABLET ORAL at 23:30

## 2023-10-03 RX ADMIN — ACETAMINOPHEN 975 MG: 325 TABLET, FILM COATED ORAL at 23:29

## 2023-10-03 RX ADMIN — LIDOCAINE 5% 1 PATCH: 700 PATCH TOPICAL at 23:30

## 2023-10-03 RX ADMIN — IBUPROFEN 400 MG: 400 TABLET, FILM COATED ORAL at 23:29

## 2023-10-04 NOTE — ED PROVIDER NOTES
History  Chief Complaint   Patient presents with   • Arm Pain     Patient reports right arm pain for the past 2 weeks. Hasnt taken anything for pain but applies topical cream with no relief. 51-year-old female presents for evaluation of back pain. Patient reports pain has been ongoing for few weeks now, she denies known trauma or injury, states it occurred when she woke up 1 day. She cannot describe worsening or remitign factors. Patient denies weakness, numbness or tingling in her extremities. She denies fevers or cold symptoms over this timeframe. She additionally denies dyspnea, chest pain, abdominal pain. Patient is not taken anything at home for her symptoms. Patient was evaluated by her primary care provider for this recently and is set to begin physical therapy. Prior to Admission Medications   Prescriptions Last Dose Informant Patient Reported? Taking?    Diclofenac Sodium (VOLTAREN) 1 %   No No   Sig: Apply 2 g topically 4 (four) times a day   Restasis 0.05 % ophthalmic emulsion   Yes No   Sig: instill 1 drop into both eyes twice a day   acetaminophen (TYLENOL) 500 mg tablet   Yes No   Sig: Take 500 mg by mouth every 6 (six) hours as needed for pain   amLODIPine (NORVASC) 5 mg tablet   No No   Sig: take 2 tablets by mouth once daily   atorvastatin (LIPITOR) 20 mg tablet   No No   Sig: Take 1 tablet (20 mg total) by mouth daily   chlorthalidone 25 mg tablet   No No   Sig: Take 1 tablet (25 mg total) by mouth daily   cholecalciferol (VITAMIN D3) 1,000 units tablet   No No   Sig: TAKE 2 TABLETS BY MOUTH DAILY   estradiol (ESTRACE) 0.1 mg/g vaginal cream   No No   Sig: insert 0.5 grams vaginally TWICE A WEEK   levothyroxine 25 mcg tablet   No No   Sig: take 1 tablet by mouth once daily   potassium chloride (K-DUR,KLOR-CON) 20 mEq tablet   No No   Sig: Take 1 tablet (20 mEq total) by mouth daily      Facility-Administered Medications: None       Past Medical History:   Diagnosis Date   • Fatigue    • High cholesterol    • Hypertension    • Neuropathy    • Osteoarthritis     knees   • Shortness of breath        Past Surgical History:   Procedure Laterality Date   • APPENDECTOMY     • EPIDURAL BLOCK INJECTION Left 5/17/2018    Procedure: L4 AND L5 TRANSFORAMINAL EPIDURAL STEROID INJECTION;  Surgeon: Yanique Granger MD;  Location: [facility];  Service: Pain Management    • EPIDURAL BLOCK INJECTION Left 10/4/2018    Procedure: L4 AND L5 TRANSFORAMINAL EPIDURAL STEROID INJECTION;  Surgeon: Yanique Granger MD;  Location: [facility];  Service: Pain Management    • EPIDURAL BLOCK INJECTION Left 5/1/2019    Procedure: L4-5 and L5-S1 transforaminal epidural steroid injection;  Surgeon: Yanique Granger MD;  Location: [facility];  Service: Pain Management    • FL GUIDED NEEDLE PLAC BX/ASP/INJ  5/1/2019   • [place] COLONOSCOPY FLX DX W/COLLJ SPEC WHEN PFRMD N/A 5/31/2016    Procedure: COLONOSCOPY;  Surgeon: Norm Vizcarra MD;  Location: [facility];  Service: Gastroenterology   • AZ ESOPHAGOGASTRODUODENOSCOPY TRANSORAL DIAGNOSTIC N/A 11/15/2016    Procedure: ESOPHAGOGASTRODUODENOSCOPY (EGD);   Surgeon: Norm Vizcarra MD;  Location: [facility];  Service: Gastroenterology   • AZ TYMPANOPLASTY W/O MASTOIDECT W/O OSSICLE RECNSTJ Left 10/25/2019    Procedure: LEFT TYMPANOPLASTY;  Surgeon: Lisa Eisenberg MD;  Location:  MAIN OR;  Service: ENT   • TUBAL LIGATION         Family History   Problem Relation Age of Onset   • Uterine cancer Mother    • Early death Father    • Heart disease Brother    • No Known Problems Sister    • No Known Problems Daughter    • No Known Problems Sister    • No Known Problems Daughter    • No Known Problems Maternal Aunt    • No Known Problems Maternal Aunt    • No Known Problems Maternal Aunt    • No Known Problems Maternal Aunt    • No Known Problems Maternal Grandmother    • No Known Problems Maternal Grandfather    • No Known Problems Paternal Grandmother    • No Known Problems Paternal Grandfather      I have reviewed and agree with the history as documented. E-Cigarette/Vaping   • E-Cigarette Use Never User      E-Cigarette/Vaping Substances   • Nicotine No    • THC No    • CBD No    • Flavoring No    • Other No    • Unknown No      Social History     Tobacco Use   • Smoking status: Never   • Smokeless tobacco: Never   Vaping Use   • Vaping Use: Never used   Substance Use Topics   • Alcohol use: Not Currently   • Drug use: No       Review of Systems   Constitutional: Negative for fever. Respiratory: Negative for shortness of breath. Cardiovascular: Negative for chest pain. Gastrointestinal: Negative for abdominal pain, nausea and vomiting. Musculoskeletal: Positive for back pain. Neurological: Negative for weakness and numbness. All other systems reviewed and are negative. Physical Exam  Physical Exam  Vitals reviewed. Constitutional:       General: She is not in acute distress. Appearance: Normal appearance. She is not ill-appearing, toxic-appearing or diaphoretic. HENT:      Head: Normocephalic and atraumatic. Right Ear: External ear normal.      Left Ear: External ear normal.      Nose: Nose normal.      Mouth/Throat:      Mouth: Mucous membranes are moist.   Eyes:      General:         Right eye: No discharge. Left eye: No discharge. Extraocular Movements: Extraocular movements intact. Cardiovascular:      Rate and Rhythm: Normal rate. Pulmonary:      Effort: Pulmonary effort is normal. No respiratory distress. Musculoskeletal:         General: Tenderness (tenderness diffusely along trapezius, worse mid thoracic paraspinal area) present. No swelling or deformity. Comments: UE strength intact 5/5, sensation intact to b/l UE, radial pulses palpable 2/4   Skin:     General: Skin is warm. Coloration: Skin is not jaundiced or pale. Findings: No bruising or erythema. Neurological:      General: No focal deficit present. Mental Status: She is alert. Sensory: No sensory deficit. Motor: No weakness. Vital Signs  ED Triage Vitals [10/03/23 2255]   Temperature Pulse Respirations Blood Pressure SpO2   (!) 96.8 °F (36 °C) 61 18 147/78 98 %      Temp src Heart Rate Source Patient Position - Orthostatic VS BP Location FiO2 (%)   -- Monitor Sitting Left arm --      Pain Score       6           Vitals:    10/03/23 2255   BP: 147/78   Pulse: 61   Patient Position - Orthostatic VS: Sitting         Visual Acuity      ED Medications  Medications   ibuprofen (MOTRIN) tablet 400 mg (400 mg Oral Given 10/3/23 2329)   acetaminophen (TYLENOL) tablet 975 mg (975 mg Oral Given 10/3/23 2329)   methocarbamol (ROBAXIN) tablet 500 mg (500 mg Oral Given 10/3/23 2330)       Diagnostic Studies  Results Reviewed     None                 XR thoracic spine 2 views    (Results Pending)              Procedures  Procedures         ED Course  ED Course as of 10/04/23 0523   e Oct 03, 2023   2350 XR thoracic spine 2 views  On my interpretation, no acute osseous abnormality                               SBIRT 22yo+    Flowsheet Row Most Recent Value   Initial Alcohol Screen: US AUDIT-C     1. How often do you have a drink containing alcohol? 0 Filed at: 10/03/2023 2255   Audit-C Score 0 Filed at: 10/03/2023 2255   HAJA: How many times in the past year have you. .. Used an illegal drug or used a prescription medication for non-medical reasons? Never Filed at: 10/03/2023 2255                    Medical Decision Making  60-year-old female presents for evaluation of back pain. Pain has been ongoing for a few weeks now, on examination pain is over her trapezius with possible worsening in the midthoracic area. Will obtain x-rays to rule out occult injury such as compression fracture however likely due to muscle spasm. Amount and/or Complexity of Data Reviewed  Radiology: ordered. Decision-making details documented in ED Course.       Risk  OTC drugs.  Prescription drug management. Disposition  Final diagnoses:   Trapezius muscle spasm     Time reflects when diagnosis was documented in both MDM as applicable and the Disposition within this note     Time User Action Codes Description Comment    10/3/2023 11:52 PM Tito Ramos Add [L89.287] Trapezius muscle spasm       ED Disposition     ED Disposition   Discharge    Condition   Stable    Date/Time   Tue Oct 3, 2023 11:52 PM    [address] discharge to home/self care. Follow-up Information     Follow up With Specialties Details [place], [address]   [address]  [phone]            Discharge Medication List as of 10/3/2023 11:53 PM      START taking these medications    Details   !! Diclofenac Sodium (VOLTAREN) 1 % Apply 2 g topically 4 (four) times a day, Starting Tue 10/3/2023, Normal      methocarbamol (ROBAXIN) 500 mg tablet Take 1 tablet (500 mg total) by mouth every 8 (eight) hours as needed for muscle spasms, Starting Tue 10/3/2023, Normal       !! - Potential duplicate medications found. Please discuss with provider. CONTINUE these medications which have NOT CHANGED    Details   acetaminophen (TYLENOL) 500 mg tablet Take 500 mg by mouth every 6 (six) hours as needed for pain, Historical Med      amLODIPine (NORVASC) 5 mg tablet take 2 tablets by mouth once daily, Normal      atorvastatin (LIPITOR) 20 mg tablet Take 1 tablet (20 mg total) by mouth daily, Starting Mon 9/11/2023, Normal      chlorthalidone 25 mg tablet Take 1 tablet (25 mg total) by mouth daily, Starting Thu 4/6/2023, Normal      cholecalciferol (VITAMIN D3) 1,000 units tablet TAKE 2 TABLETS BY MOUTH DAILY, Starting Wed 8/2/2023, Normal      !!  Diclofenac Sodium (VOLTAREN) 1 % Apply 2 g topically 4 (four) times a day, Starting Mon 6/13/2022, Normal      estradiol (ESTRACE) 0.1 mg/g vaginal cream insert 0.5 grams vaginally TWICE A WEEK, Normal      levothyroxine 25 mcg tablet take 1 tablet by mouth once daily, Normal      potassium chloride (K-DUR,KLOR-CON) 20 mEq tablet Take 1 tablet (20 mEq total) by mouth daily, Starting Mon 3/20/2023, Normal      Restasis 0.05 % ophthalmic emulsion instill 1 drop into both eyes twice a day, Historical Med       !! - Potential duplicate medications found. Please discuss with provider. No discharge procedures on file.     PDMP Review       Value Time User    PDMP Reviewed  Yes 10/5/2021  5:04 PM Amena Cortez PA-C          ED Provider  Electronically Signed by           Ian Davila DO  10/04/23 1300

## 2023-10-13 DIAGNOSIS — E78.5 DYSLIPIDEMIA: ICD-10-CM

## 2023-10-13 RX ORDER — ATORVASTATIN CALCIUM 20 MG/1
20 TABLET, FILM COATED ORAL DAILY
Qty: 30 TABLET | Refills: 1 | Status: SHIPPED | OUTPATIENT
Start: 2023-10-13

## 2023-10-21 ENCOUNTER — HOSPITAL ENCOUNTER (EMERGENCY)
Facility: HOSPITAL | Age: 58
Discharge: HOME | End: 2023-10-22
Attending: EMERGENCY MEDICINE
Payer: COMMERCIAL

## 2023-10-21 DIAGNOSIS — M79.601 RIGHT ARM PAIN: Primary | ICD-10-CM

## 2023-10-21 PROCEDURE — 99284 EMERGENCY DEPT VISIT MOD MDM: CPT

## 2023-10-22 VITALS
SYSTOLIC BLOOD PRESSURE: 147 MMHG | TEMPERATURE: 98 F | DIASTOLIC BLOOD PRESSURE: 67 MMHG | HEART RATE: 69 BPM | OXYGEN SATURATION: 97 % | BODY MASS INDEX: 28.41 KG/M2 | WEIGHT: 140.65 LBS | RESPIRATION RATE: 22 BRPM

## 2023-10-22 LAB
ALBUMIN SERPL BCP-MCNC: 4.7 G/DL (ref 3.5–5)
ALP SERPL-CCNC: 88 U/L (ref 34–104)
ALT SERPL W P-5'-P-CCNC: 30 U/L (ref 7–52)
ANION GAP SERPL CALCULATED.3IONS-SCNC: 10 MMOL/L
APTT PPP: 35 SECONDS (ref 23–37)
AST SERPL W P-5'-P-CCNC: 23 U/L (ref 13–39)
BASOPHILS # BLD AUTO: 0.06 THOUSANDS/ÂΜL (ref 0–0.1)
BASOPHILS NFR BLD AUTO: 1 % (ref 0–1)
BILIRUB SERPL-MCNC: 1.08 MG/DL (ref 0.2–1)
BUN SERPL-MCNC: 13 MG/DL (ref 5–25)
CALCIUM SERPL-MCNC: 9.8 MG/DL (ref 8.4–10.2)
CARDIAC TROPONIN I PNL SERPL HS: 6 NG/L
CHLORIDE SERPL-SCNC: 100 MMOL/L (ref 96–108)
CO2 SERPL-SCNC: 28 MMOL/L (ref 21–32)
CREAT SERPL-MCNC: 0.83 MG/DL (ref 0.6–1.3)
D DIMER PPP FEU-MCNC: 0.65 UG/ML FEU
EOSINOPHIL # BLD AUTO: 0.13 THOUSAND/ÂΜL (ref 0–0.61)
EOSINOPHIL NFR BLD AUTO: 1 % (ref 0–6)
ERYTHROCYTE [DISTWIDTH] IN BLOOD BY AUTOMATED COUNT: 12.6 % (ref 11.6–15.1)
GFR SERPL CREATININE-BSD FRML MDRD: 77 ML/MIN/1.73SQ M
GLUCOSE SERPL-MCNC: 130 MG/DL (ref 65–140)
HCT VFR BLD AUTO: 40.2 % (ref 34.8–46.1)
HGB BLD-MCNC: 13.8 G/DL (ref 11.5–15.4)
IMM GRANULOCYTES # BLD AUTO: 0.02 THOUSAND/UL (ref 0–0.2)
IMM GRANULOCYTES NFR BLD AUTO: 0 % (ref 0–2)
INR PPP: 0.97 (ref 0.84–1.19)
LYMPHOCYTES # BLD AUTO: 3.12 THOUSANDS/ÂΜL (ref 0.6–4.47)
LYMPHOCYTES NFR BLD AUTO: 34 % (ref 14–44)
MCH RBC QN AUTO: 30.3 PG (ref 26.8–34.3)
MCHC RBC AUTO-ENTMCNC: 34.3 G/DL (ref 31.4–37.4)
MCV RBC AUTO: 88 FL (ref 82–98)
MONOCYTES # BLD AUTO: 0.57 THOUSAND/ÂΜL (ref 0.17–1.22)
MONOCYTES NFR BLD AUTO: 6 % (ref 4–12)
NEUTROPHILS # BLD AUTO: 5.25 THOUSANDS/ÂΜL (ref 1.85–7.62)
NEUTS SEG NFR BLD AUTO: 58 % (ref 43–75)
NRBC BLD AUTO-RTO: 0 /100 WBCS
PLATELET # BLD AUTO: 257 THOUSANDS/UL (ref 149–390)
PMV BLD AUTO: 10.4 FL (ref 8.9–12.7)
POTASSIUM SERPL-SCNC: 3.9 MMOL/L (ref 3.5–5.3)
PROT SERPL-MCNC: 7.6 G/DL (ref 6.4–8.4)
PROTHROMBIN TIME: 12.7 SECONDS (ref 11.6–14.5)
RBC # BLD AUTO: 4.56 MILLION/UL (ref 3.81–5.12)
SODIUM SERPL-SCNC: 138 MMOL/L (ref 135–147)
WBC # BLD AUTO: 9.15 THOUSAND/UL (ref 4.31–10.16)

## 2023-10-22 PROCEDURE — 36415 COLL VENOUS BLD VENIPUNCTURE: CPT | Performed by: EMERGENCY MEDICINE

## 2023-10-22 PROCEDURE — 93005 ELECTROCARDIOGRAM TRACING: CPT

## 2023-10-22 PROCEDURE — 85379 FIBRIN DEGRADATION QUANT: CPT | Performed by: EMERGENCY MEDICINE

## 2023-10-22 PROCEDURE — 84484 ASSAY OF TROPONIN QUANT: CPT | Performed by: EMERGENCY MEDICINE

## 2023-10-22 PROCEDURE — 85730 THROMBOPLASTIN TIME PARTIAL: CPT | Performed by: EMERGENCY MEDICINE

## 2023-10-22 PROCEDURE — 85025 COMPLETE CBC W/AUTO DIFF WBC: CPT | Performed by: EMERGENCY MEDICINE

## 2023-10-22 PROCEDURE — 80053 COMPREHEN METABOLIC PANEL: CPT | Performed by: EMERGENCY MEDICINE

## 2023-10-22 PROCEDURE — 99285 EMERGENCY DEPT VISIT HI MDM: CPT | Performed by: EMERGENCY MEDICINE

## 2023-10-22 PROCEDURE — 85610 PROTHROMBIN TIME: CPT | Performed by: EMERGENCY MEDICINE

## 2023-10-22 PROCEDURE — 96374 THER/PROPH/DIAG INJ IV PUSH: CPT

## 2023-10-22 RX ORDER — ACETAMINOPHEN 325 MG/1
975 TABLET ORAL ONCE
Status: COMPLETED | OUTPATIENT
Start: 2023-10-22 | End: 2023-10-22

## 2023-10-22 RX ORDER — KETOROLAC TROMETHAMINE 30 MG/ML
15 INJECTION, SOLUTION INTRAMUSCULAR; INTRAVENOUS ONCE
Status: COMPLETED | OUTPATIENT
Start: 2023-10-22 | End: 2023-10-22

## 2023-10-22 RX ORDER — LIDOCAINE 50 MG/G
1 PATCH TOPICAL ONCE
Status: DISCONTINUED | OUTPATIENT
Start: 2023-10-22 | End: 2023-10-22 | Stop reason: HOSPADM

## 2023-10-22 RX ORDER — ENOXAPARIN SODIUM 100 MG/ML
1 INJECTION SUBCUTANEOUS ONCE
Status: COMPLETED | OUTPATIENT
Start: 2023-10-22 | End: 2023-10-22

## 2023-10-22 RX ADMIN — KETOROLAC TROMETHAMINE 15 MG: 30 INJECTION, SOLUTION INTRAMUSCULAR; INTRAVENOUS at 01:37

## 2023-10-22 RX ADMIN — LIDOCAINE 5% 1 PATCH: 700 PATCH TOPICAL at 01:19

## 2023-10-22 RX ADMIN — ACETAMINOPHEN 975 MG: 325 TABLET, FILM COATED ORAL at 01:15

## 2023-10-22 RX ADMIN — ENOXAPARIN SODIUM 60 MG: 60 INJECTION SUBCUTANEOUS at 03:14

## 2023-10-22 NOTE — DISCHARGE INSTRUCTIONS
Please follow all return precautions. A referral was sent for your duplex study, you should receive a call in the morning. If you do not receive a call, please return to ED for US. Thank you.

## 2023-10-22 NOTE — ED PROVIDER NOTES
History  Chief Complaint   Patient presents with    Arm Pain     Pain starts on r-side of neck and radiates down to r-arm, onset 2 days ago. 26-year-old female who presents for right shoulder pain, right arm pain, mild swelling noted to the fingers. Patient states that she has had recurrent back pain in the past, in fact she was seen on 10/3 for similar, right trapezius pain, she was discharged at that time. She describes that she has noticed worsening pain since then, primarily over the right shoulder, right upper arm, right upper back. She also states that she has now noticed some mild swelling to her fingers. She denies any chest pain. No shortness of breath. She states that she has been taking her muscle relaxant without significant relief at home. No history of blood clots. Review of systems otherwise negative        Prior to Admission Medications   Prescriptions Last Dose Informant Patient Reported? Taking?    Diclofenac Sodium (VOLTAREN) 1 %   No No   Sig: Apply 2 g topically 4 (four) times a day   Diclofenac Sodium (VOLTAREN) 1 %   No No   Sig: Apply 2 g topically 4 (four) times a day   Restasis 0.05 % ophthalmic emulsion   Yes No   Sig: instill 1 drop into both eyes twice a day   acetaminophen (TYLENOL) 500 mg tablet   Yes No   Sig: Take 500 mg by mouth every 6 (six) hours as needed for pain   amLODIPine (NORVASC) 5 mg tablet   No No   Sig: take 2 tablets by mouth once daily   atorvastatin (LIPITOR) 20 mg tablet   No No   Sig: Take 1 tablet (20 mg total) by mouth daily   chlorthalidone 25 mg tablet   No No   Sig: Take 1 tablet (25 mg total) by mouth daily   cholecalciferol (VITAMIN D3) 1,000 units tablet   No No   Sig: TAKE 2 TABLETS BY MOUTH DAILY   estradiol (ESTRACE) 0.1 mg/g vaginal cream   No No   Sig: insert 0.5 grams vaginally TWICE A WEEK   levothyroxine 25 mcg tablet   No No   Sig: take 1 tablet by mouth once daily   methocarbamol (ROBAXIN) 500 mg tablet   No No   Sig: Take 1 tablet (500 mg total) by mouth every 8 (eight) hours as needed for muscle spasms   potassium chloride (K-DUR,KLOR-CON) 20 mEq tablet   No No   Sig: Take 1 tablet (20 mEq total) by mouth daily      Facility-Administered Medications: None       Past Medical History:   Diagnosis Date    Fatigue     High cholesterol     Hypertension     Neuropathy     Osteoarthritis     knees    Shortness of breath        Past Surgical History:   Procedure Laterality Date    APPENDECTOMY      EPIDURAL BLOCK INJECTION Left 5/17/2018    Procedure: L4 AND L5 TRANSFORAMINAL EPIDURAL STEROID INJECTION;  Surgeon: John Tovar MD;  Location: [facility];  Service: Pain Management     EPIDURAL BLOCK INJECTION Left 10/4/2018    Procedure: L4 AND L5 TRANSFORAMINAL EPIDURAL STEROID INJECTION;  Surgeon: John Tovar MD;  Location: [facility];  Service: Pain Management     EPIDURAL BLOCK INJECTION Left 5/1/2019    Procedure: L4-5 and L5-S1 transforaminal epidural steroid injection;  Surgeon: John Tovar MD;  Location: [facility];  Service: Pain Management     FL GUIDED NEEDLE PLAC BX/ASP/INJ  5/1/2019    GA COLONOSCOPY FLX DX W/COLLJ SPEC WHEN PFRMD N/A 5/31/2016    Procedure: COLONOSCOPY;  Surgeon: Hebert Unger MD;  Location: [facility];  Service: Gastroenterology    GA ESOPHAGOGASTRODUODENOSCOPY TRANSORAL DIAGNOSTIC N/A 11/15/2016    Procedure: ESOPHAGOGASTRODUODENOSCOPY (EGD);   Surgeon: Hebert Unger MD;  Location: [facility];  Service: Gastroenterology    GA TYMPANOPLASTY W/O MASTOIDECT W/O OSSICLE RECNSTJ Left 10/25/2019    Procedure: LEFT TYMPANOPLASTY;  Surgeon: Foreign Mathis MD;  Location:  MAIN OR;  Service: ENT    TUBAL LIGATION         Family History   Problem Relation Age of Onset    Uterine cancer Mother     Early death Father     Heart disease Brother     No Known Problems Sister     No Known Problems Daughter     No Known Problems Sister     No Known Problems Daughter     No Known Problems Maternal Aunt     No Known Problems Maternal Aunt     No Known Problems Maternal Aunt     No Known Problems Maternal Aunt     No Known Problems Maternal Grandmother     No Known Problems Maternal Grandfather     No Known Problems Paternal Grandmother     No Known Problems Paternal Grandfather      I have reviewed and agree with the history as documented. E-Cigarette/Vaping    E-Cigarette Use Never User      E-Cigarette/Vaping Substances    Nicotine No     THC No     CBD No     Flavoring No     Other No     Unknown No      Social History     Tobacco Use    Smoking status: Never    Smokeless tobacco: Never   Vaping Use    Vaping Use: Never used   Substance Use Topics    Alcohol use: Not Currently    Drug use: No       Review of Systems   Constitutional:  Negative for chills and fever. HENT:  Negative for congestion, rhinorrhea and sore throat. Respiratory:  Negative for cough and shortness of breath. Cardiovascular:  Negative for chest pain and palpitations. Gastrointestinal:  Negative for abdominal pain, constipation, diarrhea, nausea and vomiting. Genitourinary:  Negative for difficulty urinating and flank pain. Musculoskeletal:  Positive for arthralgias and myalgias. Neurological:  Negative for dizziness, weakness, light-headedness and headaches. Psychiatric/Behavioral:  Negative for agitation, behavioral problems and confusion. All other systems reviewed and are negative. Physical Exam  Physical Exam  Constitutional:       Appearance: She is well-developed. HENT:      Head: Normocephalic and atraumatic. Cardiovascular:      Rate and Rhythm: Normal rate and regular rhythm. Heart sounds: Normal heart sounds. No murmur heard. No friction rub. Pulmonary:      Effort: Pulmonary effort is normal. No respiratory distress. Breath sounds: Normal breath sounds. No wheezing or rales. Abdominal:      General: Bowel sounds are normal. There is no distension. Palpations: Abdomen is soft.       Tenderness: There is no abdominal tenderness. Musculoskeletal:         General: Swelling present. Normal range of motion. Cervical back: Normal range of motion and neck supple. Comments: There is possible, very mild swelling noted to fingers of the right hand. It is difficult to tell if they are simply slightly thicker than left hand at baseline, however, patient is reporting this as new. No obvious discoloration appreciated, with intact, strong radial pulses bilaterally. She denies tenderness when pressing on the hand. She has strong  strength, normal abduction of fingers, thumbs up. She has significant pain trying to raise right arm. She is tender throughout the R upper arm as well. Skin:     General: Skin is warm. Neurological:      Mental Status: She is alert and oriented to person, place, and time. Coordination: Coordination normal.   Psychiatric:         Behavior: Behavior normal.         Thought Content:  Thought content normal.         Judgment: Judgment normal.         Vital Signs  ED Triage Vitals   Temperature Pulse Respirations Blood Pressure SpO2   10/22/23 0311 10/21/23 2318 10/21/23 2318 10/21/23 2318 10/21/23 2318   98 °F (36.7 °C) 63 18 145/70 99 %      Temp Source Heart Rate Source Patient Position - Orthostatic VS BP Location FiO2 (%)   10/22/23 0311 10/21/23 2318 10/21/23 2318 10/21/23 2318 --   Tympanic Monitor Sitting Left arm       Pain Score       10/21/23 2318       10 - Worst Possible Pain           Vitals:    10/21/23 2318 10/22/23 0100 10/22/23 0311   BP: 145/70 147/67 147/67   Pulse: 63 69 69   Patient Position - Orthostatic VS: Sitting Sitting Lying         Visual Acuity      ED Medications  Medications   lidocaine (LIDODERM) 5 % patch 1 patch (1 patch Topical Medication Applied 10/22/23 0119)   ketorolac (TORADOL) injection 15 mg (15 mg Intravenous Given 10/22/23 0137)   acetaminophen (TYLENOL) tablet 975 mg (975 mg Oral Given 10/22/23 0115)   enoxaparin (LOVENOX) subcutaneous injection 60 mg (60 mg Subcutaneous Given 10/22/23 0314)       Diagnostic Studies  Results Reviewed       Procedure Component Value Units Date/Time    HS Troponin 0hr (reflex protocol) [[id number]]  (Normal) Collected: 10/22/23 0142    Lab Status: Final result Specimen: Blood from Arm, Left Updated: 10/22/23 0211     hs TnI 0hr 6 ng/L     HS Troponin I 2hr [[id number]]     Lab Status: No result Specimen: Blood     APTT [[id number]]  (Normal) Collected: 10/22/23 0142    Lab Status: Final result Specimen: Blood from Arm, Left Updated: 10/22/23 0209     PTT 35 seconds     Protime-INR [[id number]]  (Normal) Collected: 10/22/23 0142    Lab Status: Final result Specimen: Blood from Arm, Left Updated: 10/22/23 0209     Protime 12.7 seconds      INR 0.97    Comprehensive metabolic panel [[id number]]  (Abnormal) Collected: 10/22/23 0142    Lab Status: Final result Specimen: Blood from Arm, Left Updated: 10/22/23 0207     Sodium 138 mmol/L      Potassium 3.9 mmol/L      Chloride 100 mmol/L      CO2 28 mmol/L      ANION GAP 10 mmol/L      BUN 13 mg/dL      Creatinine 0.83 mg/dL      Glucose 130 mg/dL      Calcium 9.8 mg/dL      AST 23 U/L      ALT 30 U/L      Alkaline Phosphatase 88 U/L      Total Protein 7.6 g/dL      Albumin 4.7 g/dL      Total Bilirubin 1.08 mg/dL      eGFR 77 ml/min/1.73sq m     Narrative:      [facility] guidelines for Chronic Kidney Disease (CKD):     Stage 1 with normal or high GFR (GFR > 90 mL/min/1.73 square meters)    Stage 2 Mild CKD (GFR = 60-89 mL/min/1.73 square meters)    Stage 3A Moderate CKD (GFR = 45-59 mL/min/1.73 square meters)    Stage 3B Moderate CKD (GFR = 30-44 mL/min/1.73 square meters)    Stage 4 Severe CKD (GFR = 15-29 mL/min/1.73 square meters)    Stage 5 End Stage CKD (GFR <15 mL/min/1.73 square meters)  Note: GFR calculation is accurate only with a steady state creatinine    D-dimer, quantitative [[id number]]  (Abnormal) Collected: 10/22/23 0142 Lab Status: Final result Specimen: Blood from Arm, Left Updated: 10/22/23 0202     D-Dimer, Quant 0.65 ug/ml FEU     Narrative: In the evaluation for possible pulmonary embolism, in the appropriate (Well's Score of 4 or less) patient, the age adjusted d-dimer cutoff for this patient can be calculated as:    Age x 0.01 (in ug/mL) for Age-adjusted D-dimer exclusion threshold for a patient over 50 years. CBC and differential [[id number]] Collected: 10/22/23 0142    Lab Status: Final result Specimen: Blood from Arm, Left Updated: 10/22/23 0150     WBC 9.15 Thousand/uL      RBC 4.56 Million/uL      Hemoglobin 13.8 g/dL      Hematocrit 40.2 %      MCV 88 fL      MCH 30.3 pg      MCHC 34.3 g/dL      RDW 12.6 %      MPV 10.4 fL      Platelets 587 Thousands/uL      nRBC 0 /100 WBCs      Neutrophils Relative 58 %      Immat GRANS % 0 %      Lymphocytes Relative 34 %      Monocytes Relative 6 %      Eosinophils Relative 1 %      Basophils Relative 1 %      Neutrophils Absolute 5.25 Thousands/µL      Immature Grans Absolute 0.02 Thousand/uL      Lymphocytes Absolute 3.12 Thousands/µL      Monocytes Absolute 0.57 Thousand/µL      Eosinophils Absolute 0.13 Thousand/µL      Basophils Absolute 0.06 Thousands/µL                    VAS upper limb venous duplex scan, unilateral/limited    (Results Pending)              Procedures  Procedures         ED Course                                             Medical Decision Making  I reviewed the patient's medical chart, PMHx, prior encounters, medications. Reviewed patient's recent encounter on 10/3 for R shoulder pain. My independent interpretation of ECG demonstrated: Sinus rhythm, left axis deviation, incomplete RBBB    My DDx includes: Muscular pain, R shoulder pain, DVT in R upper extremity     Patient's labs were largely unremarkable except for slightly elevated D-dimer.  Given patient's concern of her finger swelling and worsening upper arm pain, DVT is in differential. We do not have US at night, so lovenox was given and order placed for duplex in AM. She was advised to return to ED for duplex if she does not receive information on scheduling. Discharged with strict return precautions. Amount and/or Complexity of Data Reviewed  Labs: ordered. Risk  OTC drugs. Prescription drug management. Disposition  Final diagnoses:   Right arm pain     Time reflects when diagnosis was documented in both MDM as applicable and the Disposition within this note       Time User Action Codes Description Comment    10/22/2023  2:41 AM Gerda Colón Add [S48.198] Right arm pain           ED Disposition       ED Disposition   Discharge    Condition   Stable    Date/Time   Sun Oct 22, 2023  2:41 AM    Comment   Bassem Jerez discharge to home/self care. Follow-up Information       Follow up With Specialties Details [facility] [address] [place] [address] Medicine Call  For re-evaluation [address]  [phone]              Discharge Medication List as of 10/22/2023  2:44 AM        CONTINUE these medications which have NOT CHANGED    Details   acetaminophen (TYLENOL) 500 mg tablet Take 500 mg by mouth every 6 (six) hours as needed for pain, Historical Med      amLODIPine (NORVASC) 5 mg tablet take 2 tablets by mouth once daily, Normal      atorvastatin (LIPITOR) 20 mg tablet Take 1 tablet (20 mg total) by mouth daily, Starting Fri 10/13/2023, Normal      chlorthalidone 25 mg tablet Take 1 tablet (25 mg total) by mouth daily, Starting Thu 4/6/2023, Normal      cholecalciferol (VITAMIN D3) 1,000 units tablet TAKE 2 TABLETS BY MOUTH DAILY, Starting Wed 8/2/2023, Normal      !! Diclofenac Sodium (VOLTAREN) 1 % Apply 2 g topically 4 (four) times a day, Starting Mon 6/13/2022, Normal      !!  Diclofenac Sodium (VOLTAREN) 1 % Apply 2 g topically 4 (four) times a day, Starting Tue 10/3/2023, Normal      estradiol (ESTRACE) 0.1 mg/g vaginal cream insert 0.5 grams vaginally TWICE A WEEK, Normal      levothyroxine 25 mcg tablet take 1 tablet by mouth once daily, Normal      methocarbamol (ROBAXIN) 500 mg tablet Take 1 tablet (500 mg total) by mouth every 8 (eight) hours as needed for muscle spasms, Starting Tue 10/3/2023, Normal      potassium chloride (K-DUR,KLOR-CON) 20 mEq tablet Take 1 tablet (20 mEq total) by mouth daily, Starting Mon 3/20/2023, Normal      Restasis 0.05 % ophthalmic emulsion instill 1 drop into both eyes twice a day, Historical Med       !! - Potential duplicate medications found. Please discuss with provider. Outpatient Discharge Orders   VAS upper limb venous duplex scan, unilateral/limited   Standing Status: Future Standing Exp.  Date: 10/22/27       PDMP Review         Value Time User    PDMP Reviewed  Yes 10/5/2021  5:04 PM Jazmine Caldwell PA-C            ED Provider  Electronically Signed by             Karyle Shi, MD  10/22/23 8426

## 2023-10-24 LAB
ATRIAL RATE: 75 BPM
PR INTERVAL: 140 MS
QRS AXIS: -31 DEGREES
QRSD INTERVAL: 112 MS
QT INTERVAL: 426 MS
QTC INTERVAL: 475 MS
T WAVE AXIS: 0 DEGREES
VENTRICULAR RATE: 75 BPM

## 2023-10-24 PROCEDURE — 93010 ELECTROCARDIOGRAM REPORT: CPT | Performed by: INTERNAL MEDICINE

## 2023-11-01 ENCOUNTER — TELEPHONE (OUTPATIENT)
Dept: FAMILY MEDICINE CLINIC | Facility: CLINIC | Age: 58
End: 2023-11-01

## 2023-11-01 DIAGNOSIS — E03.9 HYPOTHYROIDISM, UNSPECIFIED TYPE: Primary | ICD-10-CM

## 2023-11-01 NOTE — TELEPHONE ENCOUNTER
Spoke to patient in regards labs needed in order to get her medications refilled. Scheduled nurse visit to get labs done.

## 2023-11-02 ENCOUNTER — CLINICAL SUPPORT (OUTPATIENT)
Dept: FAMILY MEDICINE CLINIC | Facility: CLINIC | Age: 58
End: 2023-11-02
Payer: COMMERCIAL

## 2023-11-02 DIAGNOSIS — Z01.89 ENCOUNTER FOR BLOOD TEST: Primary | ICD-10-CM

## 2023-11-02 DIAGNOSIS — E03.9 HYPOTHYROIDISM, UNSPECIFIED TYPE: ICD-10-CM

## 2023-11-02 PROCEDURE — 84443 ASSAY THYROID STIM HORMONE: CPT | Performed by: NURSE PRACTITIONER

## 2023-11-03 LAB — TSH SERPL DL<=0.05 MIU/L-ACNC: 2.58 UIU/ML (ref 0.45–4.5)

## 2023-11-06 ENCOUNTER — EVALUATION (OUTPATIENT)
Dept: PHYSICAL THERAPY | Facility: CLINIC | Age: 58
End: 2023-11-06
Payer: COMMERCIAL

## 2023-11-06 DIAGNOSIS — M75.91 SUPRASPINATUS TENDINITIS, RIGHT: Primary | ICD-10-CM

## 2023-11-06 PROCEDURE — 97535 SELF CARE MNGMENT TRAINING: CPT

## 2023-11-06 PROCEDURE — 97161 PT EVAL LOW COMPLEX 20 MIN: CPT

## 2023-11-06 PROCEDURE — 97110 THERAPEUTIC EXERCISES: CPT

## 2023-11-06 NOTE — PROGRESS NOTES
PT Evaluation     Today's date: 2023  Patient name: Gloria Steiner  : 1965  MRN: [id number]  Referring provider: Annette Márquez, *  Dx:   Encounter Diagnosis     ICD-10-CM    1. Supraspinatus tendinitis, right  M75.91 Ambulatory Referral to Physical Therapy     PT plan of care cert/re-cert    Acetaminophen, ice, and supportive care reviewed  P.T. for strengthening and evaluation  RTC if no improvement  Consider MRI if symptoms persist          Start Time: 0800  Stop Time: 6084  Total time in clinic (min): 55 minutes    Assessment  Assessment details: The patient is a 62 y.o. female presenting to Physical Therapy today with R sided shoulder pain with signs and symptoms suggestive of R RTC tendinitis. Upon completion of the initial evaluation the patient is demonstrating with limitations in R shoulder mobility, shoulder strength, scapulothoracic control, cervical mobility, and pain. She is currently having difficulty with functional activities such as lifting, overhead activity, reaching, carrying, pulling, and pushing due to symptomatology. Patient would benefit from a course of skilled Physical Therapy services to address functional limitations to return to prior level of function.  Thank you for your referral.   Impairments: abnormal muscle firing, abnormal muscle tone, abnormal or restricted ROM, abnormal movement, activity intolerance, impaired physical strength, lacks appropriate home exercise program, pain with function and poor posture     Symptom irritability: moderateUnderstanding of Dx/Px/POC: good   Prognosis: good    Goals  ST.) Patient will initiate HEP Independently   2.) Patient will have a 50% reduction in overall symptoms   3.) Patient will demonstrate improved strength evidenced by a 1-2 MMT grade increase  4.) Patient will demonstrate improved cervical mobility evidenced by </= minimal restriction in all planes of motion  5.) Patient will demonstrate improved R shoulder mobility evidenced by R shoulder Flexion AROM >/= 120 deg, R shoulder ABD AROM >/= 120 deg    LT.) Patient will be d/c to an HEP independently  2.) Patient will improve functional abilities evidenced by FOTO scores  3.) Eliminate pain with functional activity   4.) Patient will demonstrate improved ability to lift, push, pull, and carry   5.) Return to [address] [place] [address] details: Plan of care was reviewed and discussed thoroughly with the patient and daughter on current condition. Patient was instructed on a HEP with written instructions. Patient is in agreement with PT recommendations and will attend Physical Therapy 2x/week for the next 8 weeks to address current deficits. Patient would benefit from: PT eval and skilled physical therapy  Referral necessary: No  Planned modality interventions: cryotherapy and thermotherapy: hydrocollator packs  Planned therapy interventions: flexibility, functional ROM exercises, graded activity, graded exercise, graded motor, home exercise program, joint mobilization, manual therapy, neuromuscular re-education, patient education, postural training, self care, strengthening, stretching, therapeutic activities, therapeutic exercise and therapeutic training  Frequency: 2x week  Duration in weeks: 8  Plan of Care beginning date: 2023  Plan of Care expiration date: 2024  Treatment plan discussed with: patient and family        Subjective Evaluation    History of Present Illness  Mechanism of injury: The patient reports today with R sided shoulder pain that started approximately three months ago. Patient denies any precipitating injury or trauma to the R shoulder region that could have provoked symptoms. She notes currently having difficulty with functional activities such as lifting, reaching, and carrying due to pain in the front of the R shoulder into the upper arm as well as neck. She reports pain has progressively been getting worse.  Patient states applying heat, cream, and taking medication with no relief of symptoms. Recurrent probem    Quality of life: good    Patient Goals  Patient goals for therapy: decreased pain, increased motion, increased strength, independence with ADLs/IADLs and return to sport/leisure activities    Pain  Current pain rating: 3  At best pain ratin  At worst pain ratin  Location: R shoulder, R neck , L elbow  Quality: sharp and burning  Relieving factors: relaxation, rest and heat  Aggravating factors: lifting and overhead activity    Treatments  Current treatment: physical therapy        Objective     Concurrent Complaints  Positive for disturbed sleep and headaches. Negative for night pain, dizziness, faints, nausea/motion sickness, tinnitus and shortness of breath    Postural Observations  Seated posture: poor  Standing posture: poor      Palpation   Left   Hypertonic in the levator scapulae and upper trapezius. Tenderness of the levator scapulae and upper trapezius. Right   No palpable tenderness to the infraspinatus, teres major, teres minor and triceps. Hypertonic in the biceps, levator scapulae, supraspinatus and upper trapezius. Tenderness of the biceps, levator scapulae, supraspinatus and upper trapezius. Tenderness   Cervical Spine   No tenderness in the spinous process, left transverse process and right transverse process. Right Shoulder  Tenderness in the biceps tendon (proximal), bicipital groove and supraspinatus tendon. No tenderness in the infraspinatus tendon and subscapularis tendon.      Neurological Testing     Sensation   Cervical/Thoracic   Left   Intact: light touch    Right   Intact: light touch    Active Range of Motion   Cervical/Thoracic Spine       Cervical    Flexion:  Restriction level: moderate  Extension:  Restriction level: moderate  Left lateral flexion:  with pain Restriction level: maximal  Right lateral flexion:  with pain Restriction level maximal  Left rotation:  with pain Restriction level: maximal  Right rotation:  Restriction level: moderate    Right Shoulder   Flexion: 95 degrees with pain  Abduction: 85 degrees with pain  External rotation 90°: 10 degreeswith pain  Internal rotation 90°: 15 degrees with pain    Passive Range of Motion     Right Shoulder   Flexion: 135 degrees with pain  Abduction: 120 degrees with pain  External rotation 90°: 20 degrees with pain  Internal rotation 90°: 30 degrees with pain    Additional Passive Range of Motion Details  Functional R ER: to Occiput   Functional R IR: L3-L4    Scapular Mobility     Right Shoulder   Scapular mobility: poor  Scapular Mobility with Shoulder to 90° FF   Scapular elevation: minimal  Upward rotation: inadequate  Downward rotation: inadequate    Scapular Mobility beyond 90° FF   Scapular elevation: minimal  Upward rotation: inadequate  Downward rotation: inadequate    Strength/Myotome Testing     Left Shoulder     Planes of Motion   Flexion: WFL   Extension: WFL   Abduction: [facility]   External rotation at 0°: [facility]   Internal rotation at 0°: WFL     Right Shoulder     Planes of Motion   Flexion: 2-   Extension: 2   Abduction: 2-   External rotation at 0°: 2+   Internal rotation at 0°: 2+     Left Elbow   Flexion: WFL  Extension: WFL    Right Elbow   Flexion: 3-  Extension: 3-    Tests     Right Shoulder   Positive empty can, full can, Hawkin's, Neer's, painful arc and Speed's. Negative belly press and drop arm. Neuro Exam:     Headaches   Patient reports headaches: Yes.        Flowsheet Rows      Flowsheet Row Most Recent Value   PT/OT G-Codes    Current Score 36   Projected Score 57               Precautions: Chronic R shoulder Pain, HTN      Manuals 11/6            R Sh PROM             STM R RTC/Biceps                                       Neuro Re-Ed             Mid Pull             High Pull             Scapular Retractions                                                                 Ther Ex             Upper Trapezius Stretch 3x20" ea            Levator Scapulae Stretch 3x20" ea            Table Slides  2x10            AAROM Pulleys             Wand Flex             Wall Slides             Pendulums             Iso             HEP Instruction  BM            Ther Activity                                       Gait Training                                       Modalities             Ice 10'

## 2023-11-07 ENCOUNTER — OFFICE VISIT (OUTPATIENT)
Dept: OBGYN CLINIC | Facility: CLINIC | Age: 58
End: 2023-11-07
Payer: COMMERCIAL

## 2023-11-07 ENCOUNTER — APPOINTMENT (OUTPATIENT)
Dept: RADIOLOGY | Facility: MEDICAL CENTER | Age: 58
End: 2023-11-07
Payer: COMMERCIAL

## 2023-11-07 VITALS
HEIGHT: 59 IN | DIASTOLIC BLOOD PRESSURE: 72 MMHG | BODY MASS INDEX: 29.84 KG/M2 | HEART RATE: 59 BPM | WEIGHT: 148 LBS | RESPIRATION RATE: 16 BRPM | SYSTOLIC BLOOD PRESSURE: 112 MMHG

## 2023-11-07 DIAGNOSIS — M75.32 CALCIFIC TENDINITIS OF BOTH SHOULDERS: Primary | ICD-10-CM

## 2023-11-07 DIAGNOSIS — M25.511 CHRONIC PAIN OF BOTH SHOULDERS: ICD-10-CM

## 2023-11-07 DIAGNOSIS — M25.512 CHRONIC PAIN OF BOTH SHOULDERS: ICD-10-CM

## 2023-11-07 DIAGNOSIS — G89.29 CHRONIC PAIN OF BOTH SHOULDERS: ICD-10-CM

## 2023-11-07 DIAGNOSIS — M75.31 CALCIFIC TENDINITIS OF BOTH SHOULDERS: Primary | ICD-10-CM

## 2023-11-07 PROCEDURE — 73030 X-RAY EXAM OF SHOULDER: CPT

## 2023-11-07 PROCEDURE — 99243 OFF/OP CNSLTJ NEW/EST LOW 30: CPT | Performed by: STUDENT IN AN ORGANIZED HEALTH CARE EDUCATION/TRAINING PROGRAM

## 2023-11-07 RX ORDER — MELOXICAM 15 MG/1
15 TABLET ORAL DAILY
Qty: 14 TABLET | Refills: 0 | Status: SHIPPED | OUTPATIENT
Start: 2023-11-07 | End: 2023-11-21

## 2023-11-07 NOTE — PROGRESS NOTES
Orthopedic Surgery Office Note  Mike Goldstein (47 y.o. female)   : 1965   MRN: [id number]   Encounter Date: 2023  Dr. Zabrina Smith DO, Orthopedic Surgeon  [facility]   Chief Complaint   Patient presents with    Left Shoulder - Pain    Right Shoulder - Pain       Assessment / Plan  Problem List Items Addressed This Visit    None  Visit Diagnoses       Calcific tendinitis of both shoulders    -  Primary    Relevant Medications    meloxicam (Mobic) 15 mg tablet    Other Relevant Orders    XR shoulder 2+ vw left    XR shoulder 2+ vw right    Chronic pain of both shoulders        Relevant Medications    meloxicam (Mobic) 15 mg tablet            Discussion:   calcific tendonitis of bilateral shoulders  - Discussed treatment options to include symptom masking with voltaren gel and OTC pain relievers, muscle strengthening with PT to have the joint rely on stronger muscle rather than degenerated muscle and possible consideration of corticosteroid or viscosupplementation injections in the future. - Continue PT  - No Injection planned at this time. - Follow up: prn    Of note patient has chronic pain and has been treated similarly for these conditions in the past.       Surgery:   No surgery planned at this time    Plan:   No follow-ups on file. History of Present Illness:  Mike Goldstein is a 62 y.o. female who presents for consultation at the request of Ayaka Argueta, * regarding bilateral shoulder pain. She reports no inciting event to the pain that begun approximately 3 months ago. Reports that ice/heat are not helpful for pain. Patient reports taking medication and using a topical cream for pain, but denies relief. Patient had been doing physical therapy from May to 2022 and had another consult with physical therapy again yesterday. At this point, she is not interested in receiving injections, but would continue with PT.      Patient speaks [employer] and [place], has family member present to serve as . TTP over the anterior and posterior aspects of the shoulder bilaterally. Reports pain in the neck on R side. Decreased ROM. Decreased strength to resistance with abduction, external rotation, and internal rotation. Patient is R hand dominant. Review of Systems  Constitutional: Negative for fatigue, fever or loss of appetite. HENT: Negative. Respiratory: Negative for shortness of breath, dyspnea. Cardiovascular: Negative for chest pain/tightness. Gastrointestinal: Negative for abdominal pain, N/V. Endocrine: Negative for cold/heat intolerance, unexplained weight loss/gain. Genitourinary: Negative for flank pain, dysuria  Musculoskeletal: Positive for arthralgia   Skin: Negative for rash. Neurological: Negative for numbness or tingling  Psychiatric/Behavioral: Negative for agitation. Physical Exam  /72   Pulse 59   Resp 16   Ht 4' 11" (1.499 m)   Wt 67.1 kg (148 lb)   BMI 29.89 kg/m²   Cons: Appears well. No apparent distress. Psych: Alert. Oriented x3. Mood and affect normal.  Eyes: PERRLA, EOMI  Resp: Normal effort. No audible wheezing or stridor. CV: Extremities warm and well perfused. No LE edema. Abd:    No distention or guarding. Skin: Warm. No visible lesions. Neuro: Normal muscle tone. Orthopedic Exam:   Bilateral Shoulder Exam  Alignment / Posture:  Normal cervical alignment. Inspection:  No swelling. No edema. No erythema. No ecchymosis. No deformity. Palpation:   posterior and clavicular tenderness. bilaterally  ROM:   ROM decreased due to pain. Strength:  Supraspinatus 3/5. Infraspinatus 3/5. Subscapularis 2/5. Stability:  Not tested. Tests: (+) Painful arc. (+) Lift off. (+) Cross-body adduction. Neurovascular:  Sensation intact in Ax/R/M/U nerve distributions. 2+ radial pulse.       Studies Reviewed  Study: XR shoulder 2+ vw right & left  Date: 11/07/2023  Report: I have read and agree with the radiologist report. I have personally reviewed imaging and my impression is as follows: No obvious deformity present. Imaging suggests presence of mild osteoarthritis. Procedures  No procedures today. Medical, Surgical, Family, and Social History  The patient's medical history, family history, and social history, were reviewed and updated as appropriate. Past Medical History:   Diagnosis Date    Fatigue     High cholesterol     Hypertension     Neuropathy     Osteoarthritis     knees    Shortness of breath      Past Surgical History:   Procedure Laterality Date    APPENDECTOMY      EPIDURAL BLOCK INJECTION Left 5/17/2018    Procedure: L4 AND L5 TRANSFORAMINAL EPIDURAL STEROID INJECTION;  Surgeon: Jaciel Austin MD;  Location: [facility];  Service: Pain Management     EPIDURAL BLOCK INJECTION Left 10/4/2018    Procedure: L4 AND L5 TRANSFORAMINAL EPIDURAL STEROID INJECTION;  Surgeon: Jaciel Austin MD;  Location: [facility];  Service: Pain Management     EPIDURAL BLOCK INJECTION Left 5/1/2019    Procedure: L4-5 and L5-S1 transforaminal epidural steroid injection;  Surgeon: Jaciel Austin MD;  Location: [facility];  Service: Pain Management     FL GUIDED NEEDLE PLAC BX/ASP/INJ  5/1/2019    KS COLONOSCOPY FLX DX W/COLLJ SPEC WHEN PFRMD N/A 5/31/2016    Procedure: COLONOSCOPY;  Surgeon: Jose Carlos Clemons MD;  Location: [facility];  Service: Gastroenterology    KS ESOPHAGOGASTRODUODENOSCOPY TRANSORAL DIAGNOSTIC N/A 11/15/2016    Procedure: ESOPHAGOGASTRODUODENOSCOPY (EGD);   Surgeon: Jose Carlos Clemons MD;  Location: [facility];  Service: Gastroenterology    KS TYMPANOPLASTY W/O MASTOIDECT W/O OSSICLE RECNSTJ Left 10/25/2019    Procedure: LEFT TYMPANOPLASTY;  Surgeon: Roldan Nj MD;  Location:  MAIN OR;  Service: ENT    TUBAL LIGATION       Family History   Problem Relation Age of Onset    Uterine cancer Mother     Early death Father     Heart disease Brother     No Known Problems Sister     No Known Problems Daughter     No Known Problems Sister     No Known Problems Daughter     No Known Problems Maternal Aunt     No Known Problems Maternal Aunt     No Known Problems Maternal Aunt     No Known Problems Maternal Aunt     No Known Problems Maternal Grandmother     No Known Problems Maternal Grandfather     No Known Problems Paternal Grandmother     No Known Problems Paternal Grandfather      Social History     Occupational History    Not on file   Tobacco Use    Smoking status: Never    Smokeless tobacco: Never   Vaping Use    Vaping Use: Never used   Substance and Sexual Activity    Alcohol use: Not Currently    Drug use: No    Sexual activity: Not Currently     Partners: Male     Birth control/protection: Female Sterilization     Allergies   Allergen Reactions    Latex Rash     Powdered latex gloves       Current Outpatient Medications:     acetaminophen (TYLENOL) 500 mg tablet, Take 500 mg by mouth every 6 (six) hours as needed for pain, Disp: , Rfl:     amLODIPine (NORVASC) 5 mg tablet, take 2 tablets by mouth once daily, Disp: 30 tablet, Rfl: 1    atorvastatin (LIPITOR) 20 mg tablet, Take 1 tablet (20 mg total) by mouth daily, Disp: 30 tablet, Rfl: 1    chlorthalidone 25 mg tablet, Take 1 tablet (25 mg total) by mouth daily, Disp: 90 tablet, Rfl: 3    cholecalciferol (VITAMIN D3) 1,000 units tablet, TAKE 2 TABLETS BY MOUTH DAILY, Disp: 180 tablet, Rfl: 1    Diclofenac Sodium (VOLTAREN) 1 %, Apply 2 g topically 4 (four) times a day, Disp: 350 g, Rfl: 1    Diclofenac Sodium (VOLTAREN) 1 %, Apply 2 g topically 4 (four) times a day, Disp: 2 g, Rfl: 0    estradiol (ESTRACE) 0.1 mg/g vaginal cream, insert 0.5 grams vaginally TWICE A WEEK, Disp: 42.5 g, Rfl: 0    levothyroxine 25 mcg tablet, take 1 tablet by mouth once daily, Disp: 30 tablet, Rfl: 1    meloxicam (Mobic) 15 mg tablet, Take 1 tablet (15 mg total) by mouth daily for 14 days, Disp: 14 tablet, Rfl: 0    methocarbamol (ROBAXIN) 500 mg tablet, Take 1 tablet (500 mg total) by mouth every 8 (eight) hours as needed for muscle spasms, Disp: 20 tablet, Rfl: 0    potassium chloride (K-DUR,KLOR-CON) 20 mEq tablet, Take 1 tablet (20 mEq total) by mouth daily, Disp: 90 tablet, Rfl: 4    Restasis 0.05 % ophthalmic emulsion, instill 1 drop into both eyes twice a day, Disp: , Rfl:     30 minutes was spent in the coordination of care, reviewing of imaging and with the patient on the date of service    DEMETRICE Eduardo    Scribe Attestation      I,:   am acting as a scribe while in the presence of the attending physician.:       I,:   personally performed the services described in this documentation    as scribed in my presence.:

## 2023-11-09 ENCOUNTER — OFFICE VISIT (OUTPATIENT)
Dept: PHYSICAL THERAPY | Facility: CLINIC | Age: 58
End: 2023-11-09
Payer: COMMERCIAL

## 2023-11-09 DIAGNOSIS — M75.91 SUPRASPINATUS TENDINITIS, RIGHT: Primary | ICD-10-CM

## 2023-11-09 PROCEDURE — 97140 MANUAL THERAPY 1/> REGIONS: CPT

## 2023-11-09 PROCEDURE — 97110 THERAPEUTIC EXERCISES: CPT

## 2023-11-09 NOTE — PROGRESS NOTES
Daily Note     Today's date: 2023  Patient name: Gloria Steiner  : 1965  MRN: [id number]  Referring provider: Annette Márquez, *  Dx:   Encounter Diagnosis     ICD-10-CM    1. Supraspinatus tendinitis, right  M75.91           Start Time:   Stop Time: 1510  Total time in clinic (min): 55 minutes    Subjective: Patient reports having a follow up appointment with Dr. Patsy Bolivar on Tuesday of this week. She notes R shoulder is sore this afternoon. Objective: See treatment diary below      Assessment: Tolerated treatment well with a mild increase in symptomatology in the R shoulder. We progressed the current program with the addition of AAROM exercise and shoulder stabilization. Patient tolerated progressions well. We will look to continue progressing patient within tolerance to address functional deficits. Patient would benefit from continued PT. Plan: Continue per plan of care.       Precautions: Chronic R shoulder Pain, HTN      Manuals            R Sh PROM  BM           STM R RTC/Biceps  BM                                     Neuro Re-Ed             Mid Pull             High Pull             Scapular Retractions  2x10                                                               Ther Ex             Upper Trapezius Stretch 3x20" ea 3x20" ea           Levator Scapulae Stretch 3x20" ea 3x20" ea           Table Slides Flx/Scaption  2x10 2x10            AAROM Pulleys  3x10           Wand Flex  2x10           Wall Slides  2x10           Pendulums  x20 ea CW/CCW           Iso             HEP Instruction  BM            Ther Activity                                       Gait Training                                       Modalities             Ice 10' 10'

## 2023-11-14 ENCOUNTER — OFFICE VISIT (OUTPATIENT)
Dept: PHYSICAL THERAPY | Facility: CLINIC | Age: 58
End: 2023-11-14
Payer: COMMERCIAL

## 2023-11-14 DIAGNOSIS — M75.91 SUPRASPINATUS TENDINITIS, RIGHT: Primary | ICD-10-CM

## 2023-11-14 PROCEDURE — 97140 MANUAL THERAPY 1/> REGIONS: CPT

## 2023-11-14 PROCEDURE — 97110 THERAPEUTIC EXERCISES: CPT

## 2023-11-14 NOTE — PROGRESS NOTES
Daily Note     Today's date: 2023  Patient name: Dat Corona  : 1965  MRN: [id number]  Referring provider: Debbie Germain, *  Dx:   Encounter Diagnosis     ICD-10-CM    1. Supraspinatus tendinitis, right  M75.91           Start Time: 1300  Stop Time: 1355  Total time in clinic (min): 55 minutes    Subjective: Patient reports R shoulder pain this afternoon most prominent in the front. Objective: See treatment diary below      Assessment: Tolerated treatment well. We progressed the current program with the addition of R shoulder stabilization and AROM exercises. Patient tolerated progressions well with mild discomfort in the R shoulder region. Patient would benefit from continued PT to address deficits to return to functional independence with the R arm. Plan: Continue per plan of care.       Precautions: Chronic R shoulder Pain, HTN      Manuals           R Sh PROM  BM BM          STM R RTC/Biceps  BM BM                                    Neuro Re-Ed             Mid Pull             High Pull             Scapular Retractions  2x10 2x10                                                              Ther Ex             Upper Trapezius Stretch 3x20" ea 3x20" ea 3x20" ea          Levator Scapulae Stretch 3x20" ea 3x20" ea 3x20" ea          Table Slides Flx/Scaption  2x10 2x10  2x10 ea          AAROM Pulleys  3x10 3x10          Wand Flex  2x10 2x10          Wall Slides  2x10 2x10          Pendulums  x20 ea CW/CCW X20 ea CW/CCW          Supine Shoulder Circles   X20 ea CW/CCW          " No Moneys"   2x10          Iso             HEP Instruction  BM            Ther Activity                                       Gait Training                                       Modalities             Ice 10' 10' 10'

## 2023-11-16 ENCOUNTER — OFFICE VISIT (OUTPATIENT)
Dept: PHYSICAL THERAPY | Facility: CLINIC | Age: 58
End: 2023-11-16
Payer: COMMERCIAL

## 2023-11-16 DIAGNOSIS — M75.91 SUPRASPINATUS TENDINITIS, RIGHT: Primary | ICD-10-CM

## 2023-11-16 PROCEDURE — 97140 MANUAL THERAPY 1/> REGIONS: CPT

## 2023-11-16 PROCEDURE — 97110 THERAPEUTIC EXERCISES: CPT

## 2023-11-16 NOTE — PROGRESS NOTES
Daily Note     Today's date: 2023  Patient name: Marcos Paiz  : 1965  MRN: [id number]  Referring provider: Ramesh Reece, *  Dx:   Encounter Diagnosis     ICD-10-CM    1. Supraspinatus tendinitis, right  M75.91                      Subjective: Patient reports she has 6-7/10 pain in R shoulder upon arrival to PT today. There is always pain in her L shoulder. When she lifts above shoulder height or does laundry at home, her pain increases. No exercises that she performed at therapy makes her pain increase. Objective: See treatment diary below. Assessment: Tolerated treatment fair. Patient would benefit from continued PT to manage pain symptoms, increase strength, mobility, and stability in R shoulder to improve R UE function. She has pain in R S' supra, and bicep tendon area assessed with STM and PROM. She responds well to soft tissue mobilization and manual therapy of the R shoulder. Plan: Continue per plan of care.       Precautions: Chronic R shoulder Pain, HTN      Manuals          R Sh PROM  BM BM BM         STM R RTC/Biceps  BM BM BM                                   Neuro Re-Ed             Mid Pull             High Pull             Scapular Retractions  2x10 2x10 2x10                                                             Ther Ex             Upper Trapezius Stretch 3x20" ea 3x20" ea 3x20" ea 30"x3 ea         Levator Scapulae Stretch 3x20" ea 3x20" ea 3x20" ea 30"x3 ea         Table Slides Flx/Scaption  2x10 2x10  2x10 ea 2x10 ea         AAROM Pulleys  3x10 3x10 2x10         Supine   Wand Flex  2x10 2x10 2x10         Wall Slides  2x10 2x10 2x10         Pendulums  x20 ea CW/CCW X20 ea CW/CCW 20x cw/ccw         Supine Shoulder Circles   X20 ea CW/CCW X20 ea CW/CCW         " No Moneys"   2x10 2x10         Iso             HEP Instruction  BM            Ther Activity                                       Gait Training Modalities             Ice 10' 10' 10' 10'

## 2023-11-21 ENCOUNTER — OFFICE VISIT (OUTPATIENT)
Dept: PHYSICAL THERAPY | Facility: CLINIC | Age: 58
End: 2023-11-21
Payer: COMMERCIAL

## 2023-11-21 DIAGNOSIS — M75.91 SUPRASPINATUS TENDINITIS, RIGHT: Primary | ICD-10-CM

## 2023-11-21 PROCEDURE — 97140 MANUAL THERAPY 1/> REGIONS: CPT

## 2023-11-21 PROCEDURE — 97112 NEUROMUSCULAR REEDUCATION: CPT

## 2023-11-21 PROCEDURE — 97110 THERAPEUTIC EXERCISES: CPT

## 2023-11-21 NOTE — PROGRESS NOTES
Daily Note     Today's date: 2023  Patient name: Tay Hernadez  : 1965  MRN: [id number]  Referring provider: Bradley Shelton, *  Dx:   Encounter Diagnosis     ICD-10-CM    1. Supraspinatus tendinitis, right  M75.91           Start Time: 1300  Stop Time: 1355  Total time in clinic (min): 55 minutes    Subjective: Patient reports pain in the top and front of the R shoulder this afternoon. Objective: See treatment diary below      Assessment: Tolerated treatment well. We progressed the current program with the addition of scapulothoracic strengthening. Patient tolerated progression well with mild discomfort in the R shoulder. Patient responded well to soft tissue mobilization of the R shoulder today with a slight relief of symptoms with this. Patient would benefit from continued PT. Plan: Continue per plan of care.       Precautions: Chronic R shoulder Pain, HTN      Manuals         R Sh PROM  BM BM BM BM        STM R RTC/Biceps  BM BM BM BM                                  Neuro Re-Ed             Mid Pull     L2 2x10        High Pull             Scapular Retractions  2x10 2x10 2x10 2x10                                                            Ther Ex             Upper Trapezius Stretch 3x20" ea 3x20" ea 3x20" ea 30"x3 ea 30"x3 ea        Levator Scapulae Stretch 3x20" ea 3x20" ea 3x20" ea 30"x3 ea 30"x3 ea        Table Slides Flx/Scaption  2x10 2x10  2x10 ea 2x10 ea 2x10 ea        AAROM Pulleys  3x10 3x10 2x10 3x10        Supine   Wand Flex  2x10 2x10 2x10 2x10        Wall Slides  2x10 2x10 2x10 2x10        Pendulums  x20 ea CW/CCW X20 ea CW/CCW 20x cw/ccw HEP        Supine Shoulder Circles   X20 ea CW/CCW X20 ea CW/CCW X20 ea CW/CCW        " No Moneys"   2x10 2x10 2x10        Iso             HEP Instruction  BM            Ther Activity                                       Gait Training                                       Modalities             Ice 10' 10' 10' 10' declined

## 2023-11-22 ENCOUNTER — OFFICE VISIT (OUTPATIENT)
Dept: FAMILY MEDICINE CLINIC | Facility: CLINIC | Age: 58
End: 2023-11-22
Payer: COMMERCIAL

## 2023-11-22 VITALS
BODY MASS INDEX: 30.2 KG/M2 | DIASTOLIC BLOOD PRESSURE: 72 MMHG | RESPIRATION RATE: 18 BRPM | WEIGHT: 149.8 LBS | OXYGEN SATURATION: 96 % | HEART RATE: 56 BPM | SYSTOLIC BLOOD PRESSURE: 118 MMHG | HEIGHT: 59 IN | TEMPERATURE: 97.7 F

## 2023-11-22 DIAGNOSIS — M75.91 SUPRASPINATUS TENDINITIS, RIGHT: Primary | ICD-10-CM

## 2023-11-22 DIAGNOSIS — Z23 NEED FOR VACCINATION: ICD-10-CM

## 2023-11-22 DIAGNOSIS — E03.9 HYPOTHYROIDISM, UNSPECIFIED TYPE: ICD-10-CM

## 2023-11-22 PROCEDURE — 90686 IIV4 VACC NO PRSV 0.5 ML IM: CPT

## 2023-11-22 PROCEDURE — 99213 OFFICE O/P EST LOW 20 MIN: CPT | Performed by: NURSE PRACTITIONER

## 2023-11-22 PROCEDURE — T1015 CLINIC SERVICE: HCPCS | Performed by: FAMILY MEDICINE

## 2023-11-22 PROCEDURE — 90686 IIV4 VACC NO PRSV 0.5 ML IM: CPT | Performed by: FAMILY MEDICINE

## 2023-11-22 RX ORDER — LEVOTHYROXINE SODIUM 0.03 MG/1
25 TABLET ORAL DAILY
Qty: 90 TABLET | Refills: 1 | Status: SHIPPED | OUTPATIENT
Start: 2023-11-22

## 2023-11-22 NOTE — PROGRESS NOTES
Assessment/Plan:     Diagnoses and all orders for this visit:    Supraspinatus tendinitis, right  Comments:  Pain and ROM improving  Continue P.T. Follow up with orthopedics as scheduled    Hypothyroidism, unspecified type  Comments:  Labs normalized; continue levothyroxine 25 mcg  Medication reordered today  Recheck TSH in 6 months  Orders:  -     levothyroxine 25 mcg tablet; Take 1 tablet (25 mcg total) by mouth daily  -     TSH, 3rd generation with Free T4 reflex; Future    Need for vaccination  -     influenza vaccine, quadrivalent, 0.5 mL, preservative-free, for adult and pediatric patients 6 mos+ (AFLURIA, FLUARIX, FLULAVAL, FLUZONE)              Return in about 4 months (around 3/22/2024) for Recheck HTN. Subjective:        Patient ID: Archie White is a 62 y.o. female. Chief Complaint   Patient presents with    Follow-up     Discuss about Thyroid results. Edema     Both hands for about 1 months. Flu Vaccine       PMH dysphagia, near syncope, chronic otitis media of left ear with tympanic membrane perforation, OA, atrophic vaginitis, HLD, and chronic pain syndrome presents with her granddaughter for follow up of right shoulder tendonitis. Defers Kiswahili video ; prefers granddaughter translate today. States that her muscles feel much better and looser. Continues HEP; has not needed any acetaminophen for pain mgmt.         The following portions of the patient's history were reviewed and updated as appropriate: allergies, current medications, past family history, past medical history, past social history, past surgical history and problem list.    Patient Active Problem List   Diagnosis    Abdominal pain, left lower quadrant    Chronic lumbar radiculopathy    Chronic pain syndrome    Dyslipidemia    Chronic pain of right knee    Chronic right hip pain    Myofascial pain syndrome    Atrophic vaginitis    Primary osteoarthritis of both knees    Lumbar radiculopathy    Left hip pain Lumbar degenerative disc disease    Pharyngoesophageal dysphagia    Perforation of left tympanic membrane    Chronic otitis media of left ear    Atypical chest pain    Epigastric abdominal pain    Chronic musculoskeletal pain    Pain in finger of both hands    Tympanic membrane perforation, left    Strain of thoracic back region    Chronic right shoulder pain    Medial epicondylitis of elbow, left    Near syncope    Chronic pain    Bradycardia    RBBB    Bursitis of right shoulder    Benign essential HTN    Leg edema    Mild anxiety    Plantar fasciitis    Hypothyroidism    Exertional dyspnea    SOB (shortness of breath)    Lightheadedness    PVC's (premature ventricular contractions)    Vaginal discharge    Dysuria    Genitourinary syndrome of menopause    Pelvic pain    Trapezius muscle spasm    Supraspinatus tendinitis, right    Lower extremity edema       Current Outpatient Medications   Medication Sig Dispense Refill    amLODIPine (NORVASC) 5 mg tablet take 2 tablets by mouth once daily 30 tablet 1    atorvastatin (LIPITOR) 20 mg tablet Take 1 tablet (20 mg total) by mouth daily 30 tablet 1    chlorthalidone 25 mg tablet Take 1 tablet (25 mg total) by mouth daily 90 tablet 3    Diclofenac Sodium (VOLTAREN) 1 % Apply 2 g topically 4 (four) times a day 350 g 1    Diclofenac Sodium (VOLTAREN) 1 % Apply 2 g topically 4 (four) times a day 2 g 0    estradiol (ESTRACE) 0.1 mg/g vaginal cream insert 0.5 grams vaginally TWICE A WEEK 42.5 g 0    levothyroxine 25 mcg tablet Take 1 tablet (25 mcg total) by mouth daily 90 tablet 1    potassium chloride (K-DUR,KLOR-CON) 20 mEq tablet Take 1 tablet (20 mEq total) by mouth daily 90 tablet 4    Restasis 0.05 % ophthalmic emulsion instill 1 drop into both eyes twice a day      acetaminophen (TYLENOL) 500 mg tablet Take 500 mg by mouth every 6 (six) hours as needed for pain (Patient not taking: Reported on 11/22/2023)      cholecalciferol (VITAMIN D3) 1,000 units tablet TAKE 2 TABLETS BY MOUTH DAILY (Patient not taking: Reported on 11/22/2023) 180 tablet 1    meloxicam (Mobic) 15 mg tablet Take 1 tablet (15 mg total) by mouth daily for 14 days 14 tablet 0    methocarbamol (ROBAXIN) 500 mg tablet Take 1 tablet (500 mg total) by mouth every 8 (eight) hours as needed for muscle spasms (Patient not taking: Reported on 11/22/2023) 20 tablet 0     No current facility-administered medications for this visit. Past Medical History:   Diagnosis Date    Fatigue     High cholesterol     Hypertension     Neuropathy     Osteoarthritis     knees    Shortness of breath         Past Surgical History:   Procedure Laterality Date    APPENDECTOMY      EPIDURAL BLOCK INJECTION Left 5/17/2018    Procedure: L4 AND L5 TRANSFORAMINAL EPIDURAL STEROID INJECTION;  Surgeon: Joanna Rodriguez MD;  Location: [facility];  Service: Pain Management     EPIDURAL BLOCK INJECTION Left 10/4/2018    Procedure: L4 AND L5 TRANSFORAMINAL EPIDURAL STEROID INJECTION;  Surgeon: Joanna Rodriguez MD;  Location: [facility];  Service: Pain Management     EPIDURAL BLOCK INJECTION Left 5/1/2019    Procedure: L4-5 and L5-S1 transforaminal epidural steroid injection;  Surgeon: Joanna Rodriugez MD;  Location: [facility];  Service: Pain Management     FL GUIDED NEEDLE PLAC BX/ASP/INJ  5/1/2019    NJ COLONOSCOPY FLX DX W/COLLJ SPEC WHEN PFRMD N/A 5/31/2016    Procedure: COLONOSCOPY;  Surgeon: Annette Allan MD;  Location: [facility];  Service: Gastroenterology    NJ ESOPHAGOGASTRODUODENOSCOPY TRANSORAL DIAGNOSTIC N/A 11/15/2016    Procedure: ESOPHAGOGASTRODUODENOSCOPY (EGD);   Surgeon: Annette Allan MD;  Location: [facility];  Service: Gastroenterology    NJ TYMPANOPLASTY W/O MASTOIDECT W/O OSSICLE RECNSTJ Left 10/25/2019    Procedure: LEFT TYMPANOPLASTY;  Surgeon: Lorelei Fay MD;  Location:  MAIN OR;  Service: ENT    TUBAL LIGATION          Social History     Socioeconomic History    Marital status: Single     Spouse name: Not on file    Number of children: Not on file    Years of education: Not on file    Highest education level: Not on file   Occupational History    Not on file   Tobacco Use    Smoking status: Never    Smokeless tobacco: Never   Vaping Use    Vaping Use: Never used   Substance and Sexual Activity    Alcohol use: Not Currently    Drug use: No    Sexual activity: Not Currently     Partners: Male     Birth control/protection: Female Sterilization   Other Topics Concern    Not on file   Social History Narrative    Not on file     Social Determinants of Health     Financial Resource Strain: Not on file   Food Insecurity: Not on file   Transportation Needs: Not on file   Physical Activity: Not on file   Stress: Not on file   Social Connections: Not on file   Intimate Partner Violence: Not on file   Housing Stability: Not on file      . Review of Systems   Constitutional:  Negative for activity change, appetite change and fatigue. HENT:  Negative for trouble swallowing. Respiratory:  Negative for cough and wheezing. Cardiovascular:  Negative for leg swelling. Gastrointestinal:  Negative for abdominal pain, constipation, diarrhea and nausea. Genitourinary:  Negative for difficulty urinating, dysuria, hematuria and pelvic pain. Musculoskeletal:  Negative for arthralgias and myalgias. Skin:  Negative for rash. Neurological:  Negative for dizziness and weakness. Psychiatric/Behavioral:  Negative for agitation, confusion and sleep disturbance. The patient is not nervous/anxious. Objective:      /72 (BP Location: Left arm, Patient Position: Sitting, Cuff Size: Standard)   Pulse 56   Temp 97.7 °F (36.5 °C) (Tympanic)   Resp 18   Ht 4' 11" (1.499 m)   Wt 67.9 kg (149 lb 12.8 oz)   SpO2 96%   BMI 30.26 kg/m²          Physical Exam  Vitals and nursing note reviewed. HENT:      Head: Normocephalic and atraumatic.       Nose: Nose normal.      Mouth/Throat:      Mouth: Mucous membranes are moist. Eyes:      Conjunctiva/sclera: Conjunctivae normal.   Cardiovascular:      Rate and Rhythm: Normal rate and regular rhythm. Pulmonary:      Effort: Pulmonary effort is normal.      Breath sounds: Normal breath sounds. Abdominal:      General: Abdomen is flat. Bowel sounds are normal.      Palpations: Abdomen is soft. Genitourinary:     Comments: Exam deferred  Musculoskeletal:      Right shoulder: No tenderness. Decreased range of motion (with abduction; improving). Decreased strength (with abduction; strength improving). Cervical back: No rigidity. Muscular tenderness (pain reduced on examination today) present. No pain with movement. Skin:     General: Skin is warm and dry. Capillary Refill: Capillary refill takes less than 2 seconds. Neurological:      General: No focal deficit present. Mental Status: She is alert and oriented to person, place, and time.    Psychiatric:         Mood and Affect: Mood normal.         Behavior: Behavior normal.

## 2023-11-28 ENCOUNTER — OFFICE VISIT (OUTPATIENT)
Dept: PHYSICAL THERAPY | Facility: CLINIC | Age: 58
End: 2023-11-28
Payer: COMMERCIAL

## 2023-11-28 DIAGNOSIS — M75.91 SUPRASPINATUS TENDINITIS, RIGHT: Primary | ICD-10-CM

## 2023-11-28 PROCEDURE — 97140 MANUAL THERAPY 1/> REGIONS: CPT

## 2023-11-28 PROCEDURE — 97110 THERAPEUTIC EXERCISES: CPT

## 2023-11-28 PROCEDURE — 97112 NEUROMUSCULAR REEDUCATION: CPT

## 2023-11-28 NOTE — PROGRESS NOTES
Daily Note     Today's date: 2023  Patient name: Zhao Reza  : 1965  MRN: [id number]  Referring provider: Mariana Martinez, *  Dx:   Encounter Diagnosis     ICD-10-CM    1. Supraspinatus tendinitis, right  M75.91           Start Time: 1300  Stop Time: 1355  Total time in clinic (min): 55 minutes    Subjective: Patient notes pain is at a 3/10 this afternoon. She notes Physical Therapy is helping with pain this far. Objective: See treatment diary below      Assessment: Tolerated treatment well. Patient exhibited good technique with therapeutic exercises and would benefit from continued PT. Patient had notable myofascial tension along the proximal biceps tendon. PT addressed this with soft tissue mobilization. We progressed the current directed therapeutic exercise program in scapulothoracic strengthening. Patient tolerated progressions well. Plan: Continue per plan of care.       Precautions: Chronic R shoulder Pain, HTN      Manuals    R Sh PROM  BM BM BM BM BM   STM R RTC/Biceps  BM BM BM BM BM                     Neuro Re-Ed         Mid Pull     L2 2x10 L2 2x10   High Pull      L2 2x10   Scapular Retractions  2x10 2x10 2x10 2x10 2x10                                       Ther Ex         Upper Trapezius Stretch 3x20" ea 3x20" ea 3x20" ea 30"x3 ea 30"x3 ea 30"x3   Levator Scapulae Stretch 3x20" ea 3x20" ea 3x20" ea 30"x3 ea 30"x3 ea 30"x3   Table Slides Flx/Scaption  2x10 2x10  2x10 ea 2x10 ea 2x10 ea 2x10 ea   AAROM Pulleys  3x10 3x10 2x10 3x10 3x10   Supine   Wand Flex  2x10 2x10 2x10 2x10 2x10   Wall Slides  2x10 2x10 2x10 2x10 2x10   Pendulums  x20 ea CW/CCW X20 ea CW/CCW 20x cw/ccw HEP    Supine Shoulder Circles   X20 ea CW/CCW X20 ea CW/CCW X20 ea CW/CCW X20 ea CW/CCW   " No Moneys"   2x10 2x10 2x10 2x10   Scaption         Iso         HEP Instruction  BM        Ther Activity                           Gait Training Modalities         Ice 10' 10' 10' 10' declined    MHP      10' Pre-exercise

## 2023-11-30 ENCOUNTER — OFFICE VISIT (OUTPATIENT)
Dept: PHYSICAL THERAPY | Facility: CLINIC | Age: 58
End: 2023-11-30
Payer: COMMERCIAL

## 2023-11-30 DIAGNOSIS — M75.91 SUPRASPINATUS TENDINITIS, RIGHT: Primary | ICD-10-CM

## 2023-11-30 PROCEDURE — 97110 THERAPEUTIC EXERCISES: CPT

## 2023-11-30 PROCEDURE — 97112 NEUROMUSCULAR REEDUCATION: CPT

## 2023-11-30 PROCEDURE — 97140 MANUAL THERAPY 1/> REGIONS: CPT

## 2023-11-30 NOTE — PROGRESS NOTES
Daily Note     Today's date: 2023  Patient name: Gilberto Shen  : 1965  MRN: [id number]  Referring provider: Laquita Townsend, *  Dx:   Encounter Diagnosis     ICD-10-CM    1. Supraspinatus tendinitis, right  M75.91           Start Time: 1400  Stop Time: 1455  Total time in clinic (min): 55 minutes    Subjective: Patient reports a 3-4/10 pain level this afternoon in the R shoulder. Objective: See treatment diary below      Assessment: Tolerated treatment well. Patient exhibited good technique with therapeutic exercises and would benefit from continued PT. We progressed the current directed therapeutic exercise program with the addition of scapulothoracic strengthening and stability. Patient tolerated progressions well with mild discomfort in the R shoulder. Plan: Continue per plan of care.       Precautions: Chronic R shoulder Pain, HTN      Manuals    R Sh PROM BM BM BM BM BM BM   STM R RTC/Biceps BM BM BM BM BM BM                     Neuro Re-Ed         Mid Pull L2 2x10    L2 2x10 L2 2x10   High Pull L2 2x10     L2 2x10   Scapular Retractions 2x10 2x10 2x10 2x10 2x10 2x10                                       Ther Ex         Upper Trapezius Stretch 3x20" ea 3x20" ea 3x20" ea 30"x3 ea 30"x3 ea 30"x3   Levator Scapulae Stretch 3x20"ea 3x20" ea 3x20" ea 30"x3 ea 30"x3 ea 30"x3   Table Slides Flx/Scaption  2x10 ea 2x10  2x10 ea 2x10 ea 2x10 ea 2x10 ea   AAROM Pulleys 3x10 3x10 3x10 2x10 3x10 3x10   Supine   Wand Flex 2x10 2x10 2x10 2x10 2x10 2x10   Wall Slides 2x10 2x10 2x10 2x10 2x10 2x10   Supine Shoulder Circles X20 ea CW/CCW  X20 ea CW/CCW X20 ea CW/CCW X20 ea CW/CCW X20 ea CW/CCW   " No Moneys" 2x10  2x10 2x10 2x10 2x10   Scaption X10         Iso         HEP Instruction          Ther Activity                           Gait Training                           Modalities         Ice  10' 10' 10' declined    MHP 10' Pre-exercise     10' Pre-exercise

## 2023-12-04 ENCOUNTER — APPOINTMENT (OUTPATIENT)
Dept: PHYSICAL THERAPY | Facility: CLINIC | Age: 58
End: 2023-12-04
Payer: COMMERCIAL

## 2023-12-05 ENCOUNTER — OFFICE VISIT (OUTPATIENT)
Dept: PHYSICAL THERAPY | Facility: CLINIC | Age: 58
End: 2023-12-05
Payer: COMMERCIAL

## 2023-12-05 DIAGNOSIS — M75.91 SUPRASPINATUS TENDINITIS, RIGHT: Primary | ICD-10-CM

## 2023-12-05 PROCEDURE — 97110 THERAPEUTIC EXERCISES: CPT

## 2023-12-05 PROCEDURE — 97112 NEUROMUSCULAR REEDUCATION: CPT

## 2023-12-05 NOTE — PROGRESS NOTES
Daily Note     Today's date: 2023  Patient name: Suma Acosta  : 1965  MRN: [id number]  Referring provider: Vicenta Jennings, *  Dx:   Encounter Diagnosis     ICD-10-CM    1. Supraspinatus tendinitis, right  M75.91           Start Time:   Stop Time: 1556  Total time in clinic (min): 43 minutes    Subjective: Patient reports no new complaints. Objective: See treatment diary below      Assessment: Patient tolerated treatment well. Cueing needed to decrease UT activation with theraband exercises and standing scaption. Pt will benefit from continued skilled PT intervention in order to address remaining limitations and to restore maximal function. Plan: Continue per plan of care.       Precautions: Chronic R shoulder Pain, HTN      Manuals    R Sh PROM BM JG BM BM BM BM   STM R RTC/Biceps BM JG BM BM BM BM                     Neuro Re-Ed         Mid Pull L2 2x10 L2 2x10   L2 2x10 L2 2x10   High Pull L2 2x10 L2 2x10    L2 2x10   Scapular Retractions 2x10 2x10 2x10 2x10 2x10 2x10                                       Ther Ex         Upper Trapezius Stretch 3x20" ea 3x20" 3x20" ea 30"x3 ea 30"x3 ea 30"x3   Levator Scapulae Stretch 3x20"ea 3x20" 3x20" ea 30"x3 ea 30"x3 ea 30"x3   Table Slides Flx/Scaption  2x10 ea 2x10 ea 2x10 ea 2x10 ea 2x10 ea 2x10 ea   AAROM Pulleys 3x10 3x10 3x10 2x10 3x10 3x10   Supine   Wand Flex 2x10 2x10 2x10 2x10 2x10 2x10   Wall Slides 2x10 2x10 2x10 2x10 2x10 2x10   Supine Shoulder Circles X20 ea CW/CCW x20 X20 ea CW/CCW X20 ea CW/CCW X20 ea CW/CCW X20 ea CW/CCW   " No Moneys" 2x10 2x10 2x10 2x10 2x10 2x10   Scaption X10  x10       Iso         HEP Instruction          Ther Activity                           Gait Training                           Modalities         Ice   10' 10' declined    MHP 10' Pre-exercise 5' pre    10' Pre-exercise

## 2023-12-07 ENCOUNTER — OFFICE VISIT (OUTPATIENT)
Dept: PHYSICAL THERAPY | Facility: CLINIC | Age: 58
End: 2023-12-07
Payer: COMMERCIAL

## 2023-12-07 DIAGNOSIS — M75.91 SUPRASPINATUS TENDINITIS, RIGHT: Primary | ICD-10-CM

## 2023-12-07 PROCEDURE — 97112 NEUROMUSCULAR REEDUCATION: CPT | Performed by: PHYSICAL THERAPIST

## 2023-12-07 PROCEDURE — 97140 MANUAL THERAPY 1/> REGIONS: CPT | Performed by: PHYSICAL THERAPIST

## 2023-12-07 PROCEDURE — 97110 THERAPEUTIC EXERCISES: CPT | Performed by: PHYSICAL THERAPIST

## 2023-12-07 NOTE — PROGRESS NOTES
Daily Note     Today's date: 2023  Patient name: Gilberto Shen  : 1965  MRN: [id number]  Referring provider: Laquita Townsend, *  Dx:   Encounter Diagnosis     ICD-10-CM    1. Supraspinatus tendinitis, right  M75.91                      Subjective: Patient has pain in her biceps muscle from the shoulder to the elbow. Objective: See treatment diary below      Assessment: Tolerated treatment well. Patient exhibited good technique with therapeutic exercises and would benefit from continued PT      Plan: Progress treatment as tolerated.        Precautions: Chronic R shoulder Pain, HTN      Manuals    R Sh PROM BM JG RK BM BM BM   STM R RTC/Biceps BM JG RK BM BM BM                     Neuro Re-Ed         Mid Pull L2 2x10 L2 2x10 L2 2/10  L2 2x10 L2 2x10   High Pull L2 2x10 L2 2x10 L2 2/10   L2 2x10   Scapular Retractions 2x10 2x10 2x10 2x10 2x10 2x10                                       Ther Ex         Upper Trapezius Stretch 3x20" ea 3x20" 3x20" ea 30"x3 ea 30"x3 ea 30"x3   Levator Scapulae Stretch 3x20"ea 3x20" 3x20" ea 30"x3 ea 30"x3 ea 30"x3   Table Slides Flx/Scaption  2x10 ea 2x10 ea 2x10 ea 2x10 ea 2x10 ea 2x10 ea   AAROM Pulleys 3x10 3x10 3x10 2x10 3x10 3x10   Supine   Wand Flex 2x10 2x10 2x10 2x10 2x10 2x10   Wall Slides 2x10 2x10 2x10 2x10 2x10 2x10   Supine Shoulder Circles X20 ea CW/CCW x20 X20 ea CW/CCW X20 ea CW/CCW X20 ea CW/CCW X20 ea CW/CCW   " No Moneys" 2x10 2x10 2x10 2x10 2x10 2x10   Scaption X10  x10 x10      Iso         HEP Instruction          Ther Activity                           Gait Training                           Modalities         Ice    10' declined    MHP 10' Pre-exercise 5' pre 5' pre   10' Pre-exercise

## 2023-12-12 ENCOUNTER — OFFICE VISIT (OUTPATIENT)
Dept: PHYSICAL THERAPY | Facility: CLINIC | Age: 58
End: 2023-12-12
Payer: COMMERCIAL

## 2023-12-12 DIAGNOSIS — M75.91 SUPRASPINATUS TENDINITIS, RIGHT: Primary | ICD-10-CM

## 2023-12-12 PROCEDURE — 97112 NEUROMUSCULAR REEDUCATION: CPT

## 2023-12-12 PROCEDURE — 97140 MANUAL THERAPY 1/> REGIONS: CPT

## 2023-12-12 PROCEDURE — 97110 THERAPEUTIC EXERCISES: CPT

## 2023-12-12 NOTE — PROGRESS NOTES
Daily Note     Today's date: 2023  Patient name: Issac Dinh  : 1965  MRN: [id number]  Referring provider: Alisa Kincaid, *  Dx:   Encounter Diagnosis     ICD-10-CM    1. Supraspinatus tendinitis, right  M75.91           Start Time: 1300  Stop Time: 1350  Total time in clinic (min): 50 minutes    Subjective: Patient reports soreness in the R shoulder this afternoon. Objective: See treatment diary below      Assessment: Tolerated treatment well. We progressed the current directed therapeutic exercise program with the addition of resistance to focus on strengthening of the surrounding musculature of the R GH joint. She tolerated progressions well. Patient would benefit from continued PT. Plan: Continue per plan of care.       Precautions: Chronic R shoulder Pain, HTN      Manuals    R Sh PROM BM JG RK BM BM BM   STM R RTC/Biceps BM JG RK BM BM BM                     Neuro Re-Ed         Mid Pull L2 2x10 L2 2x10 L2 2/10 L3 2x10  L2 2x10 L2 2x10   High Pull L2 2x10 L2 2x10 L2 2/10 L3 2x10   L2 2x10   Scapular Retractions 2x10 2x10 2x10 2x10  2x10 2x10                                       Ther Ex         Upper Trapezius Stretch 3x20" ea 3x20" 3x20" ea 3x20" ea 30"x3 ea 30"x3   Levator Scapulae Stretch 3x20"ea 3x20" 3x20" ea 3x20" ea 30"x3 ea 30"x3   Table Slides Flx/Scaption  2x10 ea 2x10 ea 2x10 ea 2x10 ea 2x10 ea 2x10 ea   AAROM Pulleys 3x10 3x10 3x10 3x10  3x10 3x10   Supine   Wand Flex 2x10 2x10 2x10 2x10 2x10 2x10   Wall Slides 2x10 2x10 2x10 2x10 2x10 2x10   Supine Shoulder Circles X20 ea CW/CCW x20 X20 ea CW/CCW X20 ea CW/CCW  X20 ea CW/CCW X20 ea CW/CCW   " No Moneys" 2x10 2x10 2x10 L1 2x10  2x10 2x10   Scaption X10  x10 x10 2x10      Iso         HEP Instruction          Ther Activity                           Gait Training                           Modalities         Ice     declined    MHP 10' Pre-exercise 5' pre 5' pre 5' pre  10' Pre-exercise

## 2023-12-14 ENCOUNTER — EVALUATION (OUTPATIENT)
Dept: PHYSICAL THERAPY | Facility: CLINIC | Age: 58
End: 2023-12-14
Payer: COMMERCIAL

## 2023-12-14 DIAGNOSIS — M75.91 SUPRASPINATUS TENDINITIS, RIGHT: Primary | ICD-10-CM

## 2023-12-14 PROCEDURE — 97140 MANUAL THERAPY 1/> REGIONS: CPT

## 2023-12-14 PROCEDURE — 97110 THERAPEUTIC EXERCISES: CPT

## 2023-12-14 NOTE — PROGRESS NOTES
PT Re-Evaluation     Today's date: 2023  Patient name: Gala Lai  : 1965  MRN: [id number]  Referring provider: Jacklyn Melo, *  Dx:   Encounter Diagnosis     ICD-10-CM    1. Supraspinatus tendinitis, right  M75.91           Start Time: 1330  Stop Time: 1430  Total time in clinic (min): 60 minutes    Assessment  Assessment details: The patient has been responding well to Physical Therapy treatment this far and has been making appropriate progressions in symptomatology. Upon completion of the re-evaluation the patient is still demonstrating with limitations in R shoulder mobility, shoulder strength, scapulothoracic control, cervical mobility, and pain. She is currently having difficulty with functional activities such as lifting, carrying, pulling, and pushing due to symptoms. She would continue to benefit from a course of skilled Physical Therapy services to address functional limitations to return to prior level of function.   Impairments: abnormal muscle firing, abnormal muscle tone, abnormal or restricted ROM, abnormal movement, activity intolerance, impaired physical strength, lacks appropriate home exercise program, pain with function and poor posture     Symptom irritability: moderateUnderstanding of Dx/Px/POC: good   Prognosis: good    Goals  ST.) Patient will initiate HEP Independently MET   2.) Patient will have a 50% reduction in overall symptoms Progressing   3.) Patient will demonstrate improved strength evidenced by a 1-2 MMT grade increase Progressing   4.) Patient will demonstrate improved cervical mobility evidenced by </= minimal restriction in all planes of motion Progressing   5.) Patient will demonstrate improved R shoulder mobility evidenced by R shoulder Flexion AROM >/= 120 deg, R shoulder ABD AROM >/= 120 deg Progressing    LT.) Patient will be d/c to an HEP independently Progressing   2.) Patient will improve functional abilities evidenced by FOTO scores Progressing   3.) Eliminate pain with functional activity Progressing   4.) Patient will demonstrate improved ability to lift, push, pull, and carry Progressing   5.) Return to [address] details: Plan of care was reviewed and discussed thoroughly with the patient and daughter on current condition. Patient was instructed on a HEP with written instructions. Patient is in agreement with PT recommendations and will continue to attend Physical Therapy 2x/week for the next 5 weeks to address current functional deficits. Patient would benefit from: PT eval and skilled physical therapy  Referral necessary: No  Planned modality interventions: cryotherapy and thermotherapy: hydrocollator packs  Planned therapy interventions: flexibility, functional ROM exercises, graded activity, graded exercise, graded motor, home exercise program, joint mobilization, manual therapy, neuromuscular re-education, patient education, postural training, self care, strengthening, stretching, therapeutic activities, therapeutic exercise and therapeutic training  Frequency: 2x week  Duration in weeks: 5  Plan of Care beginning date: 12/14/2023  Plan of Care expiration date: 1/18/2024  Treatment plan discussed with: patient and family        Subjective Evaluation    History of Present Illness  Mechanism of injury: The patient reports today with R sided shoulder pain that started approximately three months ago. Patient denies any precipitating injury or trauma to the R shoulder region that could have provoked symptoms. She notes currently having difficulty with functional activities such as lifting, reaching, and carrying due to pain in the front of the R shoulder into the upper arm as well as neck. She reports pain has progressively been getting worse. Patient states applying heat, cream, and taking medication with no relief of symptoms. Update 12-14-23:  The patient reports to Physical Therapy today with an 80% improvement in function since the start of Physical Therapy treatment. She notes pain levels continue to reduce and ADL's are getting easier to perform. She is pleased with her progress this far. Recurrent probem    Quality of life: good    Patient Goals  Patient goals for therapy: decreased pain, increased motion, increased strength, independence with ADLs/IADLs and return to sport/leisure activities    Pain  Current pain ratin  At best pain ratin  At worst pain ratin  Location: R shoulder, R neck , R  elbow  Quality: sharp and burning  Relieving factors: relaxation, rest and heat  Aggravating factors: lifting and overhead activity    Treatments  Current treatment: physical therapy        Objective     Concurrent Complaints  Positive for disturbed sleep and headaches. Negative for night pain, dizziness, faints, nausea/motion sickness, tinnitus and shortness of breath    Postural Observations  Seated posture: fair  Standing posture: fair      Palpation   Left   Hypertonic in the levator scapulae. Tenderness of the levator scapulae. Right   No palpable tenderness to the infraspinatus, teres major, teres minor and triceps. Hypertonic in the biceps, levator scapulae, supraspinatus and upper trapezius. Tenderness of the biceps, levator scapulae, supraspinatus and upper trapezius. Tenderness   Cervical Spine   No tenderness in the spinous process, left transverse process and right transverse process. Right Shoulder  Tenderness in the biceps tendon (proximal), bicipital groove and supraspinatus tendon. No tenderness in the infraspinatus tendon and subscapularis tendon.      Neurological Testing     Sensation   Cervical/Thoracic   Left   Intact: light touch    Right   Intact: light touch    Active Range of Motion   Cervical/Thoracic Spine       Cervical    Flexion:  Restriction level: minimal  Extension:  Restriction level: minimal  Left lateral flexion:  Restriction level: moderate  Right lateral flexion:  Restriction level moderate  Left rotation:  Restriction level: moderate  Right rotation:  Restriction level: moderate    Right Shoulder   Flexion: 145 degrees with pain  Abduction: 115 degrees with pain  External rotation 90°: 15 degreeswith pain  Internal rotation 90°: 20 degrees with pain    Passive Range of Motion     Right Shoulder   Flexion: 160 degrees with pain  Abduction: 135 degrees with pain  External rotation 90°: 25 degrees with pain  Internal rotation 90°: 35 degrees with pain    Additional Passive Range of Motion Details  Functional R ER: to Occiput   Functional R IR: L3-L4    Scapular Mobility     Right Shoulder   Scapular mobility: good    Strength/Myotome Testing     Left Shoulder     Planes of Motion   Flexion: WFL   Extension: WFL   Abduction: WFL   External rotation at 0°: [facility]   Internal rotation at 0°: WFL     Right Shoulder     Planes of Motion   Flexion: 3   Extension: 3   Abduction: 3   External rotation at 0°: 3   Internal rotation at 0°: 3     Left Elbow   Flexion: WFL  Extension: WFL    Right Elbow   Flexion: 4-  Extension: 4-    Tests     Right Shoulder   Positive empty can, full can, Hawkin's, Neer's, painful arc and Speed's. Negative belly press and drop arm. Neuro Exam:     Headaches   Patient reports headaches: Yes.                 Precautions: Chronic R shoulder Pain, HTN      Manuals 11/30 12/5 12/7 12/12 12/14 11/28   R Sh PROM BM JG RK BM BM BM   STM R RTC/Biceps BM JG RK BM BM BM                     Neuro Re-Ed         Mid Pull L2 2x10 L2 2x10 L2 2/10 L3 2x10  L3 2x10 L2 2x10   High Pull L2 2x10 L2 2x10 L2 2/10 L3 2x10  L3 2x10 L2 2x10   Scapular Retractions 2x10 2x10 2x10 2x10  2x10 2x10                                       Ther Ex         Upper Trapezius Stretch 3x20" ea 3x20" 3x20" ea 3x20" ea 3x30" ea 30"x3   Levator Scapulae Stretch 3x20"ea 3x20" 3x20" ea 3x20" ea 3x30" ea 30"x3   Table Slides Flx/Scaption  2x10 ea 2x10 ea 2x10 ea 2x10 ea 2x10 ea 2x10 ea   AAROM Pulleys 3x10 3x10 3x10 3x10  3x10 3x10   Supine   Wand Flex 2x10 2x10 2x10 2x10 2x10 2x10   Wall Slides 2x10 2x10 2x10 2x10 2x10 2x10   Supine Shoulder Circles X20 ea CW/CCW x20 X20 ea CW/CCW X20 ea CW/CCW  X20 ea CW/CCW  X20 ea CW/CCW   " No Moneys" 2x10 2x10 2x10 L1 2x10  NT 2x10   Scaption X10  x10 x10 2x10  NT    Iso         HEP Instruction          Ther Activity                           Gait Training                           Modalities         Ice         MHP 10' Pre-exercise 5' pre 5' pre 5' pre 10' pre 10' Pre-exercise

## 2023-12-17 DIAGNOSIS — N95.8 GENITOURINARY SYNDROME OF MENOPAUSE: ICD-10-CM

## 2023-12-17 RX ORDER — ESTRADIOL 0.1 MG/G
CREAM VAGINAL
Qty: 42.5 G | Refills: 0 | Status: SHIPPED | OUTPATIENT
Start: 2023-12-17

## 2023-12-18 ENCOUNTER — OFFICE VISIT (OUTPATIENT)
Dept: PHYSICAL THERAPY | Facility: CLINIC | Age: 58
End: 2023-12-18
Payer: COMMERCIAL

## 2023-12-18 DIAGNOSIS — M75.91 SUPRASPINATUS TENDINITIS, RIGHT: Primary | ICD-10-CM

## 2023-12-18 PROCEDURE — 97140 MANUAL THERAPY 1/> REGIONS: CPT

## 2023-12-18 PROCEDURE — 97112 NEUROMUSCULAR REEDUCATION: CPT

## 2023-12-18 PROCEDURE — 97110 THERAPEUTIC EXERCISES: CPT

## 2023-12-18 NOTE — PROGRESS NOTES
"Daily Note     Today's date: 2023  Patient name: Lindsey Wilson  : 1965  MRN: [id number]  Referring provider: Melodie Guerra, *  Dx:   Encounter Diagnosis     ICD-10-CM    1. Supraspinatus tendinitis, right  M75.91           Start Time: 1400  Stop Time: 1455  Total time in clinic (min): 55 minutes    Subjective: Patient reports no new complaints in the R shoulder this afternoon.      Objective: See treatment diary below      Assessment: Tolerated treatment well. Patient exhibited good technique with therapeutic exercises and would benefit from continued PT to address functional limitations in R shoulder strength, scapulothoracic control, and R shoulder mobility to return to prior level of function.       Plan: Continue per plan of care.      Precautions: Chronic R shoulder Pain, HTN      Manuals    R Sh PROM BM JG RK BM BM BM   STM R RTC/Biceps BM JG RK BM BM BM                     Neuro Re-Ed         Mid Pull L2 2x10 L2 2x10 L2 2/10 L3 2x10  L3 2x10 L3 2x10    High Pull L2 2x10 L2 2x10 L2 2/10 L3 2x10  L3 2x10 L3 2x10    Scapular Retractions 2x10 2x10 2x10 2x10  2x10 2x10                                       Ther Ex         Upper Trapezius Stretch 3x20\" ea 3x20\" 3x20\" ea 3x20\" ea 3x30\" ea HEP   Levator Scapulae Stretch 3x20\"ea 3x20\" 3x20\" ea 3x20\" ea 3x30\" ea HEP   Table Slides Flx/Scaption  2x10 ea 2x10 ea 2x10 ea 2x10 ea 2x10 ea 2x10 ea   AAROM Pulleys 3x10 3x10 3x10 3x10  3x10 3x10    Supine   Wand Flex 2x10 2x10 2x10 2x10 2x10 2x10   Wall Slides 2x10 2x10 2x10 2x10 2x10 2x10   Supine Shoulder Circles X20 ea CW/CCW x20 X20 ea CW/CCW X20 ea CW/CCW  X20 ea CW/CCW  X20 ea CW/CCW   \" No Moneys\" 2x10 2x10 2x10 L1 2x10  NT L1 2x10    Scaption X10  x10 x10 2x10  NT 2x10   CC CHARLES      P2 2x10    Iso         HEP Instruction          Ther Activity                           Gait Training                           Modalities         Ice         MHP 10' Pre-exercise 5' " pre 5' pre 5' pre 10' pre 10' Pre-exercise

## 2023-12-20 ENCOUNTER — OFFICE VISIT (OUTPATIENT)
Dept: PHYSICAL THERAPY | Facility: CLINIC | Age: 58
End: 2023-12-20
Payer: COMMERCIAL

## 2023-12-20 DIAGNOSIS — M75.91 SUPRASPINATUS TENDINITIS, RIGHT: Primary | ICD-10-CM

## 2023-12-20 PROCEDURE — 97112 NEUROMUSCULAR REEDUCATION: CPT

## 2023-12-20 PROCEDURE — 97110 THERAPEUTIC EXERCISES: CPT

## 2023-12-20 PROCEDURE — 97140 MANUAL THERAPY 1/> REGIONS: CPT

## 2023-12-20 NOTE — PROGRESS NOTES
"Daily Note     Today's date: 2023  Patient name: Lindsey Wilson  : 1965  MRN: [id number]  Referring provider: Melodie Guerra, *  Dx:   Encounter Diagnosis     ICD-10-CM    1. Supraspinatus tendinitis, right  M75.91           Start Time: 0800  Stop Time: 0855  Total time in clinic (min): 55 minutes    Subjective: The patient reports the R shoulder is feeling okay today.      Objective: See treatment diary below      Assessment: Tolerated treatment well. We progressed the current directed therapeutic exercise program with the addition of R rotator cuff strengthening to work on stabilization of the R GH joint. Patient tolerated progressions well. She would benefit from continued PT to address functional limitations to return to prior level of function.      Plan: Continue per plan of care.      Precautions: Chronic R shoulder Pain, HTN      Manuals    R Sh PROM BM JG RK BM BM BM   STM R RTC/Biceps BM  JG RK BM BM BM                     Neuro Re-Ed         Mid Pull L3 2x10  L2 2x10 L2 2/10 L3 2x10  L3 2x10 L3 2x10    High Pull L3 2x10  L2 2x10 L2 2/10 L3 2x10  L3 2x10 L3 2x10    Scapular Retractions 2x10 2x10 2x10 2x10  2x10 2x10                                       Ther Ex         Upper Trapezius Stretch  3x20\" 3x20\" ea 3x20\" ea 3x30\" ea HEP   Levator Scapulae Stretch  3x20\" 3x20\" ea 3x20\" ea 3x30\" ea HEP   Table Slides Flx/Scaption  2x10 ea 2x10 ea 2x10 ea 2x10 ea 2x10 ea 2x10 ea   AAROM Pulleys 3x10 3x10 3x10 3x10  3x10 3x10    Supine   Wand Flex 2x10 2x10 2x10 2x10 2x10 2x10   Wall Slides 2x10 2x10 2x10 2x10 2x10 2x10   Supine Shoulder Circles X20 ea CW/CCW x20 X20 ea CW/CCW X20 ea CW/CCW  X20 ea CW/CCW  X20 ea CW/CCW   \" No Moneys\" L2 2x10 2x10 2x10 L1 2x10  NT L1 2x10    Scaption 1# 2x10  x10 x10 2x10  NT 2x10   CC CHARLES P3 2x10      P2 2x10    TB IR/ER IR L2 2x10        HEP Instruction          Ther Activity                           Gait Training                "            Modalities         Ice         MHP 10' Pre-exercise 5' pre 5' pre 5' pre 10' pre 10' Pre-exercise

## 2023-12-21 ENCOUNTER — APPOINTMENT (OUTPATIENT)
Dept: PHYSICAL THERAPY | Facility: CLINIC | Age: 58
End: 2023-12-21
Payer: COMMERCIAL

## 2024-01-04 ENCOUNTER — OFFICE VISIT (OUTPATIENT)
Dept: PHYSICAL THERAPY | Facility: CLINIC | Age: 59
End: 2024-01-04
Payer: COMMERCIAL

## 2024-01-04 DIAGNOSIS — M75.91 SUPRASPINATUS TENDINITIS, RIGHT: Primary | ICD-10-CM

## 2024-01-04 PROCEDURE — 97110 THERAPEUTIC EXERCISES: CPT

## 2024-01-04 PROCEDURE — 97140 MANUAL THERAPY 1/> REGIONS: CPT

## 2024-01-04 PROCEDURE — 97112 NEUROMUSCULAR REEDUCATION: CPT

## 2024-01-04 NOTE — PROGRESS NOTES
"Daily Note     Today's date: 2024  Patient name: Lindsey Wilson  : 1965  MRN: [id number]  Referring provider: Melodie Guerra, *  Dx:   Encounter Diagnosis     ICD-10-CM    1. Supraspinatus tendinitis, right  M75.91           Start Time: 1300  Stop Time: 1355  Total time in clinic (min): 55 minutes    Subjective: Patient reports R shoulder feels okay today.      Objective: See treatment diary below      Assessment: Tolerated treatment well. Patient exhibited good technique with therapeutic exercises and would benefit from continued PT. She is continuing to respond well to manual therapy and the directed therapeutic exercise program.      Plan: Continue per plan of care.      Precautions: Chronic R shoulder Pain, HTN      Manuals    R Sh PROM BM BM RK BM BM BM   STM R RTC/Biceps BM  BM RK BM BM BM                     Neuro Re-Ed         Mid Pull L3 2x10  L3 2x10 L2 2/10 L3 2x10  L3 2x10 L3 2x10    High Pull L3 2x10  L3 2x10 L2 2/10 L3 2x10  L3 2x10 L3 2x10    Scapular Retractions 2x10 2x10  2x10 2x10  2x10 2x10                                       Ther Ex         Table Slides Flx/Scaption  2x10 ea 2x10 ea 2x10 ea 2x10 ea 2x10 ea 2x10 ea   AAROM Pulleys 3x10 3x10  3x10 3x10  3x10 3x10    Supine   Wand Flex 2x10 2x10 2x10 2x10 2x10 2x10   Wall Slides 2x10 2x10 2x10 2x10 2x10 2x10   Supine Shoulder Circles X20 ea CW/CCW X20 ea CW/CCW X20 ea CW/CCW X20 ea CW/CCW  X20 ea CW/CCW  X20 ea CW/CCW   \" No Moneys\" L2 2x10 L2 2x10  2x10 L1 2x10  NT L1 2x10    Scaption 1# 2x10  1# 2x10  x10 2x10  NT 2x10   CC CHARLES P3 2x10  P3 2x10     P2 2x10    TB IR/ER IR L2 2x10 IR L2 2x10        HEP Instruction          Ther Activity                           Gait Training                           Modalities         Ice         MHP 10' Pre-exercise 10' pre 5' pre 5' pre 10' pre 10' Pre-exercise               "

## 2024-01-09 ENCOUNTER — OFFICE VISIT (OUTPATIENT)
Dept: PHYSICAL THERAPY | Facility: CLINIC | Age: 59
End: 2024-01-09
Payer: COMMERCIAL

## 2024-01-09 DIAGNOSIS — M75.91 SUPRASPINATUS TENDINITIS, RIGHT: Primary | ICD-10-CM

## 2024-01-09 PROCEDURE — 97110 THERAPEUTIC EXERCISES: CPT

## 2024-01-09 PROCEDURE — 97112 NEUROMUSCULAR REEDUCATION: CPT

## 2024-01-09 PROCEDURE — 97140 MANUAL THERAPY 1/> REGIONS: CPT

## 2024-01-09 NOTE — PROGRESS NOTES
"Daily Note     Today's date: 2024  Patient name: Lindsey Wilson  : 1965  MRN: [id number]  Referring provider: Melodie Guerra, *  Dx:   Encounter Diagnosis     ICD-10-CM    1. Supraspinatus tendinitis, right  M75.91           Start Time: 1430  Stop Time: 1525  Total time in clinic (min): 55 minutes    Subjective: Patient reports the R shoulder is feeling okay today.      Objective: See treatment diary below      Assessment: Tolerated treatment well. We progressed the current directed therapeutic exercise program in resistance this afternoon. Patient tolerated progressions well with a minimal increase in symptomatology. Patient demonstrated fatigue post treatment, exhibited good technique with therapeutic exercises, and would benefit from continued PT.      Plan: Continue per plan of care.      Precautions: Chronic R shoulder Pain, HTN      Manuals    R Sh PROM BM BM BM BM BM BM   STM R RTC/Biceps BM  BM BM BM BM BM                     Neuro Re-Ed         Mid Pull L3 2x10  L3 2x10 L4 2x10  L3 2x10  L3 2x10 L3 2x10    High Pull L3 2x10  L3 2x10 L4 2x10  L3 2x10  L3 2x10 L3 2x10    Scapular Retractions 2x10 2x10  HEP  2x10  2x10 2x10                                       Ther Ex         Table Slides Flx/Scaption  2x10 ea 2x10 ea 2x10 ea 2x10 ea 2x10 ea 2x10 ea   AAROM Pulleys 3x10 3x10  3x10  3x10  3x10 3x10    Supine   Wand Flex 2x10 2x10 NT  2x10 2x10 2x10   Wall Slides 2x10 2x10 2x10  2x10 2x10 2x10   Supine Shoulder Circles X20 ea CW/CCW X20 ea CW/CCW HEP  X20 ea CW/CCW  X20 ea CW/CCW  X20 ea CW/CCW   \" No Moneys\" L2 2x10 L2 2x10  L2 2x10  L1 2x10  NT L1 2x10    Scaption 1# 2x10  1# 2x10  2# 2x10  2x10  NT 2x10   CC CHARLES P3 2x10  P3 2x10  P4 2x10    P2 2x10    TB IR/ER IR L2 2x10 IR L2 2x10  L2 2x10 ea      HEP Instruction          Ther Activity                           Gait Training                           Modalities         Ice         MHP 10' Pre-exercise 10' " pre 10' pre 5' pre 10' pre 10' Pre-exercise

## 2024-01-10 DIAGNOSIS — N95.8 GENITOURINARY SYNDROME OF MENOPAUSE: ICD-10-CM

## 2024-01-10 RX ORDER — ESTRADIOL 0.1 MG/G
CREAM VAGINAL
Qty: 42.5 G | Refills: 1 | Status: SHIPPED | OUTPATIENT
Start: 2024-01-10

## 2024-01-11 ENCOUNTER — OFFICE VISIT (OUTPATIENT)
Dept: PHYSICAL THERAPY | Facility: CLINIC | Age: 59
End: 2024-01-11
Payer: COMMERCIAL

## 2024-01-11 DIAGNOSIS — M75.91 SUPRASPINATUS TENDINITIS, RIGHT: Primary | ICD-10-CM

## 2024-01-11 PROCEDURE — 97110 THERAPEUTIC EXERCISES: CPT

## 2024-01-11 PROCEDURE — 97140 MANUAL THERAPY 1/> REGIONS: CPT

## 2024-01-11 NOTE — PROGRESS NOTES
"Daily Note     Today's date: 2024  Patient name: Lindsey Wilson  : 1965  MRN: [id number]  Referring provider: Melodie Guerra, *  Dx:   Encounter Diagnosis     ICD-10-CM    1. Supraspinatus tendinitis, right  M75.91           Start Time: 1400  Stop Time: 1450  Total time in clinic (min): 50 minutes    Subjective: Patient reports no new complaints in the R shoulder this afternoon.      Objective: See treatment diary below      Assessment: Tolerated treatment well. Patient exhibited good technique with therapeutic exercises and would benefit from continued PT to address functional limitations. She continues to respond well to the directed therapeutic exercise program and manual therapy.      Plan: Continue per plan of care.      Precautions: Chronic R shoulder Pain, HTN      Manuals    R Sh PROM BM BM BM BM BM BM   STM R RTC/Biceps BM  BM BM BM BM BM                     Neuro Re-Ed         Mid Pull L3 2x10  L3 2x10 L4 2x10  L4 2x10  L3 2x10 L3 2x10    High Pull L3 2x10  L3 2x10 L4 2x10  L4 2x10  L3 2x10 L3 2x10    Scapular Retractions 2x10 2x10  HEP   2x10 2x10                                       Ther Ex         Table Slides Flx/Scaption  2x10 ea 2x10 ea 2x10 ea 2x10 ea 2x10 ea 2x10 ea   AAROM Pulleys 3x10 3x10  3x10  3x10  3x10 3x10    Supine   Wand Flex 2x10 2x10 NT  2x10 2x10 2x10   Wall Slides 2x10 2x10 2x10  2x10 2x10 2x10   \" No Moneys\" L2 2x10 L2 2x10  L2 2x10  L3 2x10  NT L1 2x10    Scaption 1# 2x10  1# 2x10  2# 2x10  2# 2x10 NT 2x10   CC CHARLES P3 2x10  P3 2x10  P4 2x10  P4 2x10   P2 2x10    TB IR/ER IR L2 2x10 IR L2 2x10  L2 2x10 ea L2 2x10 ea     HEP Instruction          Ther Activity                           Gait Training                           Modalities         Ice         MHP 10' Pre-exercise 10' pre 10' pre 10' pre 10' pre 10' Pre-exercise                   "

## 2024-01-15 DIAGNOSIS — I10 BENIGN ESSENTIAL HTN: ICD-10-CM

## 2024-01-15 RX ORDER — AMLODIPINE BESYLATE 5 MG/1
TABLET ORAL
Qty: 30 TABLET | Refills: 1 | Status: SHIPPED | OUTPATIENT
Start: 2024-01-15

## 2024-01-30 DIAGNOSIS — E78.5 DYSLIPIDEMIA: ICD-10-CM

## 2024-01-31 DIAGNOSIS — E87.6 HYPOKALEMIA: ICD-10-CM

## 2024-01-31 RX ORDER — POTASSIUM CHLORIDE 20 MEQ/1
TABLET, EXTENDED RELEASE ORAL
Qty: 180 TABLET | Refills: 3 | Status: SHIPPED | OUTPATIENT
Start: 2024-01-31

## 2024-02-05 DIAGNOSIS — E78.5 DYSLIPIDEMIA: ICD-10-CM

## 2024-02-05 DIAGNOSIS — E03.9 HYPOTHYROIDISM, UNSPECIFIED TYPE: ICD-10-CM

## 2024-02-05 DIAGNOSIS — I10 BENIGN ESSENTIAL HTN: Primary | ICD-10-CM

## 2024-02-05 DIAGNOSIS — E55.9 VITAMIN D DEFICIENCY: ICD-10-CM

## 2024-02-05 DIAGNOSIS — Z13.1 DIABETES MELLITUS SCREENING: ICD-10-CM

## 2024-02-05 RX ORDER — ATORVASTATIN CALCIUM 20 MG/1
20 TABLET, FILM COATED ORAL DAILY
Qty: 90 TABLET | Refills: 3 | Status: SHIPPED | OUTPATIENT
Start: 2024-02-05

## 2024-02-05 RX ORDER — ATORVASTATIN CALCIUM 20 MG/1
20 TABLET, FILM COATED ORAL DAILY
Qty: 30 TABLET | Refills: 1 | OUTPATIENT
Start: 2024-02-05

## 2024-02-05 NOTE — TELEPHONE ENCOUNTER
"Refilled; labs entered for patient. Her AWV has not been done in a bit as the gyn appt registered as her \"well visit.\" Thanks!  "

## 2024-02-11 DIAGNOSIS — I10 BENIGN ESSENTIAL HTN: ICD-10-CM

## 2024-02-12 DIAGNOSIS — I10 BENIGN ESSENTIAL HTN: ICD-10-CM

## 2024-02-12 RX ORDER — AMLODIPINE BESYLATE 5 MG/1
TABLET ORAL
Qty: 30 TABLET | Refills: 1 | OUTPATIENT
Start: 2024-02-12

## 2024-02-12 RX ORDER — AMLODIPINE BESYLATE 10 MG/1
10 TABLET ORAL DAILY
Qty: 90 TABLET | Refills: 2 | Status: SHIPPED | OUTPATIENT
Start: 2024-02-12

## 2024-03-14 DIAGNOSIS — N95.8 GENITOURINARY SYNDROME OF MENOPAUSE: ICD-10-CM

## 2024-03-14 RX ORDER — ESTRADIOL 0.1 MG/G
CREAM VAGINAL
Qty: 42.5 G | Refills: 1 | Status: SHIPPED | OUTPATIENT
Start: 2024-03-14

## 2024-03-28 ENCOUNTER — OFFICE VISIT (OUTPATIENT)
Dept: FAMILY MEDICINE CLINIC | Facility: CLINIC | Age: 59
End: 2024-03-28
Payer: COMMERCIAL

## 2024-03-28 VITALS
RESPIRATION RATE: 16 BRPM | SYSTOLIC BLOOD PRESSURE: 124 MMHG | BODY MASS INDEX: 30.24 KG/M2 | HEIGHT: 59 IN | OXYGEN SATURATION: 96 % | DIASTOLIC BLOOD PRESSURE: 76 MMHG | TEMPERATURE: 97.5 F | HEART RATE: 50 BPM | WEIGHT: 150 LBS

## 2024-03-28 DIAGNOSIS — R09.82 PND (POST-NASAL DRIP): ICD-10-CM

## 2024-03-28 DIAGNOSIS — Z23 NEED FOR SHINGLES VACCINE: ICD-10-CM

## 2024-03-28 DIAGNOSIS — H60.8X3 CHRONIC ECZEMATOUS OTITIS EXTERNA OF BOTH EARS: ICD-10-CM

## 2024-03-28 DIAGNOSIS — I10 BENIGN ESSENTIAL HTN: Primary | ICD-10-CM

## 2024-03-28 DIAGNOSIS — M75.91 SUPRASPINATUS TENDINITIS, RIGHT: ICD-10-CM

## 2024-03-28 PROCEDURE — 99214 OFFICE O/P EST MOD 30 MIN: CPT | Performed by: PHYSICIAN ASSISTANT

## 2024-03-28 PROCEDURE — T1015 CLINIC SERVICE: HCPCS | Performed by: FAMILY MEDICINE

## 2024-03-28 RX ORDER — CETIRIZINE HYDROCHLORIDE 10 MG/1
10 TABLET ORAL DAILY
Qty: 30 TABLET | Refills: 0 | Status: SHIPPED | OUTPATIENT
Start: 2024-03-28

## 2024-03-28 NOTE — PROGRESS NOTES
Assessment/Plan:    No problem-specific Assessment & Plan notes found for this encounter.       Diagnoses and all orders for this visit:    Benign essential HTN  Comments:  at goal, cont current    PND (post-nasal drip)  -     cetirizine (ZyrTEC) 10 mg tablet; Take 1 tablet (10 mg total) by mouth daily    Chronic eczematous otitis externa of both ears  -     neomycin-polymyxin-hydrocortisone (CORTISPORIN) otic solution; Administer 4 drops into both ears every 8 (eight) hours    Need for shingles vaccine  -     Zoster Vaccine Recombinant IM    Supraspinatus tendinitis, right  Comments:  resolved          Ear drops + zyrtec as ordered. Cont for 1 month. May d/c if resolves.  -lab work as previously ordered. Reprinted today  -RTC 3 months for FUP BP/PND/DM/hypothyroid or sooner if concerns arise     Subjective:      Patient ID: Lindsey Wilson is a 59 y.o. female PMH HTN, dysphagia, near syncope, chronic otitis media of left ear with tympanic membrane perforation, OA, atrophic vaginitis, HLD, and chronic pain syndrome presents for follow up. Her daughter provides translation.    BP is at goal on her current medications. She denies CP, SOB, headaches. She as mild edema at the end of the day that resolves.     She completed PT for right shoulder tendinitis. She has essentially no pain. She has not needed medications in several months. She was discharged from [facility].     She complains of itchy ears bilaterally. She has history of ear surgery and states she was placed on ear drops to help. There is no pain or decreased hearing. She also complains of mucus build up in the back of her throat. She has no coughing, runny nose, sore throat, fever, chills or SOB. This has been ongoing about 3 months.       HPI    The following portions of the patient's history were reviewed and updated as appropriate: allergies, current medications, past family history, past medical history, past social history, past surgical history, and problem  "list.    Review of Systems   Constitutional:  Negative for activity change, appetite change, chills, diaphoresis, fatigue and fever.   HENT:  Positive for postnasal drip. Negative for congestion, ear discharge, ear pain, rhinorrhea, sinus pressure, sinus pain, sneezing, sore throat, tinnitus and trouble swallowing.         Bilateral ear pruritis   Respiratory:  Negative for cough, choking, chest tightness, shortness of breath and wheezing.    Cardiovascular:  Positive for leg swelling. Negative for chest pain and palpitations.   Musculoskeletal:  Negative for arthralgias, joint swelling and myalgias.         Objective:      /76 (BP Location: Left arm, Patient Position: Sitting, Cuff Size: Adult)   Pulse (!) 50   Temp 97.5 °F (36.4 °C) (Tympanic)   Resp 16   Ht 4' 11\" (1.499 m)   Wt 68 kg (150 lb)   SpO2 96%   BMI 30.30 kg/m²          Physical Exam  Constitutional:       General: She is not in acute distress.     Appearance: Normal appearance. She is well-developed.   HENT:      Head: Normocephalic and atraumatic.      Right Ear: No drainage or swelling. A middle ear effusion is present. Tympanic membrane is bulging.      Left Ear: No drainage or swelling.  No middle ear effusion. Tympanic membrane is not bulging.      Nose: Mucosal edema and rhinorrhea present.      Mouth/Throat:      Pharynx: Posterior oropharyngeal erythema present. No pharyngeal swelling or oropharyngeal exudate.   Eyes:      Pupils: Pupils are equal, round, and reactive to light.   Cardiovascular:      Rate and Rhythm: Normal rate and regular rhythm.      Heart sounds: Normal heart sounds. No murmur heard.  Pulmonary:      Effort: Pulmonary effort is normal. No respiratory distress.      Breath sounds: Normal breath sounds. No wheezing.   Abdominal:      General: Bowel sounds are normal. There is no distension.      Palpations: Abdomen is soft.      Tenderness: There is no abdominal tenderness.   Skin:     General: Skin is warm and " dry.      Capillary Refill: Capillary refill takes less than 2 seconds.   Neurological:      Mental Status: She is alert and oriented to person, place, and time.   Psychiatric:         Behavior: Behavior normal.

## 2024-04-08 ENCOUNTER — TELEPHONE (OUTPATIENT)
Age: 59
End: 2024-04-08

## 2024-04-08 DIAGNOSIS — I10 BENIGN ESSENTIAL HTN: ICD-10-CM

## 2024-04-08 RX ORDER — CHLORTHALIDONE 25 MG/1
25 TABLET ORAL DAILY
Qty: 90 TABLET | Refills: 3 | Status: SHIPPED | OUTPATIENT
Start: 2024-04-08

## 2024-04-08 NOTE — TELEPHONE ENCOUNTER
Daughter called asking to speak to [facility].  Provided number and cold transfer to [facility].

## 2024-04-11 ENCOUNTER — TELEPHONE (OUTPATIENT)
Dept: FAMILY MEDICINE CLINIC | Facility: CLINIC | Age: 59
End: 2024-04-11

## 2024-04-18 ENCOUNTER — APPOINTMENT (OUTPATIENT)
Dept: LAB | Facility: HOSPITAL | Age: 59
End: 2024-04-18
Payer: COMMERCIAL

## 2024-04-18 ENCOUNTER — HOSPITAL ENCOUNTER (OUTPATIENT)
Dept: MAMMOGRAPHY | Facility: HOSPITAL | Age: 59
Discharge: HOME | End: 2024-04-18
Attending: OBSTETRICS & GYNECOLOGY
Payer: COMMERCIAL

## 2024-04-18 ENCOUNTER — OFFICE VISIT (OUTPATIENT)
Dept: FAMILY MEDICINE CLINIC | Facility: CLINIC | Age: 59
End: 2024-04-18
Payer: COMMERCIAL

## 2024-04-18 VITALS — WEIGHT: 149 LBS | HEIGHT: 55 IN | BODY MASS INDEX: 34.48 KG/M2

## 2024-04-18 VITALS
DIASTOLIC BLOOD PRESSURE: 74 MMHG | BODY MASS INDEX: 34.58 KG/M2 | OXYGEN SATURATION: 98 % | HEIGHT: 55 IN | SYSTOLIC BLOOD PRESSURE: 138 MMHG | TEMPERATURE: 97.9 F | RESPIRATION RATE: 18 BRPM | HEART RATE: 76 BPM | WEIGHT: 149.4 LBS

## 2024-04-18 DIAGNOSIS — I10 BENIGN ESSENTIAL HTN: ICD-10-CM

## 2024-04-18 DIAGNOSIS — E03.9 HYPOTHYROIDISM, UNSPECIFIED TYPE: ICD-10-CM

## 2024-04-18 DIAGNOSIS — Z12.31 ENCOUNTER FOR SCREENING MAMMOGRAM FOR MALIGNANT NEOPLASM OF BREAST: ICD-10-CM

## 2024-04-18 DIAGNOSIS — E78.5 DYSLIPIDEMIA: ICD-10-CM

## 2024-04-18 DIAGNOSIS — E55.9 VITAMIN D DEFICIENCY: ICD-10-CM

## 2024-04-18 DIAGNOSIS — Z13.1 DIABETES MELLITUS SCREENING: ICD-10-CM

## 2024-04-18 DIAGNOSIS — S16.1XXA NECK MUSCLE STRAIN, INITIAL ENCOUNTER: Primary | ICD-10-CM

## 2024-04-18 LAB
25(OH)D3 SERPL-MCNC: 27.5 NG/ML (ref 30–100)
ALBUMIN SERPL BCP-MCNC: 4.7 G/DL (ref 3.5–5)
ALP SERPL-CCNC: 77 U/L (ref 34–104)
ALT SERPL W P-5'-P-CCNC: 29 U/L (ref 7–52)
ANION GAP SERPL CALCULATED.3IONS-SCNC: 10 MMOL/L (ref 4–13)
AST SERPL W P-5'-P-CCNC: 24 U/L (ref 13–39)
BASOPHILS # BLD AUTO: 0.03 THOUSANDS/ÂΜL (ref 0–0.1)
BASOPHILS NFR BLD AUTO: 1 % (ref 0–1)
BILIRUB SERPL-MCNC: 1.01 MG/DL (ref 0.2–1)
BUN SERPL-MCNC: 20 MG/DL (ref 5–25)
CALCIUM SERPL-MCNC: 10 MG/DL (ref 8.4–10.2)
CHLORIDE SERPL-SCNC: 101 MMOL/L (ref 96–108)
CHOLEST SERPL-MCNC: 114 MG/DL
CO2 SERPL-SCNC: 28 MMOL/L (ref 21–32)
CREAT SERPL-MCNC: 0.84 MG/DL (ref 0.6–1.3)
EOSINOPHIL # BLD AUTO: 0.22 THOUSAND/ÂΜL (ref 0–0.61)
EOSINOPHIL NFR BLD AUTO: 4 % (ref 0–6)
ERYTHROCYTE [DISTWIDTH] IN BLOOD BY AUTOMATED COUNT: 12.2 % (ref 11.6–15.1)
GFR SERPL CREATININE-BSD FRML MDRD: 76 ML/MIN/1.73SQ M
GLUCOSE P FAST SERPL-MCNC: 125 MG/DL (ref 65–99)
HCT VFR BLD AUTO: 39.4 % (ref 34.8–46.1)
HDLC SERPL-MCNC: 49 MG/DL
HGB BLD-MCNC: 13.9 G/DL (ref 11.5–15.4)
IMM GRANULOCYTES # BLD AUTO: 0.01 THOUSAND/UL (ref 0–0.2)
IMM GRANULOCYTES NFR BLD AUTO: 0 % (ref 0–2)
LDLC SERPL CALC-MCNC: 51 MG/DL (ref 0–100)
LYMPHOCYTES # BLD AUTO: 2.28 THOUSANDS/ÂΜL (ref 0.6–4.47)
LYMPHOCYTES NFR BLD AUTO: 40 % (ref 14–44)
MCH RBC QN AUTO: 30.5 PG (ref 26.8–34.3)
MCHC RBC AUTO-ENTMCNC: 35.3 G/DL (ref 31.4–37.4)
MCV RBC AUTO: 86 FL (ref 82–98)
MONOCYTES # BLD AUTO: 0.31 THOUSAND/ÂΜL (ref 0.17–1.22)
MONOCYTES NFR BLD AUTO: 6 % (ref 4–12)
NEUTROPHILS # BLD AUTO: 2.83 THOUSANDS/ÂΜL (ref 1.85–7.62)
NEUTS SEG NFR BLD AUTO: 49 % (ref 43–75)
NRBC BLD AUTO-RTO: 0 /100 WBCS
PLATELET # BLD AUTO: 260 THOUSANDS/UL (ref 149–390)
PMV BLD AUTO: 9.8 FL (ref 8.9–12.7)
POTASSIUM SERPL-SCNC: 3.3 MMOL/L (ref 3.5–5.3)
PROT SERPL-MCNC: 7.7 G/DL (ref 6.4–8.4)
RBC # BLD AUTO: 4.56 MILLION/UL (ref 3.81–5.12)
SODIUM SERPL-SCNC: 139 MMOL/L (ref 135–147)
TRIGL SERPL-MCNC: 71 MG/DL
TSH SERPL DL<=0.05 MIU/L-ACNC: 2.42 UIU/ML (ref 0.45–4.5)
WBC # BLD AUTO: 5.68 THOUSAND/UL (ref 4.31–10.16)

## 2024-04-18 PROCEDURE — 80061 LIPID PANEL: CPT

## 2024-04-18 PROCEDURE — 77067 SCR MAMMO BI INCL CAD: CPT

## 2024-04-18 PROCEDURE — 99214 OFFICE O/P EST MOD 30 MIN: CPT | Performed by: PHYSICIAN ASSISTANT

## 2024-04-18 PROCEDURE — 77063 BREAST TOMOSYNTHESIS BI: CPT

## 2024-04-18 PROCEDURE — 85025 COMPLETE CBC W/AUTO DIFF WBC: CPT

## 2024-04-18 PROCEDURE — 82306 VITAMIN D 25 HYDROXY: CPT

## 2024-04-18 PROCEDURE — 83036 HEMOGLOBIN GLYCOSYLATED A1C: CPT

## 2024-04-18 PROCEDURE — 80053 COMPREHEN METABOLIC PANEL: CPT

## 2024-04-18 PROCEDURE — 84443 ASSAY THYROID STIM HORMONE: CPT

## 2024-04-18 PROCEDURE — 36415 COLL VENOUS BLD VENIPUNCTURE: CPT

## 2024-04-18 PROCEDURE — T1015 CLINIC SERVICE: HCPCS | Performed by: FAMILY MEDICINE

## 2024-04-18 RX ORDER — CYCLOBENZAPRINE HCL 5 MG
5 TABLET ORAL
Qty: 21 TABLET | Refills: 0 | Status: SHIPPED | OUTPATIENT
Start: 2024-04-18

## 2024-04-18 NOTE — PROGRESS NOTES
"Assessment/Plan:    No problem-specific Assessment & Plan notes found for this encounter.       Diagnoses and all orders for this visit:    Neck muscle strain, initial encounter  Comments:  encourage rest, heat, and stretches provided  may use muscle relaxer qhs in additional to tylenol  RTC if sx not improved  Orders:  -     cyclobenzaprine (FLEXERIL) 5 mg tablet; Take 1 tablet (5 mg total) by mouth daily at bedtime          Subjective:      Patient ID: Lindsey Wilson is a 59 y.o. female PMH HTN, dysphagia, near syncope, chronic otitis media of left ear with tympanic membrane perforation, OA, atrophic vaginitis, HLD, and chronic pain syndrome presents for neck and shoulder pain. Her son provides translation.     Pain started about 8 days. No injury of inciting event. States has pain when turning neck su towards the right. Rate about 3/10. Is using tylenol with some relief. No associated numbness or tingling. She has no headaches or dizziness. Had pain in R shoulder which she completeled PT.       HPI    The following portions of the patient's history were reviewed and updated as appropriate: allergies, current medications, past family history, past medical history, past social history, past surgical history, and problem list.    Review of Systems   Constitutional: Negative.    HENT: Negative.     Respiratory: Negative.     Cardiovascular: Negative.    Gastrointestinal: Negative.    Musculoskeletal:  Positive for arthralgias, myalgias, neck pain and neck stiffness.         Objective:      /74 (BP Location: Right arm, Patient Position: Sitting, Cuff Size: Adult)   Pulse 76   Temp 97.9 °F (36.6 °C) (Tympanic)   Resp 18   Ht 4' 1\" (1.245 m)   Wt 67.8 kg (149 lb 6.4 oz)   SpO2 98%   BMI 43.75 kg/m²          Physical Exam  Constitutional:       General: She is not in acute distress.     Appearance: Normal appearance. She is well-developed and normal weight.   HENT:      Head: Normocephalic and atraumatic. "   Eyes:      Pupils: Pupils are equal, round, and reactive to light.   Neck:     Cardiovascular:      Rate and Rhythm: Normal rate and regular rhythm.      Heart sounds: Normal heart sounds. No murmur heard.  Pulmonary:      Effort: Pulmonary effort is normal. No respiratory distress.      Breath sounds: Normal breath sounds. No wheezing.   Abdominal:      General: Bowel sounds are normal. There is no distension.      Palpations: Abdomen is soft.      Tenderness: There is no abdominal tenderness.   Musculoskeletal:      Cervical back: No erythema, signs of trauma, rigidity, torticollis or crepitus. Pain with movement and muscular tenderness present. No spinous process tenderness. Decreased range of motion.   Skin:     General: Skin is warm and dry.      Capillary Refill: Capillary refill takes less than 2 seconds.   Neurological:      Mental Status: She is alert and oriented to person, place, and time.   Psychiatric:         Behavior: Behavior normal.

## 2024-04-18 NOTE — PATIENT INSTRUCTIONS
Ejercicios para el shante   CUIDADO AMBULATORIO:   Los ejercicios para el shante ayudan a reducir el dolor de shante y, al mismo tiempo, lo fortalecen y mejoran del valle movimiento. Los ejercicios para el shante también ayudan a prevenir problemas de shante a bill plazo.  Llame a del valle médico si:  Del Valle dolor no mejora o empeora.    Usted tiene preguntas o inquietudes acerca de del valle afección, cuidado o programa de ejercicios.    Lo que usted necesita saber acerca de la seguridad de los ejercicios:  Muévase lentamente, con cuidado y suavemente. Evite movimientos rápidos o bruscos.    Póngase de pie y siéntese de la forma que del valle médico le mata mostrado. La buena postura puede llegar a reducir del valle dolor de shante. Revise del valle postura con frecuencia, incluso cuando no esté haciendo catherine ejercicios de shante.    Cíñase al programa de ejercicios que le recomiende del valle médico. Del Valle médico le indicará qué ejercicios son más apropiados para del valle condición. También le indicará cuántas repeticiones y con qué frecuencia debe hacer los ejercicios.    Cómo realizar ejercicios para el shante de manera ramos:  Posición de ejercicio: Puede sentarse o estar parado mientras realiza los ejercicios para el shante. Luna de frente. Los hombros deben estar rectos y relajados, con haydee buena postura.         Inclinación de trevin hacia adelante y hacia atrás: Incline del valle trevin suavemente tratando que del valle mentón toque del valle pecho. Del Valle médico puede incluso pedirle que empuje la parte de atrás de del valle shante para ayudar a inclinar del valle trevin. Levante del valle barbilla hasta la posición inicial. Incline del valle trevin hacia atrás lo más que pueda de manera que quede mirando hacia el bal raso. Es posible que del valle médico le pida que levante el mentón para así ayudar a inclinar del valle trevin hacia atrás. Regrese del valle trevin a la posición inicial.         Inclinaciones de trevin, de lado a lado: Incline del valle trevin de manera que del valle oreja quede cerca de del valle hombro. Luego incline del valle trevin hacia del valle  otro hombro.         Giros de trevin: Gire del valle trevin yolis si fuera a mirar sobre el hombro. Incline del valle mentón hacia abajo y trate de que toque del valle hombro. No levante del valle hombro hasta que toque del valle mentón. Luna de frente otra vez. Roberto lo mismo del otro lado.         Giros de trevin: Despacio, lleve la barbilla hacia el pecho. A continuación, gire la trevin hacia la derecha. La oreja debe quedar ubicada por encima del hombro. Mantenga esta posición por 5 segundos. Gire la trevin otra vez hacia el pecho y, luego, hacia la izquierda a la misma posición. Sostenga está posición por 5 segundos. Con cuidado, gire la trevin hacia atrás en círculo en el sentido de las manecillas del reloj y repita 3 veces. A continuación, mueve la trevin en la dirección contraria (en el sentido contrario de las manecillas del reloj) en círculo 3 veces. No encoja los hombros hacia arriba mientras realiza jennifer ejercicio.       Acuda a la consulta de control con del valle médico según las indicaciones: Anote catherine preguntas para que se acuerde de hacerlas linda catherine visitas.  © Copyright [employer] 2023 Information is for End User's use only and may not be sold, redistributed or otherwise used for commercial purposes.  Esta información es sólo para uso en educación. Del Valle intención no es darle un consejo médico sobre enfermedades o tratamientos. Colsulte con del valle médico, enfermera o farmacéutico antes de seguir cualquier régimen médico para saber si es seguro y efectivo para usted.

## 2024-04-19 LAB
EST. AVERAGE GLUCOSE BLD GHB EST-MCNC: 146 MG/DL
HBA1C MFR BLD: 6.7 %

## 2024-04-22 ENCOUNTER — TELEPHONE (OUTPATIENT)
Dept: FAMILY MEDICINE CLINIC | Facility: CLINIC | Age: 59
End: 2024-04-22

## 2024-04-22 NOTE — TELEPHONE ENCOUNTER
----- Message from REJI Coronado sent at 4/22/2024  9:14 AM EDT -----  Labs consistent with new diabetes diagnosis. Vitamin D improving and potassium is low. Please, find out if patient is still taking her potassium supplement and how much Vitamin D she is taking daily. Please, schedule her for a sooner OV to discuss new DM diagnosis. Thanks!

## 2024-05-12 DIAGNOSIS — N95.8 GENITOURINARY SYNDROME OF MENOPAUSE: ICD-10-CM

## 2024-05-13 DIAGNOSIS — N95.8 GENITOURINARY SYNDROME OF MENOPAUSE: ICD-10-CM

## 2024-05-13 RX ORDER — ESTRADIOL 0.1 MG/G
CREAM VAGINAL
Qty: 42.5 G | Refills: 1 | OUTPATIENT
Start: 2024-05-13

## 2024-05-13 RX ORDER — ESTRADIOL 0.1 MG/G
0.5 CREAM VAGINAL 2 TIMES WEEKLY
Qty: 42.5 G | Refills: 1 | Status: SHIPPED | OUTPATIENT
Start: 2024-05-13

## 2024-05-21 DIAGNOSIS — E03.9 HYPOTHYROIDISM, UNSPECIFIED TYPE: ICD-10-CM

## 2024-05-21 RX ORDER — LEVOTHYROXINE SODIUM 0.03 MG/1
25 TABLET ORAL DAILY
Qty: 90 TABLET | Refills: 5 | Status: SHIPPED | OUTPATIENT
Start: 2024-05-21

## 2024-07-01 DIAGNOSIS — N95.8 GENITOURINARY SYNDROME OF MENOPAUSE: ICD-10-CM

## 2024-07-02 DIAGNOSIS — N95.8 GENITOURINARY SYNDROME OF MENOPAUSE: ICD-10-CM

## 2024-07-02 RX ORDER — ESTRADIOL 0.1 MG/G
0.5 CREAM VAGINAL 2 TIMES WEEKLY
Qty: 42.5 G | Refills: 1 | Status: SHIPPED | OUTPATIENT
Start: 2024-07-04

## 2024-07-02 RX ORDER — ESTRADIOL 0.1 MG/G
CREAM VAGINAL
Qty: 42.5 G | Refills: 1 | OUTPATIENT
Start: 2024-07-02

## 2024-08-30 DIAGNOSIS — N95.8 GENITOURINARY SYNDROME OF MENOPAUSE: ICD-10-CM

## 2024-08-30 RX ORDER — ESTRADIOL 0.1 MG/G
CREAM VAGINAL
Qty: 42.5 G | Refills: 1 | Status: SHIPPED | OUTPATIENT
Start: 2024-08-30

## 2024-09-28 DIAGNOSIS — N95.8 GENITOURINARY SYNDROME OF MENOPAUSE: ICD-10-CM

## 2024-09-30 RX ORDER — ESTRADIOL 0.1 MG/G
CREAM VAGINAL
Qty: 127.5 G | Refills: 1 | Status: SHIPPED | OUTPATIENT
Start: 2024-09-30

## 2024-11-03 DIAGNOSIS — I10 BENIGN ESSENTIAL HTN: ICD-10-CM

## 2024-11-04 RX ORDER — AMLODIPINE BESYLATE 10 MG/1
10 TABLET ORAL DAILY
Qty: 90 TABLET | Refills: 0 | Status: SHIPPED | OUTPATIENT
Start: 2024-11-04

## 2024-12-10 ENCOUNTER — OFFICE VISIT (OUTPATIENT)
Dept: FAMILY MEDICINE CLINIC | Facility: CLINIC | Age: 59
End: 2024-12-10
Payer: COMMERCIAL

## 2024-12-10 VITALS
SYSTOLIC BLOOD PRESSURE: 132 MMHG | TEMPERATURE: 97.5 F | OXYGEN SATURATION: 99 % | BODY MASS INDEX: 29.19 KG/M2 | RESPIRATION RATE: 18 BRPM | WEIGHT: 144.8 LBS | HEIGHT: 59 IN | DIASTOLIC BLOOD PRESSURE: 84 MMHG | HEART RATE: 71 BPM

## 2024-12-10 DIAGNOSIS — R39.9 UTI SYMPTOMS: Primary | ICD-10-CM

## 2024-12-10 DIAGNOSIS — R39.9 UTI SYMPTOMS: ICD-10-CM

## 2024-12-10 DIAGNOSIS — M54.50 ACUTE BILATERAL LOW BACK PAIN WITHOUT SCIATICA: ICD-10-CM

## 2024-12-10 LAB
BACTERIA UR QL AUTO: NORMAL /HPF
BILIRUB UR QL STRIP: NEGATIVE
CLARITY UR: CLEAR
COLOR UR: ABNORMAL
GLUCOSE UR STRIP-MCNC: NEGATIVE MG/DL
HGB UR QL STRIP.AUTO: NEGATIVE
KETONES UR STRIP-MCNC: NEGATIVE MG/DL
LEUKOCYTE ESTERASE UR QL STRIP: NEGATIVE
NITRITE UR QL STRIP: NEGATIVE
NON-SQ EPI CELLS URNS QL MICRO: NORMAL /HPF
PH UR STRIP.AUTO: 7 [PH]
PROT UR STRIP-MCNC: ABNORMAL MG/DL
RBC #/AREA URNS AUTO: NORMAL /HPF
SP GR UR STRIP.AUTO: 1.01 (ref 1–1.03)
UROBILINOGEN UR STRIP-ACNC: <2 MG/DL
WBC #/AREA URNS AUTO: NORMAL /HPF

## 2024-12-10 PROCEDURE — 81001 URINALYSIS AUTO W/SCOPE: CPT

## 2024-12-10 PROCEDURE — 99213 OFFICE O/P EST LOW 20 MIN: CPT

## 2024-12-10 PROCEDURE — T1015 CLINIC SERVICE: HCPCS | Performed by: FAMILY MEDICINE

## 2024-12-10 RX ORDER — SENNOSIDES 8.6 MG
650 CAPSULE ORAL EVERY 8 HOURS PRN
Qty: 30 TABLET | Refills: 0 | Status: SHIPPED | OUTPATIENT
Start: 2024-12-10

## 2024-12-10 RX ORDER — SULFAMETHOXAZOLE AND TRIMETHOPRIM 800; 160 MG/1; MG/1
1 TABLET ORAL EVERY 12 HOURS SCHEDULED
Qty: 14 TABLET | Refills: 0 | Status: SHIPPED | OUTPATIENT
Start: 2024-12-10 | End: 2024-12-17

## 2024-12-10 NOTE — PROGRESS NOTES
Name: Lindsey Wilson      : 1965      MRN: [id number]  Encounter Provider: Sam Deshpande MD  Encounter Date: 12/10/2024   Encounter department: [facility] [place]  :  Patient has signs and symptoms that may indicate musculoskeletal pain but also potential UTI, will obtain urine studies today and given tylenol for pain + bactrim for UTI. Given back pain may indicate flank pain, would consider this complicated UTI and have short-term followup in 1 week if able.  Assessment & Plan  UTI symptoms    Orders:    sulfamethoxazole-trimethoprim (BACTRIM DS) 800-160 mg per tablet; Take 1 tablet by mouth every 12 (twelve) hours for 7 days    UA w Reflex to Microscopic w Reflex to Culture; Future    Acute bilateral low back pain without sciatica    Orders:    acetaminophen (TYLENOL) 650 mg CR tablet; Take 1 tablet (650 mg total) by mouth every 8 (eight) hours as needed for mild pain or moderate pain           History of Present Illness     HPI   declined, son interpreting  CC: lower back pain  X2 week new suprapubic pain with x5d of lumbar paraspinal back pain now, occasionally shooting down front right leg with tingling on exertion but no numbness. No incontinence. Has concurrent suprapubic fullness + pressure feeling like she has trouble emptying bladder. No dysuria, hematuria, positive frequency, mild clouding, more yellow with an odd odor. Sxs not progressing  Hx: overweight, HLD, chronic LBP and pain syndrome/myofascial pain, GSM, vitamin D deficiency, HTN, hypothyroidism, last A1c 6.7%    Review of Systems   Constitutional:  Negative for chills and fever.   Eyes:  Negative for pain and visual disturbance.   Respiratory:  Negative for cough and shortness of breath.    Cardiovascular:  Negative for chest pain and palpitations.   Gastrointestinal:  Positive for abdominal pain. Negative for diarrhea, nausea and vomiting.        No bowel incontinence   Genitourinary:   "Positive for difficulty urinating (feels incomplete emptying), flank pain and frequency. Negative for decreased urine volume, dysuria and hematuria.        No bladder incontinence   Musculoskeletal:  Negative for arthralgias and back pain.   Skin:  Negative for color change and rash.   Neurological:  Negative for dizziness and light-headedness.   Psychiatric/Behavioral:  Negative for agitation and confusion.    All other systems reviewed and are negative.      Objective   /84 (BP Location: Left arm, Patient Position: Sitting, Cuff Size: Standard)   Pulse 71   Temp 97.5 °F (36.4 °C) (Tympanic)   Resp 18   Ht 4' 11\" (1.499 m)   Wt 65.7 kg (144 lb 12.8 oz)   SpO2 99%   BMI 29.25 kg/m²      Physical Exam  Vitals and nursing note reviewed.   Constitutional:       General: She is not in acute distress.     Appearance: She is well-developed.   HENT:      Head: Normocephalic and atraumatic.      Nose: No rhinorrhea.   Eyes:      General: No scleral icterus.        Right eye: No discharge.         Left eye: No discharge.      Conjunctiva/sclera: Conjunctivae normal.   Cardiovascular:      Rate and Rhythm: Normal rate and regular rhythm.      Pulses: Normal pulses.      Heart sounds: Normal heart sounds. No murmur heard.     No friction rub. No gallop.   Pulmonary:      Effort: Pulmonary effort is normal. No respiratory distress.      Breath sounds: Normal breath sounds. No stridor. No wheezing, rhonchi or rales.   Abdominal:      General: Bowel sounds are normal. There is no distension.      Palpations: Abdomen is soft. There is no mass.      Tenderness: There is abdominal tenderness (moderate throughout lower abdomen). There is right CVA tenderness (vs msk tenderness) and left CVA tenderness (vs msk tenderness). There is no guarding or rebound.   Musculoskeletal:         General: Tenderness (lumbar/thoracic paraspinals) present. No swelling.      Cervical back: Neck supple.   Skin:     General: Skin is warm and " dry.      Coloration: Skin is not jaundiced or pale.   Neurological:      Mental Status: She is alert.      Comments: No facial droop, slurred speech or gross focal deficits noted

## 2024-12-16 ENCOUNTER — TELEPHONE (OUTPATIENT)
Dept: FAMILY MEDICINE CLINIC | Facility: CLINIC | Age: 59
End: 2024-12-16

## 2024-12-16 NOTE — TELEPHONE ENCOUNTER
[id number]  Left message on machine. Called patient in hopes of letting her know we did not find evidence of infection on UA microscopy, and request that she would let us know if her sxs have not improved.  -Sam Deshpande MD

## 2024-12-31 ENCOUNTER — OFFICE VISIT (OUTPATIENT)
Dept: FAMILY MEDICINE CLINIC | Facility: CLINIC | Age: 59
End: 2024-12-31
Payer: COMMERCIAL

## 2024-12-31 VITALS
HEART RATE: 59 BPM | OXYGEN SATURATION: 97 % | HEIGHT: 59 IN | TEMPERATURE: 98.5 F | WEIGHT: 146 LBS | RESPIRATION RATE: 18 BRPM | DIASTOLIC BLOOD PRESSURE: 84 MMHG | BODY MASS INDEX: 29.43 KG/M2 | SYSTOLIC BLOOD PRESSURE: 134 MMHG

## 2024-12-31 DIAGNOSIS — M54.2 CHRONIC NECK PAIN: ICD-10-CM

## 2024-12-31 DIAGNOSIS — G89.29 CHRONIC NECK PAIN: ICD-10-CM

## 2024-12-31 DIAGNOSIS — G89.29 CHRONIC MIDLINE LOW BACK PAIN WITHOUT SCIATICA: Primary | ICD-10-CM

## 2024-12-31 DIAGNOSIS — M54.50 CHRONIC MIDLINE LOW BACK PAIN WITHOUT SCIATICA: Primary | ICD-10-CM

## 2024-12-31 DIAGNOSIS — Z23 ENCOUNTER FOR IMMUNIZATION: ICD-10-CM

## 2024-12-31 PROCEDURE — 99213 OFFICE O/P EST LOW 20 MIN: CPT

## 2024-12-31 PROCEDURE — T1015 CLINIC SERVICE: HCPCS | Performed by: FAMILY MEDICINE

## 2024-12-31 PROCEDURE — 90673 RIV3 VACCINE NO PRESERV IM: CPT

## 2024-12-31 RX ORDER — SENNOSIDES 8.6 MG
650 CAPSULE ORAL EVERY 8 HOURS PRN
Qty: 30 TABLET | Refills: 2 | Status: SHIPPED | OUTPATIENT
Start: 2024-12-31 | End: 2024-12-31 | Stop reason: ALTCHOICE

## 2024-12-31 RX ORDER — SENNOSIDES 8.6 MG
650 CAPSULE ORAL EVERY 8 HOURS PRN
Qty: 30 TABLET | Refills: 2 | Status: SHIPPED | OUTPATIENT
Start: 2024-12-31

## 2024-12-31 NOTE — PROGRESS NOTES
"Name: Lindsey Wilson      : 1965      MRN: [id number]  Encounter Provider: Sam Deshpande MD  Encounter Date: 2024   Encounter department: [facility] [place]  :  Patient's signs and symptoms are most indicative of osteoarthritis of the back and possibly also the neck based on physical exam.  Patient experiencing benefits of Tylenol, will continue 650mg as needed and physical therapy as she has had significant benefit from this in the past.  Will obtain x-rays of the neck and lower back to assess degree of arthritis there.  Also recommended over-the-counter diclofenac cream, heating pads and other conservative treatments.  Follow-up in 1 month for annual physical and A1c recheck.  Assessment & Plan  Chronic midline low back pain without sciatica    Orders:    XR spine lumbar minimum 4 views non injury; Future    Ambulatory Referral to Physical Therapy; Future    acetaminophen (TYLENOL) 650 mg CR tablet; Take 1 tablet (650 mg total) by mouth every 8 (eight) hours as needed for mild pain or moderate pain    Chronic neck pain    Orders:    XR spine cervical complete 4 or 5 vw non injury; Future    Ambulatory Referral to Physical Therapy; Future    Encounter for immunization    Orders:    influenza vaccine, recombinant, PF, 0.5 mL IM (Flublok)           History of Present Illness     HPI   deferred, prefers family member in the room interpret  CC: follow up back pain  Last visit was given bactrim + acetaminophen for back pains that seemed to indicate flank pain in conjunction with UTI symptoms, thus was treated as a complicated UTI. UA microscopy did not show evidence of a UTI after the fact, however, and we attempted unsuccessfully to contact the patient with regards to this.  Today she still has pain that comes and goes at night in the back. States the antibiotic helped \"just a bit,\" but not a big difference. Currently 1/10 in the lower stomach and the waist " "around the back, at worst 5/10 pulling sensation mostly in the mornings though occasionally in the evenings, occurs after sitting for prolonged periods she experiences after she stands up, not as intense when laying down for prolonged periods and then standing up.  She does tense her abdomen frequently before bending down and does \"a lot\" of physical activity/housework throughout the day according to the family member in the room.  Sometimes when laying down she feels pain shoot down the front of the right leg for about an hour, not the back of the leg. No numbness/tingling, no changes in urination or bowel/bladder incontinence. Out of prescribed tylenol, not taking OTC tylenol, felt the tylenol 650mg helped somewhat. Not using other OTC treatments or topicals. Feels like her chronic back pain of 20 years' duration.  Was in physical therapy a year ago and felt that it significantly benefited her symptoms.    Review of Systems   Constitutional:  Negative for chills and fever.   Eyes:  Negative for visual disturbance.   Respiratory:  Negative for cough and shortness of breath.    Cardiovascular:  Negative for chest pain and palpitations.   Gastrointestinal:  Negative for abdominal pain and vomiting.   Genitourinary:  Negative for dysuria and hematuria.   Musculoskeletal:  Negative for arthralgias and back pain.   Skin:  Negative for color change and rash.   Neurological:  Negative for dizziness, weakness, light-headedness and numbness.   Psychiatric/Behavioral:  Negative for agitation and confusion.    All other systems reviewed and are negative.      Objective   /84   Pulse 59   Temp 98.5 °F (36.9 °C)   Resp 18   Ht 4' 11\" (1.499 m)   Wt 66.2 kg (146 lb)   SpO2 97%   BMI 29.49 kg/m²      Physical Exam  Vitals and nursing note reviewed.   Constitutional:       General: She is not in acute distress.     Appearance: She is well-developed.   HENT:      Head: Normocephalic and atraumatic.      Nose: No " rhinorrhea.   Eyes:      General: No scleral icterus.        Right eye: No discharge.         Left eye: No discharge.      Conjunctiva/sclera: Conjunctivae normal.   Cardiovascular:      Rate and Rhythm: Normal rate and regular rhythm.      Pulses: Normal pulses.      Heart sounds: Normal heart sounds. No murmur heard.     No friction rub. No gallop.   Pulmonary:      Effort: Pulmonary effort is normal. No respiratory distress.      Breath sounds: Normal breath sounds. No stridor. No wheezing, rhonchi or rales.   Abdominal:      General: There is no distension.      Palpations: Abdomen is soft. There is no mass.      Tenderness: There is abdominal tenderness (mild LLQ/RLQ equal). There is no guarding or rebound.   Musculoskeletal:         General: Tenderness (mild right thoracic paraspinal tenderness, mild spinous lumbar) present. No swelling.      Cervical back: Neck supple.      Comments: Pain provoked with most thoracolumbar active ROM in the mid & lower back in band-like distribution. Cervical spine ROM extension and twisting painful in R neck. No cervical tenderness     Skin:     General: Skin is warm and dry.      Capillary Refill: Capillary refill takes less than 2 seconds.      Coloration: Skin is not jaundiced or pale.   Neurological:      Mental Status: She is alert.      Gait: Gait normal.      Comments: No facial droop, slurred speech or gross focal deficits noted

## 2024-12-31 NOTE — PATIENT INSTRUCTIONS
You can try diclofenac AKA Voltaren gel on the neck and back for chronic pains, can also try IcyHot or heating pads.

## 2025-01-23 ENCOUNTER — HOSPITAL ENCOUNTER (OUTPATIENT)
Dept: RADIOLOGY | Facility: HOSPITAL | Age: 60
Discharge: HOME | End: 2025-01-23
Payer: COMMERCIAL

## 2025-01-23 DIAGNOSIS — G89.29 CHRONIC MIDLINE LOW BACK PAIN WITHOUT SCIATICA: ICD-10-CM

## 2025-01-23 DIAGNOSIS — M54.2 CHRONIC NECK PAIN: ICD-10-CM

## 2025-01-23 DIAGNOSIS — G89.29 CHRONIC NECK PAIN: ICD-10-CM

## 2025-01-23 DIAGNOSIS — M54.50 CHRONIC MIDLINE LOW BACK PAIN WITHOUT SCIATICA: ICD-10-CM

## 2025-01-23 PROCEDURE — 72050 X-RAY EXAM NECK SPINE 4/5VWS: CPT

## 2025-01-23 PROCEDURE — 72110 X-RAY EXAM L-2 SPINE 4/>VWS: CPT

## 2025-01-24 ENCOUNTER — RESULTS FOLLOW-UP (OUTPATIENT)
Dept: FAMILY MEDICINE CLINIC | Facility: CLINIC | Age: 60
End: 2025-01-24

## 2025-01-24 NOTE — TELEPHONE ENCOUNTER
Spoke with patient and relayed the message below. Patient verbalized understanding. Patient states she is already looking to start Physical Therapy as recommended.   ----- Message from Sam Deshpande MD sent at 1/24/2025  9:18 AM EST -----  Albertina Ma,  Happy Friday!  Would we be able to reach out to Lindsey regarding her recent x-ray results?  She did x-rays of the cervical spine and lumbar spine, the upper and lower.  Both showed there were arthritis changes in the spine which likely explains her chronic pain.  There were no fractures, and the other thing of note was that the spine normally has a curvature in the neck area, but the curve in her neck is more straightened out than normal.  This can be a result of aging, arthritis, poor posture like looking down or leaning forward with the neck chronically, and sometimes even with emotional stress.  The resultant tightening of the muscles due to the above causes the cervical spine to straighten and become very tight.  Overall it might be a partial cause of her neck pain, or it could simply be the result of arthritis. Physical therapy is definitely encouraged, and I hope she has gotten into that.  -Sam Deshpande MD

## 2025-01-26 DIAGNOSIS — E78.5 DYSLIPIDEMIA: ICD-10-CM

## 2025-01-28 ENCOUNTER — OFFICE VISIT (OUTPATIENT)
Dept: FAMILY MEDICINE CLINIC | Facility: CLINIC | Age: 60
End: 2025-01-28
Payer: COMMERCIAL

## 2025-01-28 VITALS
HEART RATE: 58 BPM | BODY MASS INDEX: 28.92 KG/M2 | DIASTOLIC BLOOD PRESSURE: 86 MMHG | SYSTOLIC BLOOD PRESSURE: 122 MMHG | WEIGHT: 143.2 LBS | OXYGEN SATURATION: 97 % | TEMPERATURE: 98.8 F | RESPIRATION RATE: 18 BRPM

## 2025-01-28 DIAGNOSIS — R73.09 ELEVATED HEMOGLOBIN A1C: ICD-10-CM

## 2025-01-28 DIAGNOSIS — E87.6 HYPOKALEMIA: ICD-10-CM

## 2025-01-28 DIAGNOSIS — R07.89 ATYPICAL CHEST PAIN: ICD-10-CM

## 2025-01-28 DIAGNOSIS — R63.0 DECREASED APPETITE: ICD-10-CM

## 2025-01-28 DIAGNOSIS — R10.84 DIFFUSE ABDOMINAL PAIN: ICD-10-CM

## 2025-01-28 DIAGNOSIS — R73.03 PREDIABETES: Primary | ICD-10-CM

## 2025-01-28 DIAGNOSIS — I45.10 RBBB: ICD-10-CM

## 2025-01-28 LAB — SL AMB POCT HEMOGLOBIN AIC: 6.4 (ref ?–6.5)

## 2025-01-28 PROCEDURE — T1015 CLINIC SERVICE: HCPCS | Performed by: FAMILY MEDICINE

## 2025-01-28 PROCEDURE — 99213 OFFICE O/P EST LOW 20 MIN: CPT

## 2025-01-28 PROCEDURE — 83036 HEMOGLOBIN GLYCOSYLATED A1C: CPT | Performed by: FAMILY MEDICINE

## 2025-01-28 RX ORDER — ATORVASTATIN CALCIUM 20 MG/1
20 TABLET, FILM COATED ORAL DAILY
Qty: 90 TABLET | Refills: 3 | Status: SHIPPED | OUTPATIENT
Start: 2025-01-28

## 2025-01-28 NOTE — ASSESSMENT & PLAN NOTE
Orders:    Echo complete w/ contrast if indicated; Future    Ambulatory Referral to Cardiology; Future

## 2025-01-28 NOTE — PROGRESS NOTES
Adult Annual Physical  Name: Lindsey Wilson      : 1965      MRN: [id number]  Encounter Provider: Sam Deshpande MD  Encounter Date: 2025   Encounter department: [facility] [place]    Would prefer diet over pills at this time, no metformin right now. Call the [facility] and [facility] to arrange echo test of the heart and CT scan of the abdomen and pelvis.  When you go there, also get labs done.  You can also schedule with cardiology at the [facility] since they have a clinic there and we are putting in a referral so you can be seen by them again. If patient demonstrates proteinuria will need acei/arb  Assessment & Plan  Prediabetes         Elevated hemoglobin A1c    Orders:    POCT hemoglobin A1c    Immunizations and preventive care screenings were discussed with patient today. Appropriate education was printed on patient's after visit summary.    Counseling:  {Annual Physical; Counselin}         History of Present Illness       Adult Annual Physical  Review of Systems      Objective   /86 (BP Location: Left arm)   Pulse 58   Temp 98.8 °F (37.1 °C)   Resp 18   Wt 65 kg (143 lb 3.2 oz)   SpO2 97%   BMI 28.92 kg/m²     Physical Exam  Abdominal:      General: Bowel sounds are normal. There is no distension.      Palpations: Abdomen is soft. There is no mass.      Tenderness: There is abdominal tenderness (diffuse, negative sykes's sign, negative peritoneal signs. Present to light and deep palpation). There is no guarding or rebound.      Hernia: No hernia is present.

## 2025-01-28 NOTE — PROGRESS NOTES
"Name: Lindsey Wilson      : 1965      MRN: [id number]  Encounter Provider: Sam Deshpande MD  Encounter Date: 2025   Encounter department: [facility] [place]  :  Would prefer diet over pills at this time for prediabetes, no metformin right now. Recommended she call the [facility] and [facility] to arrange echo test of the heart for her cardiac issues and CT scan of the abdomen and pelvis given her decreased appetite.  When she goes there, also get labs done to assess cholesterol and proteinuria for medication adjustments.  Placed new cardiology referral. If patient demonstrates proteinuria will need acei/arb. F/u 1 month to discuss results.  Assessment & Plan  Prediabetes    Orders:    Albumin / creatinine urine ratio; Future    Urinalysis with microscopic; Future    Lipid panel; Future    Elevated hemoglobin A1c    Orders:    POCT hemoglobin A1c    RBBB    Orders:    Echo complete w/ contrast if indicated; Future    Ambulatory Referral to Cardiology; Future    Decreased appetite    Orders:    CT abdomen pelvis w contrast; Future    Diffuse abdominal pain    Orders:    CT abdomen pelvis w contrast; Future    Atypical chest pain    Orders:    Echo complete w/ contrast if indicated; Future    Ambulatory Referral to Cardiology; Future           History of Present Illness   HPI  CC: annual + A1c  A1c 6.7% last time in April, today 6.4%, has never had a formal diagnosis of type 2 diabetes or any diabetes medications. Was not aware of this in the past and we discussed diabetes vs prediabetes at this time and risks of diabetes. She is in prediabetes at this time, eats 1-2 meals per day, occasionally feels \"hot\" bodily sensation if she does not eat for an extended period of time and 0.5 months ago had one instance of shakiness which both improve with eating. Last symptoms half month ago. Day-to-day food chicken beans pork fruit white rice, occasional vegetables. No milk " or sugary drinks. Has coffee in the morning without food, then has a meal. Feels she has been small portions and less than normal over the past 2-3 months due to decreased appetite that came on suddenly, cannot recall an inciting incident. Does not think she lost weight, has not been trying to lose weight, 143 lbs today fairly steady since December. States it is slightly harder to defecate, bowel movement 2x/day which is normal for her but stools slightly harder, no blood. No early satiety. Gets occasional GERD sxs, hx H pylori infection treated in the past, no worsening of GERD recently, no pain with eating. Colonoscopy 2016 benign polyp x1. No hyperglycemia symptoms. Home sugars not being tested. No antidiabetes meds. Has occasional left chest heat sensation last x2d ago, happens twice monthly, not a pain just a heat sensation that goes away in a couple minutes. Occurs at rest, not with exertion. Has hx of heart disease blocked arteries. Last saw cardiology >1 year ago, heat sensation started roughly 5 months ago. BP today acceptable for age    Review of Systems   Constitutional:  Negative for chills and fever.   HENT:          Negative jaw pain   Eyes:  Negative for pain and visual disturbance.   Respiratory:  Negative for cough and shortness of breath.    Cardiovascular:  Negative for chest pain (Heat sensation not necessarily pain), palpitations and leg swelling.   Gastrointestinal:  Positive for abdominal pain. Negative for blood in stool, constipation (Slightly harder stools but no constipation), diarrhea, nausea and vomiting.   Endocrine: Negative for polydipsia, polyphagia and polyuria.   Genitourinary:  Negative for difficulty urinating and frequency.   Musculoskeletal:  Negative for arthralgias and back pain.   Skin:  Negative for color change and rash.   Neurological:  Positive for tremors (Rare). Negative for seizures and syncope.   All other systems reviewed and are negative.      Objective   /86  (BP Location: Left arm)   Pulse 58   Temp 98.8 °F (37.1 °C)   Resp 18   Wt 65 kg (143 lb 3.2 oz)   SpO2 97%   BMI 28.92 kg/m²      Physical Exam  Vitals and nursing note reviewed.   Constitutional:       General: She is not in acute distress.     Appearance: She is well-developed.   HENT:      Head: Normocephalic and atraumatic.      Nose: No rhinorrhea.   Eyes:      General: No scleral icterus.        Right eye: No discharge.         Left eye: No discharge.      Conjunctiva/sclera: Conjunctivae normal.   Cardiovascular:      Rate and Rhythm: Normal rate and regular rhythm.      Pulses: Normal pulses.      Heart sounds: Normal heart sounds. No murmur heard.     No friction rub. No gallop.   Pulmonary:      Effort: Pulmonary effort is normal. No respiratory distress.      Breath sounds: Normal breath sounds. No stridor. No wheezing, rhonchi or rales.   Abdominal:      General: Bowel sounds are normal. There is no distension.      Palpations: Abdomen is soft. There is no mass.      Tenderness: There is abdominal tenderness (diffuse, negative sykes's sign, negative peritoneal signs. Present to light and deep palpation). There is no guarding or rebound.      Hernia: No hernia is present.   Musculoskeletal:         General: No swelling.      Cervical back: Neck supple.   Skin:     General: Skin is warm and dry.      Coloration: Skin is not jaundiced or pale.   Neurological:      Mental Status: She is alert.      Comments: No facial droop, slurred speech or gross focal deficits noted

## 2025-01-28 NOTE — PATIENT INSTRUCTIONS
[facility] to arrange echo test of the heart and CT scan of the abdomen and pelvis.  When you go there, also get labs done.  You can also schedule with cardiology at the [facility] since they have a clinic there and we are putting in a referral so you can be seen by them again.

## 2025-01-29 RX ORDER — POTASSIUM CHLORIDE 1500 MG/1
20 TABLET, EXTENDED RELEASE ORAL 2 TIMES DAILY WITH MEALS
Qty: 180 TABLET | Refills: 3 | Status: SHIPPED | OUTPATIENT
Start: 2025-01-29

## 2025-01-31 DIAGNOSIS — I10 BENIGN ESSENTIAL HTN: ICD-10-CM

## 2025-01-31 RX ORDER — AMLODIPINE BESYLATE 10 MG/1
10 TABLET ORAL DAILY
Qty: 90 TABLET | Refills: 0 | Status: SHIPPED | OUTPATIENT
Start: 2025-01-31

## 2025-02-25 ENCOUNTER — OFFICE VISIT (OUTPATIENT)
Dept: FAMILY MEDICINE CLINIC | Facility: CLINIC | Age: 60
End: 2025-02-25
Payer: COMMERCIAL

## 2025-02-25 VITALS
HEART RATE: 52 BPM | HEIGHT: 59 IN | DIASTOLIC BLOOD PRESSURE: 74 MMHG | RESPIRATION RATE: 18 BRPM | TEMPERATURE: 98.2 F | BODY MASS INDEX: 28.63 KG/M2 | OXYGEN SATURATION: 96 % | SYSTOLIC BLOOD PRESSURE: 128 MMHG | WEIGHT: 142 LBS

## 2025-02-25 DIAGNOSIS — R10.84 DIFFUSE ABDOMINAL PAIN: ICD-10-CM

## 2025-02-25 DIAGNOSIS — R73.09 ELEVATED HEMOGLOBIN A1C: ICD-10-CM

## 2025-02-25 DIAGNOSIS — I10 BENIGN ESSENTIAL HTN: ICD-10-CM

## 2025-02-25 DIAGNOSIS — R73.03 PREDIABETES: ICD-10-CM

## 2025-02-25 DIAGNOSIS — I45.10 RBBB: ICD-10-CM

## 2025-02-25 DIAGNOSIS — R07.89 ATYPICAL CHEST PAIN: ICD-10-CM

## 2025-02-25 DIAGNOSIS — R63.4 WEIGHT LOSS: Primary | ICD-10-CM

## 2025-02-25 LAB
BACTERIA UR QL AUTO: NORMAL /HPF
BILIRUB UR QL STRIP: NEGATIVE
CHOLEST SERPL-MCNC: 105 MG/DL (ref ?–200)
CLARITY UR: CLEAR
COLOR UR: COLORLESS
GLUCOSE UR STRIP-MCNC: NEGATIVE MG/DL
HDLC SERPL-MCNC: 53 MG/DL
HGB UR QL STRIP.AUTO: NEGATIVE
KETONES UR STRIP-MCNC: NEGATIVE MG/DL
LDLC SERPL CALC-MCNC: 37 MG/DL (ref 0–100)
LEUKOCYTE ESTERASE UR QL STRIP: NEGATIVE
NITRITE UR QL STRIP: NEGATIVE
NON-SQ EPI CELLS URNS QL MICRO: NORMAL /HPF
NONHDLC SERPL-MCNC: 52 MG/DL
PH UR STRIP.AUTO: 7.5 [PH]
PROT UR STRIP-MCNC: NEGATIVE MG/DL
RBC #/AREA URNS AUTO: NORMAL /HPF
SP GR UR STRIP.AUTO: 1.01 (ref 1–1.03)
TRIGL SERPL-MCNC: 77 MG/DL (ref ?–150)
UROBILINOGEN UR STRIP-ACNC: <2 MG/DL
WBC #/AREA URNS AUTO: NORMAL /HPF

## 2025-02-25 PROCEDURE — 82043 UR ALBUMIN QUANTITATIVE: CPT

## 2025-02-25 PROCEDURE — T1015 CLINIC SERVICE: HCPCS | Performed by: FAMILY MEDICINE

## 2025-02-25 PROCEDURE — 80061 LIPID PANEL: CPT

## 2025-02-25 PROCEDURE — 99213 OFFICE O/P EST LOW 20 MIN: CPT

## 2025-02-25 PROCEDURE — 81001 URINALYSIS AUTO W/SCOPE: CPT

## 2025-02-25 PROCEDURE — 82570 ASSAY OF URINE CREATININE: CPT

## 2025-02-25 NOTE — PROGRESS NOTES
Name: Lindsey Wilson      : 1965      MRN: [id number]  Encounter Provider: Sam Deshpande MD  Encounter Date: 2025   Encounter department: [facility] [place]  :  Patient's weight is fairly steady since January, only slightly down however her previous episode of 4 pound weight loss over 1 month with a persistence of her abdominal symptoms is still concerning, recommended that they get the CT abdomen pelvis that was scheduled.  There may be a GERD component as well and in the meantime they can try over-the-counter antacids or Pepcid.  But I would defer further management until we have the test results.  -She is not having chest pain currently, though her history of atypical chest pain with a right bundle branch block on a prior EKG warrants that she complete the echocardiogram that was ordered.  Recommended that be done as well.  -We will attempt to get her remaining blood and urine studies today, will refrain from prescribing antidiabetes medicine or lisinopril at this time given patient's controlled blood pressure borderline prediabetes-diabetes picture and her desire to use dietary changes to avoid having to take more medicine.  We will recheck her A1c in 2 months to see how successful this was.  -Suspect her left back pain is muscular at this time, her physical therapy referral is still available to be acted upon, also discussed over-the-counter analgesics.  Assessment & Plan  Weight loss         Diffuse abdominal pain         Benign essential HTN         Elevated hemoglobin A1c         RBBB         Atypical chest pain         Prediabetes    Orders:    Albumin / creatinine urine ratio    Urinalysis with microscopic    Lipid panel           History of Present Illness   HPI  Patient declined  despite our recommendations and entrusted interpretation to family member in the room instead.  CC: recheck studies  Last visit was given cardiology referral and ordered  echo as patient was intent to get this done per cardiology last time she saw them in 2023, but as of today this was not obtained. Patient was also having 5 months of 1-2 times weekly left chest hot sensation, none currently though does come and go.  She is also been having 2-3 months decreased appetite and had new diffuse abdominal tenderness on exam for which CT abdomen pelvis was ordered. Today still has some of this pain some days and feels a bit like food getting stuck at times in the lower chest.  Has not obtained any of the testing we ordered last time.  Urine studies and cholesterol not obtained yet.  Today states her appetite is fairly regular now, did lose 4lbs unintentionally since December but from January 28th to now weight is roughly the same, perhaps down 1 additional lb. Does not want new medications at this time, wants dietary changes to avoid being in overt diabetes, last A1c 1/28/25 was in preDM range 6.4%, though one before that was 6.7%.  No chest pain currently, only mild stomach pains.  States her left lower back hurts when twisting x5 months, unchanged, no urinary issues, no fever/chills. Does not wear bra to sleep despite pain being at the bra line.    Review of Systems   Constitutional:  Positive for appetite change. Negative for chills and fever.   Eyes:  Negative for pain and visual disturbance.   Respiratory:  Negative for cough and shortness of breath.    Cardiovascular:  Negative for chest pain, palpitations and leg swelling.   Gastrointestinal:  Positive for abdominal pain. Negative for diarrhea, nausea and vomiting.   Genitourinary:  Negative for difficulty urinating, dysuria and hematuria.   Musculoskeletal:  Positive for back pain and myalgias. Negative for arthralgias.   Skin:  Negative for color change and rash.   Psychiatric/Behavioral:  Negative for agitation and confusion.    All other systems reviewed and are negative.      Objective   /74 (BP Location: Left arm, Patient  "Position: Sitting, Cuff Size: Large)   Pulse (!) 52   Temp 98.2 °F (36.8 °C) (Tympanic)   Resp 18   Ht 4' 11\" (1.499 m)   Wt 64.4 kg (142 lb)   SpO2 96%   BMI 28.68 kg/m²      Physical Exam  Vitals and nursing note reviewed.   Constitutional:       General: She is not in acute distress.     Appearance: She is well-developed.   HENT:      Head: Normocephalic and atraumatic.      Nose: No rhinorrhea.   Eyes:      General: No scleral icterus.        Right eye: No discharge.         Left eye: No discharge.      Conjunctiva/sclera: Conjunctivae normal.   Cardiovascular:      Rate and Rhythm: Regular rhythm. Bradycardia present.      Pulses: Normal pulses.      Heart sounds: Normal heart sounds. No murmur heard.     No friction rub. No gallop.   Pulmonary:      Effort: Pulmonary effort is normal. No respiratory distress.      Breath sounds: Normal breath sounds. No stridor. No wheezing, rhonchi or rales.   Abdominal:      General: Bowel sounds are normal. There is no distension.      Palpations: Abdomen is soft. There is no mass.      Tenderness: There is abdominal tenderness (Epigastric with reflux sensation, LLQ both mild-moderate). There is no right CVA tenderness, left CVA tenderness, guarding or rebound.   Musculoskeletal:         General: Tenderness (mild L mid back by the bra line) present. No swelling.      Cervical back: Neck supple.   Skin:     General: Skin is warm and dry.      Coloration: Skin is not jaundiced or pale.   Neurological:      Mental Status: She is alert.      Comments: No facial droop, slurred speech or gross focal deficits noted         "

## 2025-02-25 NOTE — PATIENT INSTRUCTIONS
Please call [facility] to schedule Echo of the heart and CT abdomen/pelvis.

## 2025-02-26 LAB
CREAT UR-MCNC: 55.2 MG/DL
MICROALBUMIN UR-MCNC: 44.9 MG/L
MICROALBUMIN/CREAT 24H UR: 81 MG/G CREATININE (ref 0–30)

## 2025-02-27 ENCOUNTER — RESULTS FOLLOW-UP (OUTPATIENT)
Dept: FAMILY MEDICINE CLINIC | Facility: CLINIC | Age: 60
End: 2025-02-27

## 2025-02-28 NOTE — TELEPHONE ENCOUNTER
Spoke with patient. Patient made aware of message below and verbalized understanding. ----- Message from Sam Deshpande MD sent at 2/27/2025 10:28 AM EST -----  Albertina Ma,  Good morning! Would I be able to get your help reaching out to this patient to let her know her cholesterol is excellently controlled on her current medication regimen. No changes need to be made there. Her urine studies showed no signs of infection or other reasons to suspect her urinary tract is causing her abdominal pains. She has slight protein spillage in the urine, which can be seen with diabetes, but it is mild at this point and not going to change our management. We will continue to monitor this yearly to ensure her kidneys stay healthy despite her diabetes, and of course will continue to monitor the diabetes/blood sugars themselves. With her attempts at dietary modifications, she only briefly crested into diabetes territory on her A1c and then went back into prediabetes range, so there is the chance she may be able to control things without medication. We will see how things go at the next appointment when we recheck her A1c.  Thank you,  -Sam Deshpande MD

## 2025-03-05 ENCOUNTER — HOSPITAL ENCOUNTER (OUTPATIENT)
Dept: CT IMAGING | Facility: HOSPITAL | Age: 60
Discharge: HOME | End: 2025-03-05
Payer: COMMERCIAL

## 2025-03-05 ENCOUNTER — HOSPITAL ENCOUNTER (OUTPATIENT)
Dept: NON INVASIVE DIAGNOSTICS | Facility: HOSPITAL | Age: 60
Discharge: HOME | End: 2025-03-05
Payer: COMMERCIAL

## 2025-03-05 ENCOUNTER — APPOINTMENT (OUTPATIENT)
Dept: LAB | Facility: HOSPITAL | Age: 60
End: 2025-03-05
Payer: COMMERCIAL

## 2025-03-05 VITALS
WEIGHT: 141.98 LBS | BODY MASS INDEX: 28.62 KG/M2 | SYSTOLIC BLOOD PRESSURE: 123 MMHG | HEART RATE: 55 BPM | DIASTOLIC BLOOD PRESSURE: 68 MMHG | HEIGHT: 59 IN

## 2025-03-05 DIAGNOSIS — I45.10 RBBB: ICD-10-CM

## 2025-03-05 DIAGNOSIS — R10.84 DIFFUSE ABDOMINAL PAIN: ICD-10-CM

## 2025-03-05 DIAGNOSIS — R63.0 DECREASED APPETITE: ICD-10-CM

## 2025-03-05 DIAGNOSIS — R07.89 ATYPICAL CHEST PAIN: ICD-10-CM

## 2025-03-05 DIAGNOSIS — R10.84 DIFFUSE ABDOMINAL PAIN: Primary | ICD-10-CM

## 2025-03-05 LAB
ALBUMIN SERPL BCG-MCNC: 4.9 G/DL (ref 3.5–5)
ALP SERPL-CCNC: 74 U/L (ref 34–104)
ALT SERPL W P-5'-P-CCNC: 31 U/L (ref 7–52)
ANION GAP SERPL CALCULATED.3IONS-SCNC: 8 MMOL/L (ref 4–13)
AORTIC ROOT: 3.1 CM
ASCENDING AORTA: 3 CM
AST SERPL W P-5'-P-CCNC: 27 U/L (ref 13–39)
BILIRUB SERPL-MCNC: 0.76 MG/DL (ref 0.2–1)
BSA FOR ECHO PROCEDURE: 1.59 M2
BUN SERPL-MCNC: 16 MG/DL (ref 5–25)
CALCIUM SERPL-MCNC: 10.1 MG/DL (ref 8.4–10.2)
CHLORIDE SERPL-SCNC: 103 MMOL/L (ref 96–108)
CO2 SERPL-SCNC: 30 MMOL/L (ref 21–32)
CREAT SERPL-MCNC: 0.72 MG/DL (ref 0.6–1.3)
E WAVE DECELERATION TIME: 173 MS
E/A RATIO: 1.8
FRACTIONAL SHORTENING: 30 (ref 28–44)
GFR SERPL CREATININE-BSD FRML MDRD: 91 ML/MIN/1.73SQ M
GLUCOSE P FAST SERPL-MCNC: 127 MG/DL (ref 65–99)
INTERVENTRICULAR SEPTUM IN DIASTOLE (PARASTERNAL SHORT AXIS VIEW): 0.9 CM
INTERVENTRICULAR SEPTUM: 0.9 CM (ref 0.6–1.1)
LAAS-AP2: 22.4 CM2
LAAS-AP4: 23.9 CM2
LEFT ATRIUM SIZE: 4.2 CM
LEFT ATRIUM VOLUME (MOD BIPLANE): 78 ML
LEFT ATRIUM VOLUME INDEX (MOD BIPLANE): 49.1 ML/M2
LEFT INTERNAL DIMENSION IN SYSTOLE: 3.5 CM (ref 2.1–4)
LEFT VENTRICULAR INTERNAL DIMENSION IN DIASTOLE: 5 CM (ref 3.5–6)
LEFT VENTRICULAR POSTERIOR WALL IN END DIASTOLE: 0.8 CM
LEFT VENTRICULAR STROKE VOLUME: 65 ML
LV EF US.2D.A4C+ESTIMATED: 48 %
LVSV (TEICH): 65 ML
MV E'TISSUE VEL-LAT: 9 CM/S
MV E'TISSUE VEL-SEP: 7 CM/S
MV PEAK A VEL: 0.46 M/S
MV PEAK E VEL: 83 CM/S
MV STENOSIS PRESSURE HALF TIME: 50 MS
MV VALVE AREA P 1/2 METHOD: 4.4
POTASSIUM SERPL-SCNC: 3.6 MMOL/L (ref 3.5–5.3)
PROT SERPL-MCNC: 7.8 G/DL (ref 6.4–8.4)
PULM VEIN S/D RATIO: 0.85
PV PEAK D VEL: 0.2 M/S
PV PEAK S VEL: 0.17 M/S
RA PRESSURE ESTIMATED: 8 MMHG
RIGHT ATRIUM AREA SYSTOLE A4C: 18.9 CM2
RIGHT VENTRICLE ID DIMENSION: 2.8 CM
RV PSP: 29 MMHG
SL CV LEFT ATRIUM LENGTH A2C: 5.4 CM
SL CV LV EF: 50
SL CV PED ECHO LEFT VENTRICLE DIASTOLIC VOLUME (MOD BIPLANE) 2D: 116 ML
SL CV PED ECHO LEFT VENTRICLE SYSTOLIC VOLUME (MOD BIPLANE) 2D: 50 ML
SODIUM SERPL-SCNC: 141 MMOL/L (ref 135–147)
TR MAX PG: 21 MMHG
TR PEAK VELOCITY: 2.3 M/S
TRICUSPID ANNULAR PLANE SYSTOLIC EXCURSION: 2.8 CM
TRICUSPID VALVE PEAK E WAVE VELOCITY: 0.11 M/S
TRICUSPID VALVE PEAK REGURGITATION VELOCITY: 2.29 M/S

## 2025-03-05 PROCEDURE — 93306 TTE W/DOPPLER COMPLETE: CPT | Performed by: INTERNAL MEDICINE

## 2025-03-05 PROCEDURE — 93306 TTE W/DOPPLER COMPLETE: CPT

## 2025-03-05 PROCEDURE — 80053 COMPREHEN METABOLIC PANEL: CPT

## 2025-03-05 PROCEDURE — 36415 COLL VENOUS BLD VENIPUNCTURE: CPT

## 2025-03-05 PROCEDURE — 74177 CT ABD & PELVIS W/CONTRAST: CPT

## 2025-03-05 RX ADMIN — IOHEXOL 100 ML: 350 INJECTION, SOLUTION INTRAVENOUS at 09:37

## 2025-03-05 NOTE — PROGRESS NOTES
Aniyah from [facility] called patient at hospital now for CT scan needs BUN/Creatinine ordered to have done prior to testing.

## 2025-03-06 ENCOUNTER — TELEPHONE (OUTPATIENT)
Dept: FAMILY MEDICINE CLINIC | Facility: CLINIC | Age: 60
End: 2025-03-06

## 2025-03-06 NOTE — TELEPHONE ENCOUNTER
----- Message from Georgina Rausch DO sent at 3/5/2025  6:20 PM EST -----  Regarding: Labs  Please call patient and let her know the recent labs she got done are normal except for some high blood sugar. Electrolytes, kidney function, liver test is all normal. Dr. Chang will recheck this during their April appointment. Thank you!    -Georgina

## 2025-03-10 PROBLEM — Q27.9: Status: ACTIVE | Noted: 2025-03-10

## 2025-03-10 PROBLEM — K57.90 DIVERTICULOSIS: Status: ACTIVE | Noted: 2025-03-10

## 2025-03-12 DIAGNOSIS — K21.9 GASTROESOPHAGEAL REFLUX DISEASE, UNSPECIFIED WHETHER ESOPHAGITIS PRESENT: Primary | ICD-10-CM

## 2025-03-12 RX ORDER — FAMOTIDINE 20 MG/1
20 TABLET, FILM COATED ORAL 2 TIMES DAILY PRN
Qty: 60 TABLET | Refills: 0 | Status: SHIPPED | OUTPATIENT
Start: 2025-03-12 | End: 2025-04-11

## 2025-03-27 DIAGNOSIS — I10 BENIGN ESSENTIAL HTN: ICD-10-CM

## 2025-03-27 RX ORDER — CHLORTHALIDONE 25 MG/1
25 TABLET ORAL DAILY
Qty: 90 TABLET | Refills: 3 | Status: SHIPPED | OUTPATIENT
Start: 2025-03-27

## 2025-04-29 ENCOUNTER — OFFICE VISIT (OUTPATIENT)
Dept: FAMILY MEDICINE CLINIC | Facility: CLINIC | Age: 60
End: 2025-04-29
Payer: COMMERCIAL

## 2025-04-29 VITALS
BODY MASS INDEX: 28.43 KG/M2 | HEIGHT: 59 IN | DIASTOLIC BLOOD PRESSURE: 82 MMHG | HEART RATE: 96 BPM | RESPIRATION RATE: 18 BRPM | OXYGEN SATURATION: 97 % | WEIGHT: 141 LBS | TEMPERATURE: 97.4 F | SYSTOLIC BLOOD PRESSURE: 122 MMHG

## 2025-04-29 DIAGNOSIS — I10 BENIGN ESSENTIAL HTN: ICD-10-CM

## 2025-04-29 DIAGNOSIS — M25.561 CHRONIC PAIN OF RIGHT KNEE: ICD-10-CM

## 2025-04-29 DIAGNOSIS — G89.29 CHRONIC PAIN OF RIGHT KNEE: ICD-10-CM

## 2025-04-29 DIAGNOSIS — I87.2 CHRONIC VENOUS STASIS DERMATITIS OF BOTH LOWER EXTREMITIES: ICD-10-CM

## 2025-04-29 DIAGNOSIS — R73.03 PREDIABETES: ICD-10-CM

## 2025-04-29 DIAGNOSIS — R73.09 ELEVATED HEMOGLOBIN A1C: Primary | ICD-10-CM

## 2025-04-29 LAB — SL AMB POCT HEMOGLOBIN AIC: 6.2 (ref ?–6.5)

## 2025-04-29 PROCEDURE — T1015 CLINIC SERVICE: HCPCS | Performed by: FAMILY MEDICINE

## 2025-04-29 PROCEDURE — 83036 HEMOGLOBIN GLYCOSYLATED A1C: CPT | Performed by: FAMILY MEDICINE

## 2025-04-29 PROCEDURE — 99213 OFFICE O/P EST LOW 20 MIN: CPT

## 2025-04-29 RX ORDER — AMLODIPINE BESYLATE 5 MG/1
5 TABLET ORAL DAILY
Qty: 90 TABLET | Refills: 0 | Status: SHIPPED | OUTPATIENT
Start: 2025-04-29

## 2025-04-29 NOTE — ASSESSMENT & PLAN NOTE
Orders:    XR knee 3 vw right non injury; Future    Ambulatory Referral to Physical Therapy; Future

## 2025-04-29 NOTE — PROGRESS NOTES
"Name: Lindsey Wlison      : 1965      MRN: [id number]  Encounter Provider: Sam Deshpande MD  Encounter Date: 2025   Encounter department: [facility] [place]  :  Pt prefers non-medication treatment for prediabetes, briefly discussed increased exercise, recommend followup for dietary/exercise modification discussion in more depth.  Next visit we will follow-up and discuss  Suspect leg discoloration is chronic venous stasis changes from leg swelling, no history of cardiorenal or hepatic issues. Prevention of further changes involved decreasing leg swelling. Discussed compression stockings/socks and decreasing amlodipine dosing to see if BP tolerates and swelling improves.  Patient would like to decrease amlodipine dosing, we will do so.  Suspect osteoarthritis vs patellofemoral pain for R leg based on pain . Will refer to physical therapy and schedule corticosteroid injection visit depending on X-ray findings. XR R knee ordered.  If PT and/or corticosteroid injection not effective, Ortho would be reasonable next step.  Assessment & Plan  Elevated hemoglobin A1c    Orders:    POCT hemoglobin A1c    Prediabetes         Chronic venous stasis dermatitis of both lower extremities         Chronic pain of right knee    Orders:    XR knee 3 vw right non injury; Future    Ambulatory Referral to Physical Therapy; Future    Benign essential HTN    Orders:    amLODIPine (NORVASC) 5 mg tablet; Take 1 tablet (5 mg total) by mouth daily           History of Present Illness   HPI  CC: recheck A1c  Wants referral to ortho for R knee pain chronic for which she has received \"injections\" in the past. Gets a stabbing pain after walking 1 hour, lasts 1 minute and then goes away. Over the patellar tendon/tibial tubercle. No numbness/tingling/weakness. Last knee X-rays were  which showed mild osteophyte the right knee.  Also concerned about multiple diffuse small brown spots on b/l lower extremities, " "present x1 year and progressively increasing in number over time. Has had 7-8 months lower leg swelling which comes and goes, especially when busier. Does take amlodipine.  Did get stress echo cardiology recommended in 2023, normal LVEF 50%, multiple valves with mild regurg no stenosis, normal RV systolic pressure, moderate left atrial dilation.  Did get CT abdomen/pelvis for decreased appetite, newer abdominal pains and weight loss of 4-5lb over past few months which have since stalled out and not reoccurred. Retroaortic left renal vein and diverticulitis noted but no acute findings, sxs likely GERD-related, was given pepcid with complete resolution of sxs. Only 1lb down over the past 2 months. No bloody stools or decreased appetite currently.    Review of Systems   Constitutional:  Negative for appetite change, chills, fever and unexpected weight change.   HENT:  Negative for ear pain and sore throat.    Eyes:  Negative for pain and visual disturbance.   Respiratory:  Negative for cough and shortness of breath.    Cardiovascular:  Negative for chest pain and palpitations.   Gastrointestinal:  Negative for abdominal pain, blood in stool, constipation, diarrhea, nausea and vomiting.   Musculoskeletal:  Positive for arthralgias. Negative for back pain.   Skin:  Positive for color change. Negative for pallor, rash and wound.   All other systems reviewed and are negative.      Objective   /82 (BP Location: Left arm, Patient Position: Sitting, Cuff Size: Large)   Pulse 96   Temp (!) 97.4 °F (36.3 °C) (Tympanic)   Resp 18   Ht 4' 11\" (1.499 m)   Wt 64 kg (141 lb)   SpO2 97%   BMI 28.48 kg/m²      Physical Exam  Vitals and nursing note reviewed.   Constitutional:       General: She is not in acute distress.     Appearance: She is well-developed.   HENT:      Head: Normocephalic and atraumatic.      Nose: No rhinorrhea.   Eyes:      General: No scleral icterus.        Right eye: No discharge.         Left eye: " No discharge.      Conjunctiva/sclera: Conjunctivae normal.   Cardiovascular:      Rate and Rhythm: Normal rate and regular rhythm.      Pulses: Normal pulses.      Heart sounds: Normal heart sounds. No murmur heard.     No friction rub. No gallop.   Pulmonary:      Effort: Pulmonary effort is normal. No respiratory distress.      Breath sounds: Normal breath sounds. No stridor. No wheezing, rhonchi or rales.   Abdominal:      General: Bowel sounds are normal. There is no distension.      Palpations: Abdomen is soft. There is no mass.      Tenderness: There is abdominal tenderness (minimal periumbilical). There is no guarding or rebound.   Musculoskeletal:         General: Swelling (Mild bilateral lower extremity edema nonpitting) and tenderness (R inner hip, R tibial tubercle) present.      Cervical back: Neck supple.      Comments: Pain elicited on resisted knee flexion and extension on the right mainly over the quadriceps muscle.  NATALYA/FADIR positive on the right not on the left.  Leg roll negative bilaterally.  Range of motion intact nonpainful otherwise hips and knees, no ankle tenderness.  Varus valgus Lachman anterior posterior drawer negative bilateral knees, Kings negative on the right.   Skin:     General: Skin is warm and dry.      Coloration: Skin is not jaundiced or pale.      Comments: Punctate to 1 mm  coalesced spots of brown skin on the anterior shins bilaterally.   Neurological:      Mental Status: She is alert.

## 2025-05-06 ENCOUNTER — HOSPITAL ENCOUNTER (OUTPATIENT)
Dept: MAMMOGRAPHY | Facility: HOSPITAL | Age: 60
Discharge: HOME | End: 2025-05-06
Attending: NURSE PRACTITIONER
Payer: COMMERCIAL

## 2025-05-06 VITALS — BODY MASS INDEX: 28.43 KG/M2 | WEIGHT: 141 LBS | HEIGHT: 59 IN

## 2025-05-06 DIAGNOSIS — Z12.31 ENCOUNTER FOR SCREENING MAMMOGRAM FOR MALIGNANT NEOPLASM OF BREAST: ICD-10-CM

## 2025-05-06 PROCEDURE — 77067 SCR MAMMO BI INCL CAD: CPT

## 2025-05-06 PROCEDURE — 77063 BREAST TOMOSYNTHESIS BI: CPT

## 2025-05-08 ENCOUNTER — TELEPHONE (OUTPATIENT)
Dept: FAMILY MEDICINE CLINIC | Facility: CLINIC | Age: 60
End: 2025-05-08

## 2025-05-08 DIAGNOSIS — R92.8 MAMMOGRAM ABNORMAL: Primary | ICD-10-CM

## 2025-05-08 NOTE — TELEPHONE ENCOUNTER
[id number]  Called patient to relay suspicious L breast findings and need for diagnostic mammography + US left breast to evaluate further. Received voice mail, left VM for patient & son Carlitos' phone asking for confirmation that they received this message.  -Sam Deshpande MD

## 2025-05-27 ENCOUNTER — TELEPHONE (OUTPATIENT)
Dept: FAMILY MEDICINE CLINIC | Facility: CLINIC | Age: 60
End: 2025-05-27

## 2025-05-27 DIAGNOSIS — M25.519 CHRONIC SHOULDER PAIN, UNSPECIFIED LATERALITY: ICD-10-CM

## 2025-05-27 DIAGNOSIS — M54.9 CHRONIC BACK PAIN, UNSPECIFIED BACK LOCATION, UNSPECIFIED BACK PAIN LATERALITY: ICD-10-CM

## 2025-05-27 DIAGNOSIS — G89.29 CHRONIC BACK PAIN, UNSPECIFIED BACK LOCATION, UNSPECIFIED BACK PAIN LATERALITY: ICD-10-CM

## 2025-05-27 DIAGNOSIS — G89.29 CHRONIC SHOULDER PAIN, UNSPECIFIED LATERALITY: ICD-10-CM

## 2025-05-27 DIAGNOSIS — M25.561 CHRONIC PAIN OF RIGHT KNEE: Primary | ICD-10-CM

## 2025-05-27 DIAGNOSIS — G89.29 CHRONIC PAIN OF RIGHT KNEE: Primary | ICD-10-CM

## 2025-05-27 NOTE — TELEPHONE ENCOUNTER
Patient daughter called asking for referral for PT for back and both shoulders declan PT can help with this, but a new script is needed

## 2025-06-04 ENCOUNTER — EVALUATION (OUTPATIENT)
Dept: PHYSICAL THERAPY | Facility: CLINIC | Age: 60
End: 2025-06-04
Payer: COMMERCIAL

## 2025-06-04 DIAGNOSIS — G89.29 CHRONIC PAIN OF RIGHT KNEE: ICD-10-CM

## 2025-06-04 DIAGNOSIS — M54.9 CHRONIC BACK PAIN, UNSPECIFIED BACK LOCATION, UNSPECIFIED BACK PAIN LATERALITY: ICD-10-CM

## 2025-06-04 DIAGNOSIS — M25.561 CHRONIC PAIN OF RIGHT KNEE: ICD-10-CM

## 2025-06-04 DIAGNOSIS — G89.29 CHRONIC BACK PAIN, UNSPECIFIED BACK LOCATION, UNSPECIFIED BACK PAIN LATERALITY: ICD-10-CM

## 2025-06-04 DIAGNOSIS — M25.519 CHRONIC SHOULDER PAIN, UNSPECIFIED LATERALITY: Primary | ICD-10-CM

## 2025-06-04 DIAGNOSIS — G89.29 CHRONIC SHOULDER PAIN, UNSPECIFIED LATERALITY: Primary | ICD-10-CM

## 2025-06-04 PROCEDURE — 97162 PT EVAL MOD COMPLEX 30 MIN: CPT

## 2025-06-04 PROCEDURE — 97110 THERAPEUTIC EXERCISES: CPT

## 2025-06-10 ENCOUNTER — OFFICE VISIT (OUTPATIENT)
Dept: FAMILY MEDICINE CLINIC | Facility: CLINIC | Age: 60
End: 2025-06-10
Payer: COMMERCIAL

## 2025-06-10 VITALS
BODY MASS INDEX: 28.63 KG/M2 | WEIGHT: 142 LBS | HEART RATE: 57 BPM | OXYGEN SATURATION: 98 % | HEIGHT: 59 IN | RESPIRATION RATE: 18 BRPM | SYSTOLIC BLOOD PRESSURE: 130 MMHG | DIASTOLIC BLOOD PRESSURE: 82 MMHG | TEMPERATURE: 97.1 F

## 2025-06-10 DIAGNOSIS — E87.6 HYPOKALEMIA: ICD-10-CM

## 2025-06-10 DIAGNOSIS — H04.123 DRY EYES: ICD-10-CM

## 2025-06-10 DIAGNOSIS — M17.11 PRIMARY OSTEOARTHRITIS OF RIGHT KNEE: Primary | ICD-10-CM

## 2025-06-10 PROCEDURE — 99213 OFFICE O/P EST LOW 20 MIN: CPT

## 2025-06-10 PROCEDURE — T1015 CLINIC SERVICE: HCPCS

## 2025-06-10 RX ORDER — TRIAMCINOLONE ACETONIDE 40 MG/ML
40 INJECTION, SUSPENSION INTRA-ARTICULAR; INTRAMUSCULAR
Status: COMPLETED | OUTPATIENT
Start: 2025-06-10 | End: 2025-06-10

## 2025-06-10 RX ORDER — CYCLOSPORINE 0.5 MG/ML
1 EMULSION OPHTHALMIC EVERY 12 HOURS
Qty: 5.5 ML | Refills: 2 | Status: SHIPPED | OUTPATIENT
Start: 2025-06-10

## 2025-06-10 RX ORDER — LIDOCAINE HYDROCHLORIDE 10 MG/ML
5 INJECTION, SOLUTION EPIDURAL; INFILTRATION; INTRACAUDAL; PERINEURAL
Status: COMPLETED | OUTPATIENT
Start: 2025-06-10 | End: 2025-06-10

## 2025-06-10 RX ORDER — POTASSIUM CHLORIDE 1500 MG/1
20 TABLET, EXTENDED RELEASE ORAL 2 TIMES DAILY WITH MEALS
Qty: 180 TABLET | Refills: 3 | Status: SHIPPED | OUTPATIENT
Start: 2025-06-10

## 2025-06-10 RX ADMIN — LIDOCAINE HYDROCHLORIDE 5 ML: 10 INJECTION, SOLUTION EPIDURAL; INFILTRATION; INTRACAUDAL; PERINEURAL at 08:40

## 2025-06-10 RX ADMIN — TRIAMCINOLONE ACETONIDE 40 MG: 40 INJECTION, SUSPENSION INTRA-ARTICULAR; INTRAMUSCULAR at 08:40

## 2025-06-10 NOTE — PROGRESS NOTES
"Name: Lindsey Wilson      : 1965      MRN: [id number]  Encounter Provider: Sam Deshpande MD  Encounter Date: 6/10/2025   Encounter department: [facility] [place]  :  Follow up 1 month recheck A1c. Procedure tolerated well, no concerns noted. Discussed post-procedural course.  Assessment & Plan  Primary osteoarthritis of right knee    Orders:    Large joint arthrocentesis: R knee    Hypokalemia    Orders:    potassium chloride (Klor-Con M20) 20 mEq tablet; Take 1 tablet (20 mEq total) by mouth 2 (two) times a day with meals    Dry eyes    Orders:    Restasis 0.05 % ophthalmic emulsion; Administer 1 drop to both eyes every 12 (twelve) hours           History of Present Illness   HPI   [id number]  CC: knee injection  Last visit suspected osteoarthritis vs patellofemoral pain for R leg based on pain . Referred to physical therapy and scheduled corticosteroid injection visit. XR R knee ordered but not obtained, discussed this with patient.  If PT and/or corticosteroid injection not effective, Ortho would be reasonable next step.  Today no new complaints  Follow up 1 month A1c recheck    Review of Systems   Constitutional:  Negative for chills and fever.   Musculoskeletal:  Positive for arthralgias. Negative for gait problem.       Objective   /82 (BP Location: Left arm, Patient Position: Sitting, Cuff Size: Large)   Pulse 57   Temp (!) 97.1 °F (36.2 °C) (Temporal)   Resp 18   Ht 4' 11\" (1.499 m)   Wt 64.4 kg (142 lb)   SpO2 98%   BMI 28.68 kg/m²      Physical Exam  Vitals reviewed.   HENT:      Head: Normocephalic and atraumatic.      Nose: No rhinorrhea.     Eyes:      General: No scleral icterus.        Right eye: No discharge.         Left eye: No discharge.      Conjunctiva/sclera: Conjunctivae normal.       Cardiovascular:      Rate and Rhythm: Normal rate.   Pulmonary:      Effort: Pulmonary effort is normal. No respiratory distress.     Musculoskeletal: "         General: Tenderness (anterior tibial tubercle) present. No swelling.     Neurological:      Mental Status: She is alert.         Large joint arthrocentesis: R knee    Performed by: Sam Deshpande MD  Authorized by: Sam Deshpande MD    Universal Protocol:  procedure performed by consultantConsent: Verbal consent obtained. Written consent obtained  Risks and benefits: risks, benefits and alternatives were discussed  Consent given by: patient  Patient understanding: patient states understanding of the procedure being performed  Patient consent: the patient's understanding of the procedure matches consent given  Procedure consent: procedure consent matches procedure scheduled  Relevant documents: relevant documents present and verified  Required items: required blood products, implants, devices, and special equipment available  Patient identity confirmed: verbally with patient  Supporting Documentation  Indications: pain     Is this a Visco injection? NoProcedure Details  Location: knee - R knee  Needle size: 25 G  Ultrasound guidance: no  Medications administered: 5 mL lidocaine (PF) 1 %; 40 mg triamcinolone acetonide 40 mg/mL    Patient tolerance: patient tolerated the procedure well with no immediate complications  Dressing:  Sterile dressing applied

## 2025-06-11 ENCOUNTER — OFFICE VISIT (OUTPATIENT)
Dept: PHYSICAL THERAPY | Facility: CLINIC | Age: 60
End: 2025-06-11
Payer: COMMERCIAL

## 2025-06-11 DIAGNOSIS — M25.519 CHRONIC SHOULDER PAIN, UNSPECIFIED LATERALITY: Primary | ICD-10-CM

## 2025-06-11 DIAGNOSIS — M54.9 CHRONIC BACK PAIN, UNSPECIFIED BACK LOCATION, UNSPECIFIED BACK PAIN LATERALITY: ICD-10-CM

## 2025-06-11 DIAGNOSIS — G89.29 CHRONIC SHOULDER PAIN, UNSPECIFIED LATERALITY: Primary | ICD-10-CM

## 2025-06-11 DIAGNOSIS — G89.29 CHRONIC BACK PAIN, UNSPECIFIED BACK LOCATION, UNSPECIFIED BACK PAIN LATERALITY: ICD-10-CM

## 2025-06-11 DIAGNOSIS — G89.29 CHRONIC PAIN OF RIGHT KNEE: ICD-10-CM

## 2025-06-11 DIAGNOSIS — M25.561 CHRONIC PAIN OF RIGHT KNEE: ICD-10-CM

## 2025-06-11 PROCEDURE — 97110 THERAPEUTIC EXERCISES: CPT

## 2025-06-11 NOTE — PROGRESS NOTES
"Daily Note     Today's date: 2025  Patient name: Lindsey Wilson  : 1965  MRN: [id number]  Referring provider: Kennedy Rojas MD  Dx:   Encounter Diagnosis     ICD-10-CM    1. Chronic shoulder pain, unspecified laterality  M25.519     G89.29       2. Chronic back pain, unspecified back location, unspecified back pain laterality  M54.9     G89.29       3. Chronic pain of right knee  M25.561     G89.29           Start Time: 0845  Stop Time: 0940  Total time in clinic (min): 55 minutes    Subjective: Patient reports of pain in bilateral shoulders this morning.      Objective: See treatment diary below      Assessment: Tolerated treatment well. We progressed the current program by adding scapulothoracic and BLE stretching as well as strengthening exercises. She completed progressions with good tolerance. Patient would benefit from continued PT to improve ROM, strength and stability to return to prior level of function.      Plan: Continue per plan of care.      Precautions: *Avoid aggravating activity and factors at this time*      Manuals       STM B UT, RTC BM BM                              Neuro Re-Ed        Scapular Retraction w/ ER         MTP         LTP                                         Ther Ex        UT stretch 20\" Hold x4 ea 20\" Hold x4 ea      LS stretch 20\" Hold x4 ea 20\" Hold x4 ea      Sleeper stretch 10\" Hold x10 ea 10\" Hold x10 ea      Table Slides Horizontal   2x10 ea      Table Slides Flexion   2x10 ea      S/L ER   2x10 ea      S/L ABD   2x10 ea      TB IR           TB ER         LTR's         Standing Lumbar Extension w/ counter support   3\" Hold x10      Hamstring Stretch   10\" Hold x5 ea on 8' step standing      Gastroc Stretch   10\" Hold x5 ea on 1/2 foam      Corner Stretch   10\" Hold x5      AAROM Pulleys Flx/Scap  3\" Hold 2x10      HEP Instruction  BM       Ther Activity                        Gait Training                        Modalities        MHP  8' 5' Pre    "   Ice  5' Post

## 2025-06-12 NOTE — PROGRESS NOTES
"Daily Note     Today's date: 2025  Patient name: Lindsey Wilson  : 1965  MRN: [id number]  Referring provider: Kennedy Rojas MD  Dx:   Encounter Diagnosis     ICD-10-CM    1. Chronic shoulder pain, unspecified laterality  M25.519     G89.29       2. Chronic back pain, unspecified back location, unspecified back pain laterality  M54.9     G89.29       3. Chronic pain of right knee  M25.561     G89.29           Start Time: 0800  Stop Time: 0855  Total time in clinic (min): 55 minutes    Subjective: Patient rates 4/10 pain today.      Objective: See treatment diary below      Assessment: Patient tolerated treatment session well. We progressed the current program by adding scapulothoracic strengthening exercises with good tolerance. Patient will benefit from continued PT to improve ROM, strength and stability to return to prior level of function.      Plan: Continue per POC. Increase reps/resistance as tolerated.      Precautions: *Avoid aggravating activity and factors at this time*      Manuals      STM B UT, RTC BM BM BM                             Neuro Re-Ed        Scapular Retraction w/ ER    x10     MTP         LTP                                         Ther Ex        UT stretch 20\" Hold x4 ea 20\" Hold x4 ea 20\" Hold x4 ea     LS stretch 20\" Hold x4 ea 20\" Hold x4 ea 20\" Hold x4 ea     Sleeper stretch 10\" Hold x10 ea 10\" Hold x10 ea 10\" Hold x10 ea     Table Slides Horizontal   2x10 ea 2x10 ea     Table Slides Flexion   2x10 ea 2x10 ea     S/L ER   2x10 ea 2x10 ea     S/L ABD   2x10 ea 2x10 ea     TB IR           TB ER         LTR's         Standing Lumbar Extension w/ counter support   3\" Hold x10 3\" Hold x10     Hamstring Stretch   10\" Hold x5 ea on 8' step standing 10\" Hold x5 ea on 8' step standing     Gastroc Stretch   10\" Hold x5 ea on 1/2 foam 10\" Hold x5 ea in 1/2 foam     Corner Stretch   10\" Hold x5 10\" Hold x10     AAROM Pulleys Flx/Scap  3\" Hold 2x10 3\" Hold 2x10     HEP " Instruction  BM       Ther Activity                        Gait Training                        Modalities        MHP  8' 5' Pre 5' Pre     Ice  5' Post 5' Post

## 2025-06-13 ENCOUNTER — OFFICE VISIT (OUTPATIENT)
Dept: PHYSICAL THERAPY | Facility: CLINIC | Age: 60
End: 2025-06-13
Payer: COMMERCIAL

## 2025-06-13 DIAGNOSIS — G89.29 CHRONIC SHOULDER PAIN, UNSPECIFIED LATERALITY: Primary | ICD-10-CM

## 2025-06-13 DIAGNOSIS — M54.9 CHRONIC BACK PAIN, UNSPECIFIED BACK LOCATION, UNSPECIFIED BACK PAIN LATERALITY: ICD-10-CM

## 2025-06-13 DIAGNOSIS — M25.519 CHRONIC SHOULDER PAIN, UNSPECIFIED LATERALITY: Primary | ICD-10-CM

## 2025-06-13 DIAGNOSIS — G89.29 CHRONIC BACK PAIN, UNSPECIFIED BACK LOCATION, UNSPECIFIED BACK PAIN LATERALITY: ICD-10-CM

## 2025-06-13 DIAGNOSIS — G89.29 CHRONIC PAIN OF RIGHT KNEE: ICD-10-CM

## 2025-06-13 DIAGNOSIS — M25.561 CHRONIC PAIN OF RIGHT KNEE: ICD-10-CM

## 2025-06-13 PROCEDURE — 97110 THERAPEUTIC EXERCISES: CPT

## 2025-06-18 ENCOUNTER — OFFICE VISIT (OUTPATIENT)
Dept: PHYSICAL THERAPY | Facility: CLINIC | Age: 60
End: 2025-06-18
Payer: COMMERCIAL

## 2025-06-18 DIAGNOSIS — M54.9 CHRONIC BACK PAIN, UNSPECIFIED BACK LOCATION, UNSPECIFIED BACK PAIN LATERALITY: ICD-10-CM

## 2025-06-18 DIAGNOSIS — M25.519 CHRONIC SHOULDER PAIN, UNSPECIFIED LATERALITY: Primary | ICD-10-CM

## 2025-06-18 DIAGNOSIS — G89.29 CHRONIC BACK PAIN, UNSPECIFIED BACK LOCATION, UNSPECIFIED BACK PAIN LATERALITY: ICD-10-CM

## 2025-06-18 DIAGNOSIS — G89.29 CHRONIC SHOULDER PAIN, UNSPECIFIED LATERALITY: Primary | ICD-10-CM

## 2025-06-18 DIAGNOSIS — M25.561 CHRONIC PAIN OF RIGHT KNEE: ICD-10-CM

## 2025-06-18 DIAGNOSIS — G89.29 CHRONIC PAIN OF RIGHT KNEE: ICD-10-CM

## 2025-06-18 PROCEDURE — 97110 THERAPEUTIC EXERCISES: CPT

## 2025-06-18 NOTE — PROGRESS NOTES
"Daily Note     Today's date: 2025  Patient name: Lindsey Wilson  : 1965  MRN: [id number]  Referring provider: Kennedy Rojas MD  Dx:   Encounter Diagnosis     ICD-10-CM    1. Chronic shoulder pain, unspecified laterality  M25.519     G89.29       2. Chronic back pain, unspecified back location, unspecified back pain laterality  M54.9     G89.29       3. Chronic pain of right knee  M25.561     G89.29           Start Time: 0800  Stop Time: 0855  Total time in clinic (min): 55 minutes    Subjective: Patient complaints of 4/10 pain in her L shoulder and 2/10 pain in her R shoulder today.      Objective: See treatment diary below      Assessment: Tolerated treatment well. She demonstrated mild discomfort in her L UE while performing therapeutic exercises this session. Patient would benefit from continued PT to decrease pain, increase ROM, increase strength, and increase stability to return to prior level of function.      Plan: Continue per plan of care.      Precautions: *Avoid aggravating activity and factors at this time*      Manuals     STM B UT, RTC BM BM BM BM                            Neuro Re-Ed        Scapular Retraction w/ ER    x10 x10    MTP         LTP                                         Ther Ex        UT stretch 20\" Hold x4 ea 20\" Hold x4 ea 20\" Hold x4 ea 20\" Hold x4 ea    LS stretch 20\" Hold x4 ea 20\" Hold x4 ea 20\" Hold x4 ea 20\" Hold x4 ea    Sleeper stretch 10\" Hold x10 ea 10\" Hold x10 ea 10\" Hold x10 ea 10\" Hold x10 ea    Table Slides Horizontal   2x10 ea 2x10 ea 2x10 ea    Table Slides Flexion   2x10 ea 2x10 ea 2x10 ea    S/L ER   2x10 ea 2x10 ea 2x10 ea    S/L ABD   2x10 ea 2x10 ea 2x10 ea    TB IR           TB ER         LTR's         Standing Lumbar Extension w/ counter support   3\" Hold x10 3\" Hold x10 3\" Hold x10    Hamstring Stretch   10\" Hold x5 ea on 8' step standing 10\" Hold x5 ea on 8' step standing 10\" Hold x5 ea on 8' step standing    Gastroc Stretch " "  10\" Hold x5 ea on 1/2 foam 10\" Hold x5 ea in 1/2 foam 10\" Hold x5 ea in 1/2 foam    Corner Stretch   10\" Hold x5 10\" Hold x10 10\" Hold x10    AAROM Pulleys Flx/Scap  3\" Hold 2x10 3\" Hold 2x10 3\" Hold 2x10    HEP Instruction  BM       Ther Activity                        Gait Training                        Modalities        MHP  8' 5' Pre 5' Pre 5' Pre    Ice  5' Post 5' Post 5' Post                    "

## 2025-06-18 NOTE — PROGRESS NOTES
"Daily Note     Today's date: 2025  Patient name: Lindsey Wilson  : 1965  MRN: [id number]  Referring provider: Kennedy Rojas MD  Dx: No diagnosis found.               Subjective: ***      Objective: See treatment diary below      Assessment: Tolerated treatment {Tolerated treatment :}. Patient {assessment:}      Plan: {PLAN:}     Precautions: *Avoid aggravating activity and factors at this time*      Manuals      STM B UT, RTC BM BM BM                             Neuro Re-Ed        Scapular Retraction w/ ER    x10     MTP         LTP                                         Ther Ex        UT stretch 20\" Hold x4 ea 20\" Hold x4 ea 20\" Hold x4 ea     LS stretch 20\" Hold x4 ea 20\" Hold x4 ea 20\" Hold x4 ea     Sleeper stretch 10\" Hold x10 ea 10\" Hold x10 ea 10\" Hold x10 ea     Table Slides Horizontal   2x10 ea 2x10 ea     Table Slides Flexion   2x10 ea 2x10 ea     S/L ER   2x10 ea 2x10 ea     S/L ABD   2x10 ea 2x10 ea     TB IR           TB ER         LTR's         Standing Lumbar Extension w/ counter support   3\" Hold x10 3\" Hold x10     Hamstring Stretch   10\" Hold x5 ea on 8' step standing 10\" Hold x5 ea on 8' step standing     Gastroc Stretch   10\" Hold x5 ea on 1/2 foam 10\" Hold x5 ea in 1/2 foam     Corner Stretch   10\" Hold x5 10\" Hold x10     AAROM Pulleys Flx/Scap  3\" Hold 2x10 3\" Hold 2x10     HEP Instruction  BM       Ther Activity                        Gait Training                        Modalities        MHP  8' 5' Pre 5' Pre     Ice  5' Post 5' Post                     "

## 2025-06-20 ENCOUNTER — OFFICE VISIT (OUTPATIENT)
Dept: PHYSICAL THERAPY | Facility: CLINIC | Age: 60
End: 2025-06-20
Payer: COMMERCIAL

## 2025-06-20 DIAGNOSIS — M54.9 CHRONIC BACK PAIN, UNSPECIFIED BACK LOCATION, UNSPECIFIED BACK PAIN LATERALITY: ICD-10-CM

## 2025-06-20 DIAGNOSIS — G89.29 CHRONIC PAIN OF RIGHT KNEE: ICD-10-CM

## 2025-06-20 DIAGNOSIS — M25.561 CHRONIC PAIN OF RIGHT KNEE: ICD-10-CM

## 2025-06-20 DIAGNOSIS — G89.29 CHRONIC BACK PAIN, UNSPECIFIED BACK LOCATION, UNSPECIFIED BACK PAIN LATERALITY: ICD-10-CM

## 2025-06-20 DIAGNOSIS — M25.519 CHRONIC SHOULDER PAIN, UNSPECIFIED LATERALITY: Primary | ICD-10-CM

## 2025-06-20 DIAGNOSIS — G89.29 CHRONIC SHOULDER PAIN, UNSPECIFIED LATERALITY: Primary | ICD-10-CM

## 2025-06-20 PROCEDURE — 97110 THERAPEUTIC EXERCISES: CPT

## 2025-06-20 NOTE — PROGRESS NOTES
"Daily Note     Today's date: 2025  Patient name: Lindsey Wilson  : 1965  MRN: [id number]  Referring provider: Kennedy Rojas MD  Dx:   Encounter Diagnosis     ICD-10-CM    1. Chronic shoulder pain, unspecified laterality  M25.519     G89.29       2. Chronic back pain, unspecified back location, unspecified back pain laterality  M54.9     G89.29       3. Chronic pain of right knee  M25.561     G89.29                      Subjective: Patient reports 3/10 pain in the L cervical region and 2/10 pain in the R shoulder this morning.      Objective: See treatment diary below      Assessment: Tolerated treatment well. She demonstrated mild L cervical myofascial restrictions this morning. PT addressed this with STM with good response. Patient would benefit from continued PT to improve ROM, strength, and stability to return to prior level of function.      Plan: Continue per plan of care.      Precautions: *Avoid aggravating activity and factors at this time*      Manuals     STM B UT, RTC BM BM BM BM BM                           Neuro Re-Ed        Scapular Retraction w/ ER    x10 x10 x10   MTP         LTP                                         Ther Ex        UT stretch 20\" Hold x4 ea 20\" Hold x4 ea 20\" Hold x4 ea 20\" Hold x4 ea 20\" Hold x4 ea   LS stretch 20\" Hold x4 ea 20\" Hold x4 ea 20\" Hold x4 ea 20\" Hold x4 ea 20\" Hold x4 ea   Sleeper stretch 10\" Hold x10 ea 10\" Hold x10 ea 10\" Hold x10 ea 10\" Hold x10 ea 10\" Hold x10 ea   Table Slides Horizontal   2x10 ea 2x10 ea 2x10 ea 2x10 ea   Table Slides Flexion   2x10 ea 2x10 ea 2x10 ea 2x10 ea   S/L ER   2x10 ea 2x10 ea 2x10 ea 2x10 ea   S/L ABD   2x10 ea 2x10 ea 2x10 ea 2x10 ea   TB IR           TB ER         LTR's         Standing Lumbar Extension w/ counter support   3\" Hold x10 3\" Hold x10 3\" Hold x10 3\" Hold x10   Hamstring Stretch   10\" Hold x5 ea on 8' step standing 10\" Hold x5 ea on 8' step standing 10\" Hold x5 ea on 8' step " "standing 10\" Hold x5 ea on 8' step standing   Gastroc Stretch   10\" Hold x5 ea on 1/2 foam 10\" Hold x5 ea in 1/2 foam 10\" Hold x5 ea in 1/2 foam 10\" Hold x5 ea in 1/2 hoam   Corner Stretch   10\" Hold x5 10\" Hold x10 10\" Hold x10 10\" Hold x10   AAROM Pulleys Flx/Scap  3\" Hold 2x10 3\" Hold 2x10 3\" Hold 2x10 3\" Hold 2x10   HEP Instruction  BM       Ther Activity                        Gait Training                        Modalities        MHP  8' 5' Pre 5' Pre 5' Pre 5' Pre   Ice  5' Post 5' Post 5' Post 5' Post                     "

## 2025-06-20 NOTE — PROGRESS NOTES
"Daily Note     Today's date: 2025  Patient name: Lindsey Wilson  : 1965  MRN: [id number]  Referring provider: Kennedy Rojas MD  Dx:   Encounter Diagnosis     ICD-10-CM    1. Chronic shoulder pain, unspecified laterality  M25.519     G89.29       2. Chronic back pain, unspecified back location, unspecified back pain laterality  M54.9     G89.29       3. Chronic pain of right knee  M25.561     G89.29           Start Time: 805  Stop Time: 910  Total time in clinic (min): 65 minutes    Subjective: ***      Objective: See treatment diary below      Assessment: Tolerated treatment {Tolerated treatment :3450619297}. Patient {assessment:9582222399}      Plan: {PLAN:7133802790}     Precautions: *Avoid aggravating activity and factors at this time*      Manuals     STM B UT, RTC BM BM BM BM                            Neuro Re-Ed        Scapular Retraction w/ ER    x10 x10    MTP         LTP                                         Ther Ex        UT stretch 20\" Hold x4 ea 20\" Hold x4 ea 20\" Hold x4 ea 20\" Hold x4 ea    LS stretch 20\" Hold x4 ea 20\" Hold x4 ea 20\" Hold x4 ea 20\" Hold x4 ea    Sleeper stretch 10\" Hold x10 ea 10\" Hold x10 ea 10\" Hold x10 ea 10\" Hold x10 ea    Table Slides Horizontal   2x10 ea 2x10 ea 2x10 ea    Table Slides Flexion   2x10 ea 2x10 ea 2x10 ea    S/L ER   2x10 ea 2x10 ea 2x10 ea    S/L ABD   2x10 ea 2x10 ea 2x10 ea    TB IR           TB ER         LTR's         Standing Lumbar Extension w/ counter support   3\" Hold x10 3\" Hold x10 3\" Hold x10    Hamstring Stretch   10\" Hold x5 ea on 8' step standing 10\" Hold x5 ea on 8' step standing 10\" Hold x5 ea on 8' step standing    Gastroc Stretch   10\" Hold x5 ea on 1/2 foam 10\" Hold x5 ea in 1/2 foam 10\" Hold x5 ea in 1/2 foam    Corner Stretch   10\" Hold x5 10\" Hold x10 10\" Hold x10    AAROM Pulleys Flx/Scap  3\" Hold 2x10 3\" Hold 2x10 3\" Hold 2x10    HEP Instruction  BM       Ther Activity                        Gait " Training                        Modalities        [facility]  8' 5' Pre 5' Pre 5' Pre    Ice  5' Post 5' Post 5' Post

## 2025-06-24 ENCOUNTER — OFFICE VISIT (OUTPATIENT)
Dept: FAMILY MEDICINE CLINIC | Facility: CLINIC | Age: 60
End: 2025-06-24
Payer: COMMERCIAL

## 2025-06-24 VITALS
HEIGHT: 59 IN | SYSTOLIC BLOOD PRESSURE: 162 MMHG | HEART RATE: 62 BPM | BODY MASS INDEX: 28.39 KG/M2 | DIASTOLIC BLOOD PRESSURE: 82 MMHG | OXYGEN SATURATION: 98 % | TEMPERATURE: 97.5 F | RESPIRATION RATE: 18 BRPM | WEIGHT: 140.8 LBS

## 2025-06-24 DIAGNOSIS — Z00.00 ANNUAL PHYSICAL EXAM: Primary | ICD-10-CM

## 2025-06-24 DIAGNOSIS — Z86.0100 HISTORY OF COLONIC POLYPS: ICD-10-CM

## 2025-06-24 DIAGNOSIS — I10 BENIGN ESSENTIAL HTN: ICD-10-CM

## 2025-06-24 DIAGNOSIS — Z13.1 SCREENING FOR DIABETES MELLITUS: ICD-10-CM

## 2025-06-24 DIAGNOSIS — Z91.09 SUN ALLERGY: ICD-10-CM

## 2025-06-24 DIAGNOSIS — Z23 ENCOUNTER FOR IMMUNIZATION: ICD-10-CM

## 2025-06-24 DIAGNOSIS — Z12.11 SCREENING FOR COLORECTAL CANCER: ICD-10-CM

## 2025-06-24 DIAGNOSIS — Z12.12 SCREENING FOR COLORECTAL CANCER: ICD-10-CM

## 2025-06-24 DIAGNOSIS — R09.82 PND (POST-NASAL DRIP): ICD-10-CM

## 2025-06-24 PROCEDURE — 99396 PREV VISIT EST AGE 40-64: CPT

## 2025-06-24 PROCEDURE — T1015 CLINIC SERVICE: HCPCS | Performed by: FAMILY MEDICINE

## 2025-06-24 RX ORDER — CETIRIZINE HYDROCHLORIDE 10 MG/1
10 TABLET ORAL DAILY
Qty: 30 TABLET | Refills: 0 | Status: SHIPPED | OUTPATIENT
Start: 2025-06-24

## 2025-06-24 NOTE — ASSESSMENT & PLAN NOTE
Hypertensive at 162/82 today, patient did not take his blood pressure medication today, last blood pressure was 130/82 when she took her blood pressure medication, patient is to recheck in 6 months for blood pressure follow-up.

## 2025-06-24 NOTE — PATIENT INSTRUCTIONS
"Patient Education     Examen físico de rutina para adultos   Conceptos Básicos   Redactado por los médicos y editores de [employer]   ¿Qué es un examen físico? -- Un examen físico es haydee consulta de rutina o \"revisión\" con del valle médico. También se conoce yolis \"consulta de bienestar\" o \"consulta preventiva\".  Linda cada consulta, el médico hará lo siguiente:   Preguntará por del valle manuel física y mental   Preguntará sobre catherine hábitos, conductas y estilo de lisa   Le hará un examen   Le administrará las vacunas que jane necesarias   Hablará con usted sobre cualquier medicina que tome   Le dará consejos sobre del valle manuel   Responderá catherine preguntas  Hacerse revisiones periódicas es haydee parte importante del cuidado de del valle manuel. Puede ayudar a del valle médico a encontrar y tratar cualquier problema que tenga. [place] también es importante para prevenir problemas de manuel.  Un examen físico de rutina es diferente de haydee \"consulta por enfermedad\". Hayede consulta por enfermedad es cuando lo atiende un médico debido a un problema o inquietud de manuel. Dado que los exámenes físicos se programan con anticipación, usted puede pensar en lo que quiere preguntarle al médico.  ¿Con qué frecuencia miguelito hacerme un examen físico? -- Depende de del valle edad y de del valle estado de manuel. En general, en el farhad de las personas mayores de 21 años:   Si tiene menos de 50 años, es posible que pueda hacerse un examen físico cada 3 años.   Si tiene 50 años o más, del valle médico podría recomendarle un examen físico cada año.  Si tiene un padecimiento de manuel crónico, gail yolis diabetes o presión arterial eric, del valle médico probablemente querrá verlo con más frecuencia.  ¿Qué sucede linda un examen físico? -- En general, cada consulta incluirá:   Examen físico - El médico o enfermero revisará del valle estatura, peso, frecuencia cardíaca y presión arterial. También le examinará los ojos y los oídos. Le preguntará cómo se siente y si tiene algún síntoma que le moleste.   Medicinas - Es haydee " "buena idea llevar haydee lista de todos las medicinas que lynda cada vez que acude a la consulta médica. [place] médico le hablará sobre ludy medicinas y responderá a ludy preguntas. Dígale si tiene algún efecto secundario que le moleste. También debe informarle si tiene dificultades para pagar alguna de ludy medicinas.   Hábitos y comportamientos - [place] incluye:   [place] dieta   Ludy hábitos de ejercicio   Si fuma, gloria alcohol o consume drogas   Si es sexualmente activo   Si se siente seguro en casa  [place] médico hablará con usted sobre las cosas que puede hacer para mejorar del valle manuel y reducir el riesgo de tener problemas de manuel. También ofrecerá ayuda y apoyo. Por ejemplo, si quiere dejar de fumar, puede darle consejos y recetarle medicinas. Si quiere mejorar del valle alimentación o realizar más actividad física, del valle médico también puede ayudarlo a lograr estos objetivos.   Pruebas de laboratorio, si son necesarias - Las pruebas que le realicen dependerán de del valle edad y situación. Por ejemplo, es posible que del valle médico quiera revisar del valle:   Colesterol   Azúcar en lia   Nivel de vasquez   Vacunas - Las vacunas recomendadas dependerán de del valle edad, del valle manuel y de las vacunas que ya haya recibido. Las vacunas son muy importantes porque pueden prevenir ciertas infecciones graves o mortales.   Análisis sobre las pruebas de detección - \"Detección\" significa revisar si hay enfermedades u otros problemas de manuel antes de que causen síntomas. [place] médico puede recomendar pruebas de detección según del valle edad, riesgo y preferencias. [place] podría incluir pruebas para detectar:   Cáncer, yolis cáncer de seno, próstata, shante uterino, ovario, colorrectal, próstata, pulmón o piel   Infecciones de transmisión sexual tales yolis clamidia y gonorrea   Padecimientos de manuel mental tales yolis depresión y ansiedad.  El médico le hablará sobre los diferentes tipos de pruebas de detección. Puede ayudarlo a decidir qué pruebas de detección debe hacerse. También le puede " explicar lo que podrían significar los resultados.   Responder preguntas - El examen físico es un buen momento para hacerle preguntas al médico o enfermero sobre del valle manuel. Si es necesario, también puede derivarlo a otros médicos o especialistas.  Los adultos mayores de 65 años a menudo también necesitan otros cuidados. A medida que envejece, del valle médico hablará con usted sobre:   Cómo evitar las caídas en el hogar   Pruebas de audición o visión   Pruebas de memoria   Cómo dmitri catherine medicinas de manera ramos   Asegurarse de tener la ayuda y el apoyo que necesita en casa  Todos los artículos se actualizan a medida que se descubre nueva evidencia y culmina nuestro proceso de evaluación por homólogos   Jennifer artículo se recuperó de [employer] el: May 02, 2024.  Artículo 242160 Versión [id number]  Release: 32.4.3 - C32.122  © 2024 [employer] Todos los derechos reservados.  Exención de responsabilidad y uso de la información del consumidor   Descargo de responsabilidad: esta información generalizada es un resumen limitado de información sobre el diagnóstico, el tratamiento y/o los medicamentos. No pretende ser exhaustiva y se debe utilizar yolis herramienta para ayudar al usuario a comprender y/o evaluar las posibles opciones de diagnóstico y tratamiento. No incluye toda la información sobre afecciones, tratamientos, medicamentos, efectos secundarios o riesgos puedan ser aplicables a un paciente específico. No tiene el propósito de servir yolis recomendación médica ni de sustituir la recomendación médica, el diagnóstico o el tratamiento de un profesional de atención médica que se base en el examen y la evaluación de jennifer profesional de la manuel respecto a las circunstancias específicas y únicas del paciente. Los pacientes deben hablar con un profesional de atención médica para obtener información completa sobre del valle manuel, cuestiones médicas y opciones de tratamiento, incluidos los riesgos o los beneficios relacionados con  el uso de medicamentos. Esta información no certifica que los tratamientos o medicamentos jane seguros, eficaces o estén aprobados para tratar a un paciente específico. [employer] y catherine afiliados renuncian a cualquier garantía o responsabilidad relacionada con esta información o el uso de la misma.El uso de esta información está sujeto a las Condiciones de uso, disponibles en [email or web address]/en/know/[email or web address]-terms. 2024© [employer] y catherine afiliados y/o licenciantes. Todos los derechos reservados.  Copyright   © 2024 [employer] Todos los derechos reservados.

## 2025-06-24 NOTE — PROGRESS NOTES
Adult Annual Physical  Name: Lindsey Wilson      : 1965      MRN: [id number]  Encounter Provider: Beatriz Paniagua DO  Encounter Date: 2025   Encounter department: [facility] [place]    :  Assessment & Plan  Annual physical exam  60-year-old female with RBBB, HTN, PVC, hypothyroidism, radiculopathy, history of tympanic membrane perforation, multiple soft tissue inflammation in the past, anxiety, multiple soft tissue pain in the past, dyslipidemia here for annual wellness exam.  Patient has no current complaints other than get a rash when going out under the sun.    Per patient, patient sees OB/GYN regularly.    Follow-up in 6 months for hypertension recheck.  Orders:  •  TSH, 3rd generation with Free T4 reflex; Future  •  CBC and Platelet; Future    Screening for colorectal cancer    Orders:  •  Cologuard    History of colonic polyps  9 years ago, benign polyp       Screening for diabetes mellitus  Patient last A1c 6.2, decreased from 6.4, extensive discussion on exercise and dieting given, patient is to exercise 5 times a day for 30 minutes at a time every time.  Repeat A1c in 1 month with TSH CBC for annual labs.  Lipid panel and CMP already received this year.  Benign.  Orders:  •  Hemoglobin A1C; Future    Encounter for immunization    Orders:  •  Zoster Vac Recomb Adjuvanted 50 MCG/0.5ML SUSR; Inject 1 Units into a muscle 1 (one) time for 1 dose  •  Pneumococcal Conjugate Vaccine 20-valent (Pcv20)    BMI 28.0-28.9,adult  Counseling given, not interested in weight management.     PND (post-nasal drip)  Patient did not know what Zyrtec does, refilled for helping the sun allergy, see below  Orders:  •  cetirizine (ZyrTEC) 10 mg tablet; Take 1 tablet (10 mg total) by mouth daily    Sun allergy  Patient complaining of getting welts, rash at the neck area whenever she go out and sit on the sun, recommend patient to start wearing sunscreen, and patient can supplement with cetirizine as  needed.       Benign essential HTN  Hypertensive at 162/82 today, patient did not take his blood pressure medication today, last blood pressure was 130/82 when she took her blood pressure medication, patient is to recheck in 6 months for blood pressure follow-up.       Annual physical exam         Screening for colorectal cancer         History of colonic polyps         Screening for diabetes mellitus         Encounter for immunization         BMI 28.0-28.9,adult               Preventive Screenings:  - Diabetes Screening: screening up-to-date  - Cholesterol Screening: screening up-to-date   - Hepatitis C screening: screening up-to-date   - HIV screening: screening up-to-date   - Cervical cancer screening: screening up-to-date   - Breast cancer screening: screening up-to-date   - Colon cancer screening: screening up-to-date   - Lung cancer screening: screening not indicated     Immunizations:  - Immunizations due: Zoster (Shingrix)    Counseling/Anticipatory Guidance:  - Alcohol: discussed moderation in alcohol intake and recommendations for healthy alcohol use.   - Drug use: discussed harms of illicit drug use and how it can negatively impact mental/physical health.   - Tobacco use: discussed harms of tobacco use and management options for quitting.   - Diet: discussed recommendations for a healthy/well-balanced diet.   - Exercise: the importance of regular exercise/physical activity was discussed. Recommend exercise 3-5 times per week for at least 30 minutes.          History of Present Illness   {?Quick Links Encounters * My Last Note * Last Note in Specialty * Snapshot * Since Last Visit * History :35707}  Adult Annual Physical:  Patient presents for annual physical. 60-year-old female with RBBB, HTN, PVC, hypothyroidism, radiculopathy, history of tympanic membrane perforation, multiple soft tissue inflammation in the past, anxiety, multiple soft tissue pain in the past, dyslipidemia here for annual wellness  "exam.  Patient has no current complaints other than get a rash when going out under the sun..     Diet and Physical Activity:  - Diet/Nutrition: no special diet and well balanced diet.  - Exercise: 1-2 times a week on average and walking.    General Health:  - Sleep: sleeps well.  - Hearing: normal hearing right ear and normal hearing left ear.  - Vision: no vision problems.  - Dental: no dental visits for > 1 year. use denture    /GYN Health:  - Follows with GYN: yes.   - Menopause: postmenopausal.     Advanced Care Planning:  - Has an advanced directive?: no    - Has a durable medical POA?: no    - ACP document given to patient?: yes      Review of Systems   Constitutional:  Negative for chills and fever.   HENT:  Negative for ear pain and sore throat.    Eyes:  Negative for pain and visual disturbance.   Respiratory:  Negative for cough and shortness of breath.    Cardiovascular:  Negative for chest pain and palpitations.   Gastrointestinal:  Negative for abdominal pain and vomiting.   Genitourinary:  Negative for dysuria and hematuria.   Musculoskeletal:  Positive for arthralgias and back pain.   Skin:  Negative for color change and rash (on sun exposure).   Neurological:  Negative for seizures and syncope.   All other systems reviewed and are negative.        Objective {?Quick Links Trend Vitals * Enter New Vitals * Results Review * Timeline (Adult) * Labs * Imaging * Cardiology * Procedures * Lung Cancer Screening * Surgical eConsent :90376}  /82 (BP Location: Left arm, Patient Position: Sitting, Cuff Size: Standard) Comment: Patient hasn't taken BP meds this morning  Pulse 62   Temp 97.5 °F (36.4 °C) (Temporal)   Resp 18   Ht 4' 11\" (1.499 m)   Wt 63.9 kg (140 lb 12.8 oz)   SpO2 98%   BMI 28.44 kg/m²     Physical Exam  Constitutional:       General: She is not in acute distress.     Appearance: Normal appearance.   HENT:      Head: Normocephalic and atraumatic.      Right Ear: Tympanic " membrane, ear canal and external ear normal. There is no impacted cerumen.      Left Ear: Tympanic membrane, ear canal and external ear normal. There is no impacted cerumen.      Nose: Nose normal. No congestion or rhinorrhea.      Mouth/Throat:      Mouth: Mucous membranes are moist.      Pharynx: Oropharynx is clear. No oropharyngeal exudate or posterior oropharyngeal erythema.     Eyes:      General: No scleral icterus.        Right eye: No discharge.         Left eye: No discharge.      Extraocular Movements: Extraocular movements intact.      Conjunctiva/sclera: Conjunctivae normal.      Pupils: Pupils are equal, round, and reactive to light.       Cardiovascular:      Rate and Rhythm: Normal rate and regular rhythm.      Pulses: Normal pulses.      Heart sounds: Normal heart sounds. No murmur heard.  Pulmonary:      Effort: Pulmonary effort is normal. No respiratory distress.      Breath sounds: Normal breath sounds. No wheezing.   Chest:      Chest wall: No tenderness.   Abdominal:      General: Abdomen is flat. There is no distension.      Palpations: Abdomen is soft. There is no mass.      Tenderness: There is no abdominal tenderness. There is no guarding.      Hernia: No hernia is present.     Musculoskeletal:         General: No swelling, tenderness or deformity. Normal range of motion.      Cervical back: Normal range of motion and neck supple. No rigidity or tenderness.      Right lower leg: No edema.      Left lower leg: No edema.   Lymphadenopathy:      Cervical: No cervical adenopathy.     Skin:     General: Skin is warm and dry.      Capillary Refill: Capillary refill takes less than 2 seconds.      Findings: No rash.     Neurological:      General: No focal deficit present.      Mental Status: She is alert.      Cranial Nerves: No cranial nerve deficit.      Sensory: No sensory deficit.      Motor: No weakness.      Gait: Gait normal.     Psychiatric:         Mood and Affect: Mood normal.          Behavior: Behavior normal.         Thought Content: Thought content normal.

## 2025-06-25 ENCOUNTER — OFFICE VISIT (OUTPATIENT)
Dept: PHYSICAL THERAPY | Facility: CLINIC | Age: 60
End: 2025-06-25
Payer: COMMERCIAL

## 2025-06-25 DIAGNOSIS — M25.561 CHRONIC PAIN OF RIGHT KNEE: ICD-10-CM

## 2025-06-25 DIAGNOSIS — G89.29 CHRONIC BACK PAIN, UNSPECIFIED BACK LOCATION, UNSPECIFIED BACK PAIN LATERALITY: ICD-10-CM

## 2025-06-25 DIAGNOSIS — G89.29 CHRONIC PAIN OF RIGHT KNEE: ICD-10-CM

## 2025-06-25 DIAGNOSIS — M25.519 CHRONIC SHOULDER PAIN, UNSPECIFIED LATERALITY: Primary | ICD-10-CM

## 2025-06-25 DIAGNOSIS — G89.29 CHRONIC SHOULDER PAIN, UNSPECIFIED LATERALITY: Primary | ICD-10-CM

## 2025-06-25 DIAGNOSIS — M54.9 CHRONIC BACK PAIN, UNSPECIFIED BACK LOCATION, UNSPECIFIED BACK PAIN LATERALITY: ICD-10-CM

## 2025-06-25 PROCEDURE — 97110 THERAPEUTIC EXERCISES: CPT

## 2025-06-25 NOTE — PROGRESS NOTES
"Daily Note     Today's date: 2025  Patient name: Lindsey Wilson  : 1965  MRN: [id number]  Referring provider: Kennedy Rojas MD  Dx:   Encounter Diagnosis     ICD-10-CM    1. Chronic shoulder pain, unspecified laterality  M25.519     G89.29       2. Chronic back pain, unspecified back location, unspecified back pain laterality  M54.9     G89.29       3. Chronic pain of right knee  M25.561     G89.29                      Subjective: Patient reports soreness in bilateral shoulders, knee joints, and low back. She states pain levels are a 3-4/10 this morning.       Objective: See treatment diary below      Assessment: Tolerated treatment well with a minimal increase in symptomatology. She is continuing to respond well to the current directed therapeutic exercise program and is making appropriate progressions. Patient would benefit from continued PT to address ROM, strength, and stability to return to prior level of function      Plan: Continue per plan of care.      Precautions: *Avoid aggravating activity and factors at this time*      Manuals     STM B UT, RTC BM BM BM BM BM                           Neuro Re-Ed        Scapular Retraction w/ ER  x10  x10 x10 x10   MTP         LTP                                         Ther Ex        UT stretch 20\" Hold x4 ea 20\" Hold x4 ea 20\" Hold x4 ea 20\" Hold x4 ea 20\" Hold x4 ea   LS stretch 20\" Hold x4 ea 20\" Hold x4 ea 20\" Hold x4 ea 20\" Hold x4 ea 20\" Hold x4 ea   Sleeper stretch 10\" Hold x10 ea 10\" Hold x10 ea 10\" Hold x10 ea 10\" Hold x10 ea 10\" Hold x10 ea   Table Slides Horizontal  2x10 ea 2x10 ea 2x10 ea 2x10 ea 2x10 ea   Table Slides Flexion  2x10 ea 2x10 ea 2x10 ea 2x10 ea 2x10 ea   S/L ER  2x10 ea 2x10 ea 2x10 ea 2x10 ea 2x10 ea   S/L ABD  2x10 ea 2x10 ea 2x10 ea 2x10 ea 2x10 ea   TB IR           TB ER         LTR's         Standing Lumbar Extension w/ counter support  NT 3\" Hold x10 3\" Hold x10 3\" Hold x10 3\" Hold x10   Hamstring " "Stretch  10\" Hold x5 ea on 8\" step standing  10\" Hold x5 ea on 8' step standing 10\" Hold x5 ea on 8' step standing 10\" Hold x5 ea on 8' step standing 10\" Hold x5 ea on 8' step standing   Gastroc Stretch  10\" Hold x5 ea 1/2 foam  10\" Hold x5 ea on 1/2 foam 10\" Hold x5 ea in 1/2 foam 10\" Hold x5 ea in 1/2 foam 10\" Hold x5 ea in 1/2 hoam   Corner Stretch  10\" Hold x10  10\" Hold x5 10\" Hold x10 10\" Hold x10 10\" Hold x10   AAROM Pulleys Flx/Scap 3\" Hold 2x10  3\" Hold 2x10 3\" Hold 2x10 3\" Hold 2x10 3\" Hold 2x10   HEP Instruction  BM       Ther Activity                        Gait Training                        Modalities        MHP  def 5' Pre 5' Pre 5' Pre 5' Pre   Ice def 5' Post 5' Post 5' Post 5' Post                       "

## 2025-06-27 ENCOUNTER — OFFICE VISIT (OUTPATIENT)
Dept: PHYSICAL THERAPY | Facility: CLINIC | Age: 60
End: 2025-06-27
Payer: COMMERCIAL

## 2025-06-27 DIAGNOSIS — M25.561 CHRONIC PAIN OF RIGHT KNEE: ICD-10-CM

## 2025-06-27 DIAGNOSIS — G89.29 CHRONIC PAIN OF RIGHT KNEE: ICD-10-CM

## 2025-06-27 DIAGNOSIS — M25.519 CHRONIC SHOULDER PAIN, UNSPECIFIED LATERALITY: Primary | ICD-10-CM

## 2025-06-27 DIAGNOSIS — G89.29 CHRONIC SHOULDER PAIN, UNSPECIFIED LATERALITY: Primary | ICD-10-CM

## 2025-06-27 DIAGNOSIS — M54.9 CHRONIC BACK PAIN, UNSPECIFIED BACK LOCATION, UNSPECIFIED BACK PAIN LATERALITY: ICD-10-CM

## 2025-06-27 DIAGNOSIS — G89.29 CHRONIC BACK PAIN, UNSPECIFIED BACK LOCATION, UNSPECIFIED BACK PAIN LATERALITY: ICD-10-CM

## 2025-06-27 PROCEDURE — 97140 MANUAL THERAPY 1/> REGIONS: CPT

## 2025-06-27 PROCEDURE — 97110 THERAPEUTIC EXERCISES: CPT

## 2025-06-27 NOTE — PROGRESS NOTES
"Daily Note     Today's date: 2025  Patient name: Lindsey Wilson  : 1965  MRN: [id number]  Referring provider: Kennedy Rojas MD  Dx:   Encounter Diagnosis     ICD-10-CM    1. Chronic shoulder pain, unspecified laterality  M25.519     G89.29       2. Chronic back pain, unspecified back location, unspecified back pain laterality  M54.9     G89.29       3. Chronic pain of right knee  M25.561     G89.29                      Subjective: Patient reports that she continues to have some soreness within the shoulders.       Objective: See treatment diary below      Assessment: Tolerated treatment well. Treatment focused more towards UE exercises to continue to improve B/L UE ROM and strength. Patient continues to note some discomfort in the B/L shoulders during movements. STM continues to have show some trigger points and tenderness in the UT and RTC musculature. Patient would benefit from continued skilled PT to improve impairments and increase her overall function to allow for a better QoL.       Plan: Continue per plan of care.      Precautions: *Avoid aggravating activity and factors at this time*      Manuals     STM B UT, RTC BM MD MARY HERNANDEZ BM                           Neuro Re-Ed        Scapular Retraction w/ ER  x10 X10  x10 x10 x10   MTP         LTP                                         Ther Ex        UT stretch 20\" Hold x4 ea NT 20\" Hold x4 ea 20\" Hold x4 ea 20\" Hold x4 ea   LS stretch 20\" Hold x4 ea NT 20\" Hold x4 ea 20\" Hold x4 ea 20\" Hold x4 ea   Sleeper stretch 10\" Hold x10 ea 10\" hold x10 ea 10\" Hold x10 ea 10\" Hold x10 ea 10\" Hold x10 ea   Table Slides Horizontal  2x10 ea NT 2x10 ea 2x10 ea 2x10 ea   Table Slides Flexion  2x10 ea 2x10 ea  2x10 ea 2x10 ea 2x10 ea   S/L ER  2x10 ea 2x10 ea 2x10 ea 2x10 ea 2x10 ea   S/L ABD  2x10 ea 2x10 ea 2x10 ea 2x10 ea 2x10 ea   TB IR           TB ER         LTR's         Standing Lumbar Extension w/ counter support  NT NT 3\" Hold x10 3\" " "Hold x10 3\" Hold x10   Hamstring Stretch  10\" Hold x5 ea on 8\" step standing  10\" Hold x5 ea on 8\" step in standing 10\" Hold x5 ea on 8' step standing 10\" Hold x5 ea on 8' step standing 10\" Hold x5 ea on 8' step standing   Gastroc Stretch  10\" Hold x5 ea 1/2 foam  20\" hold x4 ea 1/2 foam 10\" Hold x5 ea in 1/2 foam 10\" Hold x5 ea in 1/2 foam 10\" Hold x5 ea in 1/2 hoam   Corner Stretch  10\" Hold x10  10\" hold x10  10\" Hold x10 10\" Hold x10 10\" Hold x10   AAROM Pulleys Flx/Scap 3\" Hold 2x10  3\" Hold 2x10  3\" Hold 2x10 3\" Hold 2x10 3\" Hold 2x10   HEP Instruction  BM       Ther Activity                        Gait Training                        Modalities        MHP  def def 5' Pre 5' Pre 5' Pre   Ice def def 5' Post 5' Post 5' Post                         "

## 2025-07-02 ENCOUNTER — OFFICE VISIT (OUTPATIENT)
Dept: PHYSICAL THERAPY | Facility: CLINIC | Age: 60
End: 2025-07-02
Payer: COMMERCIAL

## 2025-07-02 DIAGNOSIS — G89.29 CHRONIC PAIN OF RIGHT KNEE: ICD-10-CM

## 2025-07-02 DIAGNOSIS — M25.519 CHRONIC SHOULDER PAIN, UNSPECIFIED LATERALITY: Primary | ICD-10-CM

## 2025-07-02 DIAGNOSIS — M25.561 CHRONIC PAIN OF RIGHT KNEE: ICD-10-CM

## 2025-07-02 DIAGNOSIS — G89.29 CHRONIC BACK PAIN, UNSPECIFIED BACK LOCATION, UNSPECIFIED BACK PAIN LATERALITY: ICD-10-CM

## 2025-07-02 DIAGNOSIS — M54.9 CHRONIC BACK PAIN, UNSPECIFIED BACK LOCATION, UNSPECIFIED BACK PAIN LATERALITY: ICD-10-CM

## 2025-07-02 DIAGNOSIS — G89.29 CHRONIC SHOULDER PAIN, UNSPECIFIED LATERALITY: Primary | ICD-10-CM

## 2025-07-02 PROCEDURE — 97140 MANUAL THERAPY 1/> REGIONS: CPT

## 2025-07-02 PROCEDURE — 97110 THERAPEUTIC EXERCISES: CPT

## 2025-07-02 NOTE — PROGRESS NOTES
"Daily Note     Today's date: 2025  Patient name: Lindsey Wilson  : 1965  MRN: [id number]  Referring provider: Kennedy Rojas MD  Dx:   Encounter Diagnosis     ICD-10-CM    1. Chronic shoulder pain, unspecified laterality  M25.519     G89.29       2. Chronic back pain, unspecified back location, unspecified back pain laterality  M54.9     G89.29       3. Chronic pain of right knee  M25.561     G89.29                      Subjective: Patient reports 3-4/10 pain levels in bilateral shoulders, low back, and R knee. She notes improvement in symptoms with PT this far.      Objective: See treatment diary below      Assessment: Tolerated treatment well. PT progressed the current program with the addition of thoracolumbar mobility exercise with good tolerance. Patient demonstrated fatigue post treatment, exhibited good technique with therapeutic exercises, and would benefit from continued PT.      Plan: Continue per plan of care.      Precautions: *Avoid aggravating activity and factors at this time*      Manuals     STM B UT, RTC BM MD BM BM BM                           Neuro Re-Ed        Scapular Retraction w/ ER  x10 X10  x10 x10 x10   MTP         LTP                                         Ther Ex        UT stretch 20\" Hold x4 ea NT 20\" Hold x4 ea 20\" Hold x4 ea 20\" Hold x4 ea   LS stretch 20\" Hold x4 ea NT 20\" Hold x4 ea 20\" Hold x4 ea 20\" Hold x4 ea   Sleeper stretch 10\" Hold x10 ea 10\" hold x10 ea 10\" Hold x10 ea 10\" Hold x10 ea 10\" Hold x10 ea   Table Slides Horizontal  2x10 ea NT 2x10 ea 2x10 ea 2x10 ea   Table Slides Flexion  2x10 ea 2x10 ea  2x10 ea 2x10 ea 2x10 ea   S/L ER  2x10 ea 2x10 ea 2x10 ea 2x10 ea 2x10 ea   S/L ABD  2x10 ea 2x10 ea 2x10 ea 2x10 ea 2x10 ea   TB IR           TB ER         LTR's    X10 ea     Standing Lumbar Extension w/ counter support  NT NT  3\" Hold x10 3\" Hold x10   Hamstring Stretch  10\" Hold x5 ea on 8\" step standing  10\" Hold x5 ea on 8\" step in " "standing 10\" Hold x5 ea on 8' step standing 10\" Hold x5 ea on 8' step standing 10\" Hold x5 ea on 8' step standing   Gastroc Stretch  10\" Hold x5 ea 1/2 foam  20\" hold x4 ea 1/2 foam 20\" Hold x4 ea in 1/2 foam 10\" Hold x5 ea in 1/2 foam 10\" Hold x5 ea in 1/2 hoam   Corner Stretch  10\" Hold x10  10\" hold x10  10\" Hold x10 10\" Hold x10 10\" Hold x10   AAROM Pulleys Flx/Scap 3\" Hold 2x10  3\" Hold 2x10  3\" Hold 2x10 3\" Hold 2x10 3\" Hold 2x10   HEP Instruction  BM       Ther Activity                        Gait Training                        Modalities        MHP  def def  5' Pre 5' Pre   Ice def def  5' Post 5' Post                           "

## 2025-07-08 ENCOUNTER — OFFICE VISIT (OUTPATIENT)
Dept: FAMILY MEDICINE CLINIC | Facility: CLINIC | Age: 60
End: 2025-07-08
Payer: COMMERCIAL

## 2025-07-08 VITALS
HEIGHT: 59 IN | DIASTOLIC BLOOD PRESSURE: 80 MMHG | BODY MASS INDEX: 28.67 KG/M2 | HEART RATE: 52 BPM | SYSTOLIC BLOOD PRESSURE: 130 MMHG | OXYGEN SATURATION: 98 % | WEIGHT: 142.2 LBS | TEMPERATURE: 96 F | RESPIRATION RATE: 16 BRPM

## 2025-07-08 DIAGNOSIS — I10 PRIMARY HYPERTENSION: Primary | ICD-10-CM

## 2025-07-08 DIAGNOSIS — L81.9 HYPERPIGMENTATION: ICD-10-CM

## 2025-07-08 DIAGNOSIS — R60.0 LEG EDEMA: ICD-10-CM

## 2025-07-08 PROCEDURE — T1015 CLINIC SERVICE: HCPCS | Performed by: FAMILY MEDICINE

## 2025-07-08 RX ORDER — LISINOPRIL 10 MG/1
10 TABLET ORAL DAILY
Qty: 30 TABLET | Refills: 1 | Status: SHIPPED | OUTPATIENT
Start: 2025-07-08

## 2025-07-08 RX ORDER — HYDROCORTISONE 25 MG/G
CREAM TOPICAL 2 TIMES DAILY
Qty: 3.5 G | Refills: 0 | Status: SHIPPED | OUTPATIENT
Start: 2025-07-08 | End: 2025-07-13

## 2025-07-08 NOTE — PROGRESS NOTES
Name: Lindsey Wilson      : 1965      MRN: [id number]  Encounter Provider: Georgina Rausch DO  Encounter Date: 2025   Encounter department: [facility]    Assessment & Plan  Primary hypertension  -Patient may have occasional elevated BP at home causing headache and vision changes  -Need patient to record at-home BP values for a week  -Stopped amlodipine  -Recheck in 5-6 weeks  -Started lisinopril  Orders:    lisinopril (ZESTRIL) 10 mg tablet; Take 1 tablet (10 mg total) by mouth in the morning.    Hyperpigmentation  -Patient has bilateral hyperpigmented freckles on their ankles  -Started hydrocortisone cream  Orders:    hydrocortisone 2.5 % cream; Apply topically in the morning and before bedtime. Do all this for 5 days.    Leg edema  -Patient has bilateral leg swelling that occurs 1x/month  -TSH lab ordered  -Stopped amlodipine  -Recheck in 5-6 weeks             History of Present Illness     Lindsey Wilson is a 61 y/o female with a Mhx significant for hypothyroidism, prediabetes, HTN, and chronic shoulder pain presenting for HTN management follow-up. The patient notes she has occasional elevated BP at home but is unsure if it's due to the HTN and doesn't recall at-home readings during these episodes. She states having a concurrent intermittent 10 minute unilateral, throbbing headaches associated with blurry vision and occasional nausea. She says cold compress helps relax the headache. The patient also states of having bilateral leg swelling that occurs 1x/month and worsen with prolonged walking. She denies having lightheadedness, syncope, weakness, or shortness of breath.       Review of Systems   Constitutional:  Negative for chills and fever.   HENT:  Negative for ear pain, rhinorrhea and sore throat.    Eyes:  Negative for pain, discharge and visual disturbance.   Respiratory:  Negative for cough, chest tightness and shortness of breath.    Cardiovascular:  Positive  "for leg swelling. Negative for palpitations.   Gastrointestinal:  Positive for nausea. Negative for abdominal pain, constipation, diarrhea and vomiting.   Genitourinary:  Negative for dysuria and hematuria.   Musculoskeletal:  Positive for neck pain. Negative for arthralgias and back pain.   Skin:  Positive for color change. Negative for rash.        Hyperpigmentation in the ankles   Neurological:  Positive for headaches. Negative for syncope and light-headedness.   All other systems reviewed and are negative.    Past Medical History[1]  Past Surgical History[2]  Family History[3]  Social History[4]  Medications[5]  Allergies   Allergen Reactions    Latex Rash     Powdered latex gloves     Immunization History   Administered Date(s) Administered    Hep B, adult 11/30/2022, 12/28/2022, 12/28/2022    INFLUENZA 10/16/2020, 11/30/2022, 11/22/2023    Influenza Recombinant Preservative Free Im 12/31/2024    Influenza, injectable, quadrivalent, preservative free 0.5 mL 11/22/2023    Influenza, recombinant, quadrivalent,injectable, preservative free 11/30/2022    Influenza, seasonal, injectable 10/11/2016    Pneumococcal Conjugate Vaccine 20-valent (Pcv20), Polysace 06/24/2025    Tdap 01/28/2016    influenza, injectable, quadrivalent 10/16/2020     Objective   /80 (BP Location: Left arm, Patient Position: Sitting, Cuff Size: Standard)   Pulse (!) 52   Temp (!) 96 °F (35.6 °C) (Tympanic)   Resp 16   Ht 4' 11\" (1.499 m)   Wt 64.5 kg (142 lb 3.2 oz)   SpO2 98%   BMI 28.72 kg/m²     Physical Exam  Constitutional:       General: She is not in acute distress.     Appearance: Normal appearance. She is not ill-appearing or toxic-appearing.   HENT:      Head: Normocephalic.     Eyes:      Extraocular Movements: Extraocular movements intact.      Conjunctiva/sclera: Conjunctivae normal.      Pupils: Pupils are equal, round, and reactive to light.       Cardiovascular:      Rate and Rhythm: Normal rate and regular rhythm. "      Pulses: Normal pulses.      Heart sounds: Normal heart sounds.   Pulmonary:      Effort: Pulmonary effort is normal.      Breath sounds: Normal breath sounds.     Musculoskeletal:         General: Normal range of motion.      Cervical back: Normal range of motion and neck supple.     Skin:     General: Skin is warm and dry.      Capillary Refill: Capillary refill takes less than 2 seconds.      Comments: Hyperpigmentation in the ankles     Neurological:      General: No focal deficit present.      Mental Status: She is alert and oriented to person, place, and time.      Cranial Nerves: No dysarthria or facial asymmetry.      Motor: No weakness.      Gait: Gait is intact.     Psychiatric:         Mood and Affect: Mood normal.         Behavior: Behavior normal.         Thought Content: Thought content normal.         Judgment: Judgment normal.            [1]   Past Medical History:  Diagnosis Date    Fatigue     High cholesterol     Hypertension     Neuropathy     Osteoarthritis     knees    Shortness of breath    [2]   Past Surgical History:  Procedure Laterality Date    APPENDECTOMY      EPIDURAL BLOCK INJECTION Left 5/17/2018    Procedure: L4 AND L5 TRANSFORAMINAL EPIDURAL STEROID INJECTION;  Surgeon: Paul Collins MD;  Location: [facility];  Service: Pain Management     EPIDURAL BLOCK INJECTION Left 10/4/2018    Procedure: L4 AND L5 TRANSFORAMINAL EPIDURAL STEROID INJECTION;  Surgeon: Paul Collins MD;  Location: [facility];  Service: Pain Management     EPIDURAL BLOCK INJECTION Left 5/1/2019    Procedure: L4-5 and L5-S1 transforaminal epidural steroid injection;  Surgeon: Paul Collins MD;  Location: [facility];  Service: Pain Management     FL GUIDED NEEDLE PLAC BX/ASP/INJ  5/1/2019    ID COLONOSCOPY FLX DX W/COLLJ SPEC WHEN PFRMD N/A 5/31/2016    Procedure: COLONOSCOPY;  Surgeon: Jesus Alberto Huggins MD;  Location: [facility];  Service: Gastroenterology    ID ESOPHAGOGASTRODUODENOSCOPY TRANSORAL  DIAGNOSTIC N/A 11/15/2016    Procedure: ESOPHAGOGASTRODUODENOSCOPY (EGD);  Surgeon: Jesus Alberto Huggins MD;  Location: [facility];  Service: Gastroenterology    DC TYMPANOPLASTY W/O MASTOIDECT W/O OSSICLE RECNSTJ Left 10/25/2019    Procedure: LEFT TYMPANOPLASTY;  Surgeon: Elias Santos MD;  Location:  MAIN OR;  Service: ENT    TUBAL LIGATION     [3]   Family History  Problem Relation Name Age of Onset    Uterine cancer Mother      Early death Father      Heart disease Brother      No Known Problems Sister annabel     No Known Problems Daughter elda     No Known Problems Sister edwina     No Known Problems Daughter petty     No Known Problems Maternal Aunt josemanuel     No Known Problems Maternal Aunt iesha     No Known Problems Maternal Aunt estsandeep     No Known Problems Maternal Aunt adrian     No Known Problems Maternal Grandmother      No Known Problems Maternal Grandfather      No Known Problems Paternal Grandmother      No Known Problems Paternal Grandfather     [4]   Social History  Tobacco Use    Smoking status: Never     Passive exposure: Never    Smokeless tobacco: Never   Vaping Use    Vaping status: Never Used   Substance and Sexual Activity    Alcohol use: Not Currently    Drug use: No    Sexual activity: Not Currently     Partners: Male     Birth control/protection: Female Sterilization   [5]   Current Outpatient Medications on File Prior to Visit   Medication Sig    atorvastatin (LIPITOR) 20 mg tablet take 1 tablet by mouth once daily    chlorthalidone 25 mg tablet take 1 tablet by mouth once daily    estradiol (ESTRACE) 0.1 mg/g vaginal cream INSERT 0.5G INTO THE VAGINA TWICE A WEEK    levothyroxine 25 mcg tablet take 1 tablet by mouth once daily    potassium chloride (Klor-Con M20) 20 mEq tablet Take 1 tablet (20 mEq total) by mouth 2 (two) times a day with meals    Restasis 0.05 % ophthalmic emulsion Administer 1 drop to both eyes every 12 (twelve) hours    [DISCONTINUED] amLODIPine (NORVASC) 5 mg tablet Take  1 tablet (5 mg total) by mouth daily    acetaminophen (TYLENOL) 650 mg CR tablet Take 1 tablet (650 mg total) by mouth every 8 (eight) hours as needed for mild pain or moderate pain (Patient not taking: Reported on 7/8/2025)    [DISCONTINUED] cetirizine (ZyrTEC) 10 mg tablet Take 1 tablet (10 mg total) by mouth daily (Patient not taking: Reported on 7/8/2025)    [DISCONTINUED] famotidine (PEPCID) 20 mg tablet Take 1 tablet (20 mg total) by mouth 2 (two) times a day as needed for heartburn or indigestion (Patient not taking: Reported on 7/8/2025)

## 2025-07-08 NOTE — ASSESSMENT & PLAN NOTE
-Patient has bilateral leg swelling that occurs 1x/month  -TSH lab ordered  -Stopped amlodipine  -Recheck in 5-6 weeks

## 2025-07-09 ENCOUNTER — OFFICE VISIT (OUTPATIENT)
Dept: PHYSICAL THERAPY | Facility: CLINIC | Age: 60
End: 2025-07-09
Payer: COMMERCIAL

## 2025-07-09 DIAGNOSIS — G89.29 CHRONIC SHOULDER PAIN, UNSPECIFIED LATERALITY: Primary | ICD-10-CM

## 2025-07-09 DIAGNOSIS — I10 BENIGN ESSENTIAL HTN: ICD-10-CM

## 2025-07-09 DIAGNOSIS — M25.519 CHRONIC SHOULDER PAIN, UNSPECIFIED LATERALITY: Primary | ICD-10-CM

## 2025-07-09 DIAGNOSIS — M25.561 CHRONIC PAIN OF RIGHT KNEE: ICD-10-CM

## 2025-07-09 DIAGNOSIS — G89.29 CHRONIC BACK PAIN, UNSPECIFIED BACK LOCATION, UNSPECIFIED BACK PAIN LATERALITY: ICD-10-CM

## 2025-07-09 DIAGNOSIS — G89.29 CHRONIC PAIN OF RIGHT KNEE: ICD-10-CM

## 2025-07-09 DIAGNOSIS — M54.9 CHRONIC BACK PAIN, UNSPECIFIED BACK LOCATION, UNSPECIFIED BACK PAIN LATERALITY: ICD-10-CM

## 2025-07-09 PROCEDURE — 97140 MANUAL THERAPY 1/> REGIONS: CPT

## 2025-07-09 PROCEDURE — 97110 THERAPEUTIC EXERCISES: CPT

## 2025-07-09 RX ORDER — CHLORTHALIDONE 25 MG/1
25 TABLET ORAL DAILY
Qty: 90 TABLET | Refills: 3 | Status: SHIPPED | OUTPATIENT
Start: 2025-07-09

## 2025-07-09 NOTE — PROGRESS NOTES
"Daily Note     Today's date: 2025  Patient name: Lindsey Wilson  : 1965  MRN: [id number]  Referring provider: Kennedy Rojas MD  Dx:   Encounter Diagnosis     ICD-10-CM    1. Chronic shoulder pain, unspecified laterality  M25.519     G89.29       2. Chronic back pain, unspecified back location, unspecified back pain laterality  M54.9     G89.29       3. Chronic pain of right knee  M25.561     G89.29                      Subjective: Patient continues to report some pain within her B/L shoulders and back.       Objective: See treatment diary below      Assessment: Tolerated treatment well. Patient continues to make progress with PT. Patient was introduced to MTP this morning to improve B/L shoulder strength. Patient did note some fatigue at the end of her session this morning due to addressing multiple areas of the body. Patient would continue to benefit from skilled PT to improve her strength, increase ROM, and improve overall function to allow for a better QoL.       Plan: Continue per plan of care.  Progress note during next visit.      Precautions: *Avoid aggravating activity and factors at this time*      Manuals     STM B UT, RTC BM MD BM MD HERNANDEZ                           Neuro Re-Ed        Scapular Retraction w/ ER  x10 X10  x10 X10  x10   MTP     L2 x10    LTP                                         Ther Ex        UT stretch 20\" Hold x4 ea NT 20\" Hold x4 ea 20\" hold x4 ea 20\" Hold x4 ea   LS stretch 20\" Hold x4 ea NT 20\" Hold x4 ea 20\" hold x4 ea 20\" Hold x4 ea   Sleeper stretch 10\" Hold x10 ea 10\" hold x10 ea 10\" Hold x10 ea 10\" hold x10 ea 10\" Hold x10 ea   Table Slides Horizontal  2x10 ea NT 2x10 ea 2x10 ea  2x10 ea   Table Slides Flexion  2x10 ea 2x10 ea  2x10 ea 2x10 ea 2x10 ea   S/L ER  2x10 ea 2x10 ea 2x10 ea 2x10 ea  2x10 ea   S/L ABD  2x10 ea 2x10 ea 2x10 ea 2x10 ea  2x10 ea   TB IR           TB ER         LTR's    X10 ea X10 ea     Standing Lumbar Extension w/ " "counter support  NT NT   3\" Hold x10   Hamstring Stretch  10\" Hold x5 ea on 8\" step standing  10\" Hold x5 ea on 8\" step in standing 10\" Hold x5 ea on 8' step standing 10\" hold x5 ea side 8\" step standing  10\" Hold x5 ea on 8' step standing   Gastroc Stretch  10\" Hold x5 ea 1/2 foam  20\" hold x4 ea 1/2 foam 20\" Hold x4 ea in 1/2 foam 10\" hold x4 1/2 foam  10\" Hold x5 ea in 1/2 hoam   Corner Stretch  10\" Hold x10  10\" hold x10  10\" Hold x10 10\" hold x10  10\" Hold x10   AAROM Pulleys Flx/Scap 3\" Hold 2x10  3\" Hold 2x10  3\" Hold 2x10 3\" hold 2x10  3\" Hold 2x10   HEP Instruction  BM       Ther Activity                        Gait Training                        Modalities        MHP  def def   5' Pre   Ice def def   5' Post                             "

## 2025-07-11 ENCOUNTER — EVALUATION (OUTPATIENT)
Dept: PHYSICAL THERAPY | Facility: CLINIC | Age: 60
End: 2025-07-11
Payer: COMMERCIAL

## 2025-07-11 DIAGNOSIS — G89.29 CHRONIC SHOULDER PAIN, UNSPECIFIED LATERALITY: Primary | ICD-10-CM

## 2025-07-11 DIAGNOSIS — M25.519 CHRONIC SHOULDER PAIN, UNSPECIFIED LATERALITY: Primary | ICD-10-CM

## 2025-07-11 DIAGNOSIS — M54.9 CHRONIC BACK PAIN, UNSPECIFIED BACK LOCATION, UNSPECIFIED BACK PAIN LATERALITY: ICD-10-CM

## 2025-07-11 DIAGNOSIS — G89.29 CHRONIC BACK PAIN, UNSPECIFIED BACK LOCATION, UNSPECIFIED BACK PAIN LATERALITY: ICD-10-CM

## 2025-07-11 DIAGNOSIS — G89.29 CHRONIC PAIN OF RIGHT KNEE: ICD-10-CM

## 2025-07-11 DIAGNOSIS — M25.561 CHRONIC PAIN OF RIGHT KNEE: ICD-10-CM

## 2025-07-11 LAB — COLOGUARD RESULT REPORTABLE: NEGATIVE

## 2025-07-11 PROCEDURE — 97110 THERAPEUTIC EXERCISES: CPT

## 2025-07-11 PROCEDURE — 97164 PT RE-EVAL EST PLAN CARE: CPT

## 2025-07-11 NOTE — PROGRESS NOTES
PT Re-Evaluation     Today's date: 2025  Patient name: Lindsey Wilson  : 1965  MRN: [id number]  Referring provider: Kennedy Rojas MD  Dx:   Encounter Diagnosis     ICD-10-CM    1. Chronic shoulder pain, unspecified laterality  M25.519     G89.29       2. Chronic back pain, unspecified back location, unspecified back pain laterality  M54.9     G89.29       3. Chronic pain of right knee  M25.561     G89.29                      Assessment  Impairments: abnormal gait, abnormal or restricted ROM, abnormal movement, activity intolerance, impaired physical strength, lacks appropriate home exercise program, pain with function, poor posture  and activity limitations  Symptom irritability: moderate    Assessment details: Lindsey Wilson is a 60 y.o. female presenting to Physical Therapy today with the chief complaints of b/l shoulder pain, lumbar spine pain, cervical spine pain, and R knee pain. Upon re-evaluation, patient still has limited B/L shoulder ROM and decreased shoulder strength. Patient is limited in cervical and lumbar spine ROM with noted hypertonic QL and paraspinals on R and L. Patient has most of her pain in her low back and knees compared to little pain in the B/L shoulders. Patient is agreeable to continuing PT to improve low back and knee pain and improve core stability.  Understanding of Dx/Px/POC: fair     Prognosis: good    Goals  STG: (6 weeks)  1.) Patient will initiate HEP Independently - MET  2.) Patient will have a 50% reduction in overall symptoms - Progressing   3.) Patient will demonstrate improved b/l shoulder and R knee strength by 50% in all planes of motion - Progressing   4.) Patient will demonstrate improved ability to perform overhead activity >/= 2 minutes - Progressing  5.) Patient will demonstrate improved thoracolumbar mobility by 50% in all planes of motion - Progressing     LTG: (12 weeks)  1.) Patient will be d/c to an HEP independently - Progressing   2.) Patient  will improve functional abilities evidenced by FOTO scores - Progressing   3.) Eliminate pain with functional activity - Progressing   4.) Patient will demonstrate improved ability to lift, push, pull, and carry safely w/o symptoms - Progressing   5.) Patient will demonstrate improved b/l shoulder and R knee strength by 90% in all planes of motion as evidence of restored function - Progressing   6.) Patient will demonstrate improved thoracolumbar mobility by 90% in all planes of motion as evidence of restored function - Progressing     Plan  Patient would benefit from: skilled physical therapy  Referral necessary: No  Planned modality interventions: cryotherapy and thermotherapy: hydrocollator packs    Planned therapy interventions: functional ROM exercises, graded activity, graded exercise, graded motor, home exercise program, flexibility, joint mobilization, manual therapy, neuromuscular re-education, patient education, postural training, self care, strengthening, stretching, therapeutic activities, therapeutic exercise and therapeutic training    Frequency: 1-2x week  Duration in weeks: 8  Plan of Care beginning date: 6/4/2025  Plan of Care expiration date: 7/30/2025  Treatment plan discussed with: patient  Plan details: Plan of care was reviewed and discussed thoroughly with the patient on their current condition. Patient was instructed on a HEP with written instructions. Patient is in agreement with PT recommendations and will attend Physical Therapy 1-2x/week for the next 8 weeks to address current deficits.          Subjective Evaluation    History of Present Illness  Mechanism of injury: The patient reports today for re-evalautation and notes that PT is helping her. Patient has noted some improvements with function at home. Patient still continues to have pain occasionally in the low back and the knees. Patient notes that attempting to walk fast causes the pain in the knees. When she is lifting heavy things  she continues to have pain in the low back. Very little pain in the shoulders.           Recurrent probem    Quality of life: good    Patient Goals  Patient goals for therapy: decreased pain, increased motion, increased strength, independence with ADLs/IADLs and return to sport/leisure activities    Pain  Current pain ratin  At best pain ratin  At worst pain ratin  Location: Low back, bilateral shoulders, cervical spine, R knee  Quality: dull ache (squeezing.)  Relieving factors: medications  Aggravating factors: lifting, overhead activity, walking, standing and stair climbing (transfers, ADL's, IADL's, pushing, pulling, carrying)    Hand dominance: left    Treatments  Current treatment: physical therapy        Objective     Concurrent Complaints  Positive for night pain and disturbed sleep. Negative for bladder dysfunction, bowel dysfunction and saddle (S4) numbness    Postural Observations  Seated posture: poor  Standing posture: poor    Additional Postural Observation Details  Patient demonstrates increased thoracic kyphosis, rounded shoulders, and forward head with postural mechanics.    Palpation   Left   Hypertonic in the lumbar paraspinals and quadratus lumborum.     Right   Hypertonic in the lumbar paraspinals and quadratus lumborum.     Neurological Testing     Sensation     Lumbar   Left   Intact: light touch    Right   Intact: light touch    Active Range of Motion   Cervical/Thoracic Spine       Cervical    Flexion:  with pain Restriction level: minimal  Extension:  with pain Restriction level: moderate  Left lateral flexion:  with pain Restriction level: moderate  Right lateral flexion:  with pain Restriction level moderate  Left rotation:  with pain Restriction level: moderate  Right rotation:  Restriction level: moderate  Left Shoulder   Flexion: 115 degrees with pain  Abduction: 117 degrees with pain  External rotation 0°: WFL  External rotation 90°: 48 degrees with pain  External rotation  "BTH: WFL  Internal rotation 0°: WFL  Internal rotation 90°: 50 degrees with pain  Internal rotation BTB: Active internal rotation behind the back: GT. with pain    Right Shoulder   Flexion: 116 degrees with pain  Abduction: 130 degrees with pain  External rotation 0°: WFL  External rotation 90°: 56 degreeswith pain  External rotation BTH: WFL  Internal rotation 0°: WFL  Internal rotation 90°: 48 degrees with pain  Internal rotation BTB: Active internal rotation behind the back: L4-L5. with pain    Lumbar   Flexion:  Restriction level: minimal  Extension:  with pain Restriction level: moderate  Left lateral flexion:  with pain Restriction level: moderate  Right lateral flexion:  with pain Restriction level: moderate  Left rotation:  with pain Restriction level: moderate  Right rotation:  with pain Restriction level: moderate  Left Knee   Flexion: WFL    Right Knee   Flexion: 125 degrees with pain    Strength/Myotome Testing     Left Shoulder     Planes of Motion   Flexion: 3+   Abduction: 3+   External rotation at 0°: 4   Internal rotation at 0°: 4     Isolated Muscles   Supraspinatus: 3-     Right Shoulder     Planes of Motion   Flexion: 3+   Abduction: 3+   External rotation at 0°: 4   Internal rotation at 0°: 4     Isolated Muscles   Supraspinatus: 3-     Left Knee   Flexion: 4+  Extension: 4+  Quadriceps contraction: fair    Right Knee   Flexion: 4-  Extension: 4-  Quadriceps contraction: fair    Ambulation     Comments   Patient ambulates w/o an AD.  Gait Mechanics: antalgic             Precautions: *Avoid aggravating activity and factors at this time*      Manuals 6/25 6/27 7/2 7/9 7/11   STM B UT, RTC BM MD BM MD                            Neuro Re-Ed        Scapular Retraction w/ ER  x10 X10  x10 X10  X10 w/o band    L1 x10    MTP     L2 x10 L2 2x10    LTP                                         Ther Ex        UT stretch 20\" Hold x4 ea NT 20\" Hold x4 ea 20\" hold x4 ea NT   LS stretch 20\" Hold x4 ea NT 20\" " "Hold x4 ea 20\" hold x4 ea NT   Sleeper stretch 10\" Hold x10 ea 10\" hold x10 ea 10\" Hold x10 ea 10\" hold x10 ea 10\" hold x10 ea   Table Slides Horizontal  2x10 ea NT 2x10 ea 2x10 ea  2x10 ea   Table Slides Flexion  2x10 ea 2x10 ea  2x10 ea 2x10 ea 2x10 ea   S/L ER  2x10 ea 2x10 ea 2x10 ea 2x10 ea  2x10 ea    S/L ABD  2x10 ea 2x10 ea 2x10 ea 2x10 ea  2x10 ea    TB IR           TB ER         LTR's    X10 ea X10 ea  X10 ea    Standing Lumbar Extension w/ counter support  NT NT      Hamstring Stretch  10\" Hold x5 ea on 8\" step standing  10\" Hold x5 ea on 8\" step in standing 10\" Hold x5 ea on 8' step standing 10\" hold x5 ea side 8\" step standing  10\" Hold x5 ea on 8' step standing   Gastroc Stretch  10\" Hold x5 ea 1/2 foam  20\" hold x4 ea 1/2 foam 20\" Hold x4 ea in 1/2 foam 10\" hold x4 1/2 foam  10\" Hold x5 ea in 1/2 hoam   Corner Stretch  10\" Hold x10  10\" hold x10  10\" Hold x10 10\" hold x10  10x10\" hold   AAROM Pulleys Flx/Scap 3\" Hold 2x10  3\" Hold 2x10  3\" Hold 2x10 3\" hold 2x10  NT   HEP Instruction  BM       Ther Activity                        Gait Training                        Modalities        MHP  def def      Ice def def               "

## 2025-07-16 ENCOUNTER — OFFICE VISIT (OUTPATIENT)
Dept: PHYSICAL THERAPY | Facility: CLINIC | Age: 60
End: 2025-07-16
Payer: COMMERCIAL

## 2025-07-16 DIAGNOSIS — M25.561 CHRONIC PAIN OF RIGHT KNEE: ICD-10-CM

## 2025-07-16 DIAGNOSIS — G89.29 CHRONIC SHOULDER PAIN, UNSPECIFIED LATERALITY: Primary | ICD-10-CM

## 2025-07-16 DIAGNOSIS — G89.29 CHRONIC PAIN OF RIGHT KNEE: ICD-10-CM

## 2025-07-16 DIAGNOSIS — M54.9 CHRONIC BACK PAIN, UNSPECIFIED BACK LOCATION, UNSPECIFIED BACK PAIN LATERALITY: ICD-10-CM

## 2025-07-16 DIAGNOSIS — G89.29 CHRONIC BACK PAIN, UNSPECIFIED BACK LOCATION, UNSPECIFIED BACK PAIN LATERALITY: ICD-10-CM

## 2025-07-16 DIAGNOSIS — M25.519 CHRONIC SHOULDER PAIN, UNSPECIFIED LATERALITY: Primary | ICD-10-CM

## 2025-07-16 PROCEDURE — 97110 THERAPEUTIC EXERCISES: CPT

## 2025-07-16 PROCEDURE — 97140 MANUAL THERAPY 1/> REGIONS: CPT

## 2025-07-16 NOTE — PROGRESS NOTES
"Daily Note     Today's date: 2025  Patient name: Lindsey Wilson  : 1965  MRN: [id number]  Referring provider: Kennedy Rojas MD  Dx:   Encounter Diagnosis     ICD-10-CM    1. Chronic shoulder pain, unspecified laterality  M25.519     G89.29       2. Chronic back pain, unspecified back location, unspecified back pain laterality  M54.9     G89.29       3. Chronic pain of right knee  M25.561     G89.29                      Subjective: Patient reports continued improvement in bilateral shoulder pain. She notes still experiencing low back pain.      Objective: See treatment diary below      Assessment: Tolerated treatment well. PT progressed the current program with the addition of loaded lumbar extension with good response in symptomatology. She demonstrated with increased myofascial restrictions in the lumbar spine paraspinals and quadratus lumborum. PT addressed this with STM. Patient would benefit from continued PT.       Plan: Continue per plan of care.      Precautions: *Avoid aggravating activity and factors at this time*      Manuals    STM B UT, RTC, PS, QL  BM Lumbar PS/QL  MD BM MD                            Neuro Re-Ed        Scapular Retraction w/ ER  X10  L1 x10  X10  x10 X10  X10 w/o band    L1 x10    MTP  L2 2x10    L2 x10 L2 2x10    LTP                                         Ther Ex        UT stretch 20\" Hold x4 ea NT 20\" Hold x4 ea 20\" hold x4 ea NT   LS stretch 20\" Hold x4 ea NT 20\" Hold x4 ea 20\" hold x4 ea NT   Sleeper stretch 10\" Hold x10 ea 10\" hold x10 ea 10\" Hold x10 ea 10\" hold x10 ea 10\" hold x10 ea   Table Slides Horizontal  HEP  NT 2x10 ea 2x10 ea  2x10 ea   Table Slides Flexion  HEP  2x10 ea  2x10 ea 2x10 ea 2x10 ea   S/L ER  NT  2x10 ea 2x10 ea 2x10 ea  2x10 ea    S/L ABD  NT  2x10 ea 2x10 ea 2x10 ea  2x10 ea    TB IR           TB ER         LTR's  X10 ea  X10 ea X10 ea  X10 ea    Standing Lumbar Extension w/ counter support  X10  NT      Hamstring " "Stretch  10\" Hold x5 ea on 8\" step standing  10\" Hold x5 ea on 8\" step in standing 10\" Hold x5 ea on 8' step standing 10\" hold x5 ea side 8\" step standing  10\" Hold x5 ea on 8' step standing   Gastroc Stretch  10\" Hold x5 ea 1/2 foam  20\" hold x4 ea 1/2 foam 20\" Hold x4 ea in 1/2 foam 10\" hold x4 1/2 foam  10\" Hold x5 ea in 1/2 hoam   Corner Stretch  10\" Hold x10  10\" hold x10  10\" Hold x10 10\" hold x10  10x10\" hold   AAROM Pulleys Flx/Scap 3\" Hold 2x10  3\" Hold 2x10  3\" Hold 2x10 3\" hold 2x10  NT   HEP Instruction  BM       Ther Activity                        Gait Training                        Modalities        MHP   def      Ice  def                    "

## 2025-07-18 ENCOUNTER — OFFICE VISIT (OUTPATIENT)
Dept: PHYSICAL THERAPY | Facility: CLINIC | Age: 60
End: 2025-07-18
Payer: COMMERCIAL

## 2025-07-18 DIAGNOSIS — M25.519 CHRONIC SHOULDER PAIN, UNSPECIFIED LATERALITY: Primary | ICD-10-CM

## 2025-07-18 DIAGNOSIS — G89.29 CHRONIC SHOULDER PAIN, UNSPECIFIED LATERALITY: Primary | ICD-10-CM

## 2025-07-18 DIAGNOSIS — M25.561 CHRONIC PAIN OF RIGHT KNEE: ICD-10-CM

## 2025-07-18 DIAGNOSIS — M54.9 CHRONIC BACK PAIN, UNSPECIFIED BACK LOCATION, UNSPECIFIED BACK PAIN LATERALITY: ICD-10-CM

## 2025-07-18 DIAGNOSIS — G89.29 CHRONIC PAIN OF RIGHT KNEE: ICD-10-CM

## 2025-07-18 DIAGNOSIS — G89.29 CHRONIC BACK PAIN, UNSPECIFIED BACK LOCATION, UNSPECIFIED BACK PAIN LATERALITY: ICD-10-CM

## 2025-07-18 PROCEDURE — 97110 THERAPEUTIC EXERCISES: CPT

## 2025-07-18 PROCEDURE — 97140 MANUAL THERAPY 1/> REGIONS: CPT

## 2025-07-18 NOTE — PROGRESS NOTES
"Daily Note     Today's date: 2025  Patient name: Lindsey Wilson  : 1965  MRN: [id number]  Referring provider: Kennedy Rojas MD  Dx:   Encounter Diagnosis     ICD-10-CM    1. Chronic shoulder pain, unspecified laterality  M25.519     G89.29       2. Chronic back pain, unspecified back location, unspecified back pain laterality  M54.9     G89.29       3. Chronic pain of right knee  M25.561     G89.29                      Subjective: Patient reports she still has some discomfort in the back and shoulders after last session.       Objective: See treatment diary below      Assessment: Tolerated treatment well. Patient continues to note some pain along the R scapular region, especially after LS stretching. Patient does have some low back discomfort as well during session. PT provided STM to lumbar paraspinals and QL B/L to reduce tightness and reduce pain. Patient would continue to benefit from skilled PT to improve impairments and increase overall function to allow for a better QoL.       Plan: Continue per plan of care.      Precautions: *Avoid aggravating activity and factors at this time*      Manuals    STM B UT, RTC, PS, QL  BM Lumbar PS/QL  MD Lumbar PS/QL BM MD                            Neuro Re-Ed        Scapular Retraction w/ ER  X10  L1 x10   L1 x10  x10 X10  X10 w/o band    L1 x10    MTP  L2 2x10  L2 2x10   L2 x10 L2 2x10    LTP                                         Ther Ex        UT stretch 20\" Hold x4 ea 20\" hold x4 ea 20\" Hold x4 ea 20\" hold x4 ea NT   LS stretch 20\" Hold x4 ea 20\" hold x4 ea  20\" Hold x4 ea 20\" hold x4 ea NT   Sleeper stretch 10\" Hold x10 ea 10\" hold x10 ea  10\" Hold x10 ea 10\" hold x10 ea 10\" hold x10 ea   Table Slides Horizontal  HEP   2x10 ea 2x10 ea  2x10 ea   Table Slides Flexion  HEP   2x10 ea 2x10 ea 2x10 ea   S/L ER  NT   2x10 ea 2x10 ea  2x10 ea    S/L ABD  NT   2x10 ea 2x10 ea  2x10 ea    TB IR           TB ER         LTR's  X10 ea 2x10 ea " "X10 ea X10 ea  X10 ea    Standing Lumbar Extension w/ counter support  X10  X10       Hamstring Stretch  10\" Hold x5 ea on 8\" step standing  10\" hold x5 ea on 8\" step standing  10\" Hold x5 ea on 8' step standing 10\" hold x5 ea side 8\" step standing  10\" Hold x5 ea on 8' step standing   Gastroc Stretch  10\" Hold x5 ea 1/2 foam  10\" hold x5 ea 1/2 roll 20\" Hold x4 ea in 1/2 foam 10\" hold x4 1/2 foam  10\" Hold x5 ea in 1/2 hoam   Corner Stretch  10\" Hold x10  10\" hold x10  10\" Hold x10 10\" hold x10  10x10\" hold   AAROM Pulleys Flx/Scap 3\" Hold 2x10  3\" Hold 2x10  3\" Hold 2x10 3\" hold 2x10  NT   HEP Instruction  BM       Ther Activity                        Gait Training                        Modalities        MHP         Ice                        "

## 2025-07-21 DIAGNOSIS — E87.6 HYPOKALEMIA: ICD-10-CM

## 2025-07-21 RX ORDER — POTASSIUM CHLORIDE 1500 MG/1
20 TABLET, EXTENDED RELEASE ORAL 2 TIMES DAILY WITH MEALS
Qty: 180 TABLET | Refills: 3 | Status: SHIPPED | OUTPATIENT
Start: 2025-07-21

## 2025-07-24 ENCOUNTER — HOSPITAL ENCOUNTER (OUTPATIENT)
Dept: MAMMOGRAPHY | Facility: HOSPITAL | Age: 60
Discharge: HOME | End: 2025-07-24
Payer: COMMERCIAL

## 2025-07-24 ENCOUNTER — HOSPITAL ENCOUNTER (OUTPATIENT)
Dept: ULTRASOUND IMAGING | Facility: HOSPITAL | Age: 60
Discharge: HOME | End: 2025-07-24
Payer: COMMERCIAL

## 2025-07-24 VITALS — WEIGHT: 142 LBS | BODY MASS INDEX: 28.63 KG/M2 | HEIGHT: 59 IN

## 2025-07-24 DIAGNOSIS — R92.8 MAMMOGRAM ABNORMAL: ICD-10-CM

## 2025-07-24 PROCEDURE — 76642 ULTRASOUND BREAST LIMITED: CPT

## 2025-07-24 PROCEDURE — G0279 TOMOSYNTHESIS, MAMMO: HCPCS

## 2025-07-24 PROCEDURE — 77065 DX MAMMO INCL CAD UNI: CPT

## 2025-07-25 ENCOUNTER — OFFICE VISIT (OUTPATIENT)
Dept: PHYSICAL THERAPY | Facility: CLINIC | Age: 60
End: 2025-07-25
Payer: COMMERCIAL

## 2025-07-25 DIAGNOSIS — M25.519 CHRONIC SHOULDER PAIN, UNSPECIFIED LATERALITY: Primary | ICD-10-CM

## 2025-07-25 DIAGNOSIS — G89.29 CHRONIC PAIN OF RIGHT KNEE: ICD-10-CM

## 2025-07-25 DIAGNOSIS — M54.9 CHRONIC BACK PAIN, UNSPECIFIED BACK LOCATION, UNSPECIFIED BACK PAIN LATERALITY: ICD-10-CM

## 2025-07-25 DIAGNOSIS — G89.29 CHRONIC BACK PAIN, UNSPECIFIED BACK LOCATION, UNSPECIFIED BACK PAIN LATERALITY: ICD-10-CM

## 2025-07-25 DIAGNOSIS — G89.29 CHRONIC SHOULDER PAIN, UNSPECIFIED LATERALITY: Primary | ICD-10-CM

## 2025-07-25 DIAGNOSIS — M25.561 CHRONIC PAIN OF RIGHT KNEE: ICD-10-CM

## 2025-07-25 PROCEDURE — 97140 MANUAL THERAPY 1/> REGIONS: CPT

## 2025-07-25 PROCEDURE — 97110 THERAPEUTIC EXERCISES: CPT

## 2025-07-25 NOTE — PROGRESS NOTES
"Daily Note     Today's date: 2025  Patient name: Lindsey Wilson  : 1965  MRN: [id number]  Referring provider: Kennedy Rojas MD  Dx:   Encounter Diagnosis     ICD-10-CM    1. Chronic shoulder pain, unspecified laterality  M25.519     G89.29       2. Chronic back pain, unspecified back location, unspecified back pain laterality  M54.9     G89.29       3. Chronic pain of right knee  M25.561     G89.29                      Subjective: Patient reports that she is doing okay this morning. Patient notes that her back is improving a little.       Objective: See treatment diary below      Assessment: Tolerated treatment well. Patient is continuing to make progress with POC. Patient has improved standing lumbar extension ROM this morning with little discomfort. Patient was introduced to LTP to improve her posture and increase strength of the back. STM to paraspinals and QL were performed to reduce tightness and increase ROM. Patient does have noted hypertonicity of the upper lumbar paraspinals this morning. Patient would benefit from continued skilled PT to improve impairments and increase overall function to allow for a better QoL.      Plan: Continue per plan of care.      Precautions: *Avoid aggravating activity and factors at this time*      Manuals    STM B UT, RTC, PS, QL  BM Lumbar PS/QL  MD Lumbar PS/QL MD Lumbar PS/QL MD                            Neuro Re-Ed        Scapular Retraction w/ ER  X10  L1 x10   L1 x10  L1 2x10  X10  X10 w/o band    L1 x10    MTP  L2 2x10  L2 2x10  L2 2x10  L2 x10 L2 2x10    LTP    L2 x10                                      Ther Ex        UT stretch 20\" Hold x4 ea 20\" hold x4 ea 20\" hold x4 ea  20\" hold x4 ea NT   LS stretch 20\" Hold x4 ea 20\" hold x4 ea  20\" hold x4 B/L  20\" hold x4 ea NT   Sleeper stretch 10\" Hold x10 ea 10\" hold x10 ea  10\" hold x10 ea  10\" hold x10 ea 10\" hold x10 ea   Table Slides Horizontal  HEP    2x10 ea  2x10 ea   Table " "Slides Flexion  HEP    2x10 ea 2x10 ea   S/L ER  NT    2x10 ea  2x10 ea    S/L ABD  NT    2x10 ea  2x10 ea    TB IR           TB ER         LTR's  X10 ea 2x10 ea NT X10 ea  X10 ea    Standing Lumbar Extension w/ counter support  X10  X10  X10      Hamstring Stretch  10\" Hold x5 ea on 8\" step standing  10\" hold x5 ea on 8\" step standing  10\" hold x5 ea on 8\" step standing  10\" hold x5 ea side 8\" step standing  10\" Hold x5 ea on 8' step standing   Gastroc Stretch  10\" Hold x5 ea 1/2 foam  10\" hold x5 ea 1/2 roll 10\" hold x5 ea 1/2 roll 10\" hold x4 1/2 foam  10\" Hold x5 ea in 1/2 hoam   Corner Stretch  10\" Hold x10  10\" hold x10  10\" hold x10 10\" hold x10  10x10\" hold   AAROM Pulleys Flx/Scap 3\" Hold 2x10  3\" Hold 2x10  3\" hold 2x10  3\" hold 2x10  NT   HEP Instruction  BM       Ther Activity                        Gait Training                        Modalities        MHP         Ice                          "

## 2025-07-30 ENCOUNTER — EVALUATION (OUTPATIENT)
Dept: PHYSICAL THERAPY | Facility: CLINIC | Age: 60
End: 2025-07-30
Payer: COMMERCIAL

## 2025-07-30 DIAGNOSIS — M25.519 CHRONIC SHOULDER PAIN, UNSPECIFIED LATERALITY: Primary | ICD-10-CM

## 2025-07-30 DIAGNOSIS — G89.29 CHRONIC PAIN OF RIGHT KNEE: ICD-10-CM

## 2025-07-30 DIAGNOSIS — M54.9 CHRONIC BACK PAIN, UNSPECIFIED BACK LOCATION, UNSPECIFIED BACK PAIN LATERALITY: ICD-10-CM

## 2025-07-30 DIAGNOSIS — G89.29 CHRONIC SHOULDER PAIN, UNSPECIFIED LATERALITY: Primary | ICD-10-CM

## 2025-07-30 DIAGNOSIS — G89.29 CHRONIC BACK PAIN, UNSPECIFIED BACK LOCATION, UNSPECIFIED BACK PAIN LATERALITY: ICD-10-CM

## 2025-07-30 DIAGNOSIS — M25.561 CHRONIC PAIN OF RIGHT KNEE: ICD-10-CM

## 2025-07-30 PROCEDURE — 97110 THERAPEUTIC EXERCISES: CPT

## 2025-07-30 PROCEDURE — 97140 MANUAL THERAPY 1/> REGIONS: CPT

## 2025-08-06 ENCOUNTER — OFFICE VISIT (OUTPATIENT)
Dept: PHYSICAL THERAPY | Facility: CLINIC | Age: 60
End: 2025-08-06
Payer: COMMERCIAL

## 2025-08-06 DIAGNOSIS — G89.29 CHRONIC BACK PAIN, UNSPECIFIED BACK LOCATION, UNSPECIFIED BACK PAIN LATERALITY: ICD-10-CM

## 2025-08-06 DIAGNOSIS — M54.9 CHRONIC BACK PAIN, UNSPECIFIED BACK LOCATION, UNSPECIFIED BACK PAIN LATERALITY: ICD-10-CM

## 2025-08-06 DIAGNOSIS — M25.561 CHRONIC PAIN OF RIGHT KNEE: ICD-10-CM

## 2025-08-06 DIAGNOSIS — M25.519 CHRONIC SHOULDER PAIN, UNSPECIFIED LATERALITY: Primary | ICD-10-CM

## 2025-08-06 DIAGNOSIS — G89.29 CHRONIC PAIN OF RIGHT KNEE: ICD-10-CM

## 2025-08-06 DIAGNOSIS — G89.29 CHRONIC SHOULDER PAIN, UNSPECIFIED LATERALITY: Primary | ICD-10-CM

## 2025-08-06 PROCEDURE — 97110 THERAPEUTIC EXERCISES: CPT

## 2025-08-06 PROCEDURE — 97140 MANUAL THERAPY 1/> REGIONS: CPT

## 2025-08-08 ENCOUNTER — OFFICE VISIT (OUTPATIENT)
Dept: PHYSICAL THERAPY | Facility: CLINIC | Age: 60
End: 2025-08-08
Payer: COMMERCIAL

## 2025-08-08 DIAGNOSIS — M25.519 CHRONIC SHOULDER PAIN, UNSPECIFIED LATERALITY: Primary | ICD-10-CM

## 2025-08-08 DIAGNOSIS — G89.29 CHRONIC PAIN OF RIGHT KNEE: ICD-10-CM

## 2025-08-08 DIAGNOSIS — G89.29 CHRONIC SHOULDER PAIN, UNSPECIFIED LATERALITY: Primary | ICD-10-CM

## 2025-08-08 DIAGNOSIS — M54.9 CHRONIC BACK PAIN, UNSPECIFIED BACK LOCATION, UNSPECIFIED BACK PAIN LATERALITY: ICD-10-CM

## 2025-08-08 DIAGNOSIS — M25.561 CHRONIC PAIN OF RIGHT KNEE: ICD-10-CM

## 2025-08-08 DIAGNOSIS — G89.29 CHRONIC BACK PAIN, UNSPECIFIED BACK LOCATION, UNSPECIFIED BACK PAIN LATERALITY: ICD-10-CM

## 2025-08-08 PROCEDURE — 97140 MANUAL THERAPY 1/> REGIONS: CPT

## 2025-08-08 PROCEDURE — 97110 THERAPEUTIC EXERCISES: CPT

## 2025-08-12 ENCOUNTER — OFFICE VISIT (OUTPATIENT)
Age: 60
End: 2025-08-12

## 2025-08-15 ENCOUNTER — OFFICE VISIT (OUTPATIENT)
Dept: PHYSICAL THERAPY | Facility: CLINIC | Age: 60
End: 2025-08-15
Payer: COMMERCIAL

## 2025-08-20 ENCOUNTER — OFFICE VISIT (OUTPATIENT)
Dept: PHYSICAL THERAPY | Facility: CLINIC | Age: 60
End: 2025-08-20
Payer: COMMERCIAL

## 2025-08-20 DIAGNOSIS — M25.561 CHRONIC PAIN OF RIGHT KNEE: ICD-10-CM

## 2025-08-20 DIAGNOSIS — M25.519 CHRONIC SHOULDER PAIN, UNSPECIFIED LATERALITY: Primary | ICD-10-CM

## 2025-08-20 DIAGNOSIS — G89.29 CHRONIC PAIN OF RIGHT KNEE: ICD-10-CM

## 2025-08-20 DIAGNOSIS — G89.29 CHRONIC SHOULDER PAIN, UNSPECIFIED LATERALITY: Primary | ICD-10-CM

## 2025-08-20 DIAGNOSIS — G89.29 CHRONIC BACK PAIN, UNSPECIFIED BACK LOCATION, UNSPECIFIED BACK PAIN LATERALITY: ICD-10-CM

## 2025-08-20 DIAGNOSIS — M54.9 CHRONIC BACK PAIN, UNSPECIFIED BACK LOCATION, UNSPECIFIED BACK PAIN LATERALITY: ICD-10-CM

## 2025-08-20 PROCEDURE — 97110 THERAPEUTIC EXERCISES: CPT

## (undated) DEVICE — SPONGE STICK WITH PVP-I: Brand: KENDALL

## (undated) DEVICE — PENCIL ELECTROSURG E-Z CLEAN -0035H

## (undated) DEVICE — PLASTIC ADHESIVE BANDAGE: Brand: CURITY

## (undated) DEVICE — NEEDLE 27 G X 1 1/4

## (undated) DEVICE — BIPOLAR CORD DISP

## (undated) DEVICE — TRAY EPIDURAL PERIFIX 20GA X 3.5IN TUOHY 8ML

## (undated) DEVICE — FLEXIBLE ADHESIVE BANDAGE,X-LARGE: Brand: CURITY

## (undated) DEVICE — SYRINGE 5ML LL

## (undated) DEVICE — INTENDED FOR TISSUE SEPARATION, AND OTHER PROCEDURES THAT REQUIRE A SHARP SURGICAL BLADE TO PUNCTURE OR CUT.: Brand: BARD-PARKER SAFETY BLADES SIZE 15, STERILE

## (undated) DEVICE — GLOVE SRG BIOGEL 8

## (undated) DEVICE — CHLORAPREP HI-LITE 10.5ML ORANGE

## (undated) DEVICE — STERILE RUBBER BANDS

## (undated) DEVICE — IV CATH 18 G X 1.16 IN

## (undated) DEVICE — 3000CC GUARDIAN II: Brand: GUARDIAN

## (undated) DEVICE — NEEDLE SPINAL 22G X 3.5IN  QUINCKE

## (undated) DEVICE — SURGIFOAM 7 X 12 SPONGE ABS

## (undated) DEVICE — ELECTRODE BLADE MOD E-Z CLEAN  2.75IN 7CM -0012AM

## (undated) DEVICE — COTTON BALLS: Brand: DEROYAL

## (undated) DEVICE — DRAPE SURGIKIT SADDLE BAG

## (undated) DEVICE — STERILE EAR PACK: Brand: CARDINAL HEALTH

## (undated) DEVICE — GLOVE SRG BIOGEL ORTHOPEDIC 7

## (undated) DEVICE — IV EXTENSION TUBING 33 IN

## (undated) DEVICE — SUT MONOCRYL 4-0 P-3 18 IN Y494G

## (undated) DEVICE — SUT CHROMIC 4-0 P-3 18 MM 1654G

## (undated) DEVICE — SYRINGE 3ML LL